# Patient Record
Sex: MALE | Race: WHITE | Employment: OTHER | URBAN - METROPOLITAN AREA
[De-identification: names, ages, dates, MRNs, and addresses within clinical notes are randomized per-mention and may not be internally consistent; named-entity substitution may affect disease eponyms.]

---

## 2017-11-09 PROBLEM — F09 COGNITIVE DYSFUNCTION: Status: ACTIVE | Noted: 2017-11-09

## 2018-03-15 ENCOUNTER — NURSE ONLY (OUTPATIENT)
Dept: CARDIOLOGY CLINIC | Age: 63
End: 2018-03-15
Payer: MEDICARE

## 2018-03-15 DIAGNOSIS — I48.20 CHRONIC ATRIAL FIBRILLATION (HCC): ICD-10-CM

## 2018-03-15 LAB
INTERNATIONAL NORMALIZATION RATIO, POC: 3
PROTHROMBIN TIME, POC: NORMAL

## 2018-03-15 PROCEDURE — 85610 PROTHROMBIN TIME: CPT | Performed by: INTERNAL MEDICINE

## 2018-03-15 PROCEDURE — 93793 ANTICOAG MGMT PT WARFARIN: CPT | Performed by: INTERNAL MEDICINE

## 2018-04-12 ENCOUNTER — NURSE ONLY (OUTPATIENT)
Dept: CARDIOLOGY CLINIC | Age: 63
End: 2018-04-12
Payer: MEDICARE

## 2018-04-12 DIAGNOSIS — I48.20 CHRONIC ATRIAL FIBRILLATION (HCC): ICD-10-CM

## 2018-04-12 LAB
INTERNATIONAL NORMALIZATION RATIO, POC: 2.7
PROTHROMBIN TIME, POC: NORMAL

## 2018-04-12 PROCEDURE — 85610 PROTHROMBIN TIME: CPT | Performed by: INTERNAL MEDICINE

## 2018-04-12 PROCEDURE — 93793 ANTICOAG MGMT PT WARFARIN: CPT | Performed by: INTERNAL MEDICINE

## 2018-05-10 ENCOUNTER — NURSE ONLY (OUTPATIENT)
Dept: CARDIOLOGY CLINIC | Age: 63
End: 2018-05-10
Payer: MEDICARE

## 2018-05-10 DIAGNOSIS — I48.20 CHRONIC ATRIAL FIBRILLATION (HCC): ICD-10-CM

## 2018-05-10 LAB
INTERNATIONAL NORMALIZATION RATIO, POC: 2
PROTHROMBIN TIME, POC: NORMAL

## 2018-05-10 PROCEDURE — 93793 ANTICOAG MGMT PT WARFARIN: CPT | Performed by: INTERNAL MEDICINE

## 2018-05-10 PROCEDURE — 85610 PROTHROMBIN TIME: CPT | Performed by: INTERNAL MEDICINE

## 2018-06-07 ENCOUNTER — NURSE ONLY (OUTPATIENT)
Dept: CARDIOLOGY CLINIC | Age: 63
End: 2018-06-07
Payer: MEDICARE

## 2018-06-07 DIAGNOSIS — I48.20 CHRONIC ATRIAL FIBRILLATION (HCC): ICD-10-CM

## 2018-06-07 LAB
INTERNATIONAL NORMALIZATION RATIO, POC: 2.5
PROTHROMBIN TIME, POC: NORMAL

## 2018-06-07 PROCEDURE — 93793 ANTICOAG MGMT PT WARFARIN: CPT | Performed by: INTERNAL MEDICINE

## 2018-06-07 PROCEDURE — 85610 PROTHROMBIN TIME: CPT | Performed by: INTERNAL MEDICINE

## 2018-06-27 RX ORDER — CARVEDILOL 12.5 MG/1
TABLET ORAL
Qty: 180 TABLET | Refills: 3 | Status: SHIPPED | OUTPATIENT
Start: 2018-06-27 | End: 2019-05-02 | Stop reason: SDUPTHER

## 2018-07-12 ENCOUNTER — NURSE ONLY (OUTPATIENT)
Dept: CARDIOLOGY CLINIC | Age: 63
End: 2018-07-12
Payer: MEDICARE

## 2018-07-12 DIAGNOSIS — I48.20 CHRONIC ATRIAL FIBRILLATION (HCC): ICD-10-CM

## 2018-07-12 LAB
INTERNATIONAL NORMALIZATION RATIO, POC: 3.3
PROTHROMBIN TIME, POC: ABNORMAL

## 2018-07-12 PROCEDURE — 85610 PROTHROMBIN TIME: CPT | Performed by: INTERNAL MEDICINE

## 2018-08-10 ENCOUNTER — NURSE ONLY (OUTPATIENT)
Dept: CARDIOLOGY CLINIC | Age: 63
End: 2018-08-10
Payer: MEDICARE

## 2018-08-10 DIAGNOSIS — I48.20 CHRONIC ATRIAL FIBRILLATION (HCC): ICD-10-CM

## 2018-08-10 LAB
INTERNATIONAL NORMALIZATION RATIO, POC: 2.3
PROTHROMBIN TIME, POC: NORMAL

## 2018-08-10 PROCEDURE — 85610 PROTHROMBIN TIME: CPT | Performed by: INTERNAL MEDICINE

## 2018-08-10 PROCEDURE — 93793 ANTICOAG MGMT PT WARFARIN: CPT | Performed by: INTERNAL MEDICINE

## 2018-09-14 ENCOUNTER — NURSE ONLY (OUTPATIENT)
Dept: CARDIOLOGY CLINIC | Age: 63
End: 2018-09-14
Payer: MEDICARE

## 2018-09-14 DIAGNOSIS — I48.91 ATRIAL FIBRILLATION, UNSPECIFIED TYPE (HCC): Primary | ICD-10-CM

## 2018-09-14 LAB
INTERNATIONAL NORMALIZATION RATIO, POC: 3.7
PROTHROMBIN TIME, POC: ABNORMAL

## 2018-09-14 PROCEDURE — 85610 PROTHROMBIN TIME: CPT | Performed by: INTERNAL MEDICINE

## 2018-10-12 ENCOUNTER — ANTI-COAG VISIT (OUTPATIENT)
Dept: CARDIOLOGY CLINIC | Age: 63
End: 2018-10-12
Payer: MEDICARE

## 2018-10-12 ENCOUNTER — TELEPHONE (OUTPATIENT)
Dept: CARDIOLOGY CLINIC | Age: 63
End: 2018-10-12

## 2018-10-12 ENCOUNTER — OFFICE VISIT (OUTPATIENT)
Dept: CARDIOLOGY CLINIC | Age: 63
End: 2018-10-12
Payer: MEDICARE

## 2018-10-12 ENCOUNTER — HOSPITAL ENCOUNTER (OUTPATIENT)
Age: 63
Discharge: HOME | End: 2018-10-12
Payer: MEDICARE

## 2018-10-12 VITALS
SYSTOLIC BLOOD PRESSURE: 134 MMHG | HEIGHT: 68 IN | DIASTOLIC BLOOD PRESSURE: 78 MMHG | HEART RATE: 77 BPM | BODY MASS INDEX: 32.13 KG/M2 | WEIGHT: 212 LBS

## 2018-10-12 DIAGNOSIS — I42.8 NONISCHEMIC CARDIOMYOPATHY (HCC): Primary | ICD-10-CM

## 2018-10-12 DIAGNOSIS — I50.42 CHRONIC COMBINED SYSTOLIC AND DIASTOLIC CONGESTIVE HEART FAILURE (HCC): ICD-10-CM

## 2018-10-12 DIAGNOSIS — I48.21 PERMANENT ATRIAL FIBRILLATION (HCC): ICD-10-CM

## 2018-10-12 DIAGNOSIS — N18.30 CHRONIC KIDNEY DISEASE, STAGE III (MODERATE) (HCC): ICD-10-CM

## 2018-10-12 DIAGNOSIS — I10 ESSENTIAL HYPERTENSION: ICD-10-CM

## 2018-10-12 DIAGNOSIS — I48.91 ATRIAL FIBRILLATION, UNSPECIFIED TYPE (HCC): ICD-10-CM

## 2018-10-12 DIAGNOSIS — F09 COGNITIVE DYSFUNCTION: ICD-10-CM

## 2018-10-12 DIAGNOSIS — I48.20 CHRONIC ATRIAL FIBRILLATION (HCC): ICD-10-CM

## 2018-10-12 LAB
ANION GAP SERPL CALCULATED.3IONS-SCNC: 11 MMOL/L (ref 7–16)
BUN BLDV-MCNC: 25 MG/DL (ref 8–23)
CALCIUM SERPL-MCNC: 9.3 MG/DL (ref 8.6–10.2)
CHLORIDE BLD-SCNC: 102 MMOL/L (ref 98–107)
CO2: 26 MMOL/L (ref 22–29)
CREAT SERPL-MCNC: 1.2 MG/DL (ref 0.7–1.2)
GFR AFRICAN AMERICAN: >60
GFR NON-AFRICAN AMERICAN: >60 ML/MIN/1.73
GLUCOSE BLD-MCNC: 109 MG/DL (ref 74–109)
INTERNATIONAL NORMALIZATION RATIO, POC: 2.6
POTASSIUM SERPL-SCNC: 4.3 MMOL/L (ref 3.5–5)
PROTHROMBIN TIME, POC: NORMAL
SODIUM BLD-SCNC: 139 MMOL/L (ref 132–146)

## 2018-10-12 PROCEDURE — 99214 OFFICE O/P EST MOD 30 MIN: CPT | Performed by: INTERNAL MEDICINE

## 2018-10-12 PROCEDURE — 80048 BASIC METABOLIC PNL TOTAL CA: CPT

## 2018-10-12 PROCEDURE — G8417 CALC BMI ABV UP PARAM F/U: HCPCS | Performed by: INTERNAL MEDICINE

## 2018-10-12 PROCEDURE — 1036F TOBACCO NON-USER: CPT | Performed by: INTERNAL MEDICINE

## 2018-10-12 PROCEDURE — 4040F PNEUMOC VAC/ADMIN/RCVD: CPT | Performed by: INTERNAL MEDICINE

## 2018-10-12 PROCEDURE — 85610 PROTHROMBIN TIME: CPT | Performed by: INTERNAL MEDICINE

## 2018-10-12 PROCEDURE — 1123F ACP DISCUSS/DSCN MKR DOCD: CPT | Performed by: INTERNAL MEDICINE

## 2018-10-12 PROCEDURE — 93000 ELECTROCARDIOGRAM COMPLETE: CPT | Performed by: INTERNAL MEDICINE

## 2018-10-12 PROCEDURE — G8484 FLU IMMUNIZE NO ADMIN: HCPCS | Performed by: INTERNAL MEDICINE

## 2018-10-12 PROCEDURE — 36415 COLL VENOUS BLD VENIPUNCTURE: CPT

## 2018-10-12 PROCEDURE — 3017F COLORECTAL CA SCREEN DOC REV: CPT | Performed by: INTERNAL MEDICINE

## 2018-10-12 PROCEDURE — G8427 DOCREV CUR MEDS BY ELIG CLIN: HCPCS | Performed by: INTERNAL MEDICINE

## 2018-10-12 PROCEDURE — 1101F PT FALLS ASSESS-DOCD LE1/YR: CPT | Performed by: INTERNAL MEDICINE

## 2018-10-12 NOTE — PROGRESS NOTES
OUTPATIENT CARDIOLOGY FOLLOW-UP    Name: Sherly Gordon    Age: 70 y.o. Primary Care Physician: Ilene Fischer MD    Date of Service: 10/12/2018    Chief Complaint: Ischemic cardiomyopathy, chronic combined systolic and diastolic cardiac, permanent atrial fibrillation, hypertension, chronic kidney disease    Interim History: Since his most recent assessment, the patient is remained stable from a cardiovascular standpoint with no interim symptoms of anginal-like chest discomfort or other ischemic equivalents. He denies changes of his functional capabilities or manifestations suggestive of decompensated left systolic dysfunction or volume overload. He has noted no arrhythmia related symptoms and denies symptoms of a focal neurologic origin nor bleeding in the face of chronic oral anticoagulation. The time of present evaluation he remains normotensive. Review of Systems: The remainder of a complete multisystem review including consitutional, central nervous, respiratory, circulatory, gastrointestinal, genitourinary, endocrinologic, hematologic, musculoskeletal and psychiatric are negative. Past Medical History:  Past Medical History:   Diagnosis Date    CHF (congestive heart failure) ([address] 5/03    severely impaired LV systolic function and CHF, EF 25-30%    H/O cardiovascular stress test 5/03    No evidence of stress-induced ischemia    H/O Doppler echocardiogram 5/04/10    [facility], mildly dilated LV, mod concentric LVH, normal LV systolic function, mod dilated left atrium, trace MR    Hypertension     Obesity     Sarcoidosis        Past Surgical History:  Past Surgical History:   Procedure Laterality Date    TONSILLECTOMY         Family History:  Family History   Problem Relation Age of Onset    Diabetes Mother     Diabetes Father     Hypertension Sister        Social History:  Social History     Social History    Marital status:       Spouse name: N/A    Number of children: N/A    Years chemistries have been advised. Continued appropriate monitoring of anticoagulation goals of maintaining prothrombin times and range of 2.0-3.0 times INR control will be essential to reducing risk of future atherosclerotic development. I presently plan to continue annual cardiovascular reassessment would happily reevaluate him in the interim should additional cardiovascular difficulties or concerns arise. The patient's current medication list, allergies, problem list and results of all previously ordered testing were reviewed at today's visit. Follow-up office visit in 1 year      Note: This report was completed using computerized voice recognition software. Every effort has been made to ensure accuracy, however; and invert and computerized transcription errors may be present. Spring Kendrick.  Pool Gupta, [facility] Cardiology

## 2018-11-15 ENCOUNTER — ANTI-COAG VISIT (OUTPATIENT)
Dept: CARDIOLOGY CLINIC | Age: 63
End: 2018-11-15
Payer: MEDICARE

## 2018-11-15 DIAGNOSIS — I48.91 ATRIAL FIBRILLATION, UNSPECIFIED TYPE (HCC): ICD-10-CM

## 2018-11-15 LAB
INTERNATIONAL NORMALIZATION RATIO, POC: 1.9
PROTHROMBIN TIME, POC: NORMAL

## 2018-11-15 PROCEDURE — 85610 PROTHROMBIN TIME: CPT | Performed by: INTERNAL MEDICINE

## 2018-11-15 PROCEDURE — 93793 ANTICOAG MGMT PT WARFARIN: CPT | Performed by: INTERNAL MEDICINE

## 2018-12-13 ENCOUNTER — ANTI-COAG VISIT (OUTPATIENT)
Dept: CARDIOLOGY CLINIC | Age: 63
End: 2018-12-13
Payer: MEDICARE

## 2018-12-13 DIAGNOSIS — I48.91 ATRIAL FIBRILLATION, UNSPECIFIED TYPE (HCC): ICD-10-CM

## 2018-12-13 LAB
INTERNATIONAL NORMALIZATION RATIO, POC: 2
PROTHROMBIN TIME, POC: NORMAL

## 2018-12-13 PROCEDURE — 85610 PROTHROMBIN TIME: CPT | Performed by: INTERNAL MEDICINE

## 2018-12-13 PROCEDURE — 93793 ANTICOAG MGMT PT WARFARIN: CPT | Performed by: INTERNAL MEDICINE

## 2019-01-17 ENCOUNTER — ANTI-COAG VISIT (OUTPATIENT)
Dept: CARDIOLOGY CLINIC | Age: 64
End: 2019-01-17
Payer: MEDICARE

## 2019-01-17 DIAGNOSIS — I48.91 ATRIAL FIBRILLATION, UNSPECIFIED TYPE (HCC): ICD-10-CM

## 2019-01-17 LAB
INTERNATIONAL NORMALIZATION RATIO, POC: 2.4
PROTHROMBIN TIME, POC: NORMAL

## 2019-01-17 PROCEDURE — 85610 PROTHROMBIN TIME: CPT | Performed by: INTERNAL MEDICINE

## 2019-01-17 PROCEDURE — 93793 ANTICOAG MGMT PT WARFARIN: CPT | Performed by: INTERNAL MEDICINE

## 2019-01-31 ENCOUNTER — OFFICE VISIT (OUTPATIENT)
Dept: FAMILY MEDICINE CLINIC | Age: 64
End: 2019-01-31
Payer: MEDICARE

## 2019-01-31 ENCOUNTER — HOSPITAL ENCOUNTER (OUTPATIENT)
Age: 64
Discharge: HOME | End: 2019-01-31
Payer: MEDICARE

## 2019-01-31 VITALS
RESPIRATION RATE: 20 BRPM | TEMPERATURE: 97.6 F | DIASTOLIC BLOOD PRESSURE: 82 MMHG | BODY MASS INDEX: 33.19 KG/M2 | HEART RATE: 86 BPM | HEIGHT: 68 IN | OXYGEN SATURATION: 98 % | SYSTOLIC BLOOD PRESSURE: 146 MMHG | WEIGHT: 219 LBS

## 2019-01-31 DIAGNOSIS — Z83.3 FAMILY HISTORY OF DIABETES MELLITUS: ICD-10-CM

## 2019-01-31 DIAGNOSIS — Z00.00 PREVENTATIVE HEALTH CARE: ICD-10-CM

## 2019-01-31 DIAGNOSIS — Z87.39 HISTORY OF GOUT: ICD-10-CM

## 2019-01-31 DIAGNOSIS — Z86.2 H/O SARCOIDOSIS: ICD-10-CM

## 2019-01-31 DIAGNOSIS — R79.9 ABNORMAL FINDING OF BLOOD CHEMISTRY: ICD-10-CM

## 2019-01-31 DIAGNOSIS — I42.8 NONISCHEMIC CARDIOMYOPATHY (HCC): ICD-10-CM

## 2019-01-31 DIAGNOSIS — E66.8 MODERATE OBESITY: ICD-10-CM

## 2019-01-31 DIAGNOSIS — I50.42 CHRONIC COMBINED SYSTOLIC AND DIASTOLIC CONGESTIVE HEART FAILURE (HCC): ICD-10-CM

## 2019-01-31 DIAGNOSIS — Z76.89 ENCOUNTER TO ESTABLISH CARE: Primary | ICD-10-CM

## 2019-01-31 DIAGNOSIS — S63.501A SPRAIN OF RIGHT WRIST, INITIAL ENCOUNTER: ICD-10-CM

## 2019-01-31 DIAGNOSIS — Z23 ENCOUNTER FOR IMMUNIZATION: ICD-10-CM

## 2019-01-31 DIAGNOSIS — I48.21 PERMANENT ATRIAL FIBRILLATION (HCC): ICD-10-CM

## 2019-01-31 DIAGNOSIS — I10 ESSENTIAL HYPERTENSION: ICD-10-CM

## 2019-01-31 PROCEDURE — G8484 FLU IMMUNIZE NO ADMIN: HCPCS | Performed by: FAMILY MEDICINE

## 2019-01-31 PROCEDURE — G0009 ADMIN PNEUMOCOCCAL VACCINE: HCPCS | Performed by: FAMILY MEDICINE

## 2019-01-31 PROCEDURE — 1123F ACP DISCUSS/DSCN MKR DOCD: CPT | Performed by: FAMILY MEDICINE

## 2019-01-31 PROCEDURE — G8417 CALC BMI ABV UP PARAM F/U: HCPCS | Performed by: FAMILY MEDICINE

## 2019-01-31 PROCEDURE — 1101F PT FALLS ASSESS-DOCD LE1/YR: CPT | Performed by: FAMILY MEDICINE

## 2019-01-31 PROCEDURE — 90670 PCV13 VACCINE IM: CPT | Performed by: FAMILY MEDICINE

## 2019-01-31 PROCEDURE — 99203 OFFICE O/P NEW LOW 30 MIN: CPT | Performed by: FAMILY MEDICINE

## 2019-01-31 PROCEDURE — 1036F TOBACCO NON-USER: CPT | Performed by: FAMILY MEDICINE

## 2019-01-31 PROCEDURE — 4040F PNEUMOC VAC/ADMIN/RCVD: CPT | Performed by: FAMILY MEDICINE

## 2019-01-31 PROCEDURE — 3017F COLORECTAL CA SCREEN DOC REV: CPT | Performed by: FAMILY MEDICINE

## 2019-01-31 PROCEDURE — G8427 DOCREV CUR MEDS BY ELIG CLIN: HCPCS | Performed by: FAMILY MEDICINE

## 2019-01-31 ASSESSMENT — PATIENT HEALTH QUESTIONNAIRE - PHQ9
1. LITTLE INTEREST OR PLEASURE IN DOING THINGS: 0
SUM OF ALL RESPONSES TO PHQ QUESTIONS 1-9: 0
SUM OF ALL RESPONSES TO PHQ9 QUESTIONS 1 & 2: 0
SUM OF ALL RESPONSES TO PHQ QUESTIONS 1-9: 0
2. FEELING DOWN, DEPRESSED OR HOPELESS: 0

## 2019-01-31 ASSESSMENT — ENCOUNTER SYMPTOMS
NAUSEA: 0
ABDOMINAL PAIN: 0
COUGH: 0
RHINORRHEA: 0
VOMITING: 0
SORE THROAT: 0
DIARRHEA: 0
BACK PAIN: 0
SHORTNESS OF BREATH: 0
SINUS PAIN: 0
CONSTIPATION: 0

## 2019-02-14 ENCOUNTER — ANTI-COAG VISIT (OUTPATIENT)
Dept: CARDIOLOGY CLINIC | Age: 64
End: 2019-02-14
Payer: MEDICARE

## 2019-02-14 DIAGNOSIS — I48.91 ATRIAL FIBRILLATION, UNSPECIFIED TYPE (HCC): ICD-10-CM

## 2019-02-14 LAB
INTERNATIONAL NORMALIZATION RATIO, POC: 3.7
PROTHROMBIN TIME, POC: ABNORMAL

## 2019-02-14 PROCEDURE — 93793 ANTICOAG MGMT PT WARFARIN: CPT | Performed by: INTERNAL MEDICINE

## 2019-02-14 PROCEDURE — 85610 PROTHROMBIN TIME: CPT | Performed by: INTERNAL MEDICINE

## 2019-03-14 ENCOUNTER — ANTI-COAG VISIT (OUTPATIENT)
Dept: CARDIOLOGY CLINIC | Age: 64
End: 2019-03-14
Payer: MEDICARE

## 2019-03-14 DIAGNOSIS — I48.91 ATRIAL FIBRILLATION, UNSPECIFIED TYPE (HCC): ICD-10-CM

## 2019-03-14 LAB
INTERNATIONAL NORMALIZATION RATIO, POC: 1.8
PROTHROMBIN TIME, POC: ABNORMAL

## 2019-03-14 PROCEDURE — 93793 ANTICOAG MGMT PT WARFARIN: CPT | Performed by: INTERNAL MEDICINE

## 2019-03-14 PROCEDURE — 85610 PROTHROMBIN TIME: CPT | Performed by: INTERNAL MEDICINE

## 2019-04-04 ENCOUNTER — ANTI-COAG VISIT (OUTPATIENT)
Dept: CARDIOLOGY CLINIC | Age: 64
End: 2019-04-04
Payer: MEDICARE

## 2019-04-04 ENCOUNTER — HOSPITAL ENCOUNTER (OUTPATIENT)
Age: 64
Discharge: HOME | End: 2019-04-04
Payer: MEDICARE

## 2019-04-04 DIAGNOSIS — I10 ESSENTIAL HYPERTENSION: ICD-10-CM

## 2019-04-04 DIAGNOSIS — Z00.00 PREVENTATIVE HEALTH CARE: ICD-10-CM

## 2019-04-04 DIAGNOSIS — R79.9 ABNORMAL FINDING OF BLOOD CHEMISTRY: ICD-10-CM

## 2019-04-04 DIAGNOSIS — Z87.39 HISTORY OF GOUT: ICD-10-CM

## 2019-04-04 DIAGNOSIS — I48.91 ATRIAL FIBRILLATION, UNSPECIFIED TYPE (HCC): ICD-10-CM

## 2019-04-04 DIAGNOSIS — Z83.3 FAMILY HISTORY OF DIABETES MELLITUS: ICD-10-CM

## 2019-04-04 DIAGNOSIS — E66.8 MODERATE OBESITY: ICD-10-CM

## 2019-04-04 LAB
ALBUMIN SERPL-MCNC: 4 G/DL (ref 3.5–5.2)
ALP BLD-CCNC: 79 U/L (ref 40–129)
ALT SERPL-CCNC: 40 U/L (ref 0–40)
ANION GAP SERPL CALCULATED.3IONS-SCNC: 9 MMOL/L (ref 7–16)
AST SERPL-CCNC: 29 U/L (ref 0–39)
BILIRUB SERPL-MCNC: 0.6 MG/DL (ref 0–1.2)
BUN BLDV-MCNC: 23 MG/DL (ref 8–23)
CALCIUM SERPL-MCNC: 9.4 MG/DL (ref 8.6–10.2)
CHLORIDE BLD-SCNC: 103 MMOL/L (ref 98–107)
CHOLESTEROL, TOTAL: 183 MG/DL (ref 0–199)
CO2: 29 MMOL/L (ref 22–29)
CREAT SERPL-MCNC: 1.3 MG/DL (ref 0.7–1.2)
GFR AFRICAN AMERICAN: >60
GFR NON-AFRICAN AMERICAN: 55 ML/MIN/1.73
GLUCOSE BLD-MCNC: 100 MG/DL (ref 74–99)
HBA1C MFR BLD: 5.5 % (ref 4–5.6)
HDLC SERPL-MCNC: 36 MG/DL
INTERNATIONAL NORMALIZATION RATIO, POC: 3
LDL CHOLESTEROL CALCULATED: 108 MG/DL (ref 0–99)
POTASSIUM SERPL-SCNC: 4.1 MMOL/L (ref 3.5–5)
PROTHROMBIN TIME, POC: NORMAL
SODIUM BLD-SCNC: 141 MMOL/L (ref 132–146)
TOTAL PROTEIN: 7 G/DL (ref 6.4–8.3)
TRIGL SERPL-MCNC: 193 MG/DL (ref 0–149)
URIC ACID, SERUM: 8.4 MG/DL (ref 3.4–7)
VLDLC SERPL CALC-MCNC: 39 MG/DL

## 2019-04-04 PROCEDURE — 80053 COMPREHEN METABOLIC PANEL: CPT

## 2019-04-04 PROCEDURE — 86803 HEPATITIS C AB TEST: CPT

## 2019-04-04 PROCEDURE — 83036 HEMOGLOBIN GLYCOSYLATED A1C: CPT

## 2019-04-04 PROCEDURE — 93793 ANTICOAG MGMT PT WARFARIN: CPT | Performed by: INTERNAL MEDICINE

## 2019-04-04 PROCEDURE — 80061 LIPID PANEL: CPT

## 2019-04-04 PROCEDURE — 84550 ASSAY OF BLOOD/URIC ACID: CPT

## 2019-04-04 PROCEDURE — 36415 COLL VENOUS BLD VENIPUNCTURE: CPT

## 2019-04-04 PROCEDURE — 85610 PROTHROMBIN TIME: CPT | Performed by: INTERNAL MEDICINE

## 2019-04-05 LAB — HEPATITIS C ANTIBODY INTERPRETATION: NORMAL

## 2019-05-02 ENCOUNTER — ANTI-COAG VISIT (OUTPATIENT)
Dept: CARDIOLOGY CLINIC | Age: 64
End: 2019-05-02
Payer: MEDICARE

## 2019-05-02 DIAGNOSIS — I48.91 ATRIAL FIBRILLATION, UNSPECIFIED TYPE (HCC): ICD-10-CM

## 2019-05-02 LAB
INTERNATIONAL NORMALIZATION RATIO, POC: 2
PROTHROMBIN TIME, POC: NORMAL

## 2019-05-02 PROCEDURE — 93793 ANTICOAG MGMT PT WARFARIN: CPT | Performed by: INTERNAL MEDICINE

## 2019-05-02 PROCEDURE — 85610 PROTHROMBIN TIME: CPT | Performed by: INTERNAL MEDICINE

## 2019-05-02 RX ORDER — DIGOXIN 125 MCG
TABLET ORAL
Qty: 90 TABLET | Refills: 3 | Status: SHIPPED | OUTPATIENT
Start: 2019-05-02 | End: 2019-12-12 | Stop reason: DRUGHIGH

## 2019-05-02 RX ORDER — WARFARIN SODIUM 5 MG/1
TABLET ORAL
Qty: 90 TABLET | Refills: 3 | Status: ON HOLD | OUTPATIENT
Start: 2019-05-02 | End: 2019-11-15 | Stop reason: HOSPADM

## 2019-05-02 RX ORDER — AMLODIPINE BESYLATE 5 MG/1
TABLET ORAL
Qty: 90 TABLET | Refills: 3 | Status: SHIPPED | OUTPATIENT
Start: 2019-05-02 | End: 2019-10-18 | Stop reason: ALTCHOICE

## 2019-05-02 RX ORDER — FUROSEMIDE 40 MG/1
TABLET ORAL
Qty: 90 TABLET | Refills: 3 | Status: SHIPPED | OUTPATIENT
Start: 2019-05-02 | End: 2019-12-12

## 2019-05-02 RX ORDER — CARVEDILOL 12.5 MG/1
TABLET ORAL
Qty: 180 TABLET | Refills: 3 | Status: SHIPPED | OUTPATIENT
Start: 2019-05-02 | End: 2019-10-18 | Stop reason: DRUGHIGH

## 2019-05-02 NOTE — PATIENT INSTRUCTIONS
INR 2.0 Continue alternating every 3 days 7.5 mg, 7.5 mg and 5 mg recheck in 1 month per Dr. Pinky Oneil

## 2019-05-08 ENCOUNTER — OFFICE VISIT (OUTPATIENT)
Dept: FAMILY MEDICINE CLINIC | Age: 64
End: 2019-05-08
Payer: MEDICARE

## 2019-05-08 VITALS
DIASTOLIC BLOOD PRESSURE: 72 MMHG | OXYGEN SATURATION: 98 % | SYSTOLIC BLOOD PRESSURE: 130 MMHG | TEMPERATURE: 97.7 F | WEIGHT: 223.8 LBS | HEART RATE: 80 BPM | RESPIRATION RATE: 16 BRPM | BODY MASS INDEX: 33.92 KG/M2 | HEIGHT: 68 IN

## 2019-05-08 DIAGNOSIS — E79.0 HYPERURICEMIA: ICD-10-CM

## 2019-05-08 DIAGNOSIS — Z23 NEED FOR TDAP VACCINATION: ICD-10-CM

## 2019-05-08 DIAGNOSIS — E78.2 MIXED HYPERLIPIDEMIA: ICD-10-CM

## 2019-05-08 DIAGNOSIS — Z12.11 SCREEN FOR COLON CANCER: ICD-10-CM

## 2019-05-08 DIAGNOSIS — Z00.00 ROUTINE GENERAL MEDICAL EXAMINATION AT A HEALTH CARE FACILITY: ICD-10-CM

## 2019-05-08 DIAGNOSIS — Z00.00 MEDICARE ANNUAL WELLNESS VISIT, INITIAL: Primary | ICD-10-CM

## 2019-05-08 PROCEDURE — 90471 IMMUNIZATION ADMIN: CPT | Performed by: FAMILY MEDICINE

## 2019-05-08 PROCEDURE — 4040F PNEUMOC VAC/ADMIN/RCVD: CPT | Performed by: FAMILY MEDICINE

## 2019-05-08 PROCEDURE — G0438 PPPS, INITIAL VISIT: HCPCS | Performed by: FAMILY MEDICINE

## 2019-05-08 PROCEDURE — 1123F ACP DISCUSS/DSCN MKR DOCD: CPT | Performed by: FAMILY MEDICINE

## 2019-05-08 PROCEDURE — 3017F COLORECTAL CA SCREEN DOC REV: CPT | Performed by: FAMILY MEDICINE

## 2019-05-08 PROCEDURE — 90715 TDAP VACCINE 7 YRS/> IM: CPT | Performed by: FAMILY MEDICINE

## 2019-05-08 ASSESSMENT — LIFESTYLE VARIABLES: HOW OFTEN DO YOU HAVE A DRINK CONTAINING ALCOHOL: 0

## 2019-05-08 ASSESSMENT — PATIENT HEALTH QUESTIONNAIRE - PHQ9
SUM OF ALL RESPONSES TO PHQ QUESTIONS 1-9: 0
SUM OF ALL RESPONSES TO PHQ QUESTIONS 1-9: 0

## 2019-05-08 ASSESSMENT — ANXIETY QUESTIONNAIRES: GAD7 TOTAL SCORE: 0

## 2019-05-08 NOTE — PROGRESS NOTES
Medicare Annual Wellness Visit  Name: Garrick Sites Date: 2019   MRN: <[id number]> Sex: Male   Age: 75 y.o. Ethnicity: Non-/Non    : 1955 Race: Michelle Lott is here for Medicare AWV (Fit test given )    Screenings for behavioral, psychosocial and functional/safety risks, and cognitive dysfunction are all negative except as indicated below. These results, as well as other patient data from the [address] form, are documented in Flowsheets linked to this Encounter. No Known Allergiesic  Prior to Visit Medications    Medication Sig Taking? Authorizing Provider   warfarin (COUMADIN) 5 MG tablet TAKE 1 TAB EVERY OTHER DAY ALTERNATING WITH 1&1/2 TAB ON IN BETWEEN DAYS OR AS DIRECTED BY DR. Prasad Loo Yes Delfina Luevano MD   furosemide (LASIX) 40 MG tablet Take 1 tablet by mouth daily. Yes Delfina Luevano MD   digoxin (LANOXIN) 125 MCG tablet TAKE 1 TABLET BY MOUTH EVERY DAY Yes Delfina Luevano MD   carvedilol (COREG) 12.5 MG tablet Take 1 tablet by mouth 2 times daily. Yes Delfina Luevano MD   amLODIPine (NORVASC) 5 MG tablet TAKE 1 TABLET BY MOUTH DAILY Yes Delfina Luevano MD   potassium chloride (KLOR-CON M) 10 MEQ extended release tablet TAKE 1 TABLET BY MOUTH DAILY Yes Lissy Lopez MD   candesartan (ATACAND) 32 MG tablet TAKE 1 TABLET BY MOUTH DAILY. Yes Lissy Lopez MD   enalapril (VASOTEC) 20 MG tablet TAKE 1 TABLET BY MOUTH 2 TIMES DAILY. Yes Lissy Lopez MD   aspirin 81 MG tablet Take 81 mg by mouth daily. Yes Historical Provider, MD   Cyanocobalamin (VITAMIN B-12 CR) 1000 MCG TBCR Take 2,000 mcg by mouth daily. Yes Historical Provider, MD   potassium chloride (KLOR-CON) 10 MEQ CR tablet Take 2 tablets by mouth daily. Lawyer Alis MD   ations    Medication Sig Taking?  Authorizing Provider   warfarin (COUMADIN) 5 MG tablet TAKE 1 TAB EVERY OTHER DAY ALTERNATING WITH 1&1/2 TAB ON IN BETWEEN DAYS OR AS DIRECTED BY DR. Prasad Loo Yes Dell López MD   furosemide (LASIX) 40 MG tablet Take 1 tablet by mouth daily. Yes Dell López MD   digoxin (LANOXIN) 125 MCG tablet TAKE 1 TABLET BY MOUTH EVERY DAY Yes Dell López MD   carvedilol (COREG) 12.5 MG tablet Take 1 tablet by mouth 2 times daily. Yes Dell López MD   amLODIPine (NORVASC) 5 MG tablet TAKE 1 TABLET BY MOUTH DAILY Yes Dell López MD   potassium chloride (KLOR-CON M) 10 MEQ extended release tablet TAKE 1 TABLET BY MOUTH DAILY Yes Willie Arnold MD   candesartan (ATACAND) 32 MG tablet TAKE 1 TABLET BY MOUTH DAILY. Yes Willie Arnold MD   enalapril (VASOTEC) 20 MG tablet TAKE 1 TABLET BY MOUTH 2 TIMES DAILY. Yes Willie Arnold MD   aspirin 81 MG tablet Take 81 mg by mouth daily. Yes Historical Provider, MD   Cyanocobalamin (VITAMIN B-12 CR) 1000 MCG TBCR Take 2,000 mcg by mouth daily. Yes Historical Provider, MD   potassium chloride (KLOR-CON) 10 MEQ CR tablet Take 2 tablets by mouth daily.   David Chawla MD      Diagnosis Date    CHF (congestive heart failure) ([facility] [address] 5/03    severely impaired LV systolic function and CHF, EF 25-30%    H/O cardiovascular stress test 5/03    No evidence of stress-induced ischemia    H/O Doppler echocardiogram 5/04/10    [facility], mildly dilated LV, mod concentric LVH, normal LV systolic function, mod dilated left atrium, trace MR    Hypertension     Obesity     Sarcoidosis      Past Surgical History:   Procedure Laterality Date    TONSILLECTOMY         Family History   Problem Relation Age of Onset    Diabetes Mother     Diabetes Father     Hypertension Sister        CareTeam (Including outside providers/suppliers regularly involved in providing care):   Patient Care Team:  Isatu Yi DO as PCP - General (Family Medicine)  Isatu Yi DO as PCP - [facility] Attributed Provider  David Chawla MD as Consulting Physician (Cardiology)  Willie Arnold MD as Consulting Physician (Cardiology)    Wt Readings from Interventions:  · No Living Will: patient declined    Health Habits/Nutrition:  Health Habits/Nutrition  Do you exercise for at least 20 minutes 2-3 times per week?: Yes  Have you lost any weight without trying in the past 3 months?: No  Do you eat fewer than 2 meals per day?: No  Have you seen a dentist within the past year?: (!) No  Body mass index is 34.03 kg/m².   Health Habits/Nutrition Interventions:  · Dental exam overdue:  patient encouraged to make appointment with his/her dentist    Hearing/Vision:  Hearing/Vision  Do you or your family notice any trouble with your hearing?: (!) Yes  Do you have difficulty driving, watching TV, or doing any of your daily activities because of your eyesight?: No  Have you had an eye exam within the past year?: (!) No  Hearing/Vision Interventions:  · Hearing concerns:  patient declines any further evaluation/treatment for hearing issues    Safety:  Safety  Do you have working smoke detectors?: (!) No  Have all throw rugs been removed or fastened?: Yes  Do you have non-slip mats in all bathtubs?: Yes  Do all of your stairways have a railing or banister?: Yes  Are your doorways, halls and stairs free of clutter?: Yes  Do you always fasten your seatbelt when you are in a car?: Yes  Safety Interventions:  · Home safety tips provided    Personalized Preventive Plan   Current Health Maintenance Status  Immunization History   Administered Date(s) Administered    Pneumococcal 13-valent Conjugate ([id number]) 01/31/2019    Tdap (Boostrix, Adacel) 05/08/2019        Health Maintenance   Topic Date Due    AAA screen  1955    Shingles Vaccine (1 of 2) 02/22/2003    Colon cancer screen colonoscopy  02/22/2003    Flu vaccine (Season Ended) 09/01/2019    Pneumococcal 65+ years Vaccine (2 of 2 - PPSV23) 01/31/2020    Potassium monitoring  04/04/2020    Creatinine monitoring  04/04/2020    Lipid screen  04/04/2024    DTaP/Tdap/Td vaccine (2 - Td) 05/08/2029    Hepatitis C screen  Completed     Recommendations for Preventive Services Due: see orders and patient instructions/AVS.  .   Recommended screening schedule for the next 5-10 years is provided to the patient in written form: see Patient Instructions/AVS.    Tdap given  ASCVD discussed, declining statin therapy at this time  Hyperuricemia discussed, will continue to monitor for gout symptoms    RTO in 1 year for AWV or sooner if needed    The 10-year ASCVD risk score (Carrington Amaya, et al., 2013) is: 18.3%    Values used to calculate the score:      Age: 75 years      Sex: Male      Is Non- : No      Diabetic: No      Tobacco smoker: No      Systolic Blood Pressure: 330 mmHg      Is BP treated: Yes      HDL Cholesterol: 36 mg/dL      Total Cholesterol: 183 mg/dL

## 2019-05-08 NOTE — PATIENT INSTRUCTIONS
Personalized Preventive Plan for Stuart Morales - 5/8/2019  Medicare offers a range of preventive health benefits. Some of the tests and screenings are paid in full while other may be subject to a deductible, co-insurance, and/or copay. Some of these benefits include a comprehensive review of your medical history including lifestyle, illnesses that may run in your family, and various assessments and screenings as appropriate. After reviewing your medical record and screening and assessments performed today your provider may have ordered immunizations, labs, imaging, and/or referrals for you. A list of these orders (if applicable) as well as your Preventive Care list are included within your After Visit Summary for your review. Other Preventive Recommendations:    · A preventive eye exam performed by an eye specialist is recommended every 1-2 years to screen for glaucoma; cataracts, macular degeneration, and other eye disorders. · A preventive dental visit is recommended every 6 months. · Try to get at least 150 minutes of exercise per week or 10,000 steps per day on a pedometer . · Order or download the FREE \"Exercise & Physical Activity: Your Everyday Guide\" from [employer]. Call 6-658.236.2163 or search [employer] online. · You need 3948-6579 mg of calcium and 6198-4657 IU of vitamin D per day. It is possible to meet your calcium requirement with diet alone, but a vitamin D supplement is usually necessary to meet this goal.  · When exposed to the sun, use a sunscreen that protects against both UVA and UVB radiation with an SPF of 30 or greater. Reapply every 2 to 3 hours or after sweating, drying off with a towel, or swimming. · Always wear a seat belt when traveling in a car. Always wear a helmet when riding a bicycle or motorcycle.       Patient Education        Tdap (Tetanus, Diphtheria, Pertussis) Vaccine: What You Need to Know  Why get vaccinated? Tetanus, diphtheria, and pertussis are very serious diseases. Tdap vaccine can protect us from these diseases. And Tdap vaccine given to pregnant women can protect  babies against pertussis. Tetanus (lockjaw) is rare in the [facility] today. It causes painful muscle tightening and stiffness, usually all over the body. · It can lead to tightening of muscles in the head and neck so you can't open your mouth, swallow, or sometimes even breathe. Tetanus kills about 1 out of 10 people who are infected even after receiving the best medical care. Diphtheria is also rare in the [place] today. It can cause a thick coating to form in the back of the throat. · It can lead to breathing problems, heart failure, paralysis, and death. Pertussis (whooping cough) causes severe coughing spells, which can cause difficulty breathing, vomiting, and disturbed sleep. · It can also lead to weight loss, incontinence, and rib fractures. Up to 2 in 100 adolescents and 5 in 100 adults with pertussis are hospitalized or have complications, which could include pneumonia or death. These diseases are caused by bacteria. Diphtheria and pertussis are spread from person to person through secretions from coughing or sneezing. Tetanus enters the body through cuts, scratches, or wounds. Before vaccines, as many as 200,000 cases of diphtheria, 200,000 cases of pertussis, and hundreds of cases of tetanus were reported in the [place] each year. Since vaccination began, reports of cases for tetanus and diphtheria have dropped by about 99% and for pertussis by about 80%. Tdap vaccine  The Tdap vaccine can protect adolescents and adults from tetanus, diphtheria, and pertussis. One dose of Tdap is routinely given at age 6 or 15. People who did not get Tdap at that age should get it as soon as possible.   Tdap is especially important for health care professionals and anyone having close contact with a baby younger than 12 months. Pregnant women should get a dose of Tdap during every pregnancy, to protect the  from pertussis. Infants are most at risk for severe, life-threatening complications from pertussis. Another vaccine, called Td, protects against tetanus and diphtheria, but not pertussis. A Td booster should be given every 10 years. Tdap may be given as one of these boosters if you have never gotten Tdap before. Tdap may also be given after a severe cut or burn to prevent tetanus infection. Your doctor or the person giving you the vaccine can give you more information. Tdap may safely be given at the same time as other vaccines. Some people should not get this vaccine  · A person who has ever had a life-threatening allergic reaction after a previous dose of any diphtheria-, tetanus-, or pertussis-containing vaccine, OR has a severe allergy to any part of this vaccine, should not get Tdap vaccine. Tell the person giving the vaccine about any severe allergies. · Anyone who had coma or long repeated seizures within 7 days after a childhood dose of DTP or DTaP, or a previous dose of Tdap, should not get Tdap, unless a cause other than the vaccine was found. They can still get Td. · Talk to your doctor if you:  ? Have seizures or another nervous system problem. ? Had severe pain or swelling after any vaccine containing diphtheria, tetanus, or pertussis. ? Ever had a condition called Guillain-Barré Syndrome (GBS). ? Aren't feeling well on the day the shot is scheduled. Risks  With any medicine, including vaccines, there is a chance of side effects. These are usually mild and go away on their own. Serious reactions are also possible but are rare. Most people who get Tdap vaccine do not have any problems with it.   Mild problems following Tdap  (Did not interfere with activities)  · Pain where the shot was given (about 3 in 4 adolescents or 2 in 3 adults)  · Redness or swelling where the shot was given (about 1 person in 5)  · Mild fever of at least 100.4°F (up to about 1 in 25 adolescents or 1 in 100 adults)  · Headache (about 3 or 4 people in 10)  · Tiredness (about 1 person in 3 or 4)  · Nausea, vomiting, diarrhea, stomachache (up to 1 in 4 adolescents or 1 in 10 adults)  · Chills, sore joints (about 1 person in 10)  · Body aches (about 1 person in 3 or 4)  · Rash, swollen glands (uncommon)  Moderate problems following Tdap  (Interfered with activities, but did not require medical attention)  · Pain where the shot was given (up to 1 in 5 or 6)  · Redness or swelling where the shot was given (up to about 1 in 16 adolescents or 1 in 12 adults)  · Fever over 102°F (about 1 in 100 adolescents or 1 in 250 adults)  · Headache (about 1 in 7 adolescents or 1 in 10 adults)  · Nausea, vomiting, diarrhea, stomachache (up to 1 to 3 people in 100)  · Swelling of the entire arm where the shot was given (up to about 1 in 500)  Severe problems following Tdap  (Unable to perform usual activities; required medical attention)  · Swelling, severe pain, bleeding and redness in the arm where the shot was given (rare)  Problems that could happen after any vaccine:  · People sometimes faint after a medical procedure, including vaccination. Sitting or lying down for about 15 minutes can help prevent fainting, and injuries caused by a fall. Tell your doctor if you feel dizzy or have vision changes or ringing in the ears. · Some people get severe pain in the shoulder and have difficulty moving the arm where a shot was given. This happens very rarely. · Any medication can cause a severe allergic reaction. Such reactions from a vaccine are very rare, estimated at fewer than 1 in a million doses, and would happen within a few minutes to a few hours after the vaccination. As with any medicine, there is a very remote chance of a vaccine causing a serious injury or death. The safety of vaccines is always being monitored.  For more information, visit: www.cdc.gov/vaccinesafety. What if there is a serious problem? What should I look for? · Look for anything that concerns you, such as signs of a severe allergic reaction, very high fever, or unusual behavior. Signs of a severe allergic reaction can include hives, swelling of the face and throat, difficulty breathing, a fast heartbeat, dizziness, and weakness. These would usually start a few minutes to a few hours after the vaccination. What should I do? · If you think it is a severe allergic reaction or other emergency that can't wait, call 9-1-1 or get the person to the nearest hospital. Otherwise, call your doctor. · Afterward, the reaction should be reported to the Vaccine Adverse Event Reporting System (VAERS). Your doctor might file this report, or you can do it yourself through the VAERS web site at www.vaers. hhs.gov, or by calling 0-240.460.5140. VAERS does not give medical advice. The National Vaccine Injury Compensation Program  The National Vaccine Injury Compensation Program (VICP) is a federal program that was created to compensate people who may have been injured by certain vaccines. Persons who believe they may have been injured by a vaccine can learn about the program and about filing a claim by calling 0-313.857.4038 or visiting the [facility] [employer] website at www.[facility].gov/vaccinecompensation. There is a time limit to file a claim for compensation. How can I learn more? · Ask your doctor. He or she can give you the vaccine package insert or suggest other sources of information. · Call your local or state health department. · Contact the Centers for Disease Control and Prevention (CDC):  ? Call 2-358.859.6454 (1-800-CDC-INFO) or  ? Visit CDC's website at www.cdc.gov/vaccines  Vaccine Information Statement (Interim)  Tdap Vaccine  (2/24/15)  42 MAR Fischer [id number]-[id number]  Department of Health and Human Services  Centers for Disease Control and Prevention  Many Vaccine Information Statements are available in Occitan and other languages. See www.immunize.org/vis. Muchas hojas de información sobre vacunas están disponibles en español y en otros idiomas. Visite www.immunize.org/vis. Care instructions adapted under license by [facility] ([facility]). If you have questions about a medical condition or this instruction, always ask your healthcare professional. [place] [address] any warranty or liability for your use of this information. Personalized Preventive Plan for Stuart Morales - 5/8/2019  Medicare offers a range of preventive health benefits. Some of the tests and screenings are paid in full while other may be subject to a deductible, co-insurance, and/or copay. Some of these benefits include a comprehensive review of your medical history including lifestyle, illnesses that may run in your family, and various assessments and screenings as appropriate. After reviewing your medical record and screening and assessments performed today your provider may have ordered immunizations, labs, imaging, and/or referrals for you. A list of these orders (if applicable) as well as your Preventive Care list are included within your After Visit Summary for your review. Other Preventive Recommendations:    · A preventive eye exam performed by an eye specialist is recommended every 1-2 years to screen for glaucoma; cataracts, macular degeneration, and other eye disorders. · A preventive dental visit is recommended every 6 months. · Try to get at least 150 minutes of exercise per week or 10,000 steps per day on a pedometer . · Order or download the FREE \"Exercise & Physical Activity: Your Everyday Guide\" from [employer]. Call 2-903.403.8272 or search [employer] online. · You need 3914-3115 mg of calcium and 5185-5632 IU of vitamin D per day.  It is possible to meet your calcium requirement with diet alone, but a vitamin D supplement is usually necessary to meet this goal.  · When exposed to the sun, use a sunscreen that protects against both UVA and UVB radiation with an SPF of 30 or greater. Reapply every 2 to 3 hours or after sweating, drying off with a towel, or swimming. · Always wear a seat belt when traveling in a car. Always wear a helmet when riding a bicycle or motorcycle.

## 2019-06-06 ENCOUNTER — ANTI-COAG VISIT (OUTPATIENT)
Dept: CARDIOLOGY CLINIC | Age: 64
End: 2019-06-06
Payer: MEDICARE

## 2019-06-06 DIAGNOSIS — I48.91 ATRIAL FIBRILLATION, UNSPECIFIED TYPE (HCC): ICD-10-CM

## 2019-06-06 LAB
INTERNATIONAL NORMALIZATION RATIO, POC: 1.6
PROTHROMBIN TIME, POC: ABNORMAL

## 2019-06-06 PROCEDURE — 93793 ANTICOAG MGMT PT WARFARIN: CPT | Performed by: INTERNAL MEDICINE

## 2019-06-06 PROCEDURE — 85610 PROTHROMBIN TIME: CPT | Performed by: INTERNAL MEDICINE

## 2019-06-06 NOTE — PATIENT INSTRUCTIONS
INR 1.6 TAKE 7.5MG THIS Saturday  AS WELL THEN Continue alternating every 3 days 7.5 mg, 7.5 mg and 5 mg recheck in 1 month per Dr. Shirleyann Ormond

## 2019-06-06 NOTE — PROGRESS NOTES
INR 1.6 TAKE 7.5MG THIS Saturday  AS WELL THEN Continue alternating every 3 days 7.5 mg, 7.5 mg and 5 mg recheck in 1 month per Dr. Lakia Isaac

## 2019-07-08 ENCOUNTER — ANTI-COAG VISIT (OUTPATIENT)
Dept: CARDIOLOGY CLINIC | Age: 64
End: 2019-07-08
Payer: MEDICARE

## 2019-07-08 DIAGNOSIS — I48.91 ATRIAL FIBRILLATION, UNSPECIFIED TYPE (HCC): ICD-10-CM

## 2019-07-08 LAB
INTERNATIONAL NORMALIZATION RATIO, POC: 1.9
PROTHROMBIN TIME, POC: NORMAL

## 2019-07-08 PROCEDURE — 85610 PROTHROMBIN TIME: CPT | Performed by: INTERNAL MEDICINE

## 2019-07-08 PROCEDURE — 93793 ANTICOAG MGMT PT WARFARIN: CPT | Performed by: INTERNAL MEDICINE

## 2019-07-08 NOTE — PROGRESS NOTES
INR 1.9.   Take 7.5 mg and than continue taking  every 3 days 7.5 mg, 7.5 mg and 5 mg recheck in one month  per Dr. Michelle Barton

## 2019-07-24 RX ORDER — CEPHALEXIN 500 MG/1
500 CAPSULE ORAL 3 TIMES DAILY
Qty: 21 CAPSULE | Refills: 0 | Status: SHIPPED | OUTPATIENT
Start: 2019-07-24 | End: 2019-07-31

## 2019-07-24 RX ORDER — KETOCONAZOLE 20 MG/G
CREAM TOPICAL
Qty: 30 G | Refills: 1 | Status: SHIPPED | OUTPATIENT
Start: 2019-07-24 | End: 2019-11-12

## 2019-07-24 RX ORDER — TRIAMCINOLONE ACETONIDE 5 MG/G
CREAM TOPICAL
Qty: 15 G | Refills: 2 | Status: SHIPPED | OUTPATIENT
Start: 2019-07-24 | End: 2019-07-26 | Stop reason: SDUPTHER

## 2019-07-26 RX ORDER — METHYLPREDNISOLONE 4 MG/1
TABLET ORAL
Qty: 21 TABLET | Refills: 0 | Status: SHIPPED | OUTPATIENT
Start: 2019-07-26 | End: 2019-08-01

## 2019-07-26 RX ORDER — TRIAMCINOLONE ACETONIDE 5 MG/G
CREAM TOPICAL
Qty: 454 G | Refills: 1 | Status: SHIPPED | OUTPATIENT
Start: 2019-07-26 | End: 2019-08-02

## 2019-08-16 ENCOUNTER — TELEPHONE (OUTPATIENT)
Dept: CARDIOLOGY CLINIC | Age: 64
End: 2019-08-16

## 2019-09-09 ENCOUNTER — ANTI-COAG VISIT (OUTPATIENT)
Dept: CARDIOLOGY CLINIC | Age: 64
End: 2019-09-09
Payer: MEDICARE

## 2019-09-09 DIAGNOSIS — I48.91 ATRIAL FIBRILLATION, UNSPECIFIED TYPE (HCC): ICD-10-CM

## 2019-09-09 LAB
INTERNATIONAL NORMALIZATION RATIO, POC: 1.7
PROTHROMBIN TIME, POC: ABNORMAL

## 2019-09-09 PROCEDURE — 93793 ANTICOAG MGMT PT WARFARIN: CPT | Performed by: INTERNAL MEDICINE

## 2019-09-09 PROCEDURE — 85610 PROTHROMBIN TIME: CPT | Performed by: INTERNAL MEDICINE

## 2019-09-09 NOTE — PROGRESS NOTES
INR 1.7. Take 7.5 mg today and than continue taking 5 mg on Monday and Thursday and 7.5 mg all other days and recheck in one week per Dr Lorie Myrick.

## 2019-09-16 ENCOUNTER — ANTI-COAG VISIT (OUTPATIENT)
Dept: CARDIOLOGY CLINIC | Age: 64
End: 2019-09-16
Payer: MEDICARE

## 2019-09-16 DIAGNOSIS — I48.91 ATRIAL FIBRILLATION, UNSPECIFIED TYPE (HCC): Primary | ICD-10-CM

## 2019-09-16 LAB
INTERNATIONAL NORMALIZATION RATIO, POC: 1.6
PROTHROMBIN TIME, POC: ABNORMAL

## 2019-09-16 PROCEDURE — 85610 PROTHROMBIN TIME: CPT | Performed by: INTERNAL MEDICINE

## 2019-09-16 PROCEDURE — 93793 ANTICOAG MGMT PT WARFARIN: CPT | Performed by: INTERNAL MEDICINE

## 2019-09-23 ENCOUNTER — ANTI-COAG VISIT (OUTPATIENT)
Dept: CARDIOLOGY CLINIC | Age: 64
End: 2019-09-23
Payer: MEDICARE

## 2019-09-23 DIAGNOSIS — I48.91 ATRIAL FIBRILLATION, UNSPECIFIED TYPE (HCC): ICD-10-CM

## 2019-09-23 LAB
INTERNATIONAL NORMALIZATION RATIO, POC: 2.4
PROTHROMBIN TIME, POC: NORMAL

## 2019-09-23 PROCEDURE — 85610 PROTHROMBIN TIME: CPT | Performed by: INTERNAL MEDICINE

## 2019-09-23 PROCEDURE — 93793 ANTICOAG MGMT PT WARFARIN: CPT | Performed by: INTERNAL MEDICINE

## 2019-09-26 ENCOUNTER — OFFICE VISIT (OUTPATIENT)
Dept: FAMILY MEDICINE CLINIC | Age: 64
End: 2019-09-26
Payer: MEDICARE

## 2019-09-26 VITALS
OXYGEN SATURATION: 98 % | TEMPERATURE: 97.3 F | HEIGHT: 68 IN | BODY MASS INDEX: 35.01 KG/M2 | WEIGHT: 231 LBS | SYSTOLIC BLOOD PRESSURE: 138 MMHG | HEART RATE: 76 BPM | DIASTOLIC BLOOD PRESSURE: 78 MMHG

## 2019-09-26 DIAGNOSIS — S16.1XXA ACUTE STRAIN OF NECK MUSCLE, INITIAL ENCOUNTER: Primary | ICD-10-CM

## 2019-09-26 PROCEDURE — G8417 CALC BMI ABV UP PARAM F/U: HCPCS | Performed by: FAMILY MEDICINE

## 2019-09-26 PROCEDURE — 3017F COLORECTAL CA SCREEN DOC REV: CPT | Performed by: FAMILY MEDICINE

## 2019-09-26 PROCEDURE — G8427 DOCREV CUR MEDS BY ELIG CLIN: HCPCS | Performed by: FAMILY MEDICINE

## 2019-09-26 PROCEDURE — 1036F TOBACCO NON-USER: CPT | Performed by: FAMILY MEDICINE

## 2019-09-26 PROCEDURE — 96372 THER/PROPH/DIAG INJ SC/IM: CPT | Performed by: FAMILY MEDICINE

## 2019-09-26 PROCEDURE — 4040F PNEUMOC VAC/ADMIN/RCVD: CPT | Performed by: FAMILY MEDICINE

## 2019-09-26 PROCEDURE — 1123F ACP DISCUSS/DSCN MKR DOCD: CPT | Performed by: FAMILY MEDICINE

## 2019-09-26 PROCEDURE — 99213 OFFICE O/P EST LOW 20 MIN: CPT | Performed by: FAMILY MEDICINE

## 2019-09-26 RX ORDER — CYCLOBENZAPRINE HCL 10 MG
10 TABLET ORAL 3 TIMES DAILY PRN
Qty: 30 TABLET | Refills: 0 | Status: SHIPPED | OUTPATIENT
Start: 2019-09-26 | End: 2019-10-06

## 2019-09-26 RX ORDER — KETOROLAC TROMETHAMINE 30 MG/ML
60 INJECTION, SOLUTION INTRAMUSCULAR; INTRAVENOUS ONCE
Status: COMPLETED | OUTPATIENT
Start: 2019-09-26 | End: 2019-09-26

## 2019-09-26 RX ADMIN — KETOROLAC TROMETHAMINE 60 MG: 30 INJECTION, SOLUTION INTRAMUSCULAR; INTRAVENOUS at 12:05

## 2019-09-26 ASSESSMENT — ENCOUNTER SYMPTOMS
CONSTIPATION: 0
DIARRHEA: 0
BACK PAIN: 1
VOMITING: 0
NAUSEA: 0
SHORTNESS OF BREATH: 0
COUGH: 0

## 2019-09-26 NOTE — PROGRESS NOTES
2019     Diana Osman (:  1955) is a 75 y.o. male, with a:  Past Medical History:   Diagnosis Date    CHF (congestive heart failure) ([facility]     severely impaired LV systolic function and CHF, EF 25-30%    H/O cardiovascular stress test     No evidence of stress-induced ischemia    H/O Doppler echocardiogram 5/04/10    [facility], mildly dilated LV, mod concentric LVH, normal LV systolic function, mod dilated left atrium, trace MR    Hypertension     Obesity     Sarcoidosis        Here for evaluation of the following medical concerns:  Chief Complaint   Patient presents with    Neck Pain     x8 days started after trimming trees     Shoulder Pain     Patient reports onset of symptoms about 8 days prior following significant yardwork. He denies any trauma. He denies any previous similar episodes. He has taken aspirin with minimal relief. Review of Systems   Constitutional: Negative for chills and fever. Respiratory: Negative for cough and shortness of breath. Cardiovascular: Negative for chest pain, palpitations and leg swelling. Gastrointestinal: Negative for constipation, diarrhea, nausea and vomiting. Musculoskeletal: Positive for arthralgias, back pain, myalgias, neck pain and neck stiffness. Prior to Visit Medications    Medication Sig Taking? Authorizing Provider   ketoconazole (NIZORAL) 2 % cream Apply topically daily to pubic area Yes Kamron Chase DO   warfarin (COUMADIN) 5 MG tablet TAKE 1 TAB EVERY OTHER DAY ALTERNATING WITH 1&1/2 TAB ON IN BETWEEN DAYS OR AS DIRECTED BY DR. Terrence Nunes Yes Denise Mckeon MD   furosemide (LASIX) 40 MG tablet Take 1 tablet by mouth daily. Yes Denise Mckeon MD   digoxin (LANOXIN) 125 MCG tablet TAKE 1 TABLET BY MOUTH EVERY DAY Yes Denise Mckeon MD   carvedilol (COREG) 12.5 MG tablet Take 1 tablet by mouth 2 times daily.  Yes Denise Mckeon MD   amLODIPine (NORVASC) 5 MG tablet TAKE 1 TABLET BY MOUTH DAILY Yes Bree Ruth Desirae Esparza MD   potassium chloride (KLOR-CON M) 10 MEQ extended release tablet TAKE 1 TABLET BY MOUTH DAILY Yes Stephen Porras MD   candesartan (ATACAND) 32 MG tablet TAKE 1 TABLET BY MOUTH DAILY. Yes Stephen Porras MD   enalapril (VASOTEC) 20 MG tablet TAKE 1 TABLET BY MOUTH 2 TIMES DAILY. Yes Stephen Porras MD   aspirin 81 MG tablet Take 81 mg by mouth daily. Yes Historical Provider, MD   Cyanocobalamin (VITAMIN B-12 CR) 1000 MCG TBCR Take 2,000 mcg by mouth daily. Yes Historical Provider, MD   potassium chloride (KLOR-CON) 10 MEQ CR tablet Take 2 tablets by mouth daily. Ant Rubio MD        Social History     Tobacco Use    Smoking status: Former Smoker     Packs/day: 0.50     Years: 5.00     Pack years: 2.50     Last attempt to quit: 10/16/1993     Years since quittin.9    Smokeless tobacco: Never Used   Substance Use Topics    Alcohol use: No        Past Surgical History:   Procedure Laterality Date    TONSILLECTOMY         Vitals:    19 1143   BP: 138/78   Site: Left Upper Arm   Position: Sitting   Pulse: 76   Temp: 97.3 °F (36.3 °C)   TempSrc: Temporal   SpO2: 98%   Weight: 231 lb (104.8 kg)   Height: 5' 8\" (1.727 m)     Estimated body mass index is 35.12 kg/m² as calculated from the following:    Height as of this encounter: 5' 8\" (1.727 m). Weight as of this encounter: 231 lb (104.8 kg). Physical Exam   Constitutional: He appears well-developed and well-nourished. He appears distressed. HENT:   Head: Normocephalic and atraumatic. Eyes: Conjunctivae and EOM are normal.   Neck: Muscular tenderness present. Decreased range of motion present. Cardiovascular: Normal rate. Pulmonary/Chest: Effort normal. No respiratory distress. Abdominal: Soft. He exhibits no distension. Musculoskeletal:        Right shoulder: He exhibits decreased range of motion and tenderness. Left shoulder: He exhibits decreased range of motion and tenderness. Neurological: He is alert. 10/18/2019 10:30 AM MD [facility]         --Yaniv Cook,  on 9/26/2019 at 11:51 AM

## 2019-09-28 ENCOUNTER — APPOINTMENT (OUTPATIENT)
Dept: CT IMAGING | Age: 64
End: 2019-09-28
Payer: MEDICARE

## 2019-09-28 ENCOUNTER — HOSPITAL ENCOUNTER (EMERGENCY)
Age: 64
Discharge: HOME | End: 2019-09-28
Attending: EMERGENCY MEDICINE
Payer: MEDICARE

## 2019-09-28 VITALS
BODY MASS INDEX: 33.88 KG/M2 | TEMPERATURE: 98.6 F | HEART RATE: 74 BPM | DIASTOLIC BLOOD PRESSURE: 100 MMHG | OXYGEN SATURATION: 95 % | HEIGHT: 68 IN | WEIGHT: 223.56 LBS | RESPIRATION RATE: 18 BRPM | SYSTOLIC BLOOD PRESSURE: 149 MMHG

## 2019-09-28 DIAGNOSIS — Z79.01 ANTICOAGULATED ON COUMADIN: ICD-10-CM

## 2019-09-28 DIAGNOSIS — M54.2 NECK PAIN: Primary | ICD-10-CM

## 2019-09-28 DIAGNOSIS — R13.10 DYSPHAGIA, UNSPECIFIED TYPE: ICD-10-CM

## 2019-09-28 LAB
ALBUMIN SERPL-MCNC: 3.9 G/DL (ref 3.5–5.2)
ALP BLD-CCNC: 48 U/L (ref 40–129)
ALT SERPL-CCNC: 14 U/L (ref 0–40)
ANION GAP SERPL CALCULATED.3IONS-SCNC: 12 MMOL/L (ref 7–16)
AST SERPL-CCNC: 16 U/L (ref 0–39)
BASOPHILS ABSOLUTE: 0.06 E9/L (ref 0–0.2)
BASOPHILS RELATIVE PERCENT: 0.9 % (ref 0–2)
BILIRUB SERPL-MCNC: 1.1 MG/DL (ref 0–1.2)
BUN BLDV-MCNC: 20 MG/DL (ref 8–23)
BURR CELLS: ABNORMAL
CALCIUM SERPL-MCNC: 9.5 MG/DL (ref 8.6–10.2)
CHLORIDE BLD-SCNC: 99 MMOL/L (ref 98–107)
CO2: 25 MMOL/L (ref 22–29)
CREAT SERPL-MCNC: 1.2 MG/DL (ref 0.7–1.2)
EOSINOPHILS ABSOLUTE: 0.22 E9/L (ref 0.05–0.5)
EOSINOPHILS RELATIVE PERCENT: 3.5 % (ref 0–6)
GFR AFRICAN AMERICAN: >60
GFR NON-AFRICAN AMERICAN: >60 ML/MIN/1.73
GLUCOSE BLD-MCNC: 172 MG/DL (ref 74–99)
HCT VFR BLD CALC: 44.8 % (ref 37–54)
HEMOGLOBIN: 14.9 G/DL (ref 12.5–16.5)
INR BLD: 1.9
LYMPHOCYTES ABSOLUTE: 0.44 E9/L (ref 1.5–4)
LYMPHOCYTES RELATIVE PERCENT: 7 % (ref 20–42)
MCH RBC QN AUTO: 28.9 PG (ref 26–35)
MCHC RBC AUTO-ENTMCNC: 33.3 % (ref 32–34.5)
MCV RBC AUTO: 87 FL (ref 80–99.9)
MONOCYTES ABSOLUTE: 1.07 E9/L (ref 0.1–0.95)
MONOCYTES RELATIVE PERCENT: 16.5 % (ref 2–12)
NEUTROPHILS ABSOLUTE: 4.54 E9/L (ref 1.8–7.3)
NEUTROPHILS RELATIVE PERCENT: 72.2 % (ref 43–80)
PDW BLD-RTO: 14.6 FL (ref 11.5–15)
PLATELET # BLD: 160 E9/L (ref 130–450)
PMV BLD AUTO: 9.7 FL (ref 7–12)
POIKILOCYTES: ABNORMAL
POLYCHROMASIA: ABNORMAL
POTASSIUM SERPL-SCNC: 3.8 MMOL/L (ref 3.5–5)
PROTHROMBIN TIME: 21.4 SEC (ref 9.3–12.4)
RBC # BLD: 5.15 E12/L (ref 3.8–5.8)
SODIUM BLD-SCNC: 136 MMOL/L (ref 132–146)
TEAR DROP CELLS: ABNORMAL
TOTAL PROTEIN: 7.6 G/DL (ref 6.4–8.3)
WBC # BLD: 6.3 E9/L (ref 4.5–11.5)

## 2019-09-28 PROCEDURE — 85610 PROTHROMBIN TIME: CPT

## 2019-09-28 PROCEDURE — 85025 COMPLETE CBC W/AUTO DIFF WBC: CPT

## 2019-09-28 PROCEDURE — 80053 COMPREHEN METABOLIC PANEL: CPT

## 2019-09-28 PROCEDURE — 96372 THER/PROPH/DIAG INJ SC/IM: CPT

## 2019-09-28 PROCEDURE — 36415 COLL VENOUS BLD VENIPUNCTURE: CPT

## 2019-09-28 PROCEDURE — 71260 CT THORAX DX C+: CPT

## 2019-09-28 PROCEDURE — 6360000004 HC RX CONTRAST MEDICATION: Performed by: RADIOLOGY

## 2019-09-28 PROCEDURE — 70491 CT SOFT TISSUE NECK W/DYE: CPT

## 2019-09-28 PROCEDURE — 99284 EMERGENCY DEPT VISIT MOD MDM: CPT

## 2019-09-28 PROCEDURE — 96374 THER/PROPH/DIAG INJ IV PUSH: CPT

## 2019-09-28 PROCEDURE — 6360000002 HC RX W HCPCS: Performed by: EMERGENCY MEDICINE

## 2019-09-28 RX ORDER — ORPHENADRINE CITRATE 30 MG/ML
60 INJECTION INTRAMUSCULAR; INTRAVENOUS ONCE
Status: COMPLETED | OUTPATIENT
Start: 2019-09-28 | End: 2019-09-28

## 2019-09-28 RX ORDER — LIDOCAINE 4 G/G
1 PATCH TOPICAL DAILY
Qty: 30 PATCH | Refills: 0 | Status: SHIPPED | OUTPATIENT
Start: 2019-09-28 | End: 2019-10-28

## 2019-09-28 RX ORDER — KETOROLAC TROMETHAMINE 15 MG/ML
15 INJECTION, SOLUTION INTRAMUSCULAR; INTRAVENOUS ONCE
Status: COMPLETED | OUTPATIENT
Start: 2019-09-28 | End: 2019-09-28

## 2019-09-28 RX ADMIN — IOPAMIDOL 80 ML: 755 INJECTION, SOLUTION INTRAVENOUS at 15:57

## 2019-09-28 RX ADMIN — ORPHENADRINE CITRATE 60 MG: 30 INJECTION INTRAMUSCULAR; INTRAVENOUS at 16:02

## 2019-09-28 RX ADMIN — KETOROLAC TROMETHAMINE 15 MG: 15 INJECTION, SOLUTION INTRAMUSCULAR; INTRAVENOUS at 16:02

## 2019-09-28 ASSESSMENT — ENCOUNTER SYMPTOMS
BACK PAIN: 0
COLOR CHANGE: 0
VISUAL CHANGE: 0
PHOTOPHOBIA: 0
SORE THROAT: 0
NAUSEA: 0
SHORTNESS OF BREATH: 0
ABDOMINAL PAIN: 0
TROUBLE SWALLOWING: 1
VOMITING: 0
COUGH: 0

## 2019-09-28 ASSESSMENT — PAIN DESCRIPTION - ORIENTATION: ORIENTATION: ANTERIOR;POSTERIOR

## 2019-09-28 ASSESSMENT — PAIN DESCRIPTION - PAIN TYPE: TYPE: ACUTE PAIN

## 2019-09-28 ASSESSMENT — PAIN DESCRIPTION - DESCRIPTORS: DESCRIPTORS: ACHING

## 2019-09-28 ASSESSMENT — PAIN SCALES - GENERAL
PAINLEVEL_OUTOF10: 7
PAINLEVEL_OUTOF10: 7

## 2019-09-28 ASSESSMENT — PAIN DESCRIPTION - LOCATION: LOCATION: NECK

## 2019-10-01 DIAGNOSIS — D72.821 MONOCYTOSIS: ICD-10-CM

## 2019-10-01 DIAGNOSIS — R53.83 FATIGUE, UNSPECIFIED TYPE: ICD-10-CM

## 2019-10-01 DIAGNOSIS — R13.10 DYSPHAGIA, UNSPECIFIED TYPE: ICD-10-CM

## 2019-10-01 DIAGNOSIS — M54.2 NECK PAIN: Primary | ICD-10-CM

## 2019-10-04 ENCOUNTER — HOSPITAL ENCOUNTER (OUTPATIENT)
Age: 64
Discharge: HOME | End: 2019-10-06
Payer: MEDICARE

## 2019-10-04 DIAGNOSIS — D72.821 MONOCYTOSIS: ICD-10-CM

## 2019-10-04 DIAGNOSIS — M54.2 NECK PAIN: ICD-10-CM

## 2019-10-04 DIAGNOSIS — R53.83 FATIGUE, UNSPECIFIED TYPE: ICD-10-CM

## 2019-10-04 DIAGNOSIS — R13.10 DYSPHAGIA, UNSPECIFIED TYPE: ICD-10-CM

## 2019-10-04 LAB
ALBUMIN SERPL-MCNC: 4.1 G/DL (ref 3.5–5.2)
ALP BLD-CCNC: 54 U/L (ref 40–129)
ALT SERPL-CCNC: 21 U/L (ref 0–40)
ANION GAP SERPL CALCULATED.3IONS-SCNC: 13 MMOL/L (ref 7–16)
AST SERPL-CCNC: 29 U/L (ref 0–39)
BASOPHILS ABSOLUTE: 0.04 E9/L (ref 0–0.2)
BASOPHILS RELATIVE PERCENT: 0.6 % (ref 0–2)
BILIRUB SERPL-MCNC: 0.4 MG/DL (ref 0–1.2)
BUN BLDV-MCNC: 32 MG/DL (ref 8–23)
C-REACTIVE PROTEIN: 2.3 MG/DL (ref 0–0.4)
CALCIUM SERPL-MCNC: 9.5 MG/DL (ref 8.6–10.2)
CHLORIDE BLD-SCNC: 99 MMOL/L (ref 98–107)
CO2: 26 MMOL/L (ref 22–29)
CREAT SERPL-MCNC: 1.3 MG/DL (ref 0.7–1.2)
EOSINOPHILS ABSOLUTE: 0.46 E9/L (ref 0.05–0.5)
EOSINOPHILS RELATIVE PERCENT: 6.9 % (ref 0–6)
GFR AFRICAN AMERICAN: >60
GFR NON-AFRICAN AMERICAN: 55 ML/MIN/1.73
GLUCOSE BLD-MCNC: 98 MG/DL (ref 74–99)
HCT VFR BLD CALC: 51.8 % (ref 37–54)
HEMOGLOBIN: 15.7 G/DL (ref 12.5–16.5)
IMMATURE GRANULOCYTES #: 0.03 E9/L
IMMATURE GRANULOCYTES %: 0.5 % (ref 0–5)
LYMPHOCYTES ABSOLUTE: 0.79 E9/L (ref 1.5–4)
LYMPHOCYTES RELATIVE PERCENT: 11.9 % (ref 20–42)
MCH RBC QN AUTO: 28.2 PG (ref 26–35)
MCHC RBC AUTO-ENTMCNC: 30.3 % (ref 32–34.5)
MCV RBC AUTO: 93 FL (ref 80–99.9)
MONOCYTES ABSOLUTE: 0.55 E9/L (ref 0.1–0.95)
MONOCYTES RELATIVE PERCENT: 8.3 % (ref 2–12)
NEUTROPHILS ABSOLUTE: 4.75 E9/L (ref 1.8–7.3)
NEUTROPHILS RELATIVE PERCENT: 71.8 % (ref 43–80)
PDW BLD-RTO: 14.9 FL (ref 11.5–15)
PLATELET # BLD: 216 E9/L (ref 130–450)
PMV BLD AUTO: 10.6 FL (ref 7–12)
POTASSIUM SERPL-SCNC: 5 MMOL/L (ref 3.5–5)
RBC # BLD: 5.57 E12/L (ref 3.8–5.8)
SEDIMENTATION RATE, ERYTHROCYTE: 16 MM/HR (ref 0–15)
SODIUM BLD-SCNC: 138 MMOL/L (ref 132–146)
TOTAL PROTEIN: 7.7 G/DL (ref 6.4–8.3)
WBC # BLD: 6.6 E9/L (ref 4.5–11.5)

## 2019-10-04 PROCEDURE — 36415 COLL VENOUS BLD VENIPUNCTURE: CPT

## 2019-10-04 PROCEDURE — 86663 EPSTEIN-BARR ANTIBODY: CPT

## 2019-10-04 PROCEDURE — 86664 EPSTEIN-BARR NUCLEAR ANTIGEN: CPT

## 2019-10-04 PROCEDURE — 86665 EPSTEIN-BARR CAPSID VCA: CPT

## 2019-10-04 PROCEDURE — 80053 COMPREHEN METABOLIC PANEL: CPT

## 2019-10-04 PROCEDURE — 85651 RBC SED RATE NONAUTOMATED: CPT

## 2019-10-04 PROCEDURE — 85025 COMPLETE CBC W/AUTO DIFF WBC: CPT

## 2019-10-04 PROCEDURE — 86140 C-REACTIVE PROTEIN: CPT

## 2019-10-04 PROCEDURE — 86735 MUMPS ANTIBODY: CPT

## 2019-10-06 LAB
EPSTEIN BARR VIRUS NUCLEAR AB IGG: 479 U/ML (ref 0–21.9)
EPSTEIN-BARR EARLY ANTIGEN ANTIBODY: 9.3 U/ML (ref 0–10.9)
EPSTEIN-BARR VCA IGG: 200 U/ML (ref 0–21.9)
EPSTEIN-BARR VCA IGM: <10 U/ML (ref 0–43.9)

## 2019-10-07 LAB — PATHOLOGIST REVIEW: NORMAL

## 2019-10-08 LAB — MUMPS IGM ANTIBODY: 4.86 IV

## 2019-10-18 ENCOUNTER — OFFICE VISIT (OUTPATIENT)
Dept: CARDIOLOGY CLINIC | Age: 64
End: 2019-10-18
Payer: MEDICARE

## 2019-10-18 ENCOUNTER — ANTI-COAG VISIT (OUTPATIENT)
Dept: CARDIOLOGY CLINIC | Age: 64
End: 2019-10-18
Payer: MEDICARE

## 2019-10-18 VITALS
WEIGHT: 220 LBS | BODY MASS INDEX: 33.34 KG/M2 | HEART RATE: 81 BPM | SYSTOLIC BLOOD PRESSURE: 120 MMHG | HEIGHT: 68 IN | DIASTOLIC BLOOD PRESSURE: 80 MMHG

## 2019-10-18 DIAGNOSIS — F09 COGNITIVE DYSFUNCTION: ICD-10-CM

## 2019-10-18 DIAGNOSIS — I10 ESSENTIAL HYPERTENSION: ICD-10-CM

## 2019-10-18 DIAGNOSIS — I48.21 PERMANENT ATRIAL FIBRILLATION (HCC): ICD-10-CM

## 2019-10-18 DIAGNOSIS — I48.91 ATRIAL FIBRILLATION, UNSPECIFIED TYPE (HCC): ICD-10-CM

## 2019-10-18 DIAGNOSIS — I50.42 CHRONIC COMBINED SYSTOLIC AND DIASTOLIC CONGESTIVE HEART FAILURE (HCC): ICD-10-CM

## 2019-10-18 DIAGNOSIS — E66.8 MODERATE OBESITY: ICD-10-CM

## 2019-10-18 DIAGNOSIS — N18.2 STAGE 2 CHRONIC KIDNEY DISEASE: ICD-10-CM

## 2019-10-18 DIAGNOSIS — I42.8 NONISCHEMIC CARDIOMYOPATHY (HCC): Primary | ICD-10-CM

## 2019-10-18 LAB
INTERNATIONAL NORMALIZATION RATIO, POC: 2
PROTHROMBIN TIME, POC: NORMAL

## 2019-10-18 PROCEDURE — 99214 OFFICE O/P EST MOD 30 MIN: CPT | Performed by: INTERNAL MEDICINE

## 2019-10-18 PROCEDURE — G8417 CALC BMI ABV UP PARAM F/U: HCPCS | Performed by: INTERNAL MEDICINE

## 2019-10-18 PROCEDURE — 1036F TOBACCO NON-USER: CPT | Performed by: INTERNAL MEDICINE

## 2019-10-18 PROCEDURE — G8427 DOCREV CUR MEDS BY ELIG CLIN: HCPCS | Performed by: INTERNAL MEDICINE

## 2019-10-18 PROCEDURE — 93000 ELECTROCARDIOGRAM COMPLETE: CPT | Performed by: INTERNAL MEDICINE

## 2019-10-18 PROCEDURE — G8484 FLU IMMUNIZE NO ADMIN: HCPCS | Performed by: INTERNAL MEDICINE

## 2019-10-18 PROCEDURE — 4040F PNEUMOC VAC/ADMIN/RCVD: CPT | Performed by: INTERNAL MEDICINE

## 2019-10-18 PROCEDURE — 1123F ACP DISCUSS/DSCN MKR DOCD: CPT | Performed by: INTERNAL MEDICINE

## 2019-10-18 PROCEDURE — 3017F COLORECTAL CA SCREEN DOC REV: CPT | Performed by: INTERNAL MEDICINE

## 2019-10-18 PROCEDURE — 85610 PROTHROMBIN TIME: CPT | Performed by: INTERNAL MEDICINE

## 2019-10-18 RX ORDER — CARVEDILOL 25 MG/1
25 TABLET ORAL 2 TIMES DAILY WITH MEALS
Qty: 60 TABLET | Refills: 3 | Status: SHIPPED | OUTPATIENT
Start: 2019-10-18 | End: 2019-10-18 | Stop reason: SDUPTHER

## 2019-10-18 RX ORDER — CARVEDILOL 25 MG/1
25 TABLET ORAL 2 TIMES DAILY WITH MEALS
Qty: 60 TABLET | Refills: 3 | Status: SHIPPED | OUTPATIENT
Start: 2019-10-18 | End: 2019-12-12

## 2019-10-18 RX ORDER — CARVEDILOL 6.25 MG/1
25 TABLET ORAL 2 TIMES DAILY WITH MEALS
Qty: 60 TABLET | Refills: 3 | Status: SHIPPED | OUTPATIENT
Start: 2019-10-18 | End: 2019-10-18 | Stop reason: DRUGHIGH

## 2019-11-07 ENCOUNTER — TELEPHONE (OUTPATIENT)
Dept: CARDIOLOGY CLINIC | Age: 64
End: 2019-11-07

## 2019-11-07 ENCOUNTER — NURSE ONLY (OUTPATIENT)
Dept: CARDIOLOGY CLINIC | Age: 64
End: 2019-11-07

## 2019-11-07 VITALS
HEART RATE: 68 BPM | WEIGHT: 236 LBS | SYSTOLIC BLOOD PRESSURE: 128 MMHG | BODY MASS INDEX: 35.88 KG/M2 | DIASTOLIC BLOOD PRESSURE: 80 MMHG

## 2019-11-12 ENCOUNTER — OFFICE VISIT (OUTPATIENT)
Dept: CARDIOLOGY CLINIC | Age: 64
End: 2019-11-12
Payer: MEDICARE

## 2019-11-12 VITALS
DIASTOLIC BLOOD PRESSURE: 90 MMHG | WEIGHT: 230 LBS | HEIGHT: 68 IN | HEART RATE: 72 BPM | OXYGEN SATURATION: 96 % | BODY MASS INDEX: 34.86 KG/M2 | RESPIRATION RATE: 18 BRPM | SYSTOLIC BLOOD PRESSURE: 150 MMHG

## 2019-11-12 DIAGNOSIS — I50.42 CHRONIC COMBINED SYSTOLIC AND DIASTOLIC CONGESTIVE HEART FAILURE (HCC): ICD-10-CM

## 2019-11-12 DIAGNOSIS — I42.8 NONISCHEMIC CARDIOMYOPATHY (HCC): Primary | ICD-10-CM

## 2019-11-12 DIAGNOSIS — I48.21 PERMANENT ATRIAL FIBRILLATION (HCC): ICD-10-CM

## 2019-11-12 PROCEDURE — 1123F ACP DISCUSS/DSCN MKR DOCD: CPT | Performed by: NURSE PRACTITIONER

## 2019-11-12 PROCEDURE — 1036F TOBACCO NON-USER: CPT | Performed by: NURSE PRACTITIONER

## 2019-11-12 PROCEDURE — 4040F PNEUMOC VAC/ADMIN/RCVD: CPT | Performed by: NURSE PRACTITIONER

## 2019-11-12 PROCEDURE — G8484 FLU IMMUNIZE NO ADMIN: HCPCS | Performed by: NURSE PRACTITIONER

## 2019-11-12 PROCEDURE — 3017F COLORECTAL CA SCREEN DOC REV: CPT | Performed by: NURSE PRACTITIONER

## 2019-11-12 PROCEDURE — 99214 OFFICE O/P EST MOD 30 MIN: CPT | Performed by: NURSE PRACTITIONER

## 2019-11-12 PROCEDURE — G8427 DOCREV CUR MEDS BY ELIG CLIN: HCPCS | Performed by: NURSE PRACTITIONER

## 2019-11-12 PROCEDURE — 93000 ELECTROCARDIOGRAM COMPLETE: CPT | Performed by: INTERNAL MEDICINE

## 2019-11-12 PROCEDURE — G8417 CALC BMI ABV UP PARAM F/U: HCPCS | Performed by: NURSE PRACTITIONER

## 2019-11-14 ENCOUNTER — HOSPITAL ENCOUNTER (OUTPATIENT)
Age: 64
Setting detail: OBSERVATION
Discharge: ANOTHER HOSPITAL | End: 2019-11-15
Attending: EMERGENCY MEDICINE | Admitting: INTERNAL MEDICINE
Payer: MEDICARE

## 2019-11-14 ENCOUNTER — APPOINTMENT (OUTPATIENT)
Dept: GENERAL RADIOLOGY | Age: 64
End: 2019-11-14
Payer: MEDICARE

## 2019-11-14 DIAGNOSIS — R07.9 CHEST PAIN, UNSPECIFIED TYPE: Primary | ICD-10-CM

## 2019-11-14 LAB
ALBUMIN SERPL-MCNC: 3.6 G/DL (ref 3.5–5.2)
ALP BLD-CCNC: 54 U/L (ref 40–129)
ALT SERPL-CCNC: 23 U/L (ref 0–40)
ANION GAP SERPL CALCULATED.3IONS-SCNC: 6 MMOL/L (ref 7–16)
ANISOCYTOSIS: ABNORMAL
AST SERPL-CCNC: 26 U/L (ref 0–39)
BASOPHILS ABSOLUTE: 0.05 E9/L (ref 0–0.2)
BASOPHILS RELATIVE PERCENT: 0.9 % (ref 0–2)
BILIRUB SERPL-MCNC: 0.6 MG/DL (ref 0–1.2)
BUN BLDV-MCNC: 29 MG/DL (ref 8–23)
CALCIUM SERPL-MCNC: 9.1 MG/DL (ref 8.6–10.2)
CHLORIDE BLD-SCNC: 105 MMOL/L (ref 98–107)
CK MB: 5.5 NG/ML (ref 0–7.7)
CO2: 29 MMOL/L (ref 22–29)
CREAT SERPL-MCNC: 1.4 MG/DL (ref 0.7–1.2)
DIGOXIN LEVEL: 0.7 NG/ML (ref 0.8–2)
EKG ATRIAL RATE: 357 BPM
EKG Q-T INTERVAL: 418 MS
EKG QRS DURATION: 106 MS
EKG QTC CALCULATION (BAZETT): 406 MS
EKG R AXIS: -32 DEGREES
EKG T AXIS: -16 DEGREES
EKG VENTRICULAR RATE: 57 BPM
EOSINOPHILS ABSOLUTE: 0.19 E9/L (ref 0.05–0.5)
EOSINOPHILS RELATIVE PERCENT: 3.5 % (ref 0–6)
GFR AFRICAN AMERICAN: >60
GFR NON-AFRICAN AMERICAN: 51 ML/MIN/1.73
GLUCOSE BLD-MCNC: 131 MG/DL (ref 74–99)
HCT VFR BLD CALC: 42.8 % (ref 37–54)
HEMOGLOBIN: 13.5 G/DL (ref 12.5–16.5)
INR BLD: 2.2
LYMPHOCYTES ABSOLUTE: 0.48 E9/L (ref 1.5–4)
LYMPHOCYTES RELATIVE PERCENT: 8.7 % (ref 20–42)
MAGNESIUM: 2.2 MG/DL (ref 1.6–2.6)
MCH RBC QN AUTO: 28.5 PG (ref 26–35)
MCHC RBC AUTO-ENTMCNC: 31.5 % (ref 32–34.5)
MCV RBC AUTO: 90.5 FL (ref 80–99.9)
METAMYELOCYTES RELATIVE PERCENT: 0.9 % (ref 0–1)
MONOCYTES ABSOLUTE: 0.21 E9/L (ref 0.1–0.95)
MONOCYTES RELATIVE PERCENT: 3.5 % (ref 2–12)
MYELOCYTE PERCENT: 0.9 % (ref 0–0)
NEUTROPHILS ABSOLUTE: 4.45 E9/L (ref 1.8–7.3)
NEUTROPHILS RELATIVE PERCENT: 81.7 % (ref 43–80)
PDW BLD-RTO: 16.6 FL (ref 11.5–15)
PLATELET # BLD: 142 E9/L (ref 130–450)
PMV BLD AUTO: 10.5 FL (ref 7–12)
POTASSIUM SERPL-SCNC: 4.1 MMOL/L (ref 3.5–5)
PRO-BNP: 2950 PG/ML (ref 0–125)
PROTHROMBIN TIME: 25.2 SEC (ref 9.3–12.4)
RBC # BLD: 4.73 E12/L (ref 3.8–5.8)
SODIUM BLD-SCNC: 140 MMOL/L (ref 132–146)
TOTAL CK: 141 U/L (ref 20–200)
TOTAL PROTEIN: 6.7 G/DL (ref 6.4–8.3)
TROPONIN: 0.02 NG/ML (ref 0–0.03)
WBC # BLD: 5.3 E9/L (ref 4.5–11.5)

## 2019-11-14 PROCEDURE — 82550 ASSAY OF CK (CPK): CPT

## 2019-11-14 PROCEDURE — 85610 PROTHROMBIN TIME: CPT

## 2019-11-14 PROCEDURE — 93005 ELECTROCARDIOGRAM TRACING: CPT | Performed by: EMERGENCY MEDICINE

## 2019-11-14 PROCEDURE — 83735 ASSAY OF MAGNESIUM: CPT

## 2019-11-14 PROCEDURE — G0378 HOSPITAL OBSERVATION PER HR: HCPCS

## 2019-11-14 PROCEDURE — 80162 ASSAY OF DIGOXIN TOTAL: CPT

## 2019-11-14 PROCEDURE — 6360000002 HC RX W HCPCS: Performed by: INTERNAL MEDICINE

## 2019-11-14 PROCEDURE — 2580000003 HC RX 258: Performed by: EMERGENCY MEDICINE

## 2019-11-14 PROCEDURE — 96376 TX/PRO/DX INJ SAME DRUG ADON: CPT

## 2019-11-14 PROCEDURE — 99215 OFFICE O/P EST HI 40 MIN: CPT | Performed by: INTERNAL MEDICINE

## 2019-11-14 PROCEDURE — 71045 X-RAY EXAM CHEST 1 VIEW: CPT

## 2019-11-14 PROCEDURE — 83880 ASSAY OF NATRIURETIC PEPTIDE: CPT

## 2019-11-14 PROCEDURE — APPSS45 APP SPLIT SHARED TIME 31-45 MINUTES: Performed by: PHYSICIAN ASSISTANT

## 2019-11-14 PROCEDURE — 2580000003 HC RX 258: Performed by: INTERNAL MEDICINE

## 2019-11-14 PROCEDURE — 85025 COMPLETE CBC W/AUTO DIFF WBC: CPT

## 2019-11-14 PROCEDURE — 99285 EMERGENCY DEPT VISIT HI MDM: CPT

## 2019-11-14 PROCEDURE — 99219 PR INITIAL OBSERVATION CARE/DAY 50 MINUTES: CPT | Performed by: INTERNAL MEDICINE

## 2019-11-14 PROCEDURE — 93016 CV STRESS TEST SUPVJ ONLY: CPT | Performed by: INTERNAL MEDICINE

## 2019-11-14 PROCEDURE — 80053 COMPREHEN METABOLIC PANEL: CPT

## 2019-11-14 PROCEDURE — 6370000000 HC RX 637 (ALT 250 FOR IP): Performed by: INTERNAL MEDICINE

## 2019-11-14 PROCEDURE — 82553 CREATINE MB FRACTION: CPT

## 2019-11-14 PROCEDURE — 36415 COLL VENOUS BLD VENIPUNCTURE: CPT

## 2019-11-14 PROCEDURE — 96375 TX/PRO/DX INJ NEW DRUG ADDON: CPT

## 2019-11-14 PROCEDURE — 84484 ASSAY OF TROPONIN QUANT: CPT

## 2019-11-14 PROCEDURE — 6370000000 HC RX 637 (ALT 250 FOR IP): Performed by: EMERGENCY MEDICINE

## 2019-11-14 RX ORDER — ENALAPRIL MALEATE 10 MG/1
20 TABLET ORAL 2 TIMES DAILY
Status: DISCONTINUED | OUTPATIENT
Start: 2019-11-14 | End: 2019-11-15

## 2019-11-14 RX ORDER — MORPHINE SULFATE 2 MG/ML
1 INJECTION, SOLUTION INTRAMUSCULAR; INTRAVENOUS EVERY 4 HOURS PRN
Status: DISCONTINUED | OUTPATIENT
Start: 2019-11-14 | End: 2019-11-15 | Stop reason: HOSPADM

## 2019-11-14 RX ORDER — WARFARIN SODIUM 7.5 MG/1
7.5 TABLET ORAL DAILY
Status: DISCONTINUED | OUTPATIENT
Start: 2019-11-14 | End: 2019-11-15

## 2019-11-14 RX ORDER — SODIUM CHLORIDE 0.9 % (FLUSH) 0.9 %
10 SYRINGE (ML) INJECTION EVERY 12 HOURS SCHEDULED
Status: DISCONTINUED | OUTPATIENT
Start: 2019-11-14 | End: 2019-11-15 | Stop reason: HOSPADM

## 2019-11-14 RX ORDER — FUROSEMIDE 10 MG/ML
40 INJECTION INTRAMUSCULAR; INTRAVENOUS ONCE
Status: DISCONTINUED | OUTPATIENT
Start: 2019-11-14 | End: 2019-11-14

## 2019-11-14 RX ORDER — NITROGLYCERIN 0.4 MG/1
0.4 TABLET SUBLINGUAL EVERY 5 MIN PRN
Status: DISCONTINUED | OUTPATIENT
Start: 2019-11-14 | End: 2019-11-15 | Stop reason: HOSPADM

## 2019-11-14 RX ORDER — ONDANSETRON 2 MG/ML
4 INJECTION INTRAMUSCULAR; INTRAVENOUS EVERY 6 HOURS PRN
Status: DISCONTINUED | OUTPATIENT
Start: 2019-11-14 | End: 2019-11-15 | Stop reason: HOSPADM

## 2019-11-14 RX ORDER — LANOLIN ALCOHOL/MO/W.PET/CERES
2000 CREAM (GRAM) TOPICAL DAILY
Status: DISCONTINUED | OUTPATIENT
Start: 2019-11-14 | End: 2019-11-15 | Stop reason: HOSPADM

## 2019-11-14 RX ORDER — FUROSEMIDE 10 MG/ML
20 INJECTION INTRAMUSCULAR; INTRAVENOUS ONCE
Status: COMPLETED | OUTPATIENT
Start: 2019-11-14 | End: 2019-11-14

## 2019-11-14 RX ORDER — POTASSIUM CHLORIDE 750 MG/1
10 TABLET, EXTENDED RELEASE ORAL DAILY
Status: DISCONTINUED | OUTPATIENT
Start: 2019-11-14 | End: 2019-11-15 | Stop reason: HOSPADM

## 2019-11-14 RX ORDER — 0.9 % SODIUM CHLORIDE 0.9 %
1000 INTRAVENOUS SOLUTION INTRAVENOUS ONCE
Status: COMPLETED | OUTPATIENT
Start: 2019-11-14 | End: 2019-11-14

## 2019-11-14 RX ORDER — FUROSEMIDE 10 MG/ML
40 INJECTION INTRAMUSCULAR; INTRAVENOUS 2 TIMES DAILY
Status: DISCONTINUED | OUTPATIENT
Start: 2019-11-14 | End: 2019-11-15

## 2019-11-14 RX ORDER — SODIUM CHLORIDE 0.9 % (FLUSH) 0.9 %
10 SYRINGE (ML) INJECTION PRN
Status: DISCONTINUED | OUTPATIENT
Start: 2019-11-14 | End: 2019-11-15 | Stop reason: SDUPTHER

## 2019-11-14 RX ORDER — FUROSEMIDE 40 MG/1
40 TABLET ORAL DAILY
Status: DISCONTINUED | OUTPATIENT
Start: 2019-11-15 | End: 2019-11-14

## 2019-11-14 RX ORDER — DIGOXIN 125 MCG
125 TABLET ORAL DAILY
Status: DISCONTINUED | OUTPATIENT
Start: 2019-11-14 | End: 2019-11-15 | Stop reason: HOSPADM

## 2019-11-14 RX ADMIN — MORPHINE SULFATE 1 MG: 2 INJECTION, SOLUTION INTRAMUSCULAR; INTRAVENOUS at 18:38

## 2019-11-14 RX ADMIN — NITROGLYCERIN 1 INCH: 20 OINTMENT TOPICAL at 12:20

## 2019-11-14 RX ADMIN — DIGOXIN 125 MCG: 125 TABLET ORAL at 19:25

## 2019-11-14 RX ADMIN — POTASSIUM CHLORIDE 10 MEQ: 750 TABLET, EXTENDED RELEASE ORAL at 19:25

## 2019-11-14 RX ADMIN — NITROGLYCERIN 0.4 MG: 0.4 TABLET, ORALLY DISINTEGRATING SUBLINGUAL at 11:45

## 2019-11-14 RX ADMIN — Medication 10 ML: at 22:35

## 2019-11-14 RX ADMIN — FUROSEMIDE 40 MG: 10 INJECTION, SOLUTION INTRAMUSCULAR; INTRAVENOUS at 22:35

## 2019-11-14 RX ADMIN — SODIUM CHLORIDE 1000 ML: 9 INJECTION, SOLUTION INTRAVENOUS at 10:50

## 2019-11-14 RX ADMIN — NITROGLYCERIN 0.4 MG: 0.4 TABLET, ORALLY DISINTEGRATING SUBLINGUAL at 10:50

## 2019-11-14 RX ADMIN — WARFARIN SODIUM 7.5 MG: 7.5 TABLET ORAL at 19:30

## 2019-11-14 RX ADMIN — MORPHINE SULFATE 1 MG: 2 INJECTION, SOLUTION INTRAMUSCULAR; INTRAVENOUS at 22:38

## 2019-11-14 RX ADMIN — FUROSEMIDE 20 MG: 10 INJECTION, SOLUTION INTRAMUSCULAR; INTRAVENOUS at 14:52

## 2019-11-14 RX ADMIN — Medication 2000 MCG: at 19:30

## 2019-11-14 ASSESSMENT — PAIN - FUNCTIONAL ASSESSMENT: PAIN_FUNCTIONAL_ASSESSMENT: ACTIVITIES ARE NOT PREVENTED

## 2019-11-14 ASSESSMENT — PAIN DESCRIPTION - PAIN TYPE
TYPE: ACUTE PAIN

## 2019-11-14 ASSESSMENT — PAIN DESCRIPTION - FREQUENCY
FREQUENCY: CONTINUOUS
FREQUENCY: INTERMITTENT
FREQUENCY: CONTINUOUS

## 2019-11-14 ASSESSMENT — PAIN SCALES - GENERAL
PAINLEVEL_OUTOF10: 5
PAINLEVEL_OUTOF10: 10
PAINLEVEL_OUTOF10: 10
PAINLEVEL_OUTOF10: 5
PAINLEVEL_OUTOF10: 5
PAINLEVEL_OUTOF10: 6

## 2019-11-14 ASSESSMENT — PAIN DESCRIPTION - LOCATION
LOCATION: CHEST

## 2019-11-14 ASSESSMENT — PAIN DESCRIPTION - DESCRIPTORS
DESCRIPTORS: CONSTANT;PRESSURE
DESCRIPTORS: PRESSURE;CONSTANT

## 2019-11-14 ASSESSMENT — PAIN DESCRIPTION - ORIENTATION
ORIENTATION: MID

## 2019-11-14 ASSESSMENT — PAIN DESCRIPTION - ONSET
ONSET: ON-GOING

## 2019-11-14 ASSESSMENT — PAIN DESCRIPTION - PROGRESSION: CLINICAL_PROGRESSION: NOT CHANGED

## 2019-11-15 ENCOUNTER — APPOINTMENT (OUTPATIENT)
Dept: NUCLEAR MEDICINE | Age: 64
End: 2019-11-15
Payer: MEDICARE

## 2019-11-15 ENCOUNTER — APPOINTMENT (OUTPATIENT)
Dept: NON INVASIVE DIAGNOSTICS | Age: 64
End: 2019-11-15
Payer: MEDICARE

## 2019-11-15 ENCOUNTER — APPOINTMENT (OUTPATIENT)
Dept: CT IMAGING | Age: 64
End: 2019-11-15
Payer: MEDICARE

## 2019-11-15 VITALS
SYSTOLIC BLOOD PRESSURE: 148 MMHG | OXYGEN SATURATION: 96 % | HEART RATE: 99 BPM | BODY MASS INDEX: 35.01 KG/M2 | TEMPERATURE: 98.9 F | RESPIRATION RATE: 24 BRPM | DIASTOLIC BLOOD PRESSURE: 82 MMHG | HEIGHT: 68 IN | WEIGHT: 231 LBS

## 2019-11-15 LAB
ADENOVIRUS BY PCR: NOT DETECTED
ALBUMIN SERPL-MCNC: 3.9 G/DL (ref 3.5–5.2)
ALP BLD-CCNC: 52 U/L (ref 40–129)
ALT SERPL-CCNC: 21 U/L (ref 0–40)
ANION GAP SERPL CALCULATED.3IONS-SCNC: 19 MMOL/L (ref 7–16)
ANISOCYTOSIS: ABNORMAL
AST SERPL-CCNC: 25 U/L (ref 0–39)
BASOPHILS ABSOLUTE: 0.02 E9/L (ref 0–0.2)
BASOPHILS RELATIVE PERCENT: 0.2 % (ref 0–2)
BILIRUB SERPL-MCNC: 1.2 MG/DL (ref 0–1.2)
BLOOD BANK DISPENSE STATUS: NORMAL
BLOOD BANK PRODUCT CODE: NORMAL
BORDETELLA PARAPERTUSSIS BY PCR: NOT DETECTED
BORDETELLA PERTUSSIS BY PCR: NOT DETECTED
BPU ID: NORMAL
BUN BLDV-MCNC: 23 MG/DL (ref 8–23)
CALCIUM SERPL-MCNC: 8.9 MG/DL (ref 8.6–10.2)
CHLAMYDOPHILIA PNEUMONIAE BY PCR: NOT DETECTED
CHLORIDE BLD-SCNC: 99 MMOL/L (ref 98–107)
CO2: 20 MMOL/L (ref 22–29)
CORONAVIRUS 229E BY PCR: NOT DETECTED
CORONAVIRUS HKU1 BY PCR: NOT DETECTED
CORONAVIRUS NL63 BY PCR: NOT DETECTED
CORONAVIRUS OC43 BY PCR: NOT DETECTED
CREAT SERPL-MCNC: 1.3 MG/DL (ref 0.7–1.2)
DESCRIPTION BLOOD BANK: NORMAL
EOSINOPHILS ABSOLUTE: 0.09 E9/L (ref 0.05–0.5)
EOSINOPHILS RELATIVE PERCENT: 1.1 % (ref 0–6)
GFR AFRICAN AMERICAN: >60
GFR NON-AFRICAN AMERICAN: 55 ML/MIN/1.73
GLUCOSE BLD-MCNC: 142 MG/DL (ref 74–99)
HCT VFR BLD CALC: 41.5 % (ref 37–54)
HEMOGLOBIN: 13.3 G/DL (ref 12.5–16.5)
HUMAN METAPNEUMOVIRUS BY PCR: NOT DETECTED
HUMAN RHINOVIRUS/ENTEROVIRUS BY PCR: NOT DETECTED
IMMATURE GRANULOCYTES #: 0.04 E9/L
IMMATURE GRANULOCYTES %: 0.5 % (ref 0–5)
INFLUENZA A BY PCR: NOT DETECTED
INFLUENZA B BY PCR: NOT DETECTED
INR BLD: 2.5
LV EF: 33 %
LV EF: 33 %
LVEF MODALITY: NORMAL
LVEF MODALITY: NORMAL
LYMPHOCYTES ABSOLUTE: 0.51 E9/L (ref 1.5–4)
LYMPHOCYTES RELATIVE PERCENT: 6.3 % (ref 20–42)
MAGNESIUM: 2.1 MG/DL (ref 1.6–2.6)
MCH RBC QN AUTO: 28.8 PG (ref 26–35)
MCHC RBC AUTO-ENTMCNC: 32 % (ref 32–34.5)
MCV RBC AUTO: 89.8 FL (ref 80–99.9)
METER GLUCOSE: 113 MG/DL (ref 74–99)
MONOCYTES ABSOLUTE: 0.91 E9/L (ref 0.1–0.95)
MONOCYTES RELATIVE PERCENT: 11.2 % (ref 2–12)
MYCOPLASMA PNEUMONIAE BY PCR: NOT DETECTED
NEUTROPHILS ABSOLUTE: 6.57 E9/L (ref 1.8–7.3)
NEUTROPHILS RELATIVE PERCENT: 80.7 % (ref 43–80)
OVALOCYTES: ABNORMAL
PARAINFLUENZA VIRUS 1 BY PCR: NOT DETECTED
PARAINFLUENZA VIRUS 2 BY PCR: NOT DETECTED
PARAINFLUENZA VIRUS 3 BY PCR: NOT DETECTED
PARAINFLUENZA VIRUS 4 BY PCR: NOT DETECTED
PDW BLD-RTO: 16.7 FL (ref 11.5–15)
PHOSPHORUS: 3.7 MG/DL (ref 2.5–4.5)
PLATELET # BLD: 119 E9/L (ref 130–450)
PMV BLD AUTO: 9.6 FL (ref 7–12)
POIKILOCYTES: ABNORMAL
POTASSIUM REFLEX MAGNESIUM: 4.1 MMOL/L (ref 3.5–5)
PROTHROMBIN TIME: 28.7 SEC (ref 9.3–12.4)
RBC # BLD: 4.62 E12/L (ref 3.8–5.8)
RESPIRATORY SYNCYTIAL VIRUS BY PCR: NOT DETECTED
SODIUM BLD-SCNC: 138 MMOL/L (ref 132–146)
TEAR DROP CELLS: ABNORMAL
TOTAL PROTEIN: 6.9 G/DL (ref 6.4–8.3)
WBC # BLD: 8.1 E9/L (ref 4.5–11.5)

## 2019-11-15 PROCEDURE — 6360000002 HC RX W HCPCS: Performed by: INTERNAL MEDICINE

## 2019-11-15 PROCEDURE — 84100 ASSAY OF PHOSPHORUS: CPT

## 2019-11-15 PROCEDURE — G0378 HOSPITAL OBSERVATION PER HR: HCPCS

## 2019-11-15 PROCEDURE — 86850 RBC ANTIBODY SCREEN: CPT

## 2019-11-15 PROCEDURE — 99225 PR SBSQ OBSERVATION CARE/DAY 25 MINUTES: CPT | Performed by: INTERNAL MEDICINE

## 2019-11-15 PROCEDURE — P9059 PLASMA, FRZ BETWEEN 8-24HOUR: HCPCS

## 2019-11-15 PROCEDURE — 86900 BLOOD TYPING SEROLOGIC ABO: CPT

## 2019-11-15 PROCEDURE — 71275 CT ANGIOGRAPHY CHEST: CPT

## 2019-11-15 PROCEDURE — A9500 TC99M SESTAMIBI: HCPCS | Performed by: RADIOLOGY

## 2019-11-15 PROCEDURE — 82962 GLUCOSE BLOOD TEST: CPT

## 2019-11-15 PROCEDURE — 2700000000 HC OXYGEN THERAPY PER DAY

## 2019-11-15 PROCEDURE — 78452 HT MUSCLE IMAGE SPECT MULT: CPT

## 2019-11-15 PROCEDURE — 87450 HC DIRECT STREP B ANTIGEN: CPT

## 2019-11-15 PROCEDURE — 0100U HC RESPIRPTHGN MULT REV TRANS & AMP PRB TECH 21 TRGT: CPT

## 2019-11-15 PROCEDURE — 99215 OFFICE O/P EST HI 40 MIN: CPT | Performed by: INTERNAL MEDICINE

## 2019-11-15 PROCEDURE — 93018 CV STRESS TEST I&R ONLY: CPT | Performed by: INTERNAL MEDICINE

## 2019-11-15 PROCEDURE — 83735 ASSAY OF MAGNESIUM: CPT

## 2019-11-15 PROCEDURE — 3430000000 HC RX DIAGNOSTIC RADIOPHARMACEUTICAL: Performed by: RADIOLOGY

## 2019-11-15 PROCEDURE — 85610 PROTHROMBIN TIME: CPT

## 2019-11-15 PROCEDURE — 36415 COLL VENOUS BLD VENIPUNCTURE: CPT

## 2019-11-15 PROCEDURE — 2580000003 HC RX 258: Performed by: INTERNAL MEDICINE

## 2019-11-15 PROCEDURE — 2500000003 HC RX 250 WO HCPCS: Performed by: INTERNAL MEDICINE

## 2019-11-15 PROCEDURE — 93306 TTE W/DOPPLER COMPLETE: CPT

## 2019-11-15 PROCEDURE — 93005 ELECTROCARDIOGRAM TRACING: CPT | Performed by: INTERNAL MEDICINE

## 2019-11-15 PROCEDURE — 93017 CV STRESS TEST TRACING ONLY: CPT

## 2019-11-15 PROCEDURE — 86901 BLOOD TYPING SEROLOGIC RH(D): CPT

## 2019-11-15 PROCEDURE — 85025 COMPLETE CBC W/AUTO DIFF WBC: CPT

## 2019-11-15 PROCEDURE — 80053 COMPREHEN METABOLIC PANEL: CPT

## 2019-11-15 PROCEDURE — 96365 THER/PROPH/DIAG IV INF INIT: CPT

## 2019-11-15 PROCEDURE — 6360000004 HC RX CONTRAST MEDICATION: Performed by: RADIOLOGY

## 2019-11-15 RX ORDER — DEXTROSE MONOHYDRATE 50 MG/ML
100 INJECTION, SOLUTION INTRAVENOUS PRN
Status: DISCONTINUED | OUTPATIENT
Start: 2019-11-15 | End: 2019-11-15 | Stop reason: HOSPADM

## 2019-11-15 RX ORDER — FUROSEMIDE 40 MG/1
40 TABLET ORAL 2 TIMES DAILY
Status: DISCONTINUED | OUTPATIENT
Start: 2019-11-15 | End: 2019-11-15 | Stop reason: HOSPADM

## 2019-11-15 RX ORDER — 0.9 % SODIUM CHLORIDE 0.9 %
250 INTRAVENOUS SOLUTION INTRAVENOUS ONCE
Status: DISCONTINUED | OUTPATIENT
Start: 2019-11-15 | End: 2019-11-15 | Stop reason: HOSPADM

## 2019-11-15 RX ORDER — ESMOLOL HYDROCHLORIDE 10 MG/ML
50 INJECTION, SOLUTION INTRAVENOUS CONTINUOUS
Status: DISCONTINUED | OUTPATIENT
Start: 2019-11-15 | End: 2019-11-15

## 2019-11-15 RX ORDER — METOPROLOL TARTRATE 5 MG/5ML
5 INJECTION INTRAVENOUS EVERY 5 MIN PRN
Status: DISCONTINUED | OUTPATIENT
Start: 2019-11-15 | End: 2019-11-15 | Stop reason: HOSPADM

## 2019-11-15 RX ORDER — ENALAPRIL MALEATE 10 MG/1
10 TABLET ORAL 2 TIMES DAILY
Status: DISCONTINUED | OUTPATIENT
Start: 2019-11-15 | End: 2019-11-15 | Stop reason: HOSPADM

## 2019-11-15 RX ORDER — NICOTINE POLACRILEX 4 MG
15 LOZENGE BUCCAL PRN
Status: DISCONTINUED | OUTPATIENT
Start: 2019-11-15 | End: 2019-11-15 | Stop reason: HOSPADM

## 2019-11-15 RX ORDER — SODIUM CHLORIDE 0.9 % (FLUSH) 0.9 %
10 SYRINGE (ML) INJECTION PRN
Status: DISCONTINUED | OUTPATIENT
Start: 2019-11-15 | End: 2019-11-15 | Stop reason: HOSPADM

## 2019-11-15 RX ORDER — PHYTONADIONE 5 MG/1
10 TABLET ORAL ONCE
Status: DISCONTINUED | OUTPATIENT
Start: 2019-11-15 | End: 2019-11-15

## 2019-11-15 RX ORDER — PHYTONADIONE 5 MG/1
5 TABLET ORAL ONCE
Status: DISCONTINUED | OUTPATIENT
Start: 2019-11-15 | End: 2019-11-15 | Stop reason: HOSPADM

## 2019-11-15 RX ORDER — DEXTROSE MONOHYDRATE 25 G/50ML
12.5 INJECTION, SOLUTION INTRAVENOUS PRN
Status: DISCONTINUED | OUTPATIENT
Start: 2019-11-15 | End: 2019-11-15 | Stop reason: HOSPADM

## 2019-11-15 RX ORDER — CARVEDILOL 6.25 MG/1
12.5 TABLET ORAL 2 TIMES DAILY WITH MEALS
Status: DISCONTINUED | OUTPATIENT
Start: 2019-11-15 | End: 2019-11-15 | Stop reason: HOSPADM

## 2019-11-15 RX ORDER — METOPROLOL TARTRATE 5 MG/5ML
5 INJECTION INTRAVENOUS EVERY 5 MIN PRN
Qty: 15 ML | Refills: 0 | Status: ON HOLD | OUTPATIENT
Start: 2019-11-15 | End: 2020-01-06

## 2019-11-15 RX ADMIN — SODIUM NITROPRUSSIDE 0.5 MCG/KG/MIN: 25 INJECTION, SOLUTION, CONCENTRATE INTRAVENOUS at 19:10

## 2019-11-15 RX ADMIN — Medication 35 MILLICURIE: at 13:24

## 2019-11-15 RX ADMIN — ESMOLOL HYDROCHLORIDE 50 MCG/KG/MIN: 10 INJECTION INTRAVENOUS at 18:25

## 2019-11-15 RX ADMIN — Medication 12 MILLICURIE: at 09:57

## 2019-11-15 RX ADMIN — IOPAMIDOL 60 ML: 755 INJECTION, SOLUTION INTRAVENOUS at 16:47

## 2019-11-15 RX ADMIN — REGADENOSON 0.4 MG: 0.08 INJECTION, SOLUTION INTRAVENOUS at 11:24

## 2019-11-15 ASSESSMENT — PAIN SCALES - GENERAL: PAINLEVEL_OUTOF10: 5

## 2019-11-15 ASSESSMENT — PAIN DESCRIPTION - PAIN TYPE: TYPE: ACUTE PAIN

## 2019-11-15 ASSESSMENT — PAIN DESCRIPTION - LOCATION: LOCATION: CHEST

## 2019-11-15 ASSESSMENT — PAIN DESCRIPTION - ORIENTATION: ORIENTATION: MID;LEFT

## 2019-11-16 ENCOUNTER — TELEPHONE (OUTPATIENT)
Dept: INTERNAL MEDICINE CLINIC | Age: 64
End: 2019-11-16

## 2019-11-16 LAB
L. PNEUMOPHILA SEROGP 1 UR AG: NORMAL
STREP PNEUMONIAE ANTIGEN, URINE: NORMAL

## 2019-11-19 LAB
EKG ATRIAL RATE: 74 BPM
EKG ATRIAL RATE: 82 BPM
EKG ATRIAL RATE: 85 BPM
EKG Q-T INTERVAL: 334 MS
EKG Q-T INTERVAL: 346 MS
EKG Q-T INTERVAL: 392 MS
EKG QRS DURATION: 100 MS
EKG QRS DURATION: 108 MS
EKG QRS DURATION: 112 MS
EKG QTC CALCULATION (BAZETT): 391 MS
EKG QTC CALCULATION (BAZETT): 404 MS
EKG QTC CALCULATION (BAZETT): 452 MS
EKG R AXIS: -11 DEGREES
EKG R AXIS: -25 DEGREES
EKG R AXIS: -26 DEGREES
EKG T AXIS: -10 DEGREES
EKG T AXIS: 12 DEGREES
EKG T AXIS: 5 DEGREES
EKG VENTRICULAR RATE: 110 BPM
EKG VENTRICULAR RATE: 64 BPM
EKG VENTRICULAR RATE: 77 BPM

## 2019-11-19 PROCEDURE — 93010 ELECTROCARDIOGRAM REPORT: CPT | Performed by: INTERNAL MEDICINE

## 2019-12-09 ENCOUNTER — TELEPHONE (OUTPATIENT)
Dept: CARDIOLOGY CLINIC | Age: 64
End: 2019-12-09

## 2019-12-11 ENCOUNTER — TELEPHONE (OUTPATIENT)
Dept: ADMINISTRATIVE | Age: 64
End: 2019-12-11

## 2019-12-11 ENCOUNTER — TELEPHONE (OUTPATIENT)
Dept: FAMILY MEDICINE CLINIC | Age: 64
End: 2019-12-11

## 2019-12-12 ENCOUNTER — OFFICE VISIT (OUTPATIENT)
Dept: FAMILY MEDICINE CLINIC | Age: 64
End: 2019-12-12
Payer: MEDICARE

## 2019-12-12 VITALS
WEIGHT: 209 LBS | DIASTOLIC BLOOD PRESSURE: 80 MMHG | OXYGEN SATURATION: 94 % | BODY MASS INDEX: 31.67 KG/M2 | TEMPERATURE: 98.8 F | SYSTOLIC BLOOD PRESSURE: 131 MMHG | HEART RATE: 93 BPM | HEIGHT: 68 IN

## 2019-12-12 DIAGNOSIS — J90 PLEURAL EFFUSION: ICD-10-CM

## 2019-12-12 DIAGNOSIS — I10 ESSENTIAL HYPERTENSION: ICD-10-CM

## 2019-12-12 DIAGNOSIS — I48.91 ATRIAL FIBRILLATION, UNSPECIFIED TYPE (HCC): ICD-10-CM

## 2019-12-12 DIAGNOSIS — I71.019 DISSECTION OF THORACIC AORTA: ICD-10-CM

## 2019-12-12 DIAGNOSIS — I31.39 PERICARDIAL EFFUSION (NONINFLAMMATORY): ICD-10-CM

## 2019-12-12 DIAGNOSIS — Z09 HOSPITAL DISCHARGE FOLLOW-UP: Primary | ICD-10-CM

## 2019-12-12 DIAGNOSIS — I50.43 ACUTE ON CHRONIC COMBINED SYSTOLIC AND DIASTOLIC CHF (CONGESTIVE HEART FAILURE) (HCC): ICD-10-CM

## 2019-12-12 PROBLEM — R07.9 CHEST PAIN: Status: RESOLVED | Noted: 2019-11-14 | Resolved: 2019-12-12

## 2019-12-12 PROBLEM — Z78.9 TRANSITION OF CARE PERFORMED WITH SHARING OF CLINICAL SUMMARY: Status: ACTIVE | Noted: 2019-11-25

## 2019-12-12 PROBLEM — G89.18 ACUTE POSTOPERATIVE PAIN: Status: RESOLVED | Noted: 2019-11-16 | Resolved: 2019-12-12

## 2019-12-12 PROBLEM — E87.70 FLUID OVERLOAD: Status: RESOLVED | Noted: 2019-11-17 | Resolved: 2019-12-12

## 2019-12-12 PROBLEM — E87.70 FLUID OVERLOAD: Status: ACTIVE | Noted: 2019-11-17

## 2019-12-12 PROBLEM — G89.18 ACUTE POSTOPERATIVE PAIN: Status: ACTIVE | Noted: 2019-11-16

## 2019-12-12 PROCEDURE — 4040F PNEUMOC VAC/ADMIN/RCVD: CPT | Performed by: FAMILY MEDICINE

## 2019-12-12 PROCEDURE — G8427 DOCREV CUR MEDS BY ELIG CLIN: HCPCS | Performed by: FAMILY MEDICINE

## 2019-12-12 PROCEDURE — 1036F TOBACCO NON-USER: CPT | Performed by: FAMILY MEDICINE

## 2019-12-12 PROCEDURE — 99214 OFFICE O/P EST MOD 30 MIN: CPT | Performed by: FAMILY MEDICINE

## 2019-12-12 PROCEDURE — G8417 CALC BMI ABV UP PARAM F/U: HCPCS | Performed by: FAMILY MEDICINE

## 2019-12-12 PROCEDURE — 1123F ACP DISCUSS/DSCN MKR DOCD: CPT | Performed by: FAMILY MEDICINE

## 2019-12-12 PROCEDURE — 3017F COLORECTAL CA SCREEN DOC REV: CPT | Performed by: FAMILY MEDICINE

## 2019-12-12 PROCEDURE — 1111F DSCHRG MED/CURRENT MED MERGE: CPT | Performed by: FAMILY MEDICINE

## 2019-12-12 PROCEDURE — G8484 FLU IMMUNIZE NO ADMIN: HCPCS | Performed by: FAMILY MEDICINE

## 2019-12-12 RX ORDER — COLCHICINE 0.6 MG/1
0.6 TABLET ORAL
COMMUNITY
Start: 2019-12-10 | End: 2019-12-21

## 2019-12-12 RX ORDER — POTASSIUM CHLORIDE 750 MG/1
TABLET, FILM COATED, EXTENDED RELEASE ORAL
Status: ON HOLD | COMMUNITY
Start: 2019-12-09 | End: 2020-01-06

## 2019-12-12 RX ORDER — CARVEDILOL 12.5 MG/1
12.5 TABLET ORAL
COMMUNITY
Start: 2019-12-09 | End: 2019-12-12 | Stop reason: DRUGHIGH

## 2019-12-12 RX ORDER — AMLODIPINE BESYLATE 5 MG/1
5 TABLET ORAL DAILY
Status: ON HOLD | COMMUNITY
Start: 2019-12-10 | End: 2020-01-15

## 2019-12-12 RX ORDER — FUROSEMIDE 20 MG/1
TABLET ORAL
COMMUNITY
Start: 2019-12-09 | End: 2020-01-06

## 2019-12-12 RX ORDER — CARVEDILOL 12.5 MG/1
12.5 TABLET ORAL 2 TIMES DAILY WITH MEALS
Qty: 60 TABLET | Refills: 0 | Status: ON HOLD
Start: 2019-12-12 | End: 2020-01-15 | Stop reason: HOSPADM

## 2019-12-12 RX ORDER — DIGOXIN 125 MCG
TABLET ORAL
Qty: 90 TABLET | Refills: 3 | Status: ON HOLD
Start: 2019-12-12 | End: 2020-01-15 | Stop reason: HOSPADM

## 2019-12-12 RX ORDER — ASPIRIN 81 MG/1
162 TABLET, CHEWABLE ORAL DAILY
Status: ON HOLD | COMMUNITY
Start: 2019-12-10 | End: 2020-01-15 | Stop reason: HOSPADM

## 2019-12-12 RX ORDER — HYDRALAZINE HYDROCHLORIDE 25 MG/1
12.5 TABLET, FILM COATED ORAL 3 TIMES DAILY
Qty: 45 TABLET | Refills: 0 | Status: ON HOLD
Start: 2019-12-12 | End: 2020-01-15 | Stop reason: HOSPADM

## 2019-12-12 RX ORDER — HYDRALAZINE HYDROCHLORIDE 25 MG/1
12.5 TABLET, FILM COATED ORAL
COMMUNITY
Start: 2019-12-09 | End: 2019-12-12 | Stop reason: DRUGHIGH

## 2019-12-12 ASSESSMENT — ENCOUNTER SYMPTOMS
VOMITING: 0
CONSTIPATION: 0
DIARRHEA: 0
ABDOMINAL PAIN: 0
SHORTNESS OF BREATH: 1
NAUSEA: 0
COUGH: 0

## 2020-01-06 ENCOUNTER — APPOINTMENT (OUTPATIENT)
Dept: GENERAL RADIOLOGY | Age: 65
DRG: 291 | End: 2020-01-06
Payer: MEDICARE

## 2020-01-06 ENCOUNTER — HOSPITAL ENCOUNTER (INPATIENT)
Age: 65
LOS: 9 days | Discharge: HOME | DRG: 291 | End: 2020-01-15
Attending: EMERGENCY MEDICINE | Admitting: INTERNAL MEDICINE
Payer: MEDICARE

## 2020-01-06 PROBLEM — I50.43 CHF (CONGESTIVE HEART FAILURE), NYHA CLASS I, ACUTE ON CHRONIC, COMBINED (HCC): Status: ACTIVE | Noted: 2020-01-06

## 2020-01-06 LAB
ABO/RH: NORMAL
ABO/RH: NORMAL
ADENOVIRUS BY PCR: NOT DETECTED
ALBUMIN SERPL-MCNC: 4.3 G/DL (ref 3.5–5.2)
ALP BLD-CCNC: 96 U/L (ref 40–129)
ALT SERPL-CCNC: 22 U/L (ref 0–40)
ANION GAP SERPL CALCULATED.3IONS-SCNC: 14 MMOL/L (ref 7–16)
ANISOCYTOSIS: ABNORMAL
ANTIBODY SCREEN: NORMAL
AST SERPL-CCNC: 25 U/L (ref 0–39)
B.E.: -2.1 MMOL/L (ref -3–3)
BASOPHILS ABSOLUTE: 0.02 E9/L (ref 0–0.2)
BASOPHILS RELATIVE PERCENT: 0.4 % (ref 0–2)
BILIRUB SERPL-MCNC: 0.8 MG/DL (ref 0–1.2)
BORDETELLA PARAPERTUSSIS BY PCR: NOT DETECTED
BORDETELLA PERTUSSIS BY PCR: NOT DETECTED
BUN BLDV-MCNC: 25 MG/DL (ref 8–23)
CALCIUM SERPL-MCNC: 10 MG/DL (ref 8.6–10.2)
CHLAMYDOPHILIA PNEUMONIAE BY PCR: NOT DETECTED
CHLORIDE BLD-SCNC: 105 MMOL/L (ref 98–107)
CO2: 24 MMOL/L (ref 22–29)
COHB: 1.1 % (ref 0–1.5)
CORONAVIRUS 229E BY PCR: NOT DETECTED
CORONAVIRUS HKU1 BY PCR: NOT DETECTED
CORONAVIRUS NL63 BY PCR: NOT DETECTED
CORONAVIRUS OC43 BY PCR: NOT DETECTED
CREAT SERPL-MCNC: 1.3 MG/DL (ref 0.7–1.2)
CRITICAL: ABNORMAL
DATE ANALYZED: ABNORMAL
DATE OF COLLECTION: ABNORMAL
DIGOXIN LEVEL: 0.6 NG/ML (ref 0.8–2)
EKG ATRIAL RATE: 78 BPM
EKG Q-T INTERVAL: 384 MS
EKG QRS DURATION: 112 MS
EKG QTC CALCULATION (BAZETT): 437 MS
EKG R AXIS: 0 DEGREES
EKG T AXIS: 18 DEGREES
EKG VENTRICULAR RATE: 78 BPM
EOSINOPHILS ABSOLUTE: 0.22 E9/L (ref 0.05–0.5)
EOSINOPHILS RELATIVE PERCENT: 4.2 % (ref 0–6)
GFR AFRICAN AMERICAN: >60
GFR NON-AFRICAN AMERICAN: 55 ML/MIN/1.73
GLUCOSE BLD-MCNC: 108 MG/DL (ref 74–99)
HCO3: 22.5 MMOL/L (ref 22–26)
HCT VFR BLD CALC: 43.2 % (ref 37–54)
HEMOGLOBIN: 12.9 G/DL (ref 12.5–16.5)
HHB: 2 % (ref 0–5)
HUMAN METAPNEUMOVIRUS BY PCR: NOT DETECTED
HUMAN RHINOVIRUS/ENTEROVIRUS BY PCR: NOT DETECTED
IMMATURE GRANULOCYTES #: 0.02 E9/L
IMMATURE GRANULOCYTES %: 0.4 % (ref 0–5)
INFLUENZA A BY PCR: NOT DETECTED
INFLUENZA A BY PCR: NOT DETECTED
INFLUENZA B BY PCR: NOT DETECTED
INFLUENZA B BY PCR: NOT DETECTED
INR BLD: 1.2
LAB: ABNORMAL
LACTIC ACID: 1 MMOL/L (ref 0.5–2.2)
LYMPHOCYTES ABSOLUTE: 0.51 E9/L (ref 1.5–4)
LYMPHOCYTES RELATIVE PERCENT: 9.7 % (ref 20–42)
Lab: ABNORMAL
MAGNESIUM: 2.2 MG/DL (ref 1.6–2.6)
MCH RBC QN AUTO: 27.8 PG (ref 26–35)
MCHC RBC AUTO-ENTMCNC: 29.9 % (ref 32–34.5)
MCV RBC AUTO: 93.1 FL (ref 80–99.9)
METHB: 0.4 % (ref 0–1.5)
MODE: ABNORMAL
MONOCYTES ABSOLUTE: 0.78 E9/L (ref 0.1–0.95)
MONOCYTES RELATIVE PERCENT: 14.9 % (ref 2–12)
MYCOPLASMA PNEUMONIAE BY PCR: NOT DETECTED
NEUTROPHILS ABSOLUTE: 3.7 E9/L (ref 1.8–7.3)
NEUTROPHILS RELATIVE PERCENT: 70.4 % (ref 43–80)
O2 CONTENT: 18.1 ML/DL
O2 SATURATION: 98 % (ref 92–98.5)
O2HB: 96.5 % (ref 94–97)
OPERATOR ID: 2325
OVALOCYTES: ABNORMAL
PARAINFLUENZA VIRUS 1 BY PCR: NOT DETECTED
PARAINFLUENZA VIRUS 2 BY PCR: NOT DETECTED
PARAINFLUENZA VIRUS 3 BY PCR: NOT DETECTED
PARAINFLUENZA VIRUS 4 BY PCR: NOT DETECTED
PATIENT TEMP: 37 C
PCO2: 38.1 MMHG (ref 35–45)
PDW BLD-RTO: 17.2 FL (ref 11.5–15)
PH BLOOD GAS: 7.39 (ref 7.35–7.45)
PLATELET # BLD: 159 E9/L (ref 130–450)
PMV BLD AUTO: 10.2 FL (ref 7–12)
PO2: 114.8 MMHG (ref 60–100)
POIKILOCYTES: ABNORMAL
POLYCHROMASIA: ABNORMAL
POTASSIUM SERPL-SCNC: 4 MMOL/L (ref 3.5–5)
PRO-BNP: 4214 PG/ML (ref 0–125)
PROTHROMBIN TIME: 14 SEC (ref 9.3–12.4)
RBC # BLD: 4.64 E12/L (ref 3.8–5.8)
RESPIRATORY SYNCYTIAL VIRUS BY PCR: NOT DETECTED
SCHISTOCYTES: ABNORMAL
SODIUM BLD-SCNC: 143 MMOL/L (ref 132–146)
SOURCE, BLOOD GAS: ABNORMAL
TEAR DROP CELLS: ABNORMAL
THB: 13.2 G/DL (ref 11.5–16.5)
TIME ANALYZED: 1417
TOTAL PROTEIN: 7.5 G/DL (ref 6.4–8.3)
TROPONIN: 0.02 NG/ML (ref 0–0.03)
TROPONIN: 0.02 NG/ML (ref 0–0.03)
WBC # BLD: 5.3 E9/L (ref 4.5–11.5)

## 2020-01-06 PROCEDURE — 2060000000 HC ICU INTERMEDIATE R&B

## 2020-01-06 PROCEDURE — 71045 X-RAY EXAM CHEST 1 VIEW: CPT

## 2020-01-06 PROCEDURE — 85025 COMPLETE CBC W/AUTO DIFF WBC: CPT

## 2020-01-06 PROCEDURE — 86901 BLOOD TYPING SEROLOGIC RH(D): CPT

## 2020-01-06 PROCEDURE — 6360000002 HC RX W HCPCS: Performed by: EMERGENCY MEDICINE

## 2020-01-06 PROCEDURE — 94761 N-INVAS EAR/PLS OXIMETRY MLT: CPT

## 2020-01-06 PROCEDURE — 6370000000 HC RX 637 (ALT 250 FOR IP): Performed by: INTERNAL MEDICINE

## 2020-01-06 PROCEDURE — 87040 BLOOD CULTURE FOR BACTERIA: CPT

## 2020-01-06 PROCEDURE — 94640 AIRWAY INHALATION TREATMENT: CPT

## 2020-01-06 PROCEDURE — 6370000000 HC RX 637 (ALT 250 FOR IP): Performed by: EMERGENCY MEDICINE

## 2020-01-06 PROCEDURE — 85610 PROTHROMBIN TIME: CPT

## 2020-01-06 PROCEDURE — 84484 ASSAY OF TROPONIN QUANT: CPT

## 2020-01-06 PROCEDURE — 94664 DEMO&/EVAL PT USE INHALER: CPT

## 2020-01-06 PROCEDURE — 80162 ASSAY OF DIGOXIN TOTAL: CPT

## 2020-01-06 PROCEDURE — 2700000000 HC OXYGEN THERAPY PER DAY

## 2020-01-06 PROCEDURE — 93010 ELECTROCARDIOGRAM REPORT: CPT | Performed by: INTERNAL MEDICINE

## 2020-01-06 PROCEDURE — 80053 COMPREHEN METABOLIC PANEL: CPT

## 2020-01-06 PROCEDURE — 83605 ASSAY OF LACTIC ACID: CPT

## 2020-01-06 PROCEDURE — 36415 COLL VENOUS BLD VENIPUNCTURE: CPT

## 2020-01-06 PROCEDURE — 94660 CPAP INITIATION&MGMT: CPT

## 2020-01-06 PROCEDURE — 83880 ASSAY OF NATRIURETIC PEPTIDE: CPT

## 2020-01-06 PROCEDURE — 99285 EMERGENCY DEPT VISIT HI MDM: CPT

## 2020-01-06 PROCEDURE — 96374 THER/PROPH/DIAG INJ IV PUSH: CPT

## 2020-01-06 PROCEDURE — 86900 BLOOD TYPING SEROLOGIC ABO: CPT

## 2020-01-06 PROCEDURE — 0100U HC RESPIRPTHGN MULT REV TRANS & AMP PRB TECH 21 TRGT: CPT

## 2020-01-06 PROCEDURE — 83735 ASSAY OF MAGNESIUM: CPT

## 2020-01-06 PROCEDURE — 82805 BLOOD GASES W/O2 SATURATION: CPT

## 2020-01-06 PROCEDURE — 87502 INFLUENZA DNA AMP PROBE: CPT

## 2020-01-06 PROCEDURE — 86850 RBC ANTIBODY SCREEN: CPT

## 2020-01-06 PROCEDURE — 93005 ELECTROCARDIOGRAM TRACING: CPT | Performed by: EMERGENCY MEDICINE

## 2020-01-06 RX ORDER — FUROSEMIDE 10 MG/ML
40 INJECTION INTRAMUSCULAR; INTRAVENOUS ONCE
Status: COMPLETED | OUTPATIENT
Start: 2020-01-06 | End: 2020-01-06

## 2020-01-06 RX ORDER — M-VIT,TX,IRON,MINS/CALC/FOLIC 27MG-0.4MG
1 TABLET ORAL DAILY
COMMUNITY

## 2020-01-06 RX ORDER — POTASSIUM CHLORIDE 750 MG/1
10 TABLET, EXTENDED RELEASE ORAL DAILY
Status: DISCONTINUED | OUTPATIENT
Start: 2020-01-06 | End: 2020-01-15 | Stop reason: HOSPADM

## 2020-01-06 RX ORDER — DIGOXIN 125 MCG
62.5 TABLET ORAL DAILY
Status: DISCONTINUED | OUTPATIENT
Start: 2020-01-06 | End: 2020-01-15 | Stop reason: HOSPADM

## 2020-01-06 RX ORDER — M-VIT,TX,IRON,MINS/CALC/FOLIC 27MG-0.4MG
1 TABLET ORAL DAILY
Status: DISCONTINUED | OUTPATIENT
Start: 2020-01-06 | End: 2020-01-15 | Stop reason: HOSPADM

## 2020-01-06 RX ORDER — ASPIRIN 81 MG/1
162 TABLET, CHEWABLE ORAL DAILY
Status: DISCONTINUED | OUTPATIENT
Start: 2020-01-06 | End: 2020-01-09

## 2020-01-06 RX ORDER — SENNA PLUS 8.6 MG/1
2 TABLET ORAL 2 TIMES DAILY
Status: DISCONTINUED | OUTPATIENT
Start: 2020-01-06 | End: 2020-01-15 | Stop reason: HOSPADM

## 2020-01-06 RX ORDER — CARVEDILOL 6.25 MG/1
12.5 TABLET ORAL 2 TIMES DAILY WITH MEALS
Status: DISCONTINUED | OUTPATIENT
Start: 2020-01-06 | End: 2020-01-07

## 2020-01-06 RX ORDER — SENNA PLUS 8.6 MG/1
2 TABLET ORAL 2 TIMES DAILY
Status: ON HOLD | COMMUNITY
End: 2020-01-15 | Stop reason: SDUPTHER

## 2020-01-06 RX ORDER — ENALAPRIL MALEATE 20 MG/1
1 TABLET ORAL 2 TIMES DAILY
Status: DISCONTINUED | OUTPATIENT
Start: 2020-01-06 | End: 2020-01-07 | Stop reason: ALTCHOICE

## 2020-01-06 RX ORDER — IPRATROPIUM BROMIDE AND ALBUTEROL SULFATE 2.5; .5 MG/3ML; MG/3ML
1 SOLUTION RESPIRATORY (INHALATION)
Status: DISCONTINUED | OUTPATIENT
Start: 2020-01-06 | End: 2020-01-15 | Stop reason: HOSPADM

## 2020-01-06 RX ORDER — FUROSEMIDE 20 MG/1
20 TABLET ORAL DAILY
Status: DISCONTINUED | OUTPATIENT
Start: 2020-01-06 | End: 2020-01-07

## 2020-01-06 RX ORDER — ACETAMINOPHEN 325 MG/1
650 TABLET ORAL EVERY 4 HOURS PRN
Status: DISCONTINUED | OUTPATIENT
Start: 2020-01-06 | End: 2020-01-15 | Stop reason: HOSPADM

## 2020-01-06 RX ORDER — HYDRALAZINE HYDROCHLORIDE 25 MG/1
12.5 TABLET, FILM COATED ORAL 3 TIMES DAILY
Status: DISCONTINUED | OUTPATIENT
Start: 2020-01-06 | End: 2020-01-07

## 2020-01-06 RX ORDER — AMLODIPINE BESYLATE 5 MG/1
5 TABLET ORAL DAILY
Status: DISCONTINUED | OUTPATIENT
Start: 2020-01-06 | End: 2020-01-15 | Stop reason: HOSPADM

## 2020-01-06 RX ORDER — VALSARTAN 160 MG/1
160 TABLET ORAL DAILY
Status: DISCONTINUED | OUTPATIENT
Start: 2020-01-06 | End: 2020-01-15 | Stop reason: HOSPADM

## 2020-01-06 RX ADMIN — FUROSEMIDE 40 MG: 10 INJECTION, SOLUTION INTRAMUSCULAR; INTRAVENOUS at 15:04

## 2020-01-06 RX ADMIN — SENNOSIDES 17.2 MG: 8.6 TABLET, FILM COATED ORAL at 22:10

## 2020-01-06 RX ADMIN — HYDRALAZINE HYDROCHLORIDE 12.5 MG: 25 TABLET, FILM COATED ORAL at 22:10

## 2020-01-06 RX ADMIN — FUROSEMIDE 20 MG: 20 TABLET ORAL at 22:09

## 2020-01-06 RX ADMIN — IPRATROPIUM BROMIDE AND ALBUTEROL SULFATE 1 AMPULE: 2.5; .5 SOLUTION RESPIRATORY (INHALATION) at 21:11

## 2020-01-06 RX ADMIN — VALSARTAN 160 MG: 160 TABLET, FILM COATED ORAL at 22:09

## 2020-01-06 RX ADMIN — NITROGLYCERIN 1 INCH: 20 OINTMENT TOPICAL at 15:04

## 2020-01-06 RX ADMIN — CARVEDILOL 12.5 MG: 6.25 TABLET, FILM COATED ORAL at 22:09

## 2020-01-06 RX ADMIN — ACETAMINOPHEN 650 MG: 325 TABLET, FILM COATED ORAL at 22:10

## 2020-01-06 ASSESSMENT — PAIN SCALES - GENERAL
PAINLEVEL_OUTOF10: 6
PAINLEVEL_OUTOF10: 3
PAINLEVEL_OUTOF10: 4

## 2020-01-06 NOTE — ED PROVIDER NOTES
and are negative. Past Surgical History:   Procedure Laterality Date    TONSILLECTOMY     Social History:  reports that he quit smoking about 26 years ago. He has a 2.50 pack-year smoking history. He has never used smokeless tobacco. He reports that he does not drink alcohol or use drugs. Family History: family history includes Diabetes in his father and mother; Hypertension in his sister. Allergies: Patient has no known allergies. Physical Exam           ED Triage Vitals   BP Temp Temp Source Pulse Resp SpO2 Height Weight   01/06/20 1400 01/06/20 1404 01/06/20 1404 01/06/20 1404 01/06/20 1404 01/06/20 1400 -- --   (!) 172/116 98.6 °F (37 °C) Tympanic 92 30 95 %        Oxygen Saturation Interpretation: Abnormal.    · General Appearance/Constitutional:  Alert. Moderate respiratory distress with accessory muscle use. Speaking in full sentences. · HEENT:  NC/NT. PERRLA. Airway patent. · Neck:  Normal ROM. Supple. · Respiratoty:  Breath sounds: reduced bilaterally. Lung sounds: rales- right base and left base. · CV:  Regular rate and rhythm, normal heart sounds, without pathological murmurs, ectopy, gallops, or rubs. .  · GI:  Soft, nontender, good bowel sounds. No firm or pulsatile mass. Midline scar well healing with no purulence or dehiscence   · Integument:  Normal turgor. Warm, dry, without visible rash. · Extremities/Lymphatics:  Edema:  2+ Bilateral lower extremity(s). Calf/Ankle edema is present. .  · Neurological:  Oriented x3. Motor functions intact.     Lab / Imaging Results   (All laboratory and radiology results have been personally reviewed by myself)  Labs:  Results for orders placed or performed during the hospital encounter of 01/06/20   Respiratory Panel, Molecular   Result Value Ref Range    Adenovirus by PCR Not Detected Not Detected    Bordetella parapertussis by PCR Not Detected Not Detected    Bordetella pertussis by PCR Not Detected Not Detected    Chlamydophilia pneumoniae by PCR Not Detected Not Detected    Coronavirus 229E by PCR Not Detected Not Detected    Coronavirus HKU1 by PCR Not Detected Not Detected    Coronavirus NL63 by PCR Not Detected Not Detected    Coronavirus OC43 by PCR Not Detected Not Detected    Human Metapneumovirus by PCR Not Detected Not Detected    Human Rhinovirus/Enterovirus by PCR Not Detected Not Detected    Influenza A by PCR Not Detected Not Detected    Influenza B by PCR Not Detected Not Detected    Mycoplasma pneumoniae by PCR Not Detected Not Detected    Parainfluenza Virus 1 by PCR Not Detected Not Detected    Parainfluenza Virus 2 by PCR Not Detected Not Detected    Parainfluenza Virus 3 by PCR Not Detected Not Detected    Parainfluenza Virus 4 by PCR Not Detected Not Detected    Respiratory Syncytial Virus by PCR Not Detected Not Detected   Rapid influenza A/B antigens   Result Value Ref Range    Influenza A by PCR Not Detected Not Detected    Influenza B by PCR Not Detected Not Detected   Blood Gas, Arterial   Result Value Ref Range    Date Analyzed 20200106     Time Analyzed 1417     Source: Blood Arterial     pH, Blood Gas 7.390 7.350 - 7.450    PCO2 38.1 35.0 - 45.0 mmHg    PO2 114.8 (H) 60.0 - 100.0 mmHg    HCO3 22.5 22.0 - 26.0 mmol/L    B.E. -2.1 -3.0 - 3.0 mmol/L    O2 Sat 98.0 92.0 - 98.5 %    O2Hb 96.5 94.0 - 97.0 %    COHb 1.1 0.0 - 1.5 %    MetHb 0.4 0.0 - 1.5 %    O2 Content 18.1 mL/dL    HHb 2.0 0.0 - 5.0 %    tHb (est) 13.2 11.5 - 16.5 g/dL    Mode NRB 15L     Date Of Collection      Time Collected      Pt Temp 37.0 C     ID [id number]     Lab 08224     Critical(s) Notified .  No Critical Values    CBC Auto Differential   Result Value Ref Range    WBC 5.3 4.5 - 11.5 E9/L    RBC 4.64 3.80 - 5.80 E12/L    Hemoglobin 12.9 12.5 - 16.5 g/dL    Hematocrit 43.2 37.0 - 54.0 %    MCV 93.1 80.0 - 99.9 fL    MCH 27.8 26.0 - 35.0 pg    MCHC 29.9 (L) 32.0 - 34.5 %    RDW 17.2 (H) 11.5 - 15.0 fL    Platelets 134 243 - 640 E9/L    MPV 10.2 7.0 - 12.0 fL    Neutrophils % 70.4 43.0 - 80.0 %    Immature Granulocytes % 0.4 0.0 - 5.0 %    Lymphocytes % 9.7 (L) 20.0 - 42.0 %    Monocytes % 14.9 (H) 2.0 - 12.0 %    Eosinophils % 4.2 0.0 - 6.0 %    Basophils % 0.4 0.0 - 2.0 %    Neutrophils Absolute 3.70 1.80 - 7.30 E9/L    Immature Granulocytes # 0.02 E9/L    Lymphocytes Absolute 0.51 (L) 1.50 - 4.00 E9/L    Monocytes Absolute 0.78 0.10 - 0.95 E9/L    Eosinophils Absolute 0.22 0.05 - 0.50 E9/L    Basophils Absolute 0.02 0.00 - 0.20 E9/L    Anisocytosis 1+     Polychromasia 1+     Poikilocytes 1+     Schistocytes 1+     Ovalocytes 1+     Tear Drop Cells 1+    Comprehensive Metabolic Panel   Result Value Ref Range    Sodium 143 132 - 146 mmol/L    Potassium 4.0 3.5 - 5.0 mmol/L    Chloride 105 98 - 107 mmol/L    CO2 24 22 - 29 mmol/L    Anion Gap 14 7 - 16 mmol/L    Glucose 108 (H) 74 - 99 mg/dL    BUN 25 (H) 8 - 23 mg/dL    CREATININE 1.3 (H) 0.7 - 1.2 mg/dL    GFR Non-African American 55 >=60 mL/min/1.73    GFR African American >60     Calcium 10.0 8.6 - 10.2 mg/dL    Total Protein 7.5 6.4 - 8.3 g/dL    Alb 4.3 3.5 - 5.2 g/dL    Total Bilirubin 0.8 0.0 - 1.2 mg/dL    Alkaline Phosphatase 96 40 - 129 U/L    ALT 22 0 - 40 U/L    AST 25 0 - 39 U/L   Troponin   Result Value Ref Range    Troponin 0.02 0.00 - 0.03 ng/mL   Brain Natriuretic Peptide   Result Value Ref Range    Pro-BNP 4,214 (H) 0 - 125 pg/mL   Magnesium   Result Value Ref Range    Magnesium 2.2 1.6 - 2.6 mg/dL   Protime-INR   Result Value Ref Range    Protime 14.0 (H) 9.3 - 12.4 sec    INR 1.2    Lactic Acid, Plasma   Result Value Ref Range    Lactic Acid 1.0 0.5 - 2.2 mmol/L   Digoxin level   Result Value Ref Range    Digoxin Lvl 0.6 (L) 0.8 - 2.0 ng/mL   TYPE AND SCREEN   Result Value Ref Range    Antibody Screen NEG      Imaging: All Radiology results interpreted by Radiologist unless otherwise noted.   XR CHEST PORTABLE   Final Result   Probable right-sided pleural effusion with some bibasilar edema or   early non consolidative pneumonia. Cardiomegaly with surgical changes, but without definite evidence of   CHF. EKG #1:  Interpreted by emergency department physician unless otherwise noted. Time:  14:05  Rate: 78  Rhythm: Atrial fibrillation. Artifact  Interpretation: unchanged from previous tracings. ED Course / Medical Decision Making     Medications   furosemide (LASIX) injection 40 mg (40 mg Intravenous Given 1/6/20 1504)   nitroglycerin (NITRO-BID) 2 % ointment 1 inch (1 inch Topical Given 1/6/20 1504)     Re-Evaluations:  1/6/20       Patients symptoms are improving. Consultations:             IP CONSULT TO INTERNAL MEDICINE  IP CONSULT TO CARDIOLOGY    Procedures:   none    MDM: Patient with recent aortic repair presents with signs and symptoms of fluid overload. He does have history of heart failure, most recent EF 20 to 30%. Since being discharged from [facility], he was taken off of his Lasix. He was found to be hypoxic by his home health nurse today. On arrival, he did have bibasilar rales and lower extremity edema. Chest x-ray showed pulmonary edema and BNP greater than 4,000. He continues to deny any chest pain, back pain or abdominal pain. His incisions are well-healing. He was placed on BiPAP and treated with nitro and Lasix. On reevaluation, his symptoms have significantly improved. He is very comfortable, laughing and joking. His respiratory rate is decreased and he no longer has accessory muscle use. Case discussed with Dr. Matt Milan on-call for [facility] physicians and he was admitted for further management. Counseling:   I have spoken with the patient and discussed todays results, in addition to providing specific details for the plan of care and counseling regarding the diagnosis and prognosis and are agreeable with the plan. Assessment      1.  Acute on chronic congestive heart failure, unspecified heart failure type ([address]      This patient's ED course included: a personal history and physicial examination  This patient has remained hemodynamically stable during their ED course. Plan   Admit to telemetry. Patient condition is stable. New Medications     New Prescriptions    No medications on file     Electronically signed by Stiven Oneil DO   DD: 1/6/20  **This report was transcribed using voice recognition software. Every effort was made to ensure accuracy; however, inadvertent computerized transcription errors may be present.   END OF PROVIDER NOTE        Stiven Oneil DO  01/06/20 8087

## 2020-01-06 NOTE — ED NOTES
Blood cultures obtained from right AC, per policy. Set one of two drawn at this time.                Denny Lockwood RN  01/06/20 8125

## 2020-01-06 NOTE — ED NOTES
Blood cultures obtained from left AC, per policy. Set two of two drawn at this time.                Rahul Tenorio RN  01/06/20 8213

## 2020-01-06 NOTE — H&P
Hospital Medicine History & Physical          PCP: Russell Trinidad DO    Date of Admission: 1/6/2020    Date of Service: Pt seen/examined on 1/6/20 and Admitted to Inpatient with expected LOS greater than two midnights due to medical therapy. Chief Complaint:  shortness of breath      History Of Present Illness:      75 y.o. male who presented to [address] [place] [address] with shortness of breath. History obtained from the patient. He states he was working with his home health nurse today when he was found to have oxygen saturation of 80% on room air. He was diagnosed with an ascending and descending aortic aneurysm with repair at [facility] diagnosed 11/15/2019. He also had a pericardial effusion drained and pleural effusion drained during his hospital stay. He has since followed up with both his surgeons and his family doctor. He has history of heart failure with last documented EF 20-30 %. He states since discharge, he has been taken off of his Lasix. He has noticed that his legs are filling up with fluid. He states last night he was feeling short of breath and took a lasix and warfarin pill. He denies chest pain or productive cough. He is scheduled for followup at [facility] on Thursday. He has PMH of CHF, HTN, sarcoidosis. Past Medical History:          Diagnosis Date    CHF (congestive heart failure) ([address] 5/03    severely impaired LV systolic function and CHF, EF 25-30%    H/O cardiovascular stress test 5/03    No evidence of stress-induced ischemia    H/O Doppler echocardiogram 5/04/10    [facility], mildly dilated LV, mod concentric LVH, normal LV systolic function, mod dilated left atrium, trace MR    Hypertension     Obesity     Sarcoidosis        Past Surgical History:          Procedure Laterality Date    TONSILLECTOMY         Medications Prior to Admission:      Prior to Admission medications    Medication Sig Start Date End Date Taking?  Authorizing Provider   amLODIPine ocular muscles intact. Conjunctivae/corneas clear. Neck: Supple, with full range of motion. No jugular venous distention. Trachea midline. Respiratory:  Normal respiratory effort. Clear to auscultation, bilaterally without Rales/Wheezes/Rhonchi. Cardiovascular:  Regular rate and rhythm with normal S1/S2 without murmurs, rubs or gallops. Abdomen: Soft, non-tender, non-distended with normal bowel sounds. Musculoskeletal:  No clubbing, cyanosis or edema bilaterally. Full range of motion without deformity. Skin: Skin color, texture, turgor normal.  Incisional scar mid chest  Neurologic:  Neurovascularly intact without any focal sensory/motor deficits.    Psychiatric:  Alert and oriented, thought content appropriate, normal insight  Capillary Refill: Brisk,< 3 seconds   Peripheral Pulses: +2 palpable, equal bilaterally       CXR:  I have reviewed the CXR with the following interpretation: right side pleural effusion and probable infiltrates      Labs:     Recent Labs     01/06/20  1425   WBC 5.3   HGB 12.9   HCT 43.2        Recent Labs     01/06/20  1425      K 4.0      CO2 24   BUN 25*   CREATININE 1.3*   CALCIUM 10.0     Recent Labs     01/06/20  1425   AST 25   ALT 22   BILITOT 0.8   ALKPHOS 96     Recent Labs     01/06/20  1425   INR 1.2     Recent Labs     01/06/20  1425   TROPONINI 0.02       Urinalysis:    No results found for: NITRU, [address], BACTERIA, RBCUA, BLOODU, SPECGRAV, GLUCOSEU      ASSESSMENT:    Active Hospital Problems    Diagnosis Date Noted    CHF (congestive heart failure), NYHA class I, acute on chronic, combined ([address] [I50.43] 01/06/2020    Dissection of thoracic aorta ([facility] [address] [I71.01] 11/15/2019    Acute on chronic systolic congestive heart failure ([address] [I50.23]     Cardiomyopathy ([address] [I42.9] 05/20/2016    Rate controlled atrial fibrillation (HCC) [I48.91] 05/20/2016    Chronic kidney disease, stage III (moderate) ([address] [N18.3] 02/04/2014    Moderate obesity [E66.8]

## 2020-01-07 LAB
ALBUMIN SERPL-MCNC: 3.9 G/DL (ref 3.5–5.2)
ALP BLD-CCNC: 85 U/L (ref 40–129)
ALT SERPL-CCNC: 21 U/L (ref 0–40)
ANION GAP SERPL CALCULATED.3IONS-SCNC: 17 MMOL/L (ref 7–16)
AST SERPL-CCNC: 24 U/L (ref 0–39)
BILIRUB SERPL-MCNC: 0.8 MG/DL (ref 0–1.2)
BUN BLDV-MCNC: 29 MG/DL (ref 8–23)
CALCIUM SERPL-MCNC: 9.4 MG/DL (ref 8.6–10.2)
CHLORIDE BLD-SCNC: 105 MMOL/L (ref 98–107)
CO2: 21 MMOL/L (ref 22–29)
CREAT SERPL-MCNC: 1.5 MG/DL (ref 0.7–1.2)
GFR AFRICAN AMERICAN: 57
GFR NON-AFRICAN AMERICAN: 47 ML/MIN/1.73
GLUCOSE BLD-MCNC: 99 MG/DL (ref 74–99)
HCT VFR BLD CALC: 38.1 % (ref 37–54)
HEMOGLOBIN: 11.6 G/DL (ref 12.5–16.5)
LV EF: 43 %
LVEF MODALITY: NORMAL
MCH RBC QN AUTO: 28 PG (ref 26–35)
MCHC RBC AUTO-ENTMCNC: 30.4 % (ref 32–34.5)
MCV RBC AUTO: 92 FL (ref 80–99.9)
PDW BLD-RTO: 16.9 FL (ref 11.5–15)
PLATELET # BLD: 149 E9/L (ref 130–450)
PMV BLD AUTO: 9.6 FL (ref 7–12)
POTASSIUM SERPL-SCNC: 4 MMOL/L (ref 3.5–5)
RBC # BLD: 4.14 E12/L (ref 3.8–5.8)
SODIUM BLD-SCNC: 143 MMOL/L (ref 132–146)
TOTAL PROTEIN: 7.2 G/DL (ref 6.4–8.3)
WBC # BLD: 5.1 E9/L (ref 4.5–11.5)

## 2020-01-07 PROCEDURE — 94761 N-INVAS EAR/PLS OXIMETRY MLT: CPT

## 2020-01-07 PROCEDURE — 94760 N-INVAS EAR/PLS OXIMETRY 1: CPT

## 2020-01-07 PROCEDURE — 6360000002 HC RX W HCPCS: Performed by: INTERNAL MEDICINE

## 2020-01-07 PROCEDURE — 6370000000 HC RX 637 (ALT 250 FOR IP): Performed by: INTERNAL MEDICINE

## 2020-01-07 PROCEDURE — 85027 COMPLETE CBC AUTOMATED: CPT

## 2020-01-07 PROCEDURE — 6360000002 HC RX W HCPCS: Performed by: NURSE PRACTITIONER

## 2020-01-07 PROCEDURE — 2580000003 HC RX 258

## 2020-01-07 PROCEDURE — 2060000000 HC ICU INTERMEDIATE R&B

## 2020-01-07 PROCEDURE — 36415 COLL VENOUS BLD VENIPUNCTURE: CPT

## 2020-01-07 PROCEDURE — 99222 1ST HOSP IP/OBS MODERATE 55: CPT | Performed by: INTERNAL MEDICINE

## 2020-01-07 PROCEDURE — APPSS60 APP SPLIT SHARED TIME 46-60 MINUTES: Performed by: NURSE PRACTITIONER

## 2020-01-07 PROCEDURE — 93306 TTE W/DOPPLER COMPLETE: CPT

## 2020-01-07 PROCEDURE — 94660 CPAP INITIATION&MGMT: CPT

## 2020-01-07 PROCEDURE — 80053 COMPREHEN METABOLIC PANEL: CPT

## 2020-01-07 PROCEDURE — 94640 AIRWAY INHALATION TREATMENT: CPT

## 2020-01-07 PROCEDURE — 2700000000 HC OXYGEN THERAPY PER DAY

## 2020-01-07 RX ORDER — FUROSEMIDE 10 MG/ML
40 INJECTION INTRAMUSCULAR; INTRAVENOUS DAILY
Status: DISCONTINUED | OUTPATIENT
Start: 2020-01-07 | End: 2020-01-08

## 2020-01-07 RX ORDER — FUROSEMIDE 20 MG/1
20 TABLET ORAL DAILY
Status: ON HOLD | COMMUNITY
End: 2020-01-15 | Stop reason: HOSPADM

## 2020-01-07 RX ORDER — HYDRALAZINE HYDROCHLORIDE 25 MG/1
25 TABLET, FILM COATED ORAL 3 TIMES DAILY
Status: DISCONTINUED | OUTPATIENT
Start: 2020-01-07 | End: 2020-01-08

## 2020-01-07 RX ORDER — SODIUM CHLORIDE 0.9 % (FLUSH) 0.9 %
SYRINGE (ML) INJECTION
Status: COMPLETED
Start: 2020-01-07 | End: 2020-01-07

## 2020-01-07 RX ORDER — HEPARIN SODIUM 10000 [USP'U]/ML
5000 INJECTION, SOLUTION INTRAVENOUS; SUBCUTANEOUS EVERY 8 HOURS
Status: DISCONTINUED | OUTPATIENT
Start: 2020-01-07 | End: 2020-01-09

## 2020-01-07 RX ORDER — CARVEDILOL 25 MG/1
25 TABLET ORAL 2 TIMES DAILY WITH MEALS
Status: DISCONTINUED | OUTPATIENT
Start: 2020-01-07 | End: 2020-01-15 | Stop reason: HOSPADM

## 2020-01-07 RX ORDER — POTASSIUM CHLORIDE 750 MG/1
10 CAPSULE, EXTENDED RELEASE ORAL DAILY
COMMUNITY
End: 2020-01-16 | Stop reason: SDUPTHER

## 2020-01-07 RX ADMIN — VALSARTAN 160 MG: 160 TABLET, FILM COATED ORAL at 09:28

## 2020-01-07 RX ADMIN — SENNOSIDES 17.2 MG: 8.6 TABLET, FILM COATED ORAL at 21:29

## 2020-01-07 RX ADMIN — SENNOSIDES 17.2 MG: 8.6 TABLET, FILM COATED ORAL at 09:30

## 2020-01-07 RX ADMIN — IPRATROPIUM BROMIDE AND ALBUTEROL SULFATE 1 AMPULE: 2.5; .5 SOLUTION RESPIRATORY (INHALATION) at 07:41

## 2020-01-07 RX ADMIN — DIGOXIN 62.5 MCG: 125 TABLET ORAL at 09:28

## 2020-01-07 RX ADMIN — IPRATROPIUM BROMIDE AND ALBUTEROL SULFATE 1 AMPULE: 2.5; .5 SOLUTION RESPIRATORY (INHALATION) at 21:49

## 2020-01-07 RX ADMIN — HYDRALAZINE HYDROCHLORIDE 12.5 MG: 25 TABLET, FILM COATED ORAL at 09:29

## 2020-01-07 RX ADMIN — CARVEDILOL 12.5 MG: 6.25 TABLET, FILM COATED ORAL at 08:08

## 2020-01-07 RX ADMIN — HYDRALAZINE HYDROCHLORIDE 25 MG: 25 TABLET, FILM COATED ORAL at 14:14

## 2020-01-07 RX ADMIN — HYDRALAZINE HYDROCHLORIDE 25 MG: 25 TABLET, FILM COATED ORAL at 21:29

## 2020-01-07 RX ADMIN — ACETAMINOPHEN 650 MG: 325 TABLET, FILM COATED ORAL at 08:08

## 2020-01-07 RX ADMIN — IPRATROPIUM BROMIDE AND ALBUTEROL SULFATE 1 AMPULE: 2.5; .5 SOLUTION RESPIRATORY (INHALATION) at 16:06

## 2020-01-07 RX ADMIN — ASPIRIN 81 MG 162 MG: 81 TABLET ORAL at 09:29

## 2020-01-07 RX ADMIN — Medication: at 15:33

## 2020-01-07 RX ADMIN — SODIUM CHLORIDE, PRESERVATIVE FREE 10 ML: 5 INJECTION INTRAVENOUS at 12:40

## 2020-01-07 RX ADMIN — ACETAMINOPHEN 650 MG: 325 TABLET, FILM COATED ORAL at 12:40

## 2020-01-07 RX ADMIN — FUROSEMIDE 40 MG: 10 INJECTION, SOLUTION INTRAMUSCULAR; INTRAVENOUS at 12:40

## 2020-01-07 RX ADMIN — HEPARIN SODIUM 5000 UNITS: 10000 INJECTION, SOLUTION INTRAVENOUS; SUBCUTANEOUS at 16:37

## 2020-01-07 RX ADMIN — POTASSIUM CHLORIDE 10 MEQ: 10 TABLET, EXTENDED RELEASE ORAL at 09:27

## 2020-01-07 RX ADMIN — MULTIPLE VITAMINS W/ MINERALS TAB 1 TABLET: TAB at 09:31

## 2020-01-07 RX ADMIN — AMLODIPINE BESYLATE 5 MG: 5 TABLET ORAL at 09:28

## 2020-01-07 RX ADMIN — CARVEDILOL 25 MG: 25 TABLET, FILM COATED ORAL at 17:53

## 2020-01-07 RX ADMIN — FUROSEMIDE 20 MG: 20 TABLET ORAL at 09:29

## 2020-01-07 RX ADMIN — IPRATROPIUM BROMIDE AND ALBUTEROL SULFATE 1 AMPULE: 2.5; .5 SOLUTION RESPIRATORY (INHALATION) at 13:14

## 2020-01-07 RX ADMIN — ACETAMINOPHEN 650 MG: 325 TABLET, FILM COATED ORAL at 02:22

## 2020-01-07 ASSESSMENT — PAIN SCALES - GENERAL
PAINLEVEL_OUTOF10: 1
PAINLEVEL_OUTOF10: 3
PAINLEVEL_OUTOF10: 4
PAINLEVEL_OUTOF10: 4
PAINLEVEL_OUTOF10: 2
PAINLEVEL_OUTOF10: 4
PAINLEVEL_OUTOF10: 1

## 2020-01-07 ASSESSMENT — PAIN DESCRIPTION - LOCATION
LOCATION: ABDOMEN;ARM
LOCATION: ARM

## 2020-01-07 ASSESSMENT — PAIN DESCRIPTION - DESCRIPTORS: DESCRIPTORS: ACHING;CONSTANT;NUMBNESS

## 2020-01-07 ASSESSMENT — PAIN DESCRIPTION - PAIN TYPE: TYPE: CHRONIC PAIN

## 2020-01-07 NOTE — CONSULTS
I independently interviewed and examined the patient. I have reviewed the above documentation completed by the LUIS. Please see my additional contributions to the HPI, physical exam, and assessment / medical decision making. Reason for consult: CHF    He was previously known to Dr. Sara Camacho service at [facility]) cardiology. Mr. Madiha Inman is a 68-year-old gentleman with history of permanent atrial fibrillation on warfarin currently off warfarin, with mild MR and TR, dilated cardiomyopathy with an EF of 40 to 45% based on echo done in May 2017, valvular heart disease team, hypertension, sarcoidosis, GERD who was recently admitted with chest pain on 11/14/2019 with tearing sensation in his chest.  Initially, he had a chest pain underwent Lexiscan Cardiolite stress test that came back negative. He had an echocardiogram due to persistent chest pain and was diagnosed with ascending aortic aneurysm with a dissection. He was also noted to have an EF of 30 to 35% on that echocardiogram.  He was life flighted to [facility] on 11/15/2019 and underwent emergency surgery for type I dissection. Postoperatively he developed atrial fibrillation and acute kidney injury. He also had a left pleural effusion for which he had a pigtail catheter placed. He was discharged home in early December 2019 on Lasix 20 mg p.o. daily. He was recently evaluated at [facility] outpatient on 12/20/2019. He had an echocardiogram done which showed small pericardial effusion measuring about 1 cm, LVEF was 42± percent, moderate mitral regurgitation. He was told to increase Lasix to 40 mg for a week. He ran out of Lasix and when he called [facility] he was directed to stop Lasix which he stopped 1 week back. Now for the past 1 week he has been having exertional dyspnea with bilateral leg edema which he noticed 3 days back.   Now for the past for 5 days he has been having orthopnea without any episodes of paroxysmal nocturnal

## 2020-01-07 NOTE — PLAN OF CARE
Problem: Falls - Risk of:  Goal: Will remain free from falls  Description  Will remain free from falls  1/7/2020 0525 by Cole Ferguson RN  Outcome: Met This Shift  1/6/2020 1901 by Robert Mccord RN  Outcome: Met This Shift  Goal: Absence of physical injury  Description  Absence of physical injury  Outcome: Met This Shift     Problem: Pain:  Goal: Pain level will decrease  Description  Pain level will decrease  1/6/2020 1901 by Ursula Ramirez RN  Outcome: Met This Shift  Goal: Control of acute pain  Description  Control of acute pain  Outcome: Met This Shift     Problem: OXYGENATION/RESPIRATORY FUNCTION  Goal: Patient will maintain patent airway  1/7/2020 0525 by Cole Ferguson RN  Outcome: Met This Shift  1/6/2020 1901 by Robert Mccord RN  Outcome: Met This Shift

## 2020-01-07 NOTE — PROGRESS NOTES
Pt asked for O2 to be increased. He was 88% on 4L. Increased O2 to 7L and is 91%. He stated that he was able to breathe much better on that amount. Will continue to monitor.      Electronically signed by Tigre Uribe RN on 1/7/2020 at 2:31 AM

## 2020-01-07 NOTE — PLAN OF CARE
Problem: Falls - Risk of:  Goal: Will remain free from falls  Description  Will remain free from falls  Outcome: Met This Shift     Problem: Pain:  Description  Pain management should include both nonpharmacologic and pharmacologic interventions.   Goal: Pain level will decrease  Description  Pain level will decrease  Outcome: Met This Shift     Problem: OXYGENATION/RESPIRATORY FUNCTION  Goal: Patient will maintain patent airway  Outcome: Met This Shift

## 2020-01-07 NOTE — PROGRESS NOTES
deformity. Skin: Skin color, texture, turgor normal.  No rashes or lesions. Neurologic:  Neurovascularly intact without any focal sensory/motor deficits. Cranial nerves: II-XII intact, grossly non-focal.  Psychiatric: Alert and oriented, thought content appropriate, normal insight  Capillary Refill: Brisk,< 3 seconds   Peripheral Pulses: +2 palpable, equal bilaterally       Labs:   Recent Labs     01/06/20  1425 01/07/20  0420   WBC 5.3 5.1   HGB 12.9 11.6*   HCT 43.2 38.1    149     Recent Labs     01/06/20  1425 01/07/20  0420    143   K 4.0 4.0    105   CO2 24 21*   BUN 25* 29*   CREATININE 1.3* 1.5*   CALCIUM 10.0 9.4     Recent Labs     01/06/20  1425 01/07/20  0420   AST 25 24   ALT 22 21   BILITOT 0.8 0.8   ALKPHOS 96 85     Recent Labs     01/06/20  1425   INR 1.2     Recent Labs     01/06/20  1425 01/06/20  1857   TROPONINI 0.02 0.02       Assessment/Plan:    Active Hospital Problems    Diagnosis Date Noted    CHF (congestive heart failure), NYHA class I, acute on chronic, combined ([facility] [I50.43] 01/06/2020    Dissection of thoracic aorta (HCC) [I71.01] 11/15/2019    Acute on chronic systolic congestive heart failure (HCC) [I50.23]     Cardiomy[facility] ([facility] [I42.9] 05/20/2016    Rate controlled atrial fibrillation (HCC) [I48.91] 05/20/2016    Chronic kidney disease, stage III (moderate) ([facility] [N18.3] 02/04/2014    Moderate obesity [E66.8] 11/05/2013    Essential hypertension [I10] 10/25/2012     Plan--  Continue IV diuretics  Echocardiogram pending  Follow-up in the a.m. DVT Prophylaxis: Heparin  Diet: DIET NO SALT ADDED (3-4 GM);   Code Status: Prior    PT/OT Eval Status: N/A    Dispo -goal home    Jarad Estevez DO

## 2020-01-07 NOTE — CONSULTS
30-35%. Hypokinesis of the basal inferior, basal inferoseptal walls. Indeterminate diastolic function. Mild left ventricular concentric hypertrophy noted. Mildly dilated right ventricle. Right ventricle global systolic function is normal. The left atrium is severely dilated. Moderately enlarged right atrium size. Mild mitral regurgitation is present. Trace aortic valve regurgitation. RVSP is 33 mmHg. Moderate-severely dilated aortic root. Ascending aorta 5.1 cm. There is a trivial circumferential pericardial effusion noted with no evidence of hemodynamic compromise. · TTE: Limited: 11/25/2019 [facility] -- - Technically difficult exam due to suboptimal positioning, bandages/chest tubes/wound and post op. Exam indication: Limited for pericardial effusion s/p type A dissection repair The left ventricle is normal in size. There is mild left ventricular hypertrophy. Left ventricular systolic function is mildly decreased. EF = 50 ± 5% (visual est.) The right ventricle is normal in size. Right ventricular systolic function is   moderately decreased. The left atrial cavity is severely dilated. There is a small to moderate size effusion lateral to the LV. The IVC is dilated and does not collapse with inspiration. Increased respiratory variation in inflow velocities across the tricuspid and mitral valves. No chamber collapse. No  obvious tamponade physiology. Recommend close monitoring. ·  TTE: 12/20/19 - [facility] There is small pericardial effusion adjacent to the left ventricle measuring 1.1  cm. There is an epicardial fat pad. Technically difficult exam due to body habitus. Exam indication: Type A dissection repair, The left ventricle is dilated. There is mild left ventricular hypertrophy. Left ventricular systolic function is mildly decreased. EF = 42 ± 5% (3D)  The right ventricle is normal in size. Right ventricular systolic function is low normal.The left atrial cavity is severely dilated. The right atrial cavity is dilated. · Gastrointestinal: Denies hematemesis or anorexia. No hematochezia or melena    · Genitourinary: Denies urgency, dysuria or hematuria. · Musculoskeletal: Denies gait disturbance,+ generalized weakness Denies joint complaints  · Integumentary: Denies rash, hives or pruritis   · Neurological: Denies dizziness, headaches or seizures. No numbness or tingling  · Psychiatric: +anxiety. Denies depression. · Endocrine: Denies temperature intolerance. No recent weight change. .  · Hematologic/Lymphatic: Denies abnormal bruising or bleeding. No swollen lymph nodes    PHYSICAL EXAM:   BP (!) 158/98   Pulse 78   Temp 97.6 °F (36.4 °C) (Temporal)   Resp 24   Ht 5' 8\" (1.727 m)   Wt 225 lb 4.8 oz (102.2 kg)   SpO2 94%   BMI 34.26 kg/m²   CONST:  Well developed, well nourished middle-aged obese male who appears of stated age. Awake, alert and cooperative. No apparent distress. HEENT:   Head- Normocephalic, atraumatic   Eyes- Conjunctivae pink, anicteric  Throat- Oral mucosa pink and moist  Neck-  No stridor, trachea midline, + jugular venous distention. No carotid bruit. CHEST: Chest symmetrical and non-tender to palpation. No accessory muscle use or intercostal retractions. Sternal incision well approximated, well-healed. RESPIRATORY: Lung sounds -bilateral rales. CARDIOVASCULAR:     Heart Inspection- shows no noted pulsations  Heart Palpation- no heaves or thrills; PMI is non-displaced   Heart Ausculation- IRR, no murmur. No s3,  or rub   PV: Trace lower extremity edema. No varicosities. Pedal pulses palpable, no clubbing or cyanosis   ABDOMEN: Soft, obese,  non-tender to light palpation. Bowel sounds present. No palpable masses no organomegaly; no abdominal bruit  MS: Good muscle strength and tone. No atrophy or abnormal movements. : Deferred  SKIN: Warm and dry no statis dermatitis or ulcers   NEURO / PSYCH: Oriented to person, place and time. Speech clear and appropriate. Follows all commands. small right pleural effusion and congestive heart failure his proBNP was 4000 214 and troponin was 0.02×3. He was given Lasix 40 mg IV and started on 20 mg daily and admitted to the floor. He is feeling much better and he put out close to 2 L of urine. He is feeling slightly better this morning.     Review of Systems:  Cardiac: As per HPI  General: No fever, chills  Pulmonary: As per HPI  GI: No nausea, vomiting  Musculoskeletal: MAYFIELD x 4, no focal motor deficits  Skin: Intact, no rashes  Neuro/Psych: No headache or seizures     Physical Exam:  BP (!) 161/84   Pulse 82   Temp 98.6 °F (37 °C) (Temporal)   Resp 18   Ht 5' 8\" (1.727 m)   Wt 225 lb 4.8 oz (102.2 kg)   SpO2 93%   BMI 34.26 kg/m²   Appearance: Awake, alert, no acute respiratory distress  Skin: Intact, no rash  Head: Normocephalic, atraumatic  ENMT: MMM, no rhinorrhea  Neck: Supple, no carotid bruits, JVD is difficult to evaluate due to thick neck  Lungs: He has bilateral rhonchi . Without any wheezing or rhonchi. Cardiac: Regular rate and rhythm, +S1S2, no murmurs apparent  Abdomen: Soft, +bowel sounds  Extremities: Moves all extremities x 4, no lower extremity edema  Neurologic: No focal motor deficits apparent, normal mood and affect     Telemetry findings reviewed: Atrial fibrillation at rate 60s, no new tachy/bradyarrhythmias overnight     EKG: Atrial fibrillation, anterior infarct age undetermined, abnormal EKG.    Vitals: Blood pressure 161/84 heart rate of 82. Labs were reviewed: Bun/creatinine 25/1.3>> 29/1.5, electrolytes normal, proBNP 4214, troponin 0.02>> 0.02, liver function normal, dig 0.6. H&H 11.6/38.1 WBC 5.1, 169.     Chest x-ray: Probable right-sided pleural effusion with some basilar edema pneumonia versus pulmonary edema.     TTE- 11/14/2019: LVEDD 6 cm EF 30 to 35%, hypo-kinesis of the basal inferior and basal inferior septal walls, diastolic function was indeterminate, mild concentric LVH, RV has global hypokinesis. RVSP 33 mmHg mild MR and moderate to severely dilated aortic root ascending aorta 5.1 cm there is trivial circumferential pericardial effusion     Stress test-11/15/2019: No reversible perfusion defect, large fixed inferior wall defect, EF 30%, hypokinetic of the inferior wall.     Assessment:  · Acute on chronic heart failure with reduced ejection fraction  · Ischemic cardiomyopathy, EF 30-35%  · ACC/AHA stage C.,  NYHA functional class III  · Persistent atrial fibrillation,  [id number] Vasc score -   · Type I aortic dissection status post surgery underwent at [place] on 11/15/2019. · Hypertension not well controlled. · CKD stage III  · Obesity  · History of sarcoidosis.       Plan:  · His blood pressure is not well controlled we will increase hydralazine to 25 mg p.o. 3 times a day and Coreg to 25 mg p.o. twice a day. · Increase Lasix to 40 mg IV daily. Monitor renal functions and electrolytes on a daily basis. · Cardiac diet and restrict daily sodium to 2 g and fluids to 64 ounces. · Continue rest of his current medications  · We will recheck his proBNP in a.m.   · No anticoagulation therapy at this time until he is cleared by the CT. surgery department.        Thank you for consulting us and will be following with you for any further recommendations.     Katt Menard MD, [facility]

## 2020-01-07 NOTE — CARE COORDINATION
Spoke with patient. He lives at home alone, tells me his spouse passed 5 years ago and then his mother 2 years after that and limited family support in the community. He plans to return home at discharge. Spoke with [place], they were active with homecare prior to admission, they will need resume orders prior to discharge. No assistive devices in the home. Patient plans to return home at discharge. No history of FABRICE. He is anxious to discharge as soon as possible to go to follow up appointment at [facility] Thursday. Patient on oxygen, no oxygen in the home.

## 2020-01-08 ENCOUNTER — APPOINTMENT (OUTPATIENT)
Dept: GENERAL RADIOLOGY | Age: 65
DRG: 291 | End: 2020-01-08
Payer: MEDICARE

## 2020-01-08 LAB
ALBUMIN SERPL-MCNC: 3.6 G/DL (ref 3.5–5.2)
ALP BLD-CCNC: 81 U/L (ref 40–129)
ALT SERPL-CCNC: 18 U/L (ref 0–40)
ANION GAP SERPL CALCULATED.3IONS-SCNC: 15 MMOL/L (ref 7–16)
AST SERPL-CCNC: 28 U/L (ref 0–39)
BILIRUB SERPL-MCNC: 0.7 MG/DL (ref 0–1.2)
BUN BLDV-MCNC: 35 MG/DL (ref 8–23)
CALCIUM SERPL-MCNC: 9.2 MG/DL (ref 8.6–10.2)
CHLORIDE BLD-SCNC: 99 MMOL/L (ref 98–107)
CO2: 23 MMOL/L (ref 22–29)
CREAT SERPL-MCNC: 1.5 MG/DL (ref 0.7–1.2)
GFR AFRICAN AMERICAN: 57
GFR NON-AFRICAN AMERICAN: 47 ML/MIN/1.73
GLUCOSE BLD-MCNC: 102 MG/DL (ref 74–99)
HCT VFR BLD CALC: 38.2 % (ref 37–54)
HEMOGLOBIN: 11.4 G/DL (ref 12.5–16.5)
MCH RBC QN AUTO: 27.8 PG (ref 26–35)
MCHC RBC AUTO-ENTMCNC: 29.8 % (ref 32–34.5)
MCV RBC AUTO: 93.2 FL (ref 80–99.9)
PDW BLD-RTO: 16.9 FL (ref 11.5–15)
PLATELET # BLD: 150 E9/L (ref 130–450)
PMV BLD AUTO: 10.1 FL (ref 7–12)
POTASSIUM SERPL-SCNC: 4.4 MMOL/L (ref 3.5–5)
RBC # BLD: 4.1 E12/L (ref 3.8–5.8)
SODIUM BLD-SCNC: 137 MMOL/L (ref 132–146)
TOTAL PROTEIN: 6.6 G/DL (ref 6.4–8.3)
WBC # BLD: 5 E9/L (ref 4.5–11.5)

## 2020-01-08 PROCEDURE — 36415 COLL VENOUS BLD VENIPUNCTURE: CPT

## 2020-01-08 PROCEDURE — 2580000003 HC RX 258

## 2020-01-08 PROCEDURE — 6370000000 HC RX 637 (ALT 250 FOR IP): Performed by: INTERNAL MEDICINE

## 2020-01-08 PROCEDURE — 6360000002 HC RX W HCPCS: Performed by: INTERNAL MEDICINE

## 2020-01-08 PROCEDURE — 2060000000 HC ICU INTERMEDIATE R&B

## 2020-01-08 PROCEDURE — 71046 X-RAY EXAM CHEST 2 VIEWS: CPT

## 2020-01-08 PROCEDURE — 6360000002 HC RX W HCPCS: Performed by: NURSE PRACTITIONER

## 2020-01-08 PROCEDURE — 94640 AIRWAY INHALATION TREATMENT: CPT

## 2020-01-08 PROCEDURE — 94660 CPAP INITIATION&MGMT: CPT

## 2020-01-08 PROCEDURE — 99232 SBSQ HOSP IP/OBS MODERATE 35: CPT | Performed by: INTERNAL MEDICINE

## 2020-01-08 PROCEDURE — 80053 COMPREHEN METABOLIC PANEL: CPT

## 2020-01-08 PROCEDURE — 2700000000 HC OXYGEN THERAPY PER DAY

## 2020-01-08 PROCEDURE — 85027 COMPLETE CBC AUTOMATED: CPT

## 2020-01-08 RX ORDER — SODIUM CHLORIDE 0.9 % (FLUSH) 0.9 %
SYRINGE (ML) INJECTION
Status: COMPLETED
Start: 2020-01-08 | End: 2020-01-08

## 2020-01-08 RX ORDER — ISOSORBIDE DINITRATE 10 MG/1
5 TABLET ORAL 3 TIMES DAILY
Status: DISCONTINUED | OUTPATIENT
Start: 2020-01-08 | End: 2020-01-15 | Stop reason: HOSPADM

## 2020-01-08 RX ORDER — FUROSEMIDE 10 MG/ML
40 INJECTION INTRAMUSCULAR; INTRAVENOUS 2 TIMES DAILY
Status: DISCONTINUED | OUTPATIENT
Start: 2020-01-08 | End: 2020-01-09

## 2020-01-08 RX ORDER — HYDRALAZINE HYDROCHLORIDE 50 MG/1
50 TABLET, FILM COATED ORAL 3 TIMES DAILY
Status: DISCONTINUED | OUTPATIENT
Start: 2020-01-08 | End: 2020-01-15 | Stop reason: HOSPADM

## 2020-01-08 RX ADMIN — POTASSIUM CHLORIDE 10 MEQ: 10 TABLET, EXTENDED RELEASE ORAL at 08:07

## 2020-01-08 RX ADMIN — IPRATROPIUM BROMIDE AND ALBUTEROL SULFATE 1 AMPULE: 2.5; .5 SOLUTION RESPIRATORY (INHALATION) at 19:18

## 2020-01-08 RX ADMIN — AMLODIPINE BESYLATE 5 MG: 5 TABLET ORAL at 08:08

## 2020-01-08 RX ADMIN — IPRATROPIUM BROMIDE AND ALBUTEROL SULFATE 1 AMPULE: 2.5; .5 SOLUTION RESPIRATORY (INHALATION) at 15:48

## 2020-01-08 RX ADMIN — IPRATROPIUM BROMIDE AND ALBUTEROL SULFATE 1 AMPULE: 2.5; .5 SOLUTION RESPIRATORY (INHALATION) at 08:39

## 2020-01-08 RX ADMIN — HYDRALAZINE HYDROCHLORIDE 25 MG: 25 TABLET, FILM COATED ORAL at 08:07

## 2020-01-08 RX ADMIN — CARVEDILOL 25 MG: 25 TABLET, FILM COATED ORAL at 08:08

## 2020-01-08 RX ADMIN — HEPARIN SODIUM 5000 UNITS: 10000 INJECTION, SOLUTION INTRAVENOUS; SUBCUTANEOUS at 16:13

## 2020-01-08 RX ADMIN — FUROSEMIDE 40 MG: 10 INJECTION, SOLUTION INTRAMUSCULAR; INTRAVENOUS at 08:06

## 2020-01-08 RX ADMIN — FUROSEMIDE 40 MG: 10 INJECTION, SOLUTION INTRAMUSCULAR; INTRAVENOUS at 17:56

## 2020-01-08 RX ADMIN — HYDRALAZINE HYDROCHLORIDE 25 MG: 25 TABLET, FILM COATED ORAL at 14:20

## 2020-01-08 RX ADMIN — IPRATROPIUM BROMIDE AND ALBUTEROL SULFATE 1 AMPULE: 2.5; .5 SOLUTION RESPIRATORY (INHALATION) at 13:19

## 2020-01-08 RX ADMIN — ACETAMINOPHEN 650 MG: 325 TABLET, FILM COATED ORAL at 16:12

## 2020-01-08 RX ADMIN — ACETAMINOPHEN 650 MG: 325 TABLET, FILM COATED ORAL at 03:00

## 2020-01-08 RX ADMIN — CARVEDILOL 25 MG: 25 TABLET, FILM COATED ORAL at 17:56

## 2020-01-08 RX ADMIN — ACETAMINOPHEN 650 MG: 325 TABLET, FILM COATED ORAL at 12:04

## 2020-01-08 RX ADMIN — ASPIRIN 81 MG 162 MG: 81 TABLET ORAL at 08:07

## 2020-01-08 RX ADMIN — ACETAMINOPHEN 650 MG: 325 TABLET, FILM COATED ORAL at 20:43

## 2020-01-08 RX ADMIN — SENNOSIDES 17.2 MG: 8.6 TABLET, FILM COATED ORAL at 08:06

## 2020-01-08 RX ADMIN — SENNOSIDES 17.2 MG: 8.6 TABLET, FILM COATED ORAL at 20:48

## 2020-01-08 RX ADMIN — SODIUM CHLORIDE, PRESERVATIVE FREE 10 ML: 5 INJECTION INTRAVENOUS at 08:10

## 2020-01-08 RX ADMIN — VALSARTAN 160 MG: 160 TABLET, FILM COATED ORAL at 08:08

## 2020-01-08 RX ADMIN — SODIUM CHLORIDE, PRESERVATIVE FREE 10 ML: 5 INJECTION INTRAVENOUS at 17:56

## 2020-01-08 RX ADMIN — MULTIPLE VITAMINS W/ MINERALS TAB 1 TABLET: TAB at 08:09

## 2020-01-08 RX ADMIN — HYDRALAZINE HYDROCHLORIDE 50 MG: 50 TABLET, FILM COATED ORAL at 20:48

## 2020-01-08 RX ADMIN — ISOSORBIDE DINITRATE 5 MG: 10 TABLET ORAL at 20:47

## 2020-01-08 RX ADMIN — DIGOXIN 62.5 MCG: 125 TABLET ORAL at 08:08

## 2020-01-08 RX ADMIN — ACETAMINOPHEN 650 MG: 325 TABLET, FILM COATED ORAL at 08:06

## 2020-01-08 ASSESSMENT — PAIN SCALES - GENERAL
PAINLEVEL_OUTOF10: 4
PAINLEVEL_OUTOF10: 3
PAINLEVEL_OUTOF10: 2
PAINLEVEL_OUTOF10: 3
PAINLEVEL_OUTOF10: 4

## 2020-01-08 NOTE — PROGRESS NOTES
ml   Net -230 ml       Exam:    BP (!) 166/86   Pulse 89   Temp 98.6 °F (37 °C) (Temporal)   Resp 20   Ht 5' 8\" (1.727 m)   Wt 221 lb 4.8 oz (100.4 kg)   SpO2 94%   BMI 33.65 kg/m²     General appearance: No apparent distress, appears stated age and cooperative. HEENT: Pupils equal, round, and reactive to light. Conjunctivae/corneas clear. Neck: Supple, with full range of motion. No jugular venous distention. Trachea midline. Respiratory:  Normal respiratory effort. Clear to auscultation, bilaterally without Rales/Wheezes/Rhonchi. Cardiovascular: Faint crackles at both bases abdomen: Soft, non-tender, non-distended with normal bowel sounds. Musculoskeletal: No clubbing, cyanosis or edema bilaterally. Full range of motion without deformity. Skin: Skin color, texture, turgor normal.  No rashes or lesions. Neurologic:  Neurovascularly intact without any focal sensory/motor deficits.  Cranial nerves: II-XII intact, grossly non-focal.  Psychiatric: Alert and oriented, thought content appropriate, normal insight  Capillary Refill: Brisk,< 3 seconds   Peripheral Pulses: +2 palpable, equal bilaterally       Labs:   Recent Labs     01/06/20  1425 01/07/20  0420 01/08/20  0430   WBC 5.3 5.1 5.0   HGB 12.9 11.6* 11.4*   HCT 43.2 38.1 38.2    149 150     Recent Labs     01/06/20  1425 01/07/20  0420 01/08/20  0430    143 137   K 4.0 4.0 4.4    105 99   CO2 24 21* 23   BUN 25* 29* 35*   CREATININE 1.3* 1.5* 1.5*   CALCIUM 10.0 9.4 9.2     Recent Labs     01/06/20  1425 01/07/20  0420 01/08/20  0430   AST 25 24 28   ALT 22 21 18   BILITOT 0.8 0.8 0.7   ALKPHOS 96 85 81     Recent Labs     01/06/20  1425   INR 1.2     Recent Labs     01/06/20  1425 01/06/20  1857   TROPONINI 0.02 0.02       Assessment/Plan:    Active Hospital Problems    Diagnosis Date Noted    CHF (congestive heart failure), NYHA class I, acute on chronic, combined ([facility] [I50.43] 01/06/2020    Dissection of thoracic aorta ([facility]

## 2020-01-08 NOTE — CONSULTS
soft, NT, ND, bowel sounds normal  Ext: warm, 1+ pitting edema, no clubbing  Skin: no rashes  Neuro: CN II-XII grossly intact, no focal deficits    Oximetry: 95% on 5L NC    Imaging personally reviewed:  CXR     Impression   Probable right-sided pleural effusion with some bibasilar edema or   early non consolidative pneumonia.       Cardiomegaly with surgical changes, but without definite evidence of   CHF. Echocardiogram:  1/6/19  Summary   Left ventricle is moderately enlarged . Ejection fraction is visually estimated at 40-45%. Overall ejection fraction mild-to-moderately decreased . Septal motion consistent with post bypass surgery. There is severe eccentric hypertrophy. Abnormal diastolic function. The left atrium is severely dilated. Normal right ventricular size and function. Mild to moderate mitral regurgitation is present. No hemodynamically significant aortic stenosis is present. RVSP is 29 mmHg. Normal PA systolic pressure. Mildly dilated aortic root (4.1 cm) and normal sized ascending aorta. A prosthetic graft noted in the location of the ascending aorta. There is a small localized near left ventricle pericardial effusion noted. Viral panel negative   proBNP 4214    Labs:  Lab Results   Component Value Date    WBC 5.0 01/08/2020    HGB 11.4 01/08/2020    HCT 38.2 01/08/2020    MCV 93.2 01/08/2020    MCH 27.8 01/08/2020    MCHC 29.8 01/08/2020    RDW 16.9 01/08/2020     01/08/2020    MPV 10.1 01/08/2020     Lab Results   Component Value Date     01/08/2020    K 4.4 01/08/2020    K 4.1 11/15/2019    CL 99 01/08/2020    CO2 23 01/08/2020    BUN 35 01/08/2020    CREATININE 1.5 01/08/2020    LABALBU 3.6 01/08/2020    CALCIUM 9.2 01/08/2020    GFRAA 57 01/08/2020    LABGLOM 47 01/08/2020     Lab Results   Component Value Date    PROTIME 14.0 01/06/2020    INR 1.2 01/06/2020       Assessment:  1. Acute hypoxic respiratory failure  2.  Decompensated systolic and diastolic heart failure  3. right pleural effusion  4. Pulmonary edema  5. Recent repair of type A aortic dissection with hemorrhagic pericardial effusion at [facility]  6. Previous left pleural effusion s/p pigtail drainage at [facility]  7. CKD  8. Probable KYARA  9. Sarcoidosis - remote and clinically inactive    Plan:  I believe Mr. Dorothy Balderas remains hypoxic secondary to pulmonary edema and a R pleural effusion due to decompensated heart failure. He is only net negative about 1L during his hospital stay. I would recommend increasing diuretic therapy which is being managed by the cardiology service. Repeat a PA/lateral CXR. We may need to proceed with a right thoracentesis. Initiate recruitment techniques consisting of incentive spirometry and EZPAP. He should undergo an outpatient sleep study.     Thank you for allowing me to participate in the care of Dalton Yao MD  1/8/2020 2:11 PM

## 2020-01-08 NOTE — PROGRESS NOTES
INPATIENT CARDIOLOGY FOLLOW-UP    Name: Makenna Diez    Age: 75 y.o. Date of Admission: 1/6/2020  1:58 PM    Date of Service: 1/8/2020    Chief Complaint: Follow-up for CHF    Interim History:  No new overnight cardiac complaints he had more dyspnea with exertion this morning. His abdomen still bloated. However feeling slightly better than yesterday. Currently with no complaints of CP, SOB, palpitations, dizziness, or lightheadedness. A. fib on telemetry.     Review of Systems:   Cardiac: As per HPI  General: No fever, chills  Pulmonary: As per HPI  HEENT: No visual disturbances, difficult swallowing  GI: No nausea, vomiting  Endocrine: No thyroid disease or DM  Musculoskeletal: MAYFIEDL x 4, no focal motor deficits  Skin: Intact, no rashes  Neuro/Psych: No headache or seizures    Problem List:  Patient Active Problem List   Diagnosis    H/O sarcoidosis    Essential hypertension    Moderate obesity    Chronic kidney disease, stage III (moderate) ([facility]    MR (mitral regurgitation)    Anticoagulated on Coumadin    Cardiomyopathy ([address]    Rate controlled atrial fibrillation ([address]    Cognitive dysfunction    Mixed hyperlipidemia    Hyperuricemia    Stage 2 chronic kidney disease    Acute on chronic systolic congestive heart failure (HCC)    Acute kidney injury ([address]    Dissection of thoracic aorta (HCC)    Pericardial effusion (noninflammatory)    Pleural effusion    Transition of care performed with sharing of clinical summary    Acute on chronic combined systolic and diastolic CHF (congestive heart failure) ([address]    Atrial fibrillation ([address]    CHF (congestive heart failure), NYHA class I, acute on chronic, combined (HCC)       Allergies:  No Known Allergies    Current Medications:  Current Facility-Administered Medications   Medication Dose Route Frequency Provider Last Rate Last Dose    furosemide (LASIX) injection 40 mg  40 mg Intravenous BID CRISTINA Hahn CNP   40 mg at

## 2020-01-09 ENCOUNTER — APPOINTMENT (OUTPATIENT)
Dept: ULTRASOUND IMAGING | Age: 65
DRG: 291 | End: 2020-01-09
Payer: MEDICARE

## 2020-01-09 ENCOUNTER — APPOINTMENT (OUTPATIENT)
Dept: GENERAL RADIOLOGY | Age: 65
DRG: 291 | End: 2020-01-09
Payer: MEDICARE

## 2020-01-09 LAB
ALBUMIN SERPL-MCNC: 3.5 G/DL (ref 3.5–5.2)
ALP BLD-CCNC: 77 U/L (ref 40–129)
ALT SERPL-CCNC: 16 U/L (ref 0–40)
AMYLASE FLUID: 39 U/L
ANION GAP SERPL CALCULATED.3IONS-SCNC: 15 MMOL/L (ref 7–16)
AST SERPL-CCNC: 19 U/L (ref 0–39)
BILIRUB SERPL-MCNC: 0.6 MG/DL (ref 0–1.2)
BUN BLDV-MCNC: 36 MG/DL (ref 8–23)
CALCIUM SERPL-MCNC: 9.3 MG/DL (ref 8.6–10.2)
CHLORIDE BLD-SCNC: 102 MMOL/L (ref 98–107)
CHOLESTEROL FLUID: 36 MG/DL
CO2: 22 MMOL/L (ref 22–29)
CREAT SERPL-MCNC: 1.4 MG/DL (ref 0.7–1.2)
CRITICAL: NORMAL
DATE ANALYZED: NORMAL
DATE OF COLLECTION: NORMAL
FLUID TYPE: NORMAL
GFR AFRICAN AMERICAN: >60
GFR NON-AFRICAN AMERICAN: 51 ML/MIN/1.73
GLUCOSE BLD-MCNC: 99 MG/DL (ref 74–99)
HCT VFR BLD CALC: 36.5 % (ref 37–54)
HEMOGLOBIN: 10.8 G/DL (ref 12.5–16.5)
INR BLD: 1.2
LAB: NORMAL
LD, FLUID: 117 U/L
Lab: NORMAL
MCH RBC QN AUTO: 27.9 PG (ref 26–35)
MCHC RBC AUTO-ENTMCNC: 29.6 % (ref 32–34.5)
MCV RBC AUTO: 94.3 FL (ref 80–99.9)
OPERATOR ID: 1057
PDW BLD-RTO: 16.6 FL (ref 11.5–15)
PH FLUID: 7.4
PH, BODY FLUID: 7.64
PLATELET # BLD: 148 E9/L (ref 130–450)
PMV BLD AUTO: 9.5 FL (ref 7–12)
POTASSIUM SERPL-SCNC: 4.2 MMOL/L (ref 3.5–5)
PROTEIN FLUID: 3 G/DL
PROTHROMBIN TIME: 13.5 SEC (ref 9.3–12.4)
RBC # BLD: 3.87 E12/L (ref 3.8–5.8)
SODIUM BLD-SCNC: 139 MMOL/L (ref 132–146)
SOURCE, BLOOD GAS: NORMAL
TIME ANALYZED: 1507
TOTAL PROTEIN: 6.4 G/DL (ref 6.4–8.3)
TRIGLYCERIDES FLUID: 10 MG/DL
WBC # BLD: 4.5 E9/L (ref 4.5–11.5)

## 2020-01-09 PROCEDURE — 83986 ASSAY PH BODY FLUID NOS: CPT

## 2020-01-09 PROCEDURE — 6370000000 HC RX 637 (ALT 250 FOR IP): Performed by: INTERNAL MEDICINE

## 2020-01-09 PROCEDURE — C1729 CATH, DRAINAGE: HCPCS

## 2020-01-09 PROCEDURE — 2700000000 HC OXYGEN THERAPY PER DAY

## 2020-01-09 PROCEDURE — 6370000000 HC RX 637 (ALT 250 FOR IP): Performed by: NURSE PRACTITIONER

## 2020-01-09 PROCEDURE — 94660 CPAP INITIATION&MGMT: CPT

## 2020-01-09 PROCEDURE — 94760 N-INVAS EAR/PLS OXIMETRY 1: CPT

## 2020-01-09 PROCEDURE — 87205 SMEAR GRAM STAIN: CPT

## 2020-01-09 PROCEDURE — 2580000003 HC RX 258

## 2020-01-09 PROCEDURE — 80053 COMPREHEN METABOLIC PANEL: CPT

## 2020-01-09 PROCEDURE — 89051 BODY FLUID CELL COUNT: CPT

## 2020-01-09 PROCEDURE — 82150 ASSAY OF AMYLASE: CPT

## 2020-01-09 PROCEDURE — 71045 X-RAY EXAM CHEST 1 VIEW: CPT

## 2020-01-09 PROCEDURE — 88305 TISSUE EXAM BY PATHOLOGIST: CPT

## 2020-01-09 PROCEDURE — 2500000003 HC RX 250 WO HCPCS: Performed by: INTERNAL MEDICINE

## 2020-01-09 PROCEDURE — 94640 AIRWAY INHALATION TREATMENT: CPT

## 2020-01-09 PROCEDURE — 83615 LACTATE (LD) (LDH) ENZYME: CPT

## 2020-01-09 PROCEDURE — 0W993ZZ DRAINAGE OF RIGHT PLEURAL CAVITY, PERCUTANEOUS APPROACH: ICD-10-PCS | Performed by: INTERNAL MEDICINE

## 2020-01-09 PROCEDURE — 84999 UNLISTED CHEMISTRY PROCEDURE: CPT

## 2020-01-09 PROCEDURE — 84478 ASSAY OF TRIGLYCERIDES: CPT

## 2020-01-09 PROCEDURE — 85610 PROTHROMBIN TIME: CPT

## 2020-01-09 PROCEDURE — 2060000000 HC ICU INTERMEDIATE R&B

## 2020-01-09 PROCEDURE — 36415 COLL VENOUS BLD VENIPUNCTURE: CPT

## 2020-01-09 PROCEDURE — 85027 COMPLETE CBC AUTOMATED: CPT

## 2020-01-09 PROCEDURE — 88112 CYTOPATH CELL ENHANCE TECH: CPT

## 2020-01-09 PROCEDURE — 84157 ASSAY OF PROTEIN OTHER: CPT

## 2020-01-09 PROCEDURE — 99233 SBSQ HOSP IP/OBS HIGH 50: CPT | Performed by: INTERNAL MEDICINE

## 2020-01-09 PROCEDURE — 87070 CULTURE OTHR SPECIMN AEROBIC: CPT

## 2020-01-09 RX ORDER — SODIUM CHLORIDE 0.9 % (FLUSH) 0.9 %
SYRINGE (ML) INJECTION
Status: COMPLETED
Start: 2020-01-09 | End: 2020-01-09

## 2020-01-09 RX ORDER — WARFARIN SODIUM 5 MG/1
7.5 TABLET ORAL
Status: COMPLETED | OUTPATIENT
Start: 2020-01-09 | End: 2020-01-09

## 2020-01-09 RX ORDER — WARFARIN SODIUM 5 MG/1
5 TABLET ORAL DAILY
Status: DISCONTINUED | OUTPATIENT
Start: 2020-01-09 | End: 2020-01-09 | Stop reason: DRUGHIGH

## 2020-01-09 RX ORDER — ASPIRIN 81 MG/1
81 TABLET, CHEWABLE ORAL DAILY
Status: DISCONTINUED | OUTPATIENT
Start: 2020-01-10 | End: 2020-01-15 | Stop reason: HOSPADM

## 2020-01-09 RX ORDER — METOLAZONE 2.5 MG/1
2.5 TABLET ORAL ONCE
Status: COMPLETED | OUTPATIENT
Start: 2020-01-09 | End: 2020-01-09

## 2020-01-09 RX ORDER — BUMETANIDE 0.25 MG/ML
1 INJECTION, SOLUTION INTRAMUSCULAR; INTRAVENOUS 2 TIMES DAILY
Status: DISCONTINUED | OUTPATIENT
Start: 2020-01-09 | End: 2020-01-11

## 2020-01-09 RX ADMIN — DIGOXIN 62.5 MCG: 125 TABLET ORAL at 09:11

## 2020-01-09 RX ADMIN — ISOSORBIDE DINITRATE 5 MG: 10 TABLET ORAL at 21:37

## 2020-01-09 RX ADMIN — SENNOSIDES 17.2 MG: 8.6 TABLET, FILM COATED ORAL at 09:12

## 2020-01-09 RX ADMIN — ACETAMINOPHEN 650 MG: 325 TABLET, FILM COATED ORAL at 00:46

## 2020-01-09 RX ADMIN — ACETAMINOPHEN 650 MG: 325 TABLET, FILM COATED ORAL at 22:40

## 2020-01-09 RX ADMIN — HYDRALAZINE HYDROCHLORIDE 50 MG: 50 TABLET, FILM COATED ORAL at 14:35

## 2020-01-09 RX ADMIN — ISOSORBIDE DINITRATE 5 MG: 10 TABLET ORAL at 09:11

## 2020-01-09 RX ADMIN — ACETAMINOPHEN 650 MG: 325 TABLET, FILM COATED ORAL at 09:18

## 2020-01-09 RX ADMIN — METOLAZONE 2.5 MG: 2.5 TABLET ORAL at 13:09

## 2020-01-09 RX ADMIN — WARFARIN SODIUM 7.5 MG: 5 TABLET ORAL at 18:07

## 2020-01-09 RX ADMIN — ISOSORBIDE DINITRATE 5 MG: 10 TABLET ORAL at 14:35

## 2020-01-09 RX ADMIN — SODIUM CHLORIDE, PRESERVATIVE FREE 10 ML: 5 INJECTION INTRAVENOUS at 09:13

## 2020-01-09 RX ADMIN — IPRATROPIUM BROMIDE AND ALBUTEROL SULFATE 1 AMPULE: 2.5; .5 SOLUTION RESPIRATORY (INHALATION) at 11:34

## 2020-01-09 RX ADMIN — CARVEDILOL 25 MG: 25 TABLET, FILM COATED ORAL at 18:07

## 2020-01-09 RX ADMIN — BUMETANIDE 1 MG: 0.25 INJECTION INTRAMUSCULAR; INTRAVENOUS at 09:11

## 2020-01-09 RX ADMIN — AMLODIPINE BESYLATE 5 MG: 5 TABLET ORAL at 09:11

## 2020-01-09 RX ADMIN — VALSARTAN 160 MG: 160 TABLET, FILM COATED ORAL at 09:11

## 2020-01-09 RX ADMIN — MULTIPLE VITAMINS W/ MINERALS TAB 1 TABLET: TAB at 09:11

## 2020-01-09 RX ADMIN — ACETAMINOPHEN 650 MG: 325 TABLET, FILM COATED ORAL at 18:08

## 2020-01-09 RX ADMIN — POTASSIUM CHLORIDE 10 MEQ: 10 TABLET, EXTENDED RELEASE ORAL at 09:12

## 2020-01-09 RX ADMIN — SENNOSIDES 17.2 MG: 8.6 TABLET, FILM COATED ORAL at 21:38

## 2020-01-09 RX ADMIN — CARVEDILOL 25 MG: 25 TABLET, FILM COATED ORAL at 09:11

## 2020-01-09 RX ADMIN — ACETAMINOPHEN 650 MG: 325 TABLET, FILM COATED ORAL at 13:25

## 2020-01-09 RX ADMIN — IPRATROPIUM BROMIDE AND ALBUTEROL SULFATE 1 AMPULE: 2.5; .5 SOLUTION RESPIRATORY (INHALATION) at 19:38

## 2020-01-09 RX ADMIN — HYDRALAZINE HYDROCHLORIDE 50 MG: 50 TABLET, FILM COATED ORAL at 21:37

## 2020-01-09 RX ADMIN — HYDRALAZINE HYDROCHLORIDE 50 MG: 50 TABLET, FILM COATED ORAL at 09:12

## 2020-01-09 RX ADMIN — BUMETANIDE 1 MG: 0.25 INJECTION INTRAMUSCULAR; INTRAVENOUS at 21:37

## 2020-01-09 ASSESSMENT — PAIN SCALES - GENERAL
PAINLEVEL_OUTOF10: 4
PAINLEVEL_OUTOF10: 4
PAINLEVEL_OUTOF10: 0
PAINLEVEL_OUTOF10: 3
PAINLEVEL_OUTOF10: 3
PAINLEVEL_OUTOF10: 0
PAINLEVEL_OUTOF10: 4
PAINLEVEL_OUTOF10: 0

## 2020-01-09 ASSESSMENT — PAIN DESCRIPTION - FREQUENCY
FREQUENCY: CONTINUOUS
FREQUENCY: CONTINUOUS

## 2020-01-09 ASSESSMENT — PAIN DESCRIPTION - PROGRESSION
CLINICAL_PROGRESSION: NOT CHANGED
CLINICAL_PROGRESSION: NOT CHANGED

## 2020-01-09 ASSESSMENT — PAIN DESCRIPTION - DESCRIPTORS: DESCRIPTORS: ACHING;DISCOMFORT;NUMBNESS

## 2020-01-09 ASSESSMENT — PAIN DESCRIPTION - ORIENTATION
ORIENTATION: LEFT
ORIENTATION: LEFT;RIGHT

## 2020-01-09 ASSESSMENT — PAIN DESCRIPTION - LOCATION
LOCATION: ARM;BACK
LOCATION: ARM;HEAD

## 2020-01-09 ASSESSMENT — PAIN DESCRIPTION - ONSET
ONSET: ON-GOING
ONSET: ON-GOING

## 2020-01-09 ASSESSMENT — PAIN DESCRIPTION - PAIN TYPE
TYPE: ACUTE PAIN;CHRONIC PAIN
TYPE: CHRONIC PAIN;ACUTE PAIN

## 2020-01-09 ASSESSMENT — PAIN - FUNCTIONAL ASSESSMENT
PAIN_FUNCTIONAL_ASSESSMENT: ACTIVITIES ARE NOT PREVENTED
PAIN_FUNCTIONAL_ASSESSMENT: ACTIVITIES ARE NOT PREVENTED

## 2020-01-09 NOTE — PROGRESS NOTES
mg Intravenous BID    metOLazone  2.5 mg Oral Once    warfarin  5 mg Oral Daily    [START ON 1/10/2020] aspirin  81 mg Oral Daily    hydrALAZINE  50 mg Oral TID    isosorbide dinitrate  5 mg Oral TID    carvedilol  25 mg Oral BID     heparin (porcine)  5,000 Units Subcutaneous Q8H    amLODIPine  5 mg Oral Daily    valsartan  160 mg Oral Daily    digoxin  62.5 mcg Oral Daily    potassium chloride  10 mEq Oral Daily    ipratropium-albuterol  1 ampule Inhalation Q4H WA    therapeutic multivitamin-minerals  1 tablet Oral Daily    senna  2 tablet Oral BID       Physical Exam:  General Appearance: appears comfortable in no acute distress. HEENT: Normocephalic atraumatic without obvious abnormality   Neck: Lips, mucosa, and tongue normal.  Supple, symmetrical, trachea midline, no adenopathy;thyroid:  no enlargement/tenderness/nodules or JVD. Lung: Breath sounds CTA markedly diminished right base, no wheezes no crackles no rhonchi. Respirations   unlabored. Symmetrical expansion. Heart: RRR, normal S1, S2. No MRG  Abdomen: Soft, NT, ND. BS present x 4 quadrants. No bruit or organomegaly. Extremities: Pedal pulses 2+ symmetric b/l. Extremities normal, no cyanosis, clubbing, or edema. Musculokeletal: No joint swelling, no muscle tenderness. ROM normal in all joints of extremities. Neurologic: Mental status: Alert and Oriented X3 . Pertinent/ New Labs and Imaging Studies     Imaging Personally Reviewed:               TECHNIQUE/NUMBER OF IMAGES/COMPARISON/CLINICAL HISTORY:       Chest pain. 2 views       2 images 2 views       Comparison November 14 and January 6.       History is pleural effusion.       FINDINGS: Patient previous midsternotomy. Cardiac air is enlarged. There is a mild to moderate right-sided pleural effusion. The pleural   effusion has not changed since since the study of January 6. A very   small left-sided pleural effusion is observed.  There is no perihilar   vascular congestion           Impression   Persistent mild to moderate right-sided pleural effusion. Minimal pleural reaction is seen in the left base       ECHO  Conclusions      Summary   Left ventricle is moderately enlarged . Ejection fraction is visually estimated at 40-45%. Overall ejection fraction mild-to-moderately decreased . Septal motion consistent with post bypass surgery. There is severe eccentric hypertrophy. Abnormal diastolic function. The left atrium is severely dilated. Normal right ventricular size and function. Mild to moderate mitral regurgitation is present. No hemodynamically significant aortic stenosis is present. RVSP is 29 mmHg. Normal PA systolic pressure. Mildly dilated aortic root (4.1 cm) and normal sized ascending aorta. A prosthetic graft noted in the location of the ascending aorta. There is a small localized near left ventricle pericardial effusion noted. Signature      ----------------------------------------------------------------   Electronically signed by Berry Coyne:  Lab Results   Component Value Date    WBC 4.5 01/09/2020    HGB 10.8 01/09/2020    HCT 36.5 01/09/2020    MCV 94.3 01/09/2020    MCH 27.9 01/09/2020    MCHC 29.6 01/09/2020    RDW 16.6 01/09/2020     01/09/2020    MPV 9.5 01/09/2020     Lab Results   Component Value Date     01/09/2020    K 4.2 01/09/2020    K 4.1 11/15/2019     01/09/2020    CO2 22 01/09/2020    BUN 36 01/09/2020    CREATININE 1.4 01/09/2020    LABALBU 3.5 01/09/2020    CALCIUM 9.3 01/09/2020    GFRAA >60 01/09/2020    LABGLOM 51 01/09/2020     Lab Results   Component Value Date    PROTIME 13.5 01/09/2020    INR 1.2 01/09/2020     Recent Labs     01/06/20  1425   PROBNP 4,214*     No results for input(s): PROCAL in the last 72 hours. This SmartLink has not been configured with any valid records. Micro:  No results for input(s): CULTRESP in the last 72 hours.   No results for input(s):

## 2020-01-09 NOTE — PROCEDURES
Thoracentesis Procedure Note    Pre-operative Diagnosis: right Pleural Effusion    Post-operative Diagnosis: same    Indications: Ashok Luna is a 75 y.o. male with a  has a past medical history of CHF (congestive heart failure) ([address] H/O cardiovascular stress test, H/O Doppler echocardiogram, Hypertension, Obesity, and Sarcoidosis. found to have a Pleural Effusion  ultrasound guided    Assistant-  US technician    Consent: After informed consent & appropriate time out protocol was noted & obtained. Risks/Benefits/Alternatives of the procedure were discussed including: infection, bleeding, pain, pneumothorax. Procedure Details:    Under sterile conditions  the patient was placed in the sitting position. The skin was prepped and draped with Chloraprep solution and sterile drapes were utilized. 1% plain lidocaine was used to anesthetize the skin, subcutaneous tissue and parietal pleura. Then the pleural catheter 6 Nepali was advanced into the pleural space. The fluid was obtained without any difficulties and minimal blood loss. A total of 900cc fluid was removed. The fluid was serosanguinous in color. A dressing was applied to the wound and wound care instructions were provided. The fluid was sent for analysis. Findings  900 ml of serosanguinous pleural fluid was obtained. A sample was sent for protein, cholesterol, and LDH,  Pathology for cytolology, cytogenetics, flow, and cell counts, as well as for infection analysis. Complications:  None; patient tolerated the procedure well. Condition: stable    Plan  A follow up chest x-ray was ordered.     Electronically signed by Madiha Gray MD on 1/9/2020 at 3:11 PM

## 2020-01-09 NOTE — PROGRESS NOTES
INPATIENT CARDIOLOGY FOLLOW-UP    Name: Som Bond    Age: 75 y.o. Date of Admission: 1/6/2020  1:58 PM    Date of Service: 1/9/2020    Chief Complaint: Follow-up for CHF    Interim History:  No new overnight cardiac complaints he is feeling better than yesterday. His abdomen is lessl bloated. Currently with no complaints of CP, palpitations, dizziness, or lightheadedness. Complaining of headache after he was started on Isordil and had similar symptoms when he took Nitropaste in the past.  A. fib on telemetry.     Review of Systems:   Cardiac: As per HPI  General: No fever, chills  Pulmonary: As per HPI  HEENT: No visual disturbances, difficult swallowing  GI: No nausea, vomiting  Endocrine: No thyroid disease or DM  Musculoskeletal: MAYFIELD x 4, no focal motor deficits  Skin: Intact, no rashes  Neuro/Psych: No headache or seizures    Problem List:  Patient Active Problem List   Diagnosis    H/O sarcoidosis    Essential hypertension    Moderate obesity    Chronic kidney disease, stage III (moderate) ([facility])    MR (mitral regurgitation)    Anticoagulated on Coumadin    Cardiomyopathy ([address]    Rate controlled atrial fibrillation (HCC)    Cognitive dysfunction    Mixed hyperlipidemia    Hyperuricemia    Stage 2 chronic kidney disease    Acute on chronic systolic congestive heart failure (HCC)    Acute kidney injury ([address]    Dissection of thoracic aorta (HCC)    Pericardial effusion (noninflammatory)    Pleural effusion    Transition of care performed with sharing of clinical summary    Acute on chronic combined systolic and diastolic CHF (congestive heart failure) ([address]    Atrial fibrillation ([address]    CHF (congestive heart failure), NYHA class I, acute on chronic, combined (HCC)       Allergies:  No Known Allergies    Current Medications:  Current Facility-Administered Medications   Medication Dose Route Frequency Provider Last Rate Last Dose    furosemide (LASIX) injection 40 mg  40 mg Intravenous BID CRISTINA Wetzel CNP   40 mg at 01/08/20 1756    hydrALAZINE (APRESOLINE) tablet 50 mg  50 mg Oral TID Prem Spain MD   50 mg at 01/08/20 2048    isosorbide dinitrate (ISORDIL) tablet 5 mg  5 mg Oral TID Prem Spain MD   5 mg at 01/08/20 2047    perflutren lipid microspheres (DEFINITY) injection 1.65 mg  1.5 mL Intravenous ONCE PRN CRISTINA Wetzel CNP        carvedilol (COREG) tablet 25 mg  25 mg Oral BID  Prem Spain MD   25 mg at 01/08/20 1756    heparin (porcine) injection 5,000 Units  5,000 Units Subcutaneous 14 \[facility]\"", DO   Stopped at 01/08/20 2342    amLODIPine (NORVASC) tablet 5 mg  5 mg Oral Daily Suzanne Sox, DO   5 mg at 01/08/20 3808    aspirin chewable tablet 162 mg  162 mg Oral Daily Suzanne Sox, DO   162 mg at 01/08/20 0795    valsartan (DIOVAN) tablet 160 mg  160 mg Oral Daily [place], DO   160 mg at 01/08/20 2453    digoxin (LANOXIN) tablet 62.5 mcg  62.5 mcg Oral Daily Suzanne Sox, DO   62.5 mcg at 01/08/20 6283    potassium chloride (KLOR-CON M) extended release tablet 10 mEq  10 mEq Oral Daily [place] Sox, DO   10 mEq at 01/08/20 7712    ipratropium-albuterol (DUONEB) nebulizer solution 1 ampule  1 ampule Inhalation Q4H WA [place] Sox, DO   1 ampule at 01/08/20 1918    acetaminophen (TYLENOL) tablet 650 mg  650 mg Oral Q4H PRN Suzanne Sox, DO   650 mg at 01/09/20 0046    therapeutic multivitamin-minerals 1 tablet  1 tablet Oral Daily Dong [place], DO   1 tablet at 01/08/20 0809    senna (SENOKOT) tablet 17.2 mg  2 tablet Oral BID Dong [place], DO   17.2 mg at 01/08/20 2048         Physical Exam:  /84   Pulse 113   Temp 97.9 °F (36.6 °C) (Temporal)   Resp 22   Ht 5' 8\" (1.727 m)   Wt 224 lb 4.8 oz (101.7 kg)   SpO2 96%   BMI 34.10 kg/m²   Wt Readings from Last 3 Encounters:   01/09/20 224 lb 4.8 oz (101.7 kg)   12/12/19 209 lb (94.8 kg)   11/15/19 231 lb (104.8 kg) Appearance: Awake, alert, no acute respiratory distress  Skin: Intact, no rash  Head: Normocephalic, atraumatic  Eyes: EOMI, no conjunctival erythema  ENMT: No pharyngeal erythema, MMM, no rhinorrhea  Neck: Supple, elevated JVP, no carotid bruits  Lungs: Clear to auscultation bilaterally. No wheezes, rales, or rhonchi. Cardiac: Irregularly irregular rate and rhythm, +S1S2, no murmurs apparent  Abdomen: Soft, nontender, +bowel sounds, abdomen is distended with a positive hepatojugular reflex. Extremities: Moves all extremities x 4, trace lower extremity edema  Neurologic: No focal motor deficits apparent, normal mood and affect  Peripheral Pulses: Intact posterior tibial pulses bilaterally    Intake/Output:    Intake/Output Summary (Last 24 hours) at 1/9/2020 0804  Last data filed at 1/9/2020 0400  Gross per 24 hour   Intake 370 ml   Output 1320 ml   Net -950 ml     No intake/output data recorded.     Laboratory Tests:  Recent Labs     01/07/20 0420 01/08/20  0430 01/09/20 0427    137 139   K 4.0 4.4 4.2    99 102   CO2 21* 23 22   BUN 29* 35* 36*   CREATININE 1.5* 1.5* 1.4*   GLUCOSE 99 102* 99   CALCIUM 9.4 9.2 9.3     Lab Results   Component Value Date    MG 2.2 01/06/2020     Recent Labs     01/07/20 0420 01/08/20  0430 01/09/20 0427   ALKPHOS 85 81 77   ALT 21 18 16   AST 24 28 19   PROT 7.2 6.6 6.4   BILITOT 0.8 0.7 0.6   LABALBU 3.9 3.6 3.5     Recent Labs     01/07/20 0420 01/08/20  0430 01/09/20 0427   WBC 5.1 5.0 4.5   RBC 4.14 4.10 3.87   HGB 11.6* 11.4* 10.8*   HCT 38.1 38.2 36.5*   MCV 92.0 93.2 94.3   MCH 28.0 27.8 27.9   MCHC 30.4* 29.8* 29.6*   RDW 16.9* 16.9* 16.6*    150 148   MPV 9.6 10.1 9.5     Lab Results   Component Value Date    CKTOTAL 141 11/14/2019    CKMB 5.5 11/14/2019    TROPONINI 0.02 01/06/2020    TROPONINI 0.02 01/06/2020    TROPONINI 0.02 11/14/2019     Lab Results   Component Value Date    INR 1.2 01/06/2020    INR 2.5 11/15/2019    INR 2.2 11/14/2019 PROTIME 14.0 (H) 01/06/2020    PROTIME 28.7 (H) 11/15/2019    PROTIME 25.2 (H) 11/14/2019     No results found for: TSHFT4, TSH  Lab Results   Component Value Date    LABA1C 5.5 04/04/2019     No results found for: EAG  Lab Results   Component Value Date    CHOL 183 04/04/2019     Lab Results   Component Value Date    TRIG 193 (H) 04/04/2019     Lab Results   Component Value Date    HDL 36 04/04/2019     Lab Results   Component Value Date    LDLCALC 108 (H) 04/04/2019     Lab Results   Component Value Date    LABVLDL 39 04/04/2019     No results found for: [facility]    Cardiac Tests:  Telemetry findings reviewed: Atrial fibrillation at rate 80s, no new tachy/bradyarrhythmias overnight     EKG: Atrial fibrillation, anterior infarct age undetermined, abnormal EKG.    Vitals: Blood pressure 161/84 heart rate of 82>> 144/86 with a heart rate of 87. Labs were reviewed: Bun/creatinine 25/1.3>> 29/1.5>> 35/1.5>> 6/1.4, electrolytes normal, proBNP 4214, troponin 0.02>> 0.02, liver function normal, dig 0.6. H&H 11.6/38.1>> 11.4/38.2>> 10.8/36.5, WBC 5.1, 169>>150.     Chest x-ray: Probable right-sided pleural effusion with some basilar edema pneumonia versus pulmonary edema.     TTE- 11/14/2019: LVEDD 6 cm EF 30 to 35%, hypo-kinesis of the basal inferior and basal inferior septal walls, diastolic function was indeterminate, mild concentric LVH, RV has global hypokinesis. RVSP 33 mmHg mild MR and moderate to severely dilated aortic root ascending aorta 5.1 cm there is trivial circumferential pericardial effusion     Stress test-11/15/2019: No reversible perfusion defect, large fixed inferior wall defect, EF 30%, hypokinetic of the inferior wall. Input output charting shows inadequate urine output not sure how accurate it is.   Assessment:  · Acute on chronic heart failure with reduced ejection fraction, although improving still volume overloaded but improving  · Ischemic cardiomyopathy, EF 30-35%  · ACC/AHA stage C.,  NYHA functional class III  · Persistent atrial fibrillation,  [id number] Vasc score -   · Type I aortic dissection status post surgery underwent at [facility] [place] on 11/15/2019. · Hypertension -improving. · CKD stage III, stable renal functions  · Obesity  · History of sarcoidosis. · Moderate right-sided pleural effusion.        Plan:  · His blood pressure is improving, continue hydralazine to 50 mg p.o. 3 times a day and Isordil 5 mg p.o. 3 times daily and continue Coreg to 25 mg p.o. twice a day. · His urine output is not adequate. We will change Lasix to Bumex 1 mg IV twice daily and also will give him 1 dose of metolazone 2.5 mg today. · Cardiac diet and restrict daily sodium to 2 g and fluids to 64 ounces. · Strict input output charting. · Continue rest of his current medications  · [facility] was contacted yesterday and per telephone encounter no no contraindications for [address], will initiate him on warfarin and will decrease aspirin to 81 mg po daily. Pharmacy to manage warfarin dosing. · Right thoracentesis as per Dr. Daniel Layton MD., [facility].   [facility]) Cardiology

## 2020-01-10 LAB
ALBUMIN SERPL-MCNC: 3.6 G/DL (ref 3.5–5.2)
ALP BLD-CCNC: 82 U/L (ref 40–129)
ALT SERPL-CCNC: 16 U/L (ref 0–40)
ANION GAP SERPL CALCULATED.3IONS-SCNC: 14 MMOL/L (ref 7–16)
AST SERPL-CCNC: 20 U/L (ref 0–39)
BILIRUB SERPL-MCNC: 0.6 MG/DL (ref 0–1.2)
BUN BLDV-MCNC: 37 MG/DL (ref 8–23)
CALCIUM SERPL-MCNC: 9.6 MG/DL (ref 8.6–10.2)
CHLORIDE BLD-SCNC: 99 MMOL/L (ref 98–107)
CO2: 25 MMOL/L (ref 22–29)
CREAT SERPL-MCNC: 1.6 MG/DL (ref 0.7–1.2)
GFR AFRICAN AMERICAN: 52
GFR NON-AFRICAN AMERICAN: 43 ML/MIN/1.73
GLUCOSE BLD-MCNC: 96 MG/DL (ref 74–99)
HCT VFR BLD CALC: 35.9 % (ref 37–54)
HEMOGLOBIN: 10.7 G/DL (ref 12.5–16.5)
INR BLD: 1.2
MCH RBC QN AUTO: 27.5 PG (ref 26–35)
MCHC RBC AUTO-ENTMCNC: 29.8 % (ref 32–34.5)
MCV RBC AUTO: 92.3 FL (ref 80–99.9)
PDW BLD-RTO: 16.8 FL (ref 11.5–15)
PLATELET # BLD: 153 E9/L (ref 130–450)
PMV BLD AUTO: 9.5 FL (ref 7–12)
POTASSIUM SERPL-SCNC: 4.1 MMOL/L (ref 3.5–5)
PROTHROMBIN TIME: 13.6 SEC (ref 9.3–12.4)
RBC # BLD: 3.89 E12/L (ref 3.8–5.8)
SODIUM BLD-SCNC: 138 MMOL/L (ref 132–146)
TOTAL PROTEIN: 6.5 G/DL (ref 6.4–8.3)
WBC # BLD: 4.4 E9/L (ref 4.5–11.5)

## 2020-01-10 PROCEDURE — 2500000003 HC RX 250 WO HCPCS: Performed by: INTERNAL MEDICINE

## 2020-01-10 PROCEDURE — 36415 COLL VENOUS BLD VENIPUNCTURE: CPT

## 2020-01-10 PROCEDURE — 2580000003 HC RX 258

## 2020-01-10 PROCEDURE — 6370000000 HC RX 637 (ALT 250 FOR IP): Performed by: INTERNAL MEDICINE

## 2020-01-10 PROCEDURE — 2060000000 HC ICU INTERMEDIATE R&B

## 2020-01-10 PROCEDURE — 94660 CPAP INITIATION&MGMT: CPT

## 2020-01-10 PROCEDURE — 85610 PROTHROMBIN TIME: CPT

## 2020-01-10 PROCEDURE — 97165 OT EVAL LOW COMPLEX 30 MIN: CPT

## 2020-01-10 PROCEDURE — 85027 COMPLETE CBC AUTOMATED: CPT

## 2020-01-10 PROCEDURE — 6370000000 HC RX 637 (ALT 250 FOR IP): Performed by: NURSE PRACTITIONER

## 2020-01-10 PROCEDURE — 97535 SELF CARE MNGMENT TRAINING: CPT

## 2020-01-10 PROCEDURE — 94640 AIRWAY INHALATION TREATMENT: CPT

## 2020-01-10 PROCEDURE — 80053 COMPREHEN METABOLIC PANEL: CPT

## 2020-01-10 PROCEDURE — 97161 PT EVAL LOW COMPLEX 20 MIN: CPT

## 2020-01-10 PROCEDURE — 2700000000 HC OXYGEN THERAPY PER DAY

## 2020-01-10 PROCEDURE — 97530 THERAPEUTIC ACTIVITIES: CPT

## 2020-01-10 PROCEDURE — 99232 SBSQ HOSP IP/OBS MODERATE 35: CPT | Performed by: INTERNAL MEDICINE

## 2020-01-10 RX ORDER — SODIUM CHLORIDE 0.9 % (FLUSH) 0.9 %
SYRINGE (ML) INJECTION
Status: COMPLETED
Start: 2020-01-10 | End: 2020-01-10

## 2020-01-10 RX ORDER — WARFARIN SODIUM 5 MG/1
7.5 TABLET ORAL
Status: COMPLETED | OUTPATIENT
Start: 2020-01-10 | End: 2020-01-10

## 2020-01-10 RX ADMIN — ACETAMINOPHEN 650 MG: 325 TABLET, FILM COATED ORAL at 04:46

## 2020-01-10 RX ADMIN — ISOSORBIDE DINITRATE 5 MG: 10 TABLET ORAL at 09:15

## 2020-01-10 RX ADMIN — BUMETANIDE 1 MG: 0.25 INJECTION INTRAMUSCULAR; INTRAVENOUS at 09:15

## 2020-01-10 RX ADMIN — VALSARTAN 160 MG: 160 TABLET, FILM COATED ORAL at 09:14

## 2020-01-10 RX ADMIN — MULTIPLE VITAMINS W/ MINERALS TAB 1 TABLET: TAB at 09:14

## 2020-01-10 RX ADMIN — ISOSORBIDE DINITRATE 5 MG: 10 TABLET ORAL at 14:31

## 2020-01-10 RX ADMIN — ACETAMINOPHEN 650 MG: 325 TABLET, FILM COATED ORAL at 14:31

## 2020-01-10 RX ADMIN — DIGOXIN 62.5 MCG: 125 TABLET ORAL at 09:15

## 2020-01-10 RX ADMIN — CARVEDILOL 25 MG: 25 TABLET, FILM COATED ORAL at 17:42

## 2020-01-10 RX ADMIN — AMLODIPINE BESYLATE 5 MG: 5 TABLET ORAL at 09:14

## 2020-01-10 RX ADMIN — ISOSORBIDE DINITRATE 5 MG: 10 TABLET ORAL at 20:31

## 2020-01-10 RX ADMIN — POTASSIUM CHLORIDE 10 MEQ: 10 TABLET, EXTENDED RELEASE ORAL at 09:14

## 2020-01-10 RX ADMIN — ACETAMINOPHEN 650 MG: 325 TABLET, FILM COATED ORAL at 09:14

## 2020-01-10 RX ADMIN — IPRATROPIUM BROMIDE AND ALBUTEROL SULFATE 1 AMPULE: 2.5; .5 SOLUTION RESPIRATORY (INHALATION) at 07:49

## 2020-01-10 RX ADMIN — IPRATROPIUM BROMIDE AND ALBUTEROL SULFATE 1 AMPULE: 2.5; .5 SOLUTION RESPIRATORY (INHALATION) at 17:17

## 2020-01-10 RX ADMIN — SODIUM CHLORIDE, PRESERVATIVE FREE 10 ML: 5 INJECTION INTRAVENOUS at 09:14

## 2020-01-10 RX ADMIN — SENNOSIDES 17.2 MG: 8.6 TABLET, FILM COATED ORAL at 09:14

## 2020-01-10 RX ADMIN — ACETAMINOPHEN 650 MG: 325 TABLET, FILM COATED ORAL at 18:37

## 2020-01-10 RX ADMIN — BUMETANIDE 1 MG: 0.25 INJECTION INTRAMUSCULAR; INTRAVENOUS at 20:31

## 2020-01-10 RX ADMIN — IPRATROPIUM BROMIDE AND ALBUTEROL SULFATE 1 AMPULE: 2.5; .5 SOLUTION RESPIRATORY (INHALATION) at 20:36

## 2020-01-10 RX ADMIN — ASPIRIN 81 MG 81 MG: 81 TABLET ORAL at 09:15

## 2020-01-10 RX ADMIN — HYDRALAZINE HYDROCHLORIDE 50 MG: 50 TABLET, FILM COATED ORAL at 09:14

## 2020-01-10 RX ADMIN — HYDRALAZINE HYDROCHLORIDE 50 MG: 50 TABLET, FILM COATED ORAL at 14:31

## 2020-01-10 RX ADMIN — SENNOSIDES 17.2 MG: 8.6 TABLET, FILM COATED ORAL at 20:30

## 2020-01-10 RX ADMIN — HYDRALAZINE HYDROCHLORIDE 50 MG: 50 TABLET, FILM COATED ORAL at 20:30

## 2020-01-10 RX ADMIN — ACETAMINOPHEN 650 MG: 325 TABLET, FILM COATED ORAL at 23:07

## 2020-01-10 RX ADMIN — IPRATROPIUM BROMIDE AND ALBUTEROL SULFATE 1 AMPULE: 2.5; .5 SOLUTION RESPIRATORY (INHALATION) at 12:19

## 2020-01-10 RX ADMIN — WARFARIN SODIUM 7.5 MG: 5 TABLET ORAL at 17:43

## 2020-01-10 RX ADMIN — CARVEDILOL 25 MG: 25 TABLET, FILM COATED ORAL at 09:14

## 2020-01-10 ASSESSMENT — PAIN DESCRIPTION - PROGRESSION
CLINICAL_PROGRESSION: NOT CHANGED
CLINICAL_PROGRESSION: NOT CHANGED

## 2020-01-10 ASSESSMENT — PAIN DESCRIPTION - PAIN TYPE
TYPE: CHRONIC PAIN

## 2020-01-10 ASSESSMENT — PAIN SCALES - GENERAL
PAINLEVEL_OUTOF10: 4
PAINLEVEL_OUTOF10: 4
PAINLEVEL_OUTOF10: 6
PAINLEVEL_OUTOF10: 4
PAINLEVEL_OUTOF10: 4
PAINLEVEL_OUTOF10: 0
PAINLEVEL_OUTOF10: 3

## 2020-01-10 ASSESSMENT — PAIN DESCRIPTION - FREQUENCY
FREQUENCY: CONTINUOUS

## 2020-01-10 ASSESSMENT — PAIN DESCRIPTION - DESCRIPTORS
DESCRIPTORS: NUMBNESS;TINGLING
DESCRIPTORS: PINS AND NEEDLES;NUMBNESS
DESCRIPTORS: NUMBNESS;PINS AND NEEDLES

## 2020-01-10 ASSESSMENT — PAIN - FUNCTIONAL ASSESSMENT
PAIN_FUNCTIONAL_ASSESSMENT: ACTIVITIES ARE NOT PREVENTED

## 2020-01-10 ASSESSMENT — PAIN DESCRIPTION - ONSET
ONSET: ON-GOING

## 2020-01-10 ASSESSMENT — PAIN DESCRIPTION - ORIENTATION
ORIENTATION: RIGHT

## 2020-01-10 ASSESSMENT — PAIN DESCRIPTION - LOCATION
LOCATION: ARM

## 2020-01-10 NOTE — PROGRESS NOTES
INPATIENT CARDIOLOGY FOLLOW-UP    Name: Ashok Luna    Age: 75 y.o. Date of Admission: 1/6/2020  1:58 PM    Date of Service: 1/10/2020    Chief Complaint: Follow-up for CHF    Interim History:  No new overnight cardiac complaints he is feeling better than yesterday. He is feeling better and improving   Currently with no complaints of CP, palpitations, dizziness, or lightheadedness. A. fib on telemetry.     Review of Systems:   Cardiac: As per HPI  General: No fever, chills  Pulmonary: As per HPI  HEENT: No visual disturbances, difficult swallowing  GI: No nausea, vomiting  Endocrine: No thyroid disease or DM  Musculoskeletal: MAYFIELD x 4, no focal motor deficits  Skin: Intact, no rashes  Neuro/Psych: No headache or seizures    Problem List:  Patient Active Problem List   Diagnosis    H/O sarcoidosis    Essential hypertension    Moderate obesity    [facility], [facility]) ([facility]    MR (mitral regurgitation)    Anticoagulated on Coumadin    Cardiomyopathy ([address]    Rate controlled atrial fibrillation (HCC)    Cognitive dysfunction    Mixed hyperlipidemia    Hyperuricemia    Stage 2 chronic kidney disease    Acute on chronic systolic congestive heart failure (HCC)    Acute kidney injury ([address]    Dissection of thoracic aorta (HCC)    Pericardial effusion (noninflammatory)    Pleural effusion    Transition of care performed with sharing of clinical summary    Acute on chronic combined systolic and diastolic CHF (congestive heart failure) ([address]    Atrial fibrillation ([address]    CHF (congestive heart failure), NYHA class I, acute on chronic, combined (HCC)       Allergies:  No Known Allergies    Current Medications:  Current Facility-Administered Medications   Medication Dose Route Frequency Provider Last Rate Last Dose    bumetanide (BUMEX) injection 1 mg  1 mg Intravenous BID Mara Singh MD   1 mg at 01/10/20 0915    aspirin chewable tablet 81 mg  81 mg Oral Daily Roena Rack, APRN - CNP   81 mg at 01/10/20 0915    warfarin (COUMADIN) daily dosing (placeholder)   Other RX Placeholder CRISTINA Hahn CNP        hydrALAZINE (APRESOLINE) tablet 50 mg  50 mg Oral TID Aakash Schreiber MD   50 mg at 01/10/20 0914    isosorbide dinitrate (ISORDIL) tablet 5 mg  5 mg Oral TID Aakash Schreiber MD   5 mg at 01/10/20 0915    perflutren lipid microspheres (DEFINITY) injection 1.65 mg  1.5 mL Intravenous ONCE PRN CRISTINA Hahn CNP        carvedilol (COREG) tablet 25 mg  25 mg Oral BID WC Aakash Schreiber MD   25 mg at 01/10/20 0914    amLODIPine (NORVASC) tablet 5 mg  5 mg Oral Daily [place] Helling, DO   5 mg at 01/10/20 1076    valsartan (DIOVAN) tablet 160 mg  160 mg Oral Daily [place] Helling, DO   160 mg at 01/10/20 0914    digoxin (LANOXIN) tablet 62.5 mcg  62.5 mcg Oral Daily Pegram Helling, DO   62.5 mcg at 01/10/20 0915    potassium chloride (KLOR-CON M) extended release tablet 10 mEq  10 mEq Oral Daily Abraham Helling, DO   10 mEq at 01/10/20 0914    ipratropium-albuterol (DUONEB) nebulizer solution 1 ampule  1 ampule Inhalation Q4H WA [place] Helling, DO   1 ampule at 01/10/20 1219    acetaminophen (TYLENOL) tablet 650 mg  650 mg Oral Q4H PRN [facility], [facility]   650 mg at 01/10/20 0897    therapeutic multivitamin-minerals 1 tablet  1 tablet Oral Daily Ca [place], DO   1 tablet at 01/10/20 0914    senna (SENOKOT) tablet 17.2 mg  2 tablet Oral BID Ca Flax, DO   17.2 mg at 01/10/20 9516         Physical Exam:  /80   Pulse 101   Temp 98.6 °F (37 °C) (Temporal)   Resp 14   Ht 5' 8\" (1.727 m)   Wt 220 lb 14.4 oz (100.2 kg)   SpO2 95%   BMI 33.59 kg/m²   Wt Readings from Last 3 Encounters:   01/10/20 220 lb 14.4 oz (100.2 kg)   12/12/19 209 lb (94.8 kg)   11/15/19 231 lb (104.8 kg)     Appearance: Awake, alert, no acute respiratory distress  Skin: Intact, no rash  Head: Normocephalic, atraumatic  Eyes: EOMI, no conjunctival erythema  ENMT: No pharyngeal erythema, MMM, no rhinorrhea  Neck: Supple, elevated JVP, no carotid bruits  Lungs: Clear to auscultation bilaterally. No wheezes. Has bibasilar rales  Cardiac: Irregularly irregular rate and rhythm, +S1S2, no murmurs apparent  Abdomen: Soft, nontender, +bowel sounds, abdomen is distended with a positive hepatojugular reflex.   Extremities: Moves all extremities x 4, trace lower extremity edema  Neurologic: No focal motor deficits apparent, normal mood and affect  Peripheral Pulses: Intact posterior tibial pulses bilaterally    Intake/Output:    Intake/Output Summary (Last 24 hours) at 1/10/2020 1238  Last data filed at 1/10/2020 1058  Gross per 24 hour   Intake 1140 ml   Output 2125 ml   Net -985 ml     I/O this shift:  In: 600 [P.O.:600]  Out: 500 [Urine:500]    Laboratory Tests:  Recent Labs     01/08/20  0430 01/09/20  0427 01/10/20  0426    139 138   K 4.4 4.2 4.1   CL 99 102 99   CO2 23 22 25   BUN 35* 36* 37*   CREATININE 1.5* 1.4* 1.6*   GLUCOSE 102* 99 96   CALCIUM 9.2 9.3 9.6     Lab Results   Component Value Date    MG 2.2 01/06/2020     Recent Labs     01/08/20  0430 01/09/20  0427 01/10/20  0426   ALKPHOS 81 77 82   ALT 18 16 16   AST 28 19 20   PROT 6.6 6.4 6.5   BILITOT 0.7 0.6 0.6   LABALBU 3.6 3.5 3.6     Recent Labs     01/08/20  0430 01/09/20  0427 01/10/20  0426   WBC 5.0 4.5 4.4*   RBC 4.10 3.87 3.89   HGB 11.4* 10.8* 10.7*   HCT 38.2 36.5* 35.9*   MCV 93.2 94.3 92.3   MCH 27.8 27.9 27.5   MCHC 29.8* 29.6* 29.8*   RDW 16.9* 16.6* 16.8*    148 153   MPV 10.1 9.5 9.5     Lab Results   Component Value Date    CKTOTAL 141 11/14/2019    CKMB 5.5 11/14/2019    TROPONINI 0.02 01/06/2020    TROPONINI 0.02 01/06/2020    TROPONINI 0.02 11/14/2019     Lab Results   Component Value Date    INR 1.2 01/09/2020    INR 1.2 01/06/2020    INR 2.5 11/15/2019    PROTIME 13.5 (H) 01/09/2020    PROTIME 14.0 (H) 01/06/2020    PROTIME 28.7 (H) 11/15/2019     No atrial fibrillation,  [id number] Vasc score -   · Type I aortic dissection status post surgery underwent at [facility] on 11/15/2019. · Hypertension -improving. · CKD stage III, stable renal functions  · Obesity  · History of sarcoidosis. · Moderate right-sided pleural effusion.        Plan:  · His blood pressure improved, continue hydralazine to 50 mg p.o. 3 times a day and Isordil 5 mg p.o. 3 times daily and continue Coreg to 25 mg p.o. twice a day. · Continue Bumex 1 mg IV twice daily 1 more day and changed to p.o. in a.m. · Cardiac diet and restrict daily sodium to 2 g and fluids to 64 ounces. · Strict input output charting. · Continue rest of his current medications  · Continue oral anticoagulation with warfarin and continue aspirin 81 mg. John Armas MD., [facility].   [facility]) Cardiology

## 2020-01-10 NOTE — PROGRESS NOTES
Physical Therapy    Initial Assessment     Name: Emerson Hanna  : 1955  MRN: [id number]    Date of Service: 1/10/2020    Evaluating PT:  Tadeo Motta, PT, DPT [id number]    Room #:  2092/7908-R  Diagnosis:  CHF  PMHx:  CHF, recent ascending/descending aortic aneurysm, sarcoidosis, HTN, obesity   Precautions:  Falls risk, Monitor SpO2   Equipment Needs:  Pulse Oximeter     Pt lives alone in a 1 story home with 1+1 stairs to enter and 0 rail(s). Bedroom and bathroom are on the first level. Pt ambulated with no AD PTA. Full flight with B rails to basement laundry. 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 Initial Evaluation  Date: 1/10/20 Treatment Short Term/ Long Term   Goals   AM-PAC 6 Clicks 36/44     Was pt agreeable to Eval/treatment? Yes     Does pt have pain? 4/10 hand pain on R     Bed Mobility  Rolling: Independent   Supine to sit: Independent   Sit to supine: NT  Scooting: Independent   Rolling: Independent   Supine to sit: Independent   Sit to supine: Independent   Scooting: Independent    Transfers Sit to stand: SBA  Stand to sit: SBA  Stand pivot: SBA  Sit to stand: Independent   Stand to sit: Independent   Stand pivot: Independent    Ambulation    200 feet with no AD SBA  >300 feet with no AD Independent    Stair negotiation: ascended and descended  NT  >4 steps with 1 rail Modified Independent     ROM BUE:  Per OT  BLE:  WFL     Strength BUE:  Per OT  BLE:  Grossly 5/5      Balance Sitting EOB:  Independent   Dynamic Standing:  SBA  Sitting EOB:  Independent   Dynamic Standing:  Independent      Pt is A & O x 4  Sensation:  Pt reports numbness and tingling to R first two digits   Edema:   WNL    Vitals:  PRE:  HR 74    SpO2 95%  POST:  HR 77   SpO2 95%  During ambulation:  SpO2 93-95% on 2 L  Post ambulation:  Dropped to 86-87% on 2 L and recovered to 94% in ~2 minutes   Supplemental O2: 2

## 2020-01-10 NOTE — PROGRESS NOTES
Occupational Therapy  OCCUPATIONAL THERAPY INITIAL EVALUATION      Date:1/10/2020  Patient Name: Susu Reno  MRN: [id number]  : 1955  Room: [address] [place], [place] [address]    Evaluating OT: Oskar Venegas OTR/L #[id number]    AM-PAC Daily Activity Raw Score:     Recommended Adaptive Equipment: shower chair for EC, pulsox     Comments: Based on patient's functional performance as stated above and level of assistance needed prior to admission, this therapist believes that the patient would benefit from continued skilled OT during/following hospital stay in an effort to increase safety, functional independence with ADLs/IADLs, and quality of life. Reason for admission: SOB  Diagnosis: acute on chronic CHF, unspecified heart failure type   Pertinent Medical History: CHF, recent aortic ascending/descending aortic aneurysm with repair \", sarcoidosis, HTN, obesity     Precautions:  Falls, 2L oxygen, monitor O2     Home Living: Pt lives alone in a ranch with 1+1 step(s) to enter and 0 rail(s); bed/bathroom with tub/shower on 1st floor; pt also has finished basement with kitchen, living area and bathroom with walk-in shower. ~8-10 steps and B rails to access. Equipment owned: none    Prior Level of Function: Indep  with ADLs , indep with IADLs; using no device for ambulation. No 02. Was recently getting home nursing/therapy.    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, going up/down ladders    Pain Level: 4/10 R hand pain since recently   Cognition: A&O: 4/4; Follows multi- step directions; G Attention to task   Memory:  G   Sequencing:  G   Problem solving:  G   Judgement/safety: F+ cues for pacing/EC     Functional Assessment:   Initial Eval Status  Date: 1/10/20 Treatment Status  Date: Short Term Goals  Treatment frequency: PRN    Feeding Indep     Grooming SBA  Standing sink no device  Mod I  while seated; / standing   UB Dressing Setup  Indep  Clothing of standardized testing, informal observation of tasks, consultation with other medical professions/disciplines, assessment of data & development of POC/goals.      Low Evaluation + 15 timed treatment minutes  Treatment Time in: 1440  Treatment Time out: 45 [facility], OTR/L #[id number]

## 2020-01-10 NOTE — PROGRESS NOTES
diuretic therapy being managed by cardiology  3. Follow chest x-ray  4. Continue incentive spirometry and EZ Pap  5.  will need outpatient PSG, ordered        This plan of care was reviewed in collaboration with Dr. Esly Shi   Electronically signed by CRISTINA Adam on 1/10/2020 at 2:39 PM    I personally saw, examined, and cared for the patient. Labs, medications, radiographs reviewed.  I agree with history exam and plans detailed in NP note with the following additions:    Oxygenation improved  Still with basilar crackles  Diuresis per primary  Pleural fluid is transudate    Electronically signed by Billy Guerrier MD on 1/10/2020 at 4:09 PM

## 2020-01-10 NOTE — PROGRESS NOTES
Pharmacy Consultation Note  (Anticoagulant Dosing and Monitoring)    Initial consult date: 1/9/20  Consulting physician: Emerson Vegas (APRN)    Allergies:  Patient has no known allergies. 75 y.o. male      Ht Readings from Last 1 Encounters:   01/06/20 5' 8\" (1.727 m)     Wt Readings from Last 1 Encounters:   01/10/20 220 lb 14.4 oz (100.2 kg)         Warfarin Indication Target   INR [place]   Dose  (if applicable) Diet/Feeding Tube   Permanent afib 2-3 7.5 mg daily  Diet no salt added (1/9)       Vitamin K or Blood product  Administration Date                 Warfarin drug-drug interactions  Start  Stop Home Med? Comments   Acetaminophen  1/6   Increase bleeding risk    Aspirin 1/10   Increase bleeding risk                   TSH:  No results found for: TSH     Hepatic Function Panel:                            Lab Results   Component Value Date    ALKPHOS 82 01/10/2020    ALT 16 01/10/2020    AST 20 01/10/2020    PROT 6.5 01/10/2020    BILITOT 0.6 01/10/2020    LABALBU 3.6 01/10/2020       Date Warfarin Dose INR Heparin or LMWH HBG/  HCT PLT Comment   1/9/20 7.5 mg  1.2 Heparin 5,000 units TID 10.8/36.5 148    1/10/20 7.5 mg 1.2 -- 10.7/35.9 153                                 Assessment and Plan:  · 75 yom who presents to the ED for hypoxia and SOB after miscommunication of restarting his diuretic. Patient has PMH of aortic aneursym (repaired by [facility] in 11/19), permanent afib on warfarin, HTN, CHF (EF 20-30%), sarcoidosis, KYARA, and mumps. Dr. Mahesh Rizo spoke with [facility] and mateo to resume [address] [place] [address]. · INR goal 2-3; home warfarin dose 7.5 mg po daily  · 1/9: INR 1.2; plan for patient to have thoracentesis today. Will start warfarin after.   · 1/10: INR 1.2; H/H stable     Plan:  · Warfarin 7.5 mg po x 1 dose   · Daily PT/INR until the INR is stable within the therapeutic range  · Pharmacist will follow and monitor/adjust dosing as necessary    Kelly Luna, SherryD, BCPS 1/10/2020 1:48 PM  Phone: [phone]

## 2020-01-10 NOTE — CARE COORDINATION
Patient continues to plan for home at discharge. [facility] following, they will need resume homecare orders. Patient continues to require oxygen. If needed at discharge choiced for [facility]. The Plan for Transition of Care is related to the following treatment goals: discharge plan when stable    The Patient and/or patient representative Jackie Merino was provided with a choice of provider and agrees   with the discharge plan. [x] Yes [] No    Freedom of choice list was provided with basic dialogue that supports the patient's individualized plan of care/goals, treatment preferences and shares the quality data associated with the providers.  [x] Yes [] No

## 2020-01-10 NOTE — PROGRESS NOTES
Hospitalist Progress Note      PCP: Gabriela Bowers DO    Date of Admission: 1/6/2020    Chief Complaint: Shortness of breath    Hospital Course: This is a patient who was taken off his Lasix, and presented to the hospital in decompensated congestive heart failure. Echocardiogram shows ejection fraction around 40%. He was initially started on Lasix IV and then switched to Bumex, he also had thoracentesis done. The Bumex is helping him diurese better. He said his breathing is better today when he walked his pulse ox did not fall as deeply. Subjective: No new complaints      Medications:  Reviewed    Infusion Medications   Scheduled Medications    bumetanide  1 mg Intravenous BID    aspirin  81 mg Oral Daily    warfarin (COUMADIN) daily dosing (placeholder)   Other RX Placeholder    hydrALAZINE  50 mg Oral TID    isosorbide dinitrate  5 mg Oral TID    carvedilol  25 mg Oral BID WC    amLODIPine  5 mg Oral Daily    valsartan  160 mg Oral Daily    digoxin  62.5 mcg Oral Daily    potassium chloride  10 mEq Oral Daily    ipratropium-albuterol  1 ampule Inhalation Q4H WA    therapeutic multivitamin-minerals  1 tablet Oral Daily    senna  2 tablet Oral BID     PRN Meds: perflutren lipid microspheres, acetaminophen      Intake/Output Summary (Last 24 hours) at 1/10/2020 1022  Last data filed at 1/10/2020 1014  Gross per 24 hour   Intake 1380 ml   Output 1850 ml   Net -470 ml       Exam:    /80   Pulse 101   Temp 98.6 °F (37 °C) (Temporal)   Resp 14   Ht 5' 8\" (1.727 m)   Wt 220 lb 14.4 oz (100.2 kg)   SpO2 95%   BMI 33.59 kg/m²     General appearance: No apparent distress, appears stated age and cooperative. HEENT: Pupils equal, round, and reactive to light. Conjunctivae/corneas clear. Neck: Supple, with full range of motion. No jugular venous distention. Trachea midline. Respiratory:  Normal respiratory effort.  Clear to auscultation, bilaterally without of Coumadin  DVT Prophylaxis: On Coumadin  Diet: DIET NO SALT ADDED (3-4 GM); 2000 ml  Code Status: Prior    PT/OT Eval Status: Consulted    Dispo -goal home 24 to 48 hours    Zach Bland DO

## 2020-01-11 LAB
ALBUMIN SERPL-MCNC: 3.5 G/DL (ref 3.5–5.2)
ALP BLD-CCNC: 81 U/L (ref 40–129)
ALT SERPL-CCNC: 17 U/L (ref 0–40)
ANION GAP SERPL CALCULATED.3IONS-SCNC: 15 MMOL/L (ref 7–16)
AST SERPL-CCNC: 20 U/L (ref 0–39)
BILIRUB SERPL-MCNC: 0.5 MG/DL (ref 0–1.2)
BLOOD CULTURE, ROUTINE: NORMAL
BUN BLDV-MCNC: 40 MG/DL (ref 8–23)
CALCIUM SERPL-MCNC: 9.7 MG/DL (ref 8.6–10.2)
CHLORIDE BLD-SCNC: 98 MMOL/L (ref 98–107)
CO2: 25 MMOL/L (ref 22–29)
CREAT SERPL-MCNC: 1.7 MG/DL (ref 0.7–1.2)
CULTURE, BLOOD 2: NORMAL
GFR AFRICAN AMERICAN: 49
GFR NON-AFRICAN AMERICAN: 40 ML/MIN/1.73
GLUCOSE BLD-MCNC: 100 MG/DL (ref 74–99)
HCT VFR BLD CALC: 35.6 % (ref 37–54)
HEMOGLOBIN: 10.8 G/DL (ref 12.5–16.5)
INR BLD: 1.3
MCH RBC QN AUTO: 27.9 PG (ref 26–35)
MCHC RBC AUTO-ENTMCNC: 30.3 % (ref 32–34.5)
MCV RBC AUTO: 92 FL (ref 80–99.9)
PDW BLD-RTO: 16.7 FL (ref 11.5–15)
PLATELET # BLD: 147 E9/L (ref 130–450)
PMV BLD AUTO: 10 FL (ref 7–12)
POTASSIUM SERPL-SCNC: 4 MMOL/L (ref 3.5–5)
PROTHROMBIN TIME: 14.3 SEC (ref 9.3–12.4)
RBC # BLD: 3.87 E12/L (ref 3.8–5.8)
SODIUM BLD-SCNC: 138 MMOL/L (ref 132–146)
TOTAL PROTEIN: 6.5 G/DL (ref 6.4–8.3)
WBC # BLD: 4.2 E9/L (ref 4.5–11.5)

## 2020-01-11 PROCEDURE — 85610 PROTHROMBIN TIME: CPT

## 2020-01-11 PROCEDURE — 94660 CPAP INITIATION&MGMT: CPT

## 2020-01-11 PROCEDURE — 94761 N-INVAS EAR/PLS OXIMETRY MLT: CPT

## 2020-01-11 PROCEDURE — 6370000000 HC RX 637 (ALT 250 FOR IP): Performed by: INTERNAL MEDICINE

## 2020-01-11 PROCEDURE — 36415 COLL VENOUS BLD VENIPUNCTURE: CPT

## 2020-01-11 PROCEDURE — 2500000003 HC RX 250 WO HCPCS: Performed by: INTERNAL MEDICINE

## 2020-01-11 PROCEDURE — 6370000000 HC RX 637 (ALT 250 FOR IP): Performed by: NURSE PRACTITIONER

## 2020-01-11 PROCEDURE — 80053 COMPREHEN METABOLIC PANEL: CPT

## 2020-01-11 PROCEDURE — 94640 AIRWAY INHALATION TREATMENT: CPT

## 2020-01-11 PROCEDURE — 85027 COMPLETE CBC AUTOMATED: CPT

## 2020-01-11 PROCEDURE — 2060000000 HC ICU INTERMEDIATE R&B

## 2020-01-11 PROCEDURE — 2700000000 HC OXYGEN THERAPY PER DAY

## 2020-01-11 PROCEDURE — 94760 N-INVAS EAR/PLS OXIMETRY 1: CPT

## 2020-01-11 RX ORDER — WARFARIN SODIUM 5 MG/1
7.5 TABLET ORAL
Status: COMPLETED | OUTPATIENT
Start: 2020-01-11 | End: 2020-01-11

## 2020-01-11 RX ORDER — BUMETANIDE 1 MG/1
1 TABLET ORAL 2 TIMES DAILY
Status: DISCONTINUED | OUTPATIENT
Start: 2020-01-11 | End: 2020-01-15 | Stop reason: HOSPADM

## 2020-01-11 RX ADMIN — CARVEDILOL 25 MG: 25 TABLET, FILM COATED ORAL at 08:01

## 2020-01-11 RX ADMIN — AMLODIPINE BESYLATE 5 MG: 5 TABLET ORAL at 09:07

## 2020-01-11 RX ADMIN — BUMETANIDE 1 MG: 1 TABLET ORAL at 20:27

## 2020-01-11 RX ADMIN — ACETAMINOPHEN 650 MG: 325 TABLET, FILM COATED ORAL at 20:27

## 2020-01-11 RX ADMIN — WARFARIN SODIUM 7.5 MG: 5 TABLET ORAL at 17:49

## 2020-01-11 RX ADMIN — HYDRALAZINE HYDROCHLORIDE 50 MG: 50 TABLET, FILM COATED ORAL at 09:07

## 2020-01-11 RX ADMIN — MULTIPLE VITAMINS W/ MINERALS TAB 1 TABLET: TAB at 09:07

## 2020-01-11 RX ADMIN — ISOSORBIDE DINITRATE 5 MG: 10 TABLET ORAL at 20:27

## 2020-01-11 RX ADMIN — IPRATROPIUM BROMIDE AND ALBUTEROL SULFATE 1 AMPULE: 2.5; .5 SOLUTION RESPIRATORY (INHALATION) at 19:15

## 2020-01-11 RX ADMIN — IPRATROPIUM BROMIDE AND ALBUTEROL SULFATE 1 AMPULE: 2.5; .5 SOLUTION RESPIRATORY (INHALATION) at 11:09

## 2020-01-11 RX ADMIN — HYDRALAZINE HYDROCHLORIDE 50 MG: 50 TABLET, FILM COATED ORAL at 14:46

## 2020-01-11 RX ADMIN — ACETAMINOPHEN 650 MG: 325 TABLET, FILM COATED ORAL at 14:50

## 2020-01-11 RX ADMIN — SENNOSIDES 17.2 MG: 8.6 TABLET, FILM COATED ORAL at 09:07

## 2020-01-11 RX ADMIN — CARVEDILOL 25 MG: 25 TABLET, FILM COATED ORAL at 17:49

## 2020-01-11 RX ADMIN — DIGOXIN 62.5 MCG: 125 TABLET ORAL at 09:07

## 2020-01-11 RX ADMIN — VALSARTAN 160 MG: 160 TABLET, FILM COATED ORAL at 09:06

## 2020-01-11 RX ADMIN — ISOSORBIDE DINITRATE 5 MG: 10 TABLET ORAL at 09:07

## 2020-01-11 RX ADMIN — ACETAMINOPHEN 650 MG: 325 TABLET, FILM COATED ORAL at 07:56

## 2020-01-11 RX ADMIN — POTASSIUM CHLORIDE 10 MEQ: 10 TABLET, EXTENDED RELEASE ORAL at 09:07

## 2020-01-11 RX ADMIN — ASPIRIN 81 MG 81 MG: 81 TABLET ORAL at 09:07

## 2020-01-11 RX ADMIN — HYDRALAZINE HYDROCHLORIDE 50 MG: 50 TABLET, FILM COATED ORAL at 20:27

## 2020-01-11 RX ADMIN — IPRATROPIUM BROMIDE AND ALBUTEROL SULFATE 1 AMPULE: 2.5; .5 SOLUTION RESPIRATORY (INHALATION) at 15:17

## 2020-01-11 RX ADMIN — ISOSORBIDE DINITRATE 5 MG: 10 TABLET ORAL at 14:47

## 2020-01-11 RX ADMIN — BUMETANIDE 1 MG: 0.25 INJECTION INTRAMUSCULAR; INTRAVENOUS at 11:02

## 2020-01-11 RX ADMIN — SENNOSIDES 17.2 MG: 8.6 TABLET, FILM COATED ORAL at 20:27

## 2020-01-11 RX ADMIN — IPRATROPIUM BROMIDE AND ALBUTEROL SULFATE 1 AMPULE: 2.5; .5 SOLUTION RESPIRATORY (INHALATION) at 07:24

## 2020-01-11 ASSESSMENT — PAIN DESCRIPTION - ORIENTATION
ORIENTATION: MID
ORIENTATION: RIGHT

## 2020-01-11 ASSESSMENT — PAIN DESCRIPTION - LOCATION
LOCATION: BACK
LOCATION: ARM
LOCATION: BACK

## 2020-01-11 ASSESSMENT — PAIN DESCRIPTION - PROGRESSION
CLINICAL_PROGRESSION: GRADUALLY IMPROVING
CLINICAL_PROGRESSION: GRADUALLY IMPROVING
CLINICAL_PROGRESSION: NOT CHANGED

## 2020-01-11 ASSESSMENT — PAIN - FUNCTIONAL ASSESSMENT
PAIN_FUNCTIONAL_ASSESSMENT: PREVENTS OR INTERFERES WITH MANY ACTIVE NOT PASSIVE ACTIVITIES
PAIN_FUNCTIONAL_ASSESSMENT: PREVENTS OR INTERFERES SOME ACTIVE ACTIVITIES AND ADLS
PAIN_FUNCTIONAL_ASSESSMENT: ACTIVITIES ARE NOT PREVENTED

## 2020-01-11 ASSESSMENT — PAIN SCALES - GENERAL
PAINLEVEL_OUTOF10: 3
PAINLEVEL_OUTOF10: 4
PAINLEVEL_OUTOF10: 0
PAINLEVEL_OUTOF10: 0
PAINLEVEL_OUTOF10: 4

## 2020-01-11 ASSESSMENT — PAIN DESCRIPTION - FREQUENCY
FREQUENCY: INTERMITTENT

## 2020-01-11 ASSESSMENT — PAIN DESCRIPTION - ONSET
ONSET: GRADUAL

## 2020-01-11 ASSESSMENT — PAIN DESCRIPTION - PAIN TYPE
TYPE: CHRONIC PAIN

## 2020-01-11 ASSESSMENT — PAIN DESCRIPTION - DESCRIPTORS
DESCRIPTORS: ACHING;DISCOMFORT;SORE
DESCRIPTORS: ACHING;DISCOMFORT;SORE

## 2020-01-11 NOTE — PROGRESS NOTES
with normal bowel sounds. Musculoskeletal: No clubbing, cyanosis or edema bilaterally. Full range of motion without deformity. Skin: Skin color, texture, turgor normal.  No rashes or lesions. Neurologic:  Neurovascularly intact without any focal sensory/motor deficits. Cranial nerves: II-XII intact, grossly non-focal.  Psychiatric: Alert and oriented, thought content appropriate, normal insight  Capillary Refill: Brisk,< 3 seconds   Peripheral Pulses: +2 palpable, equal bilaterally       Labs:   Recent Labs     01/09/20  0427 01/10/20  0426 01/11/20  0413   WBC 4.5 4.4* 4.2*   HGB 10.8* 10.7* 10.8*   HCT 36.5* 35.9* 35.6*    153 147     Recent Labs     01/09/20  0427 01/10/20  0426 01/11/20  0413    138 138   K 4.2 4.1 4.0    99 98   CO2 22 25 25   BUN 36* 37* 40*   CREATININE 1.4* 1.6* 1.7*   CALCIUM 9.3 9.6 9.7     Recent Labs     01/09/20  0427 01/10/20  0426 01/11/20  0413   AST 19 20 20   ALT 16 16 17   BILITOT 0.6 0.6 0.5   ALKPHOS 77 82 81     Recent Labs     01/09/20  0914 01/10/20  1205 01/11/20  1118   INR 1.2 1.2 1.3     No results for input(s): Bahman Lua in the last 72 hours.     Assessment/Plan:    Active Hospital Problems    Diagnosis Date Noted    CHF (congestive heart failure), NYHA class I, acute on chronic, combined ([facility] [I50.43] 01/06/2020    Dissection of thoracic aorta (HCC) [I71.01] 11/15/2019    Acute on chronic systolic congestive heart failure ([facility] [I50.23]     Cardiomyopathy ([facility] [I42.9] 05/20/2016    Rate controlled atrial fibrillation (HCC) [I48.91] 05/20/2016    Chronic kidney disease, stage III (moderate) (HCC) [N18.3] 02/04/2014    Moderate obesity [E66.8] 11/05/2013    Essential hypertension [I10] 10/25/2012     Plan  If okay with all transition Bumex to oral  Possible discharge tomorrow if creatinine is stable  Continue Coumadin    DVT Prophylaxis: Coumadin  Diet: DIET NO SALT ADDED (3-4 GM); 2000 ml  Code Status: Prior    PT/OT Eval Status:

## 2020-01-12 LAB
ALBUMIN SERPL-MCNC: 3.6 G/DL (ref 3.5–5.2)
ALP BLD-CCNC: 82 U/L (ref 40–129)
ALT SERPL-CCNC: 17 U/L (ref 0–40)
ANION GAP SERPL CALCULATED.3IONS-SCNC: 19 MMOL/L (ref 7–16)
APPEARANCE FLUID: NORMAL
AST SERPL-CCNC: 21 U/L (ref 0–39)
BILIRUB SERPL-MCNC: 0.5 MG/DL (ref 0–1.2)
BODY FLUID CULTURE, STERILE: NORMAL
BUN BLDV-MCNC: 42 MG/DL (ref 8–23)
CALCIUM SERPL-MCNC: 9.9 MG/DL (ref 8.6–10.2)
CELL COUNT FLUID TYPE: NORMAL
CHLORIDE BLD-SCNC: 97 MMOL/L (ref 98–107)
CO2: 26 MMOL/L (ref 22–29)
COLOR FLUID: YELLOW
CREAT SERPL-MCNC: 1.6 MG/DL (ref 0.7–1.2)
GFR AFRICAN AMERICAN: 52
GFR NON-AFRICAN AMERICAN: 43 ML/MIN/1.73
GLUCOSE BLD-MCNC: 100 MG/DL (ref 74–99)
GRAM STAIN RESULT: NORMAL
HCT VFR BLD CALC: 36 % (ref 37–54)
HEMOGLOBIN: 10.8 G/DL (ref 12.5–16.5)
INR BLD: 1.3
MCH RBC QN AUTO: 27.4 PG (ref 26–35)
MCHC RBC AUTO-ENTMCNC: 30 % (ref 32–34.5)
MCV RBC AUTO: 91.4 FL (ref 80–99.9)
MONOCYTE, FLUID: 99 %
NEUTROPHIL, FLUID: 1 %
NUCLEATED CELLS FLUID: 3030 /UL
PDW BLD-RTO: 16.5 FL (ref 11.5–15)
PLATELET # BLD: 145 E9/L (ref 130–450)
PMV BLD AUTO: 9.4 FL (ref 7–12)
POTASSIUM SERPL-SCNC: 4.2 MMOL/L (ref 3.5–5)
PROTHROMBIN TIME: 15.2 SEC (ref 9.3–12.4)
RBC # BLD: 3.94 E12/L (ref 3.8–5.8)
RBC FLUID: 9000 /UL
SODIUM BLD-SCNC: 142 MMOL/L (ref 132–146)
TOTAL PROTEIN: 6.5 G/DL (ref 6.4–8.3)
WBC # BLD: 4.2 E9/L (ref 4.5–11.5)

## 2020-01-12 PROCEDURE — 6370000000 HC RX 637 (ALT 250 FOR IP): Performed by: INTERNAL MEDICINE

## 2020-01-12 PROCEDURE — 36415 COLL VENOUS BLD VENIPUNCTURE: CPT

## 2020-01-12 PROCEDURE — 94660 CPAP INITIATION&MGMT: CPT

## 2020-01-12 PROCEDURE — 2700000000 HC OXYGEN THERAPY PER DAY

## 2020-01-12 PROCEDURE — 85610 PROTHROMBIN TIME: CPT

## 2020-01-12 PROCEDURE — 80053 COMPREHEN METABOLIC PANEL: CPT

## 2020-01-12 PROCEDURE — 94640 AIRWAY INHALATION TREATMENT: CPT

## 2020-01-12 PROCEDURE — 6370000000 HC RX 637 (ALT 250 FOR IP): Performed by: NURSE PRACTITIONER

## 2020-01-12 PROCEDURE — 2060000000 HC ICU INTERMEDIATE R&B

## 2020-01-12 PROCEDURE — 85027 COMPLETE CBC AUTOMATED: CPT

## 2020-01-12 RX ORDER — WARFARIN SODIUM 5 MG/1
10 TABLET ORAL
Status: COMPLETED | OUTPATIENT
Start: 2020-01-12 | End: 2020-01-12

## 2020-01-12 RX ADMIN — HYDRALAZINE HYDROCHLORIDE 50 MG: 50 TABLET, FILM COATED ORAL at 20:53

## 2020-01-12 RX ADMIN — BUMETANIDE 1 MG: 1 TABLET ORAL at 09:46

## 2020-01-12 RX ADMIN — SENNOSIDES 17.2 MG: 8.6 TABLET, FILM COATED ORAL at 20:53

## 2020-01-12 RX ADMIN — VALSARTAN 160 MG: 160 TABLET, FILM COATED ORAL at 09:46

## 2020-01-12 RX ADMIN — CARVEDILOL 25 MG: 25 TABLET, FILM COATED ORAL at 17:25

## 2020-01-12 RX ADMIN — ASPIRIN 81 MG 81 MG: 81 TABLET ORAL at 09:51

## 2020-01-12 RX ADMIN — HYDRALAZINE HYDROCHLORIDE 50 MG: 50 TABLET, FILM COATED ORAL at 09:46

## 2020-01-12 RX ADMIN — ACETAMINOPHEN 650 MG: 325 TABLET, FILM COATED ORAL at 09:51

## 2020-01-12 RX ADMIN — WARFARIN SODIUM 10 MG: 5 TABLET ORAL at 17:26

## 2020-01-12 RX ADMIN — IPRATROPIUM BROMIDE AND ALBUTEROL SULFATE 1 AMPULE: 2.5; .5 SOLUTION RESPIRATORY (INHALATION) at 19:42

## 2020-01-12 RX ADMIN — HYDRALAZINE HYDROCHLORIDE 50 MG: 50 TABLET, FILM COATED ORAL at 13:58

## 2020-01-12 RX ADMIN — AMLODIPINE BESYLATE 5 MG: 5 TABLET ORAL at 09:46

## 2020-01-12 RX ADMIN — ACETAMINOPHEN 650 MG: 325 TABLET, FILM COATED ORAL at 14:26

## 2020-01-12 RX ADMIN — IPRATROPIUM BROMIDE AND ALBUTEROL SULFATE 1 AMPULE: 2.5; .5 SOLUTION RESPIRATORY (INHALATION) at 11:23

## 2020-01-12 RX ADMIN — ACETAMINOPHEN 650 MG: 325 TABLET, FILM COATED ORAL at 04:30

## 2020-01-12 RX ADMIN — IPRATROPIUM BROMIDE AND ALBUTEROL SULFATE 1 AMPULE: 2.5; .5 SOLUTION RESPIRATORY (INHALATION) at 15:59

## 2020-01-12 RX ADMIN — IPRATROPIUM BROMIDE AND ALBUTEROL SULFATE 1 AMPULE: 2.5; .5 SOLUTION RESPIRATORY (INHALATION) at 07:53

## 2020-01-12 RX ADMIN — SENNOSIDES 17.2 MG: 8.6 TABLET, FILM COATED ORAL at 09:46

## 2020-01-12 RX ADMIN — ACETAMINOPHEN 650 MG: 325 TABLET, FILM COATED ORAL at 23:47

## 2020-01-12 RX ADMIN — ISOSORBIDE DINITRATE 5 MG: 10 TABLET ORAL at 20:53

## 2020-01-12 RX ADMIN — POTASSIUM CHLORIDE 10 MEQ: 10 TABLET, EXTENDED RELEASE ORAL at 09:47

## 2020-01-12 RX ADMIN — ISOSORBIDE DINITRATE 5 MG: 10 TABLET ORAL at 13:58

## 2020-01-12 RX ADMIN — BUMETANIDE 1 MG: 1 TABLET ORAL at 20:53

## 2020-01-12 RX ADMIN — MULTIPLE VITAMINS W/ MINERALS TAB 1 TABLET: TAB at 09:45

## 2020-01-12 RX ADMIN — CARVEDILOL 25 MG: 25 TABLET, FILM COATED ORAL at 07:45

## 2020-01-12 RX ADMIN — ACETAMINOPHEN 650 MG: 325 TABLET, FILM COATED ORAL at 19:44

## 2020-01-12 RX ADMIN — DIGOXIN 62.5 MCG: 125 TABLET ORAL at 09:47

## 2020-01-12 RX ADMIN — ISOSORBIDE DINITRATE 5 MG: 10 TABLET ORAL at 09:47

## 2020-01-12 ASSESSMENT — PAIN - FUNCTIONAL ASSESSMENT
PAIN_FUNCTIONAL_ASSESSMENT: ACTIVITIES ARE NOT PREVENTED
PAIN_FUNCTIONAL_ASSESSMENT: PREVENTS OR INTERFERES SOME ACTIVE ACTIVITIES AND ADLS
PAIN_FUNCTIONAL_ASSESSMENT: ACTIVITIES ARE NOT PREVENTED

## 2020-01-12 ASSESSMENT — PAIN SCALES - GENERAL
PAINLEVEL_OUTOF10: 3
PAINLEVEL_OUTOF10: 3
PAINLEVEL_OUTOF10: 0
PAINLEVEL_OUTOF10: 4
PAINLEVEL_OUTOF10: 4
PAINLEVEL_OUTOF10: 0
PAINLEVEL_OUTOF10: 4
PAINLEVEL_OUTOF10: 0
PAINLEVEL_OUTOF10: 0
PAINLEVEL_OUTOF10: 5

## 2020-01-12 ASSESSMENT — PAIN DESCRIPTION - PAIN TYPE
TYPE: CHRONIC PAIN
TYPE: ACUTE PAIN

## 2020-01-12 ASSESSMENT — PAIN DESCRIPTION - ONSET
ONSET: GRADUAL
ONSET: GRADUAL
ONSET: AWAKENED FROM SLEEP
ONSET: GRADUAL
ONSET: GRADUAL

## 2020-01-12 ASSESSMENT — PAIN DESCRIPTION - ORIENTATION
ORIENTATION: RIGHT

## 2020-01-12 ASSESSMENT — PAIN DESCRIPTION - PROGRESSION
CLINICAL_PROGRESSION: NOT CHANGED
CLINICAL_PROGRESSION: GRADUALLY IMPROVING
CLINICAL_PROGRESSION: GRADUALLY IMPROVING
CLINICAL_PROGRESSION: NOT CHANGED
CLINICAL_PROGRESSION: GRADUALLY IMPROVING

## 2020-01-12 ASSESSMENT — PAIN DESCRIPTION - DESCRIPTORS
DESCRIPTORS: ACHING;DISCOMFORT
DESCRIPTORS: DISCOMFORT;NAGGING

## 2020-01-12 ASSESSMENT — PAIN DESCRIPTION - LOCATION
LOCATION: ARM
LOCATION: ABDOMEN

## 2020-01-12 ASSESSMENT — PAIN DESCRIPTION - FREQUENCY
FREQUENCY: INTERMITTENT

## 2020-01-12 NOTE — PROGRESS NOTES
Patient removed oxygen. Pulse ox at rest 85%. Oxygen immediately applied at 3 l/min via nc. Pulse ox increased to 93% within 30 sec.

## 2020-01-12 NOTE — PROGRESS NOTES
Hospitalist Progress Note      PCP: Grant Santiago DO    Date of Admission: 1/6/2020    Chief Complaint: Volume overload    Hospital Course: This patient is a 64-year-old male admitted with volume overload and congestive heart failure. He was initially started IV Lasix without much success, transition to IV Bumex, and now is on oral Bumex. However he walked to the bathroom without oxygen and his saturation was 85%. He had thoracentesis, and overall breathing is much better, he will need oxygen arranged for home. He is negative approximately 4 L since admission    Subjective:Just feels worn out      Medications:  Reviewed    Infusion Medications   Scheduled Medications    warfarin  10 mg Oral Once    bumetanide  1 mg Oral BID    aspirin  81 mg Oral Daily    warfarin (COUMADIN) daily dosing (placeholder)   Other RX Placeholder    hydrALAZINE  50 mg Oral TID    isosorbide dinitrate  5 mg Oral TID    carvedilol  25 mg Oral BID WC    amLODIPine  5 mg Oral Daily    valsartan  160 mg Oral Daily    digoxin  62.5 mcg Oral Daily    potassium chloride  10 mEq Oral Daily    ipratropium-albuterol  1 ampule Inhalation Q4H WA    therapeutic multivitamin-minerals  1 tablet Oral Daily    senna  2 tablet Oral BID     PRN Meds: perflutren lipid microspheres, acetaminophen      Intake/Output Summary (Last 24 hours) at 1/12/2020 1447  Last data filed at 1/12/2020 1406  Gross per 24 hour   Intake 1420 ml   Output 3000 ml   Net -1580 ml       Exam:    /68   Pulse 68   Temp 97.3 °F (36.3 °C) (Temporal)   Resp 18   Ht 5' 8\" (1.727 m)   Wt 229 lb 6.4 oz (104.1 kg)   SpO2 97%   BMI 34.88 kg/m²     General appearance: No apparent distress, appears stated age and cooperative. HEENT: Pupils equal, round, and reactive to light. Conjunctivae/corneas clear. Neck: Supple, with full range of motion. No jugular venous distention. Trachea midline. Respiratory:  Normal respiratory effort.  Clear to auscultation, Bumex  Continue Coumadin  Hopefully discharge tomorrow    DVT Prophylaxis: Coumadin  Diet: DIET NO SALT ADDED (3-4 GM); 2000 ml  Code Status: Prior    PT/OT Eval Status: Consulted     Dispo -goal home    Alyssia Chino DO

## 2020-01-12 NOTE — PROGRESS NOTES
Pharmacy Consultation Note  (Anticoagulant Dosing and Monitoring)    Initial consult date: 1/9/20  Consulting physician: Laura Youssef (APRN)    Allergies:  Patient has no known allergies. 75 y.o. male      Ht Readings from Last 1 Encounters:   01/06/20 5' 8\" (1.727 m)     Wt Readings from Last 1 Encounters:   01/12/20 229 lb 6.4 oz (104.1 kg)         Warfarin Indication Target   INR Range Home   Dose  (if applicable) Diet/Feeding Tube   Permanent afib 2-3 7.5 mg daily  Diet no salt added (1/9)       Vitamin K or Blood product  Administration Date                 Warfarin drug-drug interactions  Start  Stop Home Med? Comments   Acetaminophen  1/6   Increase bleeding risk    Aspirin 1/10   Increase bleeding risk                   TSH:  No results found for: TSH     Hepatic Function Panel:                            Lab Results   Component Value Date    ALKPHOS 82 01/12/2020    ALT 17 01/12/2020    AST 21 01/12/2020    PROT 6.5 01/12/2020    BILITOT 0.5 01/12/2020    LABALBU 3.6 01/12/2020       Date Warfarin Dose INR Heparin or LMWH HBG/  HCT PLT Comment   1/9/20 7.5 mg  1.2 Heparin 5,000 units TID 10.8/36.5 148    1/10/20 7.5 mg 1.2 -- 10.7/35.9 153    1/11/20 7.5 mg 1.3 -- 10.8/35.6 147    1/12/20 10 mg 1.3 -- 10.8/36 145               Assessment and Plan:  · 75 yom who presents to the ED for hypoxia and SOB after miscommunication of restarting his diuretic. Patient has PMH of aortic aneursym (repaired by [facility] in 11/19), permanent afib on warfarin, HTN, CHF (EF 20-30%), sarcoidosis, KYARA, and mumps. Dr. Royal Corbin spoke with [facility] and mateo to resume [address] [place] [address]. · INR goal 2-3; home warfarin dose 7.5 mg po daily  · 1/9: INR 1.2; plan for patient to have thoracentesis today. Will start warfarin after. · 1/10: INR 1.2; H/H stable  · 1/11: INR 1.3    · 1/12: INR 1.3: H/H stable.  Since minimal change in INR will give booster dose of 10 mg     Plan:  · Warfarin 10 mg po x 1 dose   · Upon discharge recommend 7.5 mg daily as this was the patient's home dose prior with close INR follow-up  · Daily PT/INR until the INR is stable within the therapeutic range  · Pharmacist will follow and monitor/adjust dosing as necessary    Sherry HuD, BCPS 1/12/2020 7:11 AM  Phone: [phone]

## 2020-01-12 NOTE — PLAN OF CARE
Problem: Falls - Risk of:  Goal: Will remain free from falls  Description  Will remain free from falls  Outcome: Met This Shift  Goal: Absence of physical injury  Description  Absence of physical injury  Outcome: Met This Shift     Problem: OXYGENATION/RESPIRATORY FUNCTION  Goal: Patient will maintain patent airway  Outcome: Met This Shift Attending Only

## 2020-01-12 NOTE — PROGRESS NOTES
Date: 1/11/2020    Time: 7:18 PM    Patient Placed On BIPAP/CPAP/ Non-Invasive Ventilation? Yes    If no must comment. Facial area red/color change? No           If YES are Blister/Lesion present? No   If yes must notify nursing staff  BIPAP/CPAP skin barrier?   Yes    Skin barrier type:mepilex       Comments:        Chastity Maldonado

## 2020-01-13 ENCOUNTER — TELEPHONE (OUTPATIENT)
Dept: CARDIOLOGY CLINIC | Age: 65
End: 2020-01-13

## 2020-01-13 LAB
ALBUMIN SERPL-MCNC: 3.6 G/DL (ref 3.5–5.2)
ALP BLD-CCNC: 82 U/L (ref 40–129)
ALT SERPL-CCNC: 19 U/L (ref 0–40)
ANION GAP SERPL CALCULATED.3IONS-SCNC: 13 MMOL/L (ref 7–16)
APTT: 32.7 SEC (ref 24.5–35.1)
APTT: 82.9 SEC (ref 24.5–35.1)
APTT: 84.8 SEC (ref 24.5–35.1)
AST SERPL-CCNC: 22 U/L (ref 0–39)
BILIRUB SERPL-MCNC: 0.5 MG/DL (ref 0–1.2)
BUN BLDV-MCNC: 42 MG/DL (ref 8–23)
CALCIUM SERPL-MCNC: 9.6 MG/DL (ref 8.6–10.2)
CHLORIDE BLD-SCNC: 97 MMOL/L (ref 98–107)
CO2: 28 MMOL/L (ref 22–29)
CREAT SERPL-MCNC: 1.7 MG/DL (ref 0.7–1.2)
GFR AFRICAN AMERICAN: 49
GFR NON-AFRICAN AMERICAN: 40 ML/MIN/1.73
GLUCOSE BLD-MCNC: 96 MG/DL (ref 74–99)
HCT VFR BLD CALC: 35.4 % (ref 37–54)
HCT VFR BLD CALC: 36.4 % (ref 37–54)
HEMOGLOBIN: 10.8 G/DL (ref 12.5–16.5)
HEMOGLOBIN: 11 G/DL (ref 12.5–16.5)
INR BLD: 1.5
MCH RBC QN AUTO: 28 PG (ref 26–35)
MCH RBC QN AUTO: 28.1 PG (ref 26–35)
MCHC RBC AUTO-ENTMCNC: 30.2 % (ref 32–34.5)
MCHC RBC AUTO-ENTMCNC: 30.5 % (ref 32–34.5)
MCV RBC AUTO: 91.7 FL (ref 80–99.9)
MCV RBC AUTO: 93.1 FL (ref 80–99.9)
PDW BLD-RTO: 16.3 FL (ref 11.5–15)
PDW BLD-RTO: 16.5 FL (ref 11.5–15)
PLATELET # BLD: 132 E9/L (ref 130–450)
PLATELET # BLD: 135 E9/L (ref 130–450)
PMV BLD AUTO: 9.2 FL (ref 7–12)
PMV BLD AUTO: 9.7 FL (ref 7–12)
POTASSIUM SERPL-SCNC: 4.3 MMOL/L (ref 3.5–5)
PROTHROMBIN TIME: 17 SEC (ref 9.3–12.4)
RBC # BLD: 3.86 E12/L (ref 3.8–5.8)
RBC # BLD: 3.91 E12/L (ref 3.8–5.8)
SODIUM BLD-SCNC: 138 MMOL/L (ref 132–146)
TOTAL PROTEIN: 6.6 G/DL (ref 6.4–8.3)
WBC # BLD: 3.7 E9/L (ref 4.5–11.5)
WBC # BLD: 4.3 E9/L (ref 4.5–11.5)

## 2020-01-13 PROCEDURE — 94640 AIRWAY INHALATION TREATMENT: CPT

## 2020-01-13 PROCEDURE — 6360000002 HC RX W HCPCS: Performed by: HOSPITALIST

## 2020-01-13 PROCEDURE — 97530 THERAPEUTIC ACTIVITIES: CPT

## 2020-01-13 PROCEDURE — 6370000000 HC RX 637 (ALT 250 FOR IP): Performed by: NURSE PRACTITIONER

## 2020-01-13 PROCEDURE — 6370000000 HC RX 637 (ALT 250 FOR IP): Performed by: INTERNAL MEDICINE

## 2020-01-13 PROCEDURE — 85027 COMPLETE CBC AUTOMATED: CPT

## 2020-01-13 PROCEDURE — 80053 COMPREHEN METABOLIC PANEL: CPT

## 2020-01-13 PROCEDURE — 94660 CPAP INITIATION&MGMT: CPT

## 2020-01-13 PROCEDURE — 2700000000 HC OXYGEN THERAPY PER DAY

## 2020-01-13 PROCEDURE — 97110 THERAPEUTIC EXERCISES: CPT

## 2020-01-13 PROCEDURE — 85610 PROTHROMBIN TIME: CPT

## 2020-01-13 PROCEDURE — 36415 COLL VENOUS BLD VENIPUNCTURE: CPT

## 2020-01-13 PROCEDURE — 2060000000 HC ICU INTERMEDIATE R&B

## 2020-01-13 PROCEDURE — 85730 THROMBOPLASTIN TIME PARTIAL: CPT

## 2020-01-13 RX ORDER — HEPARIN SODIUM 1000 [USP'U]/ML
60 INJECTION, SOLUTION INTRAVENOUS; SUBCUTANEOUS PRN
Status: DISCONTINUED | OUTPATIENT
Start: 2020-01-13 | End: 2020-01-15 | Stop reason: HOSPADM

## 2020-01-13 RX ORDER — WARFARIN SODIUM 5 MG/1
10 TABLET ORAL
Status: COMPLETED | OUTPATIENT
Start: 2020-01-13 | End: 2020-01-13

## 2020-01-13 RX ORDER — HEPARIN SODIUM 1000 [USP'U]/ML
60 INJECTION, SOLUTION INTRAVENOUS; SUBCUTANEOUS ONCE
Status: COMPLETED | OUTPATIENT
Start: 2020-01-13 | End: 2020-01-13

## 2020-01-13 RX ORDER — HEPARIN SODIUM 10000 [USP'U]/100ML
12 INJECTION, SOLUTION INTRAVENOUS CONTINUOUS
Status: DISCONTINUED | OUTPATIENT
Start: 2020-01-13 | End: 2020-01-15

## 2020-01-13 RX ORDER — HEPARIN SODIUM 1000 [USP'U]/ML
30 INJECTION, SOLUTION INTRAVENOUS; SUBCUTANEOUS PRN
Status: DISCONTINUED | OUTPATIENT
Start: 2020-01-13 | End: 2020-01-15 | Stop reason: HOSPADM

## 2020-01-13 RX ADMIN — HYDRALAZINE HYDROCHLORIDE 50 MG: 50 TABLET, FILM COATED ORAL at 09:30

## 2020-01-13 RX ADMIN — ISOSORBIDE DINITRATE 5 MG: 10 TABLET ORAL at 21:04

## 2020-01-13 RX ADMIN — IPRATROPIUM BROMIDE AND ALBUTEROL SULFATE 1 AMPULE: 2.5; .5 SOLUTION RESPIRATORY (INHALATION) at 16:54

## 2020-01-13 RX ADMIN — DIGOXIN 62.5 MCG: 125 TABLET ORAL at 10:17

## 2020-01-13 RX ADMIN — WARFARIN SODIUM 10 MG: 5 TABLET ORAL at 17:49

## 2020-01-13 RX ADMIN — AMLODIPINE BESYLATE 5 MG: 5 TABLET ORAL at 10:17

## 2020-01-13 RX ADMIN — CARVEDILOL 25 MG: 25 TABLET, FILM COATED ORAL at 17:49

## 2020-01-13 RX ADMIN — IPRATROPIUM BROMIDE AND ALBUTEROL SULFATE 1 AMPULE: 2.5; .5 SOLUTION RESPIRATORY (INHALATION) at 13:26

## 2020-01-13 RX ADMIN — IPRATROPIUM BROMIDE AND ALBUTEROL SULFATE 1 AMPULE: 2.5; .5 SOLUTION RESPIRATORY (INHALATION) at 08:57

## 2020-01-13 RX ADMIN — ISOSORBIDE DINITRATE 5 MG: 10 TABLET ORAL at 14:03

## 2020-01-13 RX ADMIN — POTASSIUM CHLORIDE 10 MEQ: 10 TABLET, EXTENDED RELEASE ORAL at 10:16

## 2020-01-13 RX ADMIN — HYDRALAZINE HYDROCHLORIDE 50 MG: 50 TABLET, FILM COATED ORAL at 21:04

## 2020-01-13 RX ADMIN — ISOSORBIDE DINITRATE 5 MG: 10 TABLET ORAL at 09:30

## 2020-01-13 RX ADMIN — CARVEDILOL 25 MG: 25 TABLET, FILM COATED ORAL at 07:24

## 2020-01-13 RX ADMIN — ACETAMINOPHEN 650 MG: 325 TABLET, FILM COATED ORAL at 17:53

## 2020-01-13 RX ADMIN — IPRATROPIUM BROMIDE AND ALBUTEROL SULFATE 1 AMPULE: 2.5; .5 SOLUTION RESPIRATORY (INHALATION) at 20:30

## 2020-01-13 RX ADMIN — HEPARIN SODIUM 6310 UNITS: 1000 INJECTION INTRAVENOUS; SUBCUTANEOUS at 13:55

## 2020-01-13 RX ADMIN — HEPARIN SODIUM 12 UNITS/KG/HR: 10000 INJECTION, SOLUTION INTRAVENOUS at 13:55

## 2020-01-13 RX ADMIN — HYDRALAZINE HYDROCHLORIDE 50 MG: 50 TABLET, FILM COATED ORAL at 14:03

## 2020-01-13 RX ADMIN — ACETAMINOPHEN 650 MG: 325 TABLET, FILM COATED ORAL at 11:52

## 2020-01-13 RX ADMIN — SENNOSIDES 17.2 MG: 8.6 TABLET, FILM COATED ORAL at 21:04

## 2020-01-13 RX ADMIN — ACETAMINOPHEN 650 MG: 325 TABLET, FILM COATED ORAL at 22:10

## 2020-01-13 RX ADMIN — MULTIPLE VITAMINS W/ MINERALS TAB 1 TABLET: TAB at 10:17

## 2020-01-13 RX ADMIN — BUMETANIDE 1 MG: 1 TABLET ORAL at 21:04

## 2020-01-13 RX ADMIN — BUMETANIDE 1 MG: 1 TABLET ORAL at 09:30

## 2020-01-13 RX ADMIN — ASPIRIN 81 MG 81 MG: 81 TABLET ORAL at 10:16

## 2020-01-13 RX ADMIN — VALSARTAN 160 MG: 160 TABLET, FILM COATED ORAL at 10:17

## 2020-01-13 RX ADMIN — SENNOSIDES 17.2 MG: 8.6 TABLET, FILM COATED ORAL at 09:30

## 2020-01-13 RX ADMIN — ACETAMINOPHEN 650 MG: 325 TABLET, FILM COATED ORAL at 07:24

## 2020-01-13 ASSESSMENT — PAIN DESCRIPTION - FREQUENCY
FREQUENCY: INTERMITTENT

## 2020-01-13 ASSESSMENT — PAIN DESCRIPTION - ONSET
ONSET: GRADUAL

## 2020-01-13 ASSESSMENT — PAIN SCALES - GENERAL
PAINLEVEL_OUTOF10: 0
PAINLEVEL_OUTOF10: 3
PAINLEVEL_OUTOF10: 0
PAINLEVEL_OUTOF10: 5
PAINLEVEL_OUTOF10: 4
PAINLEVEL_OUTOF10: 0
PAINLEVEL_OUTOF10: 3

## 2020-01-13 ASSESSMENT — PAIN - FUNCTIONAL ASSESSMENT
PAIN_FUNCTIONAL_ASSESSMENT: PREVENTS OR INTERFERES SOME ACTIVE ACTIVITIES AND ADLS
PAIN_FUNCTIONAL_ASSESSMENT: PREVENTS OR INTERFERES SOME ACTIVE ACTIVITIES AND ADLS

## 2020-01-13 ASSESSMENT — PAIN DESCRIPTION - DESCRIPTORS
DESCRIPTORS: ACHING;DISCOMFORT;DULL
DESCRIPTORS: ACHING;DISCOMFORT;SORE
DESCRIPTORS: ACHING;DISCOMFORT;SORE

## 2020-01-13 ASSESSMENT — PAIN DESCRIPTION - LOCATION
LOCATION: HEAD

## 2020-01-13 ASSESSMENT — PAIN DESCRIPTION - PROGRESSION
CLINICAL_PROGRESSION: GRADUALLY IMPROVING
CLINICAL_PROGRESSION: NOT CHANGED
CLINICAL_PROGRESSION: GRADUALLY IMPROVING

## 2020-01-13 ASSESSMENT — PAIN DESCRIPTION - PAIN TYPE
TYPE: ACUTE PAIN
TYPE: CHRONIC PAIN
TYPE: CHRONIC PAIN

## 2020-01-13 ASSESSMENT — PAIN DESCRIPTION - ORIENTATION: ORIENTATION: MID

## 2020-01-13 NOTE — TELEPHONE ENCOUNTER
Inpatient Notes   Received: Yesterday   Message Contents   MD Mohsen Tan, GLENDA             Post acute hf tj meneses or Jah Albrecht

## 2020-01-13 NOTE — PROGRESS NOTES
Conjunctivae/corneas clear. Neck: Supple, with full range of motion. No jugular venous distention. Trachea midline. Respiratory:  Normal respiratory effort. Clear to auscultation, bilaterally without Rales/Wheezes/Rhonchi. Cardiovascular: Regular rate and rhythm with normal S1/S2 without murmurs, rubs or gallops. Abdomen: Soft, non-tender, non-distended with normal bowel sounds. Musculoskeletal: No clubbing, cyanosis or edema bilaterally. Full range of motion without deformity. Skin: Skin color, texture, turgor normal.  No rashes or lesions. Neurologic:  Neurovascularly intact without any focal sensory/motor deficits. Cranial nerves: II-XII intact, grossly non-focal.  Psychiatric: Alert and oriented, thought content appropriate, normal insight        Labs:   Recent Labs     01/11/20 0413 01/12/20 0432 01/13/20  0436   WBC 4.2* 4.2* 3.7*   HGB 10.8* 10.8* 10.8*   HCT 35.6* 36.0* 35.4*    145 132     Recent Labs     01/11/20 0413 01/12/20  0432 01/13/20  0436    142 138   K 4.0 4.2 4.3   CL 98 97* 97*   CO2 25 26 28   BUN 40* 42* 42*   CREATININE 1.7* 1.6* 1.7*   CALCIUM 9.7 9.9 9.6     Recent Labs     01/11/20  0413 01/12/20  0432 01/13/20  0436   AST 20 21 22   ALT 17 17 19   BILITOT 0.5 0.5 0.5   ALKPHOS 81 82 82     Recent Labs     01/11/20  1118 01/12/20  0432 01/13/20  0436   INR 1.3 1.3 1.5     No results for input(s): Skipper Brenner in the last 72 hours.     Assessment/Plan:    Active Hospital Problems    Diagnosis Date Noted    CHF (congestive heart failure), NYHA class I, acute on chronic, combined ([facility] [I50.43] 01/06/2020    Dissection of thoracic aorta (HCC) [I71.01] 11/15/2019    Acute on chronic systolic congestive heart failure ([facility] [I50.23]     Cardiomyopathy [facility] [I42.9] 05/20/2016    Rate controlled atrial fibrillation ([facility] [I48.91] 05/20/2016    Chronic kidney disease, stage III (moderate) ([facility] [N18.3] 02/04/2014    Moderate obesity [E66.8] 11/05/2013    Essential hypertension [I10] 10/25/2012     Plan  Arrange home O2 at dc  Iv heparin bridge-- ER 30-35% AND AFIB  Continue diuresing with oral Bumex  Continue Coumadin  Dc home when INR 2  Pulmo/cards on board, last seen on 1-10-20 by them    DVT Prophylaxis: Coumadin  Diet: DIET NO SALT ADDED (3-4 GM); 2000 ml  Code Status: Prior    PT/OT Eval Status: Consulted     Dispo -goal home    Dre Michael MD

## 2020-01-13 NOTE — TELEPHONE ENCOUNTER
Pt currently hospitalized  [facility] hospital FU appt made for January 23rd at 10:30 with sabina camacho np, at the [facility] office (pt of dr. Benigno House)  Manjit Reardon inpt CHF RN, notified of this appt and will add to his DC instructions.

## 2020-01-13 NOTE — PROGRESS NOTES
Once    bumetanide  1 mg Oral BID    aspirin  81 mg Oral Daily    warfarin (COUMADIN) daily dosing (placeholder)   Other RX Placeholder    hydrALAZINE  50 mg Oral TID    isosorbide dinitrate  5 mg Oral TID    carvedilol  25 mg Oral BID     amLODIPine  5 mg Oral Daily    valsartan  160 mg Oral Daily    digoxin  62.5 mcg Oral Daily    potassium chloride  10 mEq Oral Daily    ipratropium-albuterol  1 ampule Inhalation Q4H WA    therapeutic multivitamin-minerals  1 tablet Oral Daily    senna  2 tablet Oral BID       Physical Exam:  General Appearance: appears comfortable in no acute distress. HEENT: Normocephalic atraumatic without obvious abnormality   Neck: Lips, mucosa, and tongue normal.  Supple, symmetrical, trachea midline, no adenopathy;thyroid:  no enlargement/tenderness/nodules or JVD. Lung: Breath sounds slightly diminished bibasilar,  no wheezes no crackles no rhonchi. Respirations   unlabored. Symmetrical expansion. Heart: RRR, normal S1, S2. No MRG  Abdomen: Soft, NT, ND. BS present x 4 quadrants. No bruit or organomegaly. Extremities: Pedal pulses 2+ symmetric b/l. Extremities normal, no cyanosis, clubbing, trace BLE edema. Musculokeletal: No joint swelling, no muscle tenderness. ROM normal in all joints of extremities. Neurologic: Mental status: Alert and Oriented X3 . Pertinent/ New Labs and Imaging Studies     Imaging Personally Reviewed:  1/9/20  Following thoracentesis, there is diminished right pleural effusion. Tiny cyst left pleural effusion is present. There are mild diffuse   interstitial drapes. Prior median sternotomy wires. Aortic arch stent.       The heart is enlarged. There is calcification within thoracic aorta. Acromioclavicular arthropathy.       The heart, lungs, mediastinum and regional skeleton are otherwise   unremarkable.           Impression       Diminished right pleural effusion following thoracentesis.  No evidence   of pneumothorax.

## 2020-01-13 NOTE — PROGRESS NOTES
Physical Therapy  Treatment Note    Name: Som Bond  : 1955  MRN: [id number]    Date of Service: 2020    Evaluating PT:  Pascual Mckenna, PT, DPT [id number]    Room #:  7950/5178-K  Diagnosis:  CHF  PMHx:  CHF, recent ascending/descending aortic aneurysm, sarcoidosis, HTN, obesity   Precautions:  Falls risk, Monitor SpO2   Equipment Needs:  Pulse Oximeter     Pt lives alone in a 1 story home with 1+1 stairs to enter and 0 rail(s). Bedroom and bathroom are on the first level. Pt ambulated with no AD PTA. Full flight with B rails to basement laundry. 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 Initial Evaluation  Date: 1/10/20 Treatment  Date: 20 Short Term/ Long Term   Goals   AM-PAC 6 Clicks 19/13     Was pt agreeable to Eval/treatment? Yes yes    Does pt have pain? 4/10 hand pain on R no    Bed Mobility  Rolling: Independent   Supine to sit: Independent   Sit to supine: NT  Scooting: Independent  Rolling: NT  Supine to sit: Independent  Sit to supine: NT  Scooting: Independent Rolling: Independent   Supine to sit: Independent   Sit to supine: Independent   Scooting: Independent    Transfers Sit to stand: SBA  Stand to sit: SBA  Stand pivot: SBA Sit to stand: Independent  Stand to sit: Independent  Stand pivot: Independent   Sit to stand: Independent   Stand to sit:  Independent   Stand pivot: Independent    Ambulation    200 feet with no AD  x 2 with standing rest break between bouts feet with no AD SBA >300 feet with no AD Independent    Stair negotiation: ascended and descended  NT 12 steps with single hand rail SBA >4 steps with 1 rail Modified Independent     ROM BUE:  Per OT  BLE:  WFL     Strength BUE:  Per OT  BLE:  Grossly 5/5      Balance Sitting EOB:  Independent   Dynamic Standing:  SBA Sitting EOB: Independent  Dynamic standing: SBA Sitting EOB:  Independent   Dynamic Standing:  Independent      Pt is A & O x 4  Sensation:  Pt reports numbness and tingling to R first two digits   Edema: WNL    Vitals:  PRE:  NT    SpO2 95%  POST:  NT   SpO2 95%  During ambulation:  SpO2 90% on 3 L  Post ambulation:  Dropped to 88% on 3 L and recovered to 94% in ~1 minute   Supplemental O2: 3 L  Pt performed therapeutic exercises of the following:  STS 2 x 10    Patient education  Pt educated on role of physical therapy intervention. Pt educated on breathing and energy conservation strategies for safe independent ambulation with supplemental O2 use at home. Patient response to education:   Pt verbalized understanding Pt demonstrated skill Pt requires further education in this area   yes yes yes     Comments:  Pt received supine in bed and agreeable to PT intervention at this time. Pt demonstrating improved ambulation tolerance as well as stair negotiation on this date. SPO2 monitored throughout with dips in oxygen levels with activity but quick recover with seated rest breaks. Pt was thoroughly educated throughout session on strategies to prevent oxygen desaturation with activity. Pt was receptive to education but requires further reinforcement. Pt will continue to benefit from skilled PT intervention to maximize functional mobility and safety for safe discharge. Patient is making good progress toward established physical therapy goals. Continue with physical therapy current plan of care.     Time in: 1103  Time out: [address], PT, DPT  [id number]

## 2020-01-13 NOTE — PROGRESS NOTES
warfarin until INR therapeutic    Plan:  · Warfarin 10 mg po x 1 dose   · Daily PT/INR until the INR is stable within the therapeutic range  · Pharmacist will follow and monitor/adjust dosing as necessary    Consuelo Lara PharmD, BCPS 1/13/2020 11:33 AM  Phone: [phone]

## 2020-01-13 NOTE — CARE COORDINATION
Plan for discharge remains home with [facility]. They will need resume orders. Patient will need pulse ox testing with in 48 hours of discharge. Choiced for [facility] if oxygen needed. INR 1.5. Discharge on hold until INR 2.0 or greater.

## 2020-01-14 ENCOUNTER — APPOINTMENT (OUTPATIENT)
Dept: GENERAL RADIOLOGY | Age: 65
DRG: 291 | End: 2020-01-14
Payer: MEDICARE

## 2020-01-14 PROBLEM — I50.43 ACUTE ON CHRONIC SYSTOLIC AND DIASTOLIC HEART FAILURE, NYHA CLASS 3 (HCC): Status: RESOLVED | Noted: 2020-01-06 | Resolved: 2020-01-14

## 2020-01-14 LAB
ALBUMIN SERPL-MCNC: 3.4 G/DL (ref 3.5–5.2)
ALP BLD-CCNC: 80 U/L (ref 40–129)
ALT SERPL-CCNC: 19 U/L (ref 0–40)
ANION GAP SERPL CALCULATED.3IONS-SCNC: 13 MMOL/L (ref 7–16)
APTT: 68.5 SEC (ref 24.5–35.1)
AST SERPL-CCNC: 21 U/L (ref 0–39)
BILIRUB SERPL-MCNC: 0.4 MG/DL (ref 0–1.2)
BUN BLDV-MCNC: 48 MG/DL (ref 8–23)
CALCIUM SERPL-MCNC: 9.7 MG/DL (ref 8.6–10.2)
CHLORIDE BLD-SCNC: 97 MMOL/L (ref 98–107)
CO2: 28 MMOL/L (ref 22–29)
CREAT SERPL-MCNC: 1.9 MG/DL (ref 0.7–1.2)
GFR AFRICAN AMERICAN: 43
GFR NON-AFRICAN AMERICAN: 36 ML/MIN/1.73
GLUCOSE BLD-MCNC: 101 MG/DL (ref 74–99)
HCT VFR BLD CALC: 34.1 % (ref 37–54)
HEMOGLOBIN: 10.1 G/DL (ref 12.5–16.5)
INR BLD: 1.8
MAGNESIUM: 2.1 MG/DL (ref 1.6–2.6)
MCH RBC QN AUTO: 27.2 PG (ref 26–35)
MCHC RBC AUTO-ENTMCNC: 29.6 % (ref 32–34.5)
MCV RBC AUTO: 91.7 FL (ref 80–99.9)
PDW BLD-RTO: 16.2 FL (ref 11.5–15)
PLATELET # BLD: 132 E9/L (ref 130–450)
PMV BLD AUTO: 9.9 FL (ref 7–12)
POTASSIUM SERPL-SCNC: 4.2 MMOL/L (ref 3.5–5)
PROTHROMBIN TIME: 21 SEC (ref 9.3–12.4)
RBC # BLD: 3.72 E12/L (ref 3.8–5.8)
SODIUM BLD-SCNC: 138 MMOL/L (ref 132–146)
TOTAL PROTEIN: 6.5 G/DL (ref 6.4–8.3)
WBC # BLD: 3.9 E9/L (ref 4.5–11.5)

## 2020-01-14 PROCEDURE — 6370000000 HC RX 637 (ALT 250 FOR IP): Performed by: INTERNAL MEDICINE

## 2020-01-14 PROCEDURE — 85730 THROMBOPLASTIN TIME PARTIAL: CPT

## 2020-01-14 PROCEDURE — 6370000000 HC RX 637 (ALT 250 FOR IP): Performed by: NURSE PRACTITIONER

## 2020-01-14 PROCEDURE — 85610 PROTHROMBIN TIME: CPT

## 2020-01-14 PROCEDURE — 80053 COMPREHEN METABOLIC PANEL: CPT

## 2020-01-14 PROCEDURE — 71045 X-RAY EXAM CHEST 1 VIEW: CPT

## 2020-01-14 PROCEDURE — 83735 ASSAY OF MAGNESIUM: CPT

## 2020-01-14 PROCEDURE — 94640 AIRWAY INHALATION TREATMENT: CPT

## 2020-01-14 PROCEDURE — 36415 COLL VENOUS BLD VENIPUNCTURE: CPT

## 2020-01-14 PROCEDURE — 2700000000 HC OXYGEN THERAPY PER DAY

## 2020-01-14 PROCEDURE — 6360000002 HC RX W HCPCS: Performed by: HOSPITALIST

## 2020-01-14 PROCEDURE — 1200000000 HC SEMI PRIVATE

## 2020-01-14 PROCEDURE — 85027 COMPLETE CBC AUTOMATED: CPT

## 2020-01-14 PROCEDURE — 94660 CPAP INITIATION&MGMT: CPT

## 2020-01-14 RX ORDER — WARFARIN SODIUM 5 MG/1
10 TABLET ORAL
Status: COMPLETED | OUTPATIENT
Start: 2020-01-14 | End: 2020-01-14

## 2020-01-14 RX ADMIN — AMLODIPINE BESYLATE 5 MG: 5 TABLET ORAL at 09:28

## 2020-01-14 RX ADMIN — SENNOSIDES 17.2 MG: 8.6 TABLET, FILM COATED ORAL at 09:29

## 2020-01-14 RX ADMIN — IPRATROPIUM BROMIDE AND ALBUTEROL SULFATE 1 AMPULE: 2.5; .5 SOLUTION RESPIRATORY (INHALATION) at 19:37

## 2020-01-14 RX ADMIN — ASPIRIN 81 MG 81 MG: 81 TABLET ORAL at 09:27

## 2020-01-14 RX ADMIN — IPRATROPIUM BROMIDE AND ALBUTEROL SULFATE 1 AMPULE: 2.5; .5 SOLUTION RESPIRATORY (INHALATION) at 17:07

## 2020-01-14 RX ADMIN — HEPARIN SODIUM 10 UNITS/KG/HR: 10000 INJECTION, SOLUTION INTRAVENOUS at 12:13

## 2020-01-14 RX ADMIN — MULTIPLE VITAMINS W/ MINERALS TAB 1 TABLET: TAB at 09:28

## 2020-01-14 RX ADMIN — DIGOXIN 62.5 MCG: 125 TABLET ORAL at 09:28

## 2020-01-14 RX ADMIN — ISOSORBIDE DINITRATE 5 MG: 10 TABLET ORAL at 14:26

## 2020-01-14 RX ADMIN — IPRATROPIUM BROMIDE AND ALBUTEROL SULFATE 1 AMPULE: 2.5; .5 SOLUTION RESPIRATORY (INHALATION) at 08:32

## 2020-01-14 RX ADMIN — ACETAMINOPHEN 650 MG: 325 TABLET, FILM COATED ORAL at 02:55

## 2020-01-14 RX ADMIN — CARVEDILOL 25 MG: 25 TABLET, FILM COATED ORAL at 16:44

## 2020-01-14 RX ADMIN — ISOSORBIDE DINITRATE 5 MG: 10 TABLET ORAL at 09:28

## 2020-01-14 RX ADMIN — ACETAMINOPHEN 650 MG: 325 TABLET, FILM COATED ORAL at 09:29

## 2020-01-14 RX ADMIN — IPRATROPIUM BROMIDE AND ALBUTEROL SULFATE 1 AMPULE: 2.5; .5 SOLUTION RESPIRATORY (INHALATION) at 12:46

## 2020-01-14 RX ADMIN — BUMETANIDE 1 MG: 1 TABLET ORAL at 20:39

## 2020-01-14 RX ADMIN — WARFARIN SODIUM 10 MG: 5 TABLET ORAL at 17:42

## 2020-01-14 RX ADMIN — POTASSIUM CHLORIDE 10 MEQ: 10 TABLET, EXTENDED RELEASE ORAL at 09:27

## 2020-01-14 RX ADMIN — VALSARTAN 160 MG: 160 TABLET, FILM COATED ORAL at 09:28

## 2020-01-14 RX ADMIN — HYDRALAZINE HYDROCHLORIDE 50 MG: 50 TABLET, FILM COATED ORAL at 14:26

## 2020-01-14 RX ADMIN — ACETAMINOPHEN 650 MG: 325 TABLET, FILM COATED ORAL at 20:38

## 2020-01-14 RX ADMIN — HYDRALAZINE HYDROCHLORIDE 50 MG: 50 TABLET, FILM COATED ORAL at 09:27

## 2020-01-14 RX ADMIN — CARVEDILOL 25 MG: 25 TABLET, FILM COATED ORAL at 09:29

## 2020-01-14 RX ADMIN — ISOSORBIDE DINITRATE 5 MG: 10 TABLET ORAL at 20:39

## 2020-01-14 RX ADMIN — ACETAMINOPHEN 650 MG: 325 TABLET, FILM COATED ORAL at 14:30

## 2020-01-14 RX ADMIN — HYDRALAZINE HYDROCHLORIDE 50 MG: 50 TABLET, FILM COATED ORAL at 20:39

## 2020-01-14 RX ADMIN — SENNOSIDES 17.2 MG: 8.6 TABLET, FILM COATED ORAL at 20:39

## 2020-01-14 RX ADMIN — BUMETANIDE 1 MG: 1 TABLET ORAL at 09:29

## 2020-01-14 ASSESSMENT — PAIN DESCRIPTION - LOCATION
LOCATION: ARM

## 2020-01-14 ASSESSMENT — PAIN DESCRIPTION - ONSET
ONSET: ON-GOING

## 2020-01-14 ASSESSMENT — PAIN DESCRIPTION - FREQUENCY
FREQUENCY: CONTINUOUS

## 2020-01-14 ASSESSMENT — PAIN SCALES - GENERAL
PAINLEVEL_OUTOF10: 0
PAINLEVEL_OUTOF10: 0
PAINLEVEL_OUTOF10: 4
PAINLEVEL_OUTOF10: 0
PAINLEVEL_OUTOF10: 4
PAINLEVEL_OUTOF10: 4
PAINLEVEL_OUTOF10: 5
PAINLEVEL_OUTOF10: 4

## 2020-01-14 ASSESSMENT — PAIN DESCRIPTION - PAIN TYPE
TYPE: ACUTE PAIN

## 2020-01-14 ASSESSMENT — PAIN - FUNCTIONAL ASSESSMENT
PAIN_FUNCTIONAL_ASSESSMENT: PREVENTS OR INTERFERES SOME ACTIVE ACTIVITIES AND ADLS

## 2020-01-14 ASSESSMENT — PAIN DESCRIPTION - PROGRESSION
CLINICAL_PROGRESSION: NOT CHANGED

## 2020-01-14 ASSESSMENT — PAIN DESCRIPTION - ORIENTATION
ORIENTATION: RIGHT

## 2020-01-14 ASSESSMENT — PAIN DESCRIPTION - DESCRIPTORS
DESCRIPTORS: ACHING;DISCOMFORT;SORE
DESCRIPTORS: ACHING;SORE

## 2020-01-14 NOTE — PROGRESS NOTES
Sobeida Agustin, notified of patient's APTT drawn at [address] [place] [address], to be resulted. Eli stated \"We cannot find the specimen, we will send someone to redraw! \"    Addendum:  Eli notified this nurse  \"Phlebotomist has the tube and is going to send to lab for processing\"

## 2020-01-14 NOTE — PROGRESS NOTES
Hospitalist Progress Note    CC: Acute on chronic systolic and diastolic heart failure, NYHA class 3 [facility])    Hospital course:      Admit date: 2020  Days in hospital:  8    24 Hour Events:   -- No acute events reported / recorded    Subjective:   -- Patient seen and examined, chart reviewed  -- Remains admitted while awaiting Therapeutic INR in the setting of Atrial Fibrillation and Moderately Reduced EF  -- His A/C had apparently been held following his last admission to [facility] in the setting of Type A Dissection and complications of Pericardial Effusion development. -- Reports that fees better, awaiting INR Goals  -- Questions about long term prognosis and whether he \"will ever get better\"   -- Resting comfortably when seated in Bedside chair     ROS:   Pertinent items are noted in HPI. Objective:  Patient Vitals for the past 24 hrs:   BP Temp Temp src Pulse Resp SpO2 Weight   20 0728 -- -- -- -- -- -- 223 lb 1.6 oz (101.2 kg)   20 0020 122/72 98.2 °F (36.8 °C) Temporal 76 22 94 % --   20 2104 130/68 -- -- 68 20 -- --   20 2000 -- -- -- 76 20 96 % --   20 1749 126/66 -- -- 74 -- -- --   20 1600 (!) 120/58 97.2 °F (36.2 °C) Temporal 84 20 95 % --     Temp (24hrs), Av.7 °F (36.5 °C), Min:97.2 °F (36.2 °C), Max:98.2 °F (36.8 °C)      Intake/Output Summary (Last 24 hours) at 2020 0755  Last data filed at 2020 2200  Gross per 24 hour   Intake 1551. 85 ml   Output 1700 ml   Net -148.15 ml   Net  This Admission  -5L     Gen: Awake, Alert, NAD. No conversational Dyspnea   HEENT: NC/AT, moist mucous membranes, no oropharyngeal erythema or exudate  Neck: supple, trachea midline, no anterior cervical or SC LAD  Heart:  Normal s1/s2, RRR, no murmurs, gallops, or rubs.  no leg edema  Lungs:  Diminished but clear to auscultation  bilaterally, no wheeze, no rales, no rhonchi, no crackles, no use of accessory muscles  Abd: bowel sounds present, soft, nontender, nondistended, no masses  Extrem:  No clubbing, cyanosis,  no edema  Skin: no rashes or lesions  Psych: A & O x3  Neuro: grossly intact, moves all four extremities. Assessment:    Principal Problem (Resolved):    Acute on chronic systolic and diastolic heart failure, NYHA class 3 (HCC)  Active Problems:    Acute on chronic systolic congestive heart failure (HCC)    Atrial fibrillation (HCC)    Chronic kidney disease, stage III (moderate) (HCC)    Cardiomyopathy (HCC)    Rate controlled atrial fibrillation (HCC)    Dissection of thoracic aorta (HCC)    Essential hypertension    Moderate obesity      Plan:  Continue Heparin Gtt and Warfarin until Therapeutic   Remain on Oral Diuretics for fluid management. Goal is net Negative Daily   Needs Additional O/P Work up for Pulmonary Function   Ambulatory Pulse Oxyegn noted -- 3L Oxygen Requirement with Exertion.  No Rest Requirement  Home Health Re-ordered        Prognosis:  Fair    Code status:  Full Code    DVT prophylaxis: [] Lovenox  [] SQ Heparin  [] SCDs  [x] warfarin/oral direct thrombin inhibitor [] Encourage ambulation  GI prophylaxis: [] [id number]  [] not indicated  Diet:  DIET NO SALT ADDED (3-4 GM); 2000 ml    Disposition:  [] Home  [x] Home with home health   -- Disposition is Home with Home health   -- Anticipated Discharge is in 24-48 hours   -- Downgrade Status -- Med-Surg Now       Medications:  Scheduled Meds:   bumetanide  1 mg Oral BID    aspirin  81 mg Oral Daily    warfarin (COUMADIN) daily dosing (placeholder)   Other RX Placeholder    hydrALAZINE  50 mg Oral TID    isosorbide dinitrate  5 mg Oral TID    carvedilol  25 mg Oral BID WC    amLODIPine  5 mg Oral Daily    valsartan  160 mg Oral Daily    digoxin  62.5 mcg Oral Daily    potassium chloride  10 mEq Oral Daily    ipratropium-albuterol  1 ampule Inhalation Q4H WA    therapeutic multivitamin-minerals  1 tablet Oral Daily    senna  2

## 2020-01-14 NOTE — PROGRESS NOTES
Pulse ox room air sitting 93%. Pulse ox room air ambulation 86%  Pulse ox on oxygen at 3 l/min via nc ambulation recovery 90%.   Pulse ox on 3 l/min via nc 93% sitting recovery

## 2020-01-14 NOTE — PROGRESS NOTES
Sapna Zeng M.D.,[facility]  Leeroy Dela Cruz D.O., F.A.C.O.I., David Wagner M.D. Ariadne Painter M.D., Rosa M Baum M.D. Eir Ozuna D.O. Daily Pulmonary Progress Note    Patient:  Mona Hunter 75 y.o. male MRN: [id number]     Date of Service: 1/14/2020      Synopsis     We are following patient for bilateral pleural effusion, probable KYARA    \"CC\" SOB     Code status: FULL       Subjective      Patient was seen and examined. Not tolerating hospital BIPAP mask well. Having desat with exertion. Review of Systems:  Constitutional: Denies fever, weight loss, night sweats, and fatigue  Skin: Denies pigmentation, dark lesions, and rashes   HEENT: Denies hearing loss, tinnitus, ear drainage, epistaxis, sore throat, and hoarseness. Cardiovascular: Denies palpitations, chest pain, and chest pressure. Respiratory: + cough,+ dyspnea with exertion, hemoptysis, apnea, and choking.   Gastrointestinal: Denies nausea, vomiting, poor appetite, diarrhea, heartburn or reflux  Genitourinary: Denies dysuria, frequency, urgency or hematuria  Musculoskeletal: Denies myalgias, muscle weakness, and bone pain    24-hour events:  None     Objective   Vitals: /70   Pulse 70   Temp 97.9 °F (36.6 °C) (Temporal)   Resp 20   Ht 5' 8\" (1.727 m)   Wt 223 lb 1.6 oz (101.2 kg)   SpO2 94%   BMI 33.92 kg/m²     I/O:    Intake/Output Summary (Last 24 hours) at 1/14/2020 1506  Last data filed at 1/14/2020 0910  Gross per 24 hour   Intake 1115.34 ml   Output 1400 ml   Net -284.66 ml       Vent Information  Skin Assessment: Clean, dry, & intact  FiO2 : 40 %       IPAP: 12 cmH20  CPAP/EPAP: 6 cmH2O     CURRENT MEDS :  Scheduled Meds:   warfarin  10 mg Oral Once    bumetanide  1 mg Oral BID    aspirin  81 mg Oral Daily    warfarin (COUMADIN) daily dosing (placeholder)   Other RX Placeholder    hydrALAZINE  50 mg Oral TID    isosorbide dinitrate  5 mg Oral TID    carvedilol  25 mg Oral BID WC    amLODIPine  5 mg Oral Daily    valsartan  160 mg Oral Daily    digoxin  62.5 mcg Oral Daily    potassium chloride  10 mEq Oral Daily    ipratropium-albuterol  1 ampule Inhalation Q4H WA    therapeutic multivitamin-minerals  1 tablet Oral Daily    senna  2 tablet Oral BID       Physical Exam:  General Appearance: appears comfortable in no acute distress. HEENT: Normocephalic atraumatic without obvious abnormality   Neck: Lips, mucosa, and tongue normal.  Supple, symmetrical, trachea midline, no adenopathy;thyroid:  no enlargement/tenderness/nodules or JVD. Lung: Breath sounds slightly diminished bibasilar,  no wheezes no crackles no rhonchi. Respirations   unlabored. Symmetrical expansion. Heart: RRR, normal S1, S2. No MRG  Abdomen: Soft, NT, ND. BS present x 4 quadrants. No bruit or organomegaly. Extremities: Pedal pulses 2+ symmetric b/l. Extremities normal, no cyanosis, clubbing, trace BLE edema. Musculokeletal: No joint swelling, no muscle tenderness. ROM normal in all joints of extremities. Neurologic: Mental status: Alert and Oriented X3 . Pertinent/ New Labs and Imaging Studies     Imaging Personally Reviewed:  1/9/20  Following thoracentesis, there is diminished right pleural effusion. Tiny cyst left pleural effusion is present. There are mild diffuse   interstitial drapes. Prior median sternotomy wires. Aortic arch stent.       The heart is enlarged. There is calcification within thoracic aorta. Acromioclavicular arthropathy.       The heart, lungs, mediastinum and regional skeleton are otherwise   unremarkable.           Impression       Diminished right pleural effusion following thoracentesis. No evidence   of pneumothorax.       Findings are worrisome for fluid overload or congestive failure   mechanism. Clinical correlation is needed.               ECHO  Conclusions      Summary   Left ventricle is moderately enlarged . Ejection fraction is visually estimated at 40-45%. Overall ejection fraction mild-to-moderately decreased . Septal motion consistent with post bypass surgery. There is severe eccentric hypertrophy. Abnormal diastolic function. The left atrium is severely dilated. Normal right ventricular size and function. Mild to moderate mitral regurgitation is present. No hemodynamically significant aortic stenosis is present. RVSP is 29 mmHg. Normal PA systolic pressure. Mildly dilated aortic root (4.1 cm) and normal sized ascending aorta. A prosthetic graft noted in the location of the ascending aorta. There is a small localized near left ventricle pericardial effusion noted. Signature      ----------------------------------------------------------------   Electronically signed by Endy Castellano:  Lab Results   Component Value Date    WBC 3.9 01/14/2020    HGB 10.1 01/14/2020    HCT 34.1 01/14/2020    MCV 91.7 01/14/2020    MCH 27.2 01/14/2020    MCHC 29.6 01/14/2020    RDW 16.2 01/14/2020     01/14/2020    MPV 9.9 01/14/2020     Lab Results   Component Value Date     01/14/2020    K 4.2 01/14/2020    K 4.1 11/15/2019    CL 97 01/14/2020    CO2 28 01/14/2020    BUN 48 01/14/2020    CREATININE 1.9 01/14/2020    LABALBU 3.4 01/14/2020    CALCIUM 9.7 01/14/2020    GFRAA 43 01/14/2020    LABGLOM 36 01/14/2020     Lab Results   Component Value Date    PROTIME 21.0 01/14/2020    INR 1.8 01/14/2020     No results for input(s): PROBNP in the last 72 hours. No results for input(s): PROCAL in the last 72 hours. This SmartLink has not been configured with any valid records. Micro:  No results for input(s): CULTRESP in the last 72 hours. No results for input(s): LABGRAM in the last 72 hours. No results for input(s): LEGUR in the last 72 hours. No results for input(s): STREPNEUMAGU in the last 72 hours. No results for input(s): LP1UAG in the last 72 hours. Assessment:    1. Acute hypoxic respiratory failure  2.  Decompensated

## 2020-01-14 NOTE — PROGRESS NOTES
Pharmacy Consultation Note  (Anticoagulant Dosing and Monitoring)    Initial consult date: 1/9/20  Consulting physician: Ophelia Mancilla (APRN)    Allergies:  Patient has no known allergies. 75 y.o. male      Ht Readings from Last 1 Encounters:   01/06/20 5' 8\" (1.727 m)     Wt Readings from Last 1 Encounters:   01/14/20 223 lb 1.6 oz (101.2 kg)         Warfarin Indication Target   INR Range Home   Dose  (if applicable) Diet/Feeding Tube   Permanent afib 2-3 7.5 mg daily  Diet no salt added (1/9)       Vitamin K or Blood product  Administration Date                 Warfarin drug-drug interactions  Start  Stop Home Med? Comments   Acetaminophen  1/6   Increase bleeding risk    Aspirin 1/10   Increase bleeding risk                   TSH:  No results found for: TSH     Hepatic Function Panel:                            Lab Results   Component Value Date    ALKPHOS 80 01/14/2020    ALT 19 01/14/2020    AST 21 01/14/2020    PROT 6.5 01/14/2020    BILITOT 0.4 01/14/2020    LABALBU 3.4 01/14/2020       Date Warfarin Dose INR Heparin or LMWH HBG/  HCT PLT Comment   1/9/20 7.5 mg  1.2 Heparin 5,000 units TID 10.8/36.5 148    1/10/20 7.5 mg 1.2 -- 10.7/35.9 153    1/11/20 7.5 mg 1.3 -- 10.8/35.6 147    1/12/20 10 mg 1.3 -- 10.8/36 145    1/13/20 10 mg 1.5 Heparin gtt 10.8/35.4 135    1/14/20 10 mg 1.8 Heparin gtt 10.1/34.1 132      Assessment and Plan:  · 75 yom who presents to the ED for hypoxia and SOB after miscommunication of restarting his diuretic. Patient has PMH of aortic aneursym (repaired by [facility] in 11/19), permanent afib on warfarin, HTN, CHF (EF 20-30%), sarcoidosis, KYARA, and mumps. Dr. David Parr spoke with [facility] and okay to resume [address] [place] [address]. · INR goal 2-3; home warfarin dose 7.5 mg po daily  · 1/9: INR 1.2; plan for patient to have thoracentesis today. Will start warfarin after. · 1/10: INR 1.2; H/H stable  · 1/11: INR 1.3    · 1/12: INR 1.3: H/H stable.  Since minimal change in INR will give booster dose of 10 mg   · 1/13: INR1.5; H/H stable. Heparin gtt started to bridge to warfarin until INR therapeutic  · 1/14: INR 1.8; H/H and platelets stable.      Plan:  · Warfarin 10 mg po x 1 dose   · Daily PT/INR until the INR is stable within the therapeutic range  · Pharmacist will follow and monitor/adjust dosing as necessary    Jackeyln Jean PharmD, BCPS 1/14/2020 11:23 AM  Phone: [phone]

## 2020-01-15 VITALS
RESPIRATION RATE: 18 BRPM | DIASTOLIC BLOOD PRESSURE: 63 MMHG | SYSTOLIC BLOOD PRESSURE: 123 MMHG | HEART RATE: 70 BPM | TEMPERATURE: 98 F | WEIGHT: 223.1 LBS | BODY MASS INDEX: 33.81 KG/M2 | OXYGEN SATURATION: 93 % | HEIGHT: 68 IN

## 2020-01-15 LAB
ALBUMIN SERPL-MCNC: 3.5 G/DL (ref 3.5–5.2)
ALP BLD-CCNC: 84 U/L (ref 40–129)
ALT SERPL-CCNC: 20 U/L (ref 0–40)
ANION GAP SERPL CALCULATED.3IONS-SCNC: 13 MMOL/L (ref 7–16)
APTT: 117.1 SEC (ref 24.5–35.1)
APTT: 40.5 SEC (ref 24.5–35.1)
AST SERPL-CCNC: 22 U/L (ref 0–39)
BILIRUB SERPL-MCNC: 0.3 MG/DL (ref 0–1.2)
BUN BLDV-MCNC: 47 MG/DL (ref 8–23)
CALCIUM SERPL-MCNC: 10.1 MG/DL (ref 8.6–10.2)
CHLORIDE BLD-SCNC: 96 MMOL/L (ref 98–107)
CO2: 28 MMOL/L (ref 22–29)
CREAT SERPL-MCNC: 1.7 MG/DL (ref 0.7–1.2)
GFR AFRICAN AMERICAN: 49
GFR NON-AFRICAN AMERICAN: 40 ML/MIN/1.73
GLUCOSE BLD-MCNC: 95 MG/DL (ref 74–99)
HCT VFR BLD CALC: 35.1 % (ref 37–54)
HEMOGLOBIN: 10.5 G/DL (ref 12.5–16.5)
INR BLD: 2
MAGNESIUM: 2.3 MG/DL (ref 1.6–2.6)
MCH RBC QN AUTO: 27.2 PG (ref 26–35)
MCHC RBC AUTO-ENTMCNC: 29.9 % (ref 32–34.5)
MCV RBC AUTO: 90.9 FL (ref 80–99.9)
PDW BLD-RTO: 16.3 FL (ref 11.5–15)
PLATELET # BLD: 133 E9/L (ref 130–450)
PMV BLD AUTO: 9.5 FL (ref 7–12)
POTASSIUM SERPL-SCNC: 4.3 MMOL/L (ref 3.5–5)
PROTHROMBIN TIME: 22.5 SEC (ref 9.3–12.4)
RBC # BLD: 3.86 E12/L (ref 3.8–5.8)
SODIUM BLD-SCNC: 137 MMOL/L (ref 132–146)
TOTAL PROTEIN: 6.8 G/DL (ref 6.4–8.3)
WBC # BLD: 3.8 E9/L (ref 4.5–11.5)

## 2020-01-15 PROCEDURE — 6360000002 HC RX W HCPCS: Performed by: HOSPITALIST

## 2020-01-15 PROCEDURE — 85730 THROMBOPLASTIN TIME PARTIAL: CPT

## 2020-01-15 PROCEDURE — 2700000000 HC OXYGEN THERAPY PER DAY

## 2020-01-15 PROCEDURE — 6370000000 HC RX 637 (ALT 250 FOR IP): Performed by: INTERNAL MEDICINE

## 2020-01-15 PROCEDURE — 85027 COMPLETE CBC AUTOMATED: CPT

## 2020-01-15 PROCEDURE — 97112 NEUROMUSCULAR REEDUCATION: CPT

## 2020-01-15 PROCEDURE — 85610 PROTHROMBIN TIME: CPT

## 2020-01-15 PROCEDURE — 97535 SELF CARE MNGMENT TRAINING: CPT

## 2020-01-15 PROCEDURE — 94640 AIRWAY INHALATION TREATMENT: CPT

## 2020-01-15 PROCEDURE — 94660 CPAP INITIATION&MGMT: CPT

## 2020-01-15 PROCEDURE — 36415 COLL VENOUS BLD VENIPUNCTURE: CPT

## 2020-01-15 PROCEDURE — 80053 COMPREHEN METABOLIC PANEL: CPT

## 2020-01-15 PROCEDURE — 83735 ASSAY OF MAGNESIUM: CPT

## 2020-01-15 PROCEDURE — 6370000000 HC RX 637 (ALT 250 FOR IP): Performed by: NURSE PRACTITIONER

## 2020-01-15 RX ORDER — BUMETANIDE 1 MG/1
1 TABLET ORAL 2 TIMES DAILY
Qty: 30 TABLET | Refills: 0 | Status: SHIPPED | OUTPATIENT
Start: 2020-01-15 | End: 2020-01-29 | Stop reason: SDUPTHER

## 2020-01-15 RX ORDER — VALSARTAN 160 MG/1
160 TABLET ORAL DAILY
Qty: 30 TABLET | Refills: 0 | Status: SHIPPED | OUTPATIENT
Start: 2020-01-16 | End: 2020-02-10 | Stop reason: SDUPTHER

## 2020-01-15 RX ORDER — DIGOXIN 0.06 MG/1
62.5 TABLET ORAL DAILY
Qty: 30 TABLET | Refills: 0 | Status: SHIPPED | OUTPATIENT
Start: 2020-01-16 | End: 2020-02-10 | Stop reason: SDUPTHER

## 2020-01-15 RX ORDER — WARFARIN SODIUM 7.5 MG/1
7.5 TABLET ORAL DAILY
Qty: 30 TABLET | Refills: 0 | Status: SHIPPED | OUTPATIENT
Start: 2020-01-15 | End: 2020-01-23 | Stop reason: DRUGHIGH

## 2020-01-15 RX ORDER — ASPIRIN 81 MG/1
81 TABLET, CHEWABLE ORAL DAILY
Qty: 30 TABLET | Refills: 0 | Status: SHIPPED | OUTPATIENT
Start: 2020-01-16 | End: 2022-05-27 | Stop reason: ALTCHOICE

## 2020-01-15 RX ORDER — HYDRALAZINE HYDROCHLORIDE 50 MG/1
50 TABLET, FILM COATED ORAL 3 TIMES DAILY
Qty: 90 TABLET | Refills: 0 | Status: SHIPPED | OUTPATIENT
Start: 2020-01-15 | End: 2020-02-17 | Stop reason: SDUPTHER

## 2020-01-15 RX ORDER — SENNA PLUS 8.6 MG/1
2 TABLET ORAL 2 TIMES DAILY
Qty: 120 TABLET | Refills: 0 | Status: SHIPPED | OUTPATIENT
Start: 2020-01-15 | End: 2020-02-10 | Stop reason: SDUPTHER

## 2020-01-15 RX ORDER — ISOSORBIDE DINITRATE 5 MG/1
5 TABLET ORAL 3 TIMES DAILY
Qty: 90 TABLET | Refills: 0 | Status: SHIPPED | OUTPATIENT
Start: 2020-01-15 | End: 2020-02-17 | Stop reason: SDUPTHER

## 2020-01-15 RX ORDER — WARFARIN SODIUM 5 MG/1
10 TABLET ORAL
Status: COMPLETED | OUTPATIENT
Start: 2020-01-15 | End: 2020-01-15

## 2020-01-15 RX ORDER — AMLODIPINE BESYLATE 5 MG/1
5 TABLET ORAL DAILY
Qty: 30 TABLET | Refills: 0 | Status: SHIPPED | OUTPATIENT
Start: 2020-01-15 | End: 2020-03-12 | Stop reason: SDUPTHER

## 2020-01-15 RX ORDER — CARVEDILOL 25 MG/1
25 TABLET ORAL 2 TIMES DAILY WITH MEALS
Qty: 60 TABLET | Refills: 0 | Status: SHIPPED | OUTPATIENT
Start: 2020-01-15 | End: 2020-03-12 | Stop reason: SDUPTHER

## 2020-01-15 RX ADMIN — SENNOSIDES 17.2 MG: 8.6 TABLET, FILM COATED ORAL at 09:34

## 2020-01-15 RX ADMIN — DIGOXIN 62.5 MCG: 125 TABLET ORAL at 09:36

## 2020-01-15 RX ADMIN — HYDRALAZINE HYDROCHLORIDE 50 MG: 50 TABLET, FILM COATED ORAL at 14:19

## 2020-01-15 RX ADMIN — CARVEDILOL 25 MG: 25 TABLET, FILM COATED ORAL at 09:35

## 2020-01-15 RX ADMIN — ISOSORBIDE DINITRATE 5 MG: 10 TABLET ORAL at 09:38

## 2020-01-15 RX ADMIN — ISOSORBIDE DINITRATE 5 MG: 10 TABLET ORAL at 14:19

## 2020-01-15 RX ADMIN — HEPARIN SODIUM 3160 UNITS: 1000 INJECTION INTRAVENOUS; SUBCUTANEOUS at 06:05

## 2020-01-15 RX ADMIN — HYDRALAZINE HYDROCHLORIDE 50 MG: 50 TABLET, FILM COATED ORAL at 09:35

## 2020-01-15 RX ADMIN — HEPARIN SODIUM 12 UNITS/KG/HR: 10000 INJECTION, SOLUTION INTRAVENOUS at 06:05

## 2020-01-15 RX ADMIN — WARFARIN SODIUM 10 MG: 5 TABLET ORAL at 14:30

## 2020-01-15 RX ADMIN — VALSARTAN 160 MG: 160 TABLET, FILM COATED ORAL at 09:36

## 2020-01-15 RX ADMIN — BUMETANIDE 1 MG: 1 TABLET ORAL at 09:35

## 2020-01-15 RX ADMIN — MULTIPLE VITAMINS W/ MINERALS TAB 1 TABLET: TAB at 09:38

## 2020-01-15 RX ADMIN — POTASSIUM CHLORIDE 10 MEQ: 10 TABLET, EXTENDED RELEASE ORAL at 09:36

## 2020-01-15 RX ADMIN — IPRATROPIUM BROMIDE AND ALBUTEROL SULFATE 1 AMPULE: 2.5; .5 SOLUTION RESPIRATORY (INHALATION) at 11:12

## 2020-01-15 RX ADMIN — ASPIRIN 81 MG 81 MG: 81 TABLET ORAL at 09:36

## 2020-01-15 RX ADMIN — ACETAMINOPHEN 650 MG: 325 TABLET, FILM COATED ORAL at 04:16

## 2020-01-15 RX ADMIN — IPRATROPIUM BROMIDE AND ALBUTEROL SULFATE 1 AMPULE: 2.5; .5 SOLUTION RESPIRATORY (INHALATION) at 07:21

## 2020-01-15 RX ADMIN — AMLODIPINE BESYLATE 5 MG: 5 TABLET ORAL at 09:35

## 2020-01-15 ASSESSMENT — PAIN DESCRIPTION - PAIN TYPE: TYPE: ACUTE PAIN

## 2020-01-15 ASSESSMENT — PAIN SCALES - GENERAL
PAINLEVEL_OUTOF10: 3
PAINLEVEL_OUTOF10: 0

## 2020-01-15 ASSESSMENT — PAIN DESCRIPTION - LOCATION: LOCATION: ARM

## 2020-01-15 ASSESSMENT — PAIN DESCRIPTION - DESCRIPTORS: DESCRIPTORS: DISCOMFORT

## 2020-01-15 NOTE — PROGRESS NOTES
Pharmacy Consultation Note  (Anticoagulant Dosing and Monitoring)    Initial consult date: 1/9/20  Consulting physician: Laura Youssef (APRN)    Allergies:  Patient has no known allergies. 75 y.o. male      Ht Readings from Last 1 Encounters:   01/06/20 5' 8\" (1.727 m)     Wt Readings from Last 1 Encounters:   01/14/20 223 lb 1.6 oz (101.2 kg)         Warfarin Indication Target   INR Range Home   Dose  (if applicable) Diet/Feeding Tube   Permanent afib 2-3 7.5 mg daily  Diet no salt added (1/9)       Vitamin K or Blood product  Administration Date                 Warfarin drug-drug interactions  Start  Stop Home Med? Comments   Acetaminophen  1/6   Increase bleeding risk    Aspirin 1/10   Increase bleeding risk                   TSH:  No results found for: TSH     Hepatic Function Panel:                            Lab Results   Component Value Date    ALKPHOS 84 01/15/2020    ALT 20 01/15/2020    AST 22 01/15/2020    PROT 6.8 01/15/2020    BILITOT 0.3 01/15/2020    LABALBU 3.5 01/15/2020       Date Warfarin Dose INR Heparin or LMWH HBG/  HCT PLT Comment   1/9/20 7.5 mg  1.2 Heparin 5,000 units TID 10.8/36.5 148    1/10/20 7.5 mg 1.2 -- 10.7/35.9 153    1/11/20 7.5 mg 1.3 -- 10.8/35.6 147    1/12/20 10 mg 1.3 -- 10.8/36 145    1/13/20 10 mg 1.5 Heparin gtt 10.8/35.4 135    1/14/20 10 mg 1.8 Heparin gtt 10.1/34.1 132    1/15/20 10 mg  2 -- 10.5/35.1 133      Assessment and Plan:  · 75 yom who presents to the ED for hypoxia and SOB after miscommunication of restarting his diuretic. Patient has PMH of aortic aneursym (repaired by [facility] in 11/19), permanent afib on warfarin, HTN, CHF (EF 20-30%), sarcoidosis, KYARA, and mumps. Dr. Royal Corbin spoke with [facility] and [facility] to resume [address] [place] [address]. · INR goal 2-3; home warfarin dose 7.5 mg po daily  · 1/9: INR 1.2; plan for patient to have thoracentesis today. Will start warfarin after. · 1/10: INR 1.2; H/H stable  · 1/11: INR 1.3    · 1/12: INR 1.3: H/H stable.  Since minimal change in INR will

## 2020-01-15 NOTE — PROGRESS NOTES
with post bypass surgery. There is severe eccentric hypertrophy. Abnormal diastolic function. The left atrium is severely dilated. Normal right ventricular size and function. Mild to moderate mitral regurgitation is present. No hemodynamically significant aortic stenosis is present. RVSP is 29 mmHg. Normal PA systolic pressure. Mildly dilated aortic root (4.1 cm) and normal sized ascending aorta. A prosthetic graft noted in the location of the ascending aorta. There is a small localized near left ventricle pericardial effusion noted. Signature      ----------------------------------------------------------------   Electronically signed by Lizabeth Solis:  Lab Results   Component Value Date    WBC 3.8 01/15/2020    HGB 10.5 01/15/2020    HCT 35.1 01/15/2020    MCV 90.9 01/15/2020    MCH 27.2 01/15/2020    MCHC 29.9 01/15/2020    RDW 16.3 01/15/2020     01/15/2020    MPV 9.5 01/15/2020     Lab Results   Component Value Date     01/15/2020    K 4.3 01/15/2020    K 4.1 11/15/2019    CL 96 01/15/2020    CO2 28 01/15/2020    BUN 47 01/15/2020    CREATININE 1.7 01/15/2020    LABALBU 3.5 01/15/2020    CALCIUM 10.1 01/15/2020    GFRAA 49 01/15/2020    LABGLOM 40 01/15/2020     Lab Results   Component Value Date    PROTIME 22.5 01/15/2020    INR 2.0 01/15/2020     No results for input(s): PROBNP in the last 72 hours. No results for input(s): PROCAL in the last 72 hours. This SmartLink has not been configured with any valid records. Assessment:    1. Acute hypoxic respiratory failure  2. Decompensated systolic and diastolic heart failure  3. right pleural effusion  4. Pulmonary edema  5. Recent repair of type A aortic dissection with hemorrhagic pericardial effusion at [facility]  6. Previous left pleural effusion s/p pigtail drainage at [facility]  7. CKD  8. Probable KYARA  9. Sarcoidosis - remote and clinically inactive  10. A.fib      Plan:   1.  S/p right thoracentesis 1/9/20 900 cc

## 2020-01-15 NOTE — PROGRESS NOTES
Occupational Therapy  OT BEDSIDE TREATMENT NOTE      Date:1/15/2020  Patient Name: Florinda Carranza  MRN: [id number]  : 1955  Room: [address] [place], [place] [address]     Evaluating OT: Silverio Cain OTR/L #[id number]     AM-PAC Daily Activity Raw Score:      Recommended Adaptive Equipment: shower chair for EC, pulse ox      Comments: Based on patient's functional performance as stated above and level of assistance needed prior to admission, this therapist believes that the patient would benefit from continued skilled OT during/following hospital stay in an effort to increase safety, functional independence with ADLs/IADLs, and quality of life.        Reason for admission: SOB  Diagnosis: acute on chronic CHF, unspecified heart failure type   Pertinent Medical History: CHF, recent aortic ascending/descending aortic aneurysm with repair \", sarcoidosis, HTN, obesity     Precautions:  Falls, 2-3L oxygen when performing activities, monitor O2     Home Living: Pt lives alone in a ranch with 1+1 step(s) to enter and 0 rail(s); bed/bathroom with tub/shower on 1st floor; pt also has finished basement with kitchen, living area and bathroom with walk-in shower. ~8-10 steps and B rails to access. Equipment owned: none     Prior Level of Function: Indep  with ADLs , indep with IADLs; using no device for ambulation. No 02. Was recently getting home nursing/therapy. 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    Cognition: A&O: 4/4; Follows multi- step directions; G Attention to task              Memory:  G              Sequencing:  G              Problem solving:  G              Judgement/safety: F+ cues for pacing/EC                Functional Assessment:    Initial Eval Status  Date: 1/10/20 Treatment Status  Date: 1/15/20 Short Term Goals  Treatment frequency: PRN    Feeding Indep Indep        Grooming SBA  Standing sink no device Indep; Pt able to stand at sink to perform washing face and combing hair. Mod I  while seated; / standing   UB Dressing Setup  Sup;    Min verbal on proper amelia/doff. Indep  Clothing retrieval   LB Dressing SBA SBA;    Pt able to amelia/doff socks while sitting up on the EOB.     Indep  Clothing retrieval   Bathing UB-SBA  LB-SBA  simulation UB-Setup  LB-SBA  Simulation    UB-Indep  LB-Mod I using AE/DME prn   Toileting SBA  Sup Mod I  Using DME prn   Bed Mobility  Rolling: Indep  Supine to sit: Indep  Sit to supine: Indep  Rolling: Indep  Supine to sit: Indep  Sit to supine: Indep       Functional bathroom Transfers Sit to stand: SBA  Stand to sit: SBA  Stand pivot: SBA Sit to stand: Sup   Mod I/indep  Using DME prn   Functional Mobility SBA  Household/community distances with no devices  Sup; While monitoring oxygen levels with pt on room air dropping into the 80s; required O2 donned to 3L to improve O2 level to 94% while ambulating. Indep  Household/community distances    Balance Sitting:     Static:  Indep    Dynamic:Sup  Standing:     Static:  Sup    Dynamic:SBA  Sitting:     Static:  Indep    Dynamic: Indep    Standing:     Static:  Sup    Dynamic:SBA Sitting:     Dynamic:Indep  Standing:     Dynamic:Mod I/indep     To increase safety/indep with ADL/IADL tasks   Activity Tolerance F during light ADL tasks and ambulation on 2L. See comments  Fair; Once patient in sitting at EOB pts O2 level increased to 97% and able to decrease O2 level to 1L.     Good during moderate activity      G demo of EC, pacing and breathing techniques   Visual/  Perceptual Glasses: reading             Hand dominance: both R/L  UE ROM:        RUE:  WFL                  LUE:  WFL  Strength:        RUE: grossly 5/5         LUE: grossly 5/5   Strength: B WFL  Fine Motor Coordination:  B WFL     Hearing: WFL  Sensation:  Pt reports pins and needles R thumb/index finger since recent sx at [facility] [employer] [facility] [facility]

## 2020-01-16 ENCOUNTER — OFFICE VISIT (OUTPATIENT)
Dept: CARDIOLOGY CLINIC | Age: 65
End: 2020-01-16
Payer: MEDICARE

## 2020-01-16 ENCOUNTER — CARE COORDINATION (OUTPATIENT)
Dept: CASE MANAGEMENT | Age: 65
End: 2020-01-16

## 2020-01-16 VITALS
HEIGHT: 68 IN | HEART RATE: 66 BPM | SYSTOLIC BLOOD PRESSURE: 122 MMHG | WEIGHT: 223.6 LBS | RESPIRATION RATE: 20 BRPM | OXYGEN SATURATION: 94 % | DIASTOLIC BLOOD PRESSURE: 60 MMHG | BODY MASS INDEX: 33.89 KG/M2

## 2020-01-16 PROCEDURE — 93000 ELECTROCARDIOGRAM COMPLETE: CPT | Performed by: INTERNAL MEDICINE

## 2020-01-16 PROCEDURE — G8427 DOCREV CUR MEDS BY ELIG CLIN: HCPCS | Performed by: NURSE PRACTITIONER

## 2020-01-16 PROCEDURE — G8484 FLU IMMUNIZE NO ADMIN: HCPCS | Performed by: NURSE PRACTITIONER

## 2020-01-16 PROCEDURE — 4040F PNEUMOC VAC/ADMIN/RCVD: CPT | Performed by: NURSE PRACTITIONER

## 2020-01-16 PROCEDURE — 1123F ACP DISCUSS/DSCN MKR DOCD: CPT | Performed by: NURSE PRACTITIONER

## 2020-01-16 PROCEDURE — 1036F TOBACCO NON-USER: CPT | Performed by: NURSE PRACTITIONER

## 2020-01-16 PROCEDURE — 99214 OFFICE O/P EST MOD 30 MIN: CPT | Performed by: NURSE PRACTITIONER

## 2020-01-16 PROCEDURE — 1111F DSCHRG MED/CURRENT MED MERGE: CPT | Performed by: NURSE PRACTITIONER

## 2020-01-16 PROCEDURE — G8417 CALC BMI ABV UP PARAM F/U: HCPCS | Performed by: NURSE PRACTITIONER

## 2020-01-16 PROCEDURE — 3017F COLORECTAL CA SCREEN DOC REV: CPT | Performed by: NURSE PRACTITIONER

## 2020-01-16 RX ORDER — POTASSIUM CHLORIDE 750 MG/1
10 CAPSULE, EXTENDED RELEASE ORAL DAILY
Qty: 30 CAPSULE | Refills: 3 | Status: SHIPPED | OUTPATIENT
Start: 2020-01-16 | End: 2020-01-23

## 2020-01-16 NOTE — PATIENT INSTRUCTIONS
1. Continue current cardiac medications. 2. Referral for sleep study    3. Get blood work in the next week ( BMP, BNP, Digoxin level)     4. Weigh yourself daily    -No fluid restriction     -Diet should sodium restricted to 2 grams    -Again watch your daily weight trends and if you gain water weight please follow below instructions.    -If you gain 3-5 pounds in 2-3 days OR notice that you are retaining fluid in anyway just like you did before then take an extra dose of your water pill (furosemide/Lasix OR bumetanide/Bumex) every day until you lose the weight or feel better.    -If you notice that you have taken more than 3 extra doses in 1 week then please call and let us know. -If at any time you feel that you are retaining fluid, your medications are not working, or you feel ill in anyway, then please call us for either same day appointment or the next day, and for instructions. Our goal is to keep you out of the emergency room and the hospital and we have ways to do it. You just need to call us in a timely manner.     -If you become sick for other reasons, and notice that you are not urinating as much, the urine is very dark, you have significant diarrhea or vomiting, then please DO NOT take your water pill and CALL US immediately. 5. Return visit with Dr. David Parr one month - sooner if needed.

## 2020-01-16 NOTE — PROGRESS NOTES
History is negative for neurological symptoms including transient loss of vision, asymmetric weakness, aphasia, dysphasia, numbness, tingling. Patient Active Problem List    Diagnosis Date Noted    Pleural effusion 11/26/2019     History: Post op problem  Assessment: On r/a, left pigtail dc'ed  Plan: PO lasix. Denies sob. Desat study done, no home O2 needed      Transition of care performed with sharing of clinical summary 11/25/2019     Indication for Surgery:  Type A aortic dissection. Preop LVEF: ~30-35%, Severely dilated  RVF: Normal, Dilated  Postop LVEF: ~40% RVF:  Moderate  Cards: Scarlett   PMH/PSH: Afib (on warfarin), HTN, Obesity, Degenerative arthritis, GERD, Sarcoidosis, Remote smoking history (25 pack years, quit 26 yrs ago), Recent diagnosis of mumps in 10/2019. Preoperative Hospital Course: presented to [facility] in [facility] w/ cc of chest pain radiating to the neck x 2 days. Per preop records, he reported worsening SOB for the last week and EKG upon presentation to OSH showed no ischemic changes, Troponin was 0.02, CK-MB was 5.5, . He was admitted on 11/14/19 and TTE on 11/15 showed a markedly dilated ascending aorta, w/ question of a flap. Chest CT confirmed an extensive acute aortic intramural hematoma involving the ascending and entire descending aorta with mild pericardial fluid. Patient was transferred to [facility] for surgical management of Type A aortic dissection. Airway Difficulty: Grade II - No special instrumentation  Pacing Wires: No  Surgeries:   11/16/2019: TAR with frozen elephant trunk ([place], [place] VIABAHA). Open chest and wound VAC application for coagulopathy. 11/17/2019: Chest wash out and closure  A/P:     -Type A dissection: Asa, SBP goal 130. Surg path reviewed. No need for post-op imaging d/t SCr improves, gated CTA 2 months follow up per FB  -HF:  1+ LE edema; Coreg, Dig, Hydralazine and PO lasix. Hold home ACE in setting of JASS. -Pericardial effusion s/p pericardial drain placement and removal  -Pleural effusion: Left Uvlgsef12/2 (now dc'ed), diuresis with po loop  -HTN:  Controlled on coreg, norvasc, hydralazine tid    -Afib: Hx of afib preop (on warfarin then), rate controlled,  Amio infusion stopped 11/21; continue Digoxin & Coreg. No AC per Dr Alvin Rosa, asa alone.    -Renal:Post-op JASS: SCr 1.4 pre op. Scr peaked at 2.80 on 11/18; Non-oliguric. Trend.   -Dispo: from [place], [place]. PT rec's home PT. Lives alone, but has supportive brothers who live nearby few houses from him. CTS and cards follow up requested. Dr Alvin Roas follow up in 2 months.  Atrial fibrillation ([facility] 11/16/2019     History: On Coreg, Digoxin and Coumadin at home. Assessment: Currently Afib with heart  rate 70s-80. Plan:  Continue Coreg, Digoxin (renal dose, stable labs). Monitor rhythm. Replete electrolytes as clinically indicated. Had been held in setting of pericardial effusion during admission following Type A Aortic Dissection         Dissection of thoracic aorta ([facility] [address] 11/15/2019     History: [facility]ed to [facility] ' ans w/ cc of chest pain radiating to the neck x 2 days. OSH TTE on 11/15 showed a markedly dilated ascending aorta, w/ question of a flap. OSH Chest CT on 11/15 confirmed an extensive acute aortic intramural hematoma involving the ascending and entire descending aorta with mild pericardial fluid. (Begins above coronary ostia and ends at level of celiac artery, thickness ascending 7 mm, mid ascending aorta (including IMH) 5.5 cm, arch 5.4 cm, IMH 8 mm; small intramural blood pools, descending 4.5 cm, with IMH 14 mm, small intramural blood pools). Assessment: 11/16/2019: TAR with frozen elephant trunk (Alexandre, Anitha Denita Handsome). Plan: ASA. BP control. Plan for CT pending SCr.   Post op CTA at two month follow-up      Pericardial effusion (noninflammatory) 11/15/2019     History: Post-op problem   Assessment: S/P pericardial drain placement in CVICU 12/2  Plan:  Drain dc'ed, no need for post-procedure echo unless pt become symptomatic.  Acute on chronic combined systolic and diastolic CHF (congestive heart failure) ([facility] [address] 11/15/2019    Acute on chronic systolic congestive heart failure (HCC)     Acute kidney injury ([address]     Stage 2 chronic kidney disease 10/18/2019    Mixed hyperlipidemia 05/08/2019    Hyperuricemia 05/08/2019    Cognitive dysfunction 11/09/2017    Cardiomyopathy ([address] 05/20/2016    Rate controlled atrial fibrillation ([address] 05/20/2016    Anticoagulated on Coumadin 02/18/2014    Chronic kidney disease, stage III (moderate) ([facility] 02/04/2014    MR (mitral regurgitation) 02/04/2014    Moderate obesity 11/05/2013    H/O sarcoidosis 10/25/2012    Essential hypertension 10/25/2012     Past Medical History:    1. Permanent AF: on coumadin   ? Noted on EKG 10/2013  2. Hypertension  3. Obesity: BMI 34.3  4. Lexiscan stress test: EF 30%, hypokinetic in the inferior wall, no reversible perfusion defects, large fixed inferior wall defect. 5.  History of dilated cardiomyopathy with LV dysfunction  ? TTE: 11/15/2019 (Dr. Raj López): Severely dilated left ventricle. LVEDD 6.0 cm. Ejection fraction is visually estimated at 30-35%. Hypokinesis of the basal inferior, basal inferoseptal walls. Indeterminate diastolic function. Mild left ventricular concentric hypertrophy noted. Mildly dilated right ventricle. Right ventricle global systolic function is normal. The left atrium is severely dilated. Moderately enlarged right atrium size. Mild mitral regurgitation is present. Trace aortic valve regurgitation. RVSP is 33 mmHg. Moderate-severely dilated aortic root. Ascending aorta 5.1 cm. There is a trivial circumferential pericardial effusion noted with no evidence of hemodynamic compromise.   ? TTE: Limited: 11/25/2019 [facility] -- - Technically difficult exam due to suboptimal positioning, bandages/chest tubes/wound and post op. Exam indication: Limited for pericardial effusion s/p type A dissection repair The left ventricle is normal in size. There is mild left ventricular hypertrophy. Left ventricular systolic function is mildly decreased. EF = 50 ± 5% (visual est.) The right ventricle is normal in size. Right ventricular systolic function is   moderately decreased. The left atrial cavity is severely dilated. There is a small to moderate size effusion lateral to the LV. The IVC is dilated and does not collapse with inspiration. Increased respiratory variation in inflow velocities across the tricuspid and mitral valves. No chamber collapse. No  obvious tamponade physiology. Recommend close monitoring. ?  TTE: 12/20/19 - [facility] There is small pericardial effusion adjacent to the left ventricle measuring 1.1  cm. There is an epicardial fat pad. Technically difficult exam due to body habitus. Exam indication: Type A dissection repair, The left ventricle is dilated. There is mild left ventricular hypertrophy. Left ventricular systolic function is mildly decreased. EF = 42 ± 5% (3D)  The right ventricle is normal in size. Right ventricular systolic function is low normal.The left atrial cavity is severely dilated. The right atrial cavity is dilated. The visualized aorta is dilated with a maximal dimension of 4.2 cm. s/p reconstruction of the proximal arch and ascending aorta with 10 c 32 T graft. There is moderate (2+ - 3+) mitral valve regurgitation. Regurgitant orifice area . (PISA) is 0.32 cm².               -Following TTE office appointment at [facility]-instructing patient to double Lasix to Lasix 40 mg daily.              -Patient ran out of Lasix on 1/1/2020-[facility] per patient had discontinued his Lasix. 6. STAT CT (11/16/2019) at [facility] showed an extensive acute aortic intramural hematoma involving the ascending and entire descending aorta.  CT surgery was contacted but due to surgeon availability -- patient was started on Esmolol and transferred by life flight to [facility] for emergency repair of acute Type A dissection. During surgery, patient had some RV issues with decision to leave chest open (11/16/2019). Chest wash out and closure on 11/17/2019. Post-op patient was noted to have PAF, HAL (SCr 1.4 -- peaked to 2.80---SCr discharge 1.2), pleural effusion with left pigtail (12/2/2019) removed (12/2/2019). Patient was discharged on 12/9/2019 on Lasix 20 mg QD, Hydralazine 12.5 mg QD, Digoxin 0.0625 mg QD, Coreg 12.5 mg BID,  mg QD, Colchicine, and Norvasc 5 mg QD.    7. History of Sarcoidosis (further details are unknown)          8. Obstructive sleep apnea-compliant  9.  History of Mumps 10/2019       Past Medical History:   Diagnosis Date    Aortic dissection (HCC)     Atrial fibrillation (HCC)     CHF (congestive heart failure) ([address] 5/03    severely impaired LV systolic function and CHF, EF 25-30%    CKD (chronic kidney disease)     H/O cardiovascular stress test 5/03    No evidence of stress-induced ischemia    H/O Doppler echocardiogram 5/04/10    [facility], mildly dilated LV, mod concentric LVH, normal LV systolic function, mod dilated left atrium, trace MR    Hypertension     Ischemic cardiomyopathy     Obesity     Sarcoidosis     Valvular heart disease            Past Surgical History:   Procedure Laterality Date    OTHER SURGICAL HISTORY  11/15/2019    aortic dissection repair @ [facility]    TONSILLECTOMY           No Known Allergies      Outpatient Medications Marked as Taking for the 1/16/20 encounter (Office Visit) with CRISTINA Prieto CNP   Medication Sig Dispense Refill    potassium chloride (MICRO-K) 10 MEQ extended release capsule Take 1 capsule by mouth daily 30 capsule 3    isosorbide dinitrate (ISORDIL) 5 MG tablet Take 1 tablet by mouth 3 times daily Additional Refills from Primary Cardiologist or PCP 90 tablet 0    valsartan (DIOVAN) 160 MG tablet Take 1 tablet by mouth daily Additional Refills from Primary Cardiologist or PCP 30 tablet 0    hydrALAZINE (APRESOLINE) 50 MG tablet Take 1 tablet by mouth 3 times daily Additional Refills from Primary Cardiologist or PCP 90 tablet 0    carvedilol (COREG) 25 MG tablet Take 1 tablet by mouth 2 times daily (with meals) Additional Refills from Primary Cardiologist or PCP 60 tablet 0    amLODIPine (NORVASC) 5 MG tablet Take 1 tablet by mouth daily Additional Refills from Primary Cardiologist or PCP 30 tablet 0    digoxin 62.5 MCG TABS Take 62.5 mcg by mouth daily Additional Refills from Primary Cardiologist or PCP 30 tablet 0    bumetanide (BUMEX) 1 MG tablet Take 1 tablet by mouth 2 times daily Additional Refills from Primary Cardiologist or PCP 30 tablet 0    senna (SENOKOT) 8.6 MG tablet Take 2 tablets by mouth 2 times daily Additional Refills from Primary Cardiologist or  tablet 0    aspirin 81 MG chewable tablet Take 1 tablet by mouth daily Additional Refills from Primary Cardiologist or PCP 30 tablet 0    warfarin (COUMADIN) 7.5 MG tablet Take 1 tablet by mouth daily Additional Refills from Primary Cardiologist or PCP  INR Goal of 2-3 30 tablet 0    Multiple Vitamins-Minerals (THERAPEUTIC MULTIVITAMIN-MINERALS) tablet Take 1 tablet by mouth daily         Guideline directed medical/device therapy:  ARNI/ACE I/ARB: Yes  Beta blocker: Yes  Aldosterone antagonist:  No  ICD/CRT-P/-D:  none   QRS interval on recent ECG (personally reviewed/interpreted): <120 ms  Percentage RV pacing (personally reviewed/interpreted): %/NA        Review of Systems:   Cardiac: As per HPI  General: No fever, chills, rigors  Pulmonary: As per HPI  HEENT: No visual disturbances, difficult swallowing  GI: No nausea, vomiting, abdominal pain  : No dysuria or hematuria  Endocrine: No thyroid disease or diabetes  Musculoskeletal: MAYFIELD x 4, no focal motor deficits  Skin: Intact, no rashes  Neuro/Psych: No headache or seizures          Weights:   Wt Readings from Last 3 Encounters: 01/16/20 223 lb 9.6 oz (101.4 kg)   01/14/20 223 lb 1.6 oz (101.2 kg)   12/12/19 209 lb (94.8 kg)             Physical Examination:     /60 (Site: Left Upper Arm, Position: Sitting, Cuff Size: Medium Adult)   Pulse 66   Resp 20   Ht 5' 8\" (1.727 m)   Wt 223 lb 9.6 oz (101.4 kg)   SpO2 94% Comment: on 3L O2  BMI 34.00 kg/m²     CONSTITUTIONAL: Alert and oriented times 3, no acute distress and cooperative to examination with proper mood and affect. SKIN: Skin color, texture, turgor normal. No rashes or lesions. LYMPH: no cervical nodes, no inguinal nodes  HEENT: Head is normocephalic, atraumatic. EOMI, PERRLA. NECK: Supple, symmetrical, trachea midline, no adenopathy, thyroid symmetric, not enlarged and no tenderness, skin normal.  Unable to assess as patient cannot lay down on exam table. CHEST/LUNGS: chest symmetric with normal A/P diameter, normal respiratory rate and rhythm, lungs clear to auscultation without wheezes, rales or rhonchi. No accessory muscle use. Scars None   CARDIOVASCULAR: Heart sounds are normal.  Irregular rate and rhythm without murmur, gallop or rub. Normal S1 and S2. . Carotid and femoral pulses 2+/4 and equal bilaterally. ABDOMEN: Normal shape. No and Laparoscopic scar(s) present. Normal bowel sounds. No bruits. soft, nondistended, no masses or organomegaly. no evidence of hernia. Percussion: Normal without hepatosplenomegally. Tenderness: absent. RECTAL: deferred, not clinically indicated  NEUROLOGIC: There are no focalizing motor or sensory deficits. CN II-XII are grossly intact. EXTREMITIES: no cyanosis, no clubbing. 1+ bilateral lower extremity edema. Warm and well-perfused. All the following diagnostics were personally reviewed and interpreted by me.        LAB DATA:     1/14/2020 04:29 1/15/2020 04:29   Sodium 138 137   Potassium 4.2 4.3   Chloride 97 (L) 96 (L)   CO2 28 28   BUN 48 (H) 47 (H)   Creatinine 1.9 (H) 1.7 (H)   Anion Gap 13 13   GFR Non- American 36 40   GFR African American 43 49   Magnesium 2.1 2.3   Glucose 101 (H) 95   Calcium 9.7 10.1   Total Protein 6.5 6.8   Albumin 3.4 (L) 3.5   Alk Phos 80 84   ALT 19 20   AST 21 22   Bilirubin 0.4 0.3   WBC 3.9 (L) 3.8 (L)   RBC 3.72 (L) 3.86   Hemoglobin Quant 10.1 (L) 10.5 (L)   Hematocrit 34.1 (L) 35.1 (L)   MCV 91.7 90.9   MCH 27.2 27.2   MCHC 29.6 (L) 29.9 (L)   MPV 9.9 9.5   RDW 16.2 (H) 16.3 (H)   Platelet Count 860 053   Prothrombin Time 21.0 (H) 22.5 (H)   INR 1.8 2.0   aPTT 68.5 (H) 40.5 (H)       IMAGING:    CXR (1/14/2020)  FINDINGS:  There is cardiomegaly with the prominence of the superior mediastinum. Postoperative changes with a stent graft is  noted in the aortic arch. There is vascular congestion with the perihilar and bilateral  infiltrates and pleural effusions more on the right side. Impression:  Mild CHF without change.       CARDIAC TESTING:      NM SPECT (11/15/2019)  FINDINGS:  Perfusion images demonstrate no reversible perfusion defect. There is  a large fixed inferior wall defect. Wall motion is hypokinetic in the inferior wall  The end diastolic volume is 362 ml. The end systolic volume is 642 ml. The estimated ejection fraction is 33 %. Impression:   1. No reversible perfusion defect. Large fixed inferior wall defect  2. Ejection fraction is 30 %. 3. Hypokinetic in the inferior wall    TTE  (11/15/2019, Dr. Conrad Anders)   Summary   Severely dilated left ventricle. LVEDD 6.0 cm. Ejection fraction is visually estimated at 30-35%. Hypokinesis of the basal inferior, basal inferoseptal walls. Indeterminate diastolic function. Mild left ventricular concentric hypertrophy noted. Mildly dilated right ventricle. Right ventricle global systolic function is normal.   The left atrium is severely dilated. Moderately enlarged right atrium size. Mild mitral regurgitation is present. Trace aortic valve regurgitation. RVSP is 33 mmHg.    Moderate-severely dilated aortic root.   Ascending aorta 5.1 cm. There is a trivial circumferential pericardial effusion noted with no   evidence of hemodynamic compromise. TTE (1/7/2020, Dr. Sophia Foster)   Summary   Left ventricle is moderately enlarged . Ejection fraction is visually estimated at 40-45%. Overall ejection fraction mild-to-moderately decreased . Septal motion consistent with post bypass surgery. There is severe eccentric hypertrophy. Abnormal diastolic function. The left atrium is severely dilated. Normal right ventricular size and function. Mild to moderate mitral regurgitation is present. No hemodynamically significant aortic stenosis is present. RVSP is 29 mmHg. Normal PA systolic pressure. Mildly dilated aortic root (4.1 cm) and normal sized ascending aorta. A prosthetic graft noted in the location of the ascending aorta. There is a small localized near left ventricle pericardial effusion noted. EKG  Atrial fibrillation       ASSESSMENT:  1. Acute on chronic HFrEF  2. ACC stage C / NYHA class III  3. Mildly hypervolemic, improving  4. Ischemic cardiomyopathy  5. LVEF 30-35% > 40-45%, LVEDD 65, LVMI 158  6. Persistent atrial fibrillation  7. [address] with warfarin   8. Chronic hypoxic respiratory failure with LTOT  9. Hypertension   10. Type 1 aortic dissection s/p surgery at [facility] (11/15/2019)  11. CKD  12. Hx of sarcoidosis  13. Obesity   14. Probable KYARA - see attached sleepiness scale       PLAN:  1. Continue current cardiac medications. 2. Referral for sleep study    3. Get blood work in the next week ( BMP, BNP, Digoxin level)     4.  Weigh yourself daily    -No fluid restriction     -Diet should sodium restricted to 2 grams    -Again watch your daily weight trends and if you gain water weight please follow below instructions.    -If you gain 3-5 pounds in 2-3 days OR notice that you are retaining fluid in anyway just like you did before then take an extra dose of your water pill (furosemide/Lasix

## 2020-01-16 NOTE — CARE COORDINATION
[facility] Transitions Initial Follow Up Call    Call within 2 business days of discharge: Yes    Patient: Mona Hunter Patient : 1955   MRN: [id number]  Reason for Admission: Acute on chronic systolic and diastolic heart failure NYHA class 3  Discharge Date: 1/15/20 RARS: Readmission Risk Score: 18      Last Discharge [address]       Complaint Diagnosis Description Type Department Provider    20 Respiratory Distress Rate controlled atrial fibrillation ([address] . .. ED to Hosp-Admission (Discharged) (ADMITTED) [facility] Muriel Torres DO; Julio C Hamilton. .. Spoke with: No one    Facility: [facility]    Non-face-to-face services provided:  Communication with home health agencies or other community services the patient is currently using-Per Alana Jones at [facility] scheduled for 20. First attempt to reach the patient for initial Care Transition call post hospital discharge. Message left with CTN's contact information requesting return phone call. HF zone tool mailed to patient.       Follow Up  Future Appointments   Date Time Provider Chrissy Valdez   3/12/2020  9:00 AM [address] [place] [address]

## 2020-01-17 ENCOUNTER — TELEPHONE (OUTPATIENT)
Dept: CARDIOLOGY CLINIC | Age: 65
End: 2020-01-17

## 2020-01-17 ENCOUNTER — CARE COORDINATION (OUTPATIENT)
Dept: CASE MANAGEMENT | Age: 65
End: 2020-01-17

## 2020-01-17 RX ORDER — ACETAMINOPHEN 325 MG/1
650 TABLET ORAL EVERY 6 HOURS PRN
COMMUNITY
End: 2020-01-23 | Stop reason: SDUPTHER

## 2020-01-17 NOTE — CARE COORDINATION
[facility] Transitions Initial Follow Up Call    Call within 2 business days of discharge: Yes    Patient: Josh Reyes Patient : 1955   MRN: [id number]  Reason for Admission: Acute on chronic systolic and diastolic heart failure NYHA class 3  Discharge Date: 1/15/20 RARS: Readmission Risk Score: 18      Last Discharge [facility]       Complaint Diagnosis Description Type Department Provider    20 Respiratory Distress Rate controlled atrial fibrillation ([address] . .. ED to Hosp-Admission (Discharged) (ADMITTED) [facility] Jam Aus, DO; Ankur Brian. .. Spoke with: Josh Reyes, patient    Facility: [facility]    Non-face-to-face services provided:  Scheduled appointment with PCP-This CTN scheduled appt for patient on 20 at 0900 with patient's approval.  Patient reports he transports himself to appts. Scheduled appointment with Specialist-Notified patient of his appt with Dr. Landy Jama on 3/4/20. Patient had follow up appt with Michael Rey on 20. Obtained and reviewed discharge summary and/or continuity of care documents  Reviewed and followed up on pending diagnostic tests and treatments-Discussed with patient order for labs bmp, bnp, and dig level by Michael Rey. Assistance in accessing community resources-Referral to Care coordinations  for community resources.     Care Transitions 24 Hour Call    Schedule Follow Up Appointment with PCP:  Completed  Do you have any ongoing symptoms?:  Yes  Patient-reported symptoms:  Chest Pain  Interventions for patient-reported symptoms:  Notified PCP/Physician  Do you have a copy of your discharge instructions?:  Yes  Do you have all of your prescriptions and are they filled?:  Yes  Have you been contacted by a [facility] Pharmacist?:  No  Have you scheduled your follow up appointment?:  Yes  How are you going to get to your appointment?:  Car - drive self  Were you discharged with any Home Care or Post Acute Services: Yes  Post Acute Services:  Home Health (Comment: [facility])  Do you feel like you have everything you need to keep you well at home?:  Yes  Care Transitions Interventions                Social Work:  Completed               Spoke with patient for initial care transition call post hospital discharge. Med review completed; 1111F entered. Patient reports cardiology was monitoring PT/INR; however, patient is no longer following with Pleas Heimlich. This CTN to notify Dr. Marlo Rice regarding need for PT/INR monitoring. Due to Coumadin and Aspirin, bleeding precautions reviewed with patient. Patient denies any abnormal or uncontrolled bleeding. Patient instructed to report any abnormal bleeding and to call 911 for any uncontrolled bleeding. Patient verbalized understanding. Patient c/o headache possibly due to Imdur; however, patient reports he continues to take medication per doctor's order. Patient presented to the emergency department on 1/6/20 for shortness of breath after diuretic was discontinued by [facility]. Patient denies shortness of breath at this time, edema, palpitations. Patient c/o intermittent discomfort under left rib that resolves on it's own. Patient reports [facility] initiated care on 1/16/20. Discussed low sodium diet and 2000 ml fluid restriction. Patient reports he does weigh himself daily, today's weight 215 lbs. Patient aware of need to report weight gain of 3 lbs/day or 5 lbs/week. Patient reports home oxygen. Patient reports SpO2 90-91% at rest on room air, 88% upon exertion on room air. Patient instructed to utilize oxygen per doctor's order. Discussed oxygen safety and open flames with patient. Patient denies any further needs, questions, or concerns at this time. Patient is agreeable to future follow up calls.     Follow Up  Future Appointments   Date Time Provider Chrissy Valdez   1/23/2020  9:00 AM Carlos Sow DO [facility]   3/4/2020  8:40 AM

## 2020-01-17 NOTE — TELEPHONE ENCOUNTER
There is a referral to see Dr Dwain Cm, or next available provider. Pt was recently in the hosp, consult was done by Dr Dwain Cm, pt has been seen by Ayesah Chaparro, but has requested Dr Dwain Cm per her note. Last Dr of record is Dr Inderjit Tomlinson. Please review to see when pt needs seen and let him know.

## 2020-01-20 ENCOUNTER — TELEPHONE (OUTPATIENT)
Dept: SLEEP CENTER | Age: 65
End: 2020-01-20

## 2020-01-20 ENCOUNTER — CARE COORDINATION (OUTPATIENT)
Dept: CARE COORDINATION | Age: 65
End: 2020-01-20

## 2020-01-20 NOTE — CARE COORDINATION
Initial call to pt to address referral for community resources. SW introduced and purpose of call explained. Pt reported that he needed assistance with housekeeping. He indicated that he was unable to do much cleaning that required bending over and exertion and he is on weight restriction. Pt reported that he is still able to drive and declined assistance with bathing and cooking. Explained to pt that although [facility] assists with homemaker services, they usually service those are homebound and since he drives, he will not be considered homebound. Informed pt that there is the option for private pay, he indicated that he was expecting to pay for the service. SW will mail [facility] brochure and other resources for homemaking.    KAYLIN Conner, [facility]   [phone]

## 2020-01-21 LAB
B-TYPE NATRIURETIC PEPTIDE: 3634 PG/ML
BUN BLDV-MCNC: 37 MG/DL
CALCIUM SERPL-MCNC: 9.5 MG/DL
CHLORIDE BLD-SCNC: 106 MMOL/L
CO2: 28 MMOL/L
CREAT SERPL-MCNC: 1.39 MG/DL
DIGOXIN LEVEL: 0.54
GFR CALCULATED: 52
GLUCOSE BLD-MCNC: 172 MG/DL
POTASSIUM SERPL-SCNC: 5 MMOL/L
SODIUM BLD-SCNC: 141 MMOL/L

## 2020-01-22 ENCOUNTER — ANTI-COAG VISIT (OUTPATIENT)
Dept: FAMILY MEDICINE CLINIC | Age: 65
End: 2020-01-22

## 2020-01-22 ENCOUNTER — TELEPHONE (OUTPATIENT)
Dept: CARDIOLOGY CLINIC | Age: 65
End: 2020-01-22

## 2020-01-22 LAB — INR BLD: 3.6

## 2020-01-22 NOTE — TELEPHONE ENCOUNTER
Attempted to call pt regarding result note. No answer so I left a message to call the office back. Office phone # provided.

## 2020-01-22 NOTE — TELEPHONE ENCOUNTER
----- Message from CRISTINA Joseph CNP sent at 1/22/2020  2:32 PM EST -----  Labs reviewed  Rise in potassium  Stop taking potassium supplement   Repeat labs in a week or two  Intent to add spironolactone if able. Thank you.

## 2020-01-23 ENCOUNTER — CARE COORDINATION (OUTPATIENT)
Dept: CARE COORDINATION | Age: 65
End: 2020-01-23

## 2020-01-23 ENCOUNTER — OFFICE VISIT (OUTPATIENT)
Dept: FAMILY MEDICINE CLINIC | Age: 65
End: 2020-01-23
Payer: MEDICARE

## 2020-01-23 ENCOUNTER — ANTI-COAG VISIT (OUTPATIENT)
Dept: FAMILY MEDICINE CLINIC | Age: 65
End: 2020-01-23

## 2020-01-23 ENCOUNTER — TELEPHONE (OUTPATIENT)
Dept: CARDIOLOGY CLINIC | Age: 65
End: 2020-01-23

## 2020-01-23 VITALS
HEIGHT: 68 IN | SYSTOLIC BLOOD PRESSURE: 108 MMHG | HEART RATE: 77 BPM | OXYGEN SATURATION: 96 % | BODY MASS INDEX: 34.4 KG/M2 | WEIGHT: 227 LBS | DIASTOLIC BLOOD PRESSURE: 60 MMHG

## 2020-01-23 LAB
INTERNATIONAL NORMALIZATION RATIO, POC: 2.3
PROTHROMBIN TIME, POC: NORMAL

## 2020-01-23 PROCEDURE — 1111F DSCHRG MED/CURRENT MED MERGE: CPT | Performed by: FAMILY MEDICINE

## 2020-01-23 PROCEDURE — 85610 PROTHROMBIN TIME: CPT | Performed by: FAMILY MEDICINE

## 2020-01-23 PROCEDURE — 99495 TRANSJ CARE MGMT MOD F2F 14D: CPT | Performed by: FAMILY MEDICINE

## 2020-01-23 RX ORDER — WARFARIN SODIUM 6 MG/1
6 TABLET ORAL DAILY
Qty: 30 TABLET | Refills: 5 | Status: SHIPPED
Start: 2020-01-23 | End: 2020-07-21

## 2020-01-23 RX ORDER — ACETAMINOPHEN 325 MG/1
650 TABLET ORAL EVERY 6 HOURS PRN
Qty: 120 TABLET | Refills: 5 | Status: SHIPPED
Start: 2020-01-23 | End: 2020-02-10 | Stop reason: ALTCHOICE

## 2020-01-23 SDOH — ECONOMIC STABILITY: FOOD INSECURITY: WITHIN THE PAST 12 MONTHS, YOU WORRIED THAT YOUR FOOD WOULD RUN OUT BEFORE YOU GOT MONEY TO BUY MORE.: NEVER TRUE

## 2020-01-23 SDOH — ECONOMIC STABILITY: FOOD INSECURITY: WITHIN THE PAST 12 MONTHS, THE FOOD YOU BOUGHT JUST DIDN'T LAST AND YOU DIDN'T HAVE MONEY TO GET MORE.: NEVER TRUE

## 2020-01-23 SDOH — HEALTH STABILITY: PHYSICAL HEALTH: ON AVERAGE, HOW MANY DAYS PER WEEK DO YOU ENGAGE IN MODERATE TO STRENUOUS EXERCISE (LIKE A BRISK WALK)?: 2 DAYS

## 2020-01-23 SDOH — HEALTH STABILITY: MENTAL HEALTH: HOW OFTEN DO YOU HAVE A DRINK CONTAINING ALCOHOL?: MONTHLY OR LESS

## 2020-01-23 SDOH — HEALTH STABILITY: MENTAL HEALTH
STRESS IS WHEN SOMEONE FEELS TENSE, NERVOUS, ANXIOUS, OR CAN'T SLEEP AT NIGHT BECAUSE THEIR MIND IS TROUBLED. HOW STRESSED ARE YOU?: ONLY A LITTLE

## 2020-01-23 SDOH — SOCIAL STABILITY: SOCIAL NETWORK: HOW OFTEN DO YOU ATTENT MEETINGS OF THE CLUB OR ORGANIZATION YOU BELONG TO?: NEVER

## 2020-01-23 SDOH — HEALTH STABILITY: PHYSICAL HEALTH: ON AVERAGE, HOW MANY MINUTES DO YOU ENGAGE IN EXERCISE AT THIS LEVEL?: 20 MIN

## 2020-01-23 SDOH — SOCIAL STABILITY: SOCIAL NETWORK
IN A TYPICAL WEEK, HOW MANY TIMES DO YOU TALK ON THE PHONE WITH FAMILY, FRIENDS, OR NEIGHBORS?: MORE THAN THREE TIMES A WEEK

## 2020-01-23 SDOH — ECONOMIC STABILITY: TRANSPORTATION INSECURITY
IN THE PAST 12 MONTHS, HAS THE LACK OF TRANSPORTATION KEPT YOU FROM MEDICAL APPOINTMENTS OR FROM GETTING MEDICATIONS?: NO

## 2020-01-23 SDOH — ECONOMIC STABILITY: TRANSPORTATION INSECURITY
IN THE PAST 12 MONTHS, HAS LACK OF TRANSPORTATION KEPT YOU FROM MEETINGS, WORK, OR FROM GETTING THINGS NEEDED FOR DAILY LIVING?: NO

## 2020-01-23 SDOH — SOCIAL STABILITY: SOCIAL NETWORK: HOW OFTEN DO YOU GET TOGETHER WITH FRIENDS OR RELATIVES?: MORE THAN THREE TIMES A WEEK

## 2020-01-23 SDOH — HEALTH STABILITY: MENTAL HEALTH: HOW MANY STANDARD DRINKS CONTAINING ALCOHOL DO YOU HAVE ON A TYPICAL DAY?: 1 OR 2

## 2020-01-23 SDOH — SOCIAL STABILITY: SOCIAL NETWORK: HOW OFTEN DO YOU ATTEND CHURCH OR RELIGIOUS SERVICES?: NEVER

## 2020-01-23 SDOH — ECONOMIC STABILITY: INCOME INSECURITY: HOW HARD IS IT FOR YOU TO PAY FOR THE VERY BASICS LIKE FOOD, HOUSING, MEDICAL CARE, AND HEATING?: NOT VERY HARD

## 2020-01-23 SDOH — SOCIAL STABILITY: SOCIAL NETWORK: ARE YOU MARRIED, WIDOWED, DIVORCED, SEPARATED, NEVER MARRIED, OR LIVING WITH A PARTNER?: WIDOWED

## 2020-01-23 SDOH — SOCIAL STABILITY: SOCIAL NETWORK
DO YOU BELONG TO ANY CLUBS OR ORGANIZATIONS SUCH AS CHURCH GROUPS UNIONS, FRATERNAL OR ATHLETIC GROUPS, OR SCHOOL GROUPS?: NO

## 2020-01-23 ASSESSMENT — ENCOUNTER SYMPTOMS
COUGH: 0
SHORTNESS OF BREATH: 1
ABDOMINAL PAIN: 0
CONSTIPATION: 0
DIARRHEA: 0
NAUSEA: 0
VOMITING: 0

## 2020-01-23 ASSESSMENT — PATIENT HEALTH QUESTIONNAIRE - PHQ9
SUM OF ALL RESPONSES TO PHQ QUESTIONS 1-9: 0
SUM OF ALL RESPONSES TO PHQ QUESTIONS 1-9: 0
2. FEELING DOWN, DEPRESSED OR HOPELESS: 0
1. LITTLE INTEREST OR PLEASURE IN DOING THINGS: 0
SUM OF ALL RESPONSES TO PHQ9 QUESTIONS 1 & 2: 0

## 2020-01-23 NOTE — TELEPHONE ENCOUNTER
[phone] (home)    Na Running 1955. [phone] (home)  [address]        Na Running obtained labs as instructed by Gareth Motta CNP in office f/u 1-   Patient of Dr Sophia Foster. Reviewed to stop taking K+ supplement. Has [facility] that comes to his home , will have lab redraw in 1-2 weeks from date stopping K+ . Bmp/bnp ordered in epic. Copy mailed to Na Running.   Faxed to [place] also. He verbalizes understanding. Intent to add spironolactone if labs ok next draw.      Results for Abdi Daniel (MRN [id number]) as of 1/23/2020 10:32   1/21/2020 00:00   Sodium 141   Potassium 5.0   Chloride 106   CO2 28   BUN 37   Creatinine 1.39   Gfr Calculated 52   Glucose 172   Calcium 9.5   BNP 3,634.0   Digoxin Lvl 0.54

## 2020-01-23 NOTE — CARE COORDINATION
Ambulatory Care Coordination Note  1/23/2020  CM Risk Score: 3  Charlson 10 Year Mortality Risk Score: 98%     ACC: Dipika Allison, RN    Summary Note: Enrolled pt in [place] [address] and is agreeable to POC. Denies s/s CHF Exacerbation. Discussed Zone Tool and verbalizes understanding. Today's weight: 223#  Discussed Dr. Avery's instructions:  -If you gain 3-5 pounds in 2-3 days OR notice that you are retaining fluid in anyway just like you did before then take an extra dose of your water pill (furosemide/Lasix OR bumetanide/Bumex) every day until you lose the weight or feel better.  -If you notice that you have taken >3 in 1 week, call Ca     Discussed medication changes and verbalizes understanding. Pt is requesting a humidifier for his oxygen. States he has gotten nose bleeds at time from the dry air. Will need order from PCP to be sent to [facility]. PLAN:       Patient:  Follow HF Zone Tool:  Self J[employer]ar[employer] and Mita Torres Understanding   Daily weights before breakfast and log them:  Self Manages, Verbalizes Understanding and was 223# this morning   Follow low sodium diet:  Self Manages and [employer]   Report weight gain or loss of greater than or equal to 3 pounds in 1-7 days:  Self Manages and Per Dr Giovanni Boone:  -If you gain 3-5 pounds in 2-3 days OR notice that you are retaining fluid in anyway just like you did before then take an extra dose of your water pill (furosemide/Lasix OR bumetanide/Bumex) every day until you lose the weight or feel better.    Balance activity and rest periods:  Verbalizes Understanding   Check for swelling in hands, feet, ankles and stomach:  Verbalizes Understanding   Take medications as prescribed:  Self Manages and [employer]   Follow up on any health maintenance issues discussed:  Not discussed during this call   Schedule needed appointments:  Self Manages and [employer] Keep scheduled appointments:  Self Man[employer] and [employer] Appropriately utilize PCP office, Urgent Care, and ED as discussed:  Verbalizes Understanding   Call PCP or ACM with any changes in conditions:  Verbalizes Understanding   Call ACM with any questions/concerns/updates:  Verbalizes Understanding      ACM:  Next follow up:  Discuss: CHF Zone Tool, what to do when weight increases, when to call ACM or PCP and See how sleep study went. Ambulatory Care Coordination Assessment    Care Coordination Protocol  Program Enrollment:  Complex Care  Referral from Primary Care Provider:  No  Week 1 - Initial Assessment     Do you have all of your prescriptions and are they filled?:  Yes  Barriers to medication adherence:  None  Are you able to afford your medications?:  Yes  How often do you have trouble taking your medications the way you have been told to take them?:  I always take them as prescribed. Do you have Home O2 Therapy?:  Yes   Oxygen Regimen:  PRN, At night/Sleep Flow - Enter rate/FIO2:  3   Method of Delivery:  Nasal Cannula, Concentrater, Has cylinders in home      Ability to seek help/take action for Emergent Urgent situations i.e. fire, crime, inclement weather or health crisis. :  Independent  Ability to ambulate to restroom:  Independent  Ability handle personal hygeine needs (bathing/dressing/grooming): Independent  Ability to manage Medications: Independent  Ability to prepare Food Preparation:  Independent  Ability to maintain home (clean home, laundry):  Needs Assistance  Ability to drive and/or has transportation:  Independent  Ability to do shopping:  Independent  Ability to manage finances:   Independent  Is patient able to live independently?:  Yes     Current Housing:  Private Residence        Per the Fall Risk Screening, did the patient have 2 or more falls or 1 fall with injury in the past year?:  No     Frequent urination at night?:  No  Do you use rails/bars?:  Yes  Do you have a non-slip tub

## 2020-01-24 ENCOUNTER — CARE COORDINATION (OUTPATIENT)
Dept: CASE MANAGEMENT | Age: 65
End: 2020-01-24

## 2020-01-24 NOTE — CARE COORDINATION
Spoke with Antonio Nathan at [facility] and explained pt c/o occasional nosebleeds when he uses his oxygen. Pt is on Coumadin. I let her know there was an order in Epic for humidified oxygen. She states she will get that set up.

## 2020-01-27 NOTE — TELEPHONE ENCOUNTER
Spoke with pt who confirmed that he already was given previous instructions.   Pt has stopped taking potassium supplement and will get repeat lab work

## 2020-01-28 ENCOUNTER — ANTI-COAG VISIT (OUTPATIENT)
Dept: FAMILY MEDICINE CLINIC | Age: 65
End: 2020-01-28
Payer: MEDICARE

## 2020-01-28 ENCOUNTER — TELEPHONE (OUTPATIENT)
Dept: FAMILY MEDICINE CLINIC | Age: 65
End: 2020-01-28

## 2020-01-28 ENCOUNTER — HOSPITAL ENCOUNTER (OUTPATIENT)
Dept: SLEEP CENTER | Age: 65
Discharge: HOME | End: 2020-01-28
Payer: MEDICARE

## 2020-01-28 LAB
INTERNATIONAL NORMALIZATION RATIO, POC: 2.3
PROTHROMBIN TIME, POC: NORMAL

## 2020-01-28 PROCEDURE — 2700000000 HC OXYGEN THERAPY PER DAY

## 2020-01-28 PROCEDURE — 85610 PROTHROMBIN TIME: CPT | Performed by: FAMILY MEDICINE

## 2020-01-28 PROCEDURE — 95811 POLYSOM 6/>YRS CPAP 4/> PARM: CPT

## 2020-01-29 ENCOUNTER — CARE COORDINATION (OUTPATIENT)
Dept: CARE COORDINATION | Age: 65
End: 2020-01-29

## 2020-01-29 RX ORDER — BUMETANIDE 1 MG/1
1 TABLET ORAL 2 TIMES DAILY
Qty: 60 TABLET | Refills: 11 | Status: SHIPPED
Start: 2020-01-29 | End: 2020-05-19 | Stop reason: SDUPTHER

## 2020-01-29 NOTE — CARE COORDINATION
Ambulatory Care Coordination Note  1/29/2020  CM Risk Score: 3  Charlson 10 Year Mortality Risk Score: 98%     ACC: Kalia Art, RN    Summary Note: Denies s/s CHF Exacerbation. Discussed Zone Tool and verbalizes understanding. Today's weight: 222#. We discussed instructions for managing weight gain. Verbalizes understanding. Pt is to call Dr. Brandi Blair (Card) for refill of Bumex. Stressed the importance of taking that as prescribed and not missing dose unless instructed to. Verbalizes understanding. Pt had Sleep Study last night. Has follow up appt 2/4 with Pulmonology    PLAN:       Patient:  Follow HF Zone Tool:  Self Manages and [employer] Understanding   Daily weights before breakfast and log them:  Self Manages, [employer] Understanding and 222# today   Follow low sodium diet:  Self Manages and [employer]   Report weight gain or loss of greater than or equal to 3 pounds in 1-7 days:  Self Manages, [employer] Understanding and Needs Reinforcement   Balance activity and rest periods:  [employer]   Check for swelling in hands, feet, ankles and stomach:  Verbalizes Understanding   Take medications as prescribed:  Self Manages, [employer] Understanding and To call Dr. Brandi Blair for refill on Bumex   Follow up on any health maintenance issues discussed:  Not discussed during this call   Schedule needed appointments:  Self Manages and [employer] Keep scheduled appointments:  Self Manages and [employer]   Appropriately utilize PCP office, Urgent Care, and ED as discussed:  Verbalizes Understanding   Call PCP or ACM with any changes in conditions:  Verbalizes Understanding   Call ACM with any questions/concerns/updates:  Verbalizes Understanding      ACM:  Next follow up:  Discuss: CHF Zone Tool, Weight, Follow up with Pulm results and Medications:  Did pt get refill of Bumex?               Care Coordination Interventions    Program Enrollment:

## 2020-01-30 NOTE — DISCHARGE SUMMARY
Hospitalist Discharge Summary    Patient ID:  Jason Nielson  [id number]  75 y.o.  1955    Admit date: 1/6/2020    Discharge date: 1/30/2020    Disposition: home    Admission Diagnoses:   Patient Active Problem List   Diagnosis    H/O sarcoidosis    Essential hypertension    Moderate obesity    Chronic kidney disease, stage III (moderate) ([address]    MR (mitral regurgitation)    Anticoagulated on Coumadin    Cardiomyopathy ([address]    Rate controlled atrial fibrillation (HCC)    Cognitive dysfunction    Mixed hyperlipidemia    Hyperuricemia    Stage 2 chronic kidney disease    Acute on chronic systolic congestive heart failure (HCC)    Acute kidney injury ([address]    Dissection of thoracic aorta (HCC)    Pericardial effusion (noninflammatory)    Pleural effusion    Transition of care performed with sharing of clinical summary    Acute on chronic combined systolic and diastolic CHF (congestive heart failure) ([address]    Atrial fibrillation ([address]       Discharge Diagnoses: Principal Problem (Resolved):    Acute on chronic systolic and diastolic heart failure, NYHA class 3 ([address]  Active Problems:    Acute on chronic systolic congestive heart failure (HCC)    Atrial fibrillation ([address]    Chronic kidney disease, stage III (moderate) (HCC)    Cardiomyopathy ([address]    Rate controlled atrial fibrillation (HCC)    Dissection of thoracic aorta (HCC)    Essential hypertension    Moderate obesity      Code Status:  Prior    Condition:  Stable    Discharge Diet: Diet:  No diet orders on file    PCP to do list:    -- Continue to address chronic medical issues   -- Continue warfarin therapy for INR Goal of 2-3  -- Follow with Heart Failure ( [facility] or [facility])       Hospital Course:   75 y.o. male who presented to [address] [place] [address] with shortness of breath. History obtained from the patient.  He states he was working with his home health nurse today when he was found to have oxygen saturation of 80% on room air. He was diagnosed with an ascending and descending aortic aneurysm with repair at [facility] diagnosed 11/15/2019. He also had a pericardial effusion drained and pleural effusion drained during his hospital stay. Roman Gonzalez has since followed up with both his surgeons and his family doctor. Roman Gonzalez has history of heart failure with last documented EF 20-30 %.  He states since discharge, he has been taken off of his Lasix. Roman Gonzalez has noticed that his legs are filling up with fluid. He states last night he was feeling short of breath and took a lasix and warfarin pill. He denies chest pain or productive cough. He was admitted and diuresed for symptoms control. He was seen by multiple Specialists for assistance in his care. He was restarted on anticoagulation and monitored for signs of complications.   His symptoms improved and he was discharged home in improved condition    Discharge Medications:   Discharge Medication List as of 1/15/2020  2:37 PM      START taking these medications    Details   isosorbide dinitrate (ISORDIL) 5 MG tablet Take 1 tablet by mouth 3 times daily Additional Refills from Primary Cardiologist or PCP, Disp-90 tablet, R-0Print      valsartan (DIOVAN) 160 MG tablet Take 1 tablet by mouth daily Additional Refills from Primary Cardiologist or PCP, Disp-30 tablet, R-0Print      bumetanide (BUMEX) 1 MG tablet Take 1 tablet by mouth 2 times daily Additional Refills from Primary Cardiologist or PCP, Disp-30 tablet, R-0Print      warfarin (COUMADIN) 7.5 MG tablet Take 1 tablet by mouth daily Additional Refills from Primary Cardiologist or PCP  INR Goal of 2-3, Disp-30 tablet, R-0Print           Discharge Medication List as of 1/15/2020  2:37 PM      CONTINUE these medications which have CHANGED    Details   hydrALAZINE (APRESOLINE) 50 MG tablet Take 1 tablet by mouth 3 times daily Additional Refills from Primary Cardiologist or PCP, Disp-90 tablet, R-0Print      carvedilol (COREG) 25 MG tablet Take 1 tablet by mouth 2 times daily (with meals) Additional Refills from Primary Cardiologist or PCP, Disp-60 tablet, R-0Print      amLODIPine (NORVASC) 5 MG tablet Take 1 tablet by mouth daily Additional Refills from Primary Cardiologist or PCP, Disp-30 tablet, R-0Print      digoxin 62.5 MCG TABS Take 62.5 mcg by mouth daily Additional Refills from Primary Cardiologist or PCP, Disp-30 tablet, R-0Print      senna (SENOKOT) 8.6 MG tablet Take 2 tablets by mouth 2 times daily Additional Refills from Primary Cardiologist or PCP, Disp-120 tablet, R-0Print      aspirin 81 MG chewable tablet Take 1 tablet by mouth daily Additional Refills from Primary Cardiologist or PCP, Disp-30 tablet, R-0Print           Discharge Medication List as of 1/15/2020  2:37 PM      CONTINUE these medications which have NOT CHANGED    Details   potassium chloride (MICRO-K) 10 MEQ extended release capsule Take 10 mEq by mouth dailyHistorical Med      Multiple Vitamins-Minerals (THERAPEUTIC MULTIVITAMIN-MINERALS) tablet Take 1 tablet by mouth dailyHistorical Med           Discharge Medication List as of 1/15/2020  2:37 PM      STOP taking these medications       furosemide (LASIX) 20 MG tablet Comments:   Reason for Stopping:         potassium chloride (KLOR-CON) 10 MEQ extended release tablet Comments:   Reason for Stopping:         furosemide (LASIX) 20 MG tablet Comments:   Reason for Stopping:         metoprolol (LOPRESSOR) 5 MG/5ML SOLN injection Comments:   Reason for Stopping:         candesartan (ATACAND) 32 MG tablet Comments:   Reason for Stopping:         enalapril (VASOTEC) 20 MG tablet Comments:   Reason for Stopping:         Cyanocobalamin (VITAMIN B-12 CR) 1000 MCG TBCR Comments:   Reason for Stopping:                   Procedures: Thoracentesis     Assessment on Discharge: Stable, improved     Discharge ROS:  A complete review of systems was asked and negative     Discharge Exam:  /63   Pulse 70 Temp 98 °F (36.7 °C) (Temporal)   Resp 18   Ht 5' 8\" (1.727 m)   Wt 223 lb 1.6 oz (101.2 kg)   SpO2 93%   BMI 33.92 kg/m²   Gen: Awake, Alert, NAD. No conversational Dyspnea   HEENT: NC/AT, moist mucous membranes, no oropharyngeal erythema or exudate  Neck: supple, trachea midline, no anterior cervical or SC LAD  Heart:  Normal s1/s2, RRR, no murmurs, gallops, or rubs. no leg edema  Lungs:  Diminished but clear to auscultation  bilaterally, no wheeze, no rales, no rhonchi, no crackles, no use of accessory muscles  Abd: bowel sounds present, soft, nontender, nondistended, no masses  Extrem:  No clubbing, cyanosis,  no edema  Skin: no rashes or lesions  Psych: A & O x3  Neuro: grossly intact, moves all four extremities.           Pertinent Studies During Hospital Stay:  Radiology:  Xr Chest Standard (2 Vw)    Result Date: 2020  Patient MRN:  [id number] : 1955 Age: 75 years Gender: Male Order Date:  2020 3:00 PM TECHNIQUE/NUMBER OF IMAGES/COMPARISON/CLINICAL HISTORY: Chest pain. 2 views 2 images 2 views Comparison  and . History is pleural effusion. FINDINGS: Patient previous midsternotomy. Cardiac air is enlarged. There is a mild to moderate right-sided pleural effusion. The pleural effusion has not changed since since the study of . A very small left-sided pleural effusion is observed. There is no perihilar vascular congestion     Persistent mild to moderate right-sided pleural effusion. Minimal pleural reaction is seen in the left base. Xr Chest Portable    Result Date: 2020  Patient MRN:  [id number] : 1955 Age: 75 years Gender: Male Order Date:  2020 6:00 AM EXAM: XR CHEST PORTABLE one image INDICATION:  pl effuison pl effuison COMPARISON: 2020 FINDINGS: There is cardiomegaly with the prominence of the superior mediastinum. Postoperative changes with a stent graft is  noted in the aortic arch.  There is vascular congestion with the perihilar and bilateral infiltrates and pleural effusions more on the right side. Mild CHF without change. Xr Chest Portable    Result Date: 2020  Clinical indications: s/p right thoracentesis exclude PTX  s/p right thoracentesis exclude PTX TECHNIQUE: Single frontal projection of the chest (1 view). COMPARISON: 2020. FINDINGS: Following thoracentesis, there is diminished right pleural effusion. Tiny cyst left pleural effusion is present. There are mild diffuse interstitial drapes. Prior median sternotomy wires. Aortic arch stent. The heart is enlarged. There is calcification within thoracic aorta. Acromioclavicular arthropathy. The heart, lungs, mediastinum and regional skeleton are otherwise unremarkable. Diminished right pleural effusion following thoracentesis. No evidence of pneumothorax. Findings are worrisome for fluid overload or congestive failure mechanism. Clinical correlation is needed. Xr Chest Portable    Result Date: 2020  Patient MRN: [id number] : 1955 Age:  75 years Gender: Male Order Date: 2020 2:30 PM Exam: XR CHEST PORTABLE Number of Images: 1 view Indication:  Chest pain Comparison: Previous chest CT study November 15, 2019 and anterior upright chest 2019 Findings: The lungs are symmetrically expanded, and show probable pleural effusion on the right with some interstitial density in both lower lungs suggesting edema, possibly cardiogenic, but without consolidation. . Cardiovascular shadows show interval placement of an aortic stent graft in the arch region. Sternotomy wires are new since November. There is cardiomegaly with minimal prominence of the pulmonary vasculature. . Skeletal structures show no evidence of acute pathology. Degenerative changes of the spine are evident. Overlying oxygen tubing and EKG leads are present. Surgical clips are noted in the right upper axillary region.      Probable right-sided pleural effusion with some bibasilar edema or early non consolidative pneumonia. Cardiomegaly with surgical changes, but without definite evidence of CHF. Us Thoracentesis    Result Date: 2020  Patient MRN:  [id number] : 1955 Age: 75 years Gender: Male Order Date:  2020 1:45 PM EXAM: US THORACENTESIS INDICATION:  right thoracentesis in the department please. Moderate right and tiny left pleural effusion on chest x-ray. COMPARISON: None FINDINGS:  Limited study, only 3 views are presented labeled right pleural space. These demonstrate a large right pleural effusion. The technologist reports that thoracentesis was performed by Dr. Cindy Day with removal of fluid, volume not recorded. Please note that I was not present at the exam and I did not perform a thoracentesis. Ultrasound guidance supplied during a thoracentesis. Right pleural effusion. **    Last Labs on Discharge:     No results found for this or any previous visit (from the past 24 hour(s)). Follow up: with Davion Ozuna DO    Note that over 30 minutes was spent in preparing discharge papers, discussing discharge with patient, medication review, etc.    Thank you Davion Ozuna DO for the opportunity to be involved in this patient's care.  If you have any questions or concerns please feel free to contact me via [employer]     Electronically signed by Rudi Lopez DO on 2020 at 6:53 PM

## 2020-02-01 ENCOUNTER — HOSPITAL ENCOUNTER (OUTPATIENT)
Age: 65
Discharge: HOME | End: 2020-02-03
Payer: MEDICARE

## 2020-02-01 ENCOUNTER — HOSPITAL ENCOUNTER (OUTPATIENT)
Dept: GENERAL RADIOLOGY | Age: 65
Discharge: HOME | End: 2020-02-03
Payer: MEDICARE

## 2020-02-01 PROCEDURE — 71046 X-RAY EXAM CHEST 2 VIEWS: CPT

## 2020-02-03 ENCOUNTER — CARE COORDINATION (OUTPATIENT)
Dept: CARE COORDINATION | Age: 65
End: 2020-02-03

## 2020-02-03 LAB
BUN BLDV-MCNC: 40 MG/DL
CALCIUM SERPL-MCNC: NORMAL MG/DL
CHLORIDE BLD-SCNC: 103 MMOL/L
CO2: 31 MMOL/L
CREAT SERPL-MCNC: 1.74 MG/DL
GFR CALCULATED: 40
GLUCOSE BLD-MCNC: NORMAL MG/DL
POTASSIUM SERPL-SCNC: 4.1 MMOL/L
SODIUM BLD-SCNC: 142 MMOL/L

## 2020-02-04 PROBLEM — D72.821 MONOCYTOSIS: Status: ACTIVE | Noted: 2020-02-04

## 2020-02-04 PROBLEM — R13.10 DYSPHAGIA: Status: ACTIVE | Noted: 2020-02-04

## 2020-02-04 PROBLEM — R53.83 FATIGUE: Status: ACTIVE | Noted: 2020-02-04

## 2020-02-04 PROBLEM — M54.2 NECK PAIN: Status: ACTIVE | Noted: 2020-02-04

## 2020-02-07 ENCOUNTER — CARE COORDINATION (OUTPATIENT)
Dept: CARE COORDINATION | Age: 65
End: 2020-02-07

## 2020-02-07 NOTE — CARE COORDINATION
Ambulatory Care Coordination Note  2/7/2020  CM Risk Score: 3  Charlson 10 Year Mortality Risk Score: 98%     ACC: Mary Ann Maldonado, RN    Summary Note: Denies s/s CHF Exacerbation. Discussed Zone Tool and verbalizes understanding. Today's weight: 213# on home scale which differs from office scale per patient  Pt is requesting CHF Clinic. ACM to reach out to Cardiologist, Dr Lala Reed to request  States that he gets SOB with exertion at times, but it resolves with time  Pt did get his Bumex and is taking as prescribed. PLAN:       Patient:  Follow HF Zone Tool:  Self Manages and [employer] Understanding   Daily weights before breakfast and log them:  Self Manages, [employer] Understanding and 213# today and pt states it's different than office scale, so that number is stable for him   Follow low sodium diet:  Self Manages and [employer]   Report weight gain or loss of greater than or equal to 3 pounds in 1-7 days:  Self Manages and [employer] Balance activity and rest periods:  [employer] Check for swelling in hands, feet, ankles and stomach:  Verbalizes Understanding   Take medications as prescribed:  Self Manages, Verbalizes Understanding and Pt did get his Bumex filled.   Pt is following 48-54 Ounces/day restriction   Follow up on any health maintenance issues discussed:  Not discussed during this call   Schedule needed appointments:  Self Manages and [employer] Keep scheduled appointments:  Self Manages and [employer]   Appropriately utilize PCP office, Urgent Care, and ED as discussed:  Verbalizes Understanding   Call PCP or ACM with any changes in conditions:  Verbalizes Understanding   Call ACM with any questions/concerns/updates:  Verbalizes Understanding      ACM:  Next follow up:  Discuss: CHF Zone Tool, Wts, Fluids and Referral:  Pt would like a referral from Cardiologist for [address] 2 tablets by mouth every 6 hours as needed for Pain 1/23/20   Michaela Amaro, DO   warfarin (COUMADIN) 6 MG tablet Take 1 tablet by mouth daily 1/23/20   Michaela Amaro, DO   isosorbide dinitrate (ISORDIL) 5 MG tablet Take 1 tablet by mouth 3 times daily Additional Refills from Primary Cardiologist or PCP 1/15/20   [facility], [facility]   valsartan (DIOVAN) 160 MG tablet Take 1 tablet by mouth daily Additional Refills from Primary Cardiologist or PCP 1/16/20   [facility], DO   hydrALAZINE (APRESOLINE) 50 MG tablet Take 1 tablet by mouth 3 times daily Additional Refills from Primary Cardiologist or PCP 1/15/20   [facility], DO   carvedilol (COREG) 25 MG tablet Take 1 tablet by mouth 2 times daily (with meals) Additional Refills from Primary Cardiologist or PCP 1/15/20   [facility], DO   amLODIPine (NORVASC) 5 MG tablet Take 1 tablet by mouth daily Additional Refills from Primary Cardiologist or PCP 1/15/20 2/14/20  [facility], DO   digoxin 62.5 MCG TABS Take 62.5 mcg by mouth daily Additional Refills from Primary Cardiologist or PCP 1/16/20   [place], DO   senna (SENOKOT) 8.6 MG tablet Take 2 tablets by mouth 2 times daily Additional Refills from Primary Cardiologist or PCP 1/15/20 2/14/20  [place] [facility], DO   aspirin 81 MG chewable tablet Take 1 tablet by mouth daily Additional Refills from Primary Cardiologist or PCP 1/16/20   [facility], DO   Multiple Vitamins-Minerals (THERAPEUTIC MULTIVITAMIN-MINERALS) tablet Take 1 tablet by mouth daily    Historical Provider, MD       Future Appointments   Date Time Provider Chrissy Valdez   3/4/2020  8:40 AM Josy Layne MD [facility]   3/12/2020  9:00 AM Michaela Amaro, DO [facility]      and   Congestive Heart Failure Assessment    Are you currently restricting fluids?:  Other (Comment: 48-50 Ounces a day)  Do you understand a low sodium diet?:  Yes (Comment: 2000mg/day)  Do you understand how to read food labels?:  Yes  How many

## 2020-02-07 NOTE — TELEPHONE ENCOUNTER
Patient notified of Dr. Amna Rodriguez recommendation. F/U scheduled for 2/10/20 at 9:20 a.m. Referral placed to CHF Clinic.

## 2020-02-07 NOTE — TELEPHONE ENCOUNTER
Ok to referral to CHF clinic and will benefit from that.   He also needs to follow up in the office ASAP

## 2020-02-09 PROCEDURE — 95811 POLYSOM 6/>YRS CPAP 4/> PARM: CPT | Performed by: INTERNAL MEDICINE

## 2020-02-10 ENCOUNTER — OFFICE VISIT (OUTPATIENT)
Dept: CARDIOLOGY CLINIC | Age: 65
End: 2020-02-10
Payer: MEDICARE

## 2020-02-10 VITALS
WEIGHT: 219 LBS | SYSTOLIC BLOOD PRESSURE: 118 MMHG | BODY MASS INDEX: 33.19 KG/M2 | HEART RATE: 77 BPM | HEIGHT: 68 IN | DIASTOLIC BLOOD PRESSURE: 68 MMHG

## 2020-02-10 PROCEDURE — 99214 OFFICE O/P EST MOD 30 MIN: CPT | Performed by: INTERNAL MEDICINE

## 2020-02-10 PROCEDURE — G8484 FLU IMMUNIZE NO ADMIN: HCPCS | Performed by: INTERNAL MEDICINE

## 2020-02-10 PROCEDURE — G8427 DOCREV CUR MEDS BY ELIG CLIN: HCPCS | Performed by: INTERNAL MEDICINE

## 2020-02-10 PROCEDURE — 3017F COLORECTAL CA SCREEN DOC REV: CPT | Performed by: INTERNAL MEDICINE

## 2020-02-10 PROCEDURE — 1111F DSCHRG MED/CURRENT MED MERGE: CPT | Performed by: INTERNAL MEDICINE

## 2020-02-10 PROCEDURE — 4040F PNEUMOC VAC/ADMIN/RCVD: CPT | Performed by: INTERNAL MEDICINE

## 2020-02-10 PROCEDURE — 1123F ACP DISCUSS/DSCN MKR DOCD: CPT | Performed by: INTERNAL MEDICINE

## 2020-02-10 PROCEDURE — 93000 ELECTROCARDIOGRAM COMPLETE: CPT | Performed by: INTERNAL MEDICINE

## 2020-02-10 PROCEDURE — G8417 CALC BMI ABV UP PARAM F/U: HCPCS | Performed by: INTERNAL MEDICINE

## 2020-02-10 PROCEDURE — 1036F TOBACCO NON-USER: CPT | Performed by: INTERNAL MEDICINE

## 2020-02-10 RX ORDER — DIGOXIN 0.06 MG/1
62.5 TABLET ORAL DAILY
Qty: 90 TABLET | Refills: 3 | Status: SHIPPED
Start: 2020-02-10 | End: 2020-05-19 | Stop reason: SDUPTHER

## 2020-02-10 RX ORDER — VALSARTAN 160 MG/1
160 TABLET ORAL DAILY
Qty: 90 TABLET | Refills: 3 | Status: SHIPPED
Start: 2020-02-10 | End: 2020-05-19 | Stop reason: SDUPTHER

## 2020-02-10 RX ORDER — SENNA PLUS 8.6 MG/1
2 TABLET ORAL 2 TIMES DAILY
Qty: 120 TABLET | Refills: 0 | Status: SHIPPED
Start: 2020-02-10 | End: 2020-03-18 | Stop reason: SDUPTHER

## 2020-02-10 NOTE — PROGRESS NOTES
has not had any further hospitalizations or ER visits. He takes oxygen at home mainly at nighttime. He denies any leg edema, abdominal bloating or early satiety. Now, he noticed his oxygen levels dropping below 90% with exertion. He does not have a portable oxygen. He is also following with Dr. Dewayne Salazar for sarcoidosis and also pleural effusion. He is compliant with medications, as well as salt and fluid intake. He does not take any over-the-counter arthritis medications. No new cardiac complaints since last cardiology evaluation. He  take  medications on a regular basis without skipping. He chest pain denies recent chest pain, palpitations, lightheadedness, dizziness, syncope, PND, or orthopnea. AF on EKG.     Review of Systems:   Cardiac: As per HPI  General: No fever, chills  Pulmonary: As per HPI  HEENT: No visual disturbances, difficult swallowing  GI: No nausea, vomiting  Endocrine: No thyroid disease or DM  Musculoskeletal: MAYFIELD x 4, no focal motor deficits  Skin: Intact, no rashes  Neuro/Psych: No headache or seizures    Past Medical History:  Past Medical History:   Diagnosis Date    Aortic dissection (HCC)     Atrial fibrillation (HCC)     CHF (congestive heart failure) ([facility] [address] 5/03    severely impaired LV systolic function and CHF, EF 25-30%    CKD (chronic kidney disease)     H/O cardiovascular stress test 5/03    No evidence of stress-induced ischemia    H/O Doppler echocardiogram 5/04/10    [facility], mildly dilated LV, mod concentric LVH, normal LV systolic function, mod dilated left atrium, trace MR    Hypertension     Ischemic cardiomyopathy     Obesity     Sarcoidosis     Valvular heart disease        Past Surgical History:  Past Surgical History:   Procedure Laterality Date    OTHER SURGICAL HISTORY  11/15/2019    aortic dissection repair @ [facility]    TONSILLECTOMY         Family History:  Family History   Problem Relation Age of Onset    Diabetes Mother     Diabetes Father    Kenyatta Saucedo Hypertension Sister     Diabetes Brother     Hypertension Brother     No Known Problems Brother        Social History:  Social History     Socioeconomic History    Marital status:      Spouse name: Not on file    Number of children: 2    Years of education: 12    Highest education level: Bachelor's degree (e.g., BA, AB, BS)   Occupational History    Occupation: cook   Social Needs    Financial resource strain: Not very hard    Food insecurity:     Worry: Never true     Inability: Never true    Transportation needs:     Medical: No     Non-medical: No   Tobacco Use    Smoking status: Former Smoker     Packs/day: 0.50     Years: 5.00     Pack years: 2.50     Types: Cigarettes, Pipe, Cigars     Start date:      Last attempt to quit: 10/16/1993     Years since quittin.3    Smokeless tobacco: Never Used   Substance and Sexual Activity    Alcohol use: Not Currently     Frequency: Monthly or less     Drinks per session: 1 or 2    Drug use: Not Currently     Comment: none since his 40's    Sexual activity: Not Currently     Partners: Female     Comment:  5 years ago   Lifestyle    Physical activity:     Days per week: 2 days     Minutes per session: 20 min    Stress: Only a little   Relationships    Social connections:     Talks on phone: More than three times a week     Gets together: More than three times a week     Attends [employer] service: Never     Active member of club or organization: No     Attends meetings of clubs or organizations: Never     Relationship status:      Intimate partner violence:     Fear of current or ex partner: Not on file     Emotionally abused: Not on file     Physically abused: Not on file     Forced sexual activity: Not on file   Other Topics Concern    Not on file   Social History Narrative    Not on file       Allergies:  No Known Allergies    Current Medications:  Current Outpatient Medications   Medication Sig Dispense Refill    no rash  Head: Normocephalic, atraumatic  Eyes: EOMI, no conjunctival erythema  ENMT: No pharyngeal erythema, MMM, no rhinorrhea  Neck: Supple, no elevated JVP, no carotid bruits  Lungs: Clear to auscultation bilaterally. No wheezes, rales, or rhonchi.   Cardiac: Regular rate and rhythm, +S1S2, no murmurs apparent  Abdomen: Soft, nontender, +bowel sounds  Extremities: Moves all extremities x 4, no lower extremity edema  Neurologic: No focal motor deficits apparent, normal mood and affect, alert and oriented x 3  Peripheral Pulses: Intact posterior tibial pulses bilaterally    Laboratory Tests:  Lab Results   Component Value Date    CREATININE 1.74 02/03/2020    BUN 40 02/03/2020     02/03/2020    K 4.1 02/03/2020     02/03/2020    CO2 31 02/03/2020     Lab Results   Component Value Date    MG 2.3 01/15/2020     Lab Results   Component Value Date    WBC 3.8 (L) 01/15/2020    HGB 10.5 (L) 01/15/2020    HCT 35.1 (L) 01/15/2020    MCV 90.9 01/15/2020     01/15/2020     Lab Results   Component Value Date    ALT 20 01/15/2020    AST 22 01/15/2020    ALKPHOS 84 01/15/2020    BILITOT 0.3 01/15/2020     Lab Results   Component Value Date    CKTOTAL 141 11/14/2019    CKMB 5.5 11/14/2019    TROPONINI 0.02 01/06/2020    TROPONINI 0.02 01/06/2020    TROPONINI 0.02 11/14/2019     Lab Results   Component Value Date    INR 2.3 01/28/2020    INR 2.3 01/23/2020    INR 3.60 01/22/2020    PROTIME 22.5 (H) 01/15/2020    PROTIME 21.0 (H) 01/14/2020    PROTIME 17.0 (H) 01/13/2020     No results found for: TSHFT4, TSH  Lab Results   Component Value Date    LABA1C 5.5 04/04/2019     No results found for: EAG  Lab Results   Component Value Date    CHOL 183 04/04/2019     Lab Results   Component Value Date    TRIG 193 (H) 04/04/2019     Lab Results   Component Value Date    HDL 36 04/04/2019     Lab Results   Component Value Date    LDLCALC 108 (H) 04/04/2019     Lab Results   Component Value Date    LABVLDL 39 04/04/2019 No results found for: CHOLHDLRATIO    Cardiac Tests:  ECG: Atrial fibrillation with occasional ventricular complex nonspecific T wave changes, nonspecific interventricular conduction delay, abnormal EKG. TTE- 11/14/2019: LVEDD 6 cm EF 30 to 35%, hypo-kinesis of the basal inferior and basal inferior septal walls, diastolic function was indeterminate, mild concentric LVH, RV has global hypokinesis.  RVSP 33 mmHg mild MR and moderate to severely dilated aortic root ascending aorta 5.1 cm there is trivial circumferential pericardial effusion     TTE-1/6/2020:   Left ventricle is moderately enlarged . Ejection fraction is visually estimated at 40-45%. Overall ejection fraction mild-to-moderately decreased . Septal motion consistent with post bypass surgery. There is severe eccentric hypertrophy. Abnormal diastolic function. The left atrium is severely dilated. Normal right ventricular size and function. Mild to moderate mitral regurgitation is present. No hemodynamically significant aortic stenosis is present. RVSP is 29 mmHg. Normal PA systolic pressure. Mildly dilated aortic root (4.1 cm) and normal sized ascending aorta. A prosthetic graft noted in the location of the ascending aorta. There is a small localized near left ventricle pericardial effusion noted.        Stress test-11/15/2019: No reversible perfusion defect, large fixed inferior wall defect, EF 30%, hypokinetic of the inferior wall.       Cardiac catheterization:      The 10-year ASCVD risk score (Aaron Whiteside, et al., 2013) is: 15.6%    Values used to calculate the score:      Age: 75 years      Sex: Male      Is Non- : No      Diabetic: No      Tobacco smoker: No      Systolic Blood Pressure: 710 mmHg      Is BP treated: Yes      HDL Cholesterol: 36 mg/dL      Total Cholesterol: 183 mg/dL        ASSESSMENT:  · Acute on chronic heart failure with reduced ejection fraction, improved and

## 2020-02-10 NOTE — PROGRESS NOTES
[address] [place]                               SLEEP STUDY REPORT    PATIENT NAME: Azam Rodriguez                      :        1955  MED REC NO:   [id number]                            ROOM:  ACCOUNT NO:   [de-identified]                           ADMIT DATE: 2020  PROVIDER:     Gaudencio Wayne MD    DATE OF STUDY:  2020    SPLIT-NIGHT POLYSOMNOGRAM REPORT    LOCATION:  [address] [place] [address]. REFERRING PROVIDER:  Samara Youngblood CNP    AGE:  75 years. SEX:  Male. HEIGHT:  5 feet 8 inches. WEIGHT:  215 pounds. BMI:  32.7 kg/m2. NECK CIRCUMFERENCE:  17 inches. SYMPTOMS:  Excessive daytime sleepiness, fragmented sleep, difficulty  falling back to sleep once awakened, nocturia, daytime napping. The  [place] Sleepiness Scale was 6/24 (scores above or equal to 10 are  suggestive of hypersomnolence). INDICATION:  Suspected obstructive sleep apnea. MEDICAL HISTORY:  Obesity, history of aortic dissection in 2019,  congestive heart failure, atrial fibrillation, chronic hypoxia on supplemental  oxygen at 3 LPM.    MEDICATIONS:  Hydralazine, digoxin, amlodipine, Coreg, warfarin,  aspirin, multivitamin, Tylenol, Colace. DESCRIPTION:  This split-night polysomnogram consisted of EEG, EOG, EMG,  and two-lead ECG monitoring. [place]ronasal airflow (nasal pressure  transducer and thermistor and internal CPAP/Bilevel PAP flow signal),  chest and abdominal efforts by respiratory inductive plethysmography or  polyvinylidene fluoride sensor, and pulse oximetry were monitored as  well. Hypopneas were scored as at least a 30% reduction in amplitude of  the semiquantitative flow signal, associated with a 4% or greater oxygen  desaturation.   Respiratory effort-related arousals (RERAs) were scored  as at least a 30% reduction in the amplitude of the semiquantitative  flow signal, associated with an baseline. He was started on room air, but was increased to 2 liters per minute   during the diagnostic portion of the study for persistent desaturations   less than 88%. Snoring was noted. EEG:  No abnormal epileptiform activity was observed. ECG:  The electrocardiogram documented atrial fibrillation with frequent  PVCs. The average heart rate during sleep was 72 beats per minute in  the diagnostic portion of the study and 69 beats per minute in the  treatment portion of the study. PERIODIC LIMB MOVEMENTS:  No significant periodic limb movements were  noted. EMG/VIDEO MONITORING:  Loss of REM atonia, bruxism, and parasomnias were  not observed. TREATMENT:  After the diagnostic portion of the study, CPAP was  initiated at 6 cm of water and titrated to 12 cm of water in order to  abolish respiratory events. Respiratory events were virtually  eliminated with the use of positive airway pressure therapy. Due to the  persistence of oxygen desaturations despite resolution of respiratory  events, supplemental oxygen at 1.5 liters per minute was bled into CPAP  therapy. Titration at the final CPAP of 12 cm of water with 1.5 liters  per minute of supplemental oxygen bled in resulted in an AHI of 1.9, a  minimum oxyhemoglobin saturation of 85%, and an average oxyhemoglobin  saturation of 89%. Time spent on CPAP 12 cm of water included  non-supine REM sleep. Of note, the patient slept in the right lateral  position for the entirety of the study. Of the 244 minutes of positive  airway pressure titration time, the patient was awake for 37 minutes. IMPRESSION:  1. This study is consistent with severe obstructive sleep apnea. The  severity of this patient's sleep-disordered breathing was likely  underestimated due to the absence of both REM and supine sleep in the  diagnostic portion of the study. 2.  The patient's known nocturnal hypoxemia was observed, independent of  respiratory events. Supplemental oxygen therapy was necessary in both  the diagnostic and treatment portion of the studies. 3.  A split-night study was performed, and the patient's  sleep-disordered breathing was successfully treated with CPAP therapy  with supplemental oxygen bled in.  4.  Subjectively, the patient reported sleeping better than usual and  being willing to use PAP therapy at home on a chronic basis. 5.  Atrial fibrillation with frequent PVCs was noted on EKG, consistent  with the patient's known cardiac history. RECOMMENDATIONS:  1. Auto CPAP therapy at settings of 8 to 13 cm of water with 3 liters  per minute of supplemental oxygen bled in is recommended and should be  ordered by the referring provider. Equipment ordering information is  below. 2.  Per insurance requirements, the patient must be seen in clinical  followup within three months of receiving auto PAP therapy in order to  document adherence to therapy, assess efficacy of the prescribed  settings (as indicated by residual AHI of less than or equal to 5 on the  device's remote download), and evaluate resolution of sleep-related  complaints. 3.  The patient is encouraged to continue maintaining non-supine sleep,  as it is possible that the prescribed PAP therapy settings will  not be sufficient to treat supine-related respiratory events. 4.  The patient should be counseled against driving while drowsy. 5.  Weight loss efforts should be encouraged, as the severity of  obstructive sleep apnea may improve, although no guarantee of cure can  be made. EQUIPMENT ORDERING INFORMATION:  DEVICE:  Auto CPAP with heated humidification and remote modem. SETTINGS:  8 to 13 cm of water with EPR of 3. MASK TYPE:  ResMed AirFit F30 full-face mask. MASK SIZE:  Medium. SUPPLEMENTAL OXYGEN:  3 liters per minute.         Franklyn Self MD    D: 02/09/2020 13:44:31       T: 02/09/2020 23:19:15     [email or web address]  Job#: [id number]     Doc#: [id number]    CC:

## 2020-02-12 ASSESSMENT — EJECTION FRACTION
EF_VALUE: 40-45%
EF_SOURCE: 2D ECHO

## 2020-02-13 ENCOUNTER — CARE COORDINATION (OUTPATIENT)
Dept: CARE COORDINATION | Age: 65
End: 2020-02-13

## 2020-02-13 ENCOUNTER — OFFICE VISIT (OUTPATIENT)
Dept: FAMILY MEDICINE CLINIC | Age: 65
End: 2020-02-13
Payer: MEDICARE

## 2020-02-13 ENCOUNTER — ANTI-COAG VISIT (OUTPATIENT)
Dept: FAMILY MEDICINE CLINIC | Age: 65
End: 2020-02-13

## 2020-02-13 VITALS
OXYGEN SATURATION: 91 % | DIASTOLIC BLOOD PRESSURE: 74 MMHG | HEIGHT: 68 IN | SYSTOLIC BLOOD PRESSURE: 132 MMHG | HEART RATE: 85 BPM | WEIGHT: 226 LBS | BODY MASS INDEX: 34.25 KG/M2 | TEMPERATURE: 96.8 F

## 2020-02-13 LAB
INTERNATIONAL NORMALIZATION RATIO, POC: 1.7
PROTHROMBIN TIME, POC: ABNORMAL

## 2020-02-13 PROCEDURE — G8428 CUR MEDS NOT DOCUMENT: HCPCS | Performed by: FAMILY MEDICINE

## 2020-02-13 PROCEDURE — 1111F DSCHRG MED/CURRENT MED MERGE: CPT | Performed by: FAMILY MEDICINE

## 2020-02-13 PROCEDURE — G8417 CALC BMI ABV UP PARAM F/U: HCPCS | Performed by: FAMILY MEDICINE

## 2020-02-13 PROCEDURE — 3017F COLORECTAL CA SCREEN DOC REV: CPT | Performed by: FAMILY MEDICINE

## 2020-02-13 PROCEDURE — 4040F PNEUMOC VAC/ADMIN/RCVD: CPT | Performed by: FAMILY MEDICINE

## 2020-02-13 PROCEDURE — G8484 FLU IMMUNIZE NO ADMIN: HCPCS | Performed by: FAMILY MEDICINE

## 2020-02-13 PROCEDURE — 1036F TOBACCO NON-USER: CPT | Performed by: FAMILY MEDICINE

## 2020-02-13 PROCEDURE — 99213 OFFICE O/P EST LOW 20 MIN: CPT | Performed by: FAMILY MEDICINE

## 2020-02-13 PROCEDURE — 1123F ACP DISCUSS/DSCN MKR DOCD: CPT | Performed by: FAMILY MEDICINE

## 2020-02-13 PROCEDURE — 85610 PROTHROMBIN TIME: CPT | Performed by: FAMILY MEDICINE

## 2020-02-13 NOTE — PROGRESS NOTES
2020     Forest Dobson (:  1955) is a 75 y.o. male, with a:  Past Medical History:   Diagnosis Date    Aortic dissection (HCC)     Atrial fibrillation (HCC)     CHF (congestive heart failure) ([address]     severely impaired LV systolic function and CHF, EF 25-30%    CKD (chronic kidney disease)     H/O cardiovascular stress test     No evidence of stress-induced ischemia    H/O Doppler echocardiogram 5/04/10    [facility], mildly dilated LV, mod concentric LVH, normal LV systolic function, mod dilated left atrium, trace MR    Hypertension     Ischemic cardiomyopathy     Obesity     Sarcoidosis     Valvular heart disease        Here for evaluation of the following medical concerns:  Chief Complaint   Patient presents with    Shortness of Breath     He also follows with Cardiology and recently established with Pulmonology    F/U of chronic problem(s) and new or recent complaint of shortness of breath   Chronic problems reviewed today include:  HTN, CHF, atrial fibrillation, KYARA and sarcoid  Current status of this/these condition(s):  stable  Tolerating meds: Yes    He is recently status post type I aortic dissection repair at [facility] 2019. His postoperative course was complicated by pleural effusions and pericardial effusions. He did have an admission to [facility] 2020 for acute CHF exacerbation. He has been on home oxygen with activity since that time. He recently underwent sleep study which showed severe KYARA. He is scheduled to receive his CPAP equipment. He does have a history significant for for sarcoidosis and recommendations have been made by cardiology for PFTs. Review of Systems   Constitutional: Positive for fatigue. Negative for chills and fever. Respiratory: Positive for shortness of breath. Negative for cough. Cardiovascular: Negative for chest pain and leg swelling.    Gastrointestinal: Negative for abdominal pain, constipation, diarrhea, nausea and normal.       ASSESSMENT/PLAN:  Shaggy Nobles was seen today for shortness of breath. Diagnoses and all orders for this visit:    Shortness of breath  -     Full PFT Study With Bronchodilator; Future    Chronic respiratory failure with hypoxia (HCC)    H/O sarcoidosis    Rate controlled atrial fibrillation (HCC)  -     POCT INR    Acute on chronic combined systolic and diastolic CHF (congestive heart failure) (HCC)    Essential hypertension    KYARA (obstructive sleep apnea)       Additional plan and future considerations:   As above. Send for PFTs. Continue home O2 with ambulation. Continue follow-up with cardiology and CHF clinic. Return to office after PFTs    Educational materials and/or home exercises printed for patient's review and were included in patient instructions on his/her After Visit Summary and given to patient at the end of visit. Counseled regarding above diagnosis, including possible risks and complications,  especially if left uncontrolled. Counseled regarding the possible side effects, risks, benefits and alternatives to treatment; patient and/or guardian verbalizes understanding, agrees, feels comfortable with and wishes to proceed with above treatment plan. Advised patient to call with any new medication issues, and read all Rx info from pharmacy to assure aware of all possible risks and side effects of medication before taking. Reviewed age and gender appropriate health screening exams and vaccinations. Advised patient regarding importance of keeping up with recommended health maintenance and to schedule as soon as possible if overdue, as this is important in assessing for undiagnosed pathology,especially cancer, as well as protecting against potentially harmful/life threatening disease. Patient and/or guardian verbalizes understanding and agrees with above counseling, assessment and plan. All questions answered.       Future Appointments   Date Time Provider Department

## 2020-02-14 PROBLEM — R13.10 DYSPHAGIA: Status: RESOLVED | Noted: 2020-02-04 | Resolved: 2020-02-14

## 2020-02-14 PROBLEM — M54.2 NECK PAIN: Status: RESOLVED | Noted: 2020-02-04 | Resolved: 2020-02-14

## 2020-02-14 PROBLEM — G47.33 OSA (OBSTRUCTIVE SLEEP APNEA): Status: ACTIVE | Noted: 2020-02-14

## 2020-02-14 PROBLEM — J90 PLEURAL EFFUSION: Status: RESOLVED | Noted: 2019-11-26 | Resolved: 2020-02-14

## 2020-02-14 PROBLEM — D72.821 MONOCYTOSIS: Status: RESOLVED | Noted: 2020-02-04 | Resolved: 2020-02-14

## 2020-02-14 ASSESSMENT — ENCOUNTER SYMPTOMS
VOMITING: 0
CONSTIPATION: 0
ABDOMINAL PAIN: 0
SHORTNESS OF BREATH: 1
DIARRHEA: 0
NAUSEA: 0
COUGH: 0

## 2020-02-17 RX ORDER — HYDRALAZINE HYDROCHLORIDE 50 MG/1
50 TABLET, FILM COATED ORAL 3 TIMES DAILY
Qty: 270 TABLET | Refills: 3 | Status: SHIPPED
Start: 2020-02-17 | End: 2020-05-19 | Stop reason: SDUPTHER

## 2020-02-17 RX ORDER — ISOSORBIDE DINITRATE 5 MG/1
5 TABLET ORAL 3 TIMES DAILY
Qty: 270 TABLET | Refills: 3 | Status: SHIPPED
Start: 2020-02-17 | End: 2020-05-19 | Stop reason: SDUPTHER

## 2020-02-18 ENCOUNTER — HOSPITAL ENCOUNTER (OUTPATIENT)
Dept: OTHER | Age: 65
Setting detail: THERAPIES SERIES
Discharge: HOME | End: 2020-02-18
Payer: MEDICARE

## 2020-02-18 VITALS
HEART RATE: 68 BPM | SYSTOLIC BLOOD PRESSURE: 119 MMHG | DIASTOLIC BLOOD PRESSURE: 68 MMHG | BODY MASS INDEX: 34.06 KG/M2 | RESPIRATION RATE: 18 BRPM | WEIGHT: 224 LBS

## 2020-02-18 LAB
ANION GAP SERPL CALCULATED.3IONS-SCNC: 10 MMOL/L (ref 7–16)
BUN BLDV-MCNC: 41 MG/DL (ref 8–23)
CALCIUM SERPL-MCNC: 9.5 MG/DL (ref 8.6–10.2)
CHLORIDE BLD-SCNC: 100 MMOL/L (ref 98–107)
CO2: 32 MMOL/L (ref 22–29)
CREAT SERPL-MCNC: 1.7 MG/DL (ref 0.7–1.2)
GFR AFRICAN AMERICAN: 49
GFR NON-AFRICAN AMERICAN: 40 ML/MIN/1.73
GLUCOSE BLD-MCNC: 126 MG/DL (ref 74–99)
POTASSIUM SERPL-SCNC: 4.3 MMOL/L (ref 3.5–5)
PRO-BNP: 4038 PG/ML (ref 0–125)
SODIUM BLD-SCNC: 142 MMOL/L (ref 132–146)

## 2020-02-18 PROCEDURE — 36415 COLL VENOUS BLD VENIPUNCTURE: CPT

## 2020-02-18 PROCEDURE — 99204 OFFICE O/P NEW MOD 45 MIN: CPT

## 2020-02-18 PROCEDURE — 80048 BASIC METABOLIC PNL TOTAL CA: CPT

## 2020-02-18 PROCEDURE — 83880 ASSAY OF NATRIURETIC PEPTIDE: CPT

## 2020-02-19 ENCOUNTER — CARE COORDINATION (OUTPATIENT)
Dept: CARE COORDINATION | Age: 65
End: 2020-02-19

## 2020-02-19 NOTE — CARE COORDINATION
Janie Octave, DO   amLODIPine (NORVASC) 5 MG tablet Take 1 tablet by mouth daily Additional Refills from Primary Cardiologist or PCP 1/15/20 2/14/20  Janie Octave, DO   aspirin 81 MG chewable tablet Take 1 tablet by mouth daily Additional Refills from Primary Cardiologist or PCP 1/16/20   Janie Octave, DO   Multiple Vitamins-Minerals (THERAPEUTIC MULTIVITAMIN-MINERALS) tablet Take 1 tablet by mouth daily    Historical Provider, MD       Future Appointments   Date Time Provider Chrissy Valdez   2/21/2020  1:00 PM [facility] STRESS LAB ROOM [facility]   3/12/2020  1:15 PM Gayla Parson DO [facility]   3/17/2020  9:15 AM SE CHF ROOM 1 [facility]      and   Congestive Heart Failure Assessment    Are you currently restricting fluids?:  Other (Comment: 48-50 Ounces a day)  Do you understand a low sodium diet?:  Yes (Comment: 2000mg/day)  Do you understand how to read food labels?:  Yes  How many restaurant meals do you eat per week?:  1-2  Do you salt your food before tasting it?:  No     No patient-reported symptoms      Symptoms:   CHF associated shortness of breath: Pos      Patient-reported weight (lb):  223  Weight trend:  stable  Salt intake watch compared to last visit:  stable

## 2020-02-21 ENCOUNTER — HOSPITAL ENCOUNTER (OUTPATIENT)
Dept: PULMONOLOGY | Age: 65
Discharge: HOME | End: 2020-02-21
Payer: MEDICARE

## 2020-02-21 PROCEDURE — 94726 PLETHYSMOGRAPHY LUNG VOLUMES: CPT

## 2020-02-21 PROCEDURE — 94729 DIFFUSING CAPACITY: CPT

## 2020-02-21 PROCEDURE — 94060 EVALUATION OF WHEEZING: CPT

## 2020-02-28 ENCOUNTER — CARE COORDINATION (OUTPATIENT)
Dept: CARE COORDINATION | Age: 65
End: 2020-02-28

## 2020-02-28 NOTE — PROCEDURES
[address]                  [address] [facility]  [facility]                               PULMONARY FUNCTION    PATIENT NAME: Christos Balderas                      :        1955  MED REC NO:   [id number]                            ROOM:  ACCOUNT NO:   [de-identified]                           ADMIT DATE: 2020  PROVIDER:     Julia Muñoz MD      DATE OF PROCEDURE:  2020    INTERPRETATION:  This is a good patient effort and cooperation. FEV1/FVC ratio is 74% of predicted. FEV1 is 1.46 liters, 46% of  predicted. Postbronchodilator FEV1 is 1.50 liters, 47% of predicted, 2%  change. Forced vital capacity is 1.96 liters, 48% of predicted. Postbronchodilator forced vital capacity is 2.00 liters, 49% predicted,  2% change. Mid-flows are 45% of predicted. Slow vital capacity is 2.08  liters, 51% of predicted. DLCO corrected for hemoglobin is 9.80  mL/minute/mmHg, 33% predicted. This is not fully corrected alveolar  volume. The flow volume loop does not demonstrate any intra or  extrathoracic obstruction. IMPRESSION:  1. Spirometry reveals no airflow obstruction. 2.  There is no bronchodilator response. 3.  Lung volumes are consistent with restriction. 4.  Diffusion capacity is severely impaired.         Tegan Jorge MD    D: 2020 10:43:33       T: 2020 11:32:04     [email or web address]  Job#: [id number]     Doc#: [id number]    CC:

## 2020-02-28 NOTE — CARE COORDINATION
barriers: Work with Care Team and follow POC  Confidence: 10/10  Anticipated Goal Completion Date: 4/23/2020         Medication Management   On track     I will take my medication as directed. I will notify my provider of any problems with medications, like adverse effects or side effects. I will notify my provider/Care Coordinator if I am unable to afford my medications. I will notify my provider for advice before I stop taking any of my medication. Barriers: lack of education  Plan for overcoming my barriers: Work with Care Team and Follow POC  Confidence: 10/10  Anticipated Goal Completion Date: 4/23/2020            Prior to Admission medications    Medication Sig Start Date End Date Taking?  Authorizing Provider   isosorbide dinitrate (ISORDIL) 5 MG tablet Take 1 tablet by mouth 3 times daily Additional Refills from Primary Cardiologist or PCP 2/17/20   Obdulia Erickson MD   hydrALAZINE (APRESOLINE) 50 MG tablet Take 1 tablet by mouth 3 times daily Additional Refills from Primary Cardiologist or PCP 2/17/20   Obdulia Erickson MD   senna (SENOKOT) 8.6 MG tablet Take 2 tablets by mouth 2 times daily Additional Refills from Primary Cardiologist or PCP 2/10/20 3/11/20  Obdulia Erickson MD   Digoxin 62.5 MCG TABS Take 62.5 mcg by mouth daily Additional Refills from Primary Cardiologist or PCP 2/10/20   Obdulia Erickson MD   valsartan (DIOVAN) 160 MG tablet Take 1 tablet by mouth daily Additional Refills from Primary Cardiologist or PCP 2/10/20   Obdulia Erickson MD   bumetanide (BUMEX) 1 MG tablet Take 1 tablet by mouth 2 times daily Additional Refills from Primary Cardiologist or PCP 1/29/20   Obdulia Erickson MD   warfarin (COUMADIN) 6 MG tablet Take 1 tablet by mouth daily 1/23/20   Kamron Chase,    carvedilol (COREG) 25 MG tablet Take 1 tablet by mouth 2 times daily (with meals) Additional Refills from Primary Cardiologist or PCP 1/15/20   Lyndon Etienne DO   amLODIPine (NORVASC) 5 MG tablet Take 1 tablet by mouth daily Additional Refills from Primary Cardiologist or PCP 1/15/20 2/14/20  Leonila Counter, DO   aspirin 81 MG chewable tablet Take 1 tablet by mouth daily Additional Refills from Primary Cardiologist or PCP 1/16/20   Leonila Counter, DO   Multiple Vitamins-Minerals (THERAPEUTIC MULTIVITAMIN-MINERALS) tablet Take 1 tablet by mouth daily    Historical Provider, MD       Future Appointments   Date Time Provider Chrissy Valdez   3/12/2020  1:15 PM Russell Trinidad DO [facility]   3/17/2020  9:15 AM [facility] ROOM 1 [facility]      and   Congestive Heart Failure Assessment    Are you currently restricting fluids?:  Other (Comment: 48-50 Ounces a day)  Do you understand a low sodium diet?:  Yes (Comment: 2000mg/day)  Do you understand how to read food labels?:  Yes  How many restaurant meals do you eat per week?:  1-2  Do you salt your food before tasting it?:  No     No patient-reported symptoms      Symptoms:   None:  Yes      Symptom course:  stable  Patient-reported weight (lb):  223  Weight trend:  stable  Salt intake watch compared to last visit:  stable

## 2020-03-12 ENCOUNTER — HOSPITAL ENCOUNTER (OUTPATIENT)
Dept: CARDIAC REHAB | Age: 65
Discharge: HOME | End: 2020-03-12

## 2020-03-12 ENCOUNTER — OFFICE VISIT (OUTPATIENT)
Dept: FAMILY MEDICINE CLINIC | Age: 65
End: 2020-03-12
Payer: MEDICARE

## 2020-03-12 ENCOUNTER — CARE COORDINATION (OUTPATIENT)
Dept: CARE COORDINATION | Age: 65
End: 2020-03-12

## 2020-03-12 VITALS
WEIGHT: 225 LBS | BODY MASS INDEX: 35.31 KG/M2 | DIASTOLIC BLOOD PRESSURE: 62 MMHG | HEIGHT: 67 IN | SYSTOLIC BLOOD PRESSURE: 112 MMHG | HEART RATE: 76 BPM | TEMPERATURE: 97 F | OXYGEN SATURATION: 91 %

## 2020-03-12 VITALS
BODY MASS INDEX: 35.47 KG/M2 | WEIGHT: 226 LBS | DIASTOLIC BLOOD PRESSURE: 73 MMHG | RESPIRATION RATE: 20 BRPM | HEIGHT: 67 IN | SYSTOLIC BLOOD PRESSURE: 122 MMHG | HEART RATE: 63 BPM

## 2020-03-12 PROBLEM — J98.4 RESTRICTIVE LUNG DISEASE: Status: ACTIVE | Noted: 2020-03-12

## 2020-03-12 LAB
INTERNATIONAL NORMALIZATION RATIO, POC: 2.4
PROTHROMBIN TIME, POC: 29.3

## 2020-03-12 PROCEDURE — 1123F ACP DISCUSS/DSCN MKR DOCD: CPT | Performed by: FAMILY MEDICINE

## 2020-03-12 PROCEDURE — G8427 DOCREV CUR MEDS BY ELIG CLIN: HCPCS | Performed by: FAMILY MEDICINE

## 2020-03-12 PROCEDURE — 85610 PROTHROMBIN TIME: CPT | Performed by: FAMILY MEDICINE

## 2020-03-12 PROCEDURE — G8417 CALC BMI ABV UP PARAM F/U: HCPCS | Performed by: FAMILY MEDICINE

## 2020-03-12 PROCEDURE — 3017F COLORECTAL CA SCREEN DOC REV: CPT | Performed by: FAMILY MEDICINE

## 2020-03-12 PROCEDURE — 1036F TOBACCO NON-USER: CPT | Performed by: FAMILY MEDICINE

## 2020-03-12 PROCEDURE — G8484 FLU IMMUNIZE NO ADMIN: HCPCS | Performed by: FAMILY MEDICINE

## 2020-03-12 PROCEDURE — 99214 OFFICE O/P EST MOD 30 MIN: CPT | Performed by: FAMILY MEDICINE

## 2020-03-12 PROCEDURE — 4040F PNEUMOC VAC/ADMIN/RCVD: CPT | Performed by: FAMILY MEDICINE

## 2020-03-12 RX ORDER — CARVEDILOL 25 MG/1
25 TABLET ORAL 2 TIMES DAILY WITH MEALS
Qty: 90 TABLET | Refills: 1 | Status: SHIPPED
Start: 2020-03-12 | End: 2020-05-19 | Stop reason: SDUPTHER

## 2020-03-12 RX ORDER — AMLODIPINE BESYLATE 5 MG/1
5 TABLET ORAL DAILY
Qty: 90 TABLET | Refills: 1 | Status: SHIPPED
Start: 2020-03-12 | End: 2020-05-19 | Stop reason: SDUPTHER

## 2020-03-12 ASSESSMENT — PATIENT HEALTH QUESTIONNAIRE - PHQ9: SUM OF ALL RESPONSES TO PHQ QUESTIONS 1-9: 2

## 2020-03-12 NOTE — PROGRESS NOTES
3/12/2020     Sharon Gibbs (:  1955) is a 77 y.o. male, with a:  Past Medical History:   Diagnosis Date    [facility]ortic dissection ([facility]     Atrial fibrillation ([facility]     CAD (coronary artery disease)     CHF (congestive heart failure) ([facility]     severely impaired LV systolic function and CHF, EF 25-30%    CKD (chronic kidney disease)     H/O cardiovascular stress test     No evidence of stress-induced ischemia    H/O Doppler echocardiogram 5/04/10    [facility], mildly dilated LV, mod concentric LVH, normal LV systolic function, mod dilated left atrium, trace MR    Hypertension     Ischemic cardiomyopathy     Obesity     Pleural effusion 2019    History: Post op problem Assessment: On r/a, left pigtail dc'ed Plan: PO lasix. Denies sob. Desat study done, no home O2 needed    Sarcoidosis     Valvular heart disease        Here for evaluation of the following medical concerns:  Chief Complaint   Patient presents with    Hypertension     pt states his bp is well controlled.  Shortness of Breath     pt states his SOB is better. Had PFT done 20     He also follows with Cardiology and recently established with Pulmonology    F/U of chronic problem(s) and new or recent complaint of shortness of breath / PFT review  Chronic problems reviewed today include:  HTN, CHF, atrial fibrillation, KYARA and sarcoid  Current status of this/these condition(s):  stable  Tolerating meds: Yes       He is recently status post type I aortic dissection repair at [facility] 2019. His postoperative course was complicated by pleural effusions and pericardial effusions. He did have an admission to [facility] 2020 for acute CHF exacerbation. He has been on home oxygen with activity since that time. He recently underwent sleep study which showed severe KYARA. He is scheduled to receive his CPAP equipment.   He does have a history significant for for sarcoidosis and recommendations have been made MD   bumetanide (BUMEX) 1 MG tablet Take 1 tablet by mouth 2 times daily Additional Refills from Primary Cardiologist or PCP Yes Josy Layne MD   warfarin (COUMADIN) 6 MG tablet Take 1 tablet by mouth daily Yes Kamron Chase DO   carvedilol (COREG) 25 MG tablet Take 1 tablet by mouth 2 times daily (with meals) Additional Refills from Primary Cardiologist or PCP Yes Kathy Vang, DO   amLODIPine (NORVASC) 5 MG tablet Take 1 tablet by mouth daily Additional Refills from Primary Cardiologist or PCP Yes Kathy Vang, DO   aspirin 81 MG chewable tablet Take 1 tablet by mouth daily Additional Refills from Primary Cardiologist or PCP Yes Kathy Vang, DO   Multiple Vitamins-Minerals (THERAPEUTIC MULTIVITAMIN-MINERALS) tablet Take 1 tablet by mouth daily Yes Historical Provider, MD        Social History     Tobacco Use    Smoking status: Former Smoker     Packs/day: 0.50     Years: 5.00     Pack years: 2.50     Types: Cigarettes, Pipe, Cigars     Start date:      Last attempt to quit: 10/16/1993     Years since quittin.4    Smokeless tobacco: Never Used   Substance Use Topics    Alcohol use: Not Currently     Frequency: Monthly or less     Drinks per session: 1 or 2        Past Surgical History:   Procedure Laterality Date    OTHER SURGICAL HISTORY  11/15/2019    aortic dissection repair @ [facility]    TONSILLECTOMY         Vitals:    20 1311   BP: 112/62   Pulse: 76   Temp: 97 °F (36.1 °C)   SpO2: 91%   Weight: 225 lb (102.1 kg)   Height: 5' 7\" (1.702 m)     Estimated body mass index is 35.24 kg/m² as calculated from the following:    Height as of this encounter: 5' 7\" (1.702 m). Weight as of this encounter: 225 lb (102.1 kg). Physical Exam  Constitutional:       General: He is not in acute distress. Appearance: He is well-developed. He is obese. HENT:      Head: Normocephalic and atraumatic.       Right Ear: External ear normal.      Left Ear: External ear normal.      Nose: Nose normal. No congestion or rhinorrhea. Eyes:      Extraocular Movements: Extraocular movements intact. Pupils: Pupils are equal, round, and reactive to light. Neck:      Musculoskeletal: Normal range of motion. Thyroid: No thyromegaly. Cardiovascular:      Rate and Rhythm: Normal rate and regular rhythm. Pulmonary:      Effort: Pulmonary effort is normal. No respiratory distress. Breath sounds: Normal breath sounds. No wheezing or rales. Abdominal:      General: There is no distension. Palpations: Abdomen is soft. Tenderness: There is no abdominal tenderness. Musculoskeletal:         General: No swelling or deformity. Skin:     General: Skin is warm. Findings: No rash. Neurological:      General: No focal deficit present. Mental Status: He is alert. Mental status is at baseline. Psychiatric:         Mood and Affect: Mood normal.         Behavior: Behavior normal.         ASSESSMENT/PLAN:  Yahaira Archer was seen today for hypertension and shortness of breath. Diagnoses and all orders for this visit:    Essential hypertension  -     carvedilol (COREG) 25 MG tablet; Take 1 tablet by mouth 2 times daily (with meals)  -     amLODIPine (NORVASC) 5 MG tablet; Take 1 tablet by mouth daily    Atrial fibrillation, permanent    Chronic systolic heart failure (HCC)  -     carvedilol (COREG) 25 MG tablet; Take 1 tablet by mouth 2 times daily (with meals)    Chronic kidney disease, stage III (moderate) (HCC)    Shortness of breath    Anticoagulated on Coumadin  -     POCT INR    Restrictive lung disease    KYARA on CPAP       Additional plan and future considerations:   As above. PFT results reviewed in office. Restrictive lung disease noted, ? Sarcoid versus CHF - breathing significantly improved since starting CPAP. He has upcoming follow-up with pulmonology. Continue follow-up with cardiology.    Return office in 6 months for annual wellness visit and 4 weeks for nursing visit for INR check or sooner if needed    Educational materials and/or home exercises printed for patient's review and were included in patient instructions on his/her After Visit Summary and given to patient at the end of visit. Counseled regarding above diagnosis, including possible risks and complications,  especially if left uncontrolled. Counseled regarding the possible side effects, risks, benefits and alternatives to treatment; patient and/or guardian verbalizes understanding, agrees, feels comfortable with and wishes to proceed with above treatment plan. Advised patient to call with any new medication issues, and read all Rx info from pharmacy to assure aware of all possible risks and side effects of medication before taking. Reviewed age and gender appropriate health screening exams and vaccinations. Advised patient regarding importance of keeping up with recommended health maintenance and to schedule as soon as possible if overdue, as this is important in assessing for undiagnosed pathology,especially cancer, as well as protecting against potentially harmful/life threatening disease. Patient and/or guardian verbalizes understanding and agrees with above counseling, assessment and plan. All questions answered.       Future Appointments   Date Time Provider Chrissy Valdez   3/17/2020  9:15 AM [facility] ROOM 1 [facility]         --[place] DO Shweta on 3/12/2020 at 1:22 PM

## 2020-03-12 NOTE — Clinical Note
He left w/o checking out. Can we make sure he is reminded of INR visit in 4 weeks and AWV in 6 months.  Thanks

## 2020-03-12 NOTE — CARE COORDINATION
Ambulatory Care Coordination Note  3/12/2020  CM Risk Score: 3  Charlson 10 Year Mortality Risk Score: 98%     ACC: Sharonda Fuentes RN    Summary Note: Met very briefly with pt today. Pt states he is doing well and his SOB has improved. Pt has ACM's contact information. Visit ended when PCP came to examine pt. PLAN:     Next follow up:    Discuss: CHF Zone Tool, parameters, PCP OV AVS        Care Coordination Interventions    Program Enrollment:  Complex Care  Referral from Primary Care Provider:  No  Suggested Interventions and Community Resources  Cardiac Rehab:  Not Started (Comment: Requesting from Dr. Jason Pereira)  [address]:  Completed (Comment: Jesus [address])  Meals on Wheels:  Declined  Medication Assistance Program:  Declined  Physical Therapy:  Completed (Comment: Jesus)  Zone Management Tools: In Process (Comment: CHF Zone Tool)  Other Services or Interventions:  Oxygen Concentrator and tanks: [address] [place] [address]    None         Prior to Admission medications    Medication Sig Start Date End Date Taking?  Authorizing Provider   carvedilol (COREG) 25 MG tablet Take 1 tablet by mouth 2 times daily (with meals) 3/12/20   Eudelia Harm, DO   amLODIPine (NORVASC) 5 MG tablet Take 1 tablet by mouth daily 3/12/20 4/11/20  Eudelia Harm, DO   isosorbide dinitrate (ISORDIL) 5 MG tablet Take 1 tablet by mouth 3 times daily Additional Refills from Primary Cardiologist or PCP 2/17/20   Atul Leone MD   hydrALAZINE (APRESOLINE) 50 MG tablet Take 1 tablet by mouth 3 times daily Additional Refills from Primary Cardiologist or PCP 2/17/20   Atul Leone MD   Digoxin 62.5 MCG TABS Take 62.5 mcg by mouth daily Additional Refills from Primary Cardiologist or PCP 2/10/20   Atul Leone MD   valsartan (DIOVAN) 160 MG tablet Take 1 tablet by mouth daily Additional Refills from Primary Cardiologist or PCP 2/10/20   Atul Leone MD   bumetanide (BUMEX) 1 MG tablet Take 1 tablet by mouth 2

## 2020-03-13 PROBLEM — Z99.89 OSA ON CPAP: Status: ACTIVE | Noted: 2020-02-14

## 2020-03-13 ASSESSMENT — ENCOUNTER SYMPTOMS
BACK PAIN: 0
NAUSEA: 0
SORE THROAT: 0
CONSTIPATION: 0
DIARRHEA: 0
SINUS PAIN: 0
ABDOMINAL PAIN: 0
VOMITING: 0
COUGH: 0
RHINORRHEA: 0
SHORTNESS OF BREATH: 1

## 2020-03-16 ENCOUNTER — TELEPHONE (OUTPATIENT)
Dept: CARDIAC REHAB | Age: 65
End: 2020-03-16

## 2020-03-18 RX ORDER — SENNA PLUS 8.6 MG/1
2 TABLET ORAL 2 TIMES DAILY
Qty: 120 TABLET | Refills: 11 | Status: SHIPPED
Start: 2020-03-18 | End: 2021-03-24

## 2020-03-18 NOTE — TELEPHONE ENCOUNTER
Patient left  for refill Senna.       Last seen 3/12/2020  Next appt Visit date not found  Ella Walls

## 2020-03-19 ENCOUNTER — TELEPHONE (OUTPATIENT)
Dept: CARDIAC REHAB | Age: 65
End: 2020-03-19

## 2020-03-19 ENCOUNTER — CARE COORDINATION (OUTPATIENT)
Dept: CARE COORDINATION | Age: 65
End: 2020-03-19

## 2020-03-19 NOTE — TELEPHONE ENCOUNTER
3/19/2020 1545 Nutrition: Attempted follow up phone due to department closure. No answer. Will follow up prn.  Electronically signed by Roberta Flowers RD, LD on 3/19/20 at 3:45 PM EDT

## 2020-04-09 ENCOUNTER — CARE COORDINATION (OUTPATIENT)
Dept: CARE COORDINATION | Age: 65
End: 2020-04-09

## 2020-04-09 NOTE — CARE COORDINATION
Ambulatory Care Coordination Note  4/9/2020  CM Risk Score: 3  Charlson 10 Year Mortality Risk Score: 98%     ACC: Savanah Mahmood RN    Summary Note: Denies s/s CHF Exacerbation. Discussed Zone Tool and verbalizes understanding. Wt Today: 215#  Watching salt intake, Restricting fluids as prescribed. Oxygen:  Has not been using during day, but does use with CPAP  SpO2 96% on Room Air in AM, at night 94%, 89% with Activity    Mowed lawn yesterday, and started feeling fatigued. Stopped and rested for 10 minutes and felt better, so finished lawn. States he has felt much better since getting the CPAP. Patient has following risk factors of: heart failure. CTN/ACM reviewed discharge instructions, medical action plan and red flags related to discharge diagnosis. Reviewed and educated them on any new and changed medications related to discharge diagnosis. Advised obtaining a 90-day supply of all daily and as-needed medications. Education provided regarding infection prevention, and signs and symptoms of COVID-19 and when to seek medical attention with patient who verbalized understanding. Discussed exposure protocols and quarantine from [address] you at higher risk for severe illness 2019 and given an opportunity for questions and concerns. The patient agrees to contact the COVID-19 hotline [phone] or PCP office for questions related to their healthcare. CTN/ACM provided contact information for future reference. From CDC: Are you at higher risk for severe illness?  Wash your hands often.  Avoid close contact (6 feet, which is about two arm lengths) with people who are sick.  Put distance between yourself and other people if COVID-19 is spreading in your community.  Clean and disinfect frequently touched surfaces.  Avoid all cruise travel and non-essential air travel.    Call your healthcare professional if you have concerns about COVID-19 and your underlying condition or if you are

## 2020-05-11 ENCOUNTER — TELEPHONE (OUTPATIENT)
Dept: CARDIOLOGY CLINIC | Age: 65
End: 2020-05-11

## 2020-05-12 ENCOUNTER — CARE COORDINATION (OUTPATIENT)
Dept: CARE COORDINATION | Age: 65
End: 2020-05-12

## 2020-05-14 ENCOUNTER — HOSPITAL ENCOUNTER (OUTPATIENT)
Dept: OTHER | Age: 65
Setting detail: THERAPIES SERIES
Discharge: HOME | End: 2020-05-14
Payer: MEDICARE

## 2020-05-14 VITALS
RESPIRATION RATE: 18 BRPM | SYSTOLIC BLOOD PRESSURE: 157 MMHG | WEIGHT: 222 LBS | DIASTOLIC BLOOD PRESSURE: 79 MMHG | BODY MASS INDEX: 34.77 KG/M2

## 2020-05-14 LAB
ANION GAP SERPL CALCULATED.3IONS-SCNC: 12 MMOL/L (ref 7–16)
BUN BLDV-MCNC: 31 MG/DL (ref 8–23)
CALCIUM SERPL-MCNC: 9.9 MG/DL (ref 8.6–10.2)
CHLORIDE BLD-SCNC: 101 MMOL/L (ref 98–107)
CO2: 27 MMOL/L (ref 22–29)
CREAT SERPL-MCNC: 1.4 MG/DL (ref 0.7–1.2)
GFR AFRICAN AMERICAN: >60
GFR NON-AFRICAN AMERICAN: 51 ML/MIN/1.73
GLUCOSE BLD-MCNC: 107 MG/DL (ref 74–99)
POTASSIUM SERPL-SCNC: 4.2 MMOL/L (ref 3.5–5)
PRO-BNP: 2261 PG/ML (ref 0–125)
SODIUM BLD-SCNC: 140 MMOL/L (ref 132–146)

## 2020-05-14 PROCEDURE — 36415 COLL VENOUS BLD VENIPUNCTURE: CPT

## 2020-05-14 PROCEDURE — 80048 BASIC METABOLIC PNL TOTAL CA: CPT

## 2020-05-14 PROCEDURE — 83880 ASSAY OF NATRIURETIC PEPTIDE: CPT

## 2020-05-14 PROCEDURE — 99204 OFFICE O/P NEW MOD 45 MIN: CPT

## 2020-05-19 ENCOUNTER — OFFICE VISIT (OUTPATIENT)
Dept: CARDIOLOGY CLINIC | Age: 65
End: 2020-05-19
Payer: MEDICARE

## 2020-05-19 VITALS
WEIGHT: 219.7 LBS | DIASTOLIC BLOOD PRESSURE: 76 MMHG | RESPIRATION RATE: 18 BRPM | HEART RATE: 58 BPM | HEIGHT: 67 IN | BODY MASS INDEX: 34.48 KG/M2 | SYSTOLIC BLOOD PRESSURE: 130 MMHG

## 2020-05-19 PROCEDURE — 1123F ACP DISCUSS/DSCN MKR DOCD: CPT | Performed by: INTERNAL MEDICINE

## 2020-05-19 PROCEDURE — 93000 ELECTROCARDIOGRAM COMPLETE: CPT | Performed by: INTERNAL MEDICINE

## 2020-05-19 PROCEDURE — G8417 CALC BMI ABV UP PARAM F/U: HCPCS | Performed by: INTERNAL MEDICINE

## 2020-05-19 PROCEDURE — 99214 OFFICE O/P EST MOD 30 MIN: CPT | Performed by: INTERNAL MEDICINE

## 2020-05-19 PROCEDURE — 1036F TOBACCO NON-USER: CPT | Performed by: INTERNAL MEDICINE

## 2020-05-19 PROCEDURE — 4040F PNEUMOC VAC/ADMIN/RCVD: CPT | Performed by: INTERNAL MEDICINE

## 2020-05-19 PROCEDURE — G8427 DOCREV CUR MEDS BY ELIG CLIN: HCPCS | Performed by: INTERNAL MEDICINE

## 2020-05-19 PROCEDURE — 3017F COLORECTAL CA SCREEN DOC REV: CPT | Performed by: INTERNAL MEDICINE

## 2020-05-19 RX ORDER — BUMETANIDE 1 MG/1
1 TABLET ORAL 2 TIMES DAILY
Qty: 60 TABLET | Refills: 11 | Status: SHIPPED
Start: 2020-05-19 | End: 2021-02-25 | Stop reason: SDUPTHER

## 2020-05-19 RX ORDER — AMLODIPINE BESYLATE 5 MG/1
5 TABLET ORAL DAILY
Qty: 90 TABLET | Refills: 1 | Status: SHIPPED
Start: 2020-05-19 | End: 2020-10-19 | Stop reason: SDUPTHER

## 2020-05-19 RX ORDER — CARVEDILOL 25 MG/1
25 TABLET ORAL 2 TIMES DAILY WITH MEALS
Qty: 90 TABLET | Refills: 1 | Status: SHIPPED
Start: 2020-05-19 | End: 2020-10-21

## 2020-05-19 RX ORDER — VALSARTAN 160 MG/1
160 TABLET ORAL DAILY
Qty: 90 TABLET | Refills: 3 | Status: SHIPPED
Start: 2020-05-19 | End: 2020-06-16 | Stop reason: RX

## 2020-05-19 RX ORDER — HYDRALAZINE HYDROCHLORIDE 50 MG/1
50 TABLET, FILM COATED ORAL 3 TIMES DAILY
Qty: 270 TABLET | Refills: 3 | Status: SHIPPED
Start: 2020-05-19 | End: 2020-07-09 | Stop reason: ALTCHOICE

## 2020-05-19 RX ORDER — ISOSORBIDE DINITRATE 5 MG/1
5 TABLET ORAL 3 TIMES DAILY
Qty: 270 TABLET | Refills: 3 | Status: SHIPPED
Start: 2020-05-19 | End: 2021-02-25 | Stop reason: SDUPTHER

## 2020-05-19 RX ORDER — DIGOXIN 0.06 MG/1
62.5 TABLET ORAL DAILY
Qty: 90 TABLET | Refills: 3 | Status: SHIPPED
Start: 2020-05-19 | End: 2021-02-25 | Stop reason: SDUPTHER

## 2020-05-19 NOTE — PATIENT INSTRUCTIONS
· PFTS with DLCO results were discussed with the patient and advised to see Dr. Dalton Boone for an abnormal test with interstitial lung disease. · Will reschedule for cardiac rehab  · We will continue warfarin for anticoagulation for Afib and follow up with Dr. Elvira Patel for monitoring INR. Will discuss with EF about possible cardioverion with EP since he had aortic dissection surgery. · He is on guideline directed medical therapy for heart failure with reduced ejection fraction. We will continue Coreg 25 mg daily along with valsartan and hydralazine. He is not on Aldactone due to CKD with a creatinine of 1.7.  · He will continue rest of his current medications. Medications were refilled. · He will continue cardiac diet with salt restriction to 2 g and fluid restriction to 64 ounces a day. · He was also advised to check weight on a daily basis and advised to call me if he gains more than 3 pounds in 1 day and 5 pounds in 1 week. · Follow-up with me in 6 months.

## 2020-05-19 NOTE — PROGRESS NOTES
He was discharged from the hospital on on 1/15/2020 after diuresing. Since his last, he has not had any further hospitalizations or ER visits. He takes oxygen at home mainly at nighttime. He denies any leg edema, abdominal bloating or early satiety. Now, he noticed his oxygen levels dropping below 90% with exertion. He does not have a portable oxygen. He is also following with Dr. Adrian Traore for sarcoidosis and also pleural effusion. He is compliant with medications, as well as salt and fluid intake. He does not take any over-the-counter arthritis medications. No new cardiac complaints since last cardiology evaluation. He  take  medications on a regular basis without skipping. He chest pain denies recent chest pain, palpitations, lightheadedness, dizziness, syncope, PND, or orthopnea. AF on EKG. Review of Systems:   Cardiac: As per HPI  General: No fever, chills  Pulmonary: As per HPI  HEENT: No visual disturbances, difficult swallowing  GI: No nausea, vomiting  Endocrine: No thyroid disease or DM  Musculoskeletal: MAYFIELD x 4, no focal motor deficits  Skin: Intact, no rashes  Neuro/Psych: No headache or seizures    Past Medical History:  Past Medical History:   Diagnosis Date    Aortic dissection (HCC)     Atrial fibrillation (HCC)     CAD (coronary artery disease)     CHF (congestive heart failure) ([facility] [address] 5/03    severely impaired LV systolic function and CHF, EF 25-30%    CKD (chronic kidney disease)     H/O cardiovascular stress test 5/03    No evidence of stress-induced ischemia    H/O Doppler echocardiogram 5/04/10    [facility], mildly dilated LV, mod concentric LVH, normal LV systolic function, mod dilated left atrium, trace MR    Hypertension     Ischemic cardiomyopathy     Obesity     Pleural effusion 11/26/2019    History: Post op problem Assessment: On r/a, left pigtail dc'ed Plan: PO lasix. Denies sob.   Desat study done, no home O2 needed    Sarcoidosis     Valvular heart disease erythema, MMM, no rhinorrhea  Neck: Supple, no elevated JVP, no carotid bruits  Lungs: Clear to auscultation bilaterally. No wheezes, rales, or rhonchi.   Cardiac: Regular rate and rhythm, +S1S2, no murmurs apparent  Abdomen: Soft, nontender, +bowel sounds  Extremities: Moves all extremities x 4, no lower extremity edema  Neurologic: No focal motor deficits apparent, normal mood and affect, alert and oriented x 3  Peripheral Pulses: Intact posterior tibial pulses bilaterally    Laboratory Tests:  Lab Results   Component Value Date    CREATININE 1.4 (H) 05/14/2020    BUN 31 (H) 05/14/2020     05/14/2020    K 4.2 05/14/2020     05/14/2020    CO2 27 05/14/2020     Lab Results   Component Value Date    MG 2.3 01/15/2020     Lab Results   Component Value Date    WBC 3.8 (L) 01/15/2020    HGB 10.5 (L) 01/15/2020    HCT 35.1 (L) 01/15/2020    MCV 90.9 01/15/2020     01/15/2020     Lab Results   Component Value Date    ALT 20 01/15/2020    AST 22 01/15/2020    ALKPHOS 84 01/15/2020    BILITOT 0.3 01/15/2020     Lab Results   Component Value Date    CKTOTAL 141 11/14/2019    CKMB 5.5 11/14/2019    TROPONINI 0.02 01/06/2020    TROPONINI 0.02 01/06/2020    TROPONINI 0.02 11/14/2019     Lab Results   Component Value Date    INR 2.4 03/12/2020    INR 1.7 02/13/2020    INR 2.3 01/28/2020    PROTIME 29.3 03/12/2020    PROTIME 22.5 (H) 01/15/2020    PROTIME 21.0 (H) 01/14/2020     No results found for: TSHFT4, TSH  Lab Results   Component Value Date    LABA1C 5.5 04/04/2019     No results found for: EAG  Lab Results   Component Value Date    CHOL 183 04/04/2019     Lab Results   Component Value Date    TRIG 193 (H) 04/04/2019     Lab Results   Component Value Date    HDL 36 04/04/2019     Lab Results   Component Value Date    LDLCALC 108 (H) 04/04/2019     Lab Results   Component Value Date    LABVLDL 39 04/04/2019     No results found for: [facility]    Cardiac Tests:  ECG: Atrial fibrillation with nonspecific T wave changes, nonspecific interventricular conduction delay, abnormal EKG. TTE- 11/14/2019: LVEDD 6 cm EF 30 to 35%, hypo-kinesis of the basal inferior and basal inferior septal walls, diastolic function was indeterminate, mild concentric LVH, RV has global hypokinesis.  RVSP 33 mmHg mild MR and moderate to severely dilated aortic root ascending aorta 5.1 cm there is trivial circumferential pericardial effusion     TTE-1/6/2020:   Left ventricle is moderately enlarged . Ejection fraction is visually estimated at 40-45%. Overall ejection fraction mild-to-moderately decreased . Septal motion consistent with post bypass surgery. There is severe eccentric hypertrophy. Abnormal diastolic function. The left atrium is severely dilated. Normal right ventricular size and function. Mild to moderate mitral regurgitation is present. No hemodynamically significant aortic stenosis is present. RVSP is 29 mmHg. Normal PA systolic pressure. Mildly dilated aortic root (4.1 cm) and normal sized ascending aorta. A prosthetic graft noted in the location of the ascending aorta. There is a small localized near left ventricle pericardial effusion noted.        Stress test-11/15/2019: No reversible perfusion defect, large fixed inferior wall defect, EF 30%, hypokinetic of the inferior wall.       Cardiac catheterization:      The 10-year ASCVD risk score (Volodymyr Barron, et al., 2013) is: 19.5%    Values used to calculate the score:      Age: 77 years      Sex: Male      Is Non- : No      Diabetic: No      Tobacco smoker: No      Systolic Blood Pressure: 507 mmHg      Is BP treated: Yes      HDL Cholesterol: 36 mg/dL      Total Cholesterol: 183 mg/dL        ASSESSMENT:  · Acute on chronic heart failure with reduced ejection fraction, improved and euvolemic  · Ischemic cardiomyopathy, EF 30-35%>>>40-45%  · HFmrEF  · ACC/AHA stage C.,  NYHA functional class III  · Persistent atrial fibrillation, still in AF [id number] Vasc score - 3  · Type I aortic dissection status post surgery underwent at [place] on 11/15/2019.    · Hypertension -improving. · CKD stage III, stable renal functions  · Obesity with KYARA on C-pap  · History of sarcoidosis. · Moderate right-sided pleural effusion  · Exertional hypoxemia rule out pulmonary pathology including interstitial lung disease    Plan:   · PFTS with DLCO results were discussed with the patient and advised to see Dr. Max Borges for an abnormal test with interstitial lung disease. · Will reschedule for cardiac rehab  · We will continue warfarin for anticoagulation for Afib and follow up with Dr. Sanya Jones for monitoring INR. Will discuss with EF about possible cardioverion with EP since he had aortic dissection surgery. · He is on guideline directed medical therapy for heart failure with reduced ejection fraction. We will continue Coreg 25 mg daily along with valsartan and hydralazine. He is not on Aldactone due to CKD with a creatinine of 1.7.  · He will continue rest of his current medications. Medications were refilled. · He will continue cardiac diet with salt restriction to 2 g and fluid restriction to 64 ounces a day. · He was also advised to check weight on a daily basis and advised to call me if he gains more than 3 pounds in 1 day and 5 pounds in 1 week. · Follow-up with me in 6 months. The patient's current medication list, allergies, problem list and results of all previously ordered testing were reviewed at today's visit.   Zeferino Hartman MD  [facility]) Cardiology

## 2020-06-03 ENCOUNTER — TELEPHONE (OUTPATIENT)
Dept: CARDIOLOGY CLINIC | Age: 65
End: 2020-06-03

## 2020-06-05 ENCOUNTER — TELEPHONE (OUTPATIENT)
Dept: FAMILY MEDICINE CLINIC | Age: 65
End: 2020-06-05

## 2020-06-05 ENCOUNTER — CARE COORDINATION (OUTPATIENT)
Dept: CARE COORDINATION | Age: 65
End: 2020-06-05

## 2020-06-05 NOTE — TELEPHONE ENCOUNTER
Per Sailaja Aldrich, RN pt needs seen for follow up and INR. Called pt to schedule and left  msg requesting return call.

## 2020-06-05 NOTE — CARE COORDINATION
provider for advice before I stop taking any of my medication. Barriers: lack of education  Plan for overcoming my barriers: Work with Care Team and Follow POC  Confidence: 10/10  Anticipated Goal Completion Date: 4/23/2020            Prior to Admission medications    Medication Sig Start Date End Date Taking?  Authorizing Provider   isosorbide dinitrate (ISORDIL) 5 MG tablet Take 1 tablet by mouth 3 times daily Additional Refills from Primary Cardiologist or PCP 5/19/20   Annalise Mcclellan MD   hydrALAZINE (APRESOLINE) 50 MG tablet Take 1 tablet by mouth 3 times daily Additional Refills from Primary Cardiologist or PCP 5/19/20   Annalise Mcclellan MD   valsartan (DIOVAN) 160 MG tablet Take 1 tablet by mouth daily Additional Refills from Primary Cardiologist or PCP 5/19/20   Annalise Mcclellan MD   Digoxin 62.5 MCG TABS Take 62.5 mcg by mouth daily Additional Refills from Primary Cardiologist or PCP 5/19/20   Annalise Mcclellan MD   carvedilol (COREG) 25 MG tablet Take 1 tablet by mouth 2 times daily (with meals) 5/19/20   Annalise Mcclellan MD   bumetanide (BUMEX) 1 MG tablet Take 1 tablet by mouth 2 times daily Additional Refills from Primary Cardiologist or PCP 5/19/20   Annalise Mcclellan MD   amLODIPine (NORVASC) 5 MG tablet Take 1 tablet by mouth daily 5/19/20 6/18/20  Annalise Mcclellan MD   warfarin (COUMADIN) 6 MG tablet Take 1 tablet by mouth daily 1/23/20   Jeremy Leyva, DO   aspirin 81 MG chewable tablet Take 1 tablet by mouth daily Additional Refills from Primary Cardiologist or PCP 1/16/20   Jori Freitas, DO   Multiple Vitamins-Minerals (THERAPEUTIC MULTIVITAMIN-MINERALS) tablet Take 1 tablet by mouth daily    Historical Provider, MD       Future Appointments   Date Time Provider Chrissy Valdez   8/18/2020 10:00 AM [facility] ROOM 1 [facility]     ,   Congestive Heart Failure Assessment    Are you currently restricting fluids?:  Other (Comment: 48-50 Ounces a day)  Do you understand a low sodium diet?:  Yes (Comment: 2000mg/day)  Do you understand how to read food labels?:  Yes  How many restaurant meals do you eat per week?:  1-2  Do you salt your food before tasting it?:  No     No patient-reported symptoms      Symptoms:   None:  Yes      Symptom course:  stable  Weight trend:  stable  Salt intake watch compared to last visit:  stable      and   General Assessment    Do you have any symptoms that are causing concern?:  No

## 2020-06-08 ENCOUNTER — NURSE ONLY (OUTPATIENT)
Dept: FAMILY MEDICINE CLINIC | Age: 65
End: 2020-06-08
Payer: MEDICARE

## 2020-06-08 ENCOUNTER — ANTI-COAG VISIT (OUTPATIENT)
Dept: FAMILY MEDICINE CLINIC | Age: 65
End: 2020-06-08

## 2020-06-08 LAB
INTERNATIONAL NORMALIZATION RATIO, POC: 1.9
PROTHROMBIN TIME, POC: 23.3

## 2020-06-08 PROCEDURE — 93793 ANTICOAG MGMT PT WARFARIN: CPT | Performed by: FAMILY MEDICINE

## 2020-06-08 PROCEDURE — 85610 PROTHROMBIN TIME: CPT | Performed by: FAMILY MEDICINE

## 2020-06-08 NOTE — TELEPHONE ENCOUNTER
Patients carvedilol ordered as a 90 day supply instead of 30 days.      Electronically signed by Dona Marrufo MD on 6/8/2020 at 10:56 AM

## 2020-06-16 ENCOUNTER — TELEPHONE (OUTPATIENT)
Dept: CARDIOLOGY CLINIC | Age: 65
End: 2020-06-16

## 2020-06-16 RX ORDER — LOSARTAN POTASSIUM 50 MG/1
50 TABLET ORAL DAILY
COMMUNITY
End: 2020-06-16 | Stop reason: SDUPTHER

## 2020-06-17 RX ORDER — LOSARTAN POTASSIUM 50 MG/1
50 TABLET ORAL DAILY
Qty: 90 TABLET | Refills: 3 | Status: SHIPPED
Start: 2020-06-17 | End: 2021-02-25 | Stop reason: SDUPTHER

## 2020-06-18 ENCOUNTER — CARE COORDINATION (OUTPATIENT)
Dept: CARE COORDINATION | Age: 65
End: 2020-06-18

## 2020-06-18 NOTE — CARE COORDINATION
Ambulatory Care Coordination Note  6/18/2020  CM Risk Score: 3  Charlson 10 Year Mortality Risk Score: 98%     ACC: Suzzette Jeans, RN    Summary Note: Denies s/s CHF Exacerbation. Discussed Zone Tool and verbalizes understanding. Today's weight: 207#. Pt is losing weight by eating healthier and going to Cardiac Rehab. He has INR scheduled for 7/6. Noted there is no PCP appt scheduled, and pt states he will make that appt at his INR visit. Pt is aware of Dr Avery's instructions:   -If you gain 3-5 pounds in 2-3 days OR notice that you are retaining fluid in anyway just like you did before then take an extra dose of your water pill (furosemide/Lasix OR bumetanide/Bumex) every day until you lose the weight or feel better.  -If you notice that you have taken >3 in 1 week, call Cardiology  Pt verbalizes understanding. Patient graduating from [address] [place] [address].  Has met goals. Education completed. Has the self-management tools needed. Contact information given should patient have any questions/concerns/barriers that she needs assistance with. Letter sent. PCP notified. Care Coordination Interventions    Program Enrollment:  Complex Care  Referral from Primary Care Provider:  No  Suggested Interventions and Community Resources  Cardiac Rehab:  Not Started (Comment: Requesting from Dr. Yolanda Schaffer)  [address]:  Completed (Comment: Jesus Messer [facility])  Meals on Wheels:  Declined  Medication Assistance Program:  Declined  Physical Therapy:  Completed (Comment: Jesus)  Zone Management Tools: In Process (Comment: CHF Zone Tool)  Other Services or Interventions:  Oxygen Concentrator and tanks: [facility]    None         Prior to Admission medications    Medication Sig Start Date End Date Taking?  Authorizing Provider   losartan (COZAAR) 50 MG tablet Take 1 tablet by mouth daily 6/17/20   Kevin Lara MD   isosorbide dinitrate (ISORDIL) 5 MG tablet Take 1 tablet by mouth 3 times daily Additional Refills from Primary Cardiologist or PCP 5/19/20   Gautam Nelson MD   hydrALAZINE (APRESOLINE) 50 MG tablet Take 1 tablet by mouth 3 times daily Additional Refills from Primary Cardiologist or PCP 5/19/20   Gautam Nelson MD   Digoxin 62.5 MCG TABS Take 62.5 mcg by mouth daily Additional Refills from Primary Cardiologist or PCP 5/19/20   Gautam Nelson MD   carvedilol (COREG) 25 MG tablet Take 1 tablet by mouth 2 times daily (with meals) 5/19/20   Gautam Nelson MD   bumetanide (BUMEX) 1 MG tablet Take 1 tablet by mouth 2 times daily Additional Refills from Primary Cardiologist or PCP 5/19/20   Gautam Nelson MD   amLODIPine (NORVASC) 5 MG tablet Take 1 tablet by mouth daily 5/19/20 6/18/20  Gautam Nelson MD   warfarin (COUMADIN) 6 MG tablet Take 1 tablet by mouth daily 1/23/20   Truptib[facility], DO   aspirin 81 MG chewable tablet Take 1 tablet by mouth daily Additional Refills from Primary Cardiologist or PCP 1/16/20   Anumnav Spearangela, DO   Multiple Vitamins-Minerals (THERAPEUTIC MULTIVITAMIN-MINERALS) tablet Take 1 tablet by mouth daily    Historical Provider, MD       Future Appointments   Date Time Provider Chrissy Valdez   7/6/2020  9:30 AM SCHEDULE, [facility]   8/18/2020 10:00 AM [facility] CHF ROOM 1 [facility]   Congestive Heart Failure Assessment    Are you currently restricting fluids?:  Other (Comment: 48-50 Ounces a day)  Do you understand a low sodium diet?:  Yes (Comment: 2000mg/day)  Do you understand how to read food labels?:  Yes  How many restaurant meals do you eat per week?:  1-2  Do you salt your food before tasting it?:  No     No patient-reported symptoms      Symptoms:      Symptom course:  stable  Patient-reported weight (lb):  207  Weight trend:  stable  Salt intake watch compared to last visit:  stable      and   General Assessment    Do you have any symptoms that are causing concern?:  No

## 2020-06-27 PROCEDURE — 9900000038 HC CARDIAC REHAB PHASE 3 - MONTHLY

## 2020-06-29 ENCOUNTER — HOSPITAL ENCOUNTER (OUTPATIENT)
Dept: CARDIAC REHAB | Age: 65
Discharge: HOME | End: 2020-06-29

## 2020-07-21 ENCOUNTER — NURSE ONLY (OUTPATIENT)
Dept: FAMILY MEDICINE CLINIC | Age: 65
End: 2020-07-21
Payer: MEDICARE

## 2020-07-21 LAB
INTERNATIONAL NORMALIZATION RATIO, POC: 2.3
PROTHROMBIN TIME, POC: 27.1

## 2020-07-21 PROCEDURE — 85610 PROTHROMBIN TIME: CPT | Performed by: FAMILY MEDICINE

## 2020-07-21 RX ORDER — WARFARIN SODIUM 6 MG/1
6 TABLET ORAL DAILY
Qty: 30 TABLET | Refills: 5 | Status: SHIPPED
Start: 2020-07-21 | End: 2021-02-25 | Stop reason: SDUPTHER

## 2020-07-27 ENCOUNTER — HOSPITAL ENCOUNTER (OUTPATIENT)
Dept: CARDIAC REHAB | Age: 65
Discharge: HOME | End: 2020-07-27

## 2020-07-27 PROCEDURE — 9900000038 HC CARDIAC REHAB PHASE 3 - MONTHLY

## 2020-08-08 PROCEDURE — 9900000038 HC CARDIAC REHAB PHASE 3 - MONTHLY

## 2020-08-19 ENCOUNTER — NURSE ONLY (OUTPATIENT)
Dept: FAMILY MEDICINE CLINIC | Age: 65
End: 2020-08-19
Payer: MEDICARE

## 2020-08-19 LAB
INTERNATIONAL NORMALIZATION RATIO, POC: 1.8
PROTHROMBIN TIME, POC: 21.4

## 2020-08-19 PROCEDURE — 93793 ANTICOAG MGMT PT WARFARIN: CPT | Performed by: FAMILY MEDICINE

## 2020-08-19 PROCEDURE — 85610 PROTHROMBIN TIME: CPT | Performed by: FAMILY MEDICINE

## 2020-08-25 ENCOUNTER — HOSPITAL ENCOUNTER (OUTPATIENT)
Dept: OTHER | Age: 65
Setting detail: THERAPIES SERIES
Discharge: HOME | End: 2020-08-25
Payer: MEDICARE

## 2020-08-31 ENCOUNTER — HOSPITAL ENCOUNTER (OUTPATIENT)
Dept: CARDIAC REHAB | Age: 65
Discharge: HOME | End: 2020-08-31

## 2020-09-28 ENCOUNTER — HOSPITAL ENCOUNTER (OUTPATIENT)
Dept: CARDIAC REHAB | Age: 65
Discharge: HOME | End: 2020-09-28

## 2020-09-28 PROCEDURE — 9900000038 HC CARDIAC REHAB PHASE 3 - MONTHLY

## 2020-10-06 ENCOUNTER — OFFICE VISIT (OUTPATIENT)
Dept: FAMILY MEDICINE CLINIC | Age: 65
End: 2020-10-06
Payer: MEDICARE

## 2020-10-06 VITALS
SYSTOLIC BLOOD PRESSURE: 110 MMHG | OXYGEN SATURATION: 98 % | BODY MASS INDEX: 32.02 KG/M2 | DIASTOLIC BLOOD PRESSURE: 70 MMHG | HEIGHT: 67 IN | RESPIRATION RATE: 16 BRPM | WEIGHT: 204 LBS | TEMPERATURE: 97.4 F | HEART RATE: 64 BPM

## 2020-10-06 PROBLEM — N18.2 STAGE 2 CHRONIC KIDNEY DISEASE: Status: RESOLVED | Noted: 2019-10-18 | Resolved: 2020-10-06

## 2020-10-06 PROBLEM — I31.39 PERICARDIAL EFFUSION (NONINFLAMMATORY): Status: RESOLVED | Noted: 2019-11-15 | Resolved: 2020-10-06

## 2020-10-06 LAB
INTERNATIONAL NORMALIZATION RATIO, POC: 1.9
PROTHROMBIN TIME, POC: 22.8

## 2020-10-06 PROCEDURE — G0439 PPPS, SUBSEQ VISIT: HCPCS | Performed by: FAMILY MEDICINE

## 2020-10-06 PROCEDURE — 90732 PPSV23 VACC 2 YRS+ SUBQ/IM: CPT | Performed by: FAMILY MEDICINE

## 2020-10-06 PROCEDURE — 85610 PROTHROMBIN TIME: CPT | Performed by: FAMILY MEDICINE

## 2020-10-06 PROCEDURE — 3017F COLORECTAL CA SCREEN DOC REV: CPT | Performed by: FAMILY MEDICINE

## 2020-10-06 PROCEDURE — G8484 FLU IMMUNIZE NO ADMIN: HCPCS | Performed by: FAMILY MEDICINE

## 2020-10-06 PROCEDURE — G0009 ADMIN PNEUMOCOCCAL VACCINE: HCPCS | Performed by: FAMILY MEDICINE

## 2020-10-06 PROCEDURE — 4040F PNEUMOC VAC/ADMIN/RCVD: CPT | Performed by: FAMILY MEDICINE

## 2020-10-06 PROCEDURE — 1123F ACP DISCUSS/DSCN MKR DOCD: CPT | Performed by: FAMILY MEDICINE

## 2020-10-06 ASSESSMENT — LIFESTYLE VARIABLES
AUDIT TOTAL SCORE: 1
HOW OFTEN DURING THE LAST YEAR HAVE YOU BEEN UNABLE TO REMEMBER WHAT HAPPENED THE NIGHT BEFORE BECAUSE YOU HAD BEEN DRINKING: NEVER
HOW OFTEN DURING THE LAST YEAR HAVE YOU FAILED TO DO WHAT WAS NORMALLY EXPECTED FROM YOU BECAUSE OF DRINKING: 0
AUDIT TOTAL SCORE: 0
HOW OFTEN DO YOU HAVE A DRINK CONTAINING ALCOHOL: 1
HOW OFTEN DURING THE LAST YEAR HAVE YOU BEEN UNABLE TO REMEMBER WHAT HAPPENED THE NIGHT BEFORE BECAUSE YOU HAD BEEN DRINKING: 0
HOW MANY STANDARD DRINKS CONTAINING ALCOHOL DO YOU HAVE ON A TYPICAL DAY: 0
HAVE YOU OR SOMEONE ELSE BEEN INJURED AS A RESULT OF YOUR DRINKING: NO
HAVE YOU OR SOMEONE ELSE BEEN INJURED AS A RESULT OF YOUR DRINKING: 0
HOW OFTEN DURING THE LAST YEAR HAVE YOU FOUND THAT YOU WERE NOT ABLE TO STOP DRINKING ONCE YOU HAD STARTED: NEVER
HOW OFTEN DO YOU HAVE SIX OR MORE DRINKS ON ONE OCCASION: NEVER
HAS A RELATIVE, FRIEND, DOCTOR, OR ANOTHER HEALTH PROFESSIONAL EXPRESSED CONCERN ABOUT YOUR DRINKING OR SUGGESTED YOU CUT DOWN: 0
HOW OFTEN DURING THE LAST YEAR HAVE YOU NEEDED AN ALCOHOLIC DRINK FIRST THING IN THE MORNING TO GET YOURSELF GOING AFTER A NIGHT OF HEAVY DRINKING: 0
HOW OFTEN DURING THE LAST YEAR HAVE YOU FOUND THAT YOU WERE NOT ABLE TO STOP DRINKING ONCE YOU HAD STARTED: 0
AUDIT-C TOTAL SCORE: 1
AUDIT-C TOTAL SCORE: 0
HOW OFTEN DURING THE LAST YEAR HAVE YOU HAD A FEELING OF GUILT OR REMORSE AFTER DRINKING: NEVER
HAS A RELATIVE, FRIEND, DOCTOR, OR ANOTHER HEALTH PROFESSIONAL EXPRESSED CONCERN ABOUT YOUR DRINKING OR SUGGESTED YOU CUT DOWN: NO
HOW MANY STANDARD DRINKS CONTAINING ALCOHOL DO YOU HAVE ON A TYPICAL DAY: ONE OR TWO
HOW OFTEN DURING THE LAST YEAR HAVE YOU NEEDED AN ALCOHOLIC DRINK FIRST THING IN THE MORNING TO GET YOURSELF GOING AFTER A NIGHT OF HEAVY DRINKING: NEVER
HOW OFTEN DURING THE LAST YEAR HAVE YOU HAD A FEELING OF GUILT OR REMORSE AFTER DRINKING: 0
HOW OFTEN DURING THE LAST YEAR HAVE YOU FAILED TO DO WHAT WAS NORMALLY EXPECTED FROM YOU BECAUSE OF DRINKING: NEVER
HOW OFTEN DO YOU HAVE A DRINK CONTAINING ALCOHOL: MONTHLY OR LESS
HOW OFTEN DO YOU HAVE SIX OR MORE DRINKS ON ONE OCCASION: 0

## 2020-10-06 ASSESSMENT — PATIENT HEALTH QUESTIONNAIRE - PHQ9
SUM OF ALL RESPONSES TO PHQ9 QUESTIONS 1 & 2: 0
1. LITTLE INTEREST OR PLEASURE IN DOING THINGS: 0
2. FEELING DOWN, DEPRESSED OR HOPELESS: 0
SUM OF ALL RESPONSES TO PHQ QUESTIONS 1-9: 0
SUM OF ALL RESPONSES TO PHQ QUESTIONS 1-9: 0

## 2020-10-06 NOTE — PATIENT INSTRUCTIONS
Personalized Preventive Plan for Melany Loyola - 10/6/2020  Medicare offers a range of preventive health benefits. Some of the tests and screenings are paid in full while other may be subject to a deductible, co-insurance, and/or copay. Some of these benefits include a comprehensive review of your medical history including lifestyle, illnesses that may run in your family, and various assessments and screenings as appropriate. After reviewing your medical record and screening and assessments performed today your provider may have ordered immunizations, labs, imaging, and/or referrals for you. A list of these orders (if applicable) as well as your Preventive Care list are included within your After Visit Summary for your review. Other Preventive Recommendations:    · A preventive eye exam performed by an eye specialist is recommended every 1-2 years to screen for glaucoma; cataracts, macular degeneration, and other eye disorders. · A preventive dental visit is recommended every 6 months. · Try to get at least 150 minutes of exercise per week or 10,000 steps per day on a pedometer . · Order or download the FREE \"Exercise & Physical Activity: Your Everyday Guide\" from [employer]. Call 9-527.966.1324 or search [employer] online. · You need 8117-6134 mg of calcium and 2750-9301 IU of vitamin D per day. It is possible to meet your calcium requirement with diet alone, but a vitamin D supplement is usually necessary to meet this goal.  · When exposed to the sun, use a sunscreen that protects against both UVA and UVB radiation with an SPF of 30 or greater. Reapply every 2 to 3 hours or after sweating, drying off with a towel, or swimming. · Always wear a seat belt when traveling in a car. Always wear a helmet when riding a bicycle or motorcycle. Patient Education        Pneumococcal Polysaccharide Vaccine: What You Need to Know  Why get vaccinated?   Pneumococcal polysaccharide vaccine (PPSV23) can prevent pneumococcal disease. Pneumococcal disease refers to any illness caused by pneumococcal bacteria. These bacteria can cause many types of illnesses, including pneumonia, which is an infection of the lungs. Pneumococcal bacteria are one of the most common causes of pneumonia. Besides pneumonia, pneumococcal bacteria can also cause:  · Ear infections,  · Sinus infections  · Meningitis (infection of the tissue covering the brain and spinal cord)  · Bacteremia (bloodstream infection)  Anyone can get pneumococcal disease, but children under 3years of age, people with certain medical conditions, adults 72 years or older, and cigarette smokers are at the highest risk. Most pneumococcal infections are mild. However, some can result in long-term problems, such as brain damage or hearing loss. Meningitis, bacteremia, and pneumonia caused by pneumococcal disease can be fatal.  PPSV23  PPSV23 protects against 23 types of bacteria that cause pneumococcal disease. PPSV23 is recommended for:  · All adults 72 years or older,  · Anyone 2 years or older with certain medical conditions that can lead to an increased risk for pneumococcal disease. Most people need only one dose of PPSV23. A second dose of PPSV23, and another type of pneumococcal vaccine called PCV13, are recommended for certain high-risk groups. Your health care provider can give you more information. People 65 years or older should get a dose of PPSV23 even if they have already gotten one or more doses of the vaccine before they turned 65. Talk with your health care provider  Tell your vaccine provider if the person getting the vaccine:  · Has had an allergic reaction after a previous dose of PPSV23, or has any severe, life-threatening allergies. In some cases, your health care provider may decide to postpone PPSV23 vaccination to a future visit. People with minor illnesses, such as a cold, may be vaccinated.  People who are moderately or severely ill should usually wait until they recover before getting PPSV23. Your health care provider can give you more information. Risks of a vaccine reaction  · Redness or pain where the shot is given, feeling tired, fever, or muscle aches can happen after PPSV23. People sometimes faint after medical procedures, including vaccination. Tell your provider if you feel dizzy or have vision changes or ringing in the ears. As with any medicine, there is a very remote chance of a vaccine causing a severe allergic reaction, other serious injury, or death. What if there is a serious problem? An allergic reaction could occur after the vaccinated person leaves the clinic. If you see signs of a severe allergic reaction (hives, swelling of the face and throat, difficulty breathing, a fast heartbeat, dizziness, or weakness), call 9-1-1 and get the person to the nearest hospital.  For other signs that concern you, call your health care provider. Adverse reactions should be reported to the Vaccine Adverse Event Reporting System (VAERS). Your health care provider will usually file this report, or you can do it yourself. Visit the VAERS website at www.vaers. hhs.gov at www.vaers. hhs.gov or call 3-659.759.2069. VAERS is only for reporting reactions, and VAERS staff do not give medical advice. How can I learn more? · Ask your health care provider. · Call your local or state health department. · Contact the Centers for Disease Control and Prevention (CDC):  ? Call 4-593.941.8388 (1-800-CDC-INFO) or  ? Visit CDC's website at www.cdc.gov/vaccines  Vaccine Information Statement  PPSV23 Vaccine  10/30/2019  [facility] and [facility] for Disease Control and Prevention  Many Vaccine Information Statements are available in [place] and other languages. See www.immunize.org/vis. Hojas de información Sobre Vacunas están disponibles en español y en muchos otros idiomas.  Visite part of your colon. Your doctor uses a lighted tube called a sigmoidoscope. This test can't find cancers or polyps in the upper part of your colon. In some cases, polyps that are found can be removed. But if your doctor finds polyps, you will need to have a colonoscopy to check the upper part of your colon. Colonoscopy. This test lets your doctor look at the lining of your rectum and your entire colon. The doctor uses a thin, flexible tool called a colonoscope. It can also be used to remove polyps or get a tissue sample (biopsy). A less common test is CT colonography (CTC). It's also called virtual colonoscopy. Who should be screened for colorectal cancer? Your risk for colorectal cancer gets higher as you get older. Some experts say that adults should start regular screening at age 48 and stop at age 76. Others say to start before age 48 or continue after age 76. Talk with your doctor about your risk and when to start and stop screening. How often you need screening depends on the type of test you get:  Stool tests. Every 1 or 2 years for FIT or gFOBT. Every 3 years for sDNA, also called FIT-DNA. Tests that look inside the colon. Every 5 or 10 years for sigmoidoscopy. Every 5 years for CT colonography (virtual colonoscopy). Every 10 years for colonoscopy. Experts agree that people at higher risk may need to be tested sooner. This includes people who have a strong family history of colon cancer. Talk to your doctor about which test is best for you and when to be tested. When should you call for help? Watch closely for changes in your health, and be sure to contact your doctor if:  · You have any changes in your bowel habits. · You have any problems. Where can you learn more? Go to [email or web address]. org and sign in to your [employer] account. Enter [phone] in the [facility] box to learn more about \"Colon Cancer Screening: Care Instructions. \"     If you do not have an account, please click on the \"Sign Up Now\" link. Current as of: August 22, 2019               Content Version: 12.5  © 9800-0413 Healthwise, Incorporated. Care instructions adapted under license by [facility] ([facility]). If you have questions about a medical condition or this instruction, always ask your healthcare professional. [address] any warranty or liability for your use of this information.

## 2020-10-19 RX ORDER — AMLODIPINE BESYLATE 5 MG/1
5 TABLET ORAL DAILY
Qty: 90 TABLET | Refills: 3 | Status: SHIPPED
Start: 2020-10-19 | End: 2021-02-25 | Stop reason: SDUPTHER

## 2020-10-21 RX ORDER — CARVEDILOL 25 MG/1
25 TABLET ORAL 2 TIMES DAILY WITH MEALS
Qty: 180 TABLET | Refills: 3 | Status: SHIPPED
Start: 2020-10-21 | End: 2021-02-25 | Stop reason: SDUPTHER

## 2021-02-25 ENCOUNTER — OFFICE VISIT (OUTPATIENT)
Dept: FAMILY MEDICINE CLINIC | Age: 66
End: 2021-02-25
Payer: MEDICARE

## 2021-02-25 VITALS
HEART RATE: 57 BPM | HEIGHT: 67 IN | TEMPERATURE: 97.2 F | BODY MASS INDEX: 32.18 KG/M2 | OXYGEN SATURATION: 98 % | SYSTOLIC BLOOD PRESSURE: 130 MMHG | WEIGHT: 205 LBS | DIASTOLIC BLOOD PRESSURE: 80 MMHG | RESPIRATION RATE: 18 BRPM

## 2021-02-25 DIAGNOSIS — I48.91 RATE CONTROLLED ATRIAL FIBRILLATION (HCC): Primary | ICD-10-CM

## 2021-02-25 DIAGNOSIS — Z79.01 ANTICOAGULATED ON COUMADIN: ICD-10-CM

## 2021-02-25 DIAGNOSIS — N18.31 STAGE 3A CHRONIC KIDNEY DISEASE (HCC): ICD-10-CM

## 2021-02-25 DIAGNOSIS — R79.9 ABNORMAL FINDING OF BLOOD CHEMISTRY, UNSPECIFIED: ICD-10-CM

## 2021-02-25 DIAGNOSIS — I71.019 DISSECTION OF THORACIC AORTA: ICD-10-CM

## 2021-02-25 DIAGNOSIS — G47.33 OSA (OBSTRUCTIVE SLEEP APNEA): ICD-10-CM

## 2021-02-25 DIAGNOSIS — I50.22 CHRONIC SYSTOLIC HEART FAILURE (HCC): ICD-10-CM

## 2021-02-25 DIAGNOSIS — I10 ESSENTIAL HYPERTENSION: ICD-10-CM

## 2021-02-25 LAB
INTERNATIONAL NORMALIZATION RATIO, POC: 1.6
PROTHROMBIN TIME, POC: 19.2

## 2021-02-25 PROCEDURE — 99213 OFFICE O/P EST LOW 20 MIN: CPT | Performed by: FAMILY MEDICINE

## 2021-02-25 PROCEDURE — 1036F TOBACCO NON-USER: CPT | Performed by: FAMILY MEDICINE

## 2021-02-25 PROCEDURE — G8427 DOCREV CUR MEDS BY ELIG CLIN: HCPCS | Performed by: FAMILY MEDICINE

## 2021-02-25 PROCEDURE — 4040F PNEUMOC VAC/ADMIN/RCVD: CPT | Performed by: FAMILY MEDICINE

## 2021-02-25 PROCEDURE — 85610 PROTHROMBIN TIME: CPT | Performed by: FAMILY MEDICINE

## 2021-02-25 PROCEDURE — 1123F ACP DISCUSS/DSCN MKR DOCD: CPT | Performed by: FAMILY MEDICINE

## 2021-02-25 PROCEDURE — G8484 FLU IMMUNIZE NO ADMIN: HCPCS | Performed by: FAMILY MEDICINE

## 2021-02-25 PROCEDURE — G8417 CALC BMI ABV UP PARAM F/U: HCPCS | Performed by: FAMILY MEDICINE

## 2021-02-25 PROCEDURE — 3017F COLORECTAL CA SCREEN DOC REV: CPT | Performed by: FAMILY MEDICINE

## 2021-02-25 RX ORDER — SENNOSIDES 8.6 MG
TABLET ORAL
COMMUNITY
Start: 2021-02-05 | End: 2021-02-25

## 2021-02-25 RX ORDER — DIGOXIN 0.06 MG/1
TABLET ORAL
Qty: 90 TABLET | Refills: 1 | Status: ON HOLD
Start: 2021-02-25 | End: 2021-03-05

## 2021-02-25 RX ORDER — ISOSORBIDE DINITRATE 5 MG/1
5 TABLET ORAL 3 TIMES DAILY
Qty: 270 TABLET | Refills: 1 | Status: SHIPPED
Start: 2021-02-25 | End: 2021-07-01 | Stop reason: SDUPTHER

## 2021-02-25 RX ORDER — WARFARIN SODIUM 6 MG/1
TABLET ORAL
Qty: 30 TABLET | Refills: 5 | OUTPATIENT
Start: 2021-02-25

## 2021-02-25 RX ORDER — WARFARIN SODIUM 6 MG/1
6 TABLET ORAL DAILY
Qty: 90 TABLET | Refills: 1 | Status: SHIPPED
Start: 2021-02-25 | End: 2021-08-23

## 2021-02-25 RX ORDER — HYDRALAZINE HYDROCHLORIDE 50 MG/1
50 TABLET, FILM COATED ORAL 3 TIMES DAILY
Qty: 270 TABLET | Refills: 1 | Status: SHIPPED
Start: 2021-02-25 | End: 2021-07-01 | Stop reason: SDUPTHER

## 2021-02-25 RX ORDER — HYDRALAZINE HYDROCHLORIDE 50 MG/1
TABLET, FILM COATED ORAL
COMMUNITY
Start: 2020-11-25 | End: 2021-02-25 | Stop reason: SDUPTHER

## 2021-02-25 RX ORDER — AMLODIPINE BESYLATE 5 MG/1
5 TABLET ORAL DAILY
Qty: 90 TABLET | Refills: 1 | Status: SHIPPED
Start: 2021-02-25 | End: 2021-07-01 | Stop reason: SDUPTHER

## 2021-02-25 RX ORDER — LOSARTAN POTASSIUM 50 MG/1
50 TABLET ORAL DAILY
Qty: 90 TABLET | Refills: 1 | Status: SHIPPED
Start: 2021-02-25 | End: 2021-07-01 | Stop reason: SDUPTHER

## 2021-02-25 RX ORDER — BUMETANIDE 1 MG/1
1 TABLET ORAL 2 TIMES DAILY
Qty: 180 TABLET | Refills: 1 | Status: SHIPPED
Start: 2021-02-25 | End: 2021-07-01 | Stop reason: SDUPTHER

## 2021-02-25 RX ORDER — CARVEDILOL 25 MG/1
25 TABLET ORAL 2 TIMES DAILY WITH MEALS
Qty: 180 TABLET | Refills: 1 | Status: SHIPPED
Start: 2021-02-25 | End: 2021-07-01 | Stop reason: SDUPTHER

## 2021-02-25 ASSESSMENT — PATIENT HEALTH QUESTIONNAIRE - PHQ9
2. FEELING DOWN, DEPRESSED OR HOPELESS: 0
SUM OF ALL RESPONSES TO PHQ QUESTIONS 1-9: 0
1. LITTLE INTEREST OR PLEASURE IN DOING THINGS: 0
SUM OF ALL RESPONSES TO PHQ QUESTIONS 1-9: 0
SUM OF ALL RESPONSES TO PHQ QUESTIONS 1-9: 0

## 2021-02-25 NOTE — PROGRESS NOTES
2021     Maya Drilling (:  1955) is a 74 y.o. male, with a:  Past Medical History:   Diagnosis Date    [facility]     Aortic dissection (HCC)     Atrial fibrillation ([facility]     CAD (coronary artery disease)     CHF (congestive heart failure) ([facility]     severely impaired LV systolic function and CHF, EF 25-30%    CKD (chronic kidney disease)     H/O cardiovascular stress test     No evidence of stress-induced ischemia    H/O Doppler echocardiogram 5/04/10    [facility], mildly dilated LV, mod concentric LVH, normal LV systolic function, mod dilated left atrium, trace MR    Hypertension     Ischemic cardiomyopathy     Obesity     Pericardial effusion (noninflammatory) 11/15/2019    History: Post-op problem  Assessment: S/P pericardial drain placement in CVICU  Plan:  Drain dc'ed, no need for post-procedure echo unless pt become symptomatic.  Pleural effusion 2019    History: Post op problem Assessment: On r/a, left pigtail dc'ed Plan: PO lasix. Denies sob. Desat study done, no home O2 needed    Sarcoidosis     Valvular heart disease        Here for evaluation of the following medical concerns:  Chief Complaint   Patient presents with    Follow-up     He also follows with Cardiology and Pulm    F/U of chronic problem(s)   Chronic problems reviewed today include:  A fib, CHF, CM, aortic dissection s/p repair, CKD, and KYARA  Current status of this/these condition(s):  stable  Tolerating meds: Yes    Atrial fibrillation / dilated cardiomyopathy / CHF / type I aortic dissection s/p emergent repair at [facility] 2019  Current treatment: Carvedilol 25 mg twice daily, amlodipine 5 mg daily, losartan 50 mg daily, isosorbide 5 mg 3 times daily, hydralazine 50 mg 3 times daily, digoxin 62.5 mcg half tablet daily, and Bumex 1 mg twice daily  Recent medication changes: None  Patient denies chest pain, shortness of breath, headache and peripheral edema.     Antihypertensive medication side effects: no medication side effects noted. BP Readings from Last 3 Encounters:   02/25/21 130/80   10/06/20 110/70   07/09/20 132/76                                          Sodium (mmol/L)   Date Value   05/14/2020 140    BUN (mg/dL)   Date Value   05/14/2020 31 (H)    Glucose (mg/dL)   Date Value   05/14/2020 107 (H)      Potassium (mmol/L)   Date Value   05/14/2020 4.2     Potassium reflex Magnesium (mmol/L)   Date Value   11/15/2019 4.1    CREATININE (mg/dL)   Date Value   05/14/2020 1.4 (H)         KYARA  Current treatment: CPAP  Tolerating well, reports compliance    CKD  Last Cr 1.4    Anticoagulated with Coumadin  Current warfarin regimen: coumadin 6 mg daily  Recent changes in treatment: none  Indication for treatment: Atrial fibrillation  Admits to recent missed doses    Lab Results   Component Value Date    INR 1.6 02/25/2021    INR 1.9 10/06/2020    INR 1.8 08/19/2020           Review of Systems   Constitutional: Negative for chills and fever. Respiratory: Negative for cough and shortness of breath. Cardiovascular: Negative for chest pain and leg swelling. Gastrointestinal: Negative for abdominal pain, constipation, diarrhea, nausea and vomiting. Genitourinary: Negative for dysuria. Neurological: Negative for headaches. Prior to Visit Medications    Medication Sig Taking?  Authorizing Provider   hydrALAZINE (APRESOLINE) 50 MG tablet  Yes Historical Provider, MD   carvedilol (COREG) 25 MG tablet TAKE 1 TABLET BY MOUTH 2 TIMES DAILY (WITH MEALS) Yes Mikey Ratliff MD   amLODIPine (NORVASC) 5 MG tablet Take 1 tablet by mouth daily Yes Kamron Chase DO   warfarin (COUMADIN) 6 MG tablet TAKE 1 TABLET BY MOUTH DAILY Yes Kamron Chase DO   losartan (COZAAR) 50 MG tablet Take 1 tablet by mouth daily Yes Keith Santos MD   isosorbide dinitrate (ISORDIL) 5 MG tablet Take 1 tablet by mouth 3 times daily Additional Refills from Primary Cardiologist or PCP Yes [place] TEAGUE Right lower leg: No edema. Left lower leg: No edema. Neurological:      Mental Status: He is alert. Mental status is at baseline. Psychiatric:         Mood and Affect: Mood normal.         Behavior: Behavior normal.     ASSESSMENT/PLAN:  Kay Caro was seen today for follow-up. Diagnoses and all orders for this visit:    Rate controlled atrial fibrillation (HCC)  -     amLODIPine (NORVASC) 5 MG tablet; Take 1 tablet by mouth daily  -     losartan (COZAAR) 50 MG tablet; Take 1 tablet by mouth daily  -     isosorbide dinitrate (ISORDIL) 5 MG tablet; Take 1 tablet by mouth 3 times daily  -     bumetanide (BUMEX) 1 MG tablet; Take 1 tablet by mouth 2 times daily  -     Digoxin 62.5 MCG TABS; Take 1/2 tablet daily  -     CBC Auto Differential; Future  -     Comprehensive Metabolic Panel; Future  -     HEMOGLOBIN A1C; Future  -     URIC ACID; Future  -     LIPID PANEL; Future    Chronic systolic heart failure (HCC)  -     hydrALAZINE (APRESOLINE) 50 MG tablet; Take 1 tablet by mouth 3 times daily  -     carvedilol (COREG) 25 MG tablet; Take 1 tablet by mouth 2 times daily (with meals)  -     amLODIPine (NORVASC) 5 MG tablet; Take 1 tablet by mouth daily  -     losartan (COZAAR) 50 MG tablet; Take 1 tablet by mouth daily  -     isosorbide dinitrate (ISORDIL) 5 MG tablet; Take 1 tablet by mouth 3 times daily  -     bumetanide (BUMEX) 1 MG tablet; Take 1 tablet by mouth 2 times daily  -     Digoxin 62.5 MCG TABS; Take 1/2 tablet daily  -     CBC Auto Differential; Future  -     Comprehensive Metabolic Panel; Future  -     HEMOGLOBIN A1C; Future  -     URIC ACID; Future  -     LIPID PANEL; Future    Stage 3a chronic kidney disease  -     CBC Auto Differential; Future  -     Comprehensive Metabolic Panel; Future  -     HEMOGLOBIN A1C; Future  -     URIC ACID; Future  -     LIPID PANEL; Future    Dissection of thoracic aorta (HCC)  -     CBC Auto Differential; Future  -     Comprehensive Metabolic Panel;  Future  - HEMOGLOBIN A1C; Future  -     URIC ACID; Future  -     LIPID PANEL; Future    KYARA (obstructive sleep apnea)    Anticoagulated on Coumadin  -     POCT INR  -     warfarin (COUMADIN) 6 MG tablet; Take 1 tablet by mouth daily    Essential hypertension  -     hydrALAZINE (APRESOLINE) 50 MG tablet; Take 1 tablet by mouth 3 times daily  -     carvedilol (COREG) 25 MG tablet; Take 1 tablet by mouth 2 times daily (with meals)  -     amLODIPine (NORVASC) 5 MG tablet; Take 1 tablet by mouth daily  -     CBC Auto Differential; Future  -     Comprehensive Metabolic Panel; Future  -     HEMOGLOBIN A1C; Future  -     URIC ACID; Future  -     LIPID PANEL; Future    Abnormal finding of blood chemistry, unspecified   -     HEMOGLOBIN A1C; Future    Additional plan and future considerations:   As above. Continue follow-up with pulmonology and cardiology as instructed. Continue current medications.   Return to office in 3 to 4 weeks for nursing visit for INR check and office visit in 6 months or sooner if needed    Future Appointments   Date Time Provider Chrissy Valdez   3/18/2021 10:00 AM [facility] Mcfarlane [facility]   8/23/2021 10:00 AM DO Prabha Rivera DO on 2/25/2021 at 3:51 PM

## 2021-02-26 ASSESSMENT — ENCOUNTER SYMPTOMS
COUGH: 0
DIARRHEA: 0
CONSTIPATION: 0
SHORTNESS OF BREATH: 0
NAUSEA: 0
VOMITING: 0
ABDOMINAL PAIN: 0

## 2021-03-04 ENCOUNTER — APPOINTMENT (OUTPATIENT)
Dept: CT IMAGING | Age: 66
DRG: 377 | End: 2021-03-04
Payer: MEDICARE

## 2021-03-04 ENCOUNTER — HOSPITAL ENCOUNTER (INPATIENT)
Age: 66
LOS: 6 days | Discharge: HOME | DRG: 377 | End: 2021-03-10
Attending: EMERGENCY MEDICINE | Admitting: FAMILY MEDICINE
Payer: MEDICARE

## 2021-03-04 ENCOUNTER — APPOINTMENT (OUTPATIENT)
Dept: GENERAL RADIOLOGY | Age: 66
DRG: 377 | End: 2021-03-04
Payer: MEDICARE

## 2021-03-04 DIAGNOSIS — K92.2 GASTROINTESTINAL HEMORRHAGE, UNSPECIFIED GASTROINTESTINAL HEMORRHAGE TYPE: Primary | ICD-10-CM

## 2021-03-04 DIAGNOSIS — D62 ACUTE BLOOD LOSS ANEMIA: ICD-10-CM

## 2021-03-04 DIAGNOSIS — I50.42 CHRONIC COMBINED SYSTOLIC AND DIASTOLIC CHF (CONGESTIVE HEART FAILURE) (HCC): ICD-10-CM

## 2021-03-04 LAB
ABO/RH: NORMAL
ALBUMIN SERPL-MCNC: 3.7 G/DL (ref 3.5–5.2)
ALP BLD-CCNC: 53 U/L (ref 40–129)
ALT SERPL-CCNC: 15 U/L (ref 0–40)
ANION GAP SERPL CALCULATED.3IONS-SCNC: 13 MMOL/L (ref 7–16)
ANTIBODY SCREEN: NORMAL
AST SERPL-CCNC: 21 U/L (ref 0–39)
BASOPHILS ABSOLUTE: 0.03 E9/L (ref 0–0.2)
BASOPHILS RELATIVE PERCENT: 0.3 % (ref 0–2)
BILIRUB SERPL-MCNC: 0.6 MG/DL (ref 0–1.2)
BUN BLDV-MCNC: 83 MG/DL (ref 8–23)
CALCIUM SERPL-MCNC: 9.4 MG/DL (ref 8.6–10.2)
CHLORIDE BLD-SCNC: 104 MMOL/L (ref 98–107)
CO2: 22 MMOL/L (ref 22–29)
CREAT SERPL-MCNC: 1.3 MG/DL (ref 0.7–1.2)
EOSINOPHILS ABSOLUTE: 0.18 E9/L (ref 0.05–0.5)
EOSINOPHILS RELATIVE PERCENT: 1.9 % (ref 0–6)
GFR AFRICAN AMERICAN: >60
GFR NON-AFRICAN AMERICAN: 55 ML/MIN/1.73
GLUCOSE BLD-MCNC: 146 MG/DL (ref 74–99)
HCT VFR BLD CALC: 24.6 % (ref 37–54)
HEMOGLOBIN: 8.1 G/DL (ref 12.5–16.5)
IMMATURE GRANULOCYTES #: 0.07 E9/L
IMMATURE GRANULOCYTES %: 0.7 % (ref 0–5)
INR BLD: 2.4
LACTIC ACID: 2.4 MMOL/L (ref 0.5–2.2)
LIPASE: 34 U/L (ref 13–60)
LYMPHOCYTES ABSOLUTE: 0.97 E9/L (ref 1.5–4)
LYMPHOCYTES RELATIVE PERCENT: 10.3 % (ref 20–42)
MCH RBC QN AUTO: 29.9 PG (ref 26–35)
MCHC RBC AUTO-ENTMCNC: 32.9 % (ref 32–34.5)
MCV RBC AUTO: 90.8 FL (ref 80–99.9)
MONOCYTES ABSOLUTE: 0.84 E9/L (ref 0.1–0.95)
MONOCYTES RELATIVE PERCENT: 8.9 % (ref 2–12)
NEUTROPHILS ABSOLUTE: 7.33 E9/L (ref 1.8–7.3)
NEUTROPHILS RELATIVE PERCENT: 77.9 % (ref 43–80)
PDW BLD-RTO: 16.5 FL (ref 11.5–15)
PLATELET # BLD: 164 E9/L (ref 130–450)
PMV BLD AUTO: 10.5 FL (ref 7–12)
POTASSIUM SERPL-SCNC: 4.6 MMOL/L (ref 3.5–5)
PROTHROMBIN TIME: 26.3 SEC (ref 9.3–12.4)
RBC # BLD: 2.71 E12/L (ref 3.8–5.8)
SODIUM BLD-SCNC: 139 MMOL/L (ref 132–146)
TOTAL PROTEIN: 6.2 G/DL (ref 6.4–8.3)
TROPONIN: 0.01 NG/ML (ref 0–0.03)
WBC # BLD: 9.4 E9/L (ref 4.5–11.5)

## 2021-03-04 PROCEDURE — 85025 COMPLETE CBC W/AUTO DIFF WBC: CPT

## 2021-03-04 PROCEDURE — 74177 CT ABD & PELVIS W/CONTRAST: CPT

## 2021-03-04 PROCEDURE — 2580000003 HC RX 258: Performed by: RADIOLOGY

## 2021-03-04 PROCEDURE — 93005 ELECTROCARDIOGRAM TRACING: CPT | Performed by: EMERGENCY MEDICINE

## 2021-03-04 PROCEDURE — 83605 ASSAY OF LACTIC ACID: CPT

## 2021-03-04 PROCEDURE — 6360000004 HC RX CONTRAST MEDICATION: Performed by: RADIOLOGY

## 2021-03-04 PROCEDURE — 96375 TX/PRO/DX INJ NEW DRUG ADDON: CPT

## 2021-03-04 PROCEDURE — 96374 THER/PROPH/DIAG INJ IV PUSH: CPT

## 2021-03-04 PROCEDURE — 2580000003 HC RX 258: Performed by: EMERGENCY MEDICINE

## 2021-03-04 PROCEDURE — 86850 RBC ANTIBODY SCREEN: CPT

## 2021-03-04 PROCEDURE — 6360000002 HC RX W HCPCS: Performed by: EMERGENCY MEDICINE

## 2021-03-04 PROCEDURE — 80053 COMPREHEN METABOLIC PANEL: CPT

## 2021-03-04 PROCEDURE — P9059 PLASMA, FRZ BETWEEN 8-24HOUR: HCPCS

## 2021-03-04 PROCEDURE — 71045 X-RAY EXAM CHEST 1 VIEW: CPT

## 2021-03-04 PROCEDURE — 84484 ASSAY OF TROPONIN QUANT: CPT

## 2021-03-04 PROCEDURE — P9016 RBC LEUKOCYTES REDUCED: HCPCS

## 2021-03-04 PROCEDURE — 85610 PROTHROMBIN TIME: CPT

## 2021-03-04 PROCEDURE — 83690 ASSAY OF LIPASE: CPT

## 2021-03-04 PROCEDURE — C9113 INJ PANTOPRAZOLE SODIUM, VIA: HCPCS | Performed by: EMERGENCY MEDICINE

## 2021-03-04 PROCEDURE — 99284 EMERGENCY DEPT VISIT MOD MDM: CPT

## 2021-03-04 PROCEDURE — 86923 COMPATIBILITY TEST ELECTRIC: CPT

## 2021-03-04 PROCEDURE — 86900 BLOOD TYPING SEROLOGIC ABO: CPT

## 2021-03-04 PROCEDURE — 2060000000 HC ICU INTERMEDIATE R&B

## 2021-03-04 PROCEDURE — 86901 BLOOD TYPING SEROLOGIC RH(D): CPT

## 2021-03-04 PROCEDURE — 71260 CT THORAX DX C+: CPT

## 2021-03-04 RX ORDER — SODIUM CHLORIDE 9 MG/ML
INJECTION, SOLUTION INTRAVENOUS CONTINUOUS
Status: DISCONTINUED | OUTPATIENT
Start: 2021-03-04 | End: 2021-03-05

## 2021-03-04 RX ORDER — SODIUM CHLORIDE 0.9 % (FLUSH) 0.9 %
10 SYRINGE (ML) INJECTION PRN
Status: DISCONTINUED | OUTPATIENT
Start: 2021-03-04 | End: 2021-03-10 | Stop reason: HOSPADM

## 2021-03-04 RX ORDER — SODIUM CHLORIDE 0.9 % (FLUSH) 0.9 %
10 SYRINGE (ML) INJECTION
Status: COMPLETED | OUTPATIENT
Start: 2021-03-04 | End: 2021-03-04

## 2021-03-04 RX ORDER — ACETAMINOPHEN 325 MG/1
650 TABLET ORAL EVERY 6 HOURS PRN
Status: DISCONTINUED | OUTPATIENT
Start: 2021-03-04 | End: 2021-03-10 | Stop reason: HOSPADM

## 2021-03-04 RX ORDER — DIGOXIN 0.06 MG/1
31.25 TABLET ORAL DAILY
Status: DISCONTINUED | OUTPATIENT
Start: 2021-03-05 | End: 2021-03-05

## 2021-03-04 RX ORDER — SODIUM CHLORIDE 9 MG/ML
10 INJECTION INTRAVENOUS EVERY 12 HOURS
Status: DISCONTINUED | OUTPATIENT
Start: 2021-03-05 | End: 2021-03-05

## 2021-03-04 RX ORDER — ACETAMINOPHEN 650 MG/1
650 SUPPOSITORY RECTAL EVERY 6 HOURS PRN
Status: DISCONTINUED | OUTPATIENT
Start: 2021-03-04 | End: 2021-03-10 | Stop reason: HOSPADM

## 2021-03-04 RX ORDER — PANTOPRAZOLE SODIUM 40 MG/10ML
40 INJECTION, POWDER, LYOPHILIZED, FOR SOLUTION INTRAVENOUS EVERY 12 HOURS
Status: DISCONTINUED | OUTPATIENT
Start: 2021-03-05 | End: 2021-03-05

## 2021-03-04 RX ORDER — ONDANSETRON 2 MG/ML
4 INJECTION INTRAMUSCULAR; INTRAVENOUS EVERY 6 HOURS PRN
Status: DISCONTINUED | OUTPATIENT
Start: 2021-03-04 | End: 2021-03-10 | Stop reason: HOSPADM

## 2021-03-04 RX ORDER — 0.9 % SODIUM CHLORIDE 0.9 %
500 INTRAVENOUS SOLUTION INTRAVENOUS ONCE
Status: COMPLETED | OUTPATIENT
Start: 2021-03-04 | End: 2021-03-05

## 2021-03-04 RX ORDER — PANTOPRAZOLE SODIUM 40 MG/10ML
40 INJECTION, POWDER, LYOPHILIZED, FOR SOLUTION INTRAVENOUS ONCE
Status: COMPLETED | OUTPATIENT
Start: 2021-03-04 | End: 2021-03-04

## 2021-03-04 RX ORDER — SODIUM CHLORIDE 0.9 % (FLUSH) 0.9 %
10 SYRINGE (ML) INJECTION EVERY 12 HOURS SCHEDULED
Status: DISCONTINUED | OUTPATIENT
Start: 2021-03-04 | End: 2021-03-10 | Stop reason: HOSPADM

## 2021-03-04 RX ADMIN — Medication 10 ML: at 23:28

## 2021-03-04 RX ADMIN — PANTOPRAZOLE SODIUM 40 MG: 40 INJECTION, POWDER, LYOPHILIZED, FOR SOLUTION INTRAVENOUS at 21:51

## 2021-03-04 RX ADMIN — IOPAMIDOL 90 ML: 755 INJECTION, SOLUTION INTRAVENOUS at 23:27

## 2021-03-04 RX ADMIN — SODIUM CHLORIDE: 9 INJECTION, SOLUTION INTRAVENOUS at 21:51

## 2021-03-05 ENCOUNTER — ANESTHESIA EVENT (OUTPATIENT)
Dept: ENDOSCOPY | Age: 66
DRG: 377 | End: 2021-03-05
Payer: MEDICARE

## 2021-03-05 ENCOUNTER — ANESTHESIA (OUTPATIENT)
Dept: ENDOSCOPY | Age: 66
DRG: 377 | End: 2021-03-05
Payer: MEDICARE

## 2021-03-05 ENCOUNTER — APPOINTMENT (OUTPATIENT)
Dept: GENERAL RADIOLOGY | Age: 66
DRG: 377 | End: 2021-03-05
Payer: MEDICARE

## 2021-03-05 VITALS — OXYGEN SATURATION: 100 % | DIASTOLIC BLOOD PRESSURE: 80 MMHG | SYSTOLIC BLOOD PRESSURE: 95 MMHG

## 2021-03-05 PROBLEM — I95.89 HYPOTENSION DUE TO HYPOVOLEMIA: Status: ACTIVE | Noted: 2021-03-05

## 2021-03-05 PROBLEM — Z86.79 S/P AAA REPAIR: Status: ACTIVE | Noted: 2021-03-05

## 2021-03-05 PROBLEM — E86.1 HYPOTENSION DUE TO HYPOVOLEMIA: Status: ACTIVE | Noted: 2021-03-05

## 2021-03-05 PROBLEM — Z98.890 S/P AAA REPAIR: Status: ACTIVE | Noted: 2021-03-05

## 2021-03-05 PROBLEM — I50.42 CHRONIC COMBINED SYSTOLIC AND DIASTOLIC CHF (CONGESTIVE HEART FAILURE) (HCC): Status: ACTIVE | Noted: 2019-11-15

## 2021-03-05 PROBLEM — E87.20 LACTIC ACIDOSIS: Status: ACTIVE | Noted: 2021-03-05

## 2021-03-05 PROBLEM — J44.9 COPD (CHRONIC OBSTRUCTIVE PULMONARY DISEASE) (HCC): Status: ACTIVE | Noted: 2021-03-05

## 2021-03-05 PROBLEM — D62 ACUTE BLOOD LOSS ANEMIA: Status: ACTIVE | Noted: 2021-03-05

## 2021-03-05 PROBLEM — Z95.1 HX OF CABG: Status: ACTIVE | Noted: 2021-03-05

## 2021-03-05 PROBLEM — I25.10 CAD (CORONARY ARTERY DISEASE): Status: ACTIVE | Noted: 2021-03-05

## 2021-03-05 LAB
ALBUMIN SERPL-MCNC: 2.9 G/DL (ref 3.5–5.2)
ALBUMIN SERPL-MCNC: 3.1 G/DL (ref 3.5–5.2)
ALP BLD-CCNC: 33 U/L (ref 40–129)
ALP BLD-CCNC: 40 U/L (ref 40–129)
ALT SERPL-CCNC: 13 U/L (ref 0–40)
ALT SERPL-CCNC: 13 U/L (ref 0–40)
AMMONIA: 18 UMOL/L (ref 16–60)
ANGLE (CLOT STRENGTH): 67.6 DEGREE (ref 59–74)
ANION GAP SERPL CALCULATED.3IONS-SCNC: 10 MMOL/L (ref 7–16)
ANION GAP SERPL CALCULATED.3IONS-SCNC: 5 MMOL/L (ref 7–16)
ANISOCYTOSIS: ABNORMAL
AST SERPL-CCNC: 14 U/L (ref 0–39)
AST SERPL-CCNC: 22 U/L (ref 0–39)
BASOPHILS ABSOLUTE: 0 E9/L (ref 0–0.2)
BASOPHILS ABSOLUTE: 0.02 E9/L (ref 0–0.2)
BASOPHILS RELATIVE PERCENT: 0 % (ref 0–2)
BASOPHILS RELATIVE PERCENT: 0.2 % (ref 0–2)
BILIRUB SERPL-MCNC: 0.4 MG/DL (ref 0–1.2)
BILIRUB SERPL-MCNC: 0.8 MG/DL (ref 0–1.2)
BLOOD BANK DISPENSE STATUS: NORMAL
BLOOD BANK PRODUCT CODE: NORMAL
BPU ID: NORMAL
BUN BLDV-MCNC: 61 MG/DL (ref 8–23)
BUN BLDV-MCNC: 78 MG/DL (ref 8–23)
BURR CELLS: ABNORMAL
CALCIUM IONIZED: 1.12 MMOL/L (ref 1.15–1.33)
CALCIUM SERPL-MCNC: 7.1 MG/DL (ref 8.6–10.2)
CALCIUM SERPL-MCNC: 8.3 MG/DL (ref 8.6–10.2)
CHLORIDE BLD-SCNC: 110 MMOL/L (ref 98–107)
CHLORIDE BLD-SCNC: 118 MMOL/L (ref 98–107)
CO2: 18 MMOL/L (ref 22–29)
CO2: 27 MMOL/L (ref 22–29)
CREAT SERPL-MCNC: 1 MG/DL (ref 0.7–1.2)
CREAT SERPL-MCNC: 1.3 MG/DL (ref 0.7–1.2)
DESCRIPTION BLOOD BANK: NORMAL
DIGOXIN LEVEL: 0.4 NG/ML (ref 0.8–2)
EKG ATRIAL RATE: 79 BPM
EKG Q-T INTERVAL: 350 MS
EKG QRS DURATION: 106 MS
EKG QTC CALCULATION (BAZETT): 423 MS
EKG R AXIS: 5 DEGREES
EKG T AXIS: 13 DEGREES
EKG VENTRICULAR RATE: 88 BPM
EOSINOPHILS ABSOLUTE: 0 E9/L (ref 0.05–0.5)
EOSINOPHILS ABSOLUTE: 0.03 E9/L (ref 0.05–0.5)
EOSINOPHILS RELATIVE PERCENT: 0 % (ref 0–6)
EOSINOPHILS RELATIVE PERCENT: 0.4 % (ref 0–6)
EPL-TEG: 0 % (ref 0–15)
G-TEG: 6.5 K D/SC (ref 4.5–11)
GFR AFRICAN AMERICAN: >60
GFR AFRICAN AMERICAN: >60
GFR NON-AFRICAN AMERICAN: 55 ML/MIN/1.73
GFR NON-AFRICAN AMERICAN: >60 ML/MIN/1.73
GLUCOSE BLD-MCNC: 114 MG/DL (ref 74–99)
GLUCOSE BLD-MCNC: 135 MG/DL (ref 74–99)
HCT VFR BLD CALC: 18.2 % (ref 37–54)
HCT VFR BLD CALC: 21.4 % (ref 37–54)
HCT VFR BLD CALC: 23.9 % (ref 37–54)
HEMOGLOBIN: 5.8 G/DL (ref 12.5–16.5)
HEMOGLOBIN: 6.9 G/DL (ref 12.5–16.5)
HEMOGLOBIN: 7.7 G/DL (ref 12.5–16.5)
IMMATURE GRANULOCYTES #: 0.05 E9/L
IMMATURE GRANULOCYTES %: 0.6 % (ref 0–5)
INR BLD: 1.4
INR BLD: 2.4
IRON SATURATION: 44 % (ref 20–55)
IRON: 92 MCG/DL (ref 59–158)
K (CLOTTING TIME): 1.7 MIN (ref 1–3)
LACTIC ACID: 1.2 MMOL/L (ref 0.5–2.2)
LACTIC ACID: 1.8 MMOL/L (ref 0.5–2.2)
LACTIC ACID: 2 MMOL/L (ref 0.5–2.2)
LY30 (FIBRINOLYSIS): 0 % (ref 0–8)
LYMPHOCYTES ABSOLUTE: 0.32 E9/L (ref 1.5–4)
LYMPHOCYTES ABSOLUTE: 0.75 E9/L (ref 1.5–4)
LYMPHOCYTES RELATIVE PERCENT: 2.6 % (ref 20–42)
LYMPHOCYTES RELATIVE PERCENT: 8.9 % (ref 20–42)
MA (MAX AMPLITUDE): 56.4 MM (ref 50–70)
MAGNESIUM: 1.8 MG/DL (ref 1.6–2.6)
MCH RBC QN AUTO: 29.9 PG (ref 26–35)
MCH RBC QN AUTO: 30.6 PG (ref 26–35)
MCHC RBC AUTO-ENTMCNC: 31.9 % (ref 32–34.5)
MCHC RBC AUTO-ENTMCNC: 32.2 % (ref 32–34.5)
MCV RBC AUTO: 93.8 FL (ref 80–99.9)
MCV RBC AUTO: 94.8 FL (ref 80–99.9)
MONOCYTES ABSOLUTE: 0 E9/L (ref 0.1–0.95)
MONOCYTES ABSOLUTE: 0.75 E9/L (ref 0.1–0.95)
MONOCYTES RELATIVE PERCENT: 0.9 % (ref 2–12)
MONOCYTES RELATIVE PERCENT: 8.9 % (ref 2–12)
NEUTROPHILS ABSOLUTE: 10.28 E9/L (ref 1.8–7.3)
NEUTROPHILS ABSOLUTE: 6.81 E9/L (ref 1.8–7.3)
NEUTROPHILS RELATIVE PERCENT: 81 % (ref 43–80)
NEUTROPHILS RELATIVE PERCENT: 97.4 % (ref 43–80)
OVALOCYTES: ABNORMAL
PDW BLD-RTO: 15.9 FL (ref 11.5–15)
PDW BLD-RTO: 16.9 FL (ref 11.5–15)
PHOSPHORUS: 2.8 MG/DL (ref 2.5–4.5)
PLATELET # BLD: 103 E9/L (ref 130–450)
PLATELET # BLD: 115 E9/L (ref 130–450)
PMV BLD AUTO: 10.2 FL (ref 7–12)
PMV BLD AUTO: 10.7 FL (ref 7–12)
POIKILOCYTES: ABNORMAL
POLYCHROMASIA: ABNORMAL
POTASSIUM REFLEX MAGNESIUM: 4.3 MMOL/L (ref 3.5–5)
POTASSIUM SERPL-SCNC: 4.4 MMOL/L (ref 3.5–5)
PROTHROMBIN TIME: 15.5 SEC (ref 9.3–12.4)
PROTHROMBIN TIME: 26 SEC (ref 9.3–12.4)
R (REACTION TIME): 4 MIN (ref 5–10)
RBC # BLD: 1.94 E12/L (ref 3.8–5.8)
RBC # BLD: 2.52 E12/L (ref 3.8–5.8)
SARS-COV-2, NAAT: NOT DETECTED
SODIUM BLD-SCNC: 142 MMOL/L (ref 132–146)
SODIUM BLD-SCNC: 146 MMOL/L (ref 132–146)
TOTAL IRON BINDING CAPACITY: 208 MCG/DL (ref 250–450)
TOTAL PROTEIN: 4.8 G/DL (ref 6.4–8.3)
TOTAL PROTEIN: 4.9 G/DL (ref 6.4–8.3)
WBC # BLD: 10.6 E9/L (ref 4.5–11.5)
WBC # BLD: 8.4 E9/L (ref 4.5–11.5)

## 2021-03-05 PROCEDURE — 83735 ASSAY OF MAGNESIUM: CPT

## 2021-03-05 PROCEDURE — 6360000002 HC RX W HCPCS: Performed by: EMERGENCY MEDICINE

## 2021-03-05 PROCEDURE — 6360000002 HC RX W HCPCS: Performed by: FAMILY MEDICINE

## 2021-03-05 PROCEDURE — 43255 EGD CONTROL BLEEDING ANY: CPT | Performed by: SURGERY

## 2021-03-05 PROCEDURE — 85347 COAGULATION TIME ACTIVATED: CPT

## 2021-03-05 PROCEDURE — 82140 ASSAY OF AMMONIA: CPT

## 2021-03-05 PROCEDURE — 2000000000 HC ICU R&B

## 2021-03-05 PROCEDURE — 6360000002 HC RX W HCPCS: Performed by: SURGERY

## 2021-03-05 PROCEDURE — 87635 SARS-COV-2 COVID-19 AMP PRB: CPT

## 2021-03-05 PROCEDURE — 02HV33Z INSERTION OF INFUSION DEVICE INTO SUPERIOR VENA CAVA, PERCUTANEOUS APPROACH: ICD-10-PCS | Performed by: RADIOLOGY

## 2021-03-05 PROCEDURE — C9113 INJ PANTOPRAZOLE SODIUM, VIA: HCPCS | Performed by: STUDENT IN AN ORGANIZED HEALTH CARE EDUCATION/TRAINING PROGRAM

## 2021-03-05 PROCEDURE — 80162 ASSAY OF DIGOXIN TOTAL: CPT

## 2021-03-05 PROCEDURE — 85576 BLOOD PLATELET AGGREGATION: CPT

## 2021-03-05 PROCEDURE — 83605 ASSAY OF LACTIC ACID: CPT

## 2021-03-05 PROCEDURE — 71045 X-RAY EXAM CHEST 1 VIEW: CPT

## 2021-03-05 PROCEDURE — 6360000002 HC RX W HCPCS: Performed by: STUDENT IN AN ORGANIZED HEALTH CARE EDUCATION/TRAINING PROGRAM

## 2021-03-05 PROCEDURE — 6360000002 HC RX W HCPCS: Performed by: ANESTHESIOLOGY

## 2021-03-05 PROCEDURE — 85014 HEMATOCRIT: CPT

## 2021-03-05 PROCEDURE — 99291 CRITICAL CARE FIRST HOUR: CPT | Performed by: SURGERY

## 2021-03-05 PROCEDURE — 2580000003 HC RX 258: Performed by: FAMILY MEDICINE

## 2021-03-05 PROCEDURE — 7100000000 HC PACU RECOVERY - FIRST 15 MIN: Performed by: SURGERY

## 2021-03-05 PROCEDURE — 7100000001 HC PACU RECOVERY - ADDTL 15 MIN: Performed by: SURGERY

## 2021-03-05 PROCEDURE — 85384 FIBRINOGEN ACTIVITY: CPT

## 2021-03-05 PROCEDURE — 84100 ASSAY OF PHOSPHORUS: CPT

## 2021-03-05 PROCEDURE — 2580000003 HC RX 258: Performed by: STUDENT IN AN ORGANIZED HEALTH CARE EDUCATION/TRAINING PROGRAM

## 2021-03-05 PROCEDURE — 82330 ASSAY OF CALCIUM: CPT

## 2021-03-05 PROCEDURE — 36415 COLL VENOUS BLD VENIPUNCTURE: CPT

## 2021-03-05 PROCEDURE — 93010 ELECTROCARDIOGRAM REPORT: CPT | Performed by: INTERNAL MEDICINE

## 2021-03-05 PROCEDURE — 85610 PROTHROMBIN TIME: CPT

## 2021-03-05 PROCEDURE — 94660 CPAP INITIATION&MGMT: CPT

## 2021-03-05 PROCEDURE — 36430 TRANSFUSION BLD/BLD COMPNT: CPT

## 2021-03-05 PROCEDURE — 80053 COMPREHEN METABOLIC PANEL: CPT

## 2021-03-05 PROCEDURE — 36556 INSERT NON-TUNNEL CV CATH: CPT

## 2021-03-05 PROCEDURE — 6360000002 HC RX W HCPCS

## 2021-03-05 PROCEDURE — 2500000003 HC RX 250 WO HCPCS

## 2021-03-05 PROCEDURE — 2500000003 HC RX 250 WO HCPCS: Performed by: STUDENT IN AN ORGANIZED HEALTH CARE EDUCATION/TRAINING PROGRAM

## 2021-03-05 PROCEDURE — P9059 PLASMA, FRZ BETWEEN 8-24HOUR: HCPCS

## 2021-03-05 PROCEDURE — P9016 RBC LEUKOCYTES REDUCED: HCPCS

## 2021-03-05 PROCEDURE — 99222 1ST HOSP IP/OBS MODERATE 55: CPT | Performed by: SURGERY

## 2021-03-05 PROCEDURE — 3700000000 HC ANESTHESIA ATTENDED CARE: Performed by: SURGERY

## 2021-03-05 PROCEDURE — 85025 COMPLETE CBC W/AUTO DIFF WBC: CPT

## 2021-03-05 PROCEDURE — 2709999900 HC NON-CHARGEABLE SUPPLY: Performed by: SURGERY

## 2021-03-05 PROCEDURE — 3609013000 HC EGD TRANSORAL CONTROL BLEEDING ANY METHOD: Performed by: SURGERY

## 2021-03-05 PROCEDURE — 2580000003 HC RX 258: Performed by: EMERGENCY MEDICINE

## 2021-03-05 PROCEDURE — 36592 COLLECT BLOOD FROM PICC: CPT

## 2021-03-05 PROCEDURE — 85018 HEMOGLOBIN: CPT

## 2021-03-05 PROCEDURE — 83540 ASSAY OF IRON: CPT

## 2021-03-05 PROCEDURE — 0W3P8ZZ CONTROL BLEEDING IN GASTROINTESTINAL TRACT, VIA NATURAL OR ARTIFICIAL OPENING ENDOSCOPIC: ICD-10-PCS | Performed by: SURGERY

## 2021-03-05 PROCEDURE — 36556 INSERT NON-TUNNEL CV CATH: CPT | Performed by: SURGERY

## 2021-03-05 PROCEDURE — 83550 IRON BINDING TEST: CPT

## 2021-03-05 PROCEDURE — 3700000001 HC ADD 15 MINUTES (ANESTHESIA): Performed by: SURGERY

## 2021-03-05 RX ORDER — METOCLOPRAMIDE HYDROCHLORIDE 5 MG/ML
INJECTION INTRAMUSCULAR; INTRAVENOUS
Status: DISCONTINUED
Start: 2021-03-05 | End: 2021-03-05

## 2021-03-05 RX ORDER — ETOMIDATE 2 MG/ML
INJECTION INTRAVENOUS PRN
Status: DISCONTINUED | OUTPATIENT
Start: 2021-03-05 | End: 2021-03-05 | Stop reason: SDUPTHER

## 2021-03-05 RX ORDER — SODIUM CHLORIDE 9 MG/ML
INJECTION, SOLUTION INTRAVENOUS PRN
Status: DISCONTINUED | OUTPATIENT
Start: 2021-03-05 | End: 2021-03-08

## 2021-03-05 RX ORDER — LIDOCAINE HYDROCHLORIDE 20 MG/ML
INJECTION, SOLUTION INTRAVENOUS PRN
Status: DISCONTINUED | OUTPATIENT
Start: 2021-03-05 | End: 2021-03-05 | Stop reason: SDUPTHER

## 2021-03-05 RX ORDER — MAGNESIUM SULFATE IN WATER 40 MG/ML
2000 INJECTION, SOLUTION INTRAVENOUS ONCE
Status: COMPLETED | OUTPATIENT
Start: 2021-03-05 | End: 2021-03-05

## 2021-03-05 RX ORDER — FENTANYL CITRATE 50 UG/ML
INJECTION, SOLUTION INTRAMUSCULAR; INTRAVENOUS PRN
Status: DISCONTINUED | OUTPATIENT
Start: 2021-03-05 | End: 2021-03-05 | Stop reason: SDUPTHER

## 2021-03-05 RX ORDER — SUCCINYLCHOLINE/SOD CL,ISO/PF 100 MG/5ML
SYRINGE (ML) INTRAVENOUS PRN
Status: DISCONTINUED | OUTPATIENT
Start: 2021-03-05 | End: 2021-03-05 | Stop reason: SDUPTHER

## 2021-03-05 RX ORDER — ONDANSETRON 2 MG/ML
INJECTION INTRAMUSCULAR; INTRAVENOUS PRN
Status: DISCONTINUED | OUTPATIENT
Start: 2021-03-05 | End: 2021-03-05 | Stop reason: SDUPTHER

## 2021-03-05 RX ORDER — DIGOXIN 125 MCG
62.5 TABLET ORAL DAILY
Status: DISCONTINUED | OUTPATIENT
Start: 2021-03-05 | End: 2021-03-10 | Stop reason: HOSPADM

## 2021-03-05 RX ORDER — MEPERIDINE HYDROCHLORIDE 25 MG/ML
12.5 INJECTION INTRAMUSCULAR; INTRAVENOUS; SUBCUTANEOUS EVERY 5 MIN PRN
Status: DISCONTINUED | OUTPATIENT
Start: 2021-03-05 | End: 2021-03-05 | Stop reason: HOSPADM

## 2021-03-05 RX ORDER — SUCRALFATE 1 G/1
1 TABLET ORAL EVERY 6 HOURS SCHEDULED
Status: DISCONTINUED | OUTPATIENT
Start: 2021-03-05 | End: 2021-03-10 | Stop reason: HOSPADM

## 2021-03-05 RX ORDER — DIGOXIN 125 MCG
62.5 TABLET ORAL DAILY
COMMUNITY
End: 2021-05-27

## 2021-03-05 RX ORDER — EPINEPHRINE 0.1 MG/ML
SYRINGE (ML) INJECTION PRN
Status: DISCONTINUED | OUTPATIENT
Start: 2021-03-05 | End: 2021-03-05 | Stop reason: HOSPADM

## 2021-03-05 RX ORDER — SODIUM CHLORIDE, SODIUM LACTATE, POTASSIUM CHLORIDE, CALCIUM CHLORIDE 600; 310; 30; 20 MG/100ML; MG/100ML; MG/100ML; MG/100ML
INJECTION, SOLUTION INTRAVENOUS CONTINUOUS
Status: DISCONTINUED | OUTPATIENT
Start: 2021-03-05 | End: 2021-03-07

## 2021-03-05 RX ORDER — DIPHENHYDRAMINE HYDROCHLORIDE 50 MG/ML
12.5 INJECTION INTRAMUSCULAR; INTRAVENOUS
Status: DISCONTINUED | OUTPATIENT
Start: 2021-03-05 | End: 2021-03-05 | Stop reason: HOSPADM

## 2021-03-05 RX ORDER — DEXAMETHASONE SODIUM PHOSPHATE 10 MG/ML
INJECTION, SOLUTION INTRAMUSCULAR; INTRAVENOUS PRN
Status: DISCONTINUED | OUTPATIENT
Start: 2021-03-05 | End: 2021-03-05 | Stop reason: SDUPTHER

## 2021-03-05 RX ORDER — PANTOPRAZOLE SODIUM 40 MG/10ML
40 INJECTION, POWDER, LYOPHILIZED, FOR SOLUTION INTRAVENOUS 2 TIMES DAILY
Status: DISCONTINUED | OUTPATIENT
Start: 2021-03-08 | End: 2021-03-05

## 2021-03-05 RX ORDER — SODIUM CHLORIDE 9 MG/ML
10 INJECTION INTRAVENOUS 2 TIMES DAILY
Status: DISCONTINUED | OUTPATIENT
Start: 2021-03-08 | End: 2021-03-05

## 2021-03-05 RX ORDER — PANTOPRAZOLE SODIUM 40 MG/10ML
40 INJECTION, POWDER, LYOPHILIZED, FOR SOLUTION INTRAVENOUS 2 TIMES DAILY
Status: DISCONTINUED | OUTPATIENT
Start: 2021-03-08 | End: 2021-03-07 | Stop reason: SDUPTHER

## 2021-03-05 RX ORDER — SODIUM CHLORIDE 9 MG/ML
INJECTION, SOLUTION INTRAVENOUS PRN
Status: DISCONTINUED | OUTPATIENT
Start: 2021-03-05 | End: 2021-03-05

## 2021-03-05 RX ORDER — METOCLOPRAMIDE HYDROCHLORIDE 5 MG/ML
INJECTION INTRAMUSCULAR; INTRAVENOUS PRN
Status: DISCONTINUED | OUTPATIENT
Start: 2021-03-05 | End: 2021-03-05 | Stop reason: SDUPTHER

## 2021-03-05 RX ADMIN — METOCLOPRAMIDE 5 MG: 5 INJECTION, SOLUTION INTRAMUSCULAR; INTRAVENOUS at 08:51

## 2021-03-05 RX ADMIN — ONDANSETRON 4 MG: 2 INJECTION INTRAMUSCULAR; INTRAVENOUS at 01:48

## 2021-03-05 RX ADMIN — SODIUM CHLORIDE: 9 INJECTION, SOLUTION INTRAVENOUS at 01:49

## 2021-03-05 RX ADMIN — SODIUM CHLORIDE, POTASSIUM CHLORIDE, SODIUM LACTATE AND CALCIUM CHLORIDE: 600; 310; 30; 20 INJECTION, SOLUTION INTRAVENOUS at 15:20

## 2021-03-05 RX ADMIN — Medication 60 MG: at 08:04

## 2021-03-05 RX ADMIN — CALCIUM GLUCONATE 1000 MG: 98 INJECTION, SOLUTION INTRAVENOUS at 18:12

## 2021-03-05 RX ADMIN — LIDOCAINE HYDROCHLORIDE 100 MG: 20 INJECTION, SOLUTION INTRAVENOUS at 08:04

## 2021-03-05 RX ADMIN — PHYTONADIONE 10 MG: 10 INJECTION, EMULSION INTRAMUSCULAR; INTRAVENOUS; SUBCUTANEOUS at 01:49

## 2021-03-05 RX ADMIN — ETOMIDATE 10 MG: 2 INJECTION, SOLUTION INTRAVENOUS at 08:04

## 2021-03-05 RX ADMIN — SODIUM CHLORIDE 500 ML: 9 INJECTION, SOLUTION INTRAVENOUS at 02:36

## 2021-03-05 RX ADMIN — FENTANYL CITRATE 50 MCG: 50 INJECTION, SOLUTION INTRAMUSCULAR; INTRAVENOUS at 08:04

## 2021-03-05 RX ADMIN — SODIUM PHOSPHATE, MONOBASIC, MONOHYDRATE AND SODIUM PHOSPHATE, DIBASIC, ANHYDROUS 15 MMOL: 276; 142 INJECTION, SOLUTION INTRAVENOUS at 18:15

## 2021-03-05 RX ADMIN — PANTOPRAZOLE SODIUM 8 MG/HR: 40 INJECTION, POWDER, FOR SOLUTION INTRAVENOUS at 17:51

## 2021-03-05 RX ADMIN — Medication 10 ML: at 12:16

## 2021-03-05 RX ADMIN — ONDANSETRON HYDROCHLORIDE 4 MG: 2 INJECTION, SOLUTION INTRAMUSCULAR; INTRAVENOUS at 08:16

## 2021-03-05 RX ADMIN — MAGNESIUM SULFATE HEPTAHYDRATE 2000 MG: 40 INJECTION, SOLUTION INTRAVENOUS at 17:20

## 2021-03-05 RX ADMIN — HYDROMORPHONE HYDROCHLORIDE 0.5 MG: 1 INJECTION, SOLUTION INTRAMUSCULAR; INTRAVENOUS; SUBCUTANEOUS at 09:05

## 2021-03-05 RX ADMIN — DEXAMETHASONE SODIUM PHOSPHATE 10 MG: 10 INJECTION INTRAMUSCULAR; INTRAVENOUS at 08:16

## 2021-03-05 RX ADMIN — Medication 10 ML: at 22:14

## 2021-03-05 ASSESSMENT — PULMONARY FUNCTION TESTS
PIF_VALUE: 24
PIF_VALUE: 21
PIF_VALUE: 1
PIF_VALUE: 21
PIF_VALUE: 21
PIF_VALUE: 2
PIF_VALUE: 2
PIF_VALUE: 24
PIF_VALUE: 23
PIF_VALUE: 21
PIF_VALUE: 23
PIF_VALUE: 25
PIF_VALUE: 21
PIF_VALUE: 25
PIF_VALUE: 24
PIF_VALUE: 25
PIF_VALUE: 1
PIF_VALUE: 21
PIF_VALUE: 21
PIF_VALUE: 25

## 2021-03-05 ASSESSMENT — LIFESTYLE VARIABLES: SMOKING_STATUS: 0

## 2021-03-05 ASSESSMENT — PAIN SCALES - GENERAL
PAINLEVEL_OUTOF10: 0
PAINLEVEL_OUTOF10: 9
PAINLEVEL_OUTOF10: 0

## 2021-03-05 NOTE — PROCEDURES
David Devine is a 74 y.o. male patient. 1. Gastrointestinal hemorrhage, unspecified gastrointestinal hemorrhage type      Past Medical History:   Diagnosis Date    [facility]     Aortic dissection (HCC)     Atrial fibrillation (HCC)     CAD (coronary artery disease)     CHF (congestive heart failure) ([facility] [address] 5/03    severely impaired LV systolic function and CHF, EF 25-30%    CKD (chronic kidney disease)     H/O cardiovascular stress test 5/03    No evidence of stress-induced ischemia    H/O Doppler echocardiogram 5/04/10    [facility], mildly dilated LV, mod concentric LVH, normal LV systolic function, mod dilated left atrium, trace MR    Hypertension     Ischemic cardiomyopathy     Obesity     Pericardial effusion (noninflammatory) 11/15/2019    History: Post-op problem  Assessment: S/P pericardial drain placement in CVICU 12/2 Plan:  Drain dc'ed, no need for post-procedure echo unless pt become symptomatic.  Pleural effusion 11/26/2019    History: Post op problem Assessment: On r/a, left pigtail dc'ed Plan: PO lasix. Denies sob. Desat study done, no home O2 needed    Sarcoidosis     Valvular heart disease      Blood pressure (!) 143/75, pulse 99, temperature 97.3 °F (36.3 °C), temperature source Axillary, resp. rate 20, height 5' 8\" (1.727 m), weight 204 lb (92.5 kg), SpO2 96 %. Central Line    Date/Time: 3/5/2021 4:38 PM  Performed by: Chandler Quintero MD  Authorized by: Chandler Quintero MD   Consent: Written consent obtained.   Risks and benefits: risks, benefits and alternatives were discussed  Consent given by: patient  Patient understanding: patient states understanding of the procedure being performed  Required items: required blood products, implants, devices, and special equipment available  Patient identity confirmed: verbally with patient  Time out: Immediately prior to procedure a \"time out\" was called to verify the correct patient, procedure, equipment, support staff and

## 2021-03-05 NOTE — ED PROVIDER NOTES
HPI:  3/4/21, Time: 9:17 PM JUDY Emmanuel is a 74 y.o. male presenting to the ED for passing dark stools and vomiting blood, beginning hours ago. The complaint has been persistent, moderate in severity, and worsened by nothing. Patient reports he has been passing blood in the stools since this morning as well as then started vomiting dark material.  Patient reports no history of ulcer. He is on Coumadin. He does have a history of repair of thoracic aneurysm. Patient reporting feeling weak. He reports no headache he reports no fall or injury. He reports no upper back pain or lower back pain. ROS:   Pertinent positives and negatives are stated within HPI, all other systems reviewed and are negative.  --------------------------------------------- PAST HISTORY ---------------------------------------------  Past Medical History:  has a past medical history of Acute kidney injury ([facility] [address] Aortic dissection ([facility] [address] Atrial fibrillation ([facility] [address] CAD (coronary artery disease), CHF (congestive heart failure) ([facility] [address] CKD (chronic kidney disease), H/O cardiovascular stress test, H/O Doppler echocardiogram, Hypertension, Ischemic cardiomyopathy, Obesity, Pericardial effusion (noninflammatory), Pleural effusion, Sarcoidosis, and Valvular heart disease. Past Surgical History:  has a past surgical history that includes Tonsillectomy and other surgical history (11/15/2019). Social History:  reports that he quit smoking about 27 years ago. His smoking use included cigarettes, pipe, and cigars. He started smoking about 33 years ago. He has a 2.50 pack-year smoking history. He has never used smokeless tobacco. He reports previous alcohol use. He reports previous drug use. Family History: family history includes Diabetes in his brother, father, and mother; Hypertension in his brother and sister; No Known Problems in his brother. The patients home medications have been reviewed.     Allergies: Patient has no Basophils % 0.3 0.0 - 2.0 %    Neutrophils Absolute 7.33 (H) 1.80 - 7.30 E9/L    Immature Granulocytes # 0.07 E9/L    Lymphocytes Absolute 0.97 (L) 1.50 - 4.00 E9/L    Monocytes Absolute 0.84 0.10 - 0.95 E9/L    Eosinophils Absolute 0.18 0.05 - 0.50 E9/L    Basophils Absolute 0.03 0.00 - 0.20 E9/L   Comprehensive Metabolic Panel   Result Value Ref Range    Sodium 139 132 - 146 mmol/L    Potassium 4.6 3.5 - 5.0 mmol/L    Chloride 104 98 - 107 mmol/L    CO2 22 22 - 29 mmol/L    Anion Gap 13 7 - 16 mmol/L    Glucose 146 (H) 74 - 99 mg/dL    BUN 83 (H) 8 - 23 mg/dL    CREATININE 1.3 (H) 0.7 - 1.2 mg/dL    GFR Non-African American 55 >=60 mL/min/1.73    GFR African American >60     Calcium 9.4 8.6 - 10.2 mg/dL    Total Protein 6.2 (L) 6.4 - 8.3 g/dL    Albumin 3.7 3.5 - 5.2 g/dL    Total Bilirubin 0.6 0.0 - 1.2 mg/dL    Alkaline Phosphatase 53 40 - 129 U/L    ALT 15 0 - 40 U/L    AST 21 0 - 39 U/L   Troponin   Result Value Ref Range    Troponin 0.01 0.00 - 0.03 ng/mL   Protime-INR   Result Value Ref Range    Protime 26.3 (H) 9.3 - 12.4 sec    INR 2.4    Lipase   Result Value Ref Range    Lipase 34 13 - 60 U/L   Lactic Acid, Plasma   Result Value Ref Range    Lactic Acid 2.4 (H) 0.5 - 2.2 mmol/L   TYPE AND SCREEN   Result Value Ref Range    ABO/Rh AB NEG     Antibody Screen NEG        RADIOLOGY:  Interpreted by Radiologist.  CT CHEST W CONTRAST   Final Result   No acute finding in the chest, abdomen or pelvis. There is an aortic arch stent graft extending into the proximal descending   thoracic aorta covering an intramural hematoma that was acute on 11/15/2019. There is no evidence of thoracic aortic aneurysm or dissection. Calcific coronary disease status post CABG. Lungs are mildly emphysematous. Unchanged biapical scarring with unchanged   right apical bronchiectasis. Few scattered colon diverticula with no evidence of diverticulitis.          CT ABDOMEN PELVIS W IV CONTRAST Additional Contrast? sodium chloride flush 0.9 % injection 10 mL (has no administration in time range)   acetaminophen (TYLENOL) tablet 650 mg (has no administration in time range)     Or   acetaminophen (TYLENOL) suppository 650 mg (has no administration in time range)   pantoprazole (PROTONIX) injection 40 mg (has no administration in time range)     And   sodium chloride (PF) 0.9 % injection 10 mL (has no administration in time range)   pantoprazole (PROTONIX) injection 40 mg (40 mg Intravenous Given 3/4/21 2151)   iopamidol (ISOVUE-370) 76 % injection 90 mL (90 mLs Intravenous Given 3/4/21 5217)   sodium chloride flush 0.9 % injection 10 mL (10 mLs Intravenous Given 3/4/21 2328)             Medical Decision Making:    Presenting here because of vomiting blood as well as passing blood in the stool. Patient reports it started this morning. Patient on Coumadin. Patient reporting no history of GI bleed. Patient does have a history of thoracic aneurysm repair. Patient reporting no back pain or abdominal pain. He does complain of some mild chest pain. Patient attributes this to vomiting. Patient was noted be hypotensive here given IV fluids. He was also given Protonix and Zofran. Patient vital signs stable here on recheck. Patient underwent CTs of his chest as well as abdomen due to his prior history of aneurysm repair. Patient will be admitted to intermediate bed. Re-Evaluations:             Re-evaluation. Patients symptoms show no change  Patient rechecked multiple times here in the emergency department. Patient having no active abdominal pain he has had no episodes of hematemesis here. Patient having no active chest pains either blood pressure did improve with IV fluids. Patient heart rate within normal limits. Patient made aware of findings and lab results. Patient will be admitted to intermediate bed.     Consultations:         Internal medicine was consulted and will admit and I also spoke to general surgery they will evaluate        Critical Care:     Please note that the withdrawal or failure to initiate urgent interventions for this patient would likely result in a life threatening deterioration or permanent disability. Accordingly this patient received 30 minutes of critical care time, excluding separately billable procedures. This patient's ED course included: a personal history and physicial eaxmination    This patient has been closely monitored during their ED course. Counseling: The emergency provider has spoken with the patient and discussed todays results, in addition to providing specific details for the plan of care and counseling regarding the diagnosis and prognosis. Questions are answered at this time and they are agreeable with the plan.       --------------------------------- IMPRESSION AND DISPOSITION ---------------------------------    IMPRESSION  1. Gastrointestinal hemorrhage, unspecified gastrointestinal hemorrhage type        DISPOSITION  Disposition: Admit to telemetry  Patient condition is stable        NOTE: This report was transcribed using voice recognition software.  Every effort was made to ensure accuracy; however, inadvertent computerized transcription errors may be present          Martinez Landis MD  03/05/21 0004       Martinez Landis MD  03/05/21 9346

## 2021-03-05 NOTE — H&P
Hospital Medicine History & Physical      PCP: Vinita Pond DO    Date of Admission: 3/4/2021    Date of Service: Pt seen/examined on 3/4/21 and Admitted to Inpatient with expected LOS greater than two midnights due to medical therapy. Chief Complaint:  GI bleed      History Of Present Illness:      74 y.o. male who presented to [facility] with PMH of coronary artery disease status post CABG, COPD, diverticulosis, AFib on coumadin, VHD-MR, Chronic combined CHF, Dementia, sarcoidosis, HTN, cardiomyopathy, type a thoracic aortic dissection status post stent 18 months ago, sleep apnea on CPAP, stage III chronic kidney disease, restrictive lung disease, pericardial effusion and obesity. Patient started having GI bleed around 11 Am today. He has never had this before according to him. Episode has been on and off, multiple times with dark red bowel movements up to 5 and hematemesis up to 3 times. This is associated with dizziness. No abdominal pain. No chest pain or shortness of breath. No cough or fever. No leg swelling. He has never had EGD or colonoscopy before. No prior history of GI bleed. Does not take nonsteroidal anti-inflammatories. Does not drink alcohol. He is anticoagulated with Coumadin for atrial fibrillation and therapeutic. He just saw PCP recently and everything was fine. Vital signs notable for blood pressure of 93/68, pulse 107, labs showed hemoglobin of 8.1, this was 10.5 a year ago, creatinine 1.3 this is around baseline, INR 2.4, lactic acid level 2.4, negative troponin, glucose 146, BUN 83 and lipase 34. Chest x-ray shows cardiomegaly with atelectasis. Chest CT is negative for any acute finding. Stent in the aortic arch extending into proximal descending aorta covering an intramural hematoma, emphysema and CABG. CT abdomen and pelvis showed diverticulosis without any diverticulitis. EKG showed atrial fibrillation rate of 68 with no acute change.     He is being admitted for further management. Past Medical History:          Diagnosis Date    [facility] ([facility] [address]     Aortic dissection (HCC)     Atrial fibrillation (HCC)     CAD (coronary artery disease)     CHF (congestive heart failure) ([facility] [address] 5/03    severely impaired LV systolic function and CHF, EF 25-30%    CKD (chronic kidney disease)     H/O cardiovascular stress test 5/03    No evidence of stress-induced ischemia    H/O Doppler echocardiogram 5/04/10    [facility], mildly dilated LV, mod concentric LVH, normal LV systolic function, mod dilated left atrium, trace MR    Hypertension     Ischemic cardiomyopathy     Obesity     Pericardial effusion (noninflammatory) 11/15/2019    History: Post-op problem  Assessment: S/P pericardial drain placement in CVICU 12/2 Plan:  Drain dc'ed, no need for post-procedure echo unless pt become symptomatic.  Pleural effusion 11/26/2019    History: Post op problem Assessment: On r/a, left pigtail dc'ed Plan: PO lasix. Denies sob. Desat study done, no home O2 needed    Sarcoidosis     Valvular heart disease        Past Surgical History:          Procedure Laterality Date    OTHER SURGICAL HISTORY  11/15/2019    aortic dissection repair @ [facility]    TONSILLECTOMY         Medications Prior to Admission:      Prior to Admission medications    Medication Sig Start Date End Date Taking?  Authorizing Provider   hydrALAZINE (APRESOLINE) 50 MG tablet Take 1 tablet by mouth 3 times daily 2/25/21 5/26/21  Maryjane Darling, DO   carvedilol (COREG) 25 MG tablet Take 1 tablet by mouth 2 times daily (with meals) 2/25/21 5/26/21  Maryjane Darling, DO   amLODIPine (NORVASC) 5 MG tablet Take 1 tablet by mouth daily 2/25/21 3/27/21  Maryjane Darling, DO   losartan (COZAAR) 50 MG tablet Take 1 tablet by mouth daily 2/25/21   Maryjane Darling, DO   isosorbide dinitrate (ISORDIL) 5 MG tablet Take 1 tablet by mouth 3 times daily 2/25/21 5/26/21  Kamron Chase, DO   bumetanide (BUMEX) 1 MG tablet Take 1 with normal S1/S2 without murmurs, rubs or gallops. Abdomen: Soft, non-tender, non-distended with normal bowel sounds. Musculoskeletal:  No clubbing, cyanosis or edema bilaterally. Full range of motion without deformity. Skin: Skin color, texture, turgor normal.  No rashes or lesions. Neurologic:  Neurovascularly intact without any focal sensory/motor deficits. Cranial nerves: II-XII intact, grossly non-focal.  Psychiatric:  Alert and oriented, thought content appropriate, normal insight  Capillary Refill: Brisk,< 3 seconds   Peripheral Pulses: +2 palpable, equal bilaterally       Labs:     Recent Labs     03/04/21 2141   WBC 9.4   HGB 8.1*   HCT 24.6*        Recent Labs     03/04/21 2141      K 4.6      CO2 22   BUN 83*   CREATININE 1.3*   CALCIUM 9.4     Recent Labs     03/04/21 2141   AST 21   ALT 15   BILITOT 0.6   ALKPHOS 53     Recent Labs     03/04/21 2141   INR 2.4     Recent Labs     03/04/21 2141   TROPONINI 0.01       Urinalysis:    No results found for: Iliana Needy, BACTERIA, RBCUA, BLOODU, [address]    Radiology:   Reviewed and documented    CT CHEST W CONTRAST   Final Result   No acute finding in the chest, abdomen or pelvis. There is an aortic arch stent graft extending into the proximal descending   thoracic aorta covering an intramural hematoma that was acute on 11/15/2019. There is no evidence of thoracic aortic aneurysm or dissection. Calcific coronary disease status post CABG. Lungs are mildly emphysematous. Unchanged biapical scarring with unchanged   right apical bronchiectasis. Few scattered colon diverticula with no evidence of diverticulitis. CT ABDOMEN PELVIS W IV CONTRAST Additional Contrast? None   Final Result   No acute finding in the chest, abdomen or pelvis. There is an aortic arch stent graft extending into the proximal descending   thoracic aorta covering an intramural hematoma that was acute on 11/15/2019. There is no evidence of thoracic aortic aneurysm or dissection. Calcific coronary disease status post CABG. Lungs are mildly emphysematous. Unchanged biapical scarring with unchanged   right apical bronchiectasis. Few scattered colon diverticula with no evidence of diverticulitis. XR CHEST PORTABLE   Final Result   Cardiomegaly with atelectasis in the lung bases more on the left side. Aortic aneurysm with stent in the aortic arch. Consider CT assessment. ASSESSMENT:    Active Hospital Problems    Diagnosis Date Noted    Acute blood loss anemia [D62] 03/05/2021    Hypotension due to hypovolemia [I95.89, E86.1] 03/05/2021    COPD (chronic obstructive pulmonary disease) (HCC) [J44.9] 03/05/2021    CAD (coronary artery disease) [I25.10] 03/05/2021    Hx of CABG [Z95.1] 03/05/2021    S/P AAA repair [R28.962, Z86.79] 03/05/2021    Lactic acidosis [E87.2] 03/05/2021    Upper GI bleed [K92.2] 03/04/2021    KYARA on CPAP [G47.33, Z99.89] 02/14/2020    Atrial fibrillation with RVR (HCC) [I48.91] 11/16/2019    Chronic combined systolic and diastolic CHF (congestive heart failure) ([facility] [address] [I50.42] 11/15/2019    Cardiomyopathy ([facility] [address] [I42.9] 05/20/2016    Anticoagulated on Coumadin [Z79.01] 02/18/2014    Stage 3a chronic kidney disease [N18.31] 02/04/2014    Essential hypertension [I10] 10/25/2012         PLAN:  1. Upper GI bleed, needs EGD and possibly colonoscopy. Surgery has been consulted. PPI. Monitor hemoglobin. Transfuse as needed. 2.  Acute blood loss anemia secondary to GI bleed. Monitor hemoglobin. As needed transfusion. 3.  Hypotension secondary to hypovolemia, IV fluids blood resuscitation as needed. 4.  Therapeutic anticoagulation on Coumadin, stopped Coumadin for now, give IV vitamin K, monitor INR daily. Coumadin can be restarted at a later date as appropriate.   5.  A. fib RVR, continue digoxin and other rate control agent as blood pressure tolerates, tachycardia is likely due to hypovolemia. Hold anticoagulation. 6.  Lactic acidosis, recheck after fluid. 7.  Chronic combined systolic and diastolic heart failure, currently compensated. Cautious use of fluid. Monitor volume status. 8.  Stage III chronic kidney disease at baseline  9. Hypertension, blood pressures running low, hold medications. 10.  Coronary artery disease status post CABG, no chest pain. 11.  Type A Aortic aneurysm status post endovascular stent graft repair. 12.  Sleep apnea on CPAP. 13.  Undiagnosed COPD, emphysema on CT scan. No acute exacerbation, breathing treatments as needed. DVT Prophylaxis: SCDs, avoid anticoagulation at this time due to risk of bleeding  Diet: Diet NPO Effective Now Exceptions are: Ice Chips  Code Status: Full Code    PT/OT Eval Status: As needed when appropriate    Dispo -inpatient/telemetry       Deangelo Miller MD    Thank you Avril Clements DO for the opportunity to be involved in this patient's care.

## 2021-03-05 NOTE — OP NOTE
EGD Op Note    DATE OF PROCEDURE: 3/5/2021     SURGEON: Adelina Olmdeo MD    PREOPERATIVE DIAGNOSIS: UGBI    POSTOPERATIVE DIAGNOSIS: Same gastric ulcer     OPERATION: Procedure(s):  EGD CONTROL HEMORRHAGE    ANESTHESIA: Local monitored anesthesia. ESTIMATED BLOOD LOSS: minimal    COMPLICATIONS: None. SPECIMENS:  * No specimens in log *    HISTORY: The patient is a 74y.o. year old male with history of above preop diagnosis. I recommended esophagogastroduodenoscopy with possible biopsy and I explained the risk, benefits, expected outcome, and alternatives to the procedure. Risks included but are not limited to bleeding, infection, respiratory distress, hypotension, and perforation of the esophagus, stomach, or duodenum. Patient understands and is in agreement. PROCEDURE: The patient was given IV conscious sedation per anesthesia. The patient was given supplemental oxygen by nasal cannula. The gastroscope was inserted orally and advanced under direct vision through the esophagus, through the stomach, through the pylorus, and into the duodenum. Findings:  Duodenum:     Descending: wnl     Bulb: wnl     Stomach:    Antrum: normal     Body: normal  - ulcer in lesser curve with active hemorrhage no visible vessel. Lesion was injected with 6cc of epi and cauterized with good hemostasis     Fundus: wnl     Esophagus: normal   GE junction: 40 cm     Larynx: not examined    The scope was removed and the patient tolerated the procedure well. IMPRESSION/PLAN:   1. Admit to ICU  2. Severe anemia, transfuse, correct coagulopathy   3. NPO NG, PPI,   4.  High risk of re bleeding      Adelina Olmedo MD  03/05/21  8:37 AM

## 2021-03-05 NOTE — ANESTHESIA POSTPROCEDURE EVALUATION
Department of Anesthesiology  Postprocedure Note    Patient: Oj Perrin  MRN: [id number]  YOB: 1955  Date of evaluation: 3/5/2021  Time:  12:47 PM     Procedure Summary     Date: 03/05/21 Room / Location: [facility] 03 / [facility]    Anesthesia Start: 0117 Anesthesia Stop: 5442    Procedure: EGD CONTROL HEMORRHAGE (N/A ) Diagnosis: (.)    Surgeons: Helena Hunter MD Responsible Provider: Lucy Monroy MD    Anesthesia Type: general ASA Status: 4          Anesthesia Type: general    Reji Phase I: Reji Score: 8    Reji Phase II:      Last vitals: Reviewed and per EMR flowsheets.        Anesthesia Post Evaluation    Patient location during evaluation: PACU  Patient participation: complete - patient participated  Level of consciousness: awake and alert  Airway patency: patent  Nausea & Vomiting: no nausea and no vomiting  Complications: no  Cardiovascular status: hemodynamically stable  Respiratory status: acceptable  Hydration status: euvolemic

## 2021-03-05 NOTE — PROGRESS NOTES
[facility]  SURGICAL INTENSIVE CARE UNIT (SICU)  ATTENDING PHYSICIAN CRITICAL CARE PROGRESS NOTE     I have examined the patient, reviewed the record,and discussed the case with the APN/  Resident. I have reviewed all relevant labs and imaging data. The following summarizes my clinical findings and independent assessment. Date of admission:  3/4/2021    CC: Bleeding duodenal ulcer     HOSPITAL COURSE:   3/5 Hematemesis, melena EGD - Epinephrine and cautery     New Imaging Reviewed:   No new imaging      Physical Exam:  Physical Exam  HENT:      Head: Normocephalic. Eyes:      Extraocular Movements: Extraocular movements intact. Pupils: Pupils are equal, round, and reactive to light. Neck:      Musculoskeletal: Neck supple. Cardiovascular:      Rate and Rhythm: Normal rate. Pulmonary:      Effort: Pulmonary effort is normal. No respiratory distress. Abdominal:      General: Abdomen is flat. There is no distension. Tenderness: There is no abdominal tenderness. There is no guarding or rebound. Musculoskeletal: Normal range of motion. Skin:     General: Skin is warm. Neurological:      Mental Status: He is alert. Psychiatric:         Mood and Affect: Mood normal.         Assessment   Active Problems:    Essential hypertension    Stage 3a chronic kidney disease    Anticoagulated on Coumadin    Cardiomyopathy (HCC)    Chronic combined systolic and diastolic CHF (congestive heart failure) ([facility])    Atrial fibrillation with RVR ([facility])    KYARA on CPAP    Gastrointestinal hemorrhage    Acute blood loss anemia    Hypotension due to hypovolemia    COPD (chronic obstructive pulmonary disease) ([facility])    CAD (coronary artery disease)    Hx of CABG    S/P AAA repair    Lactic acidosis  Resolved Problems:    * No resolved hospital problems.  *      Plan   GI:  NPO , No bowel regiment  PPI gtt and carafate , h pylori ag   Neuro: prn Tylenol     Renal: 125cc LR, JASS resolving Monitor Urine Output, Daily BMP, Mg,Phos, ionized Ca Q 6 hour H&H ,Post FFP transfusion TEG , Lactic acidosis resolved , replace Calcium   Musculoskeletal: WBAT all extremities ,AM-PAC Score when able   Pulmonary: Aggressive pulmonary hygiene , SMI , Monitor RR and Maintain SpO2 > 92%  ID: No Indication for Antibiotics      Heme: Transfusion secondary to Recieved 2units PRBC and 2FFP receiving 1 FFP    Cardiac: Monitor Hemodynamics digoxin Hold hydralazine, coreg, norvasc, coazaar, isordil, bumex, ASA, coumadin   Endocrine: No Glycemic Issues    DVT Prophylaxis: PCDs, Hold Chemoprophylaxis until  stable Hb    Ulcer Prophylaxis: PPI gtt for GIB bleed plan 72 hours then transition to BID   Tubes and Lines: Continue PIV and Place Central Line   Seizure proph:     No Indication  Ancillary consults:   Internal Medicine    Family Update:         As available   CODE Status:       Full Code    Dispo: RICHARD Mccormack MD    Critical Care: 32 minutes evaluating and managing patient with bleeding duodenal ulcer with risk rebleeding , acute blood loss anemia and JASS

## 2021-03-05 NOTE — ANESTHESIA PRE PROCEDURE
Department of Anesthesiology  Preprocedure Note       Name:  Yolande Reed   Age:  74 y.o.  :  1955                                          MRN:  [id number]         Date:  3/5/2021      Surgeon: Saurav Atwood):  Mark Villagomez MD    Procedure: Procedure(s):  EGD DIAGNOSTIC ONLY    Medications prior to admission:   Prior to Admission medications    Medication Sig Start Date End Date Taking?  Authorizing Provider   digoxin (LANOXIN) 125 MCG tablet Take 62.5 mcg by mouth daily   Yes Historical Provider, MD   hydrALAZINE (APRESOLINE) 50 MG tablet Take 1 tablet by mouth 3 times daily 21 Yes Kamron Chase DO   carvedilol (COREG) 25 MG tablet Take 1 tablet by mouth 2 times daily (with meals) 21 Yes Kamron Chase DO   amLODIPine (NORVASC) 5 MG tablet Take 1 tablet by mouth daily 2/25/21 3/27/21 Yes Kamron Chase DO   losartan (COZAAR) 50 MG tablet Take 1 tablet by mouth daily 21  Yes Kamron Chase DO   isosorbide dinitrate (ISORDIL) 5 MG tablet Take 1 tablet by mouth 3 times daily 21 Yes Kamron Chase DO   bumetanide (BUMEX) 1 MG tablet Take 1 tablet by mouth 2 times daily 21 Yes Kamron Chase DO   aspirin 81 MG chewable tablet Take 1 tablet by mouth daily Additional Refills from Primary Cardiologist or PCP 20  Yes Kyle Palomino,    Multiple Vitamins-Minerals (THERAPEUTIC MULTIVITAMIN-MINERALS) tablet Take 1 tablet by mouth daily   Yes Historical Provider, MD   warfarin (COUMADIN) 6 MG tablet Take 1 tablet by mouth daily 21   Kandice Watson DO       Current medications:    Current Facility-Administered Medications   Medication Dose Route Frequency Provider Last Rate Last Admin    digoxin (LANOXIN) tablet 62.5 mcg  62.5 mcg Oral Daily Tien Pratt MD        0.9 % sodium chloride infusion   Intravenous PRN Namrata Mckinney MD        0.9 % sodium chloride infusion   Intravenous Continuous Elizabeth Patel  mL/hr at 21 0149 New Bag at 03/05/21 0149    ondansetron (ZOFRAN) injection 4 mg  4 mg Intravenous Q6H PRN Reyes Fern, MD   4 mg at 03/05/21 0148    sodium chloride flush 0.9 % injection 10 mL  10 mL Intravenous 2 times per day Ishmael Valadez MD        sodium chloride flush 0.9 % injection 10 mL  10 mL Intravenous PRN Ishmael Valadez MD        acetaminophen (TYLENOL) tablet 650 mg  650 mg Oral Q6H PRN Ishmael Valadez MD        Or    acetaminophen (TYLENOL) suppository 650 mg  650 mg Rectal Q6H PRN Ishmael Valadez MD        pantoprazole (PROTONIX) injection 40 mg  40 mg Intravenous Q12H Ishmael Valadez MD        And    sodium chloride (PF) 0.9 % injection 10 mL  10 mL Intravenous Q12H Ishmael Valadez MD           Allergies:  No Known Allergies    Problem List:    Patient Active Problem List   Diagnosis Code    H/O sarcoidosis Z80.3    Essential hypertension I10    Moderate obesity E66.8    Stage 3a chronic kidney disease N18.31    MR (mitral regurgitation) I34.0    Anticoagulated on Coumadin Z79.01    Cardiomyopathy ([facility] I42.9    Rate controlled atrial fibrillation ([facility] I48.91    Cognitive dysfunction F09    Mixed hyperlipidemia E78.2    Hyperuricemia E79.0    Dissection of thoracic aorta ([facility] I71.01    Transition of care performed with sharing of clinical summary [id number]    Chronic combined systolic and diastolic CHF (congestive heart failure) ([facility]) I50.42    Atrial fibrillation with RVR ([facility]) I48.91    Fatigue R53.83    KYARA on CPAP G47.33, Z99.89    Restrictive lung disease J98.4    Upper GI bleed K92.2    Acute blood loss anemia D62    Hypotension due to hypovolemia I95.89, E86.1    COPD (chronic obstructive pulmonary disease) ([facility]) J44.9    CAD (coronary artery disease) I25.10    Hx of CABG Z95.1    S/P AAA repair Z98.890, Z86.79    Lactic acidosis E87.2       Past Medical History:        Diagnosis Date    Acute kidney injury ([facility] [facility]     Aortic dissection ([facility])     [facility]     CAD (coronary artery disease)     CHF (congestive heart failure) ([facility]     severely impaired LV systolic function and CHF, EF 25-30%    CKD (chronic kidney disease)     H/O cardiovascular stress test     No evidence of stress-induced ischemia    H/O Doppler echocardiogram 5/04/10    [facility], mildly dilated LV, mod concentric LVH, normal LV systolic function, mod dilated left atrium, trace MR    Hypertension     Ischemic cardiomyopathy     Obesity     Pericardial effusion (noninflammatory) 11/15/2019    History: Post-op problem  Assessment: S/P pericardial drain placement in CVICU  Plan:  Drain dc'ed, no need for post-procedure echo unless pt become symptomatic.  Pleural effusion 2019    History: Post op problem Assessment: On r/a, left pigtail dc'ed Plan: PO lasix. Denies sob.   Desat study done, no home O2 needed    Sarcoidosis     Valvular heart disease        Past Surgical History:        Procedure Laterality Date    OTHER SURGICAL HISTORY  11/15/2019    aortic dissection repair @ [facility]    TONSILLECTOMY         Social History:    Social History     Tobacco Use    Smoking status: Former Smoker     Packs/day: 0.50     Years: 5.00     Pack years: 2.50     Types: Cigarettes, Pipe, Cigars     Start date:      Quit date: 10/16/1993     Years since quittin.4    Smokeless tobacco: Never Used   Substance Use Topics    Alcohol use: Not Currently     Frequency: Monthly or less     Drinks per session: 1 or 2                                Counseling given: Not Answered      Vital Signs (Current):   Vitals:    21 0000 21 0100 21 0215 21 0435   BP: 110/65 (!) 110/55 122/70    Pulse: 99 98 94    Resp: 23 12 16    Temp:  36.7 °C (98.1 °F) 36.5 °C (97.7 °F)    TempSrc:   Temporal    SpO2: 95% 96% 97%    Weight:    205 lb 1.6 oz (93 kg)   Height:                                                  BP Readings from Last 3 Encounters:   21 122/70   02/25/21 130/80   10/06/20 110/70       NPO Status:                                                                                 BMI:   Wt Readings from Last 3 Encounters:   03/05/21 205 lb 1.6 oz (93 kg)   02/25/21 205 lb (93 kg)   10/06/20 204 lb (92.5 kg)     Body mass index is 31.19 kg/m². CBC:   Lab Results   Component Value Date    WBC 8.4 03/05/2021    RBC 1.94 03/05/2021    HGB 5.8 03/05/2021    HCT 18.2 03/05/2021    MCV 93.8 03/05/2021    RDW 16.9 03/05/2021     03/05/2021       CMP:   Lab Results   Component Value Date     03/04/2021    K 4.6 03/04/2021    K 4.1 11/15/2019     03/04/2021    CO2 22 03/04/2021    BUN 83 03/04/2021    CREATININE 1.3 03/04/2021    GFRAA >60 03/04/2021    LABGLOM 55 03/04/2021    GLUCOSE 146 03/04/2021    PROT 6.2 03/04/2021    CALCIUM 9.4 03/04/2021    BILITOT 0.6 03/04/2021    ALKPHOS 53 03/04/2021    AST 21 03/04/2021    ALT 15 03/04/2021       POC Tests: No results for input(s): POCGLU, POCNA, POCK, POCCL, POCBUN, POCHEMO, POCHCT in the last 72 hours.     Coags:   Lab Results   Component Value Date    PROTIME 26.0 03/05/2021    PROTIME 19.2 02/25/2021    INR 2.4 03/05/2021    APTT 117.1 01/15/2020       HCG (If Applicable): No results found for: PREGTESTUR, PREGSERUM, HCG, HCGQUANT     ABGs: No results found for: PHART, PO2ART, RKD2GXA, HOG3KEN, BEART, X5IHSSQR     Type & Screen (If Applicable):  No results found for: LABABO, LABRH    Drug/Infectious Status (If Applicable):  No results found for: HIV, HEPCAB    COVID-19 Screening (If Applicable): No results found for: COVID19     ECHO:    Narrative & Impression     Transthoracic Echocardiography Report (TTE)      Demographics      Patient Name    [facility]       Gender            Male                   [address] [facility]  [id number]     Room Number       0292   Number      Account #       [de-identified]    Procedure Date    01/07/2020      Corporate ID                 Chen Solis, MD Hernan                                Physician      Accession       [id number]    Referring   Number                       Physician      Date of Birth   1955   Sonographer       Zhane Mcintosh RDCS      Age             75 year(s)   Interpreting      [address]                                Physician         Physician Cardiology                                                  Josep Ramos MD                                   Any Other     Procedure     Type of Study      TTE procedure     Procedure Date  Date: 01/07/2020 Start: 10:25 AM     Study Location: Echo Lab  Technical Quality: Adequate visualization     Indications:R/O Tamponade.     Patient Status: STAT     Height: 68 inches Weight: 225 pounds BSA: 2.15 m^2 BMI: 34.21 kg/m^2     BP: 158/98 mmHg      Findings      Left Ventricle   Left ventricle is moderately enlarged . Ejection fraction is visually estimated at 40-45%. Overall ejection fraction mild-to-moderately decreased . Septal motion consistent with post bypass surgery. There is severe eccentric hypertrophy. Abnormal diastolic function. Right Ventricle   Normal right ventricular size and function. Left Atrium   The left atrium is severely dilated. Interatrial septum appears intact. No evidence of patent foramen ovale. Right Atrium   Markedly enlarged right atrium size. Mitral Valve   Normal mitral valve structure and function. No evidence of mitral valve stenosis. Mild to moderate mitral regurgitation is present. Tricuspid Valve   The tricuspid valve appears structurally normal.   Physiologic and/or trace tricuspid regurgitation. RVSP is 29 mmHg. Normal PA systolic pressure. Aortic Valve   The aortic valve appears mildly sclerotic. The aortic valve is trileaflet. No hemodynamically significant aortic stenosis is present. No evidence of aortic valve regurgitation.       Pulmonic Valve   Pulmonic valve is structurally normal.   Mild pulmonic regurgitation present. No evidence of pulmonic valve stenosis. Pericardial Effusion   There is a small localized near left ventricle pericardial effusion noted. Pleural Effusion   Right pleural effusion. Aorta   Mildly dilated aortic root (4.1 cm) and normal sized ascending aorta. A prosthetic graft noted in the location of the ascending aorta. Miscellaneous   Dilated Inferior Vena Cava. Inferior Vena Cava, normal respiratory variation. Conclusions      Summary   Left ventricle is moderately enlarged . Ejection fraction is visually estimated at 40-45%. Overall ejection fraction mild-to-moderately decreased . Septal motion consistent with post bypass surgery. There is severe eccentric hypertrophy. Abnormal diastolic function. The left atrium is severely dilated. Normal right ventricular size and function. Mild to moderate mitral regurgitation is present. No hemodynamically significant aortic stenosis is present. RVSP is 29 mmHg. Normal PA systolic pressure. Mildly dilated aortic root (4.1 cm) and normal sized ascending aorta. A prosthetic graft noted in the location of the ascending aorta. There is a small localized near left ventricle pericardial effusion noted. Signature      ----------------------------------------------------------------   Electronically signed by Jemima Valentino MD(Interpreting   physician) on 01/07/2020 07:02 PM   ----------------------------------------------------------------            Anesthesia Evaluation  Patient summary reviewed and Nursing notes reviewed  Airway: Mallampati: III  TM distance: >3 FB   Neck ROM: full  Mouth opening: > = 3 FB Dental:      Comment: Pt states no loose, chipped, or missing teeth. Pulmonary:   (+) COPD:  sleep apnea: on CPAP,  decreased breath sounds,      (-) not a current smoker (former smoker )          Patient did not smoke on day of surgery.                 ROS comment: sarcoidosis   Cardiovascular:  Exercise tolerance: poor (<4 METS),   (+) hypertension:, valvular problems/murmurs: MR, CAD: obstructive, CABG/stent:, dysrhythmias: atrial fibrillation, CHF (LVEF 65%): systolic and diastolic, hyperlipidemia      ECG reviewed  Rhythm: irregular  Rate: abnormal  Echocardiogram reviewed         Beta Blocker:  Not on Beta Blocker         Neuro/Psych:   Negative Neuro/Psych ROS              GI/Hepatic/Renal:   (+) GERD:,          ROS comment: N/V  hematemesis. Endo/Other:    (+) DiabetesType II DM, , blood dyscrasia: anemia, arthritis: OA., . Pt had no PAT visit        ROS comment: obese Abdominal:           Vascular:           ROS comment: Dissection of thoracic aorta . Anesthesia Plan      general     ASA 4       Induction: intravenous and rapid sequence. MIPS: Postoperative opioids intended and Prophylactic antiemetics administered. Anesthetic plan and risks discussed with patient. Use of blood products discussed with patient whom consented to blood products. Plan discussed with attending and CRNA. Louann Escalante RN   3/5/2021    DOS STAFF ADDENDUM:    Patient seen and examined, physical exam updated as needed, chart reviewed. NPO status confirmed. Anesthetic options and risks discussed with patient/legal guardian. Patient/legal guardian verbalized understanding and agrees to proceed.      Lara Guajardo MD  Staff Anesthesiologist  March 5, 2021  8:10 AM

## 2021-03-05 NOTE — PROGRESS NOTES
Physician Progress Note      PATIENTSheryle Killian  [facility] #:                  [id number]  :                       1955  ADMIT DATE:       3/4/2021 9:27 PM  [address] [place] DATE:  RESPONDING  PROVIDER #:        YAMILE FRANKLIN          QUERY TEXT:    Dear Cathy Luciano and associates,    Patient admitted with GI bleeding, noted to have elevated PTT 26.4 and chronic   gastric ulcer. If possible, please document in progress notes and discharge   summary the cause of the GI bleeding: The medical record reflects the following:  Risk Factors: age AF TAVR coumadin  Clinical Indicators: PTT 26.4  Hbg 5.8 drop GI bleed  Treatment: serial H&H's Vitamin K FFP transfusion PRBC    Abdifatah Blue RN BSN CCDS  Options provided:  -- GI bleeding due to chronic gastric ulcer  -- GI bleeding due to extrinsic circulating anticoagulation (coumadin)  -- Other - I will add my own diagnosis  -- Disagree - Not applicable / Not valid  -- Disagree - Clinically unable to determine / Unknown  -- Refer to Clinical Documentation Reviewer    PROVIDER RESPONSE TEXT:    This patient has GI bleeding due to chronic gastric ulcer s.     Query created by: Deny Lee on 3/5/2021 12:34 PM      Electronically signed by:  Pablo Elliott 3/5/2021 12:52 PM

## 2021-03-05 NOTE — PROGRESS NOTES
Hospitalist Progress Note      SYNOPSIS: Patient admitted on 3/4/2021 for a GIB. Patient states he had approximately 5 dark red bowel movements and hematemesis x3 beginning approximately 10 hours prior to arrival.  He also admits to associated dizziness. He is anticoagulated with Coumadin for atrial fibrillation. In ED he was found to be hypotensive, tachycardic, and anemic with a hemoglobin of 8.1. His INR is therapeutic at 2.4. Lactic acid 2.4 as well. Chest x-ray with cardiomegaly and atelectasis. Chest CT significant for a stent in the aortic arch extending into proximal descending aorta covering an intramural hematoma, emphysema and CABG. EKG with atrial fibrillation, rate controlled.    Coumadin was held. Surgery was consulted and patient underwent an EGD today which showed a gastric ulcer which was injected with epi and cauterized. Hospital day 1     SUBJECTIVE:  Stable overnight. No issues reported. Patient seen and examined. Records reviewed. Temp (24hrs), Av °F (36.7 °C), Min:97.3 °F (36.3 °C), Max:98.5 °F (36.9 °C)    DIET: Diet NPO Effective Now Exceptions are: Ice Chips  CODE: Full Code    Intake/Output Summary (Last 24 hours) at 3/5/2021 0958  Last data filed at 3/5/2021 6567  Gross per 24 hour   Intake --   Output 0 ml   Net 0 ml       OBJECTIVE:    /81   Pulse 109   Temp 98.5 °F (36.9 °C)   Resp 13   Ht 5' 8\" (1.727 m)   Wt 205 lb 1.6 oz (93 kg)   SpO2 100%   BMI 31.19 kg/m²     General appearance: Obese, elderly, ill appearing male in no apparent acute distress, appears stated age and cooperative. HEENT: Normal cephalic, atraumatic without obvious deformity. Pupils equal, round, and reactive to light. Extra ocular muscles intact. Conjunctivae/corneas clear. Neck: Supple, with full range of motion. No jugular venous distention. Trachea midline. Respiratory:  Clear to auscultation bilaterally. No apparent distress.   Cardiovascular:  Tachycardic rate and regular rhythm. S1, S2 without murmurs, rubs, or gallops. PV: Brisk capillary refill. +2 pedal and radial pulses bilaterally. No clubbing, cyanosis, edema of bilateral lower extremities. Abdomen: Soft, non-tender, non-distended. +BS  Musculoskeletal: No obvious deformities or erythematous or edematous joints. Skin: Pale. No rashes or lesions. Neurologic:  Neurovascularly intact without any focal sensory/motor deficits.  Cranial nerves: II-XII intact, grossly non-focal.  Psychiatric: Alert and oriented, thought content appropriate, normal insight    Medications:  REVIEWED DAILY    Infusion Medications    sodium chloride      sodium chloride 100 mL/hr at 03/05/21 0149     Scheduled Medications    digoxin  62.5 mcg Oral Daily    metoclopramide        sodium chloride flush  10 mL Intravenous 2 times per day    pantoprazole  40 mg Intravenous Q12H    And    sodium chloride (PF)  10 mL Intravenous Q12H     PRN Meds: sodium chloride, meperidine, diphenhydrAMINE, HYDROmorphone, HYDROmorphone, EPINEPHrine, ondansetron, sodium chloride flush, acetaminophen **OR** acetaminophen    Labs:     Recent Labs     03/04/21 2141 03/05/21 0622   WBC 9.4 8.4   HGB 8.1* 5.8*   HCT 24.6* 18.2*    115*       Recent Labs     03/04/21 2141 03/05/21 0622    142   K 4.6 4.3    110*   CO2 22 27   BUN 83* 78*   CREATININE 1.3* 1.3*   CALCIUM 9.4 8.3*       Recent Labs     03/04/21 2141 03/05/21 0622   PROT 6.2* 4.9*   ALKPHOS 53 40   ALT 15 13   AST 21 14   BILITOT 0.6 0.4   LIPASE 34  --        Recent Labs     03/04/21 2141 03/05/21 0622   INR 2.4 2.4       Recent Labs     03/04/21 2141   TROPONINI 0.01       Chronic labs:    Lab Results   Component Value Date    CHOL 183 04/04/2019    TRIG 193 (H) 04/04/2019    HDL 36 04/04/2019    LDLCALC 108 (H) 04/04/2019    INR 2.4 03/05/2021    LABA1C 5.5 04/04/2019       Radiology: REVIEWED DAILY    ASSESSMENT/PLAN:  Upper GI bleed secondary to gastric ulcer s/p

## 2021-03-05 NOTE — CONSULTS
GENERAL SURGERY  CONSULT NOTE  3/5/2021    Physician Consulted: Dr. Jovani Gandhi   Reason for Consult: Upper GI bleed   Referring Physician: Dr. Jose Lainez     HPI  Marivel Rubalcava is a 74 y.o. male who presents for evaluation of hematemesis and melena. Patient states that he had a very dark bowel movement with specks of bright red blood in it. He states that he became nauseous and began to vomit black content with specks of blood in it. He states that he has had 5 bowel movements and 3 episodes of hematemesis. Patient takes Coumadin with an INR of 2.4 for his rate control A. fib. Patient presented mildly hypotensive and tachycardic into the ED however, has become normotensive and remains mildly tachycardic. Hemoglobin is 8 1 down from his baseline of 11. Patient was Hemoccult positive in the ED. Patient has not had a bowel movement or any hematemesis since presentation to the hospital.  Patient complains of only mild epigastric as well as left lower quadrant pain. His abdomen is soft and nondistended. Patient does have a significant past medical history of A. fib which is rate controlled, CHF, thoracic aortic dissection s/p repair, CKD, and hypertension. Patient denies ever having an EGD or colonoscopy before.       Past Medical History:   Diagnosis Date    Acute kidney injury ([facility]     Aortic dissection (HCC)     Atrial fibrillation (HCC)     CAD (coronary artery disease)     CHF (congestive heart failure) ([facility] 5/03    severely impaired LV systolic function and CHF, EF 25-30%    CKD (chronic kidney disease)     H/O cardiovascular stress test 5/03    No evidence of stress-induced ischemia    H/O Doppler echocardiogram 5/04/10    [facility], mildly dilated LV, mod concentric LVH, normal LV systolic function, mod dilated left atrium, trace MR    Hypertension     Ischemic cardiomyopathy     Obesity     Pericardial effusion (noninflammatory) 11/15/2019    History: Post-op problem  Assessment: S/P pericardial drain placement in CVICU 12/2 Plan:  Drain dc'ed, no need for post-procedure echo unless pt become symptomatic.  Pleural effusion 11/26/2019    History: Post op problem Assessment: On r/a, left pigtail dc'ed Plan: PO lasix. Denies sob. Desat study done, no home O2 needed    Sarcoidosis     Valvular heart disease        Past Surgical History:   Procedure Laterality Date    OTHER SURGICAL HISTORY  11/15/2019    aortic dissection repair @ [facility]    TONSILLECTOMY         Medications Prior to Admission:    Prior to Admission medications    Medication Sig Start Date End Date Taking?  Authorizing Provider   hydrALAZINE (APRESOLINE) 50 MG tablet Take 1 tablet by mouth 3 times daily 2/25/21 5/26/21  Elihu Smith, DO   carvedilol (COREG) 25 MG tablet Take 1 tablet by mouth 2 times daily (with meals) 2/25/21 5/26/21  Elihu Smith, DO   amLODIPine (NORVASC) 5 MG tablet Take 1 tablet by mouth daily 2/25/21 3/27/21  Elihu Smith, DO   losartan (COZAAR) 50 MG tablet Take 1 tablet by mouth daily 2/25/21   Elihu Smith, DO   isosorbide dinitrate (ISORDIL) 5 MG tablet Take 1 tablet by mouth 3 times daily 2/25/21 5/26/21  Elihu Smith, DO   bumetanide (BUMEX) 1 MG tablet Take 1 tablet by mouth 2 times daily 2/25/21 5/26/21  Elihu Smith, DO   Digoxin 62.5 MCG TABS Take 1/2 tablet daily 2/25/21   Elihu Smith, DO   warfarin (COUMADIN) 6 MG tablet Take 1 tablet by mouth daily 2/25/21   Elihu Smith, DO   aspirin 81 MG chewable tablet Take 1 tablet by mouth daily Additional Refills from Primary Cardiologist or PCP 1/16/20   Ky Belcher, DO   Multiple Vitamins-Minerals (THERAPEUTIC MULTIVITAMIN-MINERALS) tablet Take 1 tablet by mouth daily    Historical Provider, MD       No Known Allergies    Family History   Problem Relation Age of Onset    Diabetes Mother     Diabetes Father     Hypertension Sister     Diabetes Brother     Hypertension Brother     No Known Problems Brother        Social History     Tobacco Use  Smoking status: Former Smoker     Packs/day: 0.50     Years: 5.00     Pack years: 2.50     Types: Cigarettes, Pipe, Cigars     Start date:      Quit date: 10/16/1993     Years since quittin.4    Smokeless tobacco: Never Used   Substance Use Topics    Alcohol use: Not Currently     Frequency: Monthly or less     Drinks per session: 1 or 2    Drug use: Not Currently     Comment: none since his 40's         Review of Systems   General ROS: positive for  - fatigue  Hematological and Lymphatic ROS: positive for -Coumadin, INR 2.4  Respiratory ROS: no cough, shortness of breath, or wheezing  Cardiovascular ROS: no chest pain or dyspnea on exertion  Gastrointestinal ROS: Melena/bright red blood per rectum, hematemesis  Genito-Urinary ROS: no dysuria, trouble voiding, or hematuria  Musculoskeletal ROS: negative      PHYSICAL EXAM:    Vitals:    21 0000   BP: 110/65   Pulse: 99   Resp: 23   Temp:    SpO2: 95%       General Appearance:  awake, alert, oriented, in mild acute distress  Skin:  Skin color, texture, turgor normal. No rashes or lesions. Head/face:  NCAT  Eyes:  PERRL  Lungs:  No chest wall tenderness. Heart: Currently normotensive, mildly tachycardic  Abdomen: Soft, nondistended, mildly tender in the epigastric and left lower quadrant  Extremities: pulses present in all extremities  Male Rectal: Hemoccult positive, normal rectal tone, no palpable masses      LABS:    CBC  Recent Labs     21   WBC 9.4   HGB 8.1*   HCT 24.6*        BMP  Recent Labs     21      K 4.6      CO2 22   BUN 83*   CREATININE 1.3*   CALCIUM 9.4     Liver Function  Recent Labs     21   LIPASE 34   BILITOT 0.6   AST 21   ALT 15   ALKPHOS 53   PROT 6.2*   LABALBU 3.7     No results for input(s): LACTATE in the last 72 hours.   Recent Labs     21   INR 2.4       RADIOLOGY    Ct Chest W Contrast    Result Date: 3/5/2021  EXAMINATION: CT OF THE CHEST WITH CONTRAST; CT OF THE ABDOMEN AND PELVIS WITH CONTRAST 3/4/2021 11:28 pm TECHNIQUE: CT of the chest was performed with the administration of intravenous contrast. Multiplanar reformatted images are provided for review. Dose modulation, iterative reconstruction, and/or weight based adjustment of the mA/kV was utilized to reduce the radiation dose to as low as reasonably achievable.; CT of the abdomen and pelvis was performed with the administration of intravenous contrast. Multiplanar reformatted images are provided for review. Dose modulation, iterative reconstruction, and/or weight based adjustment of the mA/kV was utilized to reduce the radiation dose to as low as reasonably achievable. COMPARISON: None Chest x-rays 03/04/2021 and 02/01/2020, CTA chest 11/15/2019 HISTORY: ORDERING SYSTEM PROVIDED HISTORY: chest pain hx of aneurysm TECHNOLOGIST PROVIDED HISTORY: Reason for exam:->chest pain hx of aneurysm Decision Support Exception->Emergency Medical Condition (MA) What reading provider will be dictating this exam?->CRC; ORDERING SYSTEM PROVIDED HISTORY: Abdominal pain vomiting gi bleed TECHNOLOGIST PROVIDED HISTORY: Reason for exam:->Abdominal pain vomiting gi bleed Additional Contrast?->None Decision Support Exception->Emergency Medical Condition (MA) What reading provider will be dictating this exam?->CRC FINDINGS: Chest: Mediastinum: There is an aortic stent graft extending from the left subclavian artery to the proximal descending thoracic aorta. This is in the area of a intramural hematoma noted on 11/15/2019. The aortic diameter is normal.  No evidence of aneurysm. No evidence of aortic dissection. Thoracic aorta is mildly tortuous. There is a stent in the proximal left subclavian artery. Proximal great vessels are patent. There is no mediastinal hematoma or adenopathy. Three-vessel calcific coronary artery disease status post CABG. The heart is borderline enlarged.   The left atrium is enlarged and there is left ventricular wall hypertrophy. Lungs/pleura: Lungs are mildly emphysematous. There is unchanged scarring at the right greater than left lung apices with unchanged right apical bronchiectasis. Lungs are mildly emphysematous. No acute infiltrate, pneumothorax or pleural fluid. Central pulmonary arteries are enlarged. Main pulmonary artery measures 4.1 cm. Soft Tissues/Bones: Prior sternal splitting procedure. No acute disease. Abdomen/Pelvis: Organs: There is minimal fatty infiltration of the liver. There are no calcified gallstones. No pericholecystic fluid. The spleen, pancreas, adrenal glands and kidneys are normal.  There are few small bilateral renal cysts measuring up to 1 cm. GI/Bowel: There are a few right colon diverticula and there are scattered diverticula in the left colon. No evidence of diverticulitis. The appendix is normal.  No bowel obstruction. Pelvis: There are prostate gland calcifications. Urinary bladder is unremarkable. Peritoneum/Retroperitoneum: There are extensive vascular calcifications throughout the abdomen and pelvis. Arterial structures are generally ectatic. Calcific aorto iliac atherosclerotic disease with no evidence of an abdominal aortic aneurysm. No adenopathy, free air or free fluid. Bones/Soft Tissues: No acute bone finding. No acute finding in the chest, abdomen or pelvis. There is an aortic arch stent graft extending into the proximal descending thoracic aorta covering an intramural hematoma that was acute on 11/15/2019. There is no evidence of thoracic aortic aneurysm or dissection. Calcific coronary disease status post CABG. Lungs are mildly emphysematous. Unchanged biapical scarring with unchanged right apical bronchiectasis. Few scattered colon diverticula with no evidence of diverticulitis.      Ct Abdomen Pelvis W Iv Contrast Additional Contrast? None    Result Date: 3/5/2021  EXAMINATION: CT OF THE CHEST WITH CONTRAST; CT OF THE ABDOMEN AND PELVIS WITH CONTRAST 3/4/2021 11:28 pm TECHNIQUE: CT of the chest was performed with the administration of intravenous contrast. Multiplanar reformatted images are provided for review. Dose modulation, iterative reconstruction, and/or weight based adjustment of the mA/kV was utilized to reduce the radiation dose to as low as reasonably achievable.; CT of the abdomen and pelvis was performed with the administration of intravenous contrast. Multiplanar reformatted images are provided for review. Dose modulation, iterative reconstruction, and/or weight based adjustment of the mA/kV was utilized to reduce the radiation dose to as low as reasonably achievable. COMPARISON: None Chest x-rays 03/04/2021 and 02/01/2020, CTA chest 11/15/2019 HISTORY: ORDERING SYSTEM PROVIDED HISTORY: chest pain hx of aneurysm TECHNOLOGIST PROVIDED HISTORY: Reason for exam:->chest pain hx of aneurysm Decision Support Exception->Emergency Medical Condition (MA) What reading provider will be dictating this exam?->CRC; ORDERING SYSTEM PROVIDED HISTORY: Abdominal pain vomiting gi bleed TECHNOLOGIST PROVIDED HISTORY: Reason for exam:->Abdominal pain vomiting gi bleed Additional Contrast?->None Decision Support Exception->Emergency Medical Condition (MA) What reading provider will be dictating this exam?->CRC FINDINGS: Chest: Mediastinum: There is an aortic stent graft extending from the left subclavian artery to the proximal descending thoracic aorta. This is in the area of a intramural hematoma noted on 11/15/2019. The aortic diameter is normal.  No evidence of aneurysm. No evidence of aortic dissection. Thoracic aorta is mildly tortuous. There is a stent in the proximal left subclavian artery. Proximal great vessels are patent. There is no mediastinal hematoma or adenopathy. Three-vessel calcific coronary artery disease status post CABG. The heart is borderline enlarged.   The left atrium is enlarged and there is left ventricular wall

## 2021-03-05 NOTE — PROGRESS NOTES
General Surgery Progress Note:  Patient seen and examined, agree with resident note, for remaining HP/Consult details please see resident HP/Consult note. CC: GIB     S: weak and light headed when walking better at rest.  Had hematemesis and melena. Hx coumadin, for afib,       Objective:  @BP (!) 156/84   Pulse 108   Temp 97.8 °F (36.6 °C) (Temporal)   Resp 21   Ht 5' 8\" (1.727 m)   Wt 205 lb 1.6 oz (93 kg)   SpO2 100%   BMI 31.19 kg/m² @    Physical -     Gen: NAD  HEENT PERRL conj pale  Resp: Breathing Comfortably, no resp distress clear b/l   CV: tachy irregular   Abd: soft obese non tender    EXT NVI no pedal edema    Assessment/Plan: UGIB on coumadin, hx afib, hx CKD, CHF, CAD,     - Anemia, acute blood loss, Reverse coagulopathy,   - EGD for possible hemorrhage control.        Leela Carlos MD FACS     9:30 AM

## 2021-03-05 NOTE — ED NOTES
Radiology Procedure Waiver   Name: Mikayla Baron  : 1955  MRN: [id number]    Date:  3/4/21    Time: 9:56 PM EST    Benefits of immediately proceeding with Radiology exam(s) without pre-testing outweigh the risks or are not indicated as specified below and therefore the following is/are being waived:    [] Pregnancy test   [] Patients LMP on-time and regular.   [] Patient had Tubal Ligation or has other Contraception Device. [] Patient  is Menopausal or Premenarcheal.    [] Patient had Full or Partial Hysterectomy. [] Protocol for Iodine allergy    [] MRI Questionnaire     [x] BUN/Creatinine   [] Patient age w/no hx of renal dysfunction. [] Patient on Dialysis. [] Recent Normal Labs.   Electronically signed by Leidy Hernandez MD on 3/4/21 at 9:56 PM EST               Leidy Hernandez MD  [phone]

## 2021-03-06 LAB
ALBUMIN SERPL-MCNC: 3.4 G/DL (ref 3.5–5.2)
ALP BLD-CCNC: 42 U/L (ref 40–129)
ALT SERPL-CCNC: 15 U/L (ref 0–40)
ANION GAP SERPL CALCULATED.3IONS-SCNC: 7 MMOL/L (ref 7–16)
ANISOCYTOSIS: ABNORMAL
ANISOCYTOSIS: ABNORMAL
AST SERPL-CCNC: 16 U/L (ref 0–39)
BASOPHILS ABSOLUTE: 0 E9/L (ref 0–0.2)
BASOPHILS ABSOLUTE: 0 E9/L (ref 0–0.2)
BASOPHILS ABSOLUTE: 0.01 E9/L (ref 0–0.2)
BASOPHILS ABSOLUTE: 0.01 E9/L (ref 0–0.2)
BASOPHILS RELATIVE PERCENT: 0 % (ref 0–2)
BASOPHILS RELATIVE PERCENT: 0.1 % (ref 0–2)
BILIRUB SERPL-MCNC: 0.7 MG/DL (ref 0–1.2)
BUN BLDV-MCNC: 43 MG/DL (ref 8–23)
CALCIUM IONIZED: 1.24 MMOL/L (ref 1.15–1.33)
CALCIUM SERPL-MCNC: 8.3 MG/DL (ref 8.6–10.2)
CHLORIDE BLD-SCNC: 113 MMOL/L (ref 98–107)
CK MB: 3.4 NG/ML (ref 0–7.7)
CO2: 25 MMOL/L (ref 22–29)
CREAT SERPL-MCNC: 1.1 MG/DL (ref 0.7–1.2)
EOSINOPHILS ABSOLUTE: 0 E9/L (ref 0.05–0.5)
EOSINOPHILS ABSOLUTE: 0.1 E9/L (ref 0.05–0.5)
EOSINOPHILS RELATIVE PERCENT: 0 % (ref 0–6)
EOSINOPHILS RELATIVE PERCENT: 0.9 % (ref 0–6)
GFR AFRICAN AMERICAN: >60
GFR NON-AFRICAN AMERICAN: >60 ML/MIN/1.73
GLUCOSE BLD-MCNC: 128 MG/DL (ref 74–99)
HCT VFR BLD CALC: 22.4 % (ref 37–54)
HCT VFR BLD CALC: 22.5 % (ref 37–54)
HCT VFR BLD CALC: 22.8 % (ref 37–54)
HCT VFR BLD CALC: 23 % (ref 37–54)
HEMOGLOBIN: 7.2 G/DL (ref 12.5–16.5)
HEMOGLOBIN: 7.3 G/DL (ref 12.5–16.5)
HEMOGLOBIN: 7.3 G/DL (ref 12.5–16.5)
HEMOGLOBIN: 7.5 G/DL (ref 12.5–16.5)
HYPOCHROMIA: ABNORMAL
IMMATURE GRANULOCYTES #: 0.13 E9/L
IMMATURE GRANULOCYTES #: 0.13 E9/L
IMMATURE GRANULOCYTES #: 0.16 E9/L
IMMATURE GRANULOCYTES %: 1.4 % (ref 0–5)
IMMATURE GRANULOCYTES %: 1.5 % (ref 0–5)
IMMATURE GRANULOCYTES %: 1.5 % (ref 0–5)
INR BLD: 1.4
LYMPHOCYTES ABSOLUTE: 0.36 E9/L (ref 1.5–4)
LYMPHOCYTES ABSOLUTE: 0.43 E9/L (ref 1.5–4)
LYMPHOCYTES ABSOLUTE: 0.44 E9/L (ref 1.5–4)
LYMPHOCYTES ABSOLUTE: 0.6 E9/L (ref 1.5–4)
LYMPHOCYTES RELATIVE PERCENT: 4.3 % (ref 20–42)
LYMPHOCYTES RELATIVE PERCENT: 4.3 % (ref 20–42)
LYMPHOCYTES RELATIVE PERCENT: 4.8 % (ref 20–42)
LYMPHOCYTES RELATIVE PERCENT: 5.6 % (ref 20–42)
MAGNESIUM: 2.6 MG/DL (ref 1.6–2.6)
MCH RBC QN AUTO: 29.8 PG (ref 26–35)
MCH RBC QN AUTO: 30 PG (ref 26–35)
MCH RBC QN AUTO: 30.9 PG (ref 26–35)
MCH RBC QN AUTO: 30.9 PG (ref 26–35)
MCHC RBC AUTO-ENTMCNC: 31.7 % (ref 32–34.5)
MCHC RBC AUTO-ENTMCNC: 32 % (ref 32–34.5)
MCHC RBC AUTO-ENTMCNC: 32.6 % (ref 32–34.5)
MCHC RBC AUTO-ENTMCNC: 32.9 % (ref 32–34.5)
MCV RBC AUTO: 93.8 FL (ref 80–99.9)
MCV RBC AUTO: 93.8 FL (ref 80–99.9)
MCV RBC AUTO: 93.9 FL (ref 80–99.9)
MCV RBC AUTO: 94.9 FL (ref 80–99.9)
MONOCYTES ABSOLUTE: 0.22 E9/L (ref 0.1–0.95)
MONOCYTES ABSOLUTE: 0.42 E9/L (ref 0.1–0.95)
MONOCYTES ABSOLUTE: 0.51 E9/L (ref 0.1–0.95)
MONOCYTES ABSOLUTE: 0.95 E9/L (ref 0.1–0.95)
MONOCYTES RELATIVE PERCENT: 1.7 % (ref 2–12)
MONOCYTES RELATIVE PERCENT: 4.7 % (ref 2–12)
MONOCYTES RELATIVE PERCENT: 6 % (ref 2–12)
MONOCYTES RELATIVE PERCENT: 8.9 % (ref 2–12)
MYELOCYTE PERCENT: 1.7 % (ref 0–0)
NEUTROPHILS ABSOLUTE: 10.32 E9/L (ref 1.8–7.3)
NEUTROPHILS ABSOLUTE: 7.43 E9/L (ref 1.8–7.3)
NEUTROPHILS ABSOLUTE: 8 E9/L (ref 1.8–7.3)
NEUTROPHILS ABSOLUTE: 8.93 E9/L (ref 1.8–7.3)
NEUTROPHILS RELATIVE PERCENT: 83.9 % (ref 43–80)
NEUTROPHILS RELATIVE PERCENT: 88.1 % (ref 43–80)
NEUTROPHILS RELATIVE PERCENT: 89.1 % (ref 43–80)
NEUTROPHILS RELATIVE PERCENT: 91.3 % (ref 43–80)
NUCLEATED RED BLOOD CELLS: 0.9 /100 WBC
OVALOCYTES: ABNORMAL
PDW BLD-RTO: 15.9 FL (ref 11.5–15)
PDW BLD-RTO: 16 FL (ref 11.5–15)
PDW BLD-RTO: 16.2 FL (ref 11.5–15)
PDW BLD-RTO: 16.3 FL (ref 11.5–15)
PHOSPHORUS: 3.5 MG/DL (ref 2.5–4.5)
PLATELET # BLD: 101 E9/L (ref 130–450)
PLATELET # BLD: 101 E9/L (ref 130–450)
PLATELET # BLD: 96 E9/L (ref 130–450)
PLATELET # BLD: 99 E9/L (ref 130–450)
PLATELET CONFIRMATION: NORMAL
PLATELET CONFIRMATION: NORMAL
PMV BLD AUTO: 10.2 FL (ref 7–12)
PMV BLD AUTO: 8.9 FL (ref 7–12)
PMV BLD AUTO: 9.5 FL (ref 7–12)
PMV BLD AUTO: 9.8 FL (ref 7–12)
POIKILOCYTES: ABNORMAL
POLYCHROMASIA: ABNORMAL
POTASSIUM SERPL-SCNC: 3.9 MMOL/L (ref 3.5–5)
PROTHROMBIN TIME: 14.9 SEC (ref 9.3–12.4)
RBC # BLD: 2.36 E12/L (ref 3.8–5.8)
RBC # BLD: 2.4 E12/L (ref 3.8–5.8)
RBC # BLD: 2.43 E12/L (ref 3.8–5.8)
RBC # BLD: 2.45 E12/L (ref 3.8–5.8)
SODIUM BLD-SCNC: 145 MMOL/L (ref 132–146)
TARGET CELLS: ABNORMAL
TEAR DROP CELLS: ABNORMAL
TOTAL CK: 65 U/L (ref 20–200)
TOTAL PROTEIN: 5.6 G/DL (ref 6.4–8.3)
TROPONIN: <0.01 NG/ML (ref 0–0.03)
WBC # BLD: 10.7 E9/L (ref 4.5–11.5)
WBC # BLD: 11.1 E9/L (ref 4.5–11.5)
WBC # BLD: 8.4 E9/L (ref 4.5–11.5)
WBC # BLD: 9 E9/L (ref 4.5–11.5)

## 2021-03-06 PROCEDURE — 36592 COLLECT BLOOD FROM PICC: CPT

## 2021-03-06 PROCEDURE — 2580000003 HC RX 258: Performed by: STUDENT IN AN ORGANIZED HEALTH CARE EDUCATION/TRAINING PROGRAM

## 2021-03-06 PROCEDURE — 93005 ELECTROCARDIOGRAM TRACING: CPT | Performed by: SURGERY

## 2021-03-06 PROCEDURE — 80053 COMPREHEN METABOLIC PANEL: CPT

## 2021-03-06 PROCEDURE — 94660 CPAP INITIATION&MGMT: CPT

## 2021-03-06 PROCEDURE — 83735 ASSAY OF MAGNESIUM: CPT

## 2021-03-06 PROCEDURE — 85610 PROTHROMBIN TIME: CPT

## 2021-03-06 PROCEDURE — 6360000002 HC RX W HCPCS: Performed by: STUDENT IN AN ORGANIZED HEALTH CARE EDUCATION/TRAINING PROGRAM

## 2021-03-06 PROCEDURE — C9113 INJ PANTOPRAZOLE SODIUM, VIA: HCPCS | Performed by: STUDENT IN AN ORGANIZED HEALTH CARE EDUCATION/TRAINING PROGRAM

## 2021-03-06 PROCEDURE — 86677 HELICOBACTER PYLORI ANTIBODY: CPT

## 2021-03-06 PROCEDURE — 6370000000 HC RX 637 (ALT 250 FOR IP): Performed by: SURGERY

## 2021-03-06 PROCEDURE — 99291 CRITICAL CARE FIRST HOUR: CPT | Performed by: SURGERY

## 2021-03-06 PROCEDURE — 84484 ASSAY OF TROPONIN QUANT: CPT

## 2021-03-06 PROCEDURE — 82330 ASSAY OF CALCIUM: CPT

## 2021-03-06 PROCEDURE — 6370000000 HC RX 637 (ALT 250 FOR IP): Performed by: STUDENT IN AN ORGANIZED HEALTH CARE EDUCATION/TRAINING PROGRAM

## 2021-03-06 PROCEDURE — 85025 COMPLETE CBC W/AUTO DIFF WBC: CPT

## 2021-03-06 PROCEDURE — 82550 ASSAY OF CK (CPK): CPT

## 2021-03-06 PROCEDURE — 36415 COLL VENOUS BLD VENIPUNCTURE: CPT

## 2021-03-06 PROCEDURE — 2000000000 HC ICU R&B

## 2021-03-06 PROCEDURE — 82553 CREATINE MB FRACTION: CPT

## 2021-03-06 PROCEDURE — 84100 ASSAY OF PHOSPHORUS: CPT

## 2021-03-06 RX ORDER — POTASSIUM CHLORIDE 20 MEQ/1
20 TABLET, EXTENDED RELEASE ORAL ONCE
Status: COMPLETED | OUTPATIENT
Start: 2021-03-06 | End: 2021-03-06

## 2021-03-06 RX ORDER — CARVEDILOL 6.25 MG/1
12.5 TABLET ORAL 2 TIMES DAILY WITH MEALS
Status: DISCONTINUED | OUTPATIENT
Start: 2021-03-06 | End: 2021-03-10 | Stop reason: HOSPADM

## 2021-03-06 RX ADMIN — PANTOPRAZOLE SODIUM 8 MG/HR: 40 INJECTION, POWDER, FOR SOLUTION INTRAVENOUS at 22:09

## 2021-03-06 RX ADMIN — SUCRALFATE 1 G: 1 TABLET ORAL at 05:38

## 2021-03-06 RX ADMIN — CARVEDILOL 12.5 MG: 6.25 TABLET, FILM COATED ORAL at 16:18

## 2021-03-06 RX ADMIN — SODIUM CHLORIDE, POTASSIUM CHLORIDE, SODIUM LACTATE AND CALCIUM CHLORIDE: 600; 310; 30; 20 INJECTION, SOLUTION INTRAVENOUS at 04:30

## 2021-03-06 RX ADMIN — Medication 10 ML: at 21:30

## 2021-03-06 RX ADMIN — SUCRALFATE 1 G: 1 TABLET ORAL at 00:59

## 2021-03-06 RX ADMIN — Medication 10 ML: at 08:24

## 2021-03-06 RX ADMIN — SUCRALFATE 1 G: 1 TABLET ORAL at 11:47

## 2021-03-06 RX ADMIN — DIGOXIN 62.5 MCG: 125 TABLET ORAL at 08:24

## 2021-03-06 RX ADMIN — POTASSIUM CHLORIDE 20 MEQ: 1500 TABLET, EXTENDED RELEASE ORAL at 13:43

## 2021-03-06 RX ADMIN — PANTOPRAZOLE SODIUM 8 MG/HR: 40 INJECTION, POWDER, FOR SOLUTION INTRAVENOUS at 11:47

## 2021-03-06 RX ADMIN — SUCRALFATE 1 G: 1 TABLET ORAL at 19:12

## 2021-03-06 ASSESSMENT — PAIN DESCRIPTION - LOCATION: LOCATION: CHEST

## 2021-03-06 ASSESSMENT — PAIN DESCRIPTION - PROGRESSION
CLINICAL_PROGRESSION: RAPIDLY IMPROVING
CLINICAL_PROGRESSION: NOT CHANGED

## 2021-03-06 ASSESSMENT — PAIN DESCRIPTION - PAIN TYPE: TYPE: OTHER (COMMENT)

## 2021-03-06 ASSESSMENT — PAIN SCALES - GENERAL
PAINLEVEL_OUTOF10: 0
PAINLEVEL_OUTOF10: 0
PAINLEVEL_OUTOF10: 3
PAINLEVEL_OUTOF10: 0

## 2021-03-06 ASSESSMENT — PAIN DESCRIPTION - ORIENTATION: ORIENTATION: MID

## 2021-03-06 ASSESSMENT — PAIN - FUNCTIONAL ASSESSMENT: PAIN_FUNCTIONAL_ASSESSMENT: PREVENTS OR INTERFERES SOME ACTIVE ACTIVITIES AND ADLS

## 2021-03-06 ASSESSMENT — PAIN DESCRIPTION - ONSET: ONSET: GRADUAL

## 2021-03-06 ASSESSMENT — PAIN DESCRIPTION - DESCRIPTORS: DESCRIPTORS: PRESSURE

## 2021-03-06 ASSESSMENT — PAIN DESCRIPTION - FREQUENCY: FREQUENCY: CONTINUOUS

## 2021-03-06 NOTE — PROGRESS NOTES
[facility]  SURGICAL INTENSIVE CARE UNIT (SICU)  ATTENDING PHYSICIAN CRITICAL CARE PROGRESS NOTE     I have examined the patient, reviewed the record,and discussed the case with the APN/  Resident. I have reviewed all relevant labs and imaging data. The following summarizes my clinical findings and independent assessment. Date of admission:  3/4/2021    CC: Bleeding duodenal ulcer     HOSPITAL COURSE:   3/5 Hematemesis, melena EGD - Epinephrine and cautery   3/6 No acute issues overnight Hb stable no further melena or hematemesis no BRB or coffee ground emesis per NGT     New Imaging Reviewed:   No new imaging      Physical Exam:  Physical Exam  HENT:      Head: Normocephalic. Eyes:      Extraocular Movements: Extraocular movements intact. Pupils: Pupils are equal, round, and reactive to light. Neck:      Musculoskeletal: Neck supple. Cardiovascular:      Rate and Rhythm: Normal rate. Pulmonary:      Effort: Pulmonary effort is normal. No respiratory distress. Abdominal:      General: Abdomen is flat. There is no distension. Tenderness: There is no abdominal tenderness. There is no guarding or rebound. Musculoskeletal: Normal range of motion. Skin:     General: Skin is warm. Neurological:      Mental Status: He is alert. Psychiatric:         Mood and Affect: Mood normal.         Assessment   Active Problems:    Essential hypertension    Stage 3a chronic kidney disease    Anticoagulated on Coumadin    Cardiomyopathy (HCC)    Chronic combined systolic and diastolic CHF (congestive heart failure) ([facility])    Atrial fibrillation with RVR (HCC)    KYARA on CPAP    Gastrointestinal hemorrhage    Acute blood loss anemia    Hypotension due to hypovolemia    COPD (chronic obstructive pulmonary disease) (HCC)    CAD (coronary artery disease)    Hx of CABG    S/P AAA repair    Lactic acidosis  Resolved Problems:    * No resolved hospital problems.  *      Plan   GI:  NPO OK for clears and NGT removal if afternoon Hb stable , No bowel regiment  PPI gtt 72 hours  and carafate , h pylori ag   Neuro: prn Tylenol     Renal: 125cc LR ( decrease to 75cc/hr , JASS resolving continue until on clears Monitor Urine Output, Daily BMP, Mg,Phos, ionized Ca Q 6 hour H&H , INR normal TEG normal  , Lactic acidosis resolved , replace  K   Musculoskeletal: WBAT all extremities ,AM-PAC Score when able   Pulmonary: Aggressive pulmonary hygiene , SMI , Monitor RR and Maintain SpO2 > 92%  ID: No Indication for Antibiotics      Heme: Transfusion secondary to Recieved 3units PRBC and 2FFP   Cardiac: Monitor Hemodynamics digoxin restart  1/2 coreg dose  Hold hydralazine, norvasc, coazaar, isordil, bumex, ASA, coumadin   Endocrine: No Glycemic Issues    DVT Prophylaxis: PCDs, Hold Chemoprophylaxis until  stable Hb    Ulcer Prophylaxis: PPI gtt for GIB bleed plan 72 hours then transition to BID   Tubes and Lines: Continue PIV and  Central Line   Seizure proph:     No Indication  Ancillary consults:   Internal Medicine    Family Update:         As available   CODE Status:       Full Code    Dispo: SICU    Manuel Saeed MD    Critical Care: 32 minutes evaluating and managing patient with bleeding duodenal ulcer with risk rebleeding , acute blood loss anemia and JASS ( resolving )

## 2021-03-06 NOTE — CONSULTS
Surgical Intensive Care Unit  Daily Progress Note  Date of admission:  3/4/2021  Reason for ICU transfer: GI bleed    HPI:    Patient is a 53-year-old gentleman with past medical history of A. fib on warfarin, CAD, CHF, CKD, hypertension, cardiomyopathy, sarcoidosis, and a past surgical history most notable for repair of aortic dissection through midline sternotomy at [facility]. Patient presented 3/4 with hematemesis and melena. Patient was hemodynamically stable in the ED after receiving blood. Hemoglobin was 8.1 down from baseline of around 11. Hemoccult positive in ED. Patient also had mild pain in the emergency department. Due to his Coumadin, INR was 2.4. He was given vitamin K for reversal.  Taken for EGD 3/5 in the morning. Gastric ulcer found. Injected with epinephrine and cauterized. Hemostasis was achieved. Transferred to SICU for critical monitoring. Physical Exam:  BP (!) 133/93   Pulse 106   Temp 97 °F (36.1 °C)   Resp 17   Ht 5' 8\" (1.727 m)   Wt 204 lb (92.5 kg)   SpO2 95%   BMI 31.02 kg/m²   CONSTITUTIONAL:  awake, alert, cooperative, no apparent distress, and appears stated age  EYES:  Lids and lashes normal, pupils equal, round and reactive to light, extra ocular muscles intact, sclera clear, conjunctiva normal  ENT:  normocepalic, without obvious abnormality  NECK:  supple, symmetrical, trachea midline  HEMATOLOGIC/LYMPHATICS:  no cervical lymphadenopathy  BACK:  symmetric  LUNGS:  no increased work of breathing  CARDIOVASCULAR:  regular rate and rhythm  ABDOMEN:  non-distended and mild tenderness noted in the epigastric region  CHEST/BREASTS: Midline sternotomy scar  GENITAL/URINARY: No abnormalities  MUSCULOSKELETAL:  motor strength is 5 out of 5 all extremities bilaterally  NEUROLOGIC:  Awake, alert, oriented to name, place and time. Cranial nerves II-XII are grossly intact. Motor is 5 out of 5 bilaterally. Cerebellar finger to nose, heel to shin intact.   Sensory is intact. Babinski down going, Romberg negative, and gait is normal.  SKIN:  no bruising or bleeding    ASSESSMENT / PLAN:       Plan   GI:  NPO , No bowel regiment  PPI gtt and carafate , h pylori ag   Neuro: prn Tylenol     Renal: 125cc LR, JASS resolving Monitor Urine Output, Daily BMP, Mg,Phos, ionized Ca Q 6 hour H&H ,Post FFP transfusion TEG , Lactic acidosis resolved , replace Calcium   Musculoskeletal: WBAT all extremities ,AM-PAC Score when able   Pulmonary: Aggressive pulmonary hygiene , SMI , Monitor RR and Maintain SpO2 > 92%  ID: No Indication for Antibiotics      Heme: Transfusion secondary to Recieved 2units PRBC and 2FFP receiving 1 FFP    Cardiac: Monitor Hemodynamics digoxin Hold hydralazine, coreg, norvasc, coazaar, isordil, bumex, ASA, coumadin   Endocrine: No Glycemic Issues     DVT Prophylaxis: PCDs, Hold Chemoprophylaxis until  stable Hb    Ulcer Prophylaxis: PPI gtt for GIB bleed plan 72 hours then transition to BID   Tubes and Lines: Continue PIV and Place Central Line   Seizure proph:     No Indication  Ancillary consults:   Internal Medicine    Family Update:         As available   CODE Status:       Full Code        Hospital course:  3/5/21  -transferred to SICU status post EGD with injection of epinephrine and coagulation achieving hemostasis of a gastric ulcer. Patient stable. NG tube in place. Continue clinical monitoring. Continue to correct coagulopathy.

## 2021-03-07 LAB
ALBUMIN SERPL-MCNC: 3.6 G/DL (ref 3.5–5.2)
ALP BLD-CCNC: 42 U/L (ref 40–129)
ALT SERPL-CCNC: 17 U/L (ref 0–40)
ANION GAP SERPL CALCULATED.3IONS-SCNC: 5 MMOL/L (ref 7–16)
AST SERPL-CCNC: 23 U/L (ref 0–39)
BASOPHILS ABSOLUTE: 0.01 E9/L (ref 0–0.2)
BASOPHILS ABSOLUTE: 0.01 E9/L (ref 0–0.2)
BASOPHILS ABSOLUTE: 0.02 E9/L (ref 0–0.2)
BASOPHILS RELATIVE PERCENT: 0.1 % (ref 0–2)
BASOPHILS RELATIVE PERCENT: 0.1 % (ref 0–2)
BASOPHILS RELATIVE PERCENT: 0.2 % (ref 0–2)
BILIRUB SERPL-MCNC: 0.7 MG/DL (ref 0–1.2)
BLOOD BANK DISPENSE STATUS: NORMAL
BLOOD BANK PRODUCT CODE: NORMAL
BPU ID: NORMAL
BUN BLDV-MCNC: 21 MG/DL (ref 8–23)
CALCIUM IONIZED: 1.25 MMOL/L (ref 1.15–1.33)
CALCIUM SERPL-MCNC: 8.4 MG/DL (ref 8.6–10.2)
CHLORIDE BLD-SCNC: 104 MMOL/L (ref 98–107)
CO2: 28 MMOL/L (ref 22–29)
CREAT SERPL-MCNC: 0.9 MG/DL (ref 0.7–1.2)
DESCRIPTION BLOOD BANK: NORMAL
EOSINOPHILS ABSOLUTE: 0.02 E9/L (ref 0.05–0.5)
EOSINOPHILS ABSOLUTE: 0.05 E9/L (ref 0.05–0.5)
EOSINOPHILS ABSOLUTE: 0.09 E9/L (ref 0.05–0.5)
EOSINOPHILS RELATIVE PERCENT: 0.2 % (ref 0–6)
EOSINOPHILS RELATIVE PERCENT: 0.6 % (ref 0–6)
EOSINOPHILS RELATIVE PERCENT: 1.1 % (ref 0–6)
GFR AFRICAN AMERICAN: >60
GFR NON-AFRICAN AMERICAN: >60 ML/MIN/1.73
GLUCOSE BLD-MCNC: 91 MG/DL (ref 74–99)
HCT VFR BLD CALC: 23 % (ref 37–54)
HCT VFR BLD CALC: 24.7 % (ref 37–54)
HCT VFR BLD CALC: 25 % (ref 37–54)
HEMOGLOBIN: 7.5 G/DL (ref 12.5–16.5)
HEMOGLOBIN: 8 G/DL (ref 12.5–16.5)
HEMOGLOBIN: 8.1 G/DL (ref 12.5–16.5)
IMMATURE GRANULOCYTES #: 0.11 E9/L
IMMATURE GRANULOCYTES #: 0.13 E9/L
IMMATURE GRANULOCYTES #: 0.23 E9/L
IMMATURE GRANULOCYTES %: 1.4 % (ref 0–5)
IMMATURE GRANULOCYTES %: 1.6 % (ref 0–5)
IMMATURE GRANULOCYTES %: 2.2 % (ref 0–5)
INR BLD: 1.2
LYMPHOCYTES ABSOLUTE: 0.66 E9/L (ref 1.5–4)
LYMPHOCYTES ABSOLUTE: 0.77 E9/L (ref 1.5–4)
LYMPHOCYTES ABSOLUTE: 0.81 E9/L (ref 1.5–4)
LYMPHOCYTES RELATIVE PERCENT: 10.3 % (ref 20–42)
LYMPHOCYTES RELATIVE PERCENT: 7.2 % (ref 20–42)
LYMPHOCYTES RELATIVE PERCENT: 8.1 % (ref 20–42)
MAGNESIUM: 2.2 MG/DL (ref 1.6–2.6)
MCH RBC QN AUTO: 30.5 PG (ref 26–35)
MCH RBC QN AUTO: 31.1 PG (ref 26–35)
MCH RBC QN AUTO: 31.2 PG (ref 26–35)
MCHC RBC AUTO-ENTMCNC: 32 % (ref 32–34.5)
MCHC RBC AUTO-ENTMCNC: 32.6 % (ref 32–34.5)
MCHC RBC AUTO-ENTMCNC: 32.8 % (ref 32–34.5)
MCV RBC AUTO: 95 FL (ref 80–99.9)
MCV RBC AUTO: 95.4 FL (ref 80–99.9)
MCV RBC AUTO: 95.4 FL (ref 80–99.9)
MONOCYTES ABSOLUTE: 0.74 E9/L (ref 0.1–0.95)
MONOCYTES ABSOLUTE: 0.79 E9/L (ref 0.1–0.95)
MONOCYTES ABSOLUTE: 0.99 E9/L (ref 0.1–0.95)
MONOCYTES RELATIVE PERCENT: 10.1 % (ref 2–12)
MONOCYTES RELATIVE PERCENT: 9.1 % (ref 2–12)
MONOCYTES RELATIVE PERCENT: 9.3 % (ref 2–12)
NEUTROPHILS ABSOLUTE: 6.03 E9/L (ref 1.8–7.3)
NEUTROPHILS ABSOLUTE: 6.51 E9/L (ref 1.8–7.3)
NEUTROPHILS ABSOLUTE: 8.65 E9/L (ref 1.8–7.3)
NEUTROPHILS RELATIVE PERCENT: 77 % (ref 43–80)
NEUTROPHILS RELATIVE PERCENT: 80.5 % (ref 43–80)
NEUTROPHILS RELATIVE PERCENT: 80.9 % (ref 43–80)
PDW BLD-RTO: 15.6 FL (ref 11.5–15)
PDW BLD-RTO: 15.6 FL (ref 11.5–15)
PDW BLD-RTO: 15.8 FL (ref 11.5–15)
PHOSPHORUS: 1.4 MG/DL (ref 2.5–4.5)
PLATELET # BLD: 103 E9/L (ref 130–450)
PLATELET # BLD: 115 E9/L (ref 130–450)
PLATELET # BLD: 117 E9/L (ref 130–450)
PMV BLD AUTO: 9.4 FL (ref 7–12)
PMV BLD AUTO: 9.6 FL (ref 7–12)
PMV BLD AUTO: 9.6 FL (ref 7–12)
POTASSIUM SERPL-SCNC: 3.8 MMOL/L (ref 3.5–5)
PROTHROMBIN TIME: 13.3 SEC (ref 9.3–12.4)
RBC # BLD: 2.41 E12/L (ref 3.8–5.8)
RBC # BLD: 2.6 E12/L (ref 3.8–5.8)
RBC # BLD: 2.62 E12/L (ref 3.8–5.8)
SODIUM BLD-SCNC: 137 MMOL/L (ref 132–146)
TOTAL PROTEIN: 5.7 G/DL (ref 6.4–8.3)
TROPONIN: <0.01 NG/ML (ref 0–0.03)
TROPONIN: <0.01 NG/ML (ref 0–0.03)
WBC # BLD: 10.7 E9/L (ref 4.5–11.5)
WBC # BLD: 7.8 E9/L (ref 4.5–11.5)
WBC # BLD: 8.1 E9/L (ref 4.5–11.5)

## 2021-03-07 PROCEDURE — 37799 UNLISTED PX VASCULAR SURGERY: CPT

## 2021-03-07 PROCEDURE — 2500000003 HC RX 250 WO HCPCS: Performed by: SURGERY

## 2021-03-07 PROCEDURE — 99291 CRITICAL CARE FIRST HOUR: CPT | Performed by: SURGERY

## 2021-03-07 PROCEDURE — 2000000000 HC ICU R&B

## 2021-03-07 PROCEDURE — 85610 PROTHROMBIN TIME: CPT

## 2021-03-07 PROCEDURE — 82330 ASSAY OF CALCIUM: CPT

## 2021-03-07 PROCEDURE — 6370000000 HC RX 637 (ALT 250 FOR IP): Performed by: STUDENT IN AN ORGANIZED HEALTH CARE EDUCATION/TRAINING PROGRAM

## 2021-03-07 PROCEDURE — 85025 COMPLETE CBC W/AUTO DIFF WBC: CPT

## 2021-03-07 PROCEDURE — 2580000003 HC RX 258: Performed by: STUDENT IN AN ORGANIZED HEALTH CARE EDUCATION/TRAINING PROGRAM

## 2021-03-07 PROCEDURE — 6370000000 HC RX 637 (ALT 250 FOR IP): Performed by: SURGERY

## 2021-03-07 PROCEDURE — 80053 COMPREHEN METABOLIC PANEL: CPT

## 2021-03-07 PROCEDURE — 2580000003 HC RX 258: Performed by: SURGERY

## 2021-03-07 PROCEDURE — P9016 RBC LEUKOCYTES REDUCED: HCPCS

## 2021-03-07 PROCEDURE — 36430 TRANSFUSION BLD/BLD COMPNT: CPT

## 2021-03-07 PROCEDURE — 36415 COLL VENOUS BLD VENIPUNCTURE: CPT

## 2021-03-07 PROCEDURE — 6360000002 HC RX W HCPCS: Performed by: SURGERY

## 2021-03-07 PROCEDURE — 83735 ASSAY OF MAGNESIUM: CPT

## 2021-03-07 PROCEDURE — C9113 INJ PANTOPRAZOLE SODIUM, VIA: HCPCS | Performed by: STUDENT IN AN ORGANIZED HEALTH CARE EDUCATION/TRAINING PROGRAM

## 2021-03-07 PROCEDURE — 84100 ASSAY OF PHOSPHORUS: CPT

## 2021-03-07 PROCEDURE — C9113 INJ PANTOPRAZOLE SODIUM, VIA: HCPCS | Performed by: SURGERY

## 2021-03-07 PROCEDURE — 6360000002 HC RX W HCPCS: Performed by: STUDENT IN AN ORGANIZED HEALTH CARE EDUCATION/TRAINING PROGRAM

## 2021-03-07 PROCEDURE — 84484 ASSAY OF TROPONIN QUANT: CPT

## 2021-03-07 RX ORDER — POTASSIUM CHLORIDE 20 MEQ/1
40 TABLET, EXTENDED RELEASE ORAL ONCE
Status: COMPLETED | OUTPATIENT
Start: 2021-03-07 | End: 2021-03-07

## 2021-03-07 RX ORDER — PANTOPRAZOLE SODIUM 40 MG/10ML
40 INJECTION, POWDER, LYOPHILIZED, FOR SOLUTION INTRAVENOUS 2 TIMES DAILY
Status: DISCONTINUED | OUTPATIENT
Start: 2021-03-07 | End: 2021-03-10 | Stop reason: HOSPADM

## 2021-03-07 RX ORDER — SODIUM CHLORIDE 9 MG/ML
INJECTION, SOLUTION INTRAVENOUS PRN
Status: DISCONTINUED | OUTPATIENT
Start: 2021-03-07 | End: 2021-03-08

## 2021-03-07 RX ADMIN — SUCRALFATE 1 G: 1 TABLET ORAL at 18:10

## 2021-03-07 RX ADMIN — SUCRALFATE 1 G: 1 TABLET ORAL at 00:02

## 2021-03-07 RX ADMIN — PANTOPRAZOLE SODIUM 8 MG/HR: 40 INJECTION, POWDER, FOR SOLUTION INTRAVENOUS at 10:02

## 2021-03-07 RX ADMIN — DIGOXIN 62.5 MCG: 125 TABLET ORAL at 10:03

## 2021-03-07 RX ADMIN — SUCRALFATE 1 G: 1 TABLET ORAL at 23:56

## 2021-03-07 RX ADMIN — PANTOPRAZOLE SODIUM 40 MG: 40 INJECTION, POWDER, FOR SOLUTION INTRAVENOUS at 21:00

## 2021-03-07 RX ADMIN — Medication 10 ML: at 21:01

## 2021-03-07 RX ADMIN — CARVEDILOL 12.5 MG: 6.25 TABLET, FILM COATED ORAL at 10:06

## 2021-03-07 RX ADMIN — POTASSIUM PHOSPHATE, MONOBASIC AND POTASSIUM PHOSPHATE, DIBASIC 20 MMOL: 224; 236 INJECTION, SOLUTION, CONCENTRATE INTRAVENOUS at 10:03

## 2021-03-07 RX ADMIN — SUCRALFATE 1 G: 1 TABLET ORAL at 10:03

## 2021-03-07 RX ADMIN — CARVEDILOL 12.5 MG: 6.25 TABLET, FILM COATED ORAL at 18:10

## 2021-03-07 RX ADMIN — SODIUM CHLORIDE, POTASSIUM CHLORIDE, SODIUM LACTATE AND CALCIUM CHLORIDE: 600; 310; 30; 20 INJECTION, SOLUTION INTRAVENOUS at 06:36

## 2021-03-07 RX ADMIN — Medication 10 ML: at 10:03

## 2021-03-07 RX ADMIN — POTASSIUM CHLORIDE 40 MEQ: 1500 TABLET, EXTENDED RELEASE ORAL at 10:03

## 2021-03-07 RX ADMIN — SUCRALFATE 1 G: 1 TABLET ORAL at 06:35

## 2021-03-07 ASSESSMENT — PAIN DESCRIPTION - PROGRESSION
CLINICAL_PROGRESSION: RAPIDLY IMPROVING

## 2021-03-07 ASSESSMENT — PAIN SCALES - GENERAL
PAINLEVEL_OUTOF10: 0

## 2021-03-07 NOTE — PROGRESS NOTES
Lactic acidosis  Resolved Problems:    * No resolved hospital problems.  *      Plan   GI: Clears  , No bowel regiment  PPI gtt 72 hours  and carafate , h pylori ag ( in process)   Neuro: prn Tylenol     Renal: Stop IV fluids ,  Monitor Urine Output, Daily BMP, Mg,Phos, ionized Ca Q 6 hour H&H , INR normal TEG normal  , Lactic acidosis resolved , replace  K   Musculoskeletal: WBAT all extremities ,AM-PAC Score when able   Pulmonary: Aggressive pulmonary hygiene , SMI , Monitor RR and Maintain SpO2 > 92%  ID: No Indication for Antibiotics      Heme: Transfusion secondary to Recieved 4units PRBC and 2FFP   Cardiac: Monitor Hemodynamics digoxin restarted  1/2 coreg dose yesterday   Hold hydralazine, norvasc, coazaar, isordil, bumex, ASA, coumadin   Endocrine: No Glycemic Issues    DVT Prophylaxis: PCDs, Hold Chemoprophylaxis until  stable Hb    Ulcer Prophylaxis: PPI gtt for GIB bleed plan 72 hours then transition to BID   Tubes and Lines: Continue PIV and  Central Line   Seizure proph:     No Indication  Ancillary consults:   Internal Medicine    Family Update:         As available   CODE Status:       Full Code    Dispo: RICHARD Baron MD    Critical Care: 32 minutes evaluating and managing patient with bleeding duodenal ulcer with risk rebleeding , acute blood loss anemia and JASS ( resolving )

## 2021-03-07 NOTE — PROGRESS NOTES
Hospitalist Progress Note      SYNOPSIS: Patient admitted on 3/4/2021 for a GIB. Patient states he had approximately 5 dark red bowel movements and hematemesis x3 beginning approximately 10 hours prior to arrival.  He also admits to associated dizziness. He is anticoagulated with Coumadin for atrial fibrillation. In ED he was found to be hypotensive, tachycardic, and anemic with a hemoglobin of 8.1. His INR is therapeutic at 2.4. Lactic acid 2.4 as well. Chest x-ray with cardiomegaly and atelectasis. Chest CT significant for a stent in the aortic arch extending into proximal descending aorta covering an intramural hematoma, emphysema and CABG. EKG with atrial fibrillation, rate controlled.    Coumadin was held. Surgery was consulted and patient underwent an EGD today which showed a gastric ulcer which was injected with epi and cauterized. Hospital day 2     SUBJECTIVE:  Stable overnight. No issues reported. Patient seen and examined. Records reviewed. Temp (24hrs), Av.2 °F (36.8 °C), Min:97.6 °F (36.4 °C), Max:98.5 °F (36.9 °C)    DIET: DIET CLEAR LIQUID;  CODE: Full Code    Intake/Output Summary (Last 24 hours) at 3/6/2021 2125  Last data filed at 3/6/2021 1800  Gross per 24 hour   Intake 3111.33 ml   Output 2045 ml   Net 1066.33 ml       OBJECTIVE:    BP (!) 147/89   Pulse 82   Temp 98 °F (36.7 °C) (Oral)   Resp 23   Ht 5' 8\" (1.727 m)   Wt 204 lb (92.5 kg)   SpO2 95%   BMI 31.02 kg/m²     General appearance: Obese, elderly, ill appearing male in no apparent acute distress, appears stated age and cooperative. HEENT: Normal cephalic, atraumatic without obvious deformity. Pupils equal, round, and reactive to light. Extra ocular muscles intact. Conjunctivae/corneas clear. Neck: Supple, with full range of motion. No jugular venous distention. Trachea midline. Respiratory:  Clear to auscultation bilaterally. No apparent distress.   Cardiovascular:  Tachycardic rate and regular rhythm. S1, S2 without murmurs, rubs, or gallops. PV: Brisk capillary refill. +2 pedal and radial pulses bilaterally. No clubbing, cyanosis, edema of bilateral lower extremities. Abdomen: Soft, non-tender, non-distended. +BS  Musculoskeletal: No obvious deformities or erythematous or edematous joints. Skin: Pale. No rashes or lesions. Neurologic:  Neurovascularly intact without any focal sensory/motor deficits.  Cranial nerves: II-XII intact, grossly non-focal.  Psychiatric: Alert and oriented, thought content appropriate, normal insight    Medications:  REVIEWED DAILY    Infusion Medications    sodium chloride      pantoprozole (PROTONIX) infusion 8 mg/hr (03/06/21 1147)    lactated ringers 75 mL/hr at 03/06/21 1343    sodium chloride       Scheduled Medications    carvedilol  12.5 mg Oral BID WC    digoxin  62.5 mcg Oral Daily    [START ON 3/8/2021] pantoprazole  40 mg Intravenous BID    sucralfate  1 g Oral 4 times per day    sodium chloride flush  10 mL Intravenous 2 times per day     PRN Meds: sodium chloride, sodium chloride, ondansetron, sodium chloride flush, acetaminophen **OR** acetaminophen    Labs:     Recent Labs     03/06/21  0545 03/06/21  1350 03/06/21  1900   WBC 8.4 11.1 10.7   HGB 7.5* 7.2* 7.3*   HCT 22.8* 22.5* 22.4*   PLT 99* 96* 101*       Recent Labs     03/05/21  0622 03/05/21  1500 03/06/21  0545    146 145   K 4.3 4.4 3.9   * 118* 113*   CO2 27 18* 25   BUN 78* 61* 43*   CREATININE 1.3* 1.0 1.1   CALCIUM 8.3* 7.1* 8.3*   PHOS  --  2.8 3.5       Recent Labs     03/04/21  2141 03/05/21  0622 03/05/21  1500 03/06/21  0545   PROT 6.2* 4.9* 4.8* 5.6*   ALKPHOS 53 40 33* 42   ALT 15 13 13 15   AST 21 14 22 16   BILITOT 0.6 0.4 0.8 0.7   LIPASE 34  --   --   --        Recent Labs     03/05/21  0622 03/05/21 2110 03/06/21  0545   INR 2.4 1.4 1.4       Recent Labs     03/04/21  2141   TROPONINI 0.01       Chronic labs:    Lab Results   Component Value Date    CHOL 183 04/04/2019    TRIG 193 (H) 04/04/2019    HDL 36 04/04/2019    LDLCALC 108 (H) 04/04/2019    INR 1.4 03/06/2021    LABA1C 5.5 04/04/2019       Radiology: REVIEWED DAILY    ASSESSMENT/PLAN:  Upper GI bleed secondary to gastric ulcer s/p EGD with epi injection and cauterization with high risk of rebleeding. General surgery following. PPI BID. NPO. NGT. Acute blood loss anemia 2/2 to above. Hb 5.8 this AM, to receive 2 U PRBC. Monitor hemoglobin Q6H. As needed transfusion. Hypovolemia 2/2 to above. IVF. Therapeutic anticoagulation on Coumadin Coumadin on hold. INR still 2.4 this AM S/p vitamin K. A. fib RVR continue digoxin, add other rate control agent as blood pressure tolerates, tachycardia is likely due to hypovolemia and anemia. Chronic combined systolic and diastolic heart failure, currently compensated. Cautious use of fluid. Monitor volume status. Stage III chronic kidney disease at baseline  Hypertension blood pressures running low, hold medications for now  Coronary artery disease s/p CABG  Type A Aortic aneurysm status post endovascular stent graft repair  KYARA on CPAP  COPD emphysema on CT scan. No acute exacerbation, breathing treatments as needed. Discussed patient with family physician     DISPOSITION: Patient needs continued ICU monitoring    +++++++++++++++++++++++++++++++++++++++++++++++++  Osman Flowers   [address]  +++++++++++++++++++++++++++++++++++++++++++++++++  NOTE: This report was transcribed using voice recognition software. Every effort was made to ensure accuracy; however, inadvertent computerized transcription errors may be present.

## 2021-03-07 NOTE — FLOWSHEET NOTE
Pt states he \"feels pressure across my chest.\" Indicates mid chest nipple-line area. Dr. Marquis Fierro notified. Stat 12 lead ekg and bloodwork obtained and sent.

## 2021-03-07 NOTE — PROGRESS NOTES
Hospitalist Progress Note      SYNOPSIS: Patient admitted on 3/4/2021 for a GIB. Patient states he had approximately 5 dark red bowel movements and hematemesis x3 beginning approximately 10 hours prior to arrival.  He also admits to associated dizziness. He is anticoagulated with Coumadin for atrial fibrillation. In ED he was found to be hypotensive, tachycardic, and anemic with a hemoglobin of 8.1. His INR is therapeutic at 2.4. Lactic acid 2.4 as well. Chest x-ray with cardiomegaly and atelectasis. Chest CT significant for a stent in the aortic arch extending into proximal descending aorta covering an intramural hematoma, emphysema and CABG. EKG with atrial fibrillation, rate controlled.    Coumadin was held. Surgery was consulted and patient underwent an EGD today which showed a gastric ulcer which was injected with epi and cauterized. Hospital day 3     SUBJECTIVE:  Stable overnight. No issues reported. Patient seen and examined. Records reviewed. Remains n.p.o.  Normal troponins overnight. Patient was given 1 PRBC unit for symptomatic anemia secondary to chest pain    Temp (24hrs), Av.3 °F (36.8 °C), Min:98 °F (36.7 °C), Max:98.6 °F (37 °C)    DIET: DIET CLEAR LIQUID;  CODE: Full Code    Intake/Output Summary (Last 24 hours) at 3/7/2021 1130  Last data filed at 3/7/2021 1100  Gross per 24 hour   Intake 4078 ml   Output 4701 ml   Net -623 ml       OBJECTIVE:    BP (!) 145/89   Pulse 84   Temp 98.3 °F (36.8 °C) (Oral)   Resp 17   Ht 5' 8\" (1.727 m)   Wt 204 lb (92.5 kg)   SpO2 98%   BMI 31.02 kg/m²     General appearance: Obese, elderly, ill appearing male in no apparent acute distress, appears stated age and cooperative. HEENT: Normal cephalic, atraumatic without obvious deformity. Pupils equal, round, and reactive to light. Extra ocular muscles intact. Conjunctivae/corneas clear. Neck: Supple, with full range of motion. No jugular venous distention.  Trachea --   --   --     < > = values in this interval not displayed. Recent Labs     03/05/21 2110 03/06/21  0545 03/07/21  0420   INR 1.4 1.4 1.2       Recent Labs     03/04/21 2141 03/06/21 2120 03/06/21 2215 03/07/21  0420   CKTOTAL  --   --  65  --    TROPONINI 0.01 <0.01  --  <0.01       Chronic labs:    Lab Results   Component Value Date    CHOL 183 04/04/2019    TRIG 193 (H) 04/04/2019    HDL 36 04/04/2019    LDLCALC 108 (H) 04/04/2019    INR 1.2 03/07/2021    LABA1C 5.5 04/04/2019       Radiology: REVIEWED DAILY    ASSESSMENT/PLAN:  Upper GI bleed secondary to gastric ulcer s/p EGD with epi injection and cauterization with high risk of rebleeding. General surgery following. PPI BID. NPO. NGT. Acute blood loss anemia 2/2 to above. Hb 5.8 this AM, to receive 2 U PRBC. Monitor hemoglobin Q6H. As needed transfusion. Hypovolemia 2/2 to above. IVF. Therapeutic anticoagulation on Coumadin Coumadin on hold. INR still 2.4 this AM S/p vitamin K. A. fib RVR continue digoxin, add other rate control agent as blood pressure tolerates, tachycardia is likely due to hypovolemia and anemia. Chronic combined systolic and diastolic heart failure, currently compensated. Cautious use of fluid. Monitor volume status. Stage III chronic kidney disease at baseline  Hypertension blood pressures running low, hold medications for now  Coronary artery disease s/p CABG  Type A Aortic aneurysm status post endovascular stent graft repair  KYARA on CPAP  COPD emphysema on CT scan. No acute exacerbation, breathing treatments as needed.   Chest pain recommend outpatient ischemic evaluation when able    Discussed patient with family physician     DISPOSITION: Patient needs continued ICU monitoring    +++++++++++++++++++++++++++++++++++++++++++++++++  Janie Colorado,   [place] [address], [place]  +++++++++++++++++++++++++++++++++++++++++++++++++  NOTE: This report was

## 2021-03-08 LAB
ALBUMIN SERPL-MCNC: 3.3 G/DL (ref 3.5–5.2)
ALP BLD-CCNC: 52 U/L (ref 40–129)
ALT SERPL-CCNC: 87 U/L (ref 0–40)
ANION GAP SERPL CALCULATED.3IONS-SCNC: 5 MMOL/L (ref 7–16)
AST SERPL-CCNC: 86 U/L (ref 0–39)
BASOPHILS ABSOLUTE: 0.01 E9/L (ref 0–0.2)
BASOPHILS RELATIVE PERCENT: 0.2 % (ref 0–2)
BILIRUB SERPL-MCNC: 0.6 MG/DL (ref 0–1.2)
BUN BLDV-MCNC: 21 MG/DL (ref 8–23)
CALCIUM IONIZED: 1.23 MMOL/L (ref 1.15–1.33)
CALCIUM SERPL-MCNC: 7.9 MG/DL (ref 8.6–10.2)
CHLORIDE BLD-SCNC: 105 MMOL/L (ref 98–107)
CO2: 29 MMOL/L (ref 22–29)
CREAT SERPL-MCNC: 1.3 MG/DL (ref 0.7–1.2)
EKG ATRIAL RATE: 277 BPM
EKG Q-T INTERVAL: 360 MS
EKG QRS DURATION: 114 MS
EKG QTC CALCULATION (BAZETT): 410 MS
EKG R AXIS: 37 DEGREES
EKG T AXIS: 12 DEGREES
EKG VENTRICULAR RATE: 78 BPM
EOSINOPHILS ABSOLUTE: 0.13 E9/L (ref 0.05–0.5)
EOSINOPHILS RELATIVE PERCENT: 2.4 % (ref 0–6)
GFR AFRICAN AMERICAN: >60
GFR NON-AFRICAN AMERICAN: 55 ML/MIN/1.73
GLUCOSE BLD-MCNC: 94 MG/DL (ref 74–99)
HCT VFR BLD CALC: 24.8 % (ref 37–54)
HEMOGLOBIN: 7.9 G/DL (ref 12.5–16.5)
IMMATURE GRANULOCYTES #: 0.1 E9/L
IMMATURE GRANULOCYTES %: 1.8 % (ref 0–5)
INR BLD: 1.2
LYMPHOCYTES ABSOLUTE: 0.79 E9/L (ref 1.5–4)
LYMPHOCYTES RELATIVE PERCENT: 14.3 % (ref 20–42)
MAGNESIUM: 2.2 MG/DL (ref 1.6–2.6)
MCH RBC QN AUTO: 30.9 PG (ref 26–35)
MCHC RBC AUTO-ENTMCNC: 31.9 % (ref 32–34.5)
MCV RBC AUTO: 96.9 FL (ref 80–99.9)
MONOCYTES ABSOLUTE: 0.63 E9/L (ref 0.1–0.95)
MONOCYTES RELATIVE PERCENT: 11.4 % (ref 2–12)
NEUTROPHILS ABSOLUTE: 3.85 E9/L (ref 1.8–7.3)
NEUTROPHILS RELATIVE PERCENT: 69.9 % (ref 43–80)
PDW BLD-RTO: 15.6 FL (ref 11.5–15)
PHOSPHORUS: 2.6 MG/DL (ref 2.5–4.5)
PLATELET # BLD: 120 E9/L (ref 130–450)
PMV BLD AUTO: 9.9 FL (ref 7–12)
POTASSIUM SERPL-SCNC: 4.1 MMOL/L (ref 3.5–5)
PROTHROMBIN TIME: 12.9 SEC (ref 9.3–12.4)
RBC # BLD: 2.56 E12/L (ref 3.8–5.8)
SODIUM BLD-SCNC: 139 MMOL/L (ref 132–146)
TOTAL PROTEIN: 5.4 G/DL (ref 6.4–8.3)
WBC # BLD: 5.5 E9/L (ref 4.5–11.5)

## 2021-03-08 PROCEDURE — 82330 ASSAY OF CALCIUM: CPT

## 2021-03-08 PROCEDURE — 85025 COMPLETE CBC W/AUTO DIFF WBC: CPT

## 2021-03-08 PROCEDURE — 97162 PT EVAL MOD COMPLEX 30 MIN: CPT

## 2021-03-08 PROCEDURE — 93010 ELECTROCARDIOGRAM REPORT: CPT | Performed by: INTERNAL MEDICINE

## 2021-03-08 PROCEDURE — 6370000000 HC RX 637 (ALT 250 FOR IP): Performed by: SURGERY

## 2021-03-08 PROCEDURE — 36592 COLLECT BLOOD FROM PICC: CPT

## 2021-03-08 PROCEDURE — 85610 PROTHROMBIN TIME: CPT

## 2021-03-08 PROCEDURE — 97530 THERAPEUTIC ACTIVITIES: CPT

## 2021-03-08 PROCEDURE — 99233 SBSQ HOSP IP/OBS HIGH 50: CPT | Performed by: SURGERY

## 2021-03-08 PROCEDURE — 6370000000 HC RX 637 (ALT 250 FOR IP): Performed by: STUDENT IN AN ORGANIZED HEALTH CARE EDUCATION/TRAINING PROGRAM

## 2021-03-08 PROCEDURE — 36415 COLL VENOUS BLD VENIPUNCTURE: CPT

## 2021-03-08 PROCEDURE — 2060000000 HC ICU INTERMEDIATE R&B

## 2021-03-08 PROCEDURE — C9113 INJ PANTOPRAZOLE SODIUM, VIA: HCPCS | Performed by: SURGERY

## 2021-03-08 PROCEDURE — 83735 ASSAY OF MAGNESIUM: CPT

## 2021-03-08 PROCEDURE — 6360000002 HC RX W HCPCS: Performed by: STUDENT IN AN ORGANIZED HEALTH CARE EDUCATION/TRAINING PROGRAM

## 2021-03-08 PROCEDURE — 84100 ASSAY OF PHOSPHORUS: CPT

## 2021-03-08 PROCEDURE — 2580000003 HC RX 258: Performed by: STUDENT IN AN ORGANIZED HEALTH CARE EDUCATION/TRAINING PROGRAM

## 2021-03-08 PROCEDURE — 6360000002 HC RX W HCPCS: Performed by: SURGERY

## 2021-03-08 PROCEDURE — 80053 COMPREHEN METABOLIC PANEL: CPT

## 2021-03-08 PROCEDURE — 97535 SELF CARE MNGMENT TRAINING: CPT

## 2021-03-08 PROCEDURE — 97166 OT EVAL MOD COMPLEX 45 MIN: CPT

## 2021-03-08 RX ORDER — HEPARIN SODIUM 10000 [USP'U]/ML
5000 INJECTION, SOLUTION INTRAVENOUS; SUBCUTANEOUS EVERY 8 HOURS SCHEDULED
Status: DISCONTINUED | OUTPATIENT
Start: 2021-03-08 | End: 2021-03-10 | Stop reason: HOSPADM

## 2021-03-08 RX ADMIN — Medication 10 ML: at 09:02

## 2021-03-08 RX ADMIN — HEPARIN SODIUM 5000 UNITS: 10000 INJECTION INTRAVENOUS; SUBCUTANEOUS at 09:03

## 2021-03-08 RX ADMIN — SUCRALFATE 1 G: 1 TABLET ORAL at 06:10

## 2021-03-08 RX ADMIN — CARVEDILOL 12.5 MG: 6.25 TABLET, FILM COATED ORAL at 09:02

## 2021-03-08 RX ADMIN — HEPARIN SODIUM 5000 UNITS: 10000 INJECTION INTRAVENOUS; SUBCUTANEOUS at 14:23

## 2021-03-08 RX ADMIN — SUCRALFATE 1 G: 1 TABLET ORAL at 16:58

## 2021-03-08 RX ADMIN — DIGOXIN 62.5 MCG: 125 TABLET ORAL at 09:02

## 2021-03-08 RX ADMIN — CARVEDILOL 12.5 MG: 6.25 TABLET, FILM COATED ORAL at 16:59

## 2021-03-08 RX ADMIN — SUCRALFATE 1 G: 1 TABLET ORAL at 11:45

## 2021-03-08 RX ADMIN — PANTOPRAZOLE SODIUM 40 MG: 40 INJECTION, POWDER, FOR SOLUTION INTRAVENOUS at 09:02

## 2021-03-08 RX ADMIN — Medication 10 ML: at 20:52

## 2021-03-08 RX ADMIN — PANTOPRAZOLE SODIUM 40 MG: 40 INJECTION, POWDER, FOR SOLUTION INTRAVENOUS at 20:52

## 2021-03-08 RX ADMIN — HEPARIN SODIUM 5000 UNITS: 10000 INJECTION INTRAVENOUS; SUBCUTANEOUS at 20:52

## 2021-03-08 ASSESSMENT — PAIN SCALES - GENERAL
PAINLEVEL_OUTOF10: 0
PAINLEVEL_OUTOF10: 1
PAINLEVEL_OUTOF10: 0

## 2021-03-08 NOTE — PROGRESS NOTES
Physical Therapy    Physical Therapy Initial Assessment     Name: Marivel Rubalcava  : 1955  MRN: [id number]    Referring Provider:  Luis Gallagher MD    Date of Service: 3/8/2021    Evaluating PT:  Sara Juan Alberto PT, DPT [id number]     Room #:  6268/0921-M -- evaluated prior to transfer while pt in [facility]  Diagnosis:  Upper GIB  PMHx/PSHx:  JASS, aortic dissection, Afib, CAD, CHF, HTN, ischemic cardiomyopathy, obesity, pericardial effusion, pleural effusion, sarcoidosis, valvular heart disease   Procedure/Surgery:  EGD with hemorrhage control 3/5  Precautions:  Falls  Equipment Needs:  NA    SUBJECTIVE:    Pt lives alone in a 1 story home with 1+1 stairs to enter and 0 rail. Bedroom and bathroom are on the 1st level. Pt ambulated with no AD and independent PTA. Full flight with B rails to basement laundry. OBJECTIVE:   Initial Evaluation  Date: 3/8/21 Treatment Short Term/ Long Term   Goals   AM-PAC 6 Clicks 92/74     Was pt agreeable to Eval/treatment? Yes     Does pt have pain? No c/o pain      Bed Mobility  NT, pt in chair on arrival   Rolling: Independent   Supine to sit: Independent   Sit to supine: Independent   Scooting: Independent    Transfers Sit to stand: SBA  Stand to sit: SBA  Stand pivot: SBA  Sit to stand: Independent   Stand to sit:  Independent   Stand pivot: Independent    Ambulation    200 feet with no AD CGA>SBA  >300 feet with no AD Independent    Stair negotiation: ascended and descended  NT  >4 steps with 1 rail Modified Independent     ROM BUE:  Per OT eval  BLE:  WFL     Strength BUE:  Per OT eval   BLE:  5/5     Balance Sitting EOB:  NT  Dynamic Standing:  SBA<>CGA  Sitting EOB:  Independent   Dynamic Standing:  Independent      Pt is A & O x 4  RASS:  0  CAM-ICU:  NT  Sensation:  Pt denies numbness and tingling to extremities  Edema:  Unremarkable     Vitals:  Blood Pressure at rest 146/86 Blood Pressure post session 135/86   Heart Rate at rest 83 bpm Heart Rate post session 82 Other     Frequency of treatments: 2-5x/week x 1-2 weeks. Time in  1015  Time out  1040    Total Treatment Time  10 minutes     Evaluation Time includes thorough review of current medical information, gathering information on past medical history/social history and prior level of function, completion of standardized testing/informal observation of tasks, assessment of data and education on plan of care and goals.     CPT codes:  [] Low Complexity PT evaluation 12379  [x] Moderate Complexity PT evaluation 34083  [] High Complexity PT evaluation 70223  [] PT Re-evaluation 22385  [] Gait training 76929 -- minutes  [] Manual therapy 85722 -- minutes  [x] Therapeutic activities 92428 10 minutes  [] Therapeutic exercises 82748 -- minutes  [] Neuromuscular reeducation 57948 -- minutes     Brittany Cardoso, PT, DPT  [id number]

## 2021-03-08 NOTE — PLAN OF CARE
Problem: Falls - Risk of:  Goal: Will remain free from falls  Description: Will remain free from falls  3/8/2021 0223 by Jonnie Manning RN  Outcome: Met This Shift  3/7/2021 2147 by Jonnie Manning RN  Outcome: Met This Shift  Goal: Absence of physical injury  Description: Absence of physical injury  3/8/2021 0223 by Jonnie Manning RN  Outcome: Met This Shift  3/7/2021 2147 by Jonnie Manning RN  Outcome: Met This Shift     Problem: Pain:  Description: Pain management should include both nonpharmacologic and pharmacologic interventions.   Goal: Pain level will decrease  Description: Pain level will decrease  3/8/2021 0223 by Jonnie Manning RN  Outcome: Met This Shift  3/7/2021 2147 by Jonnie Manning RN  Outcome: Met This Shift  Goal: Control of acute pain  Description: Control of acute pain  3/8/2021 0223 by Jonnie Manning RN  Outcome: Met This Shift  3/7/2021 2147 by Jonnie Manning RN  Outcome: Met This Shift  Goal: Control of chronic pain  Description: Control of chronic pain  3/8/2021 0223 by Jonnie Manning RN  Outcome: Met This Shift  3/7/2021 2147 by Jonnie Manning RN  Outcome: Met This Shift     Problem: Skin Integrity:  Goal: Will show no infection signs and symptoms  Description: Will show no infection signs and symptoms  3/8/2021 0223 by Jonnie Manning RN  Outcome: Met This Shift  3/7/2021 2147 by Jonnie Manning RN  Outcome: Met This Shift  Goal: Absence of new skin breakdown  Description: Absence of new skin breakdown  3/8/2021 0223 by Jonnie Manning RN  Outcome: Met This Shift  3/7/2021 2147 by Jonnie Manning RN  Outcome: Met This Shift

## 2021-03-08 NOTE — PROGRESS NOTES
Tachycardic rate and regular rhythm. S1, S2 without murmurs, rubs, or gallops. PV: Brisk capillary refill. +2 pedal and radial pulses bilaterally. No clubbing, cyanosis, edema of bilateral lower extremities. Abdomen: Soft, non-tender, non-distended. +BS  Musculoskeletal: No obvious deformities or erythematous or edematous joints. Skin: Pale. No rashes or lesions. Neurologic:  Neurovascularly intact without any focal sensory/motor deficits.  Cranial nerves: II-XII intact, grossly non-focal.  Psychiatric: Alert and oriented, thought content appropriate, normal insight    Medications:  REVIEWED DAILY    Infusion Medications     Scheduled Medications    heparin (porcine)  5,000 Units Subcutaneous 3 times per day    pantoprazole  40 mg Intravenous BID    carvedilol  12.5 mg Oral BID WC    digoxin  62.5 mcg Oral Daily    sucralfate  1 g Oral 4 times per day    sodium chloride flush  10 mL Intravenous 2 times per day     PRN Meds: ondansetron, sodium chloride flush, acetaminophen **OR** acetaminophen    Labs:     Recent Labs     03/07/21 0645 03/07/21  1540 03/08/21  0435   WBC 8.1 7.8 5.5   HGB 7.5* 8.1* 7.9*   HCT 23.0* 24.7* 24.8*   * 115* 120*       Recent Labs     03/06/21  0545 03/07/21  0420 03/08/21  0435    137 139   K 3.9 3.8 4.1   * 104 105   CO2 25 28 29   BUN 43* 21 21   CREATININE 1.1 0.9 1.3*   CALCIUM 8.3* 8.4* 7.9*   PHOS 3.5 1.4* 2.6       Recent Labs     03/06/21  0545 03/07/21  0420 03/08/21  0435   PROT 5.6* 5.7* 5.4*   ALKPHOS 42 42 52   ALT 15 17 87*   AST 16 23 86*   BILITOT 0.7 0.7 0.6       Recent Labs     03/06/21  0545 03/07/21  0420 03/08/21  0435   INR 1.4 1.2 1.2       Recent Labs     03/06/21  2120 03/06/21  2215 03/07/21  0420 03/07/21  1540   CKTOTAL  --  65  --   --    TROPONINI <0.01  --  <0.01 <0.01       Chronic labs:    Lab Results   Component Value Date    CHOL 183 04/04/2019    TRIG 193 (H) 04/04/2019    HDL 36 04/04/2019    LDLCALC 108 (H) 04/04/2019    INR 1.2 03/08/2021    LABA1C 5.5 04/04/2019       Radiology: REVIEWED DAILY    ASSESSMENT/PLAN:        Assessment  Acute anemia  Acute GI bleed secondary to gastric ulcer  Hypotensive shock secondary to the above  Therapeutic anticoagulation on Coumadin  Atrial fibrillation with RVR RVR resolved  Combined systolic and diastolic heart failure, chronic, compensated  CKD stage III  Hypertension  CAD status post surgical revascularization history  Type B aortic aneurysm status post endovascular stent graft repair  KYARA on sleep apnea  COPD emphysema stable  Chest pain in the setting of severe anemia      Plan  Patient status post EGD with control of hemorrhage. Hemoglobin hematocrit stable  PPI twice daily. Patient currently on peptic ulcer diet and tolerating  Kidney function is currently at or around baseline. Troponin VII negative x3. Needs ischemic evaluation due to chest pain in the setting of anemia  Hemoglobin is stable at around 8.0. Continue to monitor. INR 1.2. Resume warfarin when okay with general surgery  Continue Coreg, digoxin  Heparin for DVT prophylaxis has been resumed  PT OT evaluation  Home with home health recommended        DISPOSITION: Transfer to [facility]    +++++++++++++++++++++++++++++++++++++++++++++++++  German Mode,   [address], [place]  +++++++++++++++++++++++++++++++++++++++++++++++++  NOTE: This report was transcribed using voice recognition software. Every effort was made to ensure accuracy; however, inadvertent computerized transcription errors may be present.

## 2021-03-08 NOTE — CARE COORDINATION
Cm  Transition of care: Met with pt at bedside. He feels well this am, having first solid meal. He lives alone in a ranch style home. He is independent in adl's. His Pcp is Dr Celi Bolanos and he uses [facility] in [place]. Hx of [address] in 2019 after repair of a dissecting aneurysm. He does not anticipate any dc needs, but if hhc ordered, he will use [address]. Pt/Ot evals pending. Pt states his brother will transport him home at discharge.

## 2021-03-08 NOTE — PROGRESS NOTES
techniques to improve tolerance for selfcare routine   Functional transfer/mobility training/DME recommendations for increased independence, safety and fall prevention         Patient/family education to increase safety and functional independence             Environmental modifications for safe mobility and completion of ADLs                           Therapeutic activity to improve functional performance during ADLs. Therapeutic exercise to improve tolerance and functional strength for ADLs   Balance retraining/tolerance tasks for facilitation of postural control with dynamic challenges during ADLs . Neuromuscular re-education: facilitation of righting/equilibrium reactions, normalization muscle tone/facilitation active functional movement                         Treatment: OT intervention provided to achieve goals:   Balance retraining: Performed standing balance ex's sink level to improve righting reactions with postural changes during ADLS. Pt demo no LOB. ADL retraining: Instruction on adapted technique (seated) to increase independence and safe reach during dressing/bathing activities. Pt demonstrated G follow through. Energy conservation: Education on breathing techniques, pacing, work simplification strategies & recommended bathroom DME for safety and energy conservation during self care tasks and activities of daily living. Line management and environmental modifications made prior to and end of session to ensure patient safety and to increase efficiency of session. Skilled monitoring of HR, O2 saturation, blood pressure and patient's response to activity performed throughout session. Comments: OK from RN to see patient. Upon arrival, patient sitting up in chair. Pt demo F tolerance with G understanding of education/techniques. At end of session, patient left seated in chair. Call light within reach, all lines and tubes intact.  Pt instructed on use of call light for assistance and fall prevention. .    Patient presents with decreased ROM/strength,activity tolerance, dynamic balance, functional mobility limiting completion of ADLs. Pt can benefit from continued skilled OT to increase safety and functional independence. Evaluation Complexity: Moderate    · History: Expanded chart review of consults, imaging, and psychosocial history related to current functional performance. · Exam: 3+ performance deficits identified limiting functional independence and safe return home   · Assistance/Modification: Min/mod assistance or modifications required to perform tasks. May have comorbidities that affect occupational performance. Rehab Potential: Good for established goals    Patient / Family Goal: to return to [facility]    Patient and/or family were instructed/educated on diagnosis, prognosis/goals and plan of care. Pt demonstrated G understanding. [] Malnutrition indicators have been identified and nursing has been notified to ensure a dietitian consult is ordered. Time In:1015             Time Out: 1040         Total Treatment time: 15   Min Units   OT Eval Low 94565     OT Eval Medium 67405 X    OT Eval High 05235     OT Re-Eval [id number]     Therapeutic Ex (24) [id number]     Therapeutic Activities 63290     ADL/Self Care 97636 15 1   Orthotic Management 40623     Neuro Re-Ed 71482     Non-Billable Time        Evaluation time includes thorough review of current medical information, gathering information on past medical history/social history and prior level of function, completion of standardized testing/informal observation of tasks, assessment of data and development of POC/Goals.      Jesus Castillo OTR/L 0050

## 2021-03-08 NOTE — PROGRESS NOTES
[facility]  SURGICAL INTENSIVE CARE UNIT (SICU)  ATTENDING PHYSICIAN CRITICAL CARE PROGRESS NOTE     I have examined the patient, reviewed the record,and discussed the case with the APN/  Resident. I have reviewed all relevant labs and imaging data. The following summarizes my clinical findings and independent assessment. Date of admission:  3/4/2021    CC: Bleeding duodenal ulcer     Subjective: Patient has received a total of 5 units of packed red blood cells this admission. He last received 1 unit packed red blood cells yesterday on 3/7. HOSPITAL COURSE:   3/5 Hematemesis, melena EGD - Epinephrine and cautery   3/6 No acute issues overnight Hb stable no further melena or hematemesis no BRB or coffee ground emesis per NGT   3/7 Chest pain overnight EKG/ troponin done - afib trop normal Given 1 unit PRBC for symptomatic anemia           Physical Exam:  Physical Exam  HENT:      Head: Normocephalic. Eyes:      Extraocular Movements: Extraocular movements intact. Pupils: Pupils are equal, round, and reactive to light. Neck:      Musculoskeletal: Neck supple. Cardiovascular:      Rate and Rhythm: Normal rate. Pulmonary:      Effort: Pulmonary effort is normal. No respiratory distress. Abdominal:      General: Abdomen is flat. There is no distension. Tenderness: There is no abdominal tenderness. There is no guarding or rebound. Musculoskeletal: Normal range of motion. Skin:     General: Skin is warm. Neurological:      Mental Status: He is alert.    Psychiatric:         Mood and Affect: Mood normal.         Assessment   Active Problems:    Essential hypertension    Stage 3a chronic kidney disease    Anticoagulated on Coumadin    Cardiomyopathy (HCC)    Chronic combined systolic and diastolic CHF (congestive heart failure) ([facility])    Atrial fibrillation with RVR ([facility])    KYARA on CPAP    Gastrointestinal hemorrhage    Acute blood loss anemia    Hypotension due to hypovolemia    COPD (chronic obstructive pulmonary disease) (HCC)    CAD (coronary artery disease)    Hx of CABG    S/P AAA repair    Lactic acidosis  Resolved Problems:    * No resolved hospital problems. *      Plan   For pain-as needed Tylenol    History of CABG, history of AAA repair-on Coreg and digoxin  History of A. fib-rate controlled. Hold on anticoagulation for now    GI-bleeding ulcer status post EGD with cautery and epi injection on 3/5  Continue Protonix 40 mg twice daily and Carafate  No evidence of further bleeding    Heme: Received 1 unit packed red blood cells yesterday  Last 2 hemoglobins 8 .1-7. 9    Chronic renal insufficiency-creatinine 1 3 baseline    Endo: Blood sugars less than 180    DVT prophylaxis-SCDs, start heparin 5000 units every 8        CODE Status:       Full Code    Okay to transfer to monitored floor     Katiana Munson MD, FACS  3/8/2021  7:36 AM        NOTE: This report was transcribed using voice recognition software. Every effort was made to ensure accuracy; however, inadvertent computerized transcription errors may be present.

## 2021-03-08 NOTE — PLAN OF CARE
Problem: Falls - Risk of:  Goal: Will remain free from falls  Description: Will remain free from falls  3/8/2021 1412 by Marcy Ji RN  Outcome: Met This Shift  3/8/2021 0947 by Dennis Red RN  Outcome: Met This Shift  3/8/2021 0223 by Hong Pineda RN  Outcome: Met This Shift  Goal: Absence of physical injury  Description: Absence of physical injury  3/8/2021 1412 by Marcy Ji RN  Outcome: Met This Shift  3/8/2021 0947 by Dennis Red RN  Outcome: Met This Shift  3/8/2021 0223 by Hong Pineda RN  Outcome: Met This Shift     Problem: Pain:  Goal: Pain level will decrease  Description: Pain level will decrease  3/8/2021 1412 by Marcy Ji RN  Outcome: Met This Shift  3/8/2021 0223 by Hong Pineda RN  Outcome: Met This Shift  Goal: Control of acute pain  Description: Control of acute pain  3/8/2021 1412 by Marcy Ji RN  Outcome: Met This Shift  3/8/2021 0223 by Hong Pineda RN  Outcome: Met This Shift  Goal: Control of chronic pain  Description: Control of chronic pain  3/8/2021 1412 by Marcy Ji RN  Outcome: Met This Shift  3/8/2021 0223 by Hong Pineda RN  Outcome: Met This Shift     Problem: Skin Integrity:  Goal: Will show no infection signs and symptoms  Description: Will show no infection signs and symptoms  3/8/2021 1412 by Marcy Ji RN  Outcome: Met This Shift  3/8/2021 0223 by Hong Pineda RN  Outcome: Met This Shift  Goal: Absence of new skin breakdown  Description: Absence of new skin breakdown  3/8/2021 1412 by Marcy Ji RN  Outcome: Met This Shift  3/8/2021 0223 by Hong Pineda RN  Outcome: Met This Shift

## 2021-03-09 LAB
ALBUMIN SERPL-MCNC: 3.3 G/DL (ref 3.5–5.2)
ALP BLD-CCNC: 51 U/L (ref 40–129)
ALT SERPL-CCNC: 66 U/L (ref 0–40)
ANION GAP SERPL CALCULATED.3IONS-SCNC: 5 MMOL/L (ref 7–16)
AST SERPL-CCNC: 40 U/L (ref 0–39)
BASOPHILS ABSOLUTE: 0.01 E9/L (ref 0–0.2)
BASOPHILS RELATIVE PERCENT: 0.2 % (ref 0–2)
BILIRUB SERPL-MCNC: 0.6 MG/DL (ref 0–1.2)
BUN BLDV-MCNC: 21 MG/DL (ref 8–23)
CALCIUM IONIZED: 1.27 MMOL/L (ref 1.15–1.33)
CALCIUM SERPL-MCNC: 8.5 MG/DL (ref 8.6–10.2)
CHLORIDE BLD-SCNC: 105 MMOL/L (ref 98–107)
CO2: 29 MMOL/L (ref 22–29)
CREAT SERPL-MCNC: 1.4 MG/DL (ref 0.7–1.2)
EOSINOPHILS ABSOLUTE: 0.2 E9/L (ref 0.05–0.5)
EOSINOPHILS RELATIVE PERCENT: 3.9 % (ref 0–6)
GFR AFRICAN AMERICAN: >60
GFR NON-AFRICAN AMERICAN: 50 ML/MIN/1.73
GLUCOSE BLD-MCNC: 99 MG/DL (ref 74–99)
HCT VFR BLD CALC: 25.5 % (ref 37–54)
HEMOGLOBIN: 8.3 G/DL (ref 12.5–16.5)
IMMATURE GRANULOCYTES #: 0.07 E9/L
IMMATURE GRANULOCYTES %: 1.4 % (ref 0–5)
INR BLD: 1.2
LYMPHOCYTES ABSOLUTE: 0.8 E9/L (ref 1.5–4)
LYMPHOCYTES RELATIVE PERCENT: 15.5 % (ref 20–42)
MAGNESIUM: 2.3 MG/DL (ref 1.6–2.6)
MCH RBC QN AUTO: 31.4 PG (ref 26–35)
MCHC RBC AUTO-ENTMCNC: 32.5 % (ref 32–34.5)
MCV RBC AUTO: 96.6 FL (ref 80–99.9)
MONOCYTES ABSOLUTE: 0.5 E9/L (ref 0.1–0.95)
MONOCYTES RELATIVE PERCENT: 9.7 % (ref 2–12)
NEUTROPHILS ABSOLUTE: 3.58 E9/L (ref 1.8–7.3)
NEUTROPHILS RELATIVE PERCENT: 69.3 % (ref 43–80)
PDW BLD-RTO: 15.8 FL (ref 11.5–15)
PHOSPHORUS: 3.6 MG/DL (ref 2.5–4.5)
PLATELET # BLD: 127 E9/L (ref 130–450)
PMV BLD AUTO: 9.8 FL (ref 7–12)
POTASSIUM SERPL-SCNC: 4.7 MMOL/L (ref 3.5–5)
PROTHROMBIN TIME: 12.5 SEC (ref 9.3–12.4)
RBC # BLD: 2.64 E12/L (ref 3.8–5.8)
SODIUM BLD-SCNC: 139 MMOL/L (ref 132–146)
TOTAL PROTEIN: 5.5 G/DL (ref 6.4–8.3)
WBC # BLD: 5.2 E9/L (ref 4.5–11.5)

## 2021-03-09 PROCEDURE — 83735 ASSAY OF MAGNESIUM: CPT

## 2021-03-09 PROCEDURE — 85025 COMPLETE CBC W/AUTO DIFF WBC: CPT

## 2021-03-09 PROCEDURE — C9113 INJ PANTOPRAZOLE SODIUM, VIA: HCPCS | Performed by: STUDENT IN AN ORGANIZED HEALTH CARE EDUCATION/TRAINING PROGRAM

## 2021-03-09 PROCEDURE — 99232 SBSQ HOSP IP/OBS MODERATE 35: CPT | Performed by: SURGERY

## 2021-03-09 PROCEDURE — 80053 COMPREHEN METABOLIC PANEL: CPT

## 2021-03-09 PROCEDURE — 36415 COLL VENOUS BLD VENIPUNCTURE: CPT

## 2021-03-09 PROCEDURE — 6370000000 HC RX 637 (ALT 250 FOR IP): Performed by: STUDENT IN AN ORGANIZED HEALTH CARE EDUCATION/TRAINING PROGRAM

## 2021-03-09 PROCEDURE — 6370000000 HC RX 637 (ALT 250 FOR IP): Performed by: SURGERY

## 2021-03-09 PROCEDURE — 2700000000 HC OXYGEN THERAPY PER DAY

## 2021-03-09 PROCEDURE — 84100 ASSAY OF PHOSPHORUS: CPT

## 2021-03-09 PROCEDURE — 6360000002 HC RX W HCPCS: Performed by: STUDENT IN AN ORGANIZED HEALTH CARE EDUCATION/TRAINING PROGRAM

## 2021-03-09 PROCEDURE — 82330 ASSAY OF CALCIUM: CPT

## 2021-03-09 PROCEDURE — 2060000000 HC ICU INTERMEDIATE R&B

## 2021-03-09 PROCEDURE — 2580000003 HC RX 258: Performed by: STUDENT IN AN ORGANIZED HEALTH CARE EDUCATION/TRAINING PROGRAM

## 2021-03-09 PROCEDURE — 85610 PROTHROMBIN TIME: CPT

## 2021-03-09 PROCEDURE — 97530 THERAPEUTIC ACTIVITIES: CPT

## 2021-03-09 RX ADMIN — CARVEDILOL 12.5 MG: 6.25 TABLET, FILM COATED ORAL at 09:16

## 2021-03-09 RX ADMIN — SUCRALFATE 1 G: 1 TABLET ORAL at 23:47

## 2021-03-09 RX ADMIN — PANTOPRAZOLE SODIUM 40 MG: 40 INJECTION, POWDER, FOR SOLUTION INTRAVENOUS at 09:17

## 2021-03-09 RX ADMIN — HEPARIN SODIUM 5000 UNITS: 10000 INJECTION INTRAVENOUS; SUBCUTANEOUS at 15:20

## 2021-03-09 RX ADMIN — CARVEDILOL 12.5 MG: 6.25 TABLET, FILM COATED ORAL at 17:37

## 2021-03-09 RX ADMIN — SUCRALFATE 1 G: 1 TABLET ORAL at 00:00

## 2021-03-09 RX ADMIN — Medication 10 ML: at 21:33

## 2021-03-09 RX ADMIN — SUCRALFATE 1 G: 1 TABLET ORAL at 05:16

## 2021-03-09 RX ADMIN — HEPARIN SODIUM 5000 UNITS: 10000 INJECTION INTRAVENOUS; SUBCUTANEOUS at 05:16

## 2021-03-09 RX ADMIN — SUCRALFATE 1 G: 1 TABLET ORAL at 17:37

## 2021-03-09 RX ADMIN — Medication 10 ML: at 09:17

## 2021-03-09 RX ADMIN — SUCRALFATE 1 G: 1 TABLET ORAL at 12:08

## 2021-03-09 RX ADMIN — DIGOXIN 62.5 MCG: 125 TABLET ORAL at 09:17

## 2021-03-09 RX ADMIN — HEPARIN SODIUM 5000 UNITS: 10000 INJECTION INTRAVENOUS; SUBCUTANEOUS at 21:38

## 2021-03-09 RX ADMIN — PANTOPRAZOLE SODIUM 40 MG: 40 INJECTION, POWDER, FOR SOLUTION INTRAVENOUS at 21:32

## 2021-03-09 ASSESSMENT — PAIN SCALES - GENERAL
PAINLEVEL_OUTOF10: 0

## 2021-03-09 NOTE — CARE COORDINATION
Pt remains hemodynamically stable post EGD with epi for gastric bleed. Will need to check with surgery when anticoagulation can be resumed. Plan remains home with no needs.

## 2021-03-09 NOTE — PLAN OF CARE
Problem: Falls - Risk of:  Goal: Will remain free from falls  Description: Will remain free from falls  3/9/2021 0300 by Creditable  Outcome: Met This Shift  3/8/2021 1412 by Shanon Renae RN  Outcome: Met This Shift  Goal: Absence of physical injury  Description: Absence of physical injury  3/9/2021 0300 by Creditable  Outcome: Met This Shift  3/8/2021 1412 by Shanon Renae RN  Outcome: Met This Shift     Problem: Pain:  Goal: Pain level will decrease  Description: Pain level will decrease  3/9/2021 0300 by Creditable  Outcome: Met This Shift  3/8/2021 1412 by Shanon Renae RN  Outcome: Met This Shift  Goal: Control of acute pain  Description: Control of acute pain  3/9/2021 0300 by Creditable  Outcome: Met This Shift  3/8/2021 1412 by Shanon Renae RN  Outcome: Met This Shift  Goal: Control of chronic pain  Description: Control of chronic pain  3/8/2021 1412 by Shanon Renae RN  Outcome: Met This Shift     Problem: Skin Integrity:  Goal: Will show no infection signs and symptoms  Description: Will show no infection signs and symptoms  3/8/2021 1412 by Shanon Renae RN  Outcome: Met This Shift  Goal: Absence of new skin breakdown  Description: Absence of new skin breakdown  3/8/2021 1412 by Shanon Renae RN  Outcome: Met This Shift

## 2021-03-09 NOTE — PROGRESS NOTES
[facility] messaged via perfect serve to check when patient is able to resume [address] from their POV.   Brook Leach NP taking messages

## 2021-03-09 NOTE — PLAN OF CARE
Problem: Falls - Risk of:  Goal: Will remain free from falls  Description: Will remain free from falls  3/9/2021 1318 by Batool Méndez RN  Outcome: Met This Shift  3/9/2021 0300 by Cristofer Hinojosa  Outcome: Met This Shift  Goal: Absence of physical injury  Description: Absence of physical injury  3/9/2021 1318 by Batool Méndez RN  Outcome: Met This Shift  3/9/2021 0300 by Cristofer Hinojosa  Outcome: Met This Shift     Problem: Pain:  Goal: Pain level will decrease  Description: Pain level will decrease  3/9/2021 1318 by Batool Méndez RN  Outcome: Met This Shift  3/9/2021 0300 by Cristofer Hinojosa  Outcome: Met This Shift  Goal: Control of acute pain  Description: Control of acute pain  3/9/2021 0300 by Cristofer Hinojosa  Outcome: Met This Shift     Problem: Skin Integrity:  Goal: Will show no infection signs and symptoms  Description: Will show no infection signs and symptoms  Outcome: Met This Shift  Goal: Absence of new skin breakdown  Description: Absence of new skin breakdown  Outcome: Met This Shift

## 2021-03-09 NOTE — PROGRESS NOTES
Hospitalist Progress Note      PCP: Simba Blood DO    Date of Admission: 3/4/2021  Days in the hospital: [address] [facility] Hospital Course:   Patient admitted on 3/4/2021 for a GIB. Patient states he had approximately 5 dark red bowel movements and hematemesis x3 beginning approximately 10 hours prior to arrival.  He also admits to associated dizziness.  He is anticoagulated with Coumadin for atrial fibrillation. In ED he was found to be hypotensive, tachycardic, and anemic with a hemoglobin of 8.1. His INR is therapeutic at 2.4. Lactic acid 2.4 as well. Chest x-ray with cardiomegaly and atelectasis. Chest CT significant for a stent in the aortic arch extending into proximal descending aorta covering an intramural hematoma, emphysema and CABG. EKG with atrial fibrillation, rate controlled.    Coumadin was held. Surgery was consulted and patient underwent an EGD today which showed a gastric ulcer which was injected with epi and cauterized. He did receive 1 unit of PRBC. Patient was placed on Protonix twice daily. Subjective  Patient seen and examined at bedside. No active bleeding noted, H&H remained stable. Patient denies any fevers, chills, chest pain, shortness of breath, nausea, vomiting. Exam:    BP (!) 147/84   Pulse 85   Temp 98.7 °F (37.1 °C) (Temporal)   Resp 16   Ht 5' 8\" (1.727 m)   Wt 204 lb (92.5 kg)   SpO2 96%   BMI 31.02 kg/m²     HEENT: + Pallor, no icterus. Respiratory:  CTA, good air entry. Cardiovascular: RRR, no murmur. Abdomen: Soft, non-tender, BS noted. Musculoskeletal: No joint pains or joint swelling noted.    Neurologic: awake, alert and following commands       Assessment/Plan:  Active Hospital Problems    Diagnosis Date Noted    Acute blood loss anemia [D62] 03/05/2021    Hypotension due to hypovolemia [I95.89, E86.1] 03/05/2021    COPD (chronic obstructive pulmonary disease) (HCC) [J44.9] 03/05/2021    CAD (coronary artery disease) [I25.10] 03/05/2021    Hx of CABG [Z95.1] 03/05/2021    S/P AAA repair [X87.361, Z86.79] 03/05/2021    Lactic acidosis [E87.2] 03/05/2021    Gastrointestinal hemorrhage [K92.2] 03/04/2021    KYARA on CPAP [G47.33, Z99.89] 02/14/2020    Atrial fibrillation with RVR (HCC) [I48.91] 11/16/2019    Chronic combined systolic and diastolic CHF (congestive heart failure) ([facility] [I50.42] 11/15/2019    Cardiomy[facility] [I42.9] 05/20/2016    Anticoagulated on Coumadin [Z79.01] 02/18/2014    Stage 3a chronic kidney disease [N18.31] 02/04/2014    Essential hypertension [I10] 10/25/2012     · Gastric ulcer  · CKD stage III    Plan:  · H&H remained stable, continue to monitor, continue PPI  · Currently off Coumadin, resume when okay with surgery  · Slight worsening of creatinine noted, continue to monitor  · Continue rest of his medications      Labs:   Recent Labs     03/07/21  1540 03/08/21  0435 03/09/21  0544   WBC 7.8 5.5 5.2   HGB 8.1* 7.9* 8.3*   HCT 24.7* 24.8* 25.5*   * 120* 127*     Recent Labs     03/07/21 0420 03/08/21  0435 03/09/21  0544    139 139   K 3.8 4.1 4.7    105 105   CO2 28 29 29   BUN 21 21 21   CREATININE 0.9 1.3* 1.4*   CALCIUM 8.4* 7.9* 8.5*   PHOS 1.4* 2.6 3.6     Recent Labs     03/07/21  0420 03/08/21  0435 03/09/21  0544   AST 23 86* 40*   ALT 17 87* 66*   BILITOT 0.7 0.6 0.6   ALKPHOS 42 52 51     Recent Labs     03/07/21  0420 03/08/21  0435 03/09/21  0544   INR 1.2 1.2 1.2     Recent Labs     03/06/21 2120 03/06/21 2215 03/07/21  0420 03/07/21  1540   CKTOTAL  --  65  --   --    TROPONINI <0.01  --  <0.01 <0.01       Medications:  Reviewed    Infusion Medications   Scheduled Medications    heparin (porcine)  5,000 Units Subcutaneous 3 times per day    pantoprazole  40 mg Intravenous BID    carvedilol  12.5 mg Oral BID WC    digoxin  62.5 mcg Oral Daily    sucralfate  1 g Oral 4 times per day    sodium chloride flush  10 mL Intravenous 2 times per day     PRN Meds: ondansetron, sodium chloride flush, acetaminophen **OR** acetaminophen      Intake/Output Summary (Last 24 hours) at 3/9/2021 0957  Last data filed at 3/9/2021 0807  Gross per 24 hour   Intake 600 ml   Output 675 ml   Net -75 ml     · Body mass index is 31.02 kg/m². · Diet  · DIET PEPTIC ULCER;    · Code Status  · Full Code       Electronically signed by Christi Mojica MD on 3/9/2021 at 9:57 AM  [employer]   Please contact me through perfect serve    NOTE: This report was transcribed using voice recognition software. Every effort was made to ensure accuracy; however, inadvertent computerized transcription errors may be present.

## 2021-03-10 VITALS
TEMPERATURE: 99.2 F | HEART RATE: 86 BPM | HEIGHT: 68 IN | DIASTOLIC BLOOD PRESSURE: 79 MMHG | SYSTOLIC BLOOD PRESSURE: 144 MMHG | OXYGEN SATURATION: 97 % | WEIGHT: 204 LBS | BODY MASS INDEX: 30.92 KG/M2 | RESPIRATION RATE: 18 BRPM

## 2021-03-10 LAB
ALBUMIN SERPL-MCNC: 3.3 G/DL (ref 3.5–5.2)
ALP BLD-CCNC: 50 U/L (ref 40–129)
ALT SERPL-CCNC: 46 U/L (ref 0–40)
ANION GAP SERPL CALCULATED.3IONS-SCNC: 7 MMOL/L (ref 7–16)
AST SERPL-CCNC: 22 U/L (ref 0–39)
BASOPHILS ABSOLUTE: 0.01 E9/L (ref 0–0.2)
BASOPHILS RELATIVE PERCENT: 0.2 % (ref 0–2)
BILIRUB SERPL-MCNC: 0.5 MG/DL (ref 0–1.2)
BUN BLDV-MCNC: 18 MG/DL (ref 8–23)
CALCIUM SERPL-MCNC: 8.4 MG/DL (ref 8.6–10.2)
CHLORIDE BLD-SCNC: 105 MMOL/L (ref 98–107)
CO2: 29 MMOL/L (ref 22–29)
CREAT SERPL-MCNC: 1.4 MG/DL (ref 0.7–1.2)
EOSINOPHILS ABSOLUTE: 0.2 E9/L (ref 0.05–0.5)
EOSINOPHILS RELATIVE PERCENT: 4.2 % (ref 0–6)
GFR AFRICAN AMERICAN: >60
GFR NON-AFRICAN AMERICAN: 50 ML/MIN/1.73
GLUCOSE BLD-MCNC: 91 MG/DL (ref 74–99)
HCT VFR BLD CALC: 24.9 % (ref 37–54)
HELICOBACTER PYLORI IGG: NORMAL
HEMOGLOBIN: 7.9 G/DL (ref 12.5–16.5)
IMMATURE GRANULOCYTES #: 0.05 E9/L
IMMATURE GRANULOCYTES %: 1.1 % (ref 0–5)
INR BLD: 1.1
LYMPHOCYTES ABSOLUTE: 0.74 E9/L (ref 1.5–4)
LYMPHOCYTES RELATIVE PERCENT: 15.6 % (ref 20–42)
MCH RBC QN AUTO: 31 PG (ref 26–35)
MCHC RBC AUTO-ENTMCNC: 31.7 % (ref 32–34.5)
MCV RBC AUTO: 97.6 FL (ref 80–99.9)
MONOCYTES ABSOLUTE: 0.51 E9/L (ref 0.1–0.95)
MONOCYTES RELATIVE PERCENT: 10.7 % (ref 2–12)
NEUTROPHILS ABSOLUTE: 3.24 E9/L (ref 1.8–7.3)
NEUTROPHILS RELATIVE PERCENT: 68.2 % (ref 43–80)
PDW BLD-RTO: 16 FL (ref 11.5–15)
PLATELET # BLD: 137 E9/L (ref 130–450)
PMV BLD AUTO: 9.7 FL (ref 7–12)
POTASSIUM SERPL-SCNC: 5 MMOL/L (ref 3.5–5)
PROTHROMBIN TIME: 12.2 SEC (ref 9.3–12.4)
RBC # BLD: 2.55 E12/L (ref 3.8–5.8)
SARS-COV-2, NAAT: NORMAL
SODIUM BLD-SCNC: 141 MMOL/L (ref 132–146)
TOTAL PROTEIN: 5.4 G/DL (ref 6.4–8.3)
WBC # BLD: 4.8 E9/L (ref 4.5–11.5)

## 2021-03-10 PROCEDURE — 94660 CPAP INITIATION&MGMT: CPT

## 2021-03-10 PROCEDURE — C9113 INJ PANTOPRAZOLE SODIUM, VIA: HCPCS | Performed by: STUDENT IN AN ORGANIZED HEALTH CARE EDUCATION/TRAINING PROGRAM

## 2021-03-10 PROCEDURE — 85610 PROTHROMBIN TIME: CPT

## 2021-03-10 PROCEDURE — 85025 COMPLETE CBC W/AUTO DIFF WBC: CPT

## 2021-03-10 PROCEDURE — 87635 SARS-COV-2 COVID-19 AMP PRB: CPT

## 2021-03-10 PROCEDURE — 6360000002 HC RX W HCPCS: Performed by: STUDENT IN AN ORGANIZED HEALTH CARE EDUCATION/TRAINING PROGRAM

## 2021-03-10 PROCEDURE — 80053 COMPREHEN METABOLIC PANEL: CPT

## 2021-03-10 PROCEDURE — 6370000000 HC RX 637 (ALT 250 FOR IP): Performed by: STUDENT IN AN ORGANIZED HEALTH CARE EDUCATION/TRAINING PROGRAM

## 2021-03-10 PROCEDURE — 36415 COLL VENOUS BLD VENIPUNCTURE: CPT

## 2021-03-10 PROCEDURE — 2580000003 HC RX 258: Performed by: STUDENT IN AN ORGANIZED HEALTH CARE EDUCATION/TRAINING PROGRAM

## 2021-03-10 RX ORDER — PANTOPRAZOLE SODIUM 40 MG/1
40 TABLET, DELAYED RELEASE ORAL
Qty: 180 TABLET | Refills: 0 | Status: SHIPPED | OUTPATIENT
Start: 2021-03-10 | End: 2021-03-24 | Stop reason: SDUPTHER

## 2021-03-10 RX ORDER — SUCRALFATE 1 G/1
1 TABLET ORAL 4 TIMES DAILY
Qty: 120 TABLET | Refills: 0 | Status: SHIPPED | OUTPATIENT
Start: 2021-03-10 | End: 2021-03-24 | Stop reason: SDUPTHER

## 2021-03-10 RX ADMIN — SUCRALFATE 1 G: 1 TABLET ORAL at 12:15

## 2021-03-10 RX ADMIN — Medication 10 ML: at 10:43

## 2021-03-10 RX ADMIN — PANTOPRAZOLE SODIUM 40 MG: 40 INJECTION, POWDER, FOR SOLUTION INTRAVENOUS at 10:42

## 2021-03-10 RX ADMIN — SUCRALFATE 1 G: 1 TABLET ORAL at 05:55

## 2021-03-10 RX ADMIN — HEPARIN SODIUM 5000 UNITS: 10000 INJECTION INTRAVENOUS; SUBCUTANEOUS at 14:17

## 2021-03-10 RX ADMIN — CARVEDILOL 12.5 MG: 6.25 TABLET, FILM COATED ORAL at 10:43

## 2021-03-10 RX ADMIN — DIGOXIN 62.5 MCG: 125 TABLET ORAL at 10:42

## 2021-03-10 NOTE — PLAN OF CARE
Problem: Falls - Risk of:  Goal: Will remain free from falls  Description: Will remain free from falls  3/10/2021 0358 by Petty Rueda RN  Outcome: Met This Shift  3/10/2021 0219 by Elfredia Blind  Outcome: Met This Shift  Goal: Absence of physical injury  Description: Absence of physical injury  3/10/2021 0358 by Petty Rueda RN  Outcome: Met This Shift  3/10/2021 0219 by Elfredia Blind  Outcome: Met This Shift

## 2021-03-10 NOTE — PROGRESS NOTES
[facility]   ATTENDING PHYSICIAN PROGRESS NOTE     I have examined the patient, reviewed the record, and discussed the case with the APN/ Resident. I have reviewed all relevant labs and imaging data. The following summarizes my clinical findings and independent assessment. CC: melena; hematemesis    Patient denies abdominal pain. States he is tolerating a diet. Passing flatus. Having bowel movements.     Awake and alert  Follows commands  Heart: Regular  Lungs: Fairly clear bilaterally  Abdomen: Soft; bowel sounds active; nontender/nondistended  Skin: Warm/dry    Patient Active Problem List    Diagnosis Date Noted    Acute blood loss anemia 03/05/2021    Hypotension due to hypovolemia 03/05/2021    COPD (chronic obstructive pulmonary disease) ([facility] 03/05/2021    CAD (coronary artery disease) 03/05/2021    Hx of CABG 03/05/2021    S/P AAA repair 03/05/2021    Lactic acidosis 03/05/2021    Gastrointestinal hemorrhage 03/04/2021    Restrictive lung disease 03/12/2020    KYARA on CPAP 02/14/2020    Fatigue 02/04/2020    Transition of care performed with sharing of clinical summary 11/25/2019    Atrial fibrillation with RVR ([facility] 11/16/2019    Dissection of thoracic aorta (HCC) 11/15/2019    Chronic combined systolic and diastolic CHF (congestive heart failure) ([address] 11/15/2019    Mixed hyperlipidemia 05/08/2019    Hyperuricemia 05/08/2019    Cognitive dysfunction 11/09/2017    Cardiomyopathy ([address] 05/20/2016    Rate controlled atrial fibrillation ([address] 05/20/2016    Anticoagulated on Coumadin 02/18/2014    Stage 3a chronic kidney disease 02/04/2014    MR (mitral regurgitation) 02/04/2014    Moderate obesity 11/05/2013    H/O sarcoidosis 10/25/2012    Essential hypertension 10/25/2012       Gastric ulcer--status post EGD with injection and cautery  On PPI/Carafate  Monitor H/H  Peptic ulcer diet as tolerated  DVT risk--PCDs/subcu heparin  Discharge planning    Margo Ceja Phuong Howard MD, [facility]  3/9/2021  9:55 PM      NOTE: This report was transcribed using voice recognition software. Every effort was made to ensure accuracy; however, inadvertent computerized transcription errors may be present.

## 2021-03-10 NOTE — PROGRESS NOTES
CLINICAL PHARMACY NOTE: MEDS TO [address] Select Patient?: Yes  Total # of Prescriptions Filled: 2   The following medications were delivered to the patient:  · Sucralfate 1  · Protonix 40  Total # of Interventions Completed: 2  Time Spent (min): 15    Additional Documentation:

## 2021-03-10 NOTE — PLAN OF CARE
Problem: Falls - Risk of:  Goal: Will remain free from falls  Description: Will remain free from falls  3/10/2021 1505 by Harman aRza RN  Outcome: Completed  3/10/2021 0358 by Petty Rueda RN  Outcome: Met This Shift  3/10/2021 0219 by Elfredia Blind  Outcome: Met This Shift  Goal: Absence of physical injury  Description: Absence of physical injury  3/10/2021 1505 by Harman Raza RN  Outcome: Completed  3/10/2021 0358 by Petty Rueda RN  Outcome: Met This Shift  3/10/2021 0219 by Elfredia Blind  Outcome: Met This Shift     Problem: Pain:  Goal: Pain level will decrease  Description: Pain level will decrease  3/10/2021 1505 by Harman Raza RN  Outcome: Completed  3/10/2021 0358 by Petty Rueda RN  Outcome: Met This Shift  3/10/2021 0219 by Elfredia Blind  Outcome: Met This Shift  Goal: Control of acute pain  Description: Control of acute pain  3/10/2021 1505 by Harman Raza RN  Outcome: Completed  3/10/2021 0358 by Petty Rueda RN  Outcome: Met This Shift  3/10/2021 0219 by Elfredia Blind  Outcome: Met This Shift  Goal: Control of chronic pain  Description: Control of chronic pain  3/10/2021 1505 by Harman Raza RN  Outcome: Completed  3/10/2021 0358 by Petty Rueda RN  Outcome: Met This Shift     Problem: Skin Integrity:  Goal: Will show no infection signs and symptoms  Description: Will show no infection signs and symptoms  3/10/2021 1505 by Harman Raza RN  Outcome: Completed  3/10/2021 0358 by Petty Rueda RN  Outcome: Met This Shift  3/10/2021 0219 by Elfredia Blind  Outcome: Met This Shift  Goal: Absence of new skin breakdown  Description: Absence of new skin breakdown  3/10/2021 1505 by Harman Raza RN  Outcome: Completed  3/10/2021 0358 by Petty Rueda RN  Outcome: Met This Shift  3/10/2021 0219 by Elfredia Blind  Outcome: Met This Shift     Problem: Airway Clearance - Ineffective  Goal: Achieve or maintain patent airway  Outcome: Completed     Problem: Gas Exchange - Impaired  Goal: Absence of hypoxia  Outcome: Completed  Goal: Promote optimal lung function  Outcome: Completed     Problem: Breathing Pattern - Ineffective  Goal: Ability to achieve and maintain a regular respiratory rate  Outcome: Completed     Problem:  Body Temperature -  Risk of, Imbalanced  Goal: Ability to maintain a body temperature within defined limits  Outcome: Completed  Goal: Will regain or maintain usual level of consciousness  Outcome: Completed  Goal: Complications related to the disease process, condition or treatment will be avoided or minimized  Outcome: Completed     Problem: Isolation Precautions - Risk of Spread of Infection  Goal: Prevent transmission of infection  Outcome: Completed     Problem: Nutrition Deficits  Goal: Optimize nutritional status  Outcome: Completed     Problem: Risk for Fluid Volume Deficit  Goal: Maintain normal heart rhythm  Outcome: Completed  Goal: Maintain absence of muscle cramping  Outcome: Completed  Goal: Maintain normal serum potassium, sodium, calcium, phosphorus, and pH  Outcome: Completed     Problem: Loneliness or Risk for Loneliness  Goal: Demonstrate positive use of time alone when socialization is not possible  Outcome: Completed     Problem: Fatigue  Goal: Verbalize increase energy and improved vitality  Outcome: Completed     Problem: Patient Education: Go to Patient Education Activity  Goal: Patient/Family Education  Outcome: Completed

## 2021-03-10 NOTE — CARE COORDINATION
Discussed with attending, messaged general surgery to check for discharge from their POV, and check if pt can be re-started on coumadin, if not when? Via perfectserve. Discharge plan is home with no needs. Per general surgery may resume coumadin today, okay from their POV for discharge; attending notified.

## 2021-03-10 NOTE — DISCHARGE SUMMARY
Hospital Medicine Discharge Summary    Patient ID: Janice Wren      Patient's PCP: Ivan Vasquez DO    Admit Date: 3/4/2021     Discharge Date:    3/10/2021    Admitting Physician: Toña Solitario MD     Discharge Physician: Evy Apley, MD      Condition at discharge: Stable    Disposition: Home    Discharge Diagnoses: Active Hospital Problems    Diagnosis Date Noted    Acute blood loss anemia [D62] 03/05/2021    Hypotension due to hypovolemia [I95.89, E86.1] 03/05/2021    COPD (chronic obstructive pulmonary disease) (HCC) [J44.9] 03/05/2021    CAD (coronary artery disease) [I25.10] 03/05/2021    Hx of CABG [Z95.1] 03/05/2021    S/P AAA repair [A42.706, Z86.79] 03/05/2021    Lactic acidosis [E87.2] 03/05/2021    Gastrointestinal hemorrhage [K92.2] 03/04/2021    KYARA on CPAP [G47.33, Z99.89] 02/14/2020    Atrial fibrillation with RVR (HCC) [I48.91] 11/16/2019    Chronic combined systolic and diastolic CHF (congestive heart failure) ([address] [I50.42] 11/15/2019    Cardiomyopathy ([address] [I42.9] 05/20/2016    Anticoagulated on Coumadin [Z79.01] 02/18/2014    Stage 3a chronic kidney disease [N18.31] 02/04/2014    Essential hypertension [I10] 10/25/2012       The patient was seen and examined on day of discharge and this discharge summary is in conjunction with any daily progress note from day of discharge. Hospital Course:   Mr. Janice Wren, a 74y.o. year old male  who  has a past medical history of Acute kidney injury ([address] Aortic dissection ([address] Atrial fibrillation ([address] CAD (coronary artery disease), CHF (congestive heart failure) ([address] CKD (chronic kidney disease), H/O cardiovascular stress test, H/O Doppler echocardiogram, Hypertension, Ischemic cardiomyopathy, Obesity, Pericardial effusion (noninflammatory), Pleural effusion, Sarcoidosis, and Valvular heart disease. Patient admitted on 3/4/2021 for a GIB.  Patient states he had approximately 5 dark red bowel movements and hematemesis x3 beginning approximately 10 hours prior to arrival. Mady Jameson also admits to associated dizziness.  He is anticoagulated with Coumadin for atrial fibrillation.  In ED he was found to be hypotensive, tachycardic, and anemic with a hemoglobin of 8.1.  His INR is therapeutic at 2.4.  Lactic acid 2.4 as well.  Chest x-ray with cardiomegaly and atelectasis. Chest CT significant for a stent in the aortic arch extending into proximal descending aorta covering an intramural hematoma, emphysema and CABG. EKG with atrial fibrillation, rate controlled.    Coumadin was held. Surgery was consulted and patient underwent an EGD today which showed a gastric ulcer which was injected with epi and cauterized. He did receive 1 unit of PRBC. Patient was placed on Protonix twice daily. Patient overall is feeling better today, surgery is okay with patient going home today. They are okay with starting anticoagulation. He will need outpatient follow-up with PCP closely and CBC and BMP has been ordered for 3/15/2021. He will be placed back on his regular medications. He was asked to come back to the hospital if he notices any further bleeding or any change in medical condition. Consults:     IP CONSULT TO INTERNAL MEDICINE  IP CONSULT HOUSE SURGERY  IP CONSULT TO GENERAL SURGERY    Significant Diagnostic Studies:  As above      Discharge Instructions/Follow-up:  As above       Activity: activity as tolerated    Physical Exam:  Vitals:    03/10/21 1030   BP: (!) 144/79   Pulse: 86   Resp: 18   Temp: 99.2 °F (37.3 °C)   SpO2: 97%       General appearance: No apparent distress, appears stated age and cooperative. Respiratory:  Clear to auscultation, good air entry. Cardiovascular: RRR, no murmur. Abdomen: Soft, non-tender, non-distended with normal bowel sounds. Neurologic: awake, alert and following commands     Labs:  For convenience and continuity at follow-up the following most recent labs are provided:      CBC:    Lab Results   Component Value Date    WBC 4.8 03/10/2021    HGB 7.9 03/10/2021    HCT 24.9 03/10/2021     03/10/2021       Renal:    Lab Results   Component Value Date     03/10/2021    K 5.0 03/10/2021    K 4.3 03/05/2021     03/10/2021    CO2 29 03/10/2021    BUN 18 03/10/2021    CREATININE 1.4 03/10/2021    CALCIUM 8.4 03/10/2021    PHOS 3.6 03/09/2021         Discharge Medications:     Current Discharge Medication List           Details   pantoprazole (PROTONIX) 40 MG tablet Take 1 tablet by mouth 2 times daily (before meals)  Qty: 180 tablet, Refills: 0      sucralfate (CARAFATE) 1 GM tablet Take 1 tablet by mouth 4 times daily  Qty: 120 tablet, Refills: 0              Details   digoxin (LANOXIN) 125 MCG tablet Take 62.5 mcg by mouth daily      hydrALAZINE (APRESOLINE) 50 MG tablet Take 1 tablet by mouth 3 times daily  Qty: 270 tablet, Refills: 1    Associated Diagnoses: Chronic systolic heart failure ([facility] Essential hypertension      carvedilol (COREG) 25 MG tablet Take 1 tablet by mouth 2 times daily (with meals)  Qty: 180 tablet, Refills: 1    Associated Diagnoses: Chronic systolic heart failure ([facility] Essential hypertension      amLODIPine (NORVASC) 5 MG tablet Take 1 tablet by mouth daily  Qty: 90 tablet, Refills: 1    Associated Diagnoses: Essential hypertension; Rate controlled atrial fibrillation ([facility] Chronic systolic heart failure (HCC)      losartan (COZAAR) 50 MG tablet Take 1 tablet by mouth daily  Qty: 90 tablet, Refills: 1    Associated Diagnoses: Rate controlled atrial fibrillation ([facility] Chronic systolic heart failure (HCC)      isosorbide dinitrate (ISORDIL) 5 MG tablet Take 1 tablet by mouth 3 times daily  Qty: 270 tablet, Refills: 1    Associated Diagnoses: Rate controlled atrial fibrillation ([facility]  Chronic systolic heart failure (HCC)      bumetanide (BUMEX) 1 MG tablet Take 1 tablet by mouth 2 times daily  Qty: 180 tablet, Refills: 1 Associated Diagnoses: Rate controlled atrial fibrillation ([facility] Chronic systolic heart failure (HCC)      aspirin 81 MG chewable tablet Take 1 tablet by mouth daily Additional Refills from Primary Cardiologist or PCP  Qty: 30 tablet, Refills: 0      Multiple Vitamins-Minerals (THERAPEUTIC MULTIVITAMIN-MINERALS) tablet Take 1 tablet by mouth daily      warfarin (COUMADIN) 6 MG tablet Take 1 tablet by mouth daily  Qty: 90 tablet, Refills: 1    Associated Diagnoses: Anticoagulated on Coumadin             Time Spent on discharge is more than 31 min in the examination, evaluation, counseling and review of medications and discharge plan. Signed:    Evy Apley, MD   3/10/2021      Thank you Ivan Vasquez DO for the opportunity to be involved in this patient's care. If you have any questions or concerns please feel free to contact me. NOTE: This report was transcribed using voice recognition software. Every effort was made to ensure accuracy; however, inadvertent computerized transcription errors may be present.

## 2021-03-17 ENCOUNTER — TELEPHONE (OUTPATIENT)
Dept: SURGERY | Age: 66
End: 2021-03-17

## 2021-03-17 NOTE — TELEPHONE ENCOUNTER
Received a call from the patient who wanting to make a follow up appt with Dr. Lb Smith. Scheduled the appt on 3/24/21 at 12:45pm in [facility]. Gave the directions to the office. Bring ID, medication list, insurance card and co-pay. Gave the directions to the office. The patient verbalized understanding.   Electronically signed by Dimitry Sage on 3/17/2021 at 3:31 PM

## 2021-03-18 ENCOUNTER — NURSE ONLY (OUTPATIENT)
Dept: FAMILY MEDICINE CLINIC | Age: 66
End: 2021-03-18
Payer: MEDICARE

## 2021-03-18 DIAGNOSIS — I48.91 RATE CONTROLLED ATRIAL FIBRILLATION (HCC): ICD-10-CM

## 2021-03-18 DIAGNOSIS — Z79.01 ANTICOAGULATED ON COUMADIN: Primary | ICD-10-CM

## 2021-03-18 LAB
INTERNATIONAL NORMALIZATION RATIO, POC: 1.4
PROTHROMBIN TIME, POC: 16.4

## 2021-03-18 PROCEDURE — 93793 ANTICOAG MGMT PT WARFARIN: CPT | Performed by: FAMILY MEDICINE

## 2021-03-18 PROCEDURE — 85610 PROTHROMBIN TIME: CPT | Performed by: FAMILY MEDICINE

## 2021-03-18 NOTE — PROGRESS NOTES
Patient is scheduled for INR recheck 3/25/2021 and advised to get the CBC and BMP orders completed.  Patient advised of lab hours

## 2021-03-18 NOTE — PROGRESS NOTES
Patient presents in office for INR check. Patient just recently started back on warfarin 6mg daily approximately 3/9/2021. Patient did state one of the the days this past week he did take 2 pills.  Please advise

## 2021-03-18 NOTE — PROGRESS NOTES
Recommend patient continue current dose given recent interruption and GI bleed.  I would also recommend he have repeat CBC and BMP drawn to recheck blood counts and kidney function

## 2021-03-22 ENCOUNTER — TELEPHONE (OUTPATIENT)
Dept: FAMILY MEDICINE CLINIC | Age: 66
End: 2021-03-22

## 2021-03-22 LAB
ANION GAP SERPL CALCULATED.3IONS-SCNC: 12 MMOL/L (ref 7–16)
BUN BLDV-MCNC: 40 MG/DL (ref 8–23)
CALCIUM SERPL-MCNC: 9.4 MG/DL (ref 8.6–10.2)
CHLORIDE BLD-SCNC: 106 MMOL/L (ref 98–107)
CO2: 23 MMOL/L (ref 22–29)
CREAT SERPL-MCNC: 1.7 MG/DL (ref 0.7–1.2)
GFR AFRICAN AMERICAN: 49
GFR NON-AFRICAN AMERICAN: 40 ML/MIN/1.73
GLUCOSE BLD-MCNC: 99 MG/DL (ref 74–99)
HCT VFR BLD CALC: 26.6 % (ref 37–54)
HEMOGLOBIN: 7.9 G/DL (ref 12.5–16.5)
MCH RBC QN AUTO: 29.3 PG (ref 26–35)
MCHC RBC AUTO-ENTMCNC: 29.7 % (ref 32–34.5)
MCV RBC AUTO: 98.5 FL (ref 80–99.9)
PDW BLD-RTO: 15.5 FL (ref 11.5–15)
PLATELET # BLD: 206 E9/L (ref 130–450)
PMV BLD AUTO: 9.7 FL (ref 7–12)
POTASSIUM SERPL-SCNC: 4.3 MMOL/L (ref 3.5–5)
RBC # BLD: 2.7 E12/L (ref 3.8–5.8)
SODIUM BLD-SCNC: 141 MMOL/L (ref 132–146)
WBC # BLD: 4.8 E9/L (ref 4.5–11.5)

## 2021-03-24 ENCOUNTER — OFFICE VISIT (OUTPATIENT)
Dept: SURGERY | Age: 66
End: 2021-03-24

## 2021-03-24 VITALS
SYSTOLIC BLOOD PRESSURE: 136 MMHG | HEIGHT: 68 IN | DIASTOLIC BLOOD PRESSURE: 84 MMHG | BODY MASS INDEX: 30.77 KG/M2 | HEART RATE: 72 BPM | OXYGEN SATURATION: 98 % | RESPIRATION RATE: 18 BRPM | TEMPERATURE: 98.2 F | WEIGHT: 203 LBS

## 2021-03-24 DIAGNOSIS — K25.4 GASTROINTESTINAL HEMORRHAGE ASSOCIATED WITH GASTRIC ULCER: Primary | ICD-10-CM

## 2021-03-24 PROCEDURE — 99024 POSTOP FOLLOW-UP VISIT: CPT | Performed by: SURGERY

## 2021-03-24 RX ORDER — SUCRALFATE 1 G/1
1 TABLET ORAL 4 TIMES DAILY
Qty: 120 TABLET | Refills: 0 | Status: SHIPPED
Start: 2021-03-24 | End: 2021-07-01 | Stop reason: ALTCHOICE

## 2021-03-24 RX ORDER — PANTOPRAZOLE SODIUM 40 MG/1
40 TABLET, DELAYED RELEASE ORAL
Qty: 180 TABLET | Refills: 0 | Status: SHIPPED
Start: 2021-03-24 | End: 2021-07-01 | Stop reason: ALTCHOICE

## 2021-03-24 RX ORDER — SENNOSIDES 8.6 MG
TABLET ORAL
Qty: 120 TABLET | Refills: 11 | Status: SHIPPED | OUTPATIENT
Start: 2021-03-24

## 2021-03-24 NOTE — PROGRESS NOTES
General Surgery Progress Note:    Cc: post op     S: doing well just weak no bleeding       Objective:  @/84   Pulse 72   Temp 98.2 °F (36.8 °C) (Infrared)   Resp 18   Ht 5' 8\" (1.727 m)   Wt 203 lb (92.1 kg)   SpO2 98%   BMI 30.87 kg/m² @    Physical -     Gen: NAD  Resp: Breathing Comfortably, no resp distress  CV: Reg Rate  Abd: snt  EXT nvi    Assessment/Plan: s/p EGD control of hemorrhage,     Stool antigen for hpylori   Repeat egd 1 month     Electronically Signed by Marley Moss MD FACS   1:13 PM

## 2021-03-26 ENCOUNTER — TELEPHONE (OUTPATIENT)
Dept: SURGERY | Age: 66
End: 2021-03-26

## 2021-03-26 NOTE — TELEPHONE ENCOUNTER
Prior Authorization Form:      DEMOGRAPHICS:                     Patient Name:  Diamond Gleason  Patient :  1955            Insurance:  Payor: Manolo Ordoñez / Plan: [place] / Product Type: *No Product type* /   Insurance ID Number:    Payor/Plan Subscr  Sex Relation Sub. Ins. ID Effective Group Num   1.  [id number] [facility] 1955 Male Self [id number]                                   [address] [address]         DIAGNOSIS & PROCEDURE:                       Procedure/Operation: EGD          CPT Code: 87530    Diagnosis:  Gastric ulcers and s/p gastric hemorrhage     ICD10 Code: Z 87.19    Location:  [place]     Surgeon:  Dr. Deena Blackwell INFORMATION:                          Date: 21  Time: 1:30pm             Anesthesia: [facility]                                                       Status:  Outpatient        Special Comments:  N/A    Electronically signed by Diaz Garcia on 3/26/2021 at 1:20 PM

## 2021-03-26 NOTE — TELEPHONE ENCOUNTER
Scheduled patient for EGD on 4/19/21 at 1:30pm in [facility]. Patient needs to report at the front entrance. Per Dr. Lb Smith, the patient does not need to hold Asprin and Coumadin prior to the procedure. Patient verbalized understanding. Instruction letter mailed. Encouraged patient to call our office if any questions.     Electronically signed by Dimitry Sage on 3/26/2021 at 1:17 PM

## 2021-03-29 ENCOUNTER — NURSE ONLY (OUTPATIENT)
Dept: FAMILY MEDICINE CLINIC | Age: 66
End: 2021-03-29
Payer: MEDICARE

## 2021-03-29 DIAGNOSIS — Z79.01 ANTICOAGULATED ON COUMADIN: Primary | ICD-10-CM

## 2021-03-29 DIAGNOSIS — I48.91 RATE CONTROLLED ATRIAL FIBRILLATION (HCC): ICD-10-CM

## 2021-03-29 LAB
INTERNATIONAL NORMALIZATION RATIO, POC: 1.6
PROTHROMBIN TIME, POC: 19.2

## 2021-03-29 PROCEDURE — 93793 ANTICOAG MGMT PT WARFARIN: CPT | Performed by: FAMILY MEDICINE

## 2021-03-29 PROCEDURE — 85610 PROTHROMBIN TIME: CPT | Performed by: FAMILY MEDICINE

## 2021-03-29 NOTE — PROGRESS NOTES
I recommend patient increase to an extra half dose 1 day/week (9 mg Mon; 6 mg daily Tues-Sunday) and repeat INR in 2 weeks.

## 2021-04-08 ENCOUNTER — OFFICE VISIT (OUTPATIENT)
Dept: FAMILY MEDICINE CLINIC | Age: 66
End: 2021-04-08
Payer: MEDICARE

## 2021-04-08 VITALS
WEIGHT: 197 LBS | OXYGEN SATURATION: 98 % | RESPIRATION RATE: 18 BRPM | DIASTOLIC BLOOD PRESSURE: 70 MMHG | TEMPERATURE: 97.7 F | BODY MASS INDEX: 29.86 KG/M2 | SYSTOLIC BLOOD PRESSURE: 130 MMHG | HEART RATE: 66 BPM | HEIGHT: 68 IN

## 2021-04-08 DIAGNOSIS — I48.91 RATE CONTROLLED ATRIAL FIBRILLATION (HCC): ICD-10-CM

## 2021-04-08 DIAGNOSIS — K27.4 GASTROINTESTINAL HEMORRHAGE ASSOCIATED WITH PEPTIC ULCER: ICD-10-CM

## 2021-04-08 DIAGNOSIS — Z79.01 ANTICOAGULATED ON COUMADIN: Primary | ICD-10-CM

## 2021-04-08 DIAGNOSIS — D62 ACUTE BLOOD LOSS ANEMIA: ICD-10-CM

## 2021-04-08 DIAGNOSIS — R79.9 ABNORMAL FINDING OF BLOOD CHEMISTRY, UNSPECIFIED: ICD-10-CM

## 2021-04-08 LAB
INTERNATIONAL NORMALIZATION RATIO, POC: 1.8
PROTHROMBIN TIME, POC: 21.4

## 2021-04-08 PROCEDURE — G8417 CALC BMI ABV UP PARAM F/U: HCPCS | Performed by: FAMILY MEDICINE

## 2021-04-08 PROCEDURE — 3017F COLORECTAL CA SCREEN DOC REV: CPT | Performed by: FAMILY MEDICINE

## 2021-04-08 PROCEDURE — 4040F PNEUMOC VAC/ADMIN/RCVD: CPT | Performed by: FAMILY MEDICINE

## 2021-04-08 PROCEDURE — 1123F ACP DISCUSS/DSCN MKR DOCD: CPT | Performed by: FAMILY MEDICINE

## 2021-04-08 PROCEDURE — 1036F TOBACCO NON-USER: CPT | Performed by: FAMILY MEDICINE

## 2021-04-08 PROCEDURE — 85610 PROTHROMBIN TIME: CPT | Performed by: FAMILY MEDICINE

## 2021-04-08 PROCEDURE — 1111F DSCHRG MED/CURRENT MED MERGE: CPT | Performed by: FAMILY MEDICINE

## 2021-04-08 PROCEDURE — G8427 DOCREV CUR MEDS BY ELIG CLIN: HCPCS | Performed by: FAMILY MEDICINE

## 2021-04-08 PROCEDURE — 99213 OFFICE O/P EST LOW 20 MIN: CPT | Performed by: FAMILY MEDICINE

## 2021-04-08 RX ORDER — CYANOCOBALAMIN 1000 UG/ML
1000 INJECTION INTRAMUSCULAR; SUBCUTANEOUS ONCE
Qty: 1 ML | Refills: 3 | Status: SHIPPED | OUTPATIENT
Start: 2021-04-08 | End: 2021-08-23

## 2021-04-08 RX ORDER — FERROUS SULFATE 325(65) MG
325 TABLET ORAL
Qty: 30 TABLET | Refills: 5 | Status: SHIPPED
Start: 2021-04-08 | End: 2021-10-22 | Stop reason: ALTCHOICE

## 2021-04-08 ASSESSMENT — ENCOUNTER SYMPTOMS
WHEEZING: 0
DIARRHEA: 0
SHORTNESS OF BREATH: 1
BLOOD IN STOOL: 0
COUGH: 0
VOMITING: 0
NAUSEA: 0

## 2021-04-08 NOTE — PROGRESS NOTES
2021     Malvin Dhaliwal (:  1955) is a 74 y.o. male, with a:  Past Medical History:   Diagnosis Date    Acute [facility] ([facility]     Aortic dissection (HCC)     Atrial fibrillation (HCC)     CAD (coronary artery disease)     CHF (congestive heart failure) ([facility]     severely impaired LV systolic function and CHF, EF 25-30%    CKD (chronic kidney disease)     H/O cardiovascular stress test     No evidence of stress-induced ischemia    H/O Doppler echocardiogram 5/04/10    [facility], mildly dilated LV, mod concentric LVH, normal LV systolic function, mod dilated left atrium, trace MR    Hypertension     Ischemic cardiomyopathy     Obesity     Pericardial effusion (noninflammatory) 11/15/2019    History: Post-op problem  Assessment: S/P pericardial drain placement in CVICU  Plan:  Drain dc'ed, no need for post-procedure echo unless pt become symptomatic.  Pleural effusion 2019    History: Post op problem Assessment: On r/a, left pigtail dc'ed Plan: PO lasix. Denies sob. Desat study done, no home O2 needed    Sarcoidosis     Valvular heart disease        Here for evaluation of the following medical concerns:  Chief Complaint   Patient presents with    Follow-up     GI Bleed 3/4/2021     GI Bleed 2/2 gastric ulcer  Current treatment: Pantoprazole 40 mg twice daily and Carafate 1 g 4 times daily  Admitted 3/4 - 3/10 for acute blood loss anemia due to GI bleed, underwent EGD which showed a gastric ulcer which was injected with epi and cauterized  Upcoming plans for repeat EGD  Denies any further bleeding      Lab Results   Component Value Date    INR 1.8 2021    INR 1.6 2021    INR 1.4 2021     Taking 6 mg daily    Review of Systems   Constitutional: Positive for fatigue. Negative for chills and fever. Respiratory: Positive for shortness of breath. Negative for cough and wheezing. Cardiovascular: Negative for chest pain and leg swelling. Gastrointestinal: Negative for blood in stool, diarrhea, nausea and vomiting. Prior to Visit Medications    Medication Sig Taking?  Authorizing Provider   senna (SENOKOT) 8.6 MG TABS tablet TAKE 2 TABLETS BY MOUTH 2 TIMES DAILY Yes Kamron Chase DO   pantoprazole (PROTONIX) 40 MG tablet Take 1 tablet by mouth 2 times daily (before meals) Yes Gilmer Manzanares MD   sucralfate (CARAFATE) 1 GM tablet Take 1 tablet by mouth 4 times daily Yes Gilmer Manzanares MD   digoxin (LANOXIN) 125 MCG tablet Take 62.5 mcg by mouth daily Yes Historical Provider, MD   hydrALAZINE (APRESOLINE) 50 MG tablet Take 1 tablet by mouth 3 times daily Yes Kamron Chase DO   carvedilol (COREG) 25 MG tablet Take 1 tablet by mouth 2 times daily (with meals) Yes Kamron Chase DO   amLODIPine (NORVASC) 5 MG tablet Take 1 tablet by mouth daily Yes Kamron Chase DO   losartan (COZAAR) 50 MG tablet Take 1 tablet by mouth daily Yes Kamron Chase DO   isosorbide dinitrate (ISORDIL) 5 MG tablet Take 1 tablet by mouth 3 times daily Yes Kamron Chase DO   bumetanide (BUMEX) 1 MG tablet Take 1 tablet by mouth 2 times daily Yes Kamron Chase DO   warfarin (COUMADIN) 6 MG tablet Take 1 tablet by mouth daily Yes Kamron Chase DO   aspirin 81 MG chewable tablet Take 1 tablet by mouth daily Additional Refills from Primary Cardiologist or PCP Yes Donis Cuenca,    Multiple Vitamins-Minerals (THERAPEUTIC MULTIVITAMIN-MINERALS) tablet Take 1 tablet by mouth daily Yes Historical Provider, MD        Social History     Tobacco Use    Smoking status: Former Smoker     Packs/day: 0.50     Years: 5.00     Pack years: 2.50     Types: Cigarettes, Pipe, Cigars     Start date:      Quit date: 10/16/1993     Years since quittin.4    Smokeless tobacco: Never Used   Substance Use Topics    Alcohol use: Not Currently     Frequency: Monthly or less     Drinks per session: 1 or 2        Past Surgical History:   Procedure Laterality Date    OTHER SURGICAL HISTORY  11/15/2019    aortic dissection repair @ [facility]    TONSILLECTOMY      UPPER GASTROINTESTINAL ENDOSCOPY N/A 3/5/2021    EGD CONTROL HEMORRHAGE performed by Dax Lua MD at [address] [place] [address] Street:    04/08/21 1115   BP: 130/70   Pulse: 66   Resp: 18   Temp: 97.7 °F (36.5 °C)   TempSrc: Temporal   SpO2: 98%   Weight: 197 lb (89.4 kg)   Height: 5' 8\" (1.727 m)     Estimated body mass index is 29.95 kg/m² as calculated from the following:    Height as of this encounter: 5' 8\" (1.727 m). Weight as of this encounter: 197 lb (89.4 kg). Physical Exam  Constitutional:       Appearance: Normal appearance. HENT:      Head: Normocephalic and atraumatic. Eyes:      Extraocular Movements: Extraocular movements intact. Conjunctiva/sclera: Conjunctivae normal.   Cardiovascular:      Rate and Rhythm: Normal rate. Rhythm irregular. Pulmonary:      Effort: Pulmonary effort is normal. No respiratory distress. Breath sounds: No wheezing or rales. Abdominal:      General: There is no distension. Neurological:      General: No focal deficit present. Mental Status: He is alert. Mental status is at baseline. ASSESSMENT/PLAN:  Carlyn Unger was seen today for follow-up. Diagnoses and all orders for this visit:    Anticoagulated on Coumadin  -     POCT INR    Rate controlled atrial fibrillation ([facility]  -     LIPID PANEL; Future    Gastrointestinal hemorrhage associated with peptic ulcer  -     ferrous sulfate (IRON 325) 325 (65 Fe) MG tablet; Take 1 tablet by mouth daily (with breakfast)  -     LIPID PANEL; Future  -     CBC Auto Differential; Future  -     Comprehensive Metabolic Panel; Future    Acute blood loss anemia  -     cyanocobalamin 1000 MCG/ML injection; Inject 1 mL into the muscle once for 1 dose  -     IRON AND TIBC; Future  -     VITAMIN B12 & FOLATE; Future  -     URIC ACID; Future  -     LIPID PANEL;  Future  -     CBC Auto Differential; Future  -     Comprehensive

## 2021-04-08 NOTE — PROGRESS NOTES
PATIENT AGREES TO HAVE COVID TESTING DONE @ [facility] 4/14/21. AGREES TO SELF ISOLATE UNTIL SURGERY DATE.

## 2021-04-14 ENCOUNTER — HOSPITAL ENCOUNTER (OUTPATIENT)
Age: 66
Discharge: HOME | End: 2021-04-16
Payer: MEDICARE

## 2021-04-14 DIAGNOSIS — U07.1 COVID-19: ICD-10-CM

## 2021-04-14 PROCEDURE — U0005 INFEC AGEN DETEC AMPLI PROBE: HCPCS

## 2021-04-14 PROCEDURE — U0003 INFECTIOUS AGENT DETECTION BY NUCLEIC ACID (DNA OR RNA); SEVERE ACUTE RESPIRATORY SYNDROME CORONAVIRUS 2 (SARS-COV-2) (CORONAVIRUS DISEASE [COVID-19]), AMPLIFIED PROBE TECHNIQUE, MAKING USE OF HIGH THROUGHPUT TECHNOLOGIES AS DESCRIBED BY CMS-2020-01-R: HCPCS

## 2021-04-15 ENCOUNTER — TELEPHONE (OUTPATIENT)
Dept: SURGERY | Age: 66
End: 2021-04-15

## 2021-04-15 LAB
SARS-COV-2: NOT DETECTED
SOURCE: NORMAL

## 2021-04-15 NOTE — PROGRESS NOTES
[facility] PRE-ADMISSION TESTING ENDOSCOPY INSTRUCTIONS- formerly [facility]-phone number:[phone]    ENDOSCOPY INSTRUCTIONS:   [] Bowel prep instructions reviewed. [x] Nothing by mouth after midnight, including gum, candy, mints, or water. Please follow your surgeons instructions if you are required to complete a bowel prep. Colonoscopy- no solid food-only clear liquids the day prior). [x] You may brush your teeth, gargle, but do NOT swallow water. [] Do not wear makeup, lotions, powders, deodorant. Nail polish as directed by the nurse. [x] Arrange transportation with a responsible adult  to and from the hospital. If you do not have a responsible adult  to transport you, you will need to make arrangements with a medical transportation company (i.e. [employer]. A Uber/taxi/bus is not appropriate unless you are accompanied by a responsible adult ). Arrange for someone to be with you for the remainder of the day and for 24 hours after your procedure due to having had anesthesia. Who will be your  for transportation?_______friend___________   Who will be staying with you for 24 hrs after your procedure?____________friend______    PARKING INSTRUCTIONS:   [x] Arrival Time:______1230__________________  · [x] Parking lot  \"I\" OR 1 is located on [facility] (the corner of [facility]). To enter, press the button and the gate will lift. A free token will be provided to exit the lot. One car per patient is allowed to park in this lot. All other cars are to park on [address] [place] [address] either in the parking garage or the handicap lot. [] To reach the [place] lobby from [address] [place] [address], upon entering the hospital, take elevator B to the 3rd floor. EDUCATION INSTRUCTIONS:  [x] Bring a complete list of your medications, please write the last time you took the medicine, give this list to the nurse.   [x] Take the following medications the morning of

## 2021-04-15 NOTE — TELEPHONE ENCOUNTER
Spoke with Dru Cedillo [phone]. Told her that per Dr. Veronica Meier, the patient can continue to take Coumadin and Aspirin till the procedure day. Giovana Rosa verbalized understanding.   Electronically signed by Masoud Deleon on 4/15/2021 at 1:52 PM

## 2021-04-15 NOTE — PROGRESS NOTES
Message left with April at Dr Padilla's on  answering machine regarding pt states still taking baby ASA and coumadin and was not told to hold it. Requested call be made to patient if needs to stop.

## 2021-04-15 NOTE — TELEPHONE ENCOUNTER
MA received a call from PAT stating they called pt to go over upcoming procedure with Venita Matthews on 4/19. Pt stated Venita Matthews told him he could continue to take his 6mg Coumadin and 81mg baby Asprin up until procdure. PAT wanted to confirm this.     Electronically signed by Jose Ferguson MA on 4/15/21 at 11:13 AM EDT

## 2021-04-18 ENCOUNTER — ANESTHESIA EVENT (OUTPATIENT)
Dept: ENDOSCOPY | Age: 66
End: 2021-04-18
Payer: MEDICARE

## 2021-04-19 ENCOUNTER — HOSPITAL ENCOUNTER (OUTPATIENT)
Age: 66
Setting detail: OUTPATIENT SURGERY
Discharge: HOME | End: 2021-04-19
Attending: SURGERY | Admitting: SURGERY
Payer: MEDICARE

## 2021-04-19 ENCOUNTER — ANESTHESIA (OUTPATIENT)
Dept: ENDOSCOPY | Age: 66
End: 2021-04-19
Payer: MEDICARE

## 2021-04-19 ENCOUNTER — PREP FOR PROCEDURE (OUTPATIENT)
Dept: SURGERY | Age: 66
End: 2021-04-19

## 2021-04-19 VITALS
SYSTOLIC BLOOD PRESSURE: 148 MMHG | OXYGEN SATURATION: 97 % | WEIGHT: 197 LBS | DIASTOLIC BLOOD PRESSURE: 91 MMHG | TEMPERATURE: 98.4 F | RESPIRATION RATE: 18 BRPM | BODY MASS INDEX: 29.86 KG/M2 | HEIGHT: 68 IN | HEART RATE: 64 BPM

## 2021-04-19 VITALS
RESPIRATION RATE: 10 BRPM | OXYGEN SATURATION: 91 % | SYSTOLIC BLOOD PRESSURE: 201 MMHG | DIASTOLIC BLOOD PRESSURE: 95 MMHG

## 2021-04-19 DIAGNOSIS — U07.1 COVID-19: Primary | ICD-10-CM

## 2021-04-19 LAB
HCT VFR BLD CALC: 26.1 % (ref 37–54)
HEMOGLOBIN: 7.8 G/DL (ref 12.5–16.5)
MCH RBC QN AUTO: 26.9 PG (ref 26–35)
MCHC RBC AUTO-ENTMCNC: 29.9 % (ref 32–34.5)
MCV RBC AUTO: 90 FL (ref 80–99.9)
PDW BLD-RTO: 17.6 FL (ref 11.5–15)
PLATELET # BLD: 145 E9/L (ref 130–450)
PMV BLD AUTO: 8.8 FL (ref 7–12)
RBC # BLD: 2.9 E12/L (ref 3.8–5.8)
WBC # BLD: 4.3 E9/L (ref 4.5–11.5)

## 2021-04-19 PROCEDURE — 94640 AIRWAY INHALATION TREATMENT: CPT

## 2021-04-19 PROCEDURE — 3700000000 HC ANESTHESIA ATTENDED CARE: Performed by: SURGERY

## 2021-04-19 PROCEDURE — 6360000002 HC RX W HCPCS: Performed by: NURSE ANESTHETIST, CERTIFIED REGISTERED

## 2021-04-19 PROCEDURE — 7100000010 HC PHASE II RECOVERY - FIRST 15 MIN: Performed by: SURGERY

## 2021-04-19 PROCEDURE — 3609017100 HC EGD: Performed by: SURGERY

## 2021-04-19 PROCEDURE — 2500000003 HC RX 250 WO HCPCS: Performed by: NURSE ANESTHETIST, CERTIFIED REGISTERED

## 2021-04-19 PROCEDURE — 3700000001 HC ADD 15 MINUTES (ANESTHESIA): Performed by: SURGERY

## 2021-04-19 PROCEDURE — 43235 EGD DIAGNOSTIC BRUSH WASH: CPT | Performed by: SURGERY

## 2021-04-19 PROCEDURE — 2709999900 HC NON-CHARGEABLE SUPPLY: Performed by: SURGERY

## 2021-04-19 PROCEDURE — 36415 COLL VENOUS BLD VENIPUNCTURE: CPT

## 2021-04-19 PROCEDURE — 6370000000 HC RX 637 (ALT 250 FOR IP): Performed by: ANESTHESIOLOGY

## 2021-04-19 PROCEDURE — 85027 COMPLETE CBC AUTOMATED: CPT

## 2021-04-19 PROCEDURE — 7100000011 HC PHASE II RECOVERY - ADDTL 15 MIN: Performed by: SURGERY

## 2021-04-19 PROCEDURE — 7100000001 HC PACU RECOVERY - ADDTL 15 MIN: Performed by: SURGERY

## 2021-04-19 PROCEDURE — 2580000003 HC RX 258: Performed by: SURGERY

## 2021-04-19 PROCEDURE — 7100000000 HC PACU RECOVERY - FIRST 15 MIN: Performed by: SURGERY

## 2021-04-19 RX ORDER — SODIUM CHLORIDE 0.9 % (FLUSH) 0.9 %
5-40 SYRINGE (ML) INJECTION EVERY 12 HOURS SCHEDULED
Status: DISCONTINUED | OUTPATIENT
Start: 2021-04-19 | End: 2021-04-19 | Stop reason: HOSPADM

## 2021-04-19 RX ORDER — ETOMIDATE 2 MG/ML
INJECTION, SOLUTION INTRAVENOUS PRN
Status: DISCONTINUED | OUTPATIENT
Start: 2021-04-19 | End: 2021-04-19 | Stop reason: SDUPTHER

## 2021-04-19 RX ORDER — SODIUM CHLORIDE 9 MG/ML
25 INJECTION, SOLUTION INTRAVENOUS PRN
Status: CANCELLED | OUTPATIENT
Start: 2021-04-19

## 2021-04-19 RX ORDER — SODIUM CHLORIDE 0.9 % (FLUSH) 0.9 %
5-40 SYRINGE (ML) INJECTION PRN
Status: CANCELLED | OUTPATIENT
Start: 2021-04-19

## 2021-04-19 RX ORDER — SODIUM CHLORIDE 9 MG/ML
25 INJECTION, SOLUTION INTRAVENOUS PRN
Status: DISCONTINUED | OUTPATIENT
Start: 2021-04-19 | End: 2021-04-19 | Stop reason: HOSPADM

## 2021-04-19 RX ORDER — LIDOCAINE HYDROCHLORIDE 20 MG/ML
INJECTION, SOLUTION INTRAVENOUS PRN
Status: DISCONTINUED | OUTPATIENT
Start: 2021-04-19 | End: 2021-04-19 | Stop reason: SDUPTHER

## 2021-04-19 RX ORDER — SODIUM CHLORIDE 0.9 % (FLUSH) 0.9 %
5-40 SYRINGE (ML) INJECTION PRN
Status: DISCONTINUED | OUTPATIENT
Start: 2021-04-19 | End: 2021-04-19 | Stop reason: HOSPADM

## 2021-04-19 RX ORDER — PROPOFOL 10 MG/ML
INJECTION, EMULSION INTRAVENOUS PRN
Status: DISCONTINUED | OUTPATIENT
Start: 2021-04-19 | End: 2021-04-19 | Stop reason: SDUPTHER

## 2021-04-19 RX ORDER — SODIUM CHLORIDE 0.9 % (FLUSH) 0.9 %
5-40 SYRINGE (ML) INJECTION EVERY 12 HOURS SCHEDULED
Status: CANCELLED | OUTPATIENT
Start: 2021-04-19

## 2021-04-19 RX ORDER — IPRATROPIUM BROMIDE AND ALBUTEROL SULFATE 2.5; .5 MG/3ML; MG/3ML
1 SOLUTION RESPIRATORY (INHALATION)
Status: COMPLETED | OUTPATIENT
Start: 2021-04-19 | End: 2021-04-19

## 2021-04-19 RX ADMIN — IPRATROPIUM BROMIDE AND ALBUTEROL SULFATE 1 AMPULE: .5; 3 SOLUTION RESPIRATORY (INHALATION) at 14:30

## 2021-04-19 RX ADMIN — PROPOFOL 30 MG: 10 INJECTION, EMULSION INTRAVENOUS at 13:41

## 2021-04-19 RX ADMIN — SODIUM CHLORIDE 25 ML: 9 INJECTION, SOLUTION INTRAVENOUS at 12:43

## 2021-04-19 RX ADMIN — LIDOCAINE HYDROCHLORIDE 40 MG: 20 INJECTION, SOLUTION INTRAVENOUS at 13:41

## 2021-04-19 RX ADMIN — ETOMIDATE 10 MG: 2 INJECTION, SOLUTION INTRAVENOUS at 13:41

## 2021-04-19 ASSESSMENT — PAIN SCALES - GENERAL: PAINLEVEL_OUTOF10: 0

## 2021-04-19 ASSESSMENT — PAIN - FUNCTIONAL ASSESSMENT: PAIN_FUNCTIONAL_ASSESSMENT: 0-10

## 2021-04-19 NOTE — OP NOTE
EGD Op Note    DATE OF PROCEDURE: 4/19/2021     SURGEON: Charu Raines MD    PREOPERATIVE DIAGNOSIS: hx gastric ulcer    POSTOPERATIVE DIAGNOSIS: Same normal,     OPERATION: Procedure(s):  EGD ESOPHAGOGASTRODUODENOSCOPY    ANESTHESIA: Local monitored anesthesia. ESTIMATED BLOOD LOSS: minimal    COMPLICATIONS: None. SPECIMENS:  * No specimens in log *    HISTORY: The patient is a 74y.o. year old male with history of above preop diagnosis. I recommended esophagogastroduodenoscopy with possible biopsy and I explained the risk, benefits, expected outcome, and alternatives to the procedure. Risks included but are not limited to bleeding, infection, respiratory distress, hypotension, and perforation of the esophagus, stomach, or duodenum. Patient understands and is in agreement. PROCEDURE: The patient was given IV conscious sedation per anesthesia. The patient was given supplemental oxygen by nasal cannula. The gastroscope was inserted orally and advanced under direct vision through the esophagus, through the stomach, through the pylorus, and into the duodenum. Findings:  Duodenum:     Descending: wnl     Bulb: wnl     Stomach:    Antrum: normal     Body: normal     Fundus: normal    Esophagus: small hiatal hernia     Larynx: not examined    The scope was removed and the patient tolerated the procedure well. IMPRESSION/PLAN:   1. [place] diet,   2.  Fu as needed       Charu Raines MD  04/19/21  2:04 PM

## 2021-04-19 NOTE — ANESTHESIA PRE PROCEDURE
Department of Anesthesiology  Preprocedure Note       Name:  Marivel Rubalcava   Age:  74 y.o.  :  1955                                          MRN:  [id number]         Date:  2021      Surgeon: Lance Galvan):  Kamille Diego MD    Procedure: Procedure(s):  EGD ESOPHAGOGASTRODUODENOSCOPY    Medications prior to admission:   Prior to Admission medications    Medication Sig Start Date End Date Taking?  Authorizing Provider   ferrous sulfate (IRON 325) 325 (65 Fe) MG tablet Take 1 tablet by mouth daily (with breakfast) 21  Yes Kamron Chase DO   senna (SENOKOT) 8.6 MG TABS tablet TAKE 2 TABLETS BY MOUTH 2 TIMES DAILY 3/24/21  Yes Kamron Chase DO   pantoprazole (PROTONIX) 40 MG tablet Take 1 tablet by mouth 2 times daily (before meals) 3/24/21  Yes Kamille Diego MD   sucralfate (CARAFATE) 1 GM tablet Take 1 tablet by mouth 4 times daily 3/24/21  Yes Kamille Diego MD   digoxin (LANOXIN) 125 MCG tablet Take 62.5 mcg by mouth daily   Yes Historical Provider, MD   hydrALAZINE (APRESOLINE) 50 MG tablet Take 1 tablet by mouth 3 times daily 21 Yes Kamron Chase DO   carvedilol (COREG) 25 MG tablet Take 1 tablet by mouth 2 times daily (with meals) 21 Yes Kamron Chase DO   amLODIPine (NORVASC) 5 MG tablet Take 1 tablet by mouth daily 21 Yes Kamron Chase DO   losartan (COZAAR) 50 MG tablet Take 1 tablet by mouth daily 21  Yes Kamron Chase DO   isosorbide dinitrate (ISORDIL) 5 MG tablet Take 1 tablet by mouth 3 times daily 21 Yes Kamron Chase DO   bumetanide (BUMEX) 1 MG tablet Take 1 tablet by mouth 2 times daily 21 Yes Mark Holliday DO   warfarin (COUMADIN) 6 MG tablet Take 1 tablet by mouth daily 21  Yes Kamron Chase DO   aspirin 81 MG chewable tablet Take 1 tablet by mouth daily Additional Refills from Primary Cardiologist or PCP 20  Yes Yudi Malathi, DO   Multiple Vitamins-Minerals (THERAPEUTIC MULTIVITAMIN-MINERALS) tablet Take 1 tablet by mouth daily   Yes Historical Provider, MD   cyanocobalamin 1000 MCG/ML injection Inject 1 mL into the muscle once for 1 dose 4/8/21 4/8/21  Verta Handler,        Current medications:    Current Facility-Administered Medications   Medication Dose Route Frequency Provider Last Rate Last Admin    0.9 % sodium chloride infusion  25 mL Intravenous PRN Charu Raines  mL/hr at 04/19/21 1243 25 mL at 04/19/21 1243    sodium chloride flush 0.9 % injection 5-40 mL  5-40 mL Intravenous 2 times per day Charu Raines MD        sodium chloride flush 0.9 % injection 5-40 mL  5-40 mL Intravenous PRN Charu Raines MD           Allergies:  No Known Allergies    Problem List:    Patient Active Problem List   Diagnosis Code    H/O sarcoidosis Z80.3    Essential hypertension I10    Moderate obesity E66.8    Stage 3a chronic kidney disease N18.31    MR (mitral regurgitation) I34.0    Anticoagulated on Coumadin Z79.01    Cardiomyopathy ([facility] I42.9    Rate controlled atrial fibrillation ([facility]) I48.91    Cognitive dysfunction F09    Mixed hyperlipidemia E78.2    Hyperuricemia E79.0    Dissection of thoracic aorta ([facility] I71.01    Transition of care performed with sharing of clinical summary [id number]    Chronic combined systolic and diastolic CHF (congestive heart failure) ([facility]) I50.42    Atrial fibrillation with RVR ([facility]) I48.91    Fatigue R53.83    KYARA on CPAP G47.33, Z99.89    Restrictive lung disease J98.4    Gastrointestinal hemorrhage K92.2    Acute blood loss anemia D62    Hypotension due to hypovolemia I95.89, E86.1    COPD (chronic obstructive pulmonary disease) ([facility]) J44.9    CAD (coronary artery disease) I25.10    Hx of CABG Z95.1    S/P AAA repair Z98.890, Z86.79    Lactic acidosis E87.2       Past Medical History:        Diagnosis Date    Acute kidney injury ([facility]     Aortic dissection (HCC)     Atrial fibrillation ([facility]     CAD (coronary artery disease)     CHF (congestive heart failure) ([facility]     severely impaired LV systolic function and CHF, EF 25-30%    CKD (chronic kidney disease)     H/O cardiovascular stress test     No evidence of stress-induced ischemia    H/O Doppler echocardiogram 5/04/10    [facility], mildly dilated LV, mod concentric LVH, normal LV systolic function, mod dilated left atrium, trace MR    Hypertension     Ischemic cardiomyopathy     Obesity     Pericardial effusion (noninflammatory) 11/15/2019    History: Post-op problem  Assessment: S/P pericardial drain placement in CVICU  Plan:  Drain dc'ed, no need for post-procedure echo unless pt become symptomatic.  Pleural effusion 2019    History: Post op problem Assessment: On r/a, left pigtail dc'ed Plan: PO lasix. Denies sob.   Desat study done, no home O2 needed    Sarcoidosis     Valvular heart disease        Past Surgical History:        Procedure Laterality Date    OTHER SURGICAL HISTORY  11/15/2019    aortic dissection repair @ [facility]    TONSILLECTOMY      UPPER GASTROINTESTINAL ENDOSCOPY N/A 3/5/2021    EGD CONTROL HEMORRHAGE performed by Breanne Bah MD at [address] [place] [address] History:    Social History     Tobacco Use    Smoking status: Former Smoker     Packs/day: 0.50     Years: 5.00     Pack years: 2.50     Types: Cigarettes, Pipe, Cigars     Start date:      Quit date: 10/16/1993     Years since quittin.5    Smokeless tobacco: Never Used   Substance Use Topics    Alcohol use: Not Currently     Frequency: Monthly or less     Drinks per session: 1 or 2                                Counseling given: Not Answered      Vital Signs (Current):   Vitals:    21 1226   Weight: 197 lb (89.4 kg)   Height: 5' 8\" (1.727 m)                                              BP Readings from Last 3 Encounters:   21 130/70   21 136/84   03/10/21 (!) 144/79       NPO Status: per pt >8hours  BMI:   Wt Readings from Last 3 Encounters:   04/19/21 197 lb (89.4 kg)   04/08/21 197 lb (89.4 kg)   03/24/21 203 lb (92.1 kg)     Body mass index is 29.95 kg/m². CBC:   Lab Results   Component Value Date    WBC 4.8 03/22/2021    RBC 2.70 03/22/2021    HGB 7.9 03/22/2021    HCT 26.6 03/22/2021    MCV 98.5 03/22/2021    RDW 15.5 03/22/2021     03/22/2021       CMP:   Lab Results   Component Value Date     03/22/2021    K 4.3 03/22/2021    K 4.3 03/05/2021     03/22/2021    CO2 23 03/22/2021    BUN 40 03/22/2021    CREATININE 1.7 03/22/2021    GFRAA 49 03/22/2021    LABGLOM 40 03/22/2021    GLUCOSE 99 03/22/2021    PROT 5.4 03/10/2021    CALCIUM 9.4 03/22/2021    BILITOT 0.5 03/10/2021    ALKPHOS 50 03/10/2021    AST 22 03/10/2021    ALT 46 03/10/2021       POC Tests: No results for input(s): POCGLU, POCNA, POCK, POCCL, POCBUN, POCHEMO, POCHCT in the last 72 hours.     Coags:   Lab Results   Component Value Date    PROTIME 21.4 04/08/2021    INR 1.8 04/08/2021    INR 1.1 03/10/2021    APTT 117.1 01/15/2020       HCG (If Applicable): No results found for: PREGTESTUR, PREGSERUM, HCG, HCGQUANT     ABGs: No results found for: PHART, PO2ART, GLE1GWR, UME7TKW, BEART, A1APSJKN     Type & Screen (If Applicable):  No results found for: LABABO, LABRH    Drug/Infectious Status (If Applicable):  No results found for: HIV, HEPCAB    COVID-19 Screening (If Applicable):   Lab Results   Component Value Date    COVID19 Not Detected 04/14/2021           Anesthesia Evaluation  Patient summary reviewed and Nursing notes reviewed no history of anesthetic complications:   Airway: Mallampati: II  TM distance: >3 FB   Neck ROM: full  Mouth opening: > = 3 FB Dental:          Pulmonary:   (+) COPD:  sleep apnea:  decreased breath sounds,                             Cardiovascular:    (+) hypertension:, valvular problems/murmurs:, CAD:, CABG/stent:, dysrhythmias: atrial fibrillation, CHF: systolic and diastolic,         Rhythm: regular  Rate: normal                 ROS comment: ECHO 1/7/2020      Summary   Left ventricle is moderately enlarged . Ejection fraction is visually estimated at 40-45%. Overall ejection fraction mild-to-moderately decreased . Septal motion consistent with post bypass surgery. There is severe eccentric hypertrophy. Abnormal diastolic function. The left atrium is severely dilated. Normal right ventricular size and function. Mild to moderate mitral regurgitation is present. No hemodynamically significant aortic stenosis is present. RVSP is 29 mmHg. Normal PA systolic pressure. Mildly dilated aortic root (4.1 cm) and normal sized ascending aorta. A prosthetic graft noted in the location of the ascending aorta. There is a small localized near left ventricle pericardial effusion noted. Neuro/Psych:               GI/Hepatic/Renal:   (+) GERD:, renal disease ( CKD 3): CRI,           Endo/Other:    (+) blood dyscrasia ( on coumadin last dose on 4/18-ok per dr Koenig Number): anemia and anticoagulation therapy:., .                 Abdominal:           Vascular:   + PVD, aortic or cerebral, . ROS comment: Type A aortic dissection with hemorrhagic pericardial effusion s/p repair ascending and descending aortic aneurysm at [facility] 11/2019. Anesthesia Plan      MAC     ASA 3       Induction: intravenous. Anesthetic plan and risks discussed with patient. Plan discussed with attending.                   Isaac Olson, APRN - CRNA   4/19/2021

## 2021-04-19 NOTE — PROGRESS NOTES
Dr. Stevenson Rosas notified of pt blood count, pt hemodynamically stable, no further orders at this time, ok for discharge

## 2021-04-19 NOTE — H&P
[facility] Surgery   History and Physical    Patient's Name/Date of Birth: Derek Cat / 1955 (74 y.o.)      PCP: Yue Logan DO      CC:  gib     HPI:  74 y.o. male  Doing well FU for UGIB    Past Medical History:   Diagnosis Date    Acute kidney injury ([address]     Aortic dissection ([address]     Atrial fibrillation ([address]     CAD (coronary artery disease)     CHF (congestive heart failure) ([address] 5/03    severely impaired LV systolic function and CHF, EF 25-30%    CKD (chronic kidney disease)     H/O cardiovascular stress test 5/03    No evidence of stress-induced ischemia    H/O Doppler echocardiogram 5/04/10    [facility], mildly dilated LV, mod concentric LVH, normal LV systolic function, mod dilated left atrium, trace MR    Hypertension     Ischemic cardiomyopathy     Obesity     Pericardial effusion (noninflammatory) 11/15/2019    History: Post-op problem  Assessment: S/P pericardial drain placement in CVICU 12/2 Plan:  Drain dc'ed, no need for post-procedure echo unless pt become symptomatic.  Pleural effusion 11/26/2019    History: Post op problem Assessment: On r/a, left pigtail dc'ed Plan: PO lasix. Denies sob. Desat study done, no home O2 needed    Sarcoidosis     Valvular heart disease        Past Surgical History:   Procedure Laterality Date    OTHER SURGICAL HISTORY  11/15/2019    aortic dissection repair [facility]    TONSILLECTOMY      UPPER GASTROINTESTINAL ENDOSCOPY N/A 3/5/2021    EGD CONTROL HEMORRHAGE performed by Joey Weber MD at [place] [address] History   Problem Relation Age of Onset    Diabetes Mother     Diabetes Father     Hypertension Sister     Diabetes Brother     Hypertension Brother     No Known Problems Brother        Social History     Socioeconomic History    Marital status:      Spouse name: Not on file    Number of children: 2    Years of education: 12    Highest education level:  Bachelor's degree (e.g., BA, AB, BS)   Occupational History    Occupation: [employer]   Social Needs    Financial resource strain: Not very hard    Food insecurity     Worry: Never true     Inability: Never true    Transportation needs     Medical: No     Non-medical: No   Tobacco Use    Smoking status: Former Smoker     Packs/day: 0.50     Years: 5.00     Pack years: 2.50     Types: Cigarettes, Pipe, Cigars     Start date:      Quit date: 10/16/1993     Years since quittin.5    Smokeless tobacco: Never Used   Substance and Sexual Activity    Alcohol use: Not Currently     Frequency: Monthly or less     Drinks per session: 1 or 2    Drug use: Not Currently     Frequency: 3.0 times per week     Types: Marijuana     Comment: since  - few times a week    Sexual activity: Not Currently     Partners: Female     Comment:  5 years ago   Lifestyle    Physical activity     Days per week: 2 days     Minutes per session: 20 min    Stress: Only a little   Relationships    Social connections     Talks on phone: More than three times a week     Gets together: More than three times a week     Attends [employer] service: Never     Active member of club or organization: No     Attends meetings of clubs or organizations: Never     Relationship status:      Intimate partner violence     Fear of current or ex partner: Not on file     Emotionally abused: Not on file     Physically abused: Not on file     Forced sexual activity: Not on file   Other Topics Concern    Not on file   Social History Narrative    Not on file       Current Facility-Administered Medications   Medication Dose Route Frequency Provider Last Rate Last Admin    0.9 % sodium chloride infusion  25 mL Intravenous PRPANCHO Manzanares  mL/hr at 21 1243 25 mL at 21 1243    sodium chloride flush 0.9 % injection 5-40 mL  5-40 mL Intravenous 2 times per day Gilmer Manzanares MD        sodium chloride flush 0.9 % injection 5-40 mL  5-40 mL Intravenous MAGDALENA Manzanares MD

## 2021-04-19 NOTE — ANESTHESIA PRE PROCEDURE
Department of Anesthesiology  Preprocedure Note       Name:  David Devine   Age:  74 y.o.  :  1955                                          MRN:  [id number]         Date:  2021      Surgeon: Lane Caldera):  Gilmer Manzanares MD    Procedure: Procedure(s):  EGD ESOPHAGOGASTRODUODENOSCOPY    Medications prior to admission:   Prior to Admission medications    Medication Sig Start Date End Date Taking?  Authorizing Provider   ferrous sulfate (IRON 325) 325 (65 Fe) MG tablet Take 1 tablet by mouth daily (with breakfast) 21  Yes Kamron Chase DO   senna (SENOKOT) 8.6 MG TABS tablet TAKE 2 TABLETS BY MOUTH 2 TIMES DAILY 3/24/21  Yes Kamron Chase DO   pantoprazole (PROTONIX) 40 MG tablet Take 1 tablet by mouth 2 times daily (before meals) 3/24/21  Yes Gilmer Manzanares MD   sucralfate (CARAFATE) 1 GM tablet Take 1 tablet by mouth 4 times daily 3/24/21  Yes Gilmer Manzanares MD   digoxin (LANOXIN) 125 MCG tablet Take 62.5 mcg by mouth daily   Yes Historical Provider, MD   hydrALAZINE (APRESOLINE) 50 MG tablet Take 1 tablet by mouth 3 times daily 21 Yes Kamron Chase DO   carvedilol (COREG) 25 MG tablet Take 1 tablet by mouth 2 times daily (with meals) 21 Yes Kamron Chase DO   amLODIPine (NORVASC) 5 MG tablet Take 1 tablet by mouth daily 21 Yes Kamron Chase DO   losartan (COZAAR) 50 MG tablet Take 1 tablet by mouth daily 21  Yes Kamron Chase DO   isosorbide dinitrate (ISORDIL) 5 MG tablet Take 1 tablet by mouth 3 times daily 21 Yes Kamron Chase DO   bumetanide (BUMEX) 1 MG tablet Take 1 tablet by mouth 2 times daily 21 Yes [employer],    warfarin (COUMADIN) 6 MG tablet Take 1 tablet by mouth daily 21  Yes Kamron Chase DO   aspirin 81 MG chewable tablet Take 1 tablet by mouth daily Additional Refills from Primary Cardiologist or PCP 20  Yes Arnetha [place], DO   Multiple Vitamins-Minerals (THERAPEUTIC MULTIVITAMIN-MINERALS) tablet Take 1 tablet by mouth daily   Yes Historical Provider, MD   cyanocobalamin 1000 MCG/ML injection Inject 1 mL into the muscle once for 1 dose 4/8/21 4/8/21  Maribel Claros DO       Current medications:    Current Facility-Administered Medications   Medication Dose Route Frequency Provider Last Rate Last Admin    0.9 % sodium chloride infusion  25 mL Intravenous PRN Urszula Moncada  mL/hr at 04/19/21 1243 25 mL at 04/19/21 1243    sodium chloride flush 0.9 % injection 5-40 mL  5-40 mL Intravenous 2 times per day Urszula Moncada MD        sodium chloride flush 0.9 % injection 5-40 mL  5-40 mL Intravenous PRN Urszula Moncada MD           Allergies:  No Known Allergies    Problem List:    Patient Active Problem List   Diagnosis Code    H/O sarcoidosis Z80.3    Essential hypertension I10    Moderate obesity E66.8    Stage 3a chronic kidney disease N18.31    MR (mitral regurgitation) I34.0    Anticoagulated on Coumadin Z79.01    Cardiomyopathy ([facility] I42.9    Rate controlled atrial fibrillation ([facility]) I48.91    Cognitive dysfunction F09    Mixed hyperlipidemia E78.2    Hyperuricemia E79.0    Dissection of thoracic aorta ([facility] I71.01    Transition of care performed with sharing of clinical summary [id number]    Chronic combined systolic and diastolic CHF (congestive heart failure) ([facility]) I50.42    Atrial fibrillation with RVR ([facility]) I48.91    Fatigue R53.83    KYARA on CPAP G47.33, Z99.89    Restrictive lung disease J98.4    Gastrointestinal hemorrhage K92.2    Acute blood loss anemia D62    Hypotension due to hypovolemia I95.89, E86.1    COPD (chronic obstructive pulmonary disease) ([facility]) J44.9    CAD (coronary artery disease) I25.10    Hx of CABG Z95.1    S/P AAA repair Z98.890, Z86.79    Lactic acidosis E87.2       Past Medical History:        Diagnosis Date    Acute kidney injury ([facility]     Aortic dissection (HCC)     Atrial fibrillation ([facility]     CAD (coronary Time of last solid consumption: 1730                        Date of last liquid consumption: 04/18/21                        Date of last solid food consumption: 04/18/21    BMI:   Wt Readings from Last 3 Encounters:   04/19/21 197 lb (89.4 kg)   04/08/21 197 lb (89.4 kg)   03/24/21 203 lb (92.1 kg)     Body mass index is 29.95 kg/m². CBC:   Lab Results   Component Value Date    WBC 4.8 03/22/2021    RBC 2.70 03/22/2021    HGB 7.9 03/22/2021    HCT 26.6 03/22/2021    MCV 98.5 03/22/2021    RDW 15.5 03/22/2021     03/22/2021       CMP:   Lab Results   Component Value Date     03/22/2021    K 4.3 03/22/2021    K 4.3 03/05/2021     03/22/2021    CO2 23 03/22/2021    BUN 40 03/22/2021    CREATININE 1.7 03/22/2021    GFRAA 49 03/22/2021    LABGLOM 40 03/22/2021    GLUCOSE 99 03/22/2021    PROT 5.4 03/10/2021    CALCIUM 9.4 03/22/2021    BILITOT 0.5 03/10/2021    ALKPHOS 50 03/10/2021    AST 22 03/10/2021    ALT 46 03/10/2021       POC Tests: No results for input(s): POCGLU, POCNA, POCK, POCCL, POCBUN, POCHEMO, POCHCT in the last 72 hours.     Coags:   Lab Results   Component Value Date    PROTIME 21.4 04/08/2021    INR 1.8 04/08/2021    INR 1.1 03/10/2021    APTT 117.1 01/15/2020       HCG (If Applicable): No results found for: PREGTESTUR, PREGSERUM, HCG, HCGQUANT     ABGs: No results found for: PHART, PO2ART, KOR0YFA, ORM1EYR, BEART, D0UGYQIQ     Type & Screen (If Applicable):  No results found for: LABABO, LABRH    Drug/Infectious Status (If Applicable):  No results found for: HIV, HEPCAB    COVID-19 Screening (If Applicable):   Lab Results   Component Value Date    COVID19 Not Detected 04/14/2021           Anesthesia Evaluation  Patient summary reviewed and Nursing notes reviewed no history of anesthetic complications:   Airway: Mallampati: I  TM distance: >3 FB   Neck ROM: full  Mouth opening: > = 3 FB Dental:          Pulmonary:   (+) sleep apnea: on CPAP,  decreased breath sounds, COPD: Pt denies COPD but 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 Smoker: Former smoker - 2.5 pack years. Worked in [employer] for 8 years. Cardiovascular:    (+) hypertension: mild, valvular problems/murmurs:, CAD:, CABG/stent:, dysrhythmias (AFIB RVR): atrial fibrillation, CHF (EF 25-30%):, murmur, hyperlipidemia        Rhythm: regular  Rate: normal                    Neuro/Psych:   Negative Neuro/Psych ROS              GI/Hepatic/Renal:   (+) renal disease: CRI,           Endo/Other:    (+) : arthritis (\"Wide spread arthritis\"):., .                 Abdominal:   (+) obese,     Abdomen: soft. Vascular: negative vascular ROS. Anesthesia Plan      MAC     ASA 4     (L Wrist 20g PIV. Pt understands risks and benefits to MAC. Pt accepts GA as backup. )  Induction: intravenous. Anesthetic plan and risks discussed with patient. Use of blood products discussed with patient whom consented to blood products. Plan discussed with CRNA and attending. EKG 3/6/21  Atrial fibrillation  Abnormal ECG  When compared with ECG of 04-MAR-2021 21:40,  No significant change was found  Confirmed by Bindu Johnson ([id number]) on 3/8/2021 12:39:24 PM    1/7/21 ECHO  Summary   Left ventricle is moderately enlarged . Ejection fraction is visually estimated at 40-45%. Overall ejection fraction mild-to-moderately decreased . Septal motion consistent with post bypass surgery. There is severe eccentric hypertrophy. Abnormal diastolic function. The left atrium is severely dilated. Normal right ventricular size and function. Mild to moderate mitral regurgitation is present. No hemodynamically significant aortic stenosis is present. RVSP is 29 mmHg. Normal PA systolic pressure. Mildly dilated aortic root (4.1 cm) and normal sized ascending aorta. A prosthetic graft noted in the location of the ascending aorta.    There is a small localized near left ventricle pericardial effusion noted. 11/15/19 Cardiac Stress Test  Impression:   1.  No evidence of regadenoson induced ischemia on stress EKG. 2.  Nuclear images to be reported separately. 3/4/21 CHEST CT W CONTRAST  Impression   No acute finding in the chest, abdomen or pelvis.       There is an aortic arch stent graft extending into the proximal descending   thoracic aorta covering an intramural hematoma that was acute on 11/15/2019. There is no evidence of thoracic aortic aneurysm or dissection.       Calcific coronary disease status post CABG.       Lungs are mildly emphysematous.  Unchanged biapical scarring with unchanged   right apical bronchiectasis.       Few scattered colon diverticula with no evidence of diverticulitis. 3/5/21 CXR  Impression   1. The left internal jugular central venous catheter appears to be in   appropriate position. 2. No pneumothorax. 3. Interval development of hazy attenuation in the mid-lower left lung may be   secondary to aspiration or asymmetric edema. 4. The enteric tube appears to terminate just beyond the gastroesophageal   junction.  Recommend consideration of advancement.            Chely Contreras RN   4/19/2021

## 2021-04-20 NOTE — ANESTHESIA POSTPROCEDURE EVALUATION
Department of Anesthesiology  Postprocedure Note    Patient: Mert Shelley  MRN: [id number]  YOB: 1955  Date of evaluation: 4/20/2021  Time:  3:17 PM     Procedure Summary     Date: 04/19/21 Room / Location: [address] [place] [address] [place] [facility]    Anesthesia Start: 0309 Anesthesia Stop: 7391    Procedure: EGD ESOPHAGOGASTRODUODENOSCOPY (N/A ) Diagnosis: (GASTRIC ULCER , S/P GASTRIC HEMORRHAGE)    Surgeons: Brody Armijo MD Responsible Provider: Landy Temple MD    Anesthesia Type: MAC ASA Status: 3          Anesthesia Type: MAC    Reji Phase I: Reji Score: 10    Reji Phase II: Reji Score: 10    Last vitals: Reviewed and per EMR flowsheets.        Anesthesia Post Evaluation    Patient location during evaluation: PACU  Patient participation: complete - patient participated  Level of consciousness: awake and alert  Airway patency: patent  Nausea & Vomiting: no nausea and no vomiting  Complications: no  Cardiovascular status: hemodynamically stable and blood pressure returned to baseline  Respiratory status: acceptable  Hydration status: euvolemic

## 2021-04-22 ENCOUNTER — NURSE ONLY (OUTPATIENT)
Dept: FAMILY MEDICINE CLINIC | Age: 66
End: 2021-04-22
Payer: MEDICARE

## 2021-04-22 DIAGNOSIS — I48.91 RATE CONTROLLED ATRIAL FIBRILLATION (HCC): ICD-10-CM

## 2021-04-22 DIAGNOSIS — Z79.01 ANTICOAGULATED ON COUMADIN: Primary | ICD-10-CM

## 2021-04-22 LAB
INTERNATIONAL NORMALIZATION RATIO, POC: 2.1
PROTHROMBIN TIME, POC: 24.9

## 2021-04-22 PROCEDURE — 93793 ANTICOAG MGMT PT WARFARIN: CPT | Performed by: FAMILY MEDICINE

## 2021-04-22 PROCEDURE — 85610 PROTHROMBIN TIME: CPT | Performed by: FAMILY MEDICINE

## 2021-05-06 ENCOUNTER — NURSE ONLY (OUTPATIENT)
Dept: FAMILY MEDICINE CLINIC | Age: 66
End: 2021-05-06
Payer: MEDICARE

## 2021-05-06 DIAGNOSIS — I48.91 RATE CONTROLLED ATRIAL FIBRILLATION (HCC): ICD-10-CM

## 2021-05-06 DIAGNOSIS — Z79.01 ANTICOAGULATED ON COUMADIN: Primary | ICD-10-CM

## 2021-05-06 LAB
INTERNATIONAL NORMALIZATION RATIO, POC: 1.8
PROTHROMBIN TIME, POC: 21.8

## 2021-05-06 PROCEDURE — 93793 ANTICOAG MGMT PT WARFARIN: CPT | Performed by: FAMILY MEDICINE

## 2021-05-06 PROCEDURE — 85610 PROTHROMBIN TIME: CPT | Performed by: FAMILY MEDICINE

## 2021-05-27 ENCOUNTER — NURSE ONLY (OUTPATIENT)
Dept: FAMILY MEDICINE CLINIC | Age: 66
End: 2021-05-27
Payer: MEDICARE

## 2021-05-27 DIAGNOSIS — I48.91 RATE CONTROLLED ATRIAL FIBRILLATION (HCC): ICD-10-CM

## 2021-05-27 DIAGNOSIS — Z79.01 ANTICOAGULATED ON COUMADIN: Primary | ICD-10-CM

## 2021-05-27 LAB
INTERNATIONAL NORMALIZATION RATIO, POC: 1.6
PROTHROMBIN TIME, POC: 18.7

## 2021-05-27 PROCEDURE — 85610 PROTHROMBIN TIME: CPT | Performed by: FAMILY MEDICINE

## 2021-05-27 PROCEDURE — 93793 ANTICOAG MGMT PT WARFARIN: CPT | Performed by: FAMILY MEDICINE

## 2021-05-27 RX ORDER — DIGOXIN 125 MCG
TABLET ORAL
Qty: 45 TABLET | Refills: 0 | Status: SHIPPED
Start: 2021-05-27 | End: 2021-07-01 | Stop reason: SDUPTHER

## 2021-05-27 NOTE — PROGRESS NOTES
Recommend increase to 9 mg every Monday and Wednesday.  Repeat INR in 2 weeks Star Wedge Flap Text: The defect edges were debeveled with a #15 scalpel blade.  Given the location of the defect, shape of the defect and the proximity to free margins a star wedge flap was deemed most appropriate.  Using a sterile surgical marker, an appropriate rotation flap was drawn incorporating the defect and placing the expected incisions within the relaxed skin tension lines where possible. The area thus outlined was incised deep to adipose tissue with a #15 scalpel blade.  The skin margins were undermined to an appropriate distance in all directions utilizing iris scissors.

## 2021-06-10 ENCOUNTER — NURSE ONLY (OUTPATIENT)
Dept: FAMILY MEDICINE CLINIC | Age: 66
End: 2021-06-10
Payer: MEDICARE

## 2021-06-10 DIAGNOSIS — I48.91 RATE CONTROLLED ATRIAL FIBRILLATION (HCC): ICD-10-CM

## 2021-06-10 DIAGNOSIS — Z79.01 ANTICOAGULATED ON COUMADIN: Primary | ICD-10-CM

## 2021-06-10 LAB
INTERNATIONAL NORMALIZATION RATIO, POC: 3.1
PROTHROMBIN TIME, POC: 37.5

## 2021-06-10 PROCEDURE — 85610 PROTHROMBIN TIME: CPT | Performed by: FAMILY MEDICINE

## 2021-06-10 PROCEDURE — 93793 ANTICOAG MGMT PT WARFARIN: CPT | Performed by: FAMILY MEDICINE

## 2021-06-28 ENCOUNTER — CARE COORDINATION (OUTPATIENT)
Dept: CARE COORDINATION | Age: 66
End: 2021-06-28

## 2021-07-01 ENCOUNTER — OFFICE VISIT (OUTPATIENT)
Dept: CARDIOLOGY CLINIC | Age: 66
End: 2021-07-01
Payer: MEDICARE

## 2021-07-01 VITALS
WEIGHT: 200.9 LBS | DIASTOLIC BLOOD PRESSURE: 60 MMHG | HEIGHT: 68 IN | HEART RATE: 53 BPM | RESPIRATION RATE: 16 BRPM | SYSTOLIC BLOOD PRESSURE: 128 MMHG | BODY MASS INDEX: 30.45 KG/M2

## 2021-07-01 DIAGNOSIS — I48.91 RATE CONTROLLED ATRIAL FIBRILLATION (HCC): ICD-10-CM

## 2021-07-01 DIAGNOSIS — I50.22 CHRONIC SYSTOLIC HEART FAILURE (HCC): ICD-10-CM

## 2021-07-01 DIAGNOSIS — I10 ESSENTIAL HYPERTENSION: Primary | ICD-10-CM

## 2021-07-01 PROCEDURE — 3017F COLORECTAL CA SCREEN DOC REV: CPT | Performed by: INTERNAL MEDICINE

## 2021-07-01 PROCEDURE — G8417 CALC BMI ABV UP PARAM F/U: HCPCS | Performed by: INTERNAL MEDICINE

## 2021-07-01 PROCEDURE — 4040F PNEUMOC VAC/ADMIN/RCVD: CPT | Performed by: INTERNAL MEDICINE

## 2021-07-01 PROCEDURE — G8427 DOCREV CUR MEDS BY ELIG CLIN: HCPCS | Performed by: INTERNAL MEDICINE

## 2021-07-01 PROCEDURE — 99214 OFFICE O/P EST MOD 30 MIN: CPT | Performed by: INTERNAL MEDICINE

## 2021-07-01 PROCEDURE — 1123F ACP DISCUSS/DSCN MKR DOCD: CPT | Performed by: INTERNAL MEDICINE

## 2021-07-01 PROCEDURE — 93000 ELECTROCARDIOGRAM COMPLETE: CPT | Performed by: INTERNAL MEDICINE

## 2021-07-01 PROCEDURE — 1036F TOBACCO NON-USER: CPT | Performed by: INTERNAL MEDICINE

## 2021-07-01 RX ORDER — DIGOXIN 125 MCG
TABLET ORAL
Qty: 45 TABLET | Refills: 3 | Status: SHIPPED
Start: 2021-07-01 | End: 2022-03-07 | Stop reason: SDUPTHER

## 2021-07-01 RX ORDER — ISOSORBIDE DINITRATE 5 MG/1
5 TABLET ORAL 3 TIMES DAILY
Qty: 270 TABLET | Refills: 3 | Status: SHIPPED
Start: 2021-07-01 | End: 2022-03-07 | Stop reason: SDUPTHER

## 2021-07-01 RX ORDER — AMLODIPINE BESYLATE 5 MG/1
5 TABLET ORAL DAILY
Qty: 90 TABLET | Refills: 3 | Status: SHIPPED
Start: 2021-07-01 | End: 2022-03-07 | Stop reason: SDUPTHER

## 2021-07-01 RX ORDER — HYDRALAZINE HYDROCHLORIDE 50 MG/1
50 TABLET, FILM COATED ORAL 3 TIMES DAILY
Qty: 270 TABLET | Refills: 3 | Status: SHIPPED
Start: 2021-07-01 | End: 2022-03-07 | Stop reason: SDUPTHER

## 2021-07-01 RX ORDER — LOSARTAN POTASSIUM 50 MG/1
50 TABLET ORAL DAILY
Qty: 90 TABLET | Refills: 3 | Status: SHIPPED
Start: 2021-07-01 | End: 2022-03-07 | Stop reason: SDUPTHER

## 2021-07-01 RX ORDER — BUMETANIDE 1 MG/1
1 TABLET ORAL 2 TIMES DAILY
Qty: 180 TABLET | Refills: 3 | Status: SHIPPED
Start: 2021-07-01 | End: 2022-03-07 | Stop reason: SDUPTHER

## 2021-07-01 RX ORDER — CARVEDILOL 25 MG/1
25 TABLET ORAL 2 TIMES DAILY WITH MEALS
Qty: 180 TABLET | Refills: 3 | Status: SHIPPED
Start: 2021-07-01 | End: 2022-03-07 | Stop reason: SDUPTHER

## 2021-07-01 NOTE — PROGRESS NOTES
OUTPATIENT CARDIOLOGY FOLLOW-UP    Name: Khoi Chase    Age: 74 y.o. Primary Care Physician: Agata Monet DO    Date of Service: 7/1/2021    Chief Complaint:   Chief Complaint   Patient presents with    6 Month Follow-Up    Hypertension    Cardiomyopathy    Congestive Heart Failure    Fatigue       Interim History:   Mr. Jose Luis Cherry is a 55-year-old gentleman with history of permanent atrial fibrillation on warfarin, with mild MR and TR, dilated cardiomyopathy with an EF of 40 to 45% based on echo done in May 2017, valvular heart disease team, hypertension, sarcoidosis, GERD who was recently admitted with chest pain on 11/14/2019 with tearing sensation in his chest.  Initially, he had a chest pain underwent Lexiscan Cardiolite stress test that came back negative. He had an echocardiogram due to persistent chest pain and was diagnosed with ascending aortic aneurysm with a dissection. He was also noted to have an EF of 30 to 35% on that echocardiogram.  He was life flighted to [facility] on 11/15/2019 and underwent emergency surgery for type I dissection. Postoperatively, he developed atrial fibrillation and acute kidney injury. He also had a left pleural effusion for which he had a pigtail catheter placed. He was discharged home in early December 2019 on Lasix 20 mg p.o. daily. He was recently evaluated at [facility] outpatient on 12/20/2019. He had an echocardiogram done which showed small pericardial effusion measuring about 1 cm, LVEF was 42± percent, moderate mitral regurgitation. He was told to increase Lasix to 40 mg for a week. He ran out of Lasix and when he called [facility] he was directed to stop Lasix which he stopped 1 week back. He was admitted to the hospital on 1/6/2020 with acute decompensated heart failure. He was seen in the hospital as a new consult on 1/7/2020 after he was admitted with a decompensated heart failure.     He was discharged from the hospital on on 1/15/2020 after diuresing. He was last seen in office on 5/19/2020, since his last, he was hospitalized from 3/4/2021 to 3/10/2021 with a GI bleed. He had a hematemesis and also dark blood in his bowels. In the emergency room, he was noted to be hypotensive and tachycardic with a hemoglobin of 8.1 and his INR was 2.4 lactic acid 2.4. He had a CT chest which showed a stent in the aortic arch extending into the proximal descending aorta. His warfarin was held, surgical service was consulted. He underwent an EGD which showed gastric ulcer which was injected with epi and was also cauterized. He did receive 1 unit of packed red blood cells and was initiated on Protonix. He denies any further episodes of bloody or black stools. He is not taking over-the-counter medications such as arthritis medications. He is back on warfarin for anticoagulation and he is following with his PCP for INR monitoring. No further hospitalizations or ER visits since then. He takes oxygen at home mainly at nighttime. He denies any leg edema, abdominal bloating or early satiety. Now, he noticed his oxygen levels staying in the 90s. Now, he is using his CPAP on a regular basis. He does not have a portable oxygen. He is also following with Dr. Sedrick Felty for sarcoidosis and also pleural effusion. He is compliant with medications, as well as salt and fluid intake. He does not take any over-the-counter arthritis medications. No new cardiac complaints since last cardiology evaluation. He  take  medications on a regular basis without skipping. He chest pain denies recent chest pain, palpitations, lightheadedness, dizziness, syncope, PND, or orthopnea. AF on EKG.     Review of Systems:   Cardiac: As per HPI  General: No fever, chills  Pulmonary: As per HPI  HEENT: No visual disturbances, difficult swallowing  GI: No nausea, vomiting  Endocrine: No thyroid disease or DM  Musculoskeletal: MAYFIELD x 4, no focal motor deficits  Skin: Intact, no rashes  Neuro/Psych: No headache or seizures    Past Medical History:  Past Medical History:   Diagnosis Date    [facility] [address]     Aortic dissection (HCC)     Atrial fibrillation (HCC)     CAD (coronary artery disease)     CHF (congestive heart failure) ([facility] [address] 5/03    severely impaired LV systolic function and CHF, EF 25-30%    CKD (chronic kidney disease)     H/O cardiovascular stress test 5/03    No evidence of stress-induced ischemia    H/O Doppler echocardiogram 5/04/10    [facility], mildly dilated LV, mod concentric LVH, normal LV systolic function, mod dilated left atrium, trace MR    Hypertension     Ischemic cardiomyopathy     Obesity     Pericardial effusion (noninflammatory) 11/15/2019    History: Post-op problem  Assessment: S/P pericardial drain placement in CVICU 12/2 Plan:  Drain dc'ed, no need for post-procedure echo unless pt become symptomatic.  Pleural effusion 11/26/2019    History: Post op problem Assessment: On r/a, left pigtail dc'ed Plan: PO lasix. Denies sob. Desat study done, no home O2 needed    Sarcoidosis     Valvular heart disease        Past Surgical History:  Past Surgical History:   Procedure Laterality Date    OTHER SURGICAL HISTORY  11/15/2019    aortic dissection repair @ [facility]    TONSILLECTOMY      UPPER GASTROINTESTINAL ENDOSCOPY N/A 3/5/2021    EGD CONTROL HEMORRHAGE performed by Catracho العلي MD at [address] [place] [address],[address] Floor N/A 4/19/2021    EGD ESOPHAGOGASTRODUODENOSCOPY performed by Catracho العلي MD at [facility] ENDOSCOPY       Family History:  Family History   Problem Relation Age of Onset    Diabetes Mother     Diabetes Father     Hypertension Sister     Diabetes Brother     Hypertension Brother     No Known Problems Brother        Social History:  Social History     Socioeconomic History    Marital status:       Spouse name: Not on file    Number of children: 2    Years of education: 16    Highest education level: Bachelor's degree (e.g., BA, AB, BS)   Occupational History    Occupation: [email or web address]   Tobacco Use    Smoking status: Former Smoker     Packs/day: 0.50     Years: 5.00     Pack years: 2.50     Types: Cigarettes, Pipe, Cigars     Start date:      Quit date: 10/16/1993     Years since quittin.7    Smokeless tobacco: Never Used   Vaping Use    Vaping Use: Never used   Substance and Sexual Activity    Alcohol use: Not Currently    Drug use: Not Currently     Frequency: 3.0 times per week     Types: Marijuana     Comment: since  - few times a week    Sexual activity: Not Currently     Partners: Female     Comment:  5 years ago   Other Topics Concern    Not on file   Social History Narrative    Not on file     Social Determinants of Health     Financial Resource Strain:     Difficulty of Paying Living Expenses:    Food Insecurity:     Worried About Running Out of Food in the Last Year:     [address] in the Last Year:    Transportation Needs:     Lack of Transportation (Medical):  Lack of Transportation (Non-Medical):    Physical Activity:     Days of Exercise per Week:     Minutes of Exercise per Session:    Stress:     Feeling of Stress :    Social Connections:     Frequency of Communication with Friends and Family:     Frequency of Social Gatherings with Friends and Family:     Attends Religion Services:     Active Member of Clubs or Organizations:     Attends Club or Organization Meetings:     Marital Status:    Intimate Partner Violence:     Fear of Current or Ex-Partner:     Emotionally Abused:     Physically Abused:     Sexually Abused:         Allergies:  No Known Allergies    Current Medications:  Current Outpatient Medications   Medication Sig Dispense Refill    amLODIPine (NORVASC) 5 MG tablet Take 1 tablet by mouth daily 90 tablet 3    bumetanide (BUMEX) 1 MG tablet Take 1 tablet by mouth 2 times daily 180 tablet 3    carvedilol (COREG) 25 MG tablet Take 1 tablet by mouth 2 times daily (with meals) 180 tablet 3    digoxin (LANOXIN) 125 MCG tablet TAKE 1/2 TABLET (62.5MG) DAILY ADDITIONAL REFILLS FROM PRIMARY CARDIOLOGIST OR PCP 45 tablet 3    hydrALAZINE (APRESOLINE) 50 MG tablet Take 1 tablet by mouth 3 times daily 270 tablet 3    isosorbide dinitrate (ISORDIL) 5 MG tablet Take 1 tablet by mouth 3 times daily 270 tablet 3    losartan (COZAAR) 50 MG tablet Take 1 tablet by mouth daily 90 tablet 3    cyanocobalamin 1000 MCG/ML injection Inject 1 mL into the muscle once for 1 dose 1 mL 3    ferrous sulfate (IRON 325) 325 (65 Fe) MG tablet Take 1 tablet by mouth daily (with breakfast) 30 tablet 5    senna (SENOKOT) 8.6 MG TABS tablet TAKE 2 TABLETS BY MOUTH 2 TIMES DAILY 120 tablet 11    warfarin (COUMADIN) 6 MG tablet Take 1 tablet by mouth daily 90 tablet 1    aspirin 81 MG chewable tablet Take 1 tablet by mouth daily Additional Refills from Primary Cardiologist or PCP 30 tablet 0    Multiple Vitamins-Minerals (THERAPEUTIC MULTIVITAMIN-MINERALS) tablet Take 1 tablet by mouth daily       Current Facility-Administered Medications   Medication Dose Route Frequency Provider Last Rate Last Admin    perflutren lipid microspheres (DEFINITY) injection 1.65 mg  1.5 mL Intravenous ONCE PRN Lan Avilez MD           Physical Exam:  /60   Pulse 53   Resp 16   Ht 5' 8\" (1.727 m)   Wt 200 lb 14.4 oz (91.1 kg)   BMI 30.55 kg/m²   Wt Readings from Last 3 Encounters:   07/01/21 200 lb 14.4 oz (91.1 kg)   04/19/21 197 lb (89.4 kg)   04/08/21 197 lb (89.4 kg)     Appearance: Awake, alert and oriented x 3, no acute respiratory distress  Skin: Intact, no rash  Head: Normocephalic, atraumatic  Eyes: EOMI, no conjunctival erythema  ENMT: No pharyngeal erythema, MMM, no rhinorrhea  Neck: Supple, no elevated JVP, no carotid bruits  Lungs: Clear to auscultation bilaterally. No wheezes, rales, or rhonchi.   Cardiac: Regular rate and rhythm, +S1S2, no murmurs apparent  Abdomen: Soft, nontender, +bowel sounds  Extremities: Moves all extremities x 4, no lower extremity edema  Neurologic: No focal motor deficits apparent, normal mood and affect, alert and oriented x 3  Peripheral Pulses: Intact posterior tibial pulses bilaterally    Laboratory Tests:  Lab Results   Component Value Date    CREATININE 1.7 (H) 03/22/2021    BUN 40 (H) 03/22/2021     03/22/2021    K 4.3 03/22/2021     03/22/2021    CO2 23 03/22/2021     Lab Results   Component Value Date    MG 2.3 03/09/2021     Lab Results   Component Value Date    WBC 4.3 (L) 04/19/2021    HGB 7.8 (L) 04/19/2021    HCT 26.1 (L) 04/19/2021    MCV 90.0 04/19/2021     04/19/2021     Lab Results   Component Value Date    ALT 46 (H) 03/10/2021    AST 22 03/10/2021    ALKPHOS 50 03/10/2021    BILITOT 0.5 03/10/2021     Lab Results   Component Value Date    CKTOTAL 65 03/06/2021    CKMB 3.4 03/06/2021    TROPONINI <0.01 03/07/2021    TROPONINI <0.01 03/07/2021    TROPONINI <0.01 03/06/2021     Lab Results   Component Value Date    INR 3.1 06/10/2021    INR 1.6 05/27/2021    INR 1.8 05/06/2021    PROTIME 37.5 06/10/2021    PROTIME 18.7 05/27/2021    PROTIME 21.8 05/06/2021     No results found for: TSHFT4, TSH  Lab Results   Component Value Date    LABA1C 5.5 04/04/2019     No results found for: EAG  Lab Results   Component Value Date    CHOL 183 04/04/2019     Lab Results   Component Value Date    TRIG 193 (H) 04/04/2019     Lab Results   Component Value Date    HDL 36 04/04/2019     Lab Results   Component Value Date    LDLCALC 108 (H) 04/04/2019     Lab Results   Component Value Date    LABVLDL 39 04/04/2019     No results found for: CHOLHDLRATIO    Cardiac Tests:  ECG: Atrial fibrillation with slow ventricular rate,  with nonspecific T wave changes, nonspecific interventricular conduction delay, abnormal EKG.     TTE- 11/14/2019: LVEDD 6 cm EF 30 to 35%, hypo-kinesis of the basal -improving. · CKD stage III, stable renal functions  · Obesity with KYARA on C-pap  · History of sarcoidosis. · Moderate right-sided pleural effusion  · Exertional hypoxemia rule out pulmonary pathology including interstitial lung disease  · H/o GI bleed secondary to gastric ulcer. Plan:   · We will continue warfarin for anticoagulation  and follow up with Dr. Dalia Francis for monitoring INR. · Will defer cardioversion at this time due to underlying history of aortic dissection s/p repair. · He is on guideline directed medical therapy for heart failure with reduced ejection fraction. We will continue Coreg 25 mg daily along with valsartan and hydralazine. He is not on Aldactone due to CKD with a creatinine of 1.7.  · He will continue rest of his current medications. Medications were refilled. · He will continue cardiac diet with salt restriction to 2 g and fluid restriction to 64 ounces a day. · He was also advised to check weight on a daily basis and advised to call me if he gains more than 3 pounds in 1 day and 5 pounds in 1 week. · Will obtain an echo to assess LV function. · Follow-up with me in 6 months. The patient's current medication list, allergies, problem list and results of all previously ordered testing were reviewed at today's visit.   Giovanni uMnoz MD  [facility]) Cardiology

## 2021-07-01 NOTE — PATIENT INSTRUCTIONS
· We will continue warfarin for anticoagulation for Afib and follow up with Dr. Trina Ralph for monitoring INR. · Will defer cardioversion at this time due to underlying history of aortic dissection s/p repair. · He is on guideline directed medical therapy for heart failure with reduced ejection fraction. We will continue Coreg 25 mg daily along with valsartan and hydralazine. He is not on Aldactone due to CKD with a creatinine of 1.7.  · He will continue rest of his current medications. Medications were refilled. · He will continue cardiac diet with salt restriction to 2 g and fluid restriction to 64 ounces a day. · He was also advised to check weight on a daily basis and advised to call me if he gains more than 3 pounds in 1 day and 5 pounds in 1 week. · Follow-up with me in 6 months.

## 2021-07-12 ENCOUNTER — TELEPHONE (OUTPATIENT)
Dept: FAMILY MEDICINE CLINIC | Age: 66
End: 2021-07-12

## 2021-07-12 ENCOUNTER — NURSE ONLY (OUTPATIENT)
Dept: FAMILY MEDICINE CLINIC | Age: 66
End: 2021-07-12
Payer: MEDICARE

## 2021-07-12 DIAGNOSIS — I48.91 RATE CONTROLLED ATRIAL FIBRILLATION (HCC): Primary | ICD-10-CM

## 2021-07-12 DIAGNOSIS — Z79.01 ANTICOAGULATED ON COUMADIN: ICD-10-CM

## 2021-07-12 DIAGNOSIS — I48.91 RATE CONTROLLED ATRIAL FIBRILLATION (HCC): ICD-10-CM

## 2021-07-12 DIAGNOSIS — Z79.01 ANTICOAGULATED ON COUMADIN: Primary | ICD-10-CM

## 2021-07-12 LAB
INTERNATIONAL NORMALIZATION RATIO, POC: 2
PROTHROMBIN TIME, POC: 24.4

## 2021-07-12 PROCEDURE — 85610 PROTHROMBIN TIME: CPT | Performed by: FAMILY MEDICINE

## 2021-07-12 PROCEDURE — 93793 ANTICOAG MGMT PT WARFARIN: CPT | Performed by: FAMILY MEDICINE

## 2021-07-13 NOTE — TELEPHONE ENCOUNTER
Contacted patient regarding the order for digoxin level. Patient states that will go to lab and get it done.

## 2021-07-15 DIAGNOSIS — Z79.01 ANTICOAGULATED ON COUMADIN: ICD-10-CM

## 2021-07-15 DIAGNOSIS — K27.4 GASTROINTESTINAL HEMORRHAGE ASSOCIATED WITH PEPTIC ULCER: ICD-10-CM

## 2021-07-15 DIAGNOSIS — R79.9 ABNORMAL FINDING OF BLOOD CHEMISTRY, UNSPECIFIED: ICD-10-CM

## 2021-07-15 DIAGNOSIS — D62 ACUTE BLOOD LOSS ANEMIA: ICD-10-CM

## 2021-07-15 DIAGNOSIS — I48.91 RATE CONTROLLED ATRIAL FIBRILLATION (HCC): ICD-10-CM

## 2021-07-15 LAB
ALBUMIN SERPL-MCNC: 4.4 G/DL (ref 3.5–5.2)
ALP BLD-CCNC: 73 U/L (ref 40–129)
ALT SERPL-CCNC: 19 U/L (ref 0–40)
ANION GAP SERPL CALCULATED.3IONS-SCNC: 12 MMOL/L (ref 7–16)
ANISOCYTOSIS: ABNORMAL
AST SERPL-CCNC: 26 U/L (ref 0–39)
BASOPHILS ABSOLUTE: 0.02 E9/L (ref 0–0.2)
BASOPHILS RELATIVE PERCENT: 0.4 % (ref 0–2)
BILIRUB SERPL-MCNC: 0.5 MG/DL (ref 0–1.2)
BUN BLDV-MCNC: 30 MG/DL (ref 6–23)
BURR CELLS: ABNORMAL
CALCIUM SERPL-MCNC: 9.1 MG/DL (ref 8.6–10.2)
CHLORIDE BLD-SCNC: 103 MMOL/L (ref 98–107)
CHOLESTEROL, TOTAL: 169 MG/DL (ref 0–199)
CO2: 25 MMOL/L (ref 22–29)
CREAT SERPL-MCNC: 1.4 MG/DL (ref 0.7–1.2)
DIGOXIN LEVEL: 0.7 NG/ML (ref 0.8–2)
EOSINOPHILS ABSOLUTE: 0.22 E9/L (ref 0.05–0.5)
EOSINOPHILS RELATIVE PERCENT: 4.4 % (ref 0–6)
FOLATE: >20 NG/ML (ref 4.8–24.2)
GFR AFRICAN AMERICAN: >60
GFR NON-AFRICAN AMERICAN: 50 ML/MIN/1.73
GLUCOSE BLD-MCNC: 101 MG/DL (ref 74–99)
HCT VFR BLD CALC: 38.7 % (ref 37–54)
HDLC SERPL-MCNC: 37 MG/DL
HEMOGLOBIN: 11.8 G/DL (ref 12.5–16.5)
IMMATURE GRANULOCYTES #: 0.01 E9/L
IMMATURE GRANULOCYTES %: 0.2 % (ref 0–5)
LDL CHOLESTEROL CALCULATED: 117 MG/DL (ref 0–99)
LYMPHOCYTES ABSOLUTE: 0.51 E9/L (ref 1.5–4)
LYMPHOCYTES RELATIVE PERCENT: 10.1 % (ref 20–42)
MCH RBC QN AUTO: 27.5 PG (ref 26–35)
MCHC RBC AUTO-ENTMCNC: 30.5 % (ref 32–34.5)
MCV RBC AUTO: 90.2 FL (ref 80–99.9)
MONOCYTES ABSOLUTE: 0.53 E9/L (ref 0.1–0.95)
MONOCYTES RELATIVE PERCENT: 10.5 % (ref 2–12)
NEUTROPHILS ABSOLUTE: 3.75 E9/L (ref 1.8–7.3)
NEUTROPHILS RELATIVE PERCENT: 74.4 % (ref 43–80)
OVALOCYTES: ABNORMAL
PDW BLD-RTO: 21.2 FL (ref 11.5–15)
PLATELET # BLD: 145 E9/L (ref 130–450)
PMV BLD AUTO: 10.2 FL (ref 7–12)
POIKILOCYTES: ABNORMAL
POLYCHROMASIA: ABNORMAL
POTASSIUM SERPL-SCNC: 4.5 MMOL/L (ref 3.5–5)
RBC # BLD: 4.29 E12/L (ref 3.8–5.8)
SODIUM BLD-SCNC: 140 MMOL/L (ref 132–146)
TEAR DROP CELLS: ABNORMAL
TOTAL PROTEIN: 7.4 G/DL (ref 6.4–8.3)
TRIGL SERPL-MCNC: 76 MG/DL (ref 0–149)
URIC ACID, SERUM: 7.2 MG/DL (ref 3.4–7)
VITAMIN B-12: 944 PG/ML (ref 211–946)
VLDLC SERPL CALC-MCNC: 15 MG/DL
WBC # BLD: 5 E9/L (ref 4.5–11.5)

## 2021-08-23 ENCOUNTER — OFFICE VISIT (OUTPATIENT)
Dept: FAMILY MEDICINE CLINIC | Age: 66
End: 2021-08-23
Payer: MEDICARE

## 2021-08-23 VITALS
BODY MASS INDEX: 30.41 KG/M2 | HEART RATE: 74 BPM | RESPIRATION RATE: 16 BRPM | SYSTOLIC BLOOD PRESSURE: 130 MMHG | DIASTOLIC BLOOD PRESSURE: 80 MMHG | TEMPERATURE: 98.1 F | OXYGEN SATURATION: 96 % | WEIGHT: 200 LBS

## 2021-08-23 DIAGNOSIS — I48.91 RATE CONTROLLED ATRIAL FIBRILLATION (HCC): ICD-10-CM

## 2021-08-23 DIAGNOSIS — Z79.01 ANTICOAGULATED ON COUMADIN: Primary | ICD-10-CM

## 2021-08-23 DIAGNOSIS — G47.33 OSA ON CPAP: ICD-10-CM

## 2021-08-23 DIAGNOSIS — Z99.89 OSA ON CPAP: ICD-10-CM

## 2021-08-23 DIAGNOSIS — I50.22 CHRONIC SYSTOLIC HEART FAILURE (HCC): ICD-10-CM

## 2021-08-23 DIAGNOSIS — I71.019 DISSECTION OF THORACIC AORTA: ICD-10-CM

## 2021-08-23 DIAGNOSIS — N18.31 STAGE 3A CHRONIC KIDNEY DISEASE (HCC): ICD-10-CM

## 2021-08-23 DIAGNOSIS — E78.00 ELEVATED LDL CHOLESTEROL LEVEL: ICD-10-CM

## 2021-08-23 LAB
INTERNATIONAL NORMALIZATION RATIO, POC: 1.6
PROTHROMBIN TIME, POC: 19.5

## 2021-08-23 PROCEDURE — 3017F COLORECTAL CA SCREEN DOC REV: CPT | Performed by: FAMILY MEDICINE

## 2021-08-23 PROCEDURE — 99213 OFFICE O/P EST LOW 20 MIN: CPT | Performed by: FAMILY MEDICINE

## 2021-08-23 PROCEDURE — 4040F PNEUMOC VAC/ADMIN/RCVD: CPT | Performed by: FAMILY MEDICINE

## 2021-08-23 PROCEDURE — G8427 DOCREV CUR MEDS BY ELIG CLIN: HCPCS | Performed by: FAMILY MEDICINE

## 2021-08-23 PROCEDURE — 1036F TOBACCO NON-USER: CPT | Performed by: FAMILY MEDICINE

## 2021-08-23 PROCEDURE — 1123F ACP DISCUSS/DSCN MKR DOCD: CPT | Performed by: FAMILY MEDICINE

## 2021-08-23 PROCEDURE — 85610 PROTHROMBIN TIME: CPT | Performed by: FAMILY MEDICINE

## 2021-08-23 PROCEDURE — G8417 CALC BMI ABV UP PARAM F/U: HCPCS | Performed by: FAMILY MEDICINE

## 2021-08-23 RX ORDER — WARFARIN SODIUM 6 MG/1
TABLET ORAL
Qty: 90 TABLET | Refills: 1 | Status: SHIPPED
Start: 2021-08-23 | End: 2022-03-07 | Stop reason: ALTCHOICE

## 2021-08-23 ASSESSMENT — ENCOUNTER SYMPTOMS
VOMITING: 0
ABDOMINAL PAIN: 0
CONSTIPATION: 0
DIARRHEA: 0
COUGH: 0
SHORTNESS OF BREATH: 0
NAUSEA: 0

## 2021-08-23 NOTE — PROGRESS NOTES
2021     Francisca Siddiqui (:  1955) is a 74 y.o. male, with a:  Past Medical History:   Diagnosis Date    [facility] ([facility] [address]     Aortic dissection (HCC)     Atrial fibrillation ([facility] [address]     CAD (coronary artery disease)     CHF (congestive heart failure) ([facility] [address]     severely impaired LV systolic function and CHF, EF 25-30%    CKD (chronic kidney disease)     H/O cardiovascular stress test     No evidence of stress-induced ischemia    H/O Doppler echocardiogram 5/04/10    [facility], mildly dilated LV, mod concentric LVH, normal LV systolic function, mod dilated left atrium, trace MR    Hypertension     Ischemic cardiomyopathy     Obesity     Pericardial effusion (noninflammatory) 11/15/2019    History: Post-op problem  Assessment: S/P pericardial drain placement in CVICU  Plan:  Drain dc'ed, no need for post-procedure echo unless pt become symptomatic.  Pleural effusion 2019    History: Post op problem Assessment: On r/a, left pigtail dc'ed Plan: PO lasix. Denies sob.   Desat study done, no home O2 needed    Sarcoidosis     Valvular heart disease        Here for evaluation of the following medical concerns:  Chief Complaint   Patient presents with    6 Month Follow-Up    Discuss Labs     completed 7/15/2021     He also follows with Cardiology and Pulm    F/U of chronic problem(s)   Chronic problems reviewed today include:  A fib, CHF, CM, aortic dissection s/p repair, CKD, and KYARA  Current status of this/these condition(s):  stable  Tolerating meds: Yes    Atrial fibrillation / dilated cardiomyopathy / CHF / type I aortic dissection s/p emergent repair at [facility] 2019  Current treatment: Carvedilol 25 mg twice daily, amlodipine 5 mg daily, losartan 50 mg daily, isosorbide 5 mg 3 times daily, hydralazine 50 mg 3 times daily, digoxin 62.5 mcg half tablet daily, and Bumex 1 mg twice daily  Recent medication changes: None  Patient denies chest pain, shortness of breath, headache and peripheral edema. BP Readings from Last 3 Encounters:   08/23/21 130/80   07/01/21 128/60   04/19/21 (!) 201/95                                          Sodium (mmol/L)   Date Value   07/15/2021 140    BUN (mg/dL)   Date Value   07/15/2021 30 (H)    Glucose (mg/dL)   Date Value   07/15/2021 101 (H)      Potassium (mmol/L)   Date Value   07/15/2021 4.5     Potassium reflex Magnesium (mmol/L)   Date Value   03/05/2021 4.3    CREATININE (mg/dL)   Date Value   07/15/2021 1.4 (H)         KYARA  Current treatment: CPAP  Tolerating well, reports compliance    CKD  Last Cr 1.4    Anticoagulated with Coumadin  Current warfarin regimen: coumadin 6 mg daily; 9 mg Mon and Wed. Recent changes in treatment: coumadin increased  Indication for treatment: Atrial fibrillation    Lab Results   Component Value Date    INR 1.6 08/23/2021    INR 2.0 07/12/2021    INR 3.1 06/10/2021     The 10-year ASCVD risk score (Aj Ovalle, et al., 2013) is: 19.7%    Values used to calculate the score:      Age: 74 years      Sex: Male      Is Non- : No      Diabetic: No      Tobacco smoker: No      Systolic Blood Pressure: 774 mmHg      Is BP treated: Yes      HDL Cholesterol: 37 mg/dL      Total Cholesterol: 169 mg/dL      Review of Systems   Constitutional: Negative for chills and fever. Respiratory: Negative for cough and shortness of breath. Cardiovascular: Negative for chest pain and leg swelling. Gastrointestinal: Negative for abdominal pain, constipation, diarrhea, nausea and vomiting. Genitourinary: Negative for dysuria. Neurological: Negative for headaches. Prior to Visit Medications    Medication Sig Taking?  Authorizing Provider   amLODIPine (NORVASC) 5 MG tablet Take 1 tablet by mouth daily Yes Josephine Harding MD   carvedilol (COREG) 25 MG tablet Take 1 tablet by mouth 2 times daily (with meals) Yes Josephine Harding MD   digoxin (LANOXIN) 125 MCG tablet TAKE 1/2 TABLET (62.5MG) Temp: 98.1 °F (36.7 °C)   TempSrc: Temporal   SpO2: 96%   Weight: 200 lb (90.7 kg)     Estimated body mass index is 30.41 kg/m² as calculated from the following:    Height as of 7/1/21: 5' 8\" (1.727 m). Weight as of this encounter: 200 lb (90.7 kg). Physical Exam  Constitutional:       Appearance: Normal appearance. HENT:      Head: Normocephalic and atraumatic. Eyes:      Extraocular Movements: Extraocular movements intact. Conjunctiva/sclera: Conjunctivae normal.   Cardiovascular:      Rate and Rhythm: Normal rate. Rhythm irregular. Pulmonary:      Effort: Pulmonary effort is normal. No respiratory distress. Breath sounds: No wheezing or rales. Abdominal:      General: There is no distension. Musculoskeletal:      Right lower leg: No edema. Left lower leg: No edema. Neurological:      Mental Status: He is alert. Mental status is at baseline. Psychiatric:         Mood and Affect: Mood normal.         Behavior: Behavior normal.       ASSESSMENT/PLAN:  Michelle Smith was seen today for 6 month follow-up and discuss labs. Diagnoses and all orders for this visit:    Anticoagulated on Coumadin  -     POCT INR  -     warfarin (COUMADIN) 6 MG tablet; Take 9 mg MWF; 6 mg all other days    Rate controlled atrial fibrillation (HCC)  -     warfarin (COUMADIN) 6 MG tablet; Take 9 mg MWF; 6 mg all other days    Dissection of thoracic aorta (HCC)    Chronic systolic heart failure (HCC)    Stage 3a chronic kidney disease (HCC)    Elevated LDL cholesterol level    KYARA on CPAP    Additional plan and future considerations:  As above. Recent labs reviewed in office, ASCVD discussed. Increase coumadin to 9 mg MWF, 6 mg all other days and repeat INR in 1 months. RTO in 6 months for AWV or sooner if needed.     Future Appointments   Date Time Provider Chrissy Valdez   1/17/2022  1:00 PM Raya Lopez MD [address], DO on 8/23/2021 at 10:27 AM

## 2021-09-22 ENCOUNTER — TELEPHONE (OUTPATIENT)
Dept: CARDIOLOGY | Age: 66
End: 2021-09-22

## 2021-10-22 ENCOUNTER — HOSPITAL ENCOUNTER (OUTPATIENT)
Age: 66
Discharge: HOME | End: 2021-10-22
Payer: MEDICARE

## 2021-10-22 DIAGNOSIS — Z79.01 ANTICOAGULATED ON COUMADIN: ICD-10-CM

## 2021-10-22 LAB
HCT VFR BLD CALC: 28.6 % (ref 37–54)
HEMOGLOBIN: 8.9 G/DL (ref 12.5–16.5)
INR BLD: 1.9
MCH RBC QN AUTO: 31 PG (ref 26–35)
MCHC RBC AUTO-ENTMCNC: 31.1 % (ref 32–34.5)
MCV RBC AUTO: 99.7 FL (ref 80–99.9)
PDW BLD-RTO: 17.8 FL (ref 11.5–15)
PLATELET # BLD: 242 E9/L (ref 130–450)
PMV BLD AUTO: 9.2 FL (ref 7–12)
PROTHROMBIN TIME: 20.8 SEC (ref 9.3–12.4)
RBC # BLD: 2.87 E12/L (ref 3.8–5.8)
WBC # BLD: 5.5 E9/L (ref 4.5–11.5)

## 2021-10-22 PROCEDURE — 85027 COMPLETE CBC AUTOMATED: CPT

## 2021-10-22 PROCEDURE — 36415 COLL VENOUS BLD VENIPUNCTURE: CPT

## 2021-10-22 PROCEDURE — 85610 PROTHROMBIN TIME: CPT

## 2021-10-25 ENCOUNTER — TELEPHONE (OUTPATIENT)
Dept: FAMILY MEDICINE CLINIC | Age: 66
End: 2021-10-25

## 2021-10-25 NOTE — TELEPHONE ENCOUNTER
----- Message from [place] sent at 10/25/2021 11:51 AM EDT -----  Subject: Appointment Request    Reason for Call: Routine (Patient Request) No Script    QUESTIONS  Type of Appointment? Established Patient  Reason for appointment request? Available appointments did not meet   patient need  Additional Information for Provider? Pt has a bruise on his left leg that   he wanted to be seen for. His leg from the knee down is black and blue. It   has been this way for 2 weeks. ---------------------------------------------------------------------------  --------------  Gabi DE  What is the best way for the office to contact you? OK to leave message on   voicemail  Preferred Call Back Phone Number? [phone]  ---------------------------------------------------------------------------  --------------  SCRIPT ANSWERS  Relationship to Patient? Self  (Is the patient requesting to see the provider for a procedure?)? No  (Is the patient requesting to see the provider urgently - today or   tomorrow. )? No  Have you been diagnosed with, awaiting test results for, or told that you   are suspected of having COVID-19 (Coronavirus)? (If patient has tested   negative or was tested as a requirement for work, school, or travel and   not based on symptoms, answer no)? No  Within the past two weeks have you developed any of the following symptoms   (answer no if symptoms have been present longer than 2 weeks or began   more than 2 weeks ago)? Fever or Chills, Cough, Shortness of breath or   difficulty breathing, Loss of taste or smell, Sore throat, Nasal   congestion, Sneezing or runny nose, Fatigue or generalized body aches   (answer no if pain is specific to a body part e.g. back pain), Diarrhea,   Headache? No  Have you had close contact with someone with COVID-19 in the last 14 days? No  (Service Expert - click yes below to proceed with [employer]ual   Scheduling)?  Yes

## 2021-10-26 ENCOUNTER — OFFICE VISIT (OUTPATIENT)
Dept: FAMILY MEDICINE CLINIC | Age: 66
End: 2021-10-26
Payer: MEDICARE

## 2021-10-26 VITALS
SYSTOLIC BLOOD PRESSURE: 130 MMHG | TEMPERATURE: 97.6 F | OXYGEN SATURATION: 98 % | DIASTOLIC BLOOD PRESSURE: 80 MMHG | HEART RATE: 78 BPM | HEIGHT: 66 IN | RESPIRATION RATE: 18 BRPM | WEIGHT: 206 LBS | BODY MASS INDEX: 33.11 KG/M2

## 2021-10-26 DIAGNOSIS — D64.9 ANEMIA, UNSPECIFIED TYPE: Primary | ICD-10-CM

## 2021-10-26 DIAGNOSIS — R58 ECCHYMOSIS: ICD-10-CM

## 2021-10-26 DIAGNOSIS — D64.9 ANEMIA, UNSPECIFIED TYPE: ICD-10-CM

## 2021-10-26 DIAGNOSIS — Z87.19 HISTORY OF GI BLEED: ICD-10-CM

## 2021-10-26 DIAGNOSIS — K27.4 GASTROINTESTINAL HEMORRHAGE ASSOCIATED WITH PEPTIC ULCER: ICD-10-CM

## 2021-10-26 DIAGNOSIS — Z79.01 ANTICOAGULATED ON COUMADIN: ICD-10-CM

## 2021-10-26 DIAGNOSIS — M79.605 PAIN OF LEFT LOWER EXTREMITY: ICD-10-CM

## 2021-10-26 DIAGNOSIS — I48.91 RATE CONTROLLED ATRIAL FIBRILLATION (HCC): ICD-10-CM

## 2021-10-26 LAB
ALBUMIN SERPL-MCNC: 4.6 G/DL (ref 3.5–5.2)
ALP BLD-CCNC: 75 U/L (ref 40–129)
ALT SERPL-CCNC: 19 U/L (ref 0–40)
ANION GAP SERPL CALCULATED.3IONS-SCNC: 18 MMOL/L (ref 7–16)
ANISOCYTOSIS: ABNORMAL
AST SERPL-CCNC: 29 U/L (ref 0–39)
BASOPHILS ABSOLUTE: 0.05 E9/L (ref 0–0.2)
BASOPHILS RELATIVE PERCENT: 0.9 % (ref 0–2)
BILIRUB SERPL-MCNC: 1.2 MG/DL (ref 0–1.2)
BUN BLDV-MCNC: 30 MG/DL (ref 6–23)
CALCIUM SERPL-MCNC: 10 MG/DL (ref 8.6–10.2)
CHLORIDE BLD-SCNC: 105 MMOL/L (ref 98–107)
CO2: 21 MMOL/L (ref 22–29)
CREAT SERPL-MCNC: 1.6 MG/DL (ref 0.7–1.2)
EOSINOPHILS ABSOLUTE: 0.2 E9/L (ref 0.05–0.5)
EOSINOPHILS RELATIVE PERCENT: 3.6 % (ref 0–6)
FERRITIN: 181 NG/ML
GFR AFRICAN AMERICAN: 52
GFR NON-AFRICAN AMERICAN: 43 ML/MIN/1.73
GLUCOSE BLD-MCNC: 90 MG/DL (ref 74–99)
HCT VFR BLD CALC: 30.9 % (ref 37–54)
HEMOGLOBIN: 9.5 G/DL (ref 12.5–16.5)
HYPOCHROMIA: ABNORMAL
IMMATURE RETIC FRACT: 25.8 % (ref 2.3–13.4)
INTERNATIONAL NORMALIZATION RATIO, POC: 1.7
IRON SATURATION: 25 % (ref 20–55)
IRON: 71 MCG/DL (ref 59–158)
LYMPHOCYTES ABSOLUTE: 0.28 E9/L (ref 1.5–4)
LYMPHOCYTES RELATIVE PERCENT: 5.4 % (ref 20–42)
MCH RBC QN AUTO: 31.5 PG (ref 26–35)
MCHC RBC AUTO-ENTMCNC: 30.7 % (ref 32–34.5)
MCV RBC AUTO: 102.3 FL (ref 80–99.9)
MONOCYTES ABSOLUTE: 0.22 E9/L (ref 0.1–0.95)
MONOCYTES RELATIVE PERCENT: 3.6 % (ref 2–12)
NEUTROPHILS ABSOLUTE: 4.87 E9/L (ref 1.8–7.3)
NEUTROPHILS RELATIVE PERCENT: 86.5 % (ref 43–80)
PDW BLD-RTO: 18.8 FL (ref 11.5–15)
PLATELET # BLD: 209 E9/L (ref 130–450)
PMV BLD AUTO: 9.8 FL (ref 7–12)
POIKILOCYTES: ABNORMAL
POLYCHROMASIA: ABNORMAL
POTASSIUM SERPL-SCNC: 4.5 MMOL/L (ref 3.5–5)
PROTHROMBIN TIME, POC: 21
RBC # BLD: 3.02 E12/L (ref 3.8–5.8)
RETIC HGB EQUIVALENT: 33.5 PG (ref 28.2–36.6)
RETICULOCYTE ABSOLUTE COUNT: 0.17 E12/L
RETICULOCYTE COUNT PCT: 5.7 % (ref 0.4–1.9)
SCHISTOCYTES: ABNORMAL
SODIUM BLD-SCNC: 144 MMOL/L (ref 132–146)
TEAR DROP CELLS: ABNORMAL
TOTAL IRON BINDING CAPACITY: 284 MCG/DL (ref 250–450)
TOTAL PROTEIN: 7.7 G/DL (ref 6.4–8.3)
WBC # BLD: 5.6 E9/L (ref 4.5–11.5)

## 2021-10-26 PROCEDURE — 3017F COLORECTAL CA SCREEN DOC REV: CPT | Performed by: FAMILY MEDICINE

## 2021-10-26 PROCEDURE — 1123F ACP DISCUSS/DSCN MKR DOCD: CPT | Performed by: FAMILY MEDICINE

## 2021-10-26 PROCEDURE — 4040F PNEUMOC VAC/ADMIN/RCVD: CPT | Performed by: FAMILY MEDICINE

## 2021-10-26 PROCEDURE — 85610 PROTHROMBIN TIME: CPT | Performed by: FAMILY MEDICINE

## 2021-10-26 PROCEDURE — G8484 FLU IMMUNIZE NO ADMIN: HCPCS | Performed by: FAMILY MEDICINE

## 2021-10-26 PROCEDURE — G8417 CALC BMI ABV UP PARAM F/U: HCPCS | Performed by: FAMILY MEDICINE

## 2021-10-26 PROCEDURE — 1036F TOBACCO NON-USER: CPT | Performed by: FAMILY MEDICINE

## 2021-10-26 PROCEDURE — G8427 DOCREV CUR MEDS BY ELIG CLIN: HCPCS | Performed by: FAMILY MEDICINE

## 2021-10-26 PROCEDURE — 99214 OFFICE O/P EST MOD 30 MIN: CPT | Performed by: FAMILY MEDICINE

## 2021-10-26 RX ORDER — PANTOPRAZOLE SODIUM 40 MG/1
40 TABLET, DELAYED RELEASE ORAL
Qty: 30 TABLET | Refills: 0 | Status: SHIPPED
Start: 2021-10-26 | End: 2022-03-07 | Stop reason: ALTCHOICE

## 2021-10-26 RX ORDER — FERROUS SULFATE 325(65) MG
325 TABLET ORAL
Qty: 30 TABLET | Refills: 0 | Status: SHIPPED
Start: 2021-10-26 | End: 2022-05-27 | Stop reason: ALTCHOICE

## 2021-10-26 RX ORDER — TRAMADOL HYDROCHLORIDE 50 MG/1
50 TABLET ORAL EVERY 4 HOURS PRN
Qty: 30 TABLET | Refills: 0 | Status: SHIPPED | OUTPATIENT
Start: 2021-10-26 | End: 2021-10-31

## 2021-10-26 NOTE — PROGRESS NOTES
10/26/2021     Oliva May (:  1955) is a 74 y.o. male, with a:  Past Medical History:   Diagnosis Date    [facility] kidney injury ([facility]     Aortic dissection (HCC)     Atrial fibrillation (HCC)     CAD (coronary artery disease)     CHF (congestive heart failure) ([facility]     severely impaired LV systolic function and CHF, EF 25-30%    CKD (chronic kidney disease)     H/O cardiovascular stress test     No evidence of stress-induced ischemia    H/O Doppler echocardiogram 5/04/10    [facility], mildly dilated LV, mod concentric LVH, normal LV systolic function, mod dilated left atrium, trace MR    Hypertension     Ischemic cardiomyopathy     Obesity     Pericardial effusion (noninflammatory) 11/15/2019    History: Post-op problem  Assessment: S/P pericardial drain placement in CVICU  Plan:  Drain dc'ed, no need for post-procedure echo unless pt become symptomatic.  Pleural effusion 2019    History: Post op problem Assessment: On r/a, left pigtail dc'ed Plan: PO lasix. Denies sob. Desat study done, no home O2 needed    Sarcoidosis     Valvular heart disease        Here for evaluation of the following medical concerns:  Chief Complaint   Patient presents with    Leg Pain     Patient hit leg about 2 weeks ago on bathroom cabinet and still C/O bruising.       He also follows with Cardiology and Pulm    Here today for left leg pain, reports that about 2 weeks ago he hit his upper left leg against a bathroom cabinet    He initially had severe bruising, significant pain, and swelling    Still with significant discomfort, the swelling and ecchymosis is improving    He is on chronic anticoagulation for atrial fibrillation    His history is notable for GI Bleed 2/2 gastric ulcer    He was admitted 3/4/21 - 3/10/21 for acute blood loss anemia due to GI bleed, underwent EGD which showed a gastric ulcer which was injected with epi and cauterized    Lab Results   Component Value Date    INR 1.7 10/26/2021    INR 1.9 10/22/2021    INR 1.6 2021       Review of Systems   Cardiovascular: Positive for leg swelling. Musculoskeletal: Positive for arthralgias and gait problem. Hematological: Bruises/bleeds easily. Prior to Visit Medications    Medication Sig Taking?  Authorizing Provider   warfarin (COUMADIN) 6 MG tablet Take 9 mg MWF; 6 mg all other days Yes Kamron Chase,    amLODIPine (NORVASC) 5 MG tablet Take 1 tablet by mouth daily Yes María Leahy MD   bumetanide (BUMEX) 1 MG tablet Take 1 tablet by mouth 2 times daily Yes María Leahy MD   carvedilol (COREG) 25 MG tablet Take 1 tablet by mouth 2 times daily (with meals) Yes María Leahy MD   digoxin (LANOXIN) 125 MCG tablet TAKE 1/2 TABLET (62.5MG) DAILY ADDITIONAL REFILLS FROM PRIMARY CARDIOLOGIST OR PCP Yes María Leahy MD   hydrALAZINE (APRESOLINE) 50 MG tablet Take 1 tablet by mouth 3 times daily Yes María Leahy MD   isosorbide dinitrate (ISORDIL) 5 MG tablet Take 1 tablet by mouth 3 times daily Yes María Leahy MD   losartan (COZAAR) 50 MG tablet Take 1 tablet by mouth daily Yes María Leahy MD   senna (SENOKOT) 8.6 MG TABS tablet TAKE 2 TABLETS BY MOUTH 2 TIMES DAILY Yes Kamron Chase DO   aspirin 81 MG chewable tablet Take 1 tablet by mouth daily Additional Refills from Primary Cardiologist or PCP Yes Philip Juarez, DO   Multiple Vitamins-Minerals (THERAPEUTIC MULTIVITAMIN-MINERALS) tablet Take 1 tablet by mouth daily Yes Historical Provider, MD        Social History     Tobacco Use    Smoking status: Former Smoker     Packs/day: 0.50     Years: 5.00     Pack years: 2.50     Types: Cigarettes, Pipe, Cigars     Start date:      Quit date: 10/16/1993     Years since quittin.0    Smokeless tobacco: Never Used   Substance Use Topics    Alcohol use: Not Currently        Past Surgical History:   Procedure Laterality Date    OTHER SURGICAL HISTORY  11/15/2019    aortic dissection repair @ [facility]  TONSILLECTOMY      UPPER GASTROINTESTINAL ENDOSCOPY N/A 3/5/2021    EGD CONTROL HEMORRHAGE performed by Mariela Suarez MD at [address] [place] [address] N/A 4/19/2021    EGD ESOPHAGOGASTRODUODENOSCOPY performed by Mariela Suarez MD at [address] [place] [address] Street:    10/26/21 1322   BP: 130/80   Pulse: 78   Resp: 18   Temp: 97.6 °F (36.4 °C)   TempSrc: Temporal   SpO2: 98%   Weight: 206 lb (93.4 kg)   Height: 5' 6\" (1.676 m)     Estimated body mass index is 33.25 kg/m² as calculated from the following:    Height as of this encounter: 5' 6\" (1.676 m). Weight as of this encounter: 206 lb (93.4 kg). Physical Exam  Constitutional:       General: He is not in acute distress. Appearance: He is well-developed. HENT:      Head: Normocephalic and atraumatic. Eyes:      Extraocular Movements: Extraocular movements intact. Pupils: Pupils are equal, round, and reactive to light. Neck:      Thyroid: No thyromegaly. Cardiovascular:      Rate and Rhythm: Normal rate. Rhythm irregular. Pulmonary:      Effort: Pulmonary effort is normal. No respiratory distress. Breath sounds: Normal breath sounds. No wheezing or rales. Abdominal:      General: There is no distension. Musculoskeletal:         General: Swelling, tenderness and signs of injury present. Skin:     Findings: Bruising present. Neurological:      Mental Status: He is alert. Mental status is at baseline. ASSESSMENT/PLAN:  Asberry Paget was seen today for leg pain. Diagnoses and all orders for this visit:    Anemia, unspecified type  -     Landy Au MD, General Surgery, [place]  -     CBC Auto Differential; Future  -     Comprehensive Metabolic Panel; Future  -     IRON AND TIBC; Future  -     FERRITIN; Future  -     RETICULOCYTES;  Future    Anticoagulated on Coumadin  -     POCT INR    Rate controlled atrial fibrillation (HCC)    Ecchymosis  -     CBC Auto Differential; Future  - Comprehensive Metabolic Panel; Future  -     IRON AND TIBC; Future  -     FERRITIN; Future  -     RETICULOCYTES; Future    Pain of left lower extremity  -     traMADol (ULTRAM) 50 MG tablet; Take 1 tablet by mouth every 4 hours as needed for Pain for up to 5 days. Intended supply: 5 days. Take lowest dose possible to manage pain    History of GI bleed  -     Maik Dao MD, General Surgery, [place]  -     pantoprazole (PROTONIX) 40 MG tablet; Take 1 tablet by mouth every morning (before breakfast)    Gastrointestinal hemorrhage associated with peptic ulcer  -     ferrous sulfate (IRON 325) 325 (65 Fe) MG tablet; Take 1 tablet by mouth daily (with breakfast)      Additional plan and future considerations: As above. Short course of pain medication provided. Repeat CBC and check iron levels. Restart pantoprazole and iron supplementation. Refer back to [address] [place] [address] for considerations of repeat EGD and/or colonoscopy.  Swelling/echymosis improving per patient report, consider vascular consult if symptoms persist. RTO in 2 weeks for nursing visit for INR check    Future Appointments   Date Time Provider Chrissy Courtney   11/9/2021  9:30 AM Lionel Lobo   11/10/2021  2:00 PM Ahsan Strong MD BD GEN SURG [facility]   1/17/2022  1:00 PM Tone Ayala MD [address], DO on 10/26/2021 at 1:36 PM

## 2021-10-28 ENCOUNTER — TELEPHONE (OUTPATIENT)
Dept: SURGERY | Age: 66
End: 2021-10-28

## 2021-10-28 NOTE — TELEPHONE ENCOUNTER
Spoke with the patient on the phone. Scheduled him on 11/10/21 at 2pm in [facility]. Bring ID, medication list and insurance card. The patient verbalized understanding.   Electronically signed by Patrice Hernández on 10/28/2021 at 4:12 PM

## 2021-11-09 ENCOUNTER — NURSE ONLY (OUTPATIENT)
Dept: FAMILY MEDICINE CLINIC | Age: 66
End: 2021-11-09
Payer: MEDICARE

## 2021-11-09 DIAGNOSIS — Z79.01 ANTICOAGULATED ON COUMADIN: Primary | ICD-10-CM

## 2021-11-09 DIAGNOSIS — I48.91 RATE CONTROLLED ATRIAL FIBRILLATION (HCC): ICD-10-CM

## 2021-11-09 LAB
INTERNATIONAL NORMALIZATION RATIO, POC: 1.9
PROTHROMBIN TIME, POC: 23.4

## 2021-11-09 PROCEDURE — 93793 ANTICOAG MGMT PT WARFARIN: CPT | Performed by: FAMILY MEDICINE

## 2021-11-09 PROCEDURE — 85610 PROTHROMBIN TIME: CPT | Performed by: FAMILY MEDICINE

## 2021-11-10 ENCOUNTER — OFFICE VISIT (OUTPATIENT)
Dept: SURGERY | Age: 66
End: 2021-11-10
Payer: MEDICARE

## 2021-11-10 VITALS
HEIGHT: 67 IN | DIASTOLIC BLOOD PRESSURE: 60 MMHG | BODY MASS INDEX: 29.82 KG/M2 | RESPIRATION RATE: 16 BRPM | SYSTOLIC BLOOD PRESSURE: 114 MMHG | HEART RATE: 72 BPM | TEMPERATURE: 98.7 F | WEIGHT: 190 LBS

## 2021-11-10 DIAGNOSIS — D64.9 ANEMIA, UNSPECIFIED TYPE: Primary | ICD-10-CM

## 2021-11-10 PROCEDURE — 99212 OFFICE O/P EST SF 10 MIN: CPT | Performed by: SURGERY

## 2021-11-10 PROCEDURE — 4040F PNEUMOC VAC/ADMIN/RCVD: CPT | Performed by: SURGERY

## 2021-11-10 PROCEDURE — G8427 DOCREV CUR MEDS BY ELIG CLIN: HCPCS | Performed by: SURGERY

## 2021-11-10 PROCEDURE — G8484 FLU IMMUNIZE NO ADMIN: HCPCS | Performed by: SURGERY

## 2021-11-10 PROCEDURE — G8417 CALC BMI ABV UP PARAM F/U: HCPCS | Performed by: SURGERY

## 2021-11-10 PROCEDURE — 3017F COLORECTAL CA SCREEN DOC REV: CPT | Performed by: SURGERY

## 2021-11-10 PROCEDURE — 1036F TOBACCO NON-USER: CPT | Performed by: SURGERY

## 2021-11-10 PROCEDURE — 1123F ACP DISCUSS/DSCN MKR DOCD: CPT | Performed by: SURGERY

## 2021-11-10 NOTE — PATIENT INSTRUCTIONS
Patient Information and Instructions for Colonoscopy         Definition of Colonoscopy   A colonoscopy is the visual exam of the rectum and colon (large intestine). The exam is done with a tool called a colonoscope. The colonoscope is a flexible tube with a tiny camera on the end. This instrument allows the doctor to view the inside of your rectum and colon. Sigmoidoscopy is a shorter scope that views only the last one third of the colon. Reasons for Colonoscopy   It is used to examine, diagnose, and treat problems in your large intestine. The procedure is most often done for the following reasons: To determine the cause of abdominal pain, rectal bleeding, or a change in bowel habits   To detect and treat colon cancer or colon polyps   To obtain tissue samples for testing   To stop intestinal bleeding   Monitor response to treatment if you have inflammatory bowel disease     Possible Complications   Complications are rare, but no procedure is completely free of risk. If you are planning to have a colonoscopy, your doctor will review a list of possible complications, which may include:   Bleeding   Reaction to the sedation causing drop in your blood pressure or problems breathing  Perforation or puncture of the bowel     Factors that may increase the risk of complications include:   Pre-existing heart or kidney condition   Treatment with certain medicines, including aspirin and other drugs with anticoagulant or blood-thinning properties   Prior abdominal surgery or radiation treatments   Active colitis , diverticulitis , or other acute bowel disease   Previous treatment with radiation therapy     Be sure to discuss these risks with your doctor before the procedure.      What to Expect   Prior to Procedure   Your doctor will likely do the following:   Physical exam   Health history   Review of medicines   Test your stool for hidden blood (called \"occult blood\")     Your colon must be completely clean before the procedure. Any stool left in the intestine will block the view. This preparation may start several days before the procedure. Follow your doctor's instructions. Leading up to your procedure:   Talk to your doctor about your medicines. You may be asked to stop taking some medicines up to one week before the procedure, like:   Anti-inflammatory drugs (e.g., aspirin )   Blood thinners like clopidogrel (Plavix) or warfarin (Coumadin)   Iron supplements or vitamins containing iron   The day or days before your procedure, go on a clear liquid diet (clear broth, clear juice, clear jello) with no red coloring  Do not eat or drink anything after midnight. Wear comfortable clothing. If you have diabetes, ask your doctor if you need to adjust your diabetes medicine on the day prior to your procedure and the day of your procedure. Arrange for a ride home after the procedure. Anesthesia   You will receive intravenous sedation medicine for the procedure so you will not feel anything during the procedure. Description of the Procedure   You will lie on your left side with knees bent and drawn up toward your chest. The colonoscope will be slowly inserted through the rectum and into the bowel. The colonoscope will inject air into the colon. A small attached video camera will allow the doctor to view the colon's lining on a screen. The doctor will continue guiding the tool through the bowel and assess the lining. A tissue sample or polyps may be removed during the procedure. How Long Will It Take? Usually it takes about 30 to 45 minutes     Will It Hurt? Most people do not feel anything during the procedure and will not remember the procedure. After the procedure, gas pains and cramping are common. These pains should go away with the passing of gas. Post-procedure Care   If any tissue was removed: It will be sent to a lab to be examined. It may take 1-2 weeks for results.  The doctor will usually give an initial report after the scope is removed. Other tests may be recommended. A small amount of bleeding may occur during the first few days after the procedure. When you return home after the procedure, be sure to follow your doctor's instructions, which may include:   Resume medicines as instructed by your doctor. Resume normal diet, unless directed otherwise by your doctor. The sedative will make you drowsy. Avoid driving, operating machinery, or making important decisions for the rest of the day. Rest for the remainder of the day. After arriving home, contact your doctor if any of the following occurs:   Bleeding from your rectum, notify your doctor if you pass a teaspoonful of blood or more. Black, tarry stools   Severe abdominal pain   Hard, swollen abdomen   Signs of infection, including fever or chills   Inability to pass gas or stool   Coughing, shortness of breath, chest pain, severe nausea or vomiting     In case of an emergency, CALL 911 . ISABELLA COLONOSCOPY PREPARATION    Instructions for Clear liquid diet  Definition  A clear liquid diet consists of clear liquids, such as water, broth and plain gelatin, that are easily digested and leave no undigested residue in your intestinal tract. Your doctor may prescribe a clear liquid diet before certain medical procedures or if you have certain digestive problems. Because a clear liquid diet can't provide you with adequate calories and nutrients, it shouldn't be continued for more than a few days. Purpose  A clear liquid diet is often used before tests, procedures or surgeries that require no food in your stomach or intestines, such as before colonoscopy. It may also be recommended as a short-term diet if you have certain digestive problems, such as nausea, vomiting or diarrhea, or after certain types of surgery.      Diet details  A clear liquid diet helps maintain adequate hydration, provides some important electrolytes, such as sodium and potassium, and gives some energy at a time when a full diet isn't possible or recommended. The following foods are allowed in a clear liquid diet:    Plain water    Fruit juices without pulp, such as apple juice, white grape juice.  Strained lemonade    Clear, fat-free broth (bouillon or consomme)    Clear sodas     Plain gelatin (Not RED or PURPLE)   Honey    Ice pops without bits of fruit or fruit pulp    Tea or coffee without milk or cream   ** NOTHING RED OR PURPLE    Any foods not on the above list should be avoided. Also, for certain tests, such as colon exams, your doctor may ask you to avoid liquids or gelatin with red coloring. A typical menu on the clear liquid diet may look like this:   Breakfast:  1 glass fruit juice  1 cup coffee or tea (without dairy products)  1 cup broth  1 bowl gelatin     Snack:  1 glass fruit juice  1 bowl gelatin     Lunch:  1 glass fruit juice  1 glass water  1 cup broth  1 bowl gelatin     Snack:  1 ice pop (without fruit pulp)  1 cup coffee or tea (without dairy products) or a soft drink     Dinner:  1 cup juice or water  1 cup broth  1 bowl gelatin  1 cup coffee or tea     Purchase this over the counter:  1. GATORADE/Clear liquid (64 ounces)   Pick these up at the pharmacy:  2. DULCOLAX 5 mg tablets (four tablets)  3. MIRALAX BOTTLE 238 grams (over the counter only)    The DAY BEFORE your colonoscopy:   Drink only clear liquids. (Absolutely no solid food)    COLONOSCOPY PREP:  3 PM: Take 2 DULCOLAX tablets    5 PM: Mix the entire bottle of MIRALAX into the 64 ounces of GATORADE. (Put half the bottle in each 32 ounce bottle). Shake the solution until fully dissolved. Drink an 8 ounce glass every 30 minutes until the solution is gone. 7 PM: Take the last 2 DULCOLAX tablets. **DO NOT EAT OR DRINK ANYTHING AFTER MIDNIGHT**          The DAY OF your colonoscopy: You may take any necessary medications with a sip of water.   Bring along someone to take you home. REMEMBER: The preparation is very important. An adequate clean out allows for the best evaluation of your entire colon. During the prep, using baby wipes may ease some of your discomfort. You should NOT plan on working or driving the rest of the day due to sedation given at the procedure    Any Questions or concerns contact April at [phone].

## 2021-11-10 NOTE — PROGRESS NOTES
General Surgery Progress Note:    Cc: anemia     S: hx UGIB early this year noted to be anemic per PCP  No hx c scope. Objective:  @/60 (Site: Left Upper Arm, Position: Sitting, Cuff Size: Medium Adult)   Pulse 72   Temp 98.7 °F (37.1 °C) (Skin)   Resp 16   Ht 5' 6.5\" (1.689 m)   Wt 190 lb (86.2 kg)   BMI 30.21 kg/m² @    Physical -     Gen: NAD  Resp: Breathing Comfortably, no resp distress  CV: Reg Rate  Abd: SNT   EXT NVI    Assessment/Plan: hx UGIB on coumadin for afib noted to be anemic no hx c scope.     Will do EGD and C scope     Will check with PCP if ok to stop coumadin for 5 days prior to endoscopy      Electronically Signed by Omar Fitzpatrick MD FACS   2:19 PM

## 2021-11-11 ENCOUNTER — TELEPHONE (OUTPATIENT)
Dept: SURGERY | Age: 66
End: 2021-11-11

## 2021-11-11 NOTE — TELEPHONE ENCOUNTER
Scheduled patient for EGD and colonoscopy on 11/23/21 at 11:45am in [facility] [address]. Patient needs to report at the front entrance 1 hour prior to the procedure, no ASA products for 7 days, remind patient to do the colon prep as well as a clear liquid diet a day before. Patient verbalized understanding. Instruction letter handed. Encouraged patient to call our office if any questions.     Electronically signed by Judy Ag on 11/11/2021 at 9:13 AM

## 2021-11-11 NOTE — TELEPHONE ENCOUNTER
Prior Authorization Form:      DEMOGRAPHICS:                     Patient Name:  Almas Rodriguez  Patient :  1955            Insurance:  Payor: [facility] / Plan: [place] / Product Type: *No Product type* /   Insurance ID Number:    Payor/Plan Subscr  Sex Relation Sub. Ins. ID Effective Group Num   1.  [id number] [facility] 1955 Male Self [id number]                                   [address] [address]         DIAGNOSIS & PROCEDURE:                       Procedure/Operation: EGD and colonoscopy          CPT Code: 40002 and 98229    Diagnosis:  Anemia     ICD10 Code: D64.9    Location:  [facility]     Surgeon:  Dr. Shy Morataya INFORMATION:                          Date: 21    Time: 11:45am              Anesthesia:  MAC/TIVA                                                       Status:  Outpatient        Special Comments:  N/A       Electronically signed by Carole Braswell on 2021 at 9:13 AM

## 2021-11-12 ENCOUNTER — PREP FOR PROCEDURE (OUTPATIENT)
Dept: SURGERY | Age: 66
End: 2021-11-12

## 2021-11-12 ENCOUNTER — TELEPHONE (OUTPATIENT)
Dept: FAMILY MEDICINE CLINIC | Age: 66
End: 2021-11-12

## 2021-11-12 RX ORDER — SODIUM CHLORIDE 9 MG/ML
25 INJECTION, SOLUTION INTRAVENOUS PRN
Status: CANCELLED | OUTPATIENT
Start: 2021-11-12

## 2021-11-12 RX ORDER — SODIUM CHLORIDE 0.9 % (FLUSH) 0.9 %
5-40 SYRINGE (ML) INJECTION EVERY 12 HOURS SCHEDULED
Status: CANCELLED | OUTPATIENT
Start: 2021-11-12

## 2021-11-12 RX ORDER — SODIUM CHLORIDE 0.9 % (FLUSH) 0.9 %
5-40 SYRINGE (ML) INJECTION PRN
Status: CANCELLED | OUTPATIENT
Start: 2021-11-12

## 2021-11-12 NOTE — TELEPHONE ENCOUNTER
April from Dr. Irma Carroll of[facility] called to inform patient is scheduled for Colonoscopy on 11/23/21 and is asking if patient can hold Aspirin for 7 days prior and hold Coumadin for 5 days prior. Please advise.     Last seen 10/26/2021  Next appt 12/13/2021

## 2021-11-15 ENCOUNTER — TELEPHONE (OUTPATIENT)
Dept: SURGERY | Age: 66
End: 2021-11-15

## 2021-11-15 ENCOUNTER — TELEPHONE (OUTPATIENT)
Dept: CARDIOLOGY CLINIC | Age: 66
End: 2021-11-15

## 2021-11-15 NOTE — TELEPHONE ENCOUNTER
Yes, hold warfarin for 5 days and restart 12 hours after the procedure if there is no concern for bleeding and ok to hold aspirin if needed otherwise continue.

## 2021-11-15 NOTE — TELEPHONE ENCOUNTER
Received a call from Dr. Tracey Carranza office Alfie Coy. She stated per Dr. Kenny Faye, it's ok to stop Coumadin 5 days prior and Aspirin 81 mg 7 days prior to the procedure. Dr. Kenny Faye wants us to reach his cardiologist Dr. Al Butterfield and see if that's ok. Attempted to call Dr. Edil Lentz office. No answer, left detail message on the phone.   Electronically signed by Porfirio Siemens on 11/15/2021 at 12:58 PM

## 2021-11-15 NOTE — TELEPHONE ENCOUNTER
April with Dr. Renny Prince office notified of Dr. Emma Brandt recommendations. She will obtain this note via Epic.

## 2021-11-15 NOTE — TELEPHONE ENCOUNTER
I called Dr. Rosales Jacobo [facility] and spoke w/April informing OK to hold per Dr. Elizabeth Cherry, but he suggested to check w/Dr. Avery/Cardiologist.  April stated she will call Dr. Anselmo Robison [facility].

## 2021-11-15 NOTE — TELEPHONE ENCOUNTER
Notified the patient. He verbalized understanding. All questions were answered.   Electronically signed by Kyaw Camilo on 11/15/2021 at 2:23 PM

## 2021-11-15 NOTE — TELEPHONE ENCOUNTER
Patient is scheduled for colonoscopy on 11/23/21 by Dr. Neli Henning. Recommended to hold Coumadin (5) days prior and Aspirin (7) days prior. Please advise.

## 2021-11-17 NOTE — PROGRESS NOTES
[address] PRE-ADMISSION TESTING ENDOSCOPY INSTRUCTIONS- [facility]-phone number:[phone]    ENDOSCOPY INSTRUCTIONS:   [x] Bowel prep instructions reviewed. [x] Nothing by mouth after midnight, including gum, candy, mints, or water. Please follow your surgeons instructions if you are required to complete a bowel prep. Colonoscopy- no solid food-only clear liquids the day prior). [x] You may brush your teeth, gargle, but do NOT swallow water. [x] Do not wear makeup, lotions, powders, deodorant. Nail polish as directed by the nurse. [x] Arrange transportation with a responsible adult  to and from the hospital. If you do not have a responsible adult  to transport you, you will need to make arrangements with a medical transportation company (i.e. [employer]. A Uber/taxi/bus is not appropriate unless you are accompanied by a responsible adult ). Arrange for someone to be with you for the remainder of the day and for 24 hours after your procedure due to having had anesthesia. Who will be your  for transportation?___friend_______________   Who will be staying with you for 24 hrs after your procedure?____friend______________    PARKING INSTRUCTIONS: 1045, WEAR MASK   · [x] Parking lot  \"I\" OR 1 is located on [facility] (the corner of [facility]). To enter, press the button and the gate will lift. A free token will be provided to exit the lot. One car per patient is allowed to park in this lot. All other cars are to park on [address] [place] [address] either in the parking garage or the handicap lot. [] To reach the [place] lobby from [address] [place] [address], upon entering the hospital, take elevator B to the 3rd floor. EDUCATION INSTRUCTIONS:  [x] Bring a complete list of your medications, please write the last time you took the medicine, give this list to the nurse.   [x] Take the following medications the morning of surgery with 1-2 ounces of water: SEE LIST  [x] Stop herbal supplements and vitamins 5 days before your surgery. [] DO NOT take any diabetic medicine the morning of surgery. Follow instructions for insulin the day before surgery. [] If you are diabetic and your blood sugar is low or you feel symptomatic, you may drink 1-2 ounces of apple juice or take a glucose tablet. The morning of your procedure, you may call the pre-op area if you have concerns about your blood sugar [phone]. [] Use your inhalers the morning of surgery. Bring your emergency inhaler with you day of surgery. [x] Follow physician instructions regarding any blood thinners you may be taking. WHAT TO EXPECT:  [x] The day of your procedure you will be greeted and checked in by the [employer].  In addition, you will be registered in the [place] by a Patient Access Representative. Please bring your photo ID and insurance card. A nurse will greet you in accordance to the time you are needed in the pre-op area to prepare you for surgery. Please do not be discouraged if you are not greeted in the order you arrive as there are many variables that are involved in patient preparation. Your patience is greatly appreciated as you wait for your nurse. Please bring in items such as: books, magazines, newspapers, electronics, or any other items  to occupy your time in the waiting area. [x]  Delays may occur. Staff will make a sincere effort to keep you informed of delays. If any delays occur with your procedure, we apologize ahead of time for your inconvenience as we recognize the value of your time.

## 2021-11-23 ENCOUNTER — HOSPITAL ENCOUNTER (OUTPATIENT)
Age: 66
Setting detail: OUTPATIENT SURGERY
Discharge: HOME | End: 2021-11-23
Attending: SURGERY | Admitting: SURGERY
Payer: MEDICARE

## 2021-11-23 ENCOUNTER — ANESTHESIA (OUTPATIENT)
Dept: ENDOSCOPY | Age: 66
End: 2021-11-23
Payer: MEDICARE

## 2021-11-23 ENCOUNTER — ANESTHESIA EVENT (OUTPATIENT)
Dept: ENDOSCOPY | Age: 66
End: 2021-11-23
Payer: MEDICARE

## 2021-11-23 VITALS — OXYGEN SATURATION: 94 % | DIASTOLIC BLOOD PRESSURE: 60 MMHG | SYSTOLIC BLOOD PRESSURE: 107 MMHG

## 2021-11-23 VITALS
BODY MASS INDEX: 29.82 KG/M2 | HEART RATE: 77 BPM | TEMPERATURE: 97 F | RESPIRATION RATE: 16 BRPM | SYSTOLIC BLOOD PRESSURE: 138 MMHG | WEIGHT: 190 LBS | DIASTOLIC BLOOD PRESSURE: 88 MMHG | HEIGHT: 67 IN | OXYGEN SATURATION: 94 %

## 2021-11-23 PROCEDURE — 3700000001 HC ADD 15 MINUTES (ANESTHESIA): Performed by: SURGERY

## 2021-11-23 PROCEDURE — 3609010600 HC COLONOSCOPY POLYPECTOMY SNARE/COLD BIOPSY: Performed by: SURGERY

## 2021-11-23 PROCEDURE — 7100000011 HC PHASE II RECOVERY - ADDTL 15 MIN: Performed by: SURGERY

## 2021-11-23 PROCEDURE — 7100000010 HC PHASE II RECOVERY - FIRST 15 MIN: Performed by: SURGERY

## 2021-11-23 PROCEDURE — 88305 TISSUE EXAM BY PATHOLOGIST: CPT

## 2021-11-23 PROCEDURE — 45385 COLONOSCOPY W/LESION REMOVAL: CPT | Performed by: SURGERY

## 2021-11-23 PROCEDURE — 3700000000 HC ANESTHESIA ATTENDED CARE: Performed by: SURGERY

## 2021-11-23 PROCEDURE — 2500000003 HC RX 250 WO HCPCS: Performed by: ANESTHESIOLOGIST ASSISTANT

## 2021-11-23 PROCEDURE — 2580000003 HC RX 258: Performed by: SURGERY

## 2021-11-23 PROCEDURE — 2709999900 HC NON-CHARGEABLE SUPPLY: Performed by: SURGERY

## 2021-11-23 PROCEDURE — 2580000003 HC RX 258: Performed by: ANESTHESIOLOGIST ASSISTANT

## 2021-11-23 PROCEDURE — 3609017100 HC EGD: Performed by: SURGERY

## 2021-11-23 PROCEDURE — 43235 EGD DIAGNOSTIC BRUSH WASH: CPT | Performed by: SURGERY

## 2021-11-23 PROCEDURE — 6360000002 HC RX W HCPCS: Performed by: NURSE ANESTHETIST, CERTIFIED REGISTERED

## 2021-11-23 RX ORDER — SODIUM CHLORIDE 9 MG/ML
INJECTION, SOLUTION INTRAVENOUS CONTINUOUS PRN
Status: DISCONTINUED | OUTPATIENT
Start: 2021-11-23 | End: 2021-11-23 | Stop reason: SDUPTHER

## 2021-11-23 RX ORDER — EPHEDRINE SULFATE/0.9% NACL/PF 50 MG/5 ML
SYRINGE (ML) INTRAVENOUS PRN
Status: DISCONTINUED | OUTPATIENT
Start: 2021-11-23 | End: 2021-11-23 | Stop reason: SDUPTHER

## 2021-11-23 RX ORDER — SODIUM CHLORIDE 0.9 % (FLUSH) 0.9 %
5-40 SYRINGE (ML) INJECTION EVERY 12 HOURS SCHEDULED
Status: DISCONTINUED | OUTPATIENT
Start: 2021-11-23 | End: 2021-11-23 | Stop reason: HOSPADM

## 2021-11-23 RX ORDER — SODIUM CHLORIDE 9 MG/ML
25 INJECTION, SOLUTION INTRAVENOUS PRN
Status: DISCONTINUED | OUTPATIENT
Start: 2021-11-23 | End: 2021-11-23 | Stop reason: HOSPADM

## 2021-11-23 RX ORDER — FENTANYL CITRATE 50 UG/ML
INJECTION, SOLUTION INTRAMUSCULAR; INTRAVENOUS PRN
Status: DISCONTINUED | OUTPATIENT
Start: 2021-11-23 | End: 2021-11-23 | Stop reason: SDUPTHER

## 2021-11-23 RX ORDER — SODIUM CHLORIDE 0.9 % (FLUSH) 0.9 %
5-40 SYRINGE (ML) INJECTION PRN
Status: DISCONTINUED | OUTPATIENT
Start: 2021-11-23 | End: 2021-11-23 | Stop reason: HOSPADM

## 2021-11-23 RX ORDER — PROPOFOL 10 MG/ML
INJECTION, EMULSION INTRAVENOUS CONTINUOUS PRN
Status: DISCONTINUED | OUTPATIENT
Start: 2021-11-23 | End: 2021-11-23 | Stop reason: SDUPTHER

## 2021-11-23 RX ADMIN — FENTANYL CITRATE 50 MCG: 50 INJECTION, SOLUTION INTRAMUSCULAR; INTRAVENOUS at 11:47

## 2021-11-23 RX ADMIN — PROPOFOL 75 MCG/KG/MIN: 10 INJECTION, EMULSION INTRAVENOUS at 11:47

## 2021-11-23 RX ADMIN — SODIUM CHLORIDE 25 ML: 9 INJECTION, SOLUTION INTRAVENOUS at 10:52

## 2021-11-23 RX ADMIN — Medication 25 MG: at 12:31

## 2021-11-23 RX ADMIN — FENTANYL CITRATE 50 MCG: 50 INJECTION, SOLUTION INTRAMUSCULAR; INTRAVENOUS at 11:51

## 2021-11-23 RX ADMIN — SODIUM CHLORIDE: 9 INJECTION, SOLUTION INTRAVENOUS at 11:45

## 2021-11-23 ASSESSMENT — PAIN SCALES - GENERAL
PAINLEVEL_OUTOF10: 0

## 2021-11-23 ASSESSMENT — PAIN - FUNCTIONAL ASSESSMENT: PAIN_FUNCTIONAL_ASSESSMENT: 0-10

## 2021-11-23 NOTE — ANESTHESIA PRE PROCEDURE
Department of Anesthesiology  Preprocedure Note       Name:  Tobin Alvarado   Age:  74 y.o.  :  1955                                          MRN:  [id number]         Date:  2021      Surgeon: Shi Pozo):  Mariela Suarez MD    Procedure colonoscopy    Medications prior to admission:   Prior to Admission medications    Medication Sig Start Date End Date Taking?  Authorizing Provider   pantoprazole (PROTONIX) 40 MG tablet Take 1 tablet by mouth every morning (before breakfast) 10/26/21  Yes Kamron Chase DO   ferrous sulfate (IRON 325) 325 (65 Fe) MG tablet Take 1 tablet by mouth daily (with breakfast) 10/26/21  Yes Kamron Chase DO   warfarin (COUMADIN) 6 MG tablet Take 9 mg MWF; 6 mg all other days 21  Yes Kamron Chase DO   amLODIPine (NORVASC) 5 MG tablet Take 1 tablet by mouth daily 21 Yes Surya Ramsay MD   bumetanide (BUMEX) 1 MG tablet Take 1 tablet by mouth 2 times daily 21 Yes Surya Ramsay MD   carvedilol (COREG) 25 MG tablet Take 1 tablet by mouth 2 times daily (with meals) 21 Yes Surya Ramsay MD   digoxin (LANOXIN) 125 MCG tablet TAKE 1/2 TABLET (62.5MG) DAILY ADDITIONAL REFILLS FROM PRIMARY CARDIOLOGIST OR PCP 21  Yes Surya Ramsay MD   hydrALAZINE (APRESOLINE) 50 MG tablet Take 1 tablet by mouth 3 times daily 21 Yes Surya Ramsay MD   isosorbide dinitrate (ISORDIL) 5 MG tablet Take 1 tablet by mouth 3 times daily 21 Yes Surya Ramsay MD   losartan (COZAAR) 50 MG tablet Take 1 tablet by mouth daily 21  Yes Surya Ramsay MD   senna (SENOKOT) 8.6 MG TABS tablet TAKE 2 TABLETS BY MOUTH 2 TIMES DAILY 3/24/21  Yes Kamron Chase DO   aspirin 81 MG chewable tablet Take 1 tablet by mouth daily Additional Refills from Primary Cardiologist or PCP 1/16/20  Yes Yumiko Honey, DO   Multiple Vitamins-Minerals (THERAPEUTIC MULTIVITAMIN-MINERALS) tablet Take 1 tablet by mouth daily   Yes Historical Provider, MD       Current medications:    Current Facility-Administered Medications   Medication Dose Route Frequency Provider Last Rate Last Admin    0.9 % sodium chloride infusion  25 mL IntraVENous PRN Chasidy Butler MD        sodium chloride flush 0.9 % injection 5-40 mL  5-40 mL IntraVENous 2 times per day Chasidy Butler MD        sodium chloride flush 0.9 % injection 5-40 mL  5-40 mL IntraVENous PRN Chasidy Butler MD           Allergies:  No Known Allergies    Problem List:    Patient Active Problem List   Diagnosis Code    H/O sarcoidosis Z80.3    Essential hypertension I10    Moderate obesity E66.8    Stage 3a chronic kidney disease ([facility] N18.31    MR (mitral regurgitation) I34.0    Anticoagulated on Coumadin Z79.01    Cardiomyopathy ([facility] I42.9    Rate controlled atrial fibrillation ([facility] [facility] I48.91    Cognitive dysfunction F09    Mixed hyperlipidemia E78.2    Hyperuricemia E79.0    Dissection of thoracic aorta ([facility] I71.01    Transition of care performed with sharing of clinical summary [id number]    Chronic combined systolic and diastolic CHF (congestive heart failure) ([facility]) I50.42    Atrial fibrillation with RVR ([facility]) I48.91    Fatigue R53.83    KYARA on CPAP G47.33, Z99.89    Restrictive lung disease J98.4    Gastrointestinal hemorrhage K92.2    Acute blood loss anemia D62    Hypotension due to hypovolemia I95.89, E86.1    COPD (chronic obstructive pulmonary disease) ([facility]) J44.9    CAD (coronary artery disease) I25.10    Hx of CABG Z95.1    S/P AAA repair Z98.890, Z86.79    Lactic acidosis E87.2    Elevated LDL cholesterol level E78.00       Past Medical History:        Diagnosis Date    Acute kidney injury ([facility]     Aortic dissection (HCC)     Atrial fibrillation ([facility]     CAD (coronary artery disease)     CHF (congestive heart failure) ([address] 5/03    severely impaired LV systolic function and CHF, EF 25-30%    CKD (chronic kidney disease)     H/O cardiovascular stress test     No evidence of stress-induced ischemia    H/O Doppler echocardiogram 5/04/10    [facility], mildly dilated LV, mod concentric LVH, normal LV systolic function, mod dilated left atrium, trace MR    Hypertension     Ischemic cardiomyopathy     Obesity     Pericardial effusion (noninflammatory) 11/15/2019    History: Post-op problem  Assessment: S/P pericardial drain placement in CVICU  Plan:  Drain dc'ed, no need for post-procedure echo unless pt become symptomatic.  Pleural effusion 2019    History: Post op problem Assessment: On r/a, left pigtail dc'ed Plan: PO lasix. Denies sob.   Desat study done, no home O2 needed    Sarcoidosis     Valvular heart disease        Past Surgical History:        Procedure Laterality Date    OTHER SURGICAL HISTORY  11/15/2019    aortic dissection repair @ [facility]    TONSILLECTOMY      UPPER GASTROINTESTINAL ENDOSCOPY N/A 3/5/2021    EGD CONTROL HEMORRHAGE performed by Jerome Ruiz MD at [address] [place] [address] N/A 2021    EGD ESOPHAGOGASTRODUODENOSCOPY performed by Jerome Ruiz MD at [facility] History:    Social History     Tobacco Use    Smoking status: Former Smoker     Packs/day: 0.50     Years: 5.00     Pack years: 2.50     Types: Cigarettes, Pipe, Cigars     Start date:      Quit date: 10/16/1993     Years since quittin.1    Smokeless tobacco: Never Used   Substance Use Topics    Alcohol use: Not Currently                                Counseling given: Not Answered      Vital Signs (Current):   Vitals:    21 1022 21 1039   BP:  (!) 140/72   Pulse:  70   Resp:  20   Temp:  98.4 °F (36.9 °C)   TempSrc:  Temporal   SpO2:  97%   Weight: 190 lb (86.2 kg)    Height: 5' 6.5\" (1.689 m)                                               BP Readings from Last 3 Encounters:   21 (!) 140/72   11/10/21 114/60   10/26/21 130/80       NPO Status: per pt >8hours  BMI:   Wt Readings from Last 3 Encounters:   11/23/21 190 lb (86.2 kg)   11/10/21 190 lb (86.2 kg)   10/26/21 206 lb (93.4 kg)     Body mass index is 30.21 kg/m². CBC:   Lab Results   Component Value Date    WBC 5.6 10/26/2021    RBC 3.02 10/26/2021    HGB 9.5 10/26/2021    HCT 30.9 10/26/2021    .3 10/26/2021    RDW 18.8 10/26/2021     10/26/2021       CMP:   Lab Results   Component Value Date     10/26/2021    K 4.5 10/26/2021    K 4.3 03/05/2021     10/26/2021    CO2 21 10/26/2021    BUN 30 10/26/2021    CREATININE 1.6 10/26/2021    GFRAA 52 10/26/2021    LABGLOM 43 10/26/2021    GLUCOSE 90 10/26/2021    PROT 7.7 10/26/2021    CALCIUM 10.0 10/26/2021    BILITOT 1.2 10/26/2021    ALKPHOS 75 10/26/2021    AST 29 10/26/2021    ALT 19 10/26/2021       POC Tests: No results for input(s): POCGLU, POCNA, POCK, POCCL, POCBUN, POCHEMO, POCHCT in the last 72 hours.     Coags:   Lab Results   Component Value Date    PROTIME 23.4 11/09/2021    INR 1.9 11/09/2021    INR 1.9 10/22/2021    APTT 117.1 01/15/2020       HCG (If Applicable): No results found for: PREGTESTUR, PREGSERUM, HCG, HCGQUANT     ABGs: No results found for: PHART, PO2ART, WWQ4DFK, DVM8YQQ, BEART, W4QYKPAI     Type & Screen (If Applicable):  No results found for: LABABO, LABRH    Drug/Infectious Status (If Applicable):  No results found for: HIV, HEPCAB    COVID-19 Screening (If Applicable):   Lab Results   Component Value Date    COVID19 Not Detected 04/14/2021           Anesthesia Evaluation  Patient summary reviewed and Nursing notes reviewed no history of anesthetic complications:   Airway: Mallampati: II  TM distance: >3 FB   Neck ROM: full  Mouth opening: > = 3 FB Dental:          Pulmonary:   (+) COPD:  sleep apnea:  decreased breath sounds,                             Cardiovascular:    (+) hypertension:, valvular problems/murmurs:, CAD:, CABG/stent:, dysrhythmias: atrial fibrillation, CHF: systolic and diastolic,         Rhythm: regular  Rate: normal                 ROS comment: ECHO 1/7/2020      Summary   Left ventricle is moderately enlarged . Ejection fraction is visually estimated at 40-45%. Overall ejection fraction mild-to-moderately decreased . Septal motion consistent with post bypass surgery. There is severe eccentric hypertrophy. Abnormal diastolic function. The left atrium is severely dilated. Normal right ventricular size and function. Mild to moderate mitral regurgitation is present. No hemodynamically significant aortic stenosis is present. RVSP is 29 mmHg. Normal PA systolic pressure. Mildly dilated aortic root (4.1 cm) and normal sized ascending aorta. A prosthetic graft noted in the location of the ascending aorta. There is a small localized near left ventricle pericardial effusion noted. Neuro/Psych:               GI/Hepatic/Renal:   (+) GERD:, renal disease ( CKD 3): CRI,           Endo/Other:    (+) blood dyscrasia ( on coumadin last dose on 4/18-ok per dr Lola Arenas): anemia and anticoagulation therapy:., .                 Abdominal:             Vascular:   + PVD, aortic or cerebral, . 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 Other Findings:               Anesthesia Plan      MAC     ASA 3       Induction: intravenous. Anesthetic plan and risks discussed with patient. Plan discussed with attending.                   Sage De Jesus MD   11/23/2021

## 2021-11-23 NOTE — ANESTHESIA POSTPROCEDURE EVALUATION
Department of Anesthesiology  Postprocedure Note    Patient: Bing Bro  MRN: [id number]  YOB: 1955  Date of evaluation: 11/24/2021  Time:  7:20 AM     Procedure Summary     Date: 11/23/21 Room / Location: [facility] 01 / [facility] 75    Anesthesia Start:  Anesthesia Stop:     Procedures:       COLONOSCOPY DIAGNOSTIC (N/A )      EGD DIAGNOSTIC ONLY (N/A ) Diagnosis: (ANEMIA)    Surgeons: Aidee Ma MD Responsible Provider:     Anesthesia Type: MAC ASA Status: 3          Anesthesia Type: MAC    Reji Phase I: Reji Score: 10    Reji Phase II: Reji Score: 10    Last vitals: Reviewed and per EMR flowsheets.        Anesthesia Post Evaluation    Patient location during evaluation: PACU  Patient participation: complete - patient participated  Level of consciousness: awake  Pain score: 3  Airway patency: patent  Nausea & Vomiting: no nausea and no vomiting  Complications: no  Cardiovascular status: blood pressure returned to baseline  Respiratory status: acceptable  Hydration status: euvolemic

## 2021-11-23 NOTE — OP NOTE
EGD/Colonoscopy Op Note    DATE OF PROCEDURE: 11/23/2021     SURGEON: Kvng Garcia MD    PREOPERATIVE DIAGNOSIS: anemia     POSTOPERATIVE DIAGNOSIS: Same normal EGD, multiple colonic polyps     OPERATION: Procedure(s):  COLONOSCOPY POLYPECTOMY SNARE/COLD BIOPSY  EGD DIAGNOSTIC ONLY    ANESTHESIA: Local monitored anesthesia. ESTIMATED BLOOD LOSS: minimal    COMPLICATIONS: None. SPECIMENS:    ID Type Source Tests Collected by Time Destination   A : left colon polyp Tissue Colon SURGICAL PATHOLOGY Kvng Garcia MD 11/23/2021 1201    B : RIGHT COLON POLYP #1 Tissue Colon SURGICAL PATHOLOGY Kvng Garcia MD 11/23/2021 1211    C : RIGHT COLON POLYP #2 Tissue Colon SURGICAL PATHOLOGY Kvng Garcia MD 11/23/2021 1212    D : RIGHT COLON POLYP #3 Tissue Colon SURGICAL PATHOLOGY Kvng Garcia MD 11/23/2021 1215        HISTORY: The patient is a 74y.o. year old male with history of above preop diagnosis. I recommended esophagogastroduodenoscopy and colonoscopy with possible biopsy/polypectomy and I explained the risk, benefits, expected outcome, and alternatives to the procedure. Risks included but are not limited to bleeding, infection, respiratory distress, hypotension, and perforation. Patient understands and is in agreement. PROCEDURE: The patient was given IV conscious sedation per anesthesia. The patient was given supplemental oxygen by nasal cannula. The gastroscope was inserted orally and advanced under direct vision through the esophagus, through the stomach, through the pylorus, and into the duodenum. Findings:  Duodenum:     Descending: normal    Bulb: normal    Stomach:    Antrum: normal    Body: normal    Fundus: normal    Esophagus: normal    Larynx: not examined    The scope was removed and the patient tolerated the procedure well. The colonoscope was inserted per rectum and advanced under direct vision to the cecum without difficulty, identified by ileocecal valve.   The prep was good so exam was adequate. FINDINGS:    LUIS: abnormal: anal skin tags, hemorrhoids,     Terminal Ileum: normal    Cecum/Ascending colon: polyps x 3 removed with hot snare     Transverse colon: normal    Descending/Sigmoid colon: abnormal: polyp 1 cm removed with hot snare     Rectum/Anus: examined in normal and retroflexed positions and was wnl     The colon was decompressed and the scope was removed. The withdraw time was approximately 10 minutes. The patient tolerated the procedure well. ASSESSMENT/PLAN:     1.  Colorectal Cancer Screening - recommend repeat colonoscopy in 3 years due to multiple polyps sooner if issues     Justine Johnson MD  11/23/21  12:31 PM

## 2021-11-23 NOTE — H&P
[facility] Surgery   History and Physical    Patient's Name/Date of Birth: Reny De La Torre / 1955 (74 y.o.)      PCP: Melissa Tariq DO      CC:  Anemia hx GIB    HPI:  74 y.o. male  Anemia hx GIB with ulcers on [address], no hx of C scope. Past Medical History:   Diagnosis Date    [facility] ([facility]     Aortic dissection (HCC)     Atrial fibrillation (HCC)     CAD (coronary artery disease)     CHF (congestive heart failure) ([facility] 5/03    severely impaired LV systolic function and CHF, EF 25-30%    CKD (chronic kidney disease)     H/O cardiovascular stress test 5/03    No evidence of stress-induced ischemia    H/O Doppler echocardiogram 5/04/10    [facility], mildly dilated LV, mod concentric LVH, normal LV systolic function, mod dilated left atrium, trace MR    Hypertension     Ischemic cardiomyopathy     Obesity     Pericardial effusion (noninflammatory) 11/15/2019    History: Post-op problem  Assessment: S/P pericardial drain placement in CVICU 12/2 Plan:  Drain dc'ed, no need for post-procedure echo unless pt become symptomatic.  Pleural effusion 11/26/2019    History: Post op problem Assessment: On r/a, left pigtail dc'ed Plan: PO lasix. Denies sob.   Desat study done, no home O2 needed    Sarcoidosis     Valvular heart disease        Past Surgical History:   Procedure Laterality Date    OTHER SURGICAL HISTORY  11/15/2019    aortic dissection repair @ [facility]    TONSILLECTOMY      UPPER GASTROINTESTINAL ENDOSCOPY N/A 3/5/2021    EGD CONTROL HEMORRHAGE performed by Andreas Trinidad MD at [address] [place] [address] N/A 4/19/2021    EGD ESOPHAGOGASTRODUODENOSCOPY performed by Andreas Trinidad MD at [place] [address] History   Problem Relation Age of Onset    Diabetes Mother     Diabetes Father     Hypertension Sister     Diabetes Brother     Hypertension Brother     No Known Problems Brother        Social History     Socioeconomic History    Marital status:      Spouse name: Not on file    Number of children: 2    Years of education: 12    Highest education level: Bachelor's degree (e.g., BA, AB, BS)   Occupational History    Occupation: [employer]   Tobacco Use    Smoking status: Former Smoker     Packs/day: 0.50     Years: 5.00     Pack years: 2.50     Types: Cigarettes, Pipe, Cigars     Start date:      Quit date: 10/16/1993     Years since quittin.1    Smokeless tobacco: Never Used   Vaping Use    Vaping Use: Never used    Passive vaping exposure: Yes   Substance and Sexual Activity    Alcohol use: Not Currently    Drug use: Not Currently     Frequency: 3.0 times per week     Types: Marijuana (Weed)     Comment: since 65s - few times a week    Sexual activity: Not Currently     Partners: Female     Comment:  5 years ago   Other Topics Concern    Not on file   Social History Narrative    Not on file     Social Determinants of Health     Financial Resource Strain:     Difficulty of Paying Living Expenses: Not on file   Food Insecurity:     Worried About Running Out of Food in the Last Year: Not on file    Bridger of Food in the Last Year: Not on file   Transportation Needs:     Lack of Transportation (Medical): Not on file    Lack of Transportation (Non-Medical):  Not on file   Physical Activity:     Days of Exercise per Week: Not on file    Minutes of Exercise per Session: Not on file   Stress:     Feeling of Stress : Not on file   Social Connections:     Frequency of Communication with Friends and Family: Not on file    Frequency of Social Gatherings with Friends and Family: Not on file    Attends Druze Services: Not on file    Active Member of Clubs or Organizations: Not on file    Attends Club or Organization Meetings: Not on file    Marital Status: Not on file   Intimate Partner Violence:     Fear of Current or Ex-Partner: Not on file    Emotionally Abused: Not on file    Physically Abused: Not on file  Sexually Abused: Not on file   Housing Stability:     Unable to Pay for Housing in the Last Year: Not on file    Number of Places Lived in the Last Year: Not on file    Unstable Housing in the Last Year: Not on file       Current Facility-Administered Medications   Medication Dose Route Frequency Provider Last Rate Last Admin    perflutren lipid microspheres (DEFINITY) injection 1.65 mg  1.5 mL IntraVENous ONCE PRN Shai Blue MD         Current Outpatient Medications   Medication Sig Dispense Refill    pantoprazole (PROTONIX) 40 MG tablet Take 1 tablet by mouth every morning (before breakfast) 30 tablet 0    ferrous sulfate (IRON 325) 325 (65 Fe) MG tablet Take 1 tablet by mouth daily (with breakfast) 30 tablet 0    warfarin (COUMADIN) 6 MG tablet Take 9 mg MWF; 6 mg all other days 90 tablet 1    amLODIPine (NORVASC) 5 MG tablet Take 1 tablet by mouth daily 90 tablet 3    bumetanide (BUMEX) 1 MG tablet Take 1 tablet by mouth 2 times daily 180 tablet 3    carvedilol (COREG) 25 MG tablet Take 1 tablet by mouth 2 times daily (with meals) 180 tablet 3    digoxin (LANOXIN) 125 MCG tablet TAKE 1/2 TABLET (62.5MG) DAILY ADDITIONAL REFILLS FROM PRIMARY CARDIOLOGIST OR PCP 45 tablet 3    hydrALAZINE (APRESOLINE) 50 MG tablet Take 1 tablet by mouth 3 times daily 270 tablet 3    isosorbide dinitrate (ISORDIL) 5 MG tablet Take 1 tablet by mouth 3 times daily 270 tablet 3    losartan (COZAAR) 50 MG tablet Take 1 tablet by mouth daily 90 tablet 3    senna (SENOKOT) 8.6 MG TABS tablet TAKE 2 TABLETS BY MOUTH 2 TIMES DAILY 120 tablet 11    aspirin 81 MG chewable tablet Take 1 tablet by mouth daily Additional Refills from Primary Cardiologist or PCP 30 tablet 0    Multiple Vitamins-Minerals (THERAPEUTIC MULTIVITAMIN-MINERALS) tablet Take 1 tablet by mouth daily         No Known Allergies    REVIEW OF SYSTEMS:      Physical Exam:    Gen: NAD  Resp: Breathing Comfortably, no resp distress  CV: Reg Rate  Abd: SNT   EXT NVI     Assessment/Plan: hx UGIB on coumadin for afib noted to be anemic no hx c scope.     Will do EGD and C scope      Will check with PCP if ok to stop coumadin for 5 days prior to endoscopy        Electronically Signed by Kimani Hermosillo MD FACS   9:10 AM

## 2021-12-13 DIAGNOSIS — D62 ACUTE BLOOD LOSS ANEMIA: ICD-10-CM

## 2021-12-13 DIAGNOSIS — I48.91 RATE CONTROLLED ATRIAL FIBRILLATION (HCC): ICD-10-CM

## 2021-12-13 DIAGNOSIS — R79.9 ABNORMAL FINDING OF BLOOD CHEMISTRY, UNSPECIFIED: ICD-10-CM

## 2021-12-13 DIAGNOSIS — N18.31 STAGE 3A CHRONIC KIDNEY DISEASE (HCC): ICD-10-CM

## 2021-12-13 DIAGNOSIS — I71.019 DISSECTION OF THORACIC AORTA (HCC): ICD-10-CM

## 2021-12-13 DIAGNOSIS — I10 ESSENTIAL HYPERTENSION: ICD-10-CM

## 2021-12-13 DIAGNOSIS — I50.22 CHRONIC SYSTOLIC HEART FAILURE (HCC): ICD-10-CM

## 2021-12-13 LAB
HBA1C MFR BLD: 4.9 % (ref 4–5.6)
IRON SATURATION: 17 % (ref 20–55)
IRON: 52 MCG/DL (ref 59–158)
TOTAL IRON BINDING CAPACITY: 303 MCG/DL (ref 250–450)

## 2022-03-07 ENCOUNTER — OFFICE VISIT (OUTPATIENT)
Dept: CARDIOLOGY CLINIC | Age: 67
End: 2022-03-07
Payer: MEDICARE

## 2022-03-07 VITALS
WEIGHT: 206 LBS | DIASTOLIC BLOOD PRESSURE: 60 MMHG | RESPIRATION RATE: 16 BRPM | BODY MASS INDEX: 32.33 KG/M2 | HEIGHT: 67 IN | SYSTOLIC BLOOD PRESSURE: 122 MMHG | HEART RATE: 65 BPM

## 2022-03-07 DIAGNOSIS — I10 ESSENTIAL HYPERTENSION: ICD-10-CM

## 2022-03-07 DIAGNOSIS — I50.22 CHRONIC SYSTOLIC HEART FAILURE (HCC): ICD-10-CM

## 2022-03-07 DIAGNOSIS — I48.91 RATE CONTROLLED ATRIAL FIBRILLATION (HCC): ICD-10-CM

## 2022-03-07 DIAGNOSIS — I34.0 NONRHEUMATIC MITRAL VALVE REGURGITATION: ICD-10-CM

## 2022-03-07 DIAGNOSIS — I25.5 ISCHEMIC CARDIOMYOPATHY: Primary | ICD-10-CM

## 2022-03-07 PROCEDURE — G8417 CALC BMI ABV UP PARAM F/U: HCPCS | Performed by: INTERNAL MEDICINE

## 2022-03-07 PROCEDURE — 99214 OFFICE O/P EST MOD 30 MIN: CPT | Performed by: INTERNAL MEDICINE

## 2022-03-07 PROCEDURE — 4040F PNEUMOC VAC/ADMIN/RCVD: CPT | Performed by: INTERNAL MEDICINE

## 2022-03-07 PROCEDURE — G8427 DOCREV CUR MEDS BY ELIG CLIN: HCPCS | Performed by: INTERNAL MEDICINE

## 2022-03-07 PROCEDURE — 1123F ACP DISCUSS/DSCN MKR DOCD: CPT | Performed by: INTERNAL MEDICINE

## 2022-03-07 PROCEDURE — 1036F TOBACCO NON-USER: CPT | Performed by: INTERNAL MEDICINE

## 2022-03-07 PROCEDURE — G8484 FLU IMMUNIZE NO ADMIN: HCPCS | Performed by: INTERNAL MEDICINE

## 2022-03-07 PROCEDURE — 93000 ELECTROCARDIOGRAM COMPLETE: CPT | Performed by: INTERNAL MEDICINE

## 2022-03-07 PROCEDURE — 3017F COLORECTAL CA SCREEN DOC REV: CPT | Performed by: INTERNAL MEDICINE

## 2022-03-07 RX ORDER — BUMETANIDE 1 MG/1
1 TABLET ORAL 2 TIMES DAILY
Qty: 180 TABLET | Refills: 3 | Status: SHIPPED
Start: 2022-03-07 | End: 2022-10-10 | Stop reason: SDUPTHER

## 2022-03-07 RX ORDER — LOSARTAN POTASSIUM 50 MG/1
50 TABLET ORAL DAILY
Qty: 90 TABLET | Refills: 3 | Status: SHIPPED
Start: 2022-03-07 | End: 2022-10-04 | Stop reason: SDUPTHER

## 2022-03-07 RX ORDER — AMLODIPINE BESYLATE 5 MG/1
5 TABLET ORAL DAILY
Qty: 90 TABLET | Refills: 3 | Status: SHIPPED | OUTPATIENT
Start: 2022-03-07 | End: 2022-06-01

## 2022-03-07 RX ORDER — DIGOXIN 125 MCG
TABLET ORAL
Qty: 45 TABLET | Refills: 3 | Status: SHIPPED | OUTPATIENT
Start: 2022-03-07

## 2022-03-07 RX ORDER — HYDRALAZINE HYDROCHLORIDE 50 MG/1
50 TABLET, FILM COATED ORAL 3 TIMES DAILY
Qty: 270 TABLET | Refills: 3 | Status: SHIPPED | OUTPATIENT
Start: 2022-03-07 | End: 2022-06-05

## 2022-03-07 RX ORDER — ISOSORBIDE DINITRATE 5 MG/1
5 TABLET ORAL 3 TIMES DAILY
Qty: 270 TABLET | Refills: 3 | Status: SHIPPED
Start: 2022-03-07 | End: 2022-09-15 | Stop reason: SDUPTHER

## 2022-03-07 RX ORDER — CARVEDILOL 25 MG/1
25 TABLET ORAL 2 TIMES DAILY WITH MEALS
Qty: 180 TABLET | Refills: 3 | Status: SHIPPED | OUTPATIENT
Start: 2022-03-07 | End: 2022-06-05

## 2022-03-07 NOTE — PROGRESS NOTES
since his last, he has not had any further hospitalizations or ER visits. He was hospitalized from 3/4/2021 to 3/10/2021 with a GI bleed. He had a hematemesis and also dark blood in his bowels. In the emergency room, he was noted to be hypotensive and tachycardic with a hemoglobin of 8.1 and his INR was 2.4 lactic acid 2.4. He had a CT chest which showed a stent in the aortic arch extending into the proximal descending aorta. His warfarin was held, surgical service was consulted. He underwent an EGD which showed gastric ulcer which was injected with epi and was also cauterized. He did receive 1 unit of packed red blood cells and was initiated on Protonix. He denies any further episodes of bloody or black stools. He is not taking over-the-counter medications such as arthritis medications. He is back on warfarin for anticoagulation and he is following with his PCP for INR monitoring. No further hospitalizations or ER visits since then. He is interested in Watchman device and would like to avoid anticoagulants due to prior GI bleeding. He takes oxygen at home mainly at nighttime. He denies any leg edema, abdominal bloating or early satiety. Now, he noticed his oxygen levels staying in the 90s. Now, he is using his CPAP on a regular basis. He does not have a portable oxygen. He is also following with Dr. Joshua Nicholson for sarcoidosis and also pleural effusion. He is compliant with medications, as well as salt and fluid intake. He does not take any over-the-counter arthritis medications. No new cardiac complaints since last cardiology evaluation. He told me that his strength is not coming back and not able to lift heavy bench pressing. He  take  medications on a regular basis without skipping. He chest pain denies recent chest pain, palpitations, lightheadedness, dizziness, syncope, PND, or orthopnea. AF on EKG.     Review of Systems:   Cardiac: As per HPI  General: No fever, chills  Pulmonary: As per HPI  HEENT: No visual disturbances, difficult swallowing  GI: No nausea, vomiting  Endocrine: No thyroid disease or DM  Musculoskeletal: MAYFIELD x 4, no focal motor deficits  Skin: Intact, no rashes  Neuro/Psych: No headache or seizures    Past Medical History:  Past Medical History:   Diagnosis Date    [facility]     Aortic dissection (HCC)     Atrial fibrillation ([facility]     CAD (coronary artery disease)     CHF (congestive heart failure) ([facility] 5/03    severely impaired LV systolic function and CHF, EF 25-30%    CKD (chronic kidney disease)     H/O cardiovascular stress test 5/03    No evidence of stress-induced ischemia    H/O Doppler echocardiogram 5/04/10    [facility], mildly dilated LV, mod concentric LVH, normal LV systolic function, mod dilated left atrium, trace MR    Hypertension     Ischemic cardiomyopathy     Obesity     Pericardial effusion (noninflammatory) 11/15/2019    History: Post-op problem  Assessment: S/P pericardial drain placement in CVICU 12/2 Plan:  Drain dc'ed, no need for post-procedure echo unless pt become symptomatic.  Pleural effusion 11/26/2019    History: Post op problem Assessment: On r/a, left pigtail dc'ed Plan: PO lasix. Denies sob.   Desat study done, no home O2 needed    Sarcoidosis     Valvular heart disease        Past Surgical History:  Past Surgical History:   Procedure Laterality Date    COLONOSCOPY N/A 11/23/2021    COLONOSCOPY POLYPECTOMY SNARE/COLD BIOPSY performed by Nuris Brantley MD at [facility]  11/15/2019    aortic dissection repair @ [facility]    TONSILLECTOMY      UPPER GASTROINTESTINAL ENDOSCOPY N/A 3/5/2021    EGD CONTROL HEMORRHAGE performed by Nuris Brantley MD at [address] [facility] N/A 4/19/2021    EGD ESOPHAGOGASTRODUODENOSCOPY performed by Nuris Brantley MD at [address] [facility] 11/23/2021    EGD DIAGNOSTIC ONLY performed by Sindy Kendrick Rylan Hand MD at [facility]       Family History:  Family History   Problem Relation Age of Onset    Diabetes Mother     Diabetes Father     Hypertension Sister     Diabetes Brother     Hypertension Brother     No Known Problems Brother        Social History:  Social History     Socioeconomic History    Marital status:      Spouse name: Not on file    Number of children: 2    Years of education: 12    Highest education level: Bachelor's degree (e.g., BA, AB, BS)   Occupational History    Occupation: [employer]   Tobacco Use    Smoking status: Former Smoker     Packs/day: 0.50     Years: 5.00     Pack years: 2.50     Types: Cigarettes, Pipe, Cigars     Start date:      Quit date: 10/16/1993     Years since quittin.4    Smokeless tobacco: Never Used   Vaping Use    Vaping Use: Never used    Passive vaping exposure: Yes   Substance and Sexual Activity    Alcohol use: Not Currently    Drug use: Not Currently     Frequency: 3.0 times per week     Types: Marijuana (Weed)     Comment: since 65s - few times a week    Sexual activity: Not Currently     Partners: Female     Comment:  5 years ago   Other Topics Concern    Not on file   Social History Narrative    Not on file     Social Determinants of Health     Financial Resource Strain:     Difficulty of Paying Living Expenses: Not on file   Food Insecurity:     Worried About Running Out of Food in the Last Year: Not on file    Bridger of Food in the Last Year: Not on file   Transportation Needs:     Lack of Transportation (Medical): Not on file    Lack of Transportation (Non-Medical):  Not on file   Physical Activity:     Days of Exercise per Week: Not on file    Minutes of Exercise per Session: Not on file   Stress:     Feeling of Stress : Not on file   Social Connections:     Frequency of Communication with Friends and Family: Not on file    Frequency of Social Gatherings with Friends and Family: Not on file    Attends Taoism Services: Not on file    Active Member of Clubs or Organizations: Not on file    Attends Club or Organization Meetings: Not on file    Marital Status: Not on file   Intimate Partner Violence:     Fear of Current or Ex-Partner: Not on file    Emotionally Abused: Not on file    Physically Abused: Not on file    Sexually Abused: Not on file   Housing Stability:     Unable to Pay for Housing in the Last Year: Not on file    Number of [place] in the Last Year: Not on file    Unstable Housing in the Last Year: Not on file       Allergies:  No Known Allergies    Current Medications:  Current Outpatient Medications   Medication Sig Dispense Refill    WARFARIN SODIUM PO Take 7 mg by mouth daily      ferrous sulfate (IRON 325) 325 (65 Fe) MG tablet Take 1 tablet by mouth daily (with breakfast) 30 tablet 0    amLODIPine (NORVASC) 5 MG tablet Take 1 tablet by mouth daily 90 tablet 3    bumetanide (BUMEX) 1 MG tablet Take 1 tablet by mouth 2 times daily 180 tablet 3    carvedilol (COREG) 25 MG tablet Take 1 tablet by mouth 2 times daily (with meals) 180 tablet 3    digoxin (LANOXIN) 125 MCG tablet TAKE 1/2 TABLET (62.5MG) DAILY ADDITIONAL REFILLS FROM PRIMARY CARDIOLOGIST OR PCP 45 tablet 3    hydrALAZINE (APRESOLINE) 50 MG tablet Take 1 tablet by mouth 3 times daily 270 tablet 3    isosorbide dinitrate (ISORDIL) 5 MG tablet Take 1 tablet by mouth 3 times daily 270 tablet 3    losartan (COZAAR) 50 MG tablet Take 1 tablet by mouth daily 90 tablet 3    senna (SENOKOT) 8.6 MG TABS tablet TAKE 2 TABLETS BY MOUTH 2 TIMES DAILY 120 tablet 11    aspirin 81 MG chewable tablet Take 1 tablet by mouth daily Additional Refills from Primary Cardiologist or PCP 30 tablet 0    Multiple Vitamins-Minerals (THERAPEUTIC MULTIVITAMIN-MINERALS) tablet Take 1 tablet by mouth daily      pantoprazole (PROTONIX) 40 MG tablet Take 1 tablet by mouth every morning (before breakfast) (Patient not taking: Reported on <0.01 03/07/2021    TROPONINI <0.01 03/07/2021    TROPONINI <0.01 03/06/2021     Lab Results   Component Value Date    INR 1.9 11/09/2021    INR 1.7 10/26/2021    INR 1.9 10/22/2021    PROTIME 23.4 11/09/2021    PROTIME 21.0 10/26/2021    PROTIME 20.8 (H) 10/22/2021     No results found for: TSHFT4, TSH  Lab Results   Component Value Date    LABA1C 4.9 12/13/2021     No results found for: EAG  Lab Results   Component Value Date    CHOL 169 07/15/2021    CHOL 183 04/04/2019     Lab Results   Component Value Date    TRIG 76 07/15/2021    TRIG 193 (H) 04/04/2019     Lab Results   Component Value Date    HDL 37 07/15/2021    HDL 36 04/04/2019     Lab Results   Component Value Date    LDLCALC 117 (H) 07/15/2021    LDLCALC 108 (H) 04/04/2019     Lab Results   Component Value Date    LABVLDL 15 07/15/2021    LABVLDL 39 04/04/2019     No results found for: CHOLHDLRATIO    Cardiac Tests:  ECG: Atrial fibrillation with slow ventricular rate,  with nonspecific T wave changes, nonspecific interventricular conduction delay, abnormal EKG. TTE- 11/14/2019: LVEDD 6 cm EF 30 to 35%, hypo-kinesis of the basal inferior and basal inferior septal walls, diastolic function was indeterminate, mild concentric LVH, RV has global hypokinesis.  RVSP 33 mmHg mild MR and moderate to severely dilated aortic root ascending aorta 5.1 cm there is trivial circumferential pericardial effusion     TTE-1/6/2020:   Left ventricle is moderately enlarged . Ejection fraction is visually estimated at 40-45%. Overall ejection fraction mild-to-moderately decreased . Septal motion consistent with post bypass surgery. There is severe eccentric hypertrophy. Abnormal diastolic function. The left atrium is severely dilated. Normal right ventricular size and function. Mild to moderate mitral regurgitation is present. No hemodynamically significant aortic stenosis is present. RVSP is 29 mmHg. Normal PA systolic pressure.    Mildly dilated aortic root (4.1 cm) and normal sized ascending aorta. A prosthetic graft noted in the location of the ascending aorta. There is a small localized near left ventricle pericardial effusion noted. Stress test-11/15/2019: No reversible perfusion defect, large fixed inferior wall defect, EF 30%, hypokinetic of the inferior wall.       Cardiac catheterization:      The 10-year ASCVD risk score (Leo Ruth, et al., 2013) is: 19%    Values used to calculate the score:      Age: 69 years      Sex: Male      Is Non- : No      Diabetic: No      Tobacco smoker: No      Systolic Blood Pressure: 093 mmHg      Is BP treated: Yes      HDL Cholesterol: 37 mg/dL      Total Cholesterol: 169 mg/dL        ASSESSMENT:  · Acute on chronic heart failure with reduced ejection fraction, improved and euvolemic  · Ischemic cardiomyopathy, EF 30-35%>>>40-45% (based on stress test) and no cath done  · HFmrEF  · ACC/AHA stage C.,  NYHA functional class II  · Persistent atrial fibrillation, still in AF [id number] Vasc score - 3, rate is controlled. · Type I aortic dissection status post surgery underwent at [facility] on 11/15/2019.    · Hypertension -well controlled. .  · CKD stage III, stable renal functions  · Obesity with KYARA on C-pap  · History of sarcoidosis. · Moderate right-sided pleural effusion  · Exertional hypoxemia rule out pulmonary pathology including interstitial lung disease  · H/o GI bleed secondary to gastric ulcer. · Moderate anemia with history of GI bleed. Plan:   · Continue warfarin for anticoagulation  and follow up with Dr. Malena Pino for monitoring INR. Patient would like to change warfarin to Eliquis due to fluctautions in INR. He was advised to get PT and INR level and call me with results. · Will defer cardioversion at this time due to underlying history of aortic dissection s/p repair.   · He is on guideline directed medical therapy for heart failure with reduced ejection fraction. We will continue Coreg 25 mg daily along with valsartan and hydralazine. He is not on Aldactone due to CKD with a creatinine of 1.7>>1.6  · He will continue rest of his current medications. Medications were refilled. · He will continue cardiac diet with salt restriction to 2 g and fluid restriction to 64 ounces a day. · He was also advised to check weight on a daily basis and advised to call me if he gains more than 3 pounds in 1 day and 5 pounds in 1 week. · Will obtain an echo to assess LV function which ordered and was not done. .  · Follow-up with me in 3 months. The patient's current medication list, allergies, problem list and results of all previously ordered testing were reviewed at today's visit.   Renetta Montemayor MD  [address]

## 2022-03-07 NOTE — PATIENT INSTRUCTIONS
· Continue warfarin for anticoagulation  and follow up with Dr. Erin Valenzuela for monitoring INR. Patient would like to change to Eliquis due to fluctautions in INR. He was advised to get PT and INR level and call me results. · Will defer cardioversion at this time due to underlying history of aortic dissection s/p repair. · He is on guideline directed medical therapy for heart failure with reduced ejection fraction. We will continue Coreg 25 mg daily along with valsartan and hydralazine. He is not on Aldactone due to CKD with a creatinine of 1.7>>1.6  · He will continue rest of his current medications. Medications were refilled. · He will continue cardiac diet with salt restriction to 2 g and fluid restriction to 64 ounces a day. · He was also advised to check weight on a daily basis and advised to call me if he gains more than 3 pounds in 1 day and 5 pounds in 1 week. · Will obtain an echo to assess LV function which ordered and was not done.  .  · Follow-up with me in 3 months

## 2022-03-14 ENCOUNTER — OFFICE VISIT (OUTPATIENT)
Dept: FAMILY MEDICINE CLINIC | Age: 67
End: 2022-03-14
Payer: MEDICARE

## 2022-03-14 VITALS
SYSTOLIC BLOOD PRESSURE: 117 MMHG | WEIGHT: 208.6 LBS | BODY MASS INDEX: 32.74 KG/M2 | HEART RATE: 55 BPM | TEMPERATURE: 98.4 F | HEIGHT: 67 IN | OXYGEN SATURATION: 97 % | DIASTOLIC BLOOD PRESSURE: 62 MMHG | RESPIRATION RATE: 16 BRPM

## 2022-03-14 DIAGNOSIS — I48.91 RATE CONTROLLED ATRIAL FIBRILLATION (HCC): Primary | ICD-10-CM

## 2022-03-14 DIAGNOSIS — N18.31 STAGE 3A CHRONIC KIDNEY DISEASE (HCC): ICD-10-CM

## 2022-03-14 DIAGNOSIS — I71.019 DISSECTION OF THORACIC AORTA: ICD-10-CM

## 2022-03-14 DIAGNOSIS — I50.42 CHRONIC COMBINED SYSTOLIC AND DIASTOLIC CHF (CONGESTIVE HEART FAILURE) (HCC): ICD-10-CM

## 2022-03-14 DIAGNOSIS — I42.0 DILATED CARDIOMYOPATHY (HCC): ICD-10-CM

## 2022-03-14 PROBLEM — J44.9 COPD (CHRONIC OBSTRUCTIVE PULMONARY DISEASE) (HCC): Status: RESOLVED | Noted: 2021-03-05 | Resolved: 2022-03-14

## 2022-03-14 LAB
INTERNATIONAL NORMALIZATION RATIO, POC: 2.1
PROTHROMBIN TIME, POC: 25.2

## 2022-03-14 PROCEDURE — 4040F PNEUMOC VAC/ADMIN/RCVD: CPT | Performed by: FAMILY MEDICINE

## 2022-03-14 PROCEDURE — 99214 OFFICE O/P EST MOD 30 MIN: CPT | Performed by: FAMILY MEDICINE

## 2022-03-14 PROCEDURE — G8427 DOCREV CUR MEDS BY ELIG CLIN: HCPCS | Performed by: FAMILY MEDICINE

## 2022-03-14 PROCEDURE — 1123F ACP DISCUSS/DSCN MKR DOCD: CPT | Performed by: FAMILY MEDICINE

## 2022-03-14 PROCEDURE — G8484 FLU IMMUNIZE NO ADMIN: HCPCS | Performed by: FAMILY MEDICINE

## 2022-03-14 PROCEDURE — 85610 PROTHROMBIN TIME: CPT | Performed by: FAMILY MEDICINE

## 2022-03-14 PROCEDURE — G8417 CALC BMI ABV UP PARAM F/U: HCPCS | Performed by: FAMILY MEDICINE

## 2022-03-14 PROCEDURE — 3017F COLORECTAL CA SCREEN DOC REV: CPT | Performed by: FAMILY MEDICINE

## 2022-03-14 PROCEDURE — 1036F TOBACCO NON-USER: CPT | Performed by: FAMILY MEDICINE

## 2022-03-14 SDOH — ECONOMIC STABILITY: FOOD INSECURITY: WITHIN THE PAST 12 MONTHS, YOU WORRIED THAT YOUR FOOD WOULD RUN OUT BEFORE YOU GOT MONEY TO BUY MORE.: NEVER TRUE

## 2022-03-14 SDOH — ECONOMIC STABILITY: FOOD INSECURITY: WITHIN THE PAST 12 MONTHS, THE FOOD YOU BOUGHT JUST DIDN'T LAST AND YOU DIDN'T HAVE MONEY TO GET MORE.: NEVER TRUE

## 2022-03-14 ASSESSMENT — ENCOUNTER SYMPTOMS
SHORTNESS OF BREATH: 0
COUGH: 0
VOMITING: 0
CONSTIPATION: 0
NAUSEA: 0
ABDOMINAL PAIN: 0
DIARRHEA: 0

## 2022-03-14 ASSESSMENT — PATIENT HEALTH QUESTIONNAIRE - PHQ9
SUM OF ALL RESPONSES TO PHQ QUESTIONS 1-9: 0
SUM OF ALL RESPONSES TO PHQ QUESTIONS 1-9: 0
SUM OF ALL RESPONSES TO PHQ9 QUESTIONS 1 & 2: 0
2. FEELING DOWN, DEPRESSED OR HOPELESS: 0
SUM OF ALL RESPONSES TO PHQ QUESTIONS 1-9: 0
1. LITTLE INTEREST OR PLEASURE IN DOING THINGS: 0
SUM OF ALL RESPONSES TO PHQ QUESTIONS 1-9: 0

## 2022-03-14 ASSESSMENT — SOCIAL DETERMINANTS OF HEALTH (SDOH): HOW HARD IS IT FOR YOU TO PAY FOR THE VERY BASICS LIKE FOOD, HOUSING, MEDICAL CARE, AND HEATING?: NOT HARD AT ALL

## 2022-03-14 NOTE — PROGRESS NOTES
Adore Jett (:  1955) is a 69 y.o. male,Established patient, here for evaluation of the following chief complaint(s):  Office Visit for Anticoagulation Management ( INR 2.1 changing from coumadin to eloquis), Hypertension, Congestive Heart Failure, Chronic Kidney Disease, and Shoulder Pain      ASSESSMENT/PLAN:  1. Rate controlled atrial fibrillation (HCC)  -     POCT INR  -     CBC; Future  -     Basic Metabolic Panel; Future  -     Protime-INR; Future  -     apixaban (ELIQUIS) 5 MG TABS tablet; Take 1 tablet by mouth 2 times daily, Disp-60 tablet, R-5Normal  2. Dilated cardiomyopathy ([facility]  3. Dissection of thoracic aorta ([facility]  4. Chronic combined systolic and diastolic CHF (congestive heart failure) (HCC)  5. Stage 3a chronic kidney disease (HCC)  -     CBC; Future  -     Basic Metabolic Panel; Future    As above. EMR reviewed.   Continue follow-up with specialists as directed    We will transition to apixaban with plans to hold warfarin and repeat INR in 3 days and start when INR <2    RTO in 3-6 months or sooner if needed    SUBJECTIVE/OBJECTIVE:  HPI  He also follows with Cardiology and Pulm    Interval Hx  - underwent EGD/C-scope 21    F/U of chronic problem(s)   Chronic problems reviewed today include:  A fib, CHF, CM, aortic dissection s/p repair, CKD, and KYARA  Current status of this/these condition(s):  stable  Tolerating meds:         Yes    Atrial fibrillation / dilated cardiomyopathy / CHF / type I aortic dissection s/p emergent repair at [facility] 2019  Current treatment: Carvedilol 25 mg twice daily, amlodipine 5 mg daily, losartan 50 mg daily, isosorbide 5 mg 3 times daily, hydralazine 50 mg 3 times daily, digoxin 62.5 mcg half tablet daily, and Bumex 1 mg twice daily  Recent medication changes: None  Patient denies chest pain, shortness of breath, headache and peripheral edema  Following with Cardiology  Interested in changing coagulation to [address]    BP Readings from Last 3 Encounters:   03/14/22 117/62   03/07/22 122/60   11/23/21 138/88                                          Sodium (mmol/L)   Date Value   10/26/2021 144    BUN (mg/dL)   Date Value   10/26/2021 30 (H)    Glucose (mg/dL)   Date Value   10/26/2021 90      Potassium (mmol/L)   Date Value   10/26/2021 4.5     Potassium reflex Magnesium (mmol/L)   Date Value   03/05/2021 4.3    CREATININE (mg/dL)   Date Value   10/26/2021 1.6 (H)         KYARA  Current treatment: CPAP  Tolerating well, reports compliance     CKD  Last Cr 1.6     Anticoagulation  Current warfarin regimen: coumadin 6 mg daily; 9 mg Mon and Wed. Recent changes in treatment: none  Indication for treatment: Atrial fibrillation    Lab Results   Component Value Date    INR 2.1 03/14/2022    INR 1.9 11/09/2021    INR 1.7 10/26/2021     Anemia / history of GI bleed due to gastric ulcer   He was admitted 3/4/21 - 3/10/21 for acute blood loss anemia due to GI bleed, underwent EGD which showed a gastric ulcer which was injected with epi and cauterized  He underwent repeat EGD /C-scope 11/23/21; EGD normal, multiple colon polyps w/ recs for repeat in 3 years    Review of Systems   Constitutional: Negative for chills and fever. Respiratory: Negative for cough and shortness of breath. Cardiovascular: Negative for chest pain and leg swelling. Gastrointestinal: Negative for abdominal pain, constipation, diarrhea, nausea and vomiting. Genitourinary: Negative for dysuria. Musculoskeletal: Positive for arthralgias. Neurological: Negative for headaches. Vitals:    03/14/22 1158   BP: 117/62   Site: Left Upper Arm   Position: Sitting   Cuff Size: Large Adult   Pulse: 55   Resp: 16   Temp: 98.4 °F (36.9 °C)   TempSrc: Temporal   SpO2: 97%   Weight: 208 lb 9.6 oz (94.6 kg)   Height: 5' 7\" (1.702 m)     Estimated body mass index is 32.67 kg/m² as calculated from the following:    Height as of this encounter: 5' 7\" (1.702 m).     Weight as of this encounter: 208 lb 9.6 oz (94.6 kg). Physical Exam  Constitutional:       General: He is not in acute distress. Appearance: He is well-developed. HENT:      Head: Normocephalic and atraumatic. Eyes:      Extraocular Movements: Extraocular movements intact. Conjunctiva/sclera: Conjunctivae normal.   Cardiovascular:      Rate and Rhythm: Normal rate and regular rhythm. Pulmonary:      Effort: Pulmonary effort is normal. No respiratory distress. Breath sounds: Normal breath sounds. No wheezing, rhonchi or rales. Abdominal:      General: There is no distension. Musculoskeletal:      Right lower leg: No edema. Left lower leg: No edema. Neurological:      General: No focal deficit present. Mental Status: He is alert. Mental status is at baseline. Prior to Visit Medications    Medication Sig Taking?  Authorizing Provider   apixaban (ELIQUIS) 5 MG TABS tablet Take 1 tablet by mouth 2 times daily Yes Kamron Chase DO   losartan (COZAAR) 50 MG tablet Take 1 tablet by mouth daily Yes Calvin Meyers MD   isosorbide dinitrate (ISORDIL) 5 MG tablet Take 1 tablet by mouth 3 times daily Yes Calvin Meyers MD   hydrALAZINE (APRESOLINE) 50 MG tablet Take 1 tablet by mouth 3 times daily Yes Calvin Meyers MD   digoxin (LANOXIN) 125 MCG tablet TAKE 1/2 TABLET (62.5MG) DAILY ADDITIONAL REFILLS FROM PRIMARY CARDIOLOGIST OR PCP Yes Calvin Meyers MD   carvedilol (COREG) 25 MG tablet Take 1 tablet by mouth 2 times daily (with meals) Yes Calvin Meyers MD   amLODIPine (NORVASC) 5 MG tablet Take 1 tablet by mouth daily Yes Calvin Meyers MD   bumetanide (BUMEX) 1 MG tablet Take 1 tablet by mouth 2 times daily Yes Calvin Meyers MD   senna (SENOKOT) 8.6 MG TABS tablet TAKE 2 TABLETS BY MOUTH 2 TIMES DAILY Yes Kamron Chase DO   aspirin 81 MG chewable tablet Take 1 tablet by mouth daily Additional Refills from Primary Cardiologist or PCP Yes Jalen Escalera, DO   Multiple Vitamins-Minerals (THERAPEUTIC MULTIVITAMIN-MINERALS) tablet Take 1 tablet by mouth daily Yes Historical Provider, MD   ferrous sulfate (IRON 325) 325 (65 Fe) MG tablet Take 1 tablet by mouth daily (with breakfast)  Patient not taking: Reported on 3/14/2022  Harpreet Slaughter DO      An electronic signature was used to authenticate this note.     --Harpreet Slaughter, DO

## 2022-03-14 NOTE — PATIENT INSTRUCTIONS

## 2022-03-17 DIAGNOSIS — I48.91 RATE CONTROLLED ATRIAL FIBRILLATION (HCC): ICD-10-CM

## 2022-03-17 DIAGNOSIS — N18.31 STAGE 3A CHRONIC KIDNEY DISEASE (HCC): ICD-10-CM

## 2022-03-17 LAB
ANION GAP SERPL CALCULATED.3IONS-SCNC: 15 MMOL/L (ref 7–16)
BUN BLDV-MCNC: 37 MG/DL (ref 6–23)
CALCIUM SERPL-MCNC: 9.6 MG/DL (ref 8.6–10.2)
CHLORIDE BLD-SCNC: 106 MMOL/L (ref 98–107)
CO2: 21 MMOL/L (ref 22–29)
CREAT SERPL-MCNC: 1.3 MG/DL (ref 0.7–1.2)
GFR AFRICAN AMERICAN: >60
GFR NON-AFRICAN AMERICAN: 55 ML/MIN/1.73
GLUCOSE BLD-MCNC: 108 MG/DL (ref 74–99)
HCT VFR BLD CALC: 40.8 % (ref 37–54)
HEMOGLOBIN: 12.6 G/DL (ref 12.5–16.5)
INR BLD: 1.4
MCH RBC QN AUTO: 30.1 PG (ref 26–35)
MCHC RBC AUTO-ENTMCNC: 30.9 % (ref 32–34.5)
MCV RBC AUTO: 97.4 FL (ref 80–99.9)
PDW BLD-RTO: 17 FL (ref 11.5–15)
PLATELET # BLD: 149 E9/L (ref 130–450)
PMV BLD AUTO: 10 FL (ref 7–12)
POTASSIUM SERPL-SCNC: 4.5 MMOL/L (ref 3.5–5)
PROTHROMBIN TIME: 15.3 SEC (ref 9.3–12.4)
RBC # BLD: 4.19 E12/L (ref 3.8–5.8)
SODIUM BLD-SCNC: 142 MMOL/L (ref 132–146)
WBC # BLD: 4.3 E9/L (ref 4.5–11.5)

## 2022-06-14 ENCOUNTER — OFFICE VISIT (OUTPATIENT)
Dept: PHYSICAL MEDICINE AND REHAB | Age: 67
End: 2022-06-14
Payer: MEDICARE

## 2022-06-14 VITALS — HEIGHT: 67 IN | BODY MASS INDEX: 31.23 KG/M2 | WEIGHT: 199 LBS

## 2022-06-14 DIAGNOSIS — R29.898 WEAKNESS OF RIGHT UPPER EXTREMITY: ICD-10-CM

## 2022-06-14 DIAGNOSIS — G56.00 CARPAL TUNNEL SYNDROME, UNSPECIFIED LATERALITY: Primary | ICD-10-CM

## 2022-06-14 PROCEDURE — 95909 NRV CNDJ TST 5-6 STUDIES: CPT | Performed by: PHYSICAL MEDICINE & REHABILITATION

## 2022-06-14 PROCEDURE — 99203 OFFICE O/P NEW LOW 30 MIN: CPT | Performed by: PHYSICAL MEDICINE & REHABILITATION

## 2022-06-14 PROCEDURE — G8417 CALC BMI ABV UP PARAM F/U: HCPCS | Performed by: PHYSICAL MEDICINE & REHABILITATION

## 2022-06-14 PROCEDURE — 95886 MUSC TEST DONE W/N TEST COMP: CPT | Performed by: PHYSICAL MEDICINE & REHABILITATION

## 2022-06-14 PROCEDURE — G8428 CUR MEDS NOT DOCUMENT: HCPCS | Performed by: PHYSICAL MEDICINE & REHABILITATION

## 2022-06-14 NOTE — PROGRESS NOTES
[facility]  Electrodiagnostic Laboratory  *Accredited by the [address] [place] [address] with exemplary status  [address] [facility] [address] [facility] [address]  Phone: [phone]  Fax: [phone]      Date of Examination: 06/14/22  Patient Name: Desmond Ferris  is a 69y.o. year old male who was seen today regarding   Chief Complaint   Patient presents with    Extremity Pain     pain in the right wrist to elbow. 2 years of symp after a heart surgery. 5/10 pain at its worst.    Numbness     numbness in thumb, pointer, and middle.  Extremity Weakness     decrease strength. The symptoms are intermittent. Previous workup has included: none. Past Medical History:   Diagnosis Date    [facility]     Aortic dissection (HCC)     Atrial fibrillation (HCC)     CAD (coronary artery disease)     CHF (congestive heart failure) ([facility] 5/03    severely impaired LV systolic function and CHF, EF 25-30%    CKD (chronic kidney disease)     H/O cardiovascular stress test 5/03    No evidence of stress-induced ischemia    H/O Doppler echocardiogram 5/04/10    [facility], mildly dilated LV, mod concentric LVH, normal LV systolic function, mod dilated left atrium, trace MR    Hypertension     Ischemic cardiomyopathy     Obesity     Pericardial effusion (noninflammatory) 11/15/2019    History: Post-op problem  Assessment: S/P pericardial drain placement in CVICU 12/2 Plan:  Drain dc'ed, no need for post-procedure echo unless pt become symptomatic.  Pleural effusion 11/26/2019    History: Post op problem Assessment: On r/a, left pigtail dc'ed Plan: PO lasix. Denies sob.   Desat study done, no home O2 needed    Sarcoidosis     Valvular heart disease        Past Surgical History:   Procedure Laterality Date    COLONOSCOPY N/A 11/23/2021    COLONOSCOPY POLYPECTOMY SNARE/COLD BIOPSY performed by Homar Funez MD at [facility]  11/15/2019    aortic dissection repair @ [facility]    TONSILLECTOMY      UPPER GASTROINTESTINAL ENDOSCOPY N/A 3/5/2021    EGD CONTROL HEMORRHAGE performed by Flaca Antonio MD at [address] [place] [address] N/A 4/19/2021    EGD ESOPHAGOGASTRODUODENOSCOPY performed by Flaca Antonio MD at [address] [place] [address] 11/23/2021    EGD DIAGNOSTIC ONLY performed by Flaca Antonio MD at [address] [place] [address]       There is not family history of neuromuscular conditions. ROS: There has been no associated vision change, hearing change, speech abnormality, swallowing abnormality, or bowel or bladder dysfunction. Physical Exam: General: The patient is in no apparent distress. Height 5' 7\" (1.702 m), weight 199 lb (90.3 kg). MSK: There is no joint effusion, deformity, instability, swelling, erythema or warmth. AROM is full in the spine and extremities. Negative TInel. Negative SPurling. Neurologic:  No focal sensorimotor deficit. Reflexes 2+ and symmetric. Gait is normal.    Impression:     1. Weakness of right upper extremity        Plan:   · EMG is indicated to evaluate the above diagnosis. Orders Placed This Encounter   Procedures    CT NEEDLE EMG EA EXTREMTY W/PARASPINL AREA COMPLETE    CT MOTOR &/SENS 11-12 NRV CNDJ PRECONF ELTRODE LIMB     · EMG was done today and showed a normal study. The patient was educated about the diagnosis and the prognosis. · Consider imaging cervical spine for possible stenosis. · Advised patient to follow up with referring provider. Thank you for allowing me to participate in the care of your patient.       Sincerely,     Zaki Carlson DO

## 2022-06-14 NOTE — PATIENT INSTRUCTIONS
Electrodiagnotic Laboratory  Accredited by the [facility] with Exemplary status  NADEEM Alston D.O. [facility]  [address] [facility] [address]  Phone: [phone]  Fax: [phone]        Today you had an electrodiagnostic exam which included nerve conduction studies (NCS) and electromyography (EMG). This test evaluated the electrical activity of your nerves and muscles to help determine if you have a nerve or muscle disease. This test can help determine the location and type of a nerve or muscle problem. This will help your referring doctor diagnose your condition and determine the appropriate next step in your treatment plan. After your test:    1. There are no long lasting side effects of the test.     2. You may resume your normal activities without restrictions. 3.  Resume any medications that were stopped for the test.     4  If you have sore areas or bruising in your muscles where the needle was placed, apply a cold pack to the sore area for 15-20 minutes three to four times a day as needed for pain. The soreness should go away in about 1-2 days. 5. Your results were provided  Briefly at the end of your test and the final detailed report will be provided to your referring physician, and/or primary care physician and any other parties you requested within 1-2 days of the examination. You may wish to contact your referring provider after a few days to determine what they would like you to do next. 6.  Please call [phone] with any questions or concerns and if you develop increased body temperature/fever, swelling, tenderness, increased pain and/or drainage from the sites where the needle was placed. Thank you for choosing us for your health care needs.

## 2022-06-14 NOTE — PROGRESS NOTES
[address] [facility]  Electrodiagnostic Laboratory  *Accredited by the [address] [place] [address] with exemplary status  [address] [facility] [address]  Phone: [phone]  Fax: [phone]    Referring Provider: Francis Piper DO  Primary Care Physician: Hany Kaufman DO  Patient Name: Alpa Hwang  Patient YOB: 1955  Gender: male  BMI: Body mass index is 31.17 kg/m². Height 5' 7\" (1.702 m), weight 199 lb (90.3 kg). 6/14/2022    Reason for Referral: Weakness of extremity      Description of clinical problem:   Chief Complaint   Patient presents with    Extremity Pain     pain in the right wrist to elbow. 2 years of symp after a heart surgery. 5/10 pain at its worst.    Numbness     numbness in thumb, pointer, and middle.  Extremity Weakness     decrease strength. Sensory NCS      Nerve / Sites Rec. Site Peak Lat PP Amp Segments Distance Velocity Temp. ms µV  cm m/s °C   R Median - Digit II (Antidromic)      Palm Dig II 2.45 8.1 Palm - Dig II 7 38 34.3      Wrist Dig II 4.32* 7.8 Wrist - Dig II 14 41 34.3   R Ulnar - Digit V (Antidromic)      Wrist Dig V 3.80 14.4 Wrist - Dig V 14 43 34.3   R Radial - Anatomical snuff box (Forearm)      Forearm Wrist 2.50 16.2 Forearm - Wrist 10 49 34.3       Motor NCS      Nerve / Sites Muscle Onset Amplitude Segments Distance Velocity Temp.     ms mV  cm m/s °C   R Median - APB      Palm APB 1.98 4.5 Palm - APB   34      Wrist APB 4.84* 2.9 Wrist - Palm 8 28* 34      Elbow APB 9.22 2.4 Elbow - Wrist 19 43* 34   R Ulnar - ADM      Wrist ADM 2.97 9.4 Wrist - ADM 8  34.3      B. Elbow ADM 6.61 8.9 B. Elbow - Wrist 21 58 34.3      A. Elbow ADM 8.39 8.8 A. Elbow - B. Elbow 10 56 34.3       F  Wave      Nerve Fmin % F    ms %   R Median - APB 31.88 30   R Ulnar - ADM 28.91 30       EMG      EMG Summary Table     Spontaneous MUAP Recruitment   Muscle Nerve Roots IA Fib PSW Fasc Amp Dur. PPP Pattern   R.  Biceps brachii Musculocutaneous C5-C6 N None None None N N N N   R. Triceps brachii Radial C6-C8 N None None None N N N N   R. Pronator teres Median C6-C7 N None None None N N N N   R. Extensor indicis proprius Radial C7-C8 N None None None N N N N   R. First dorsal interosseous Ulnar C8-T1 N None None None N N N N   R. Abductor pollicis brevis Median G0-W5 N None None None N N N N   R. Deltoid Axillary C5-C6 N None None None N N N N   R. Brachioradialis Radial C5-C6 N None None None N N N N   R. Cervical paraspinals (low)  - N None None None N N N N   R. Cervical paraspinals (mid)  - N None None None N N N N          Study Limitations:  obesity    Summary of Findings:   Nerve conduction studies:   · The following nerve conduction studies were abnormal:   · The right median sensory latency was prolonged at the wrist.   · The right median motor latency at the wrist was prolonged and there was focal slowing of conduction velocity across the wrist.   · All other nerve conduction studies, as listed in the table were normal in latency, amplitude and conduction velocity. Needle EMG:   · Needle EMG was performed using a concentric needle. · Observed motor units were normal in amplitude, duration, phases and recruitment and no active denervation signs were seen. Diagnostic Interpretation: This study was abnormal.     Electrodiagnosis: There is electrodiagnostic evidence of a median mononeuropathy. · Location: right at the wrist.   · Nature: [  ] Axonal   Samir.Armida  ] Demyelinating  [  ] Mixed axonal and demyelinating     [  ] Sensory [  ] Motor               Samir.Armida  ] Mixed sensorimotor     [  ] with active denervation       [ X ] without active denervation  · Duration: Acute  · Severity: moderate  · Prognosis: Good. The prognosis for recovery of demyelinating lesions is good if the cause is alleviated. Previous Study: There is not a prior study for comparison. Follow up EMG is recommended if no surgical intervention and symptoms persist in one year. Technologist: Brandy Rosario  Physician:    Ulis Burkitt, D.O., P.T. Board Certified Physical Medicine and Rehabilitation  Board Certified Electrodiagnostic Medicine      Nerve conduction studies and electromyography were performed according to our laboratory policies and procedures which can be provided upon request. All abnormal values are identified in the table.  Laboratory normal values can also be provided upon request.       Cc: DO Jelena Welsh DO

## 2022-06-20 NOTE — PROGRESS NOTES
Patient notified  Will send in referral to [address] [facility].

## 2022-07-18 ENCOUNTER — OFFICE VISIT (OUTPATIENT)
Dept: ORTHOPEDIC SURGERY | Age: 67
End: 2022-07-18
Payer: MEDICARE

## 2022-07-18 VITALS — TEMPERATURE: 98 F | HEIGHT: 68 IN | WEIGHT: 195 LBS | BODY MASS INDEX: 29.55 KG/M2

## 2022-07-18 DIAGNOSIS — G56.01 RIGHT CARPAL TUNNEL SYNDROME: Primary | ICD-10-CM

## 2022-07-18 PROCEDURE — 1123F ACP DISCUSS/DSCN MKR DOCD: CPT | Performed by: ORTHOPAEDIC SURGERY

## 2022-07-18 PROCEDURE — 99203 OFFICE O/P NEW LOW 30 MIN: CPT | Performed by: ORTHOPAEDIC SURGERY

## 2022-07-18 NOTE — PROGRESS NOTES
Chief Complaint   Patient presents with    Wrist Pain     Right Wrist/hand, x 3 years, states of numbness/tingling in the fingers       Oliva May is a 69y.o. year old  male who presents for evaluation of right wrist pain. he reports this started 3 years. he does not remember a specific injury that started the pain. The injury was none. The pain is located mainly hand. The pain is worse with activity and better with rest.  The patient has tried nothing specific. The treatment has not been effective. The patient is left dominant. The patient is employed at retired. Past Medical History:   Diagnosis Date    Acute kidney injury [facility])     Aortic dissection (HCC)     Atrial fibrillation (HCC)     CAD (coronary artery disease)     CHF (congestive heart failure) ([facility] [address] 5/03    severely impaired LV systolic function and CHF, EF 25-30%    CKD (chronic kidney disease)     H/O cardiovascular stress test 5/03    No evidence of stress-induced ischemia    H/O Doppler echocardiogram 5/04/10    [facility], mildly dilated LV, mod concentric LVH, normal LV systolic function, mod dilated left atrium, trace MR    Hypertension     Ischemic cardiomyopathy     Obesity     Pericardial effusion (noninflammatory) 11/15/2019    History: Post-op problem  Assessment: S/P pericardial drain placement in CVICU 12/2 Plan:  Drain dc'ed, no need for post-procedure echo unless pt become symptomatic. Pleural effusion 11/26/2019    History: Post op problem Assessment: On r/a, left pigtail dc'ed Plan: PO lasix. Denies sob.   Desat study done, no home O2 needed    Sarcoidosis     Valvular heart disease      Past Surgical History:   Procedure Laterality Date    COLONOSCOPY N/A 11/23/2021    COLONOSCOPY POLYPECTOMY SNARE/COLD BIOPSY performed by Natasha Mejia MD at [address] [place] [address]  11/15/2019    aortic dissection repair @ [facility]    TONSILLECTOMY      UPPER GASTROINTESTINAL ENDOSCOPY N/A 3/5/2021    EGD CONTROL HEMORRHAGE performed by Grady Nunez MD at [address] [facility] N/A 2021    EGD ESOPHAGOGASTRODUODENOSCOPY performed by Grady Nunez MD at [address]  2021    EGD DIAGNOSTIC ONLY performed by Grady Nunez MD at [address] [place] [address]       Current Outpatient Medications:     apixaban (ELIQUIS) 5 MG TABS tablet, Take 1 tablet by mouth 2 times daily, Disp: 60 tablet, Rfl: 5    losartan (COZAAR) 50 MG tablet, Take 1 tablet by mouth daily, Disp: 90 tablet, Rfl: 3    digoxin (LANOXIN) 125 MCG tablet, TAKE 1/2 TABLET (62.5MG) DAILY ADDITIONAL REFILLS FROM PRIMARY CARDIOLOGIST OR PCP, Disp: 45 tablet, Rfl: 3    senna (SENOKOT) 8.6 MG TABS tablet, TAKE 2 TABLETS BY MOUTH 2 TIMES DAILY, Disp: 120 tablet, Rfl: 11    Multiple Vitamins-Minerals (THERAPEUTIC MULTIVITAMIN-MINERALS) tablet, Take 1 tablet by mouth daily, Disp: , Rfl:     isosorbide dinitrate (ISORDIL) 5 MG tablet, Take 1 tablet by mouth 3 times daily, Disp: 270 tablet, Rfl: 3    hydrALAZINE (APRESOLINE) 50 MG tablet, Take 1 tablet by mouth 3 times daily, Disp: 270 tablet, Rfl: 3    carvedilol (COREG) 25 MG tablet, Take 1 tablet by mouth 2 times daily (with meals), Disp: 180 tablet, Rfl: 3    amLODIPine (NORVASC) 5 MG tablet, Take 1 tablet by mouth daily, Disp: 90 tablet, Rfl: 3    bumetanide (BUMEX) 1 MG tablet, Take 1 tablet by mouth 2 times daily, Disp: 180 tablet, Rfl: 3  No Known Allergies  Social History     Socioeconomic History    Marital status:      Spouse name: Not on file    Number of children: 2    Years of education: 16    Highest education level:  Bachelor's degree (e.g., BA, AB, BS)   Occupational History    Occupation: [employer]   Tobacco Use    Smoking status: Former     Packs/day: 0.50     Years: 5.00     Pack years: 2.50     Types: Cigarettes, Pipe, Cigars     Start date:      Quit date: 10/16/1993     Years since quittin.7    Smokeless tobacco: Never Vaping Use    Vaping Use: Never used    Passive vaping exposure: Yes   Substance and Sexual Activity    Alcohol use: Not Currently    Drug use: Not Currently     Frequency: 3.0 times per week     Types: Marijuana Charmayne Stai)     Comment: since 65s - few times a week    Sexual activity: Not Currently     Partners: Female     Comment:  5 years ago   Other Topics Concern    Not on file   Social History Narrative    Not on file     Social Determinants of Health     Financial Resource Strain: Low Risk     Difficulty of Paying Living Expenses: Not hard at all   Food Insecurity: No Food Insecurity    Worried About Running Out of Food in the Last Year: Never true    Ran Out of Food in the Last Year: Never true   Transportation Needs: Not on file   Physical Activity: Not on file   Stress: Not on file   Social Connections: Not on file   Intimate Partner Violence: Not on file   Housing Stability: Not on file     Family History   Problem Relation Age of Onset    Diabetes Mother     Diabetes Father     Hypertension Sister     Diabetes Brother     Hypertension Brother     No Known Problems Brother        REVIEW OF SYSTEMS:     General/Constitution:  (-)weight loss, (-)fever, (-)chills, (-)weakness. Skin: (-) rash,(-) psoriasis,(-) eczema, (-)skin cancer. Musculoskeletal: (-) fractures,  (-) dislocations,(-) collagen vascular disease, (-) fibromyalgia, (-) multiple sclerosis, (-) muscular dystrophy, (-) RSD,(-) joint pain (-)swelling, (-) joint pain,swelling. Neurologic: (-) epilepsy, (-)seizures,(-) brain tumor,(-) TIA, (-)stroke, (-)headaches, (-)Parkinson disease,(-) memory loss, (-) LOC. Cardiovascular: (-) Chest pain, (-) swelling in legs/feet, (-) SOB, (-) cramping in legs/feet with walking. Respiratory: (-) SOB, (-) Coughing, (-) night sweats. GI: (-) nausea, (-) vomiting, (-) diarrhea, (-) blood in stool, (-) gastric ulcer.   Psychiatric: (-) Depression, (-) Anxiety, (-) bipolar disease, (-) Alzheimer's Disease  Allergic/Immunologic: (-) allergies latex, (-) allergies metal, (-) skin sensitivity. Hematlogic: (-) anemia, (-) blood transfusion, (-) DVT/PE, (-) Clotting disorders      Subjective:    Constitution:  Temp 98 °F (36.7 °C)   Ht 5' 8\" (1.727 m)   Wt 195 lb (88.5 kg)   BMI 29.65 kg/m²     Psycihatric:  The patient is alert and oriented x 3, appears to be stated age and in no distress. Respiratory:  Respiratory effort is not labored. Patient is not gasping. Palpation of the chest reveals no tactile fremitus. Skin:  Upon inspection: the skin appears warm, dry and intact. There is not a previous scar over the affected area. There is not any cellulitis, lymphedema or cutaneous lesions noted in the lower extremities. Upon palpation there is no induration noted. Neurologic:  Motor exam of the upper extremities show: The reflexes in biceps/triceps/brachioradialis are equal and symmetric. Sensory exam C5-T1 are normal bilaterally. Patient c/o numbness and tingling in the right hand. Cardiovascular: The vascular exam is normal and is well perfused to distal extremities. There are 2+ radial pulses bilaterally, and motor and sensation is intact to median, ulnar, and radial, musclocutaneus, and axillary nerve distribution and grossly symmetric bilaterally. There is cap refill noted less than two seconds in all digits. There is not edema of the bilateral upper extremities. There is not varicosities noted in the distal extremities. Lymph:  Upon palpation,  there is no lymphadenopathy noted in bilateral upper extremities. Musculoskeletal:  Gait: normal; examination of the nails and digits reveal no cyanosis or clubbing. Cervical Exam:  On physical exam, Reny De La Torre is well-developed, well-nourished, oriented to person, place and time. his gait is normal.  On evaluation of his cervical spine, he has full range of motion of the cervical spine without pain.   There is no cervical tenderness to palpation. Shoulder Exam:  On evaluation of his bilaterally upper extremities, his bilateral shoulder has no deformity. There is not evidence of scapular dyskinesis. There is not muscle atrophy in shoulder girdle. The range of motion for the Right Shoulder is 150/45/t10 and for the Left shoulder is 150/45/t10. Right shoulder Motor strength is 5/5 in the supraspinatus, 5/5 internal rotation and 5/5 in external rotation, and Left shoulder motor strength 5/5 in supraspinatus, 5/5 in internal rotation, 5/5 in external rotation. Elbow exam:  Evaluation of the elbow, reveals no signs of swelling or deformity. ROM is 0-150. There is not instability with varus/valgus stresses. Motor strength is 5/5 with flexion/extension. Wrist exam:  Inspection of the bilateral upper extremities, there is no evidence of deformity of the wrist.  ROM Wrist ROM R wrist DF 60, VF 70, L wrist DF 70, VF 80, R pronation 70/ supination 70, L pronation 90/supination 90. Motor strength is 4/5 with Dorsiflexion/Volarflexion/Supination/Pronation. Motor and sensation is intact and symmetric throughout the bilateral upper extremities in the ulnar and radial , musclcutaneous, and axillary nerve distributions. There are paresthesia in the median nerve distribution in the right hand. Hand exam:  The skin overlying the hand is  intact. There is not evidence of scar, lesion, laceration, or abrasion. The motion in the small joints of the hand are intact with no stiffness or deformity. The ROM in the MCP flexion WNL/ extension WNl , PIP flexion WNL/ extension WNL, DIP flexion WNl/ extension WNL. There is not rotational deformity. There is no masses or adenopathy in bilateral upper extremities. Radial pulses are 2+ and symmetric bilaterally. Capillary refill is intact and < 2 seconds. Motor strength is 5/5 with flexion and extension of the small finger joints.      Right:  Phallens sign(-), Tinnells sign (-), Median nerve compression test (-),  Finklesteins (-), [facility] Grind test (-), [employer](-). Left:    Phallens sign(-), Tinnells sign (-), Median nerve compression test (-),  Finklesteins (-), [employer] Grind test (-), [employer](-). Xrays:   Not performed. EMG:  Diagnostic Interpretation: This study was abnormal.       Electrodiagnosis: There is electrodiagnostic evidence of a median mononeuropathy. \" Location: right at the wrist.   \" Nature: [  ] Axonal   Sierra.Corolla  ] Demyelinating  [  ] Mixed axonal and demyelinating                      [  ] Sensory [  ] Motor               Sierra.[place]olla  ] Mixed sensorimotor                      [  ] with active denervation       [ X ] without active denervation   \" Duration: Acute   \" Severity: moderate   \" Prognosis: Good. The prognosis for recovery of demyelinating lesions is good if the cause is alleviated. Previous Study: There is not a prior study for comparison. Follow up EMG is recommended if no surgical intervention and symptoms persist in one year. Radiographic findings reviewed with patient    Impression:   Encounter Diagnosis   Name Primary? Right carpal tunnel syndrome Yes       Plan: Natural history and expected course discussed. Questions answered. Educational materials distributed. Rest, ice, compression, and elevation (RICE) therapy. Reduction in offending activity discussed. I had a lengthy discussion with the patient regarding their diagnosis. I explained treatment options including surgical vs non surgical treatment. I reviewed in detail the risks and benefits and outlined the procedure in detail with expected outcomes and possible complications. I also discussed non surgical treatment such as injections (CSI), physical therapy, topical creams and NSAID's. They have elected for surgical management at this time. We will perform a Right carpal tunnel release.    The risks and benefits were reviewed with the patient such as:  DVT, infection,  injuries to blood vessels and nerves, non relief of symptoms, continued pain, worsening of symptoms. The patient will call when he is ready to schedule surgery.

## 2022-08-19 NOTE — PROGRESS NOTES
LMOM advising patient of updated coumadin dose and advised patient to call office back for nurse visit x 2 weeks no

## 2022-09-02 RX ORDER — DIGOXIN 125 MCG
TABLET ORAL
Qty: 45 TABLET | Refills: 3 | OUTPATIENT
Start: 2022-09-02

## 2022-09-13 DIAGNOSIS — I50.22 CHRONIC SYSTOLIC HEART FAILURE (HCC): ICD-10-CM

## 2022-09-13 DIAGNOSIS — I48.91 RATE CONTROLLED ATRIAL FIBRILLATION (HCC): ICD-10-CM

## 2022-09-14 RX ORDER — ISOSORBIDE DINITRATE 5 MG/1
5 TABLET ORAL 3 TIMES DAILY
Qty: 270 TABLET | Refills: 3 | OUTPATIENT
Start: 2022-09-14 | End: 2022-12-13

## 2022-09-15 DIAGNOSIS — I48.91 RATE CONTROLLED ATRIAL FIBRILLATION (HCC): ICD-10-CM

## 2022-09-15 DIAGNOSIS — I50.22 CHRONIC SYSTOLIC HEART FAILURE (HCC): ICD-10-CM

## 2022-09-15 RX ORDER — ISOSORBIDE DINITRATE 5 MG/1
5 TABLET ORAL 3 TIMES DAILY
Qty: 270 TABLET | Refills: 3 | Status: SHIPPED | OUTPATIENT
Start: 2022-09-15

## 2022-10-04 DIAGNOSIS — I50.22 CHRONIC SYSTOLIC HEART FAILURE (HCC): ICD-10-CM

## 2022-10-04 DIAGNOSIS — I48.91 RATE CONTROLLED ATRIAL FIBRILLATION (HCC): ICD-10-CM

## 2022-10-04 RX ORDER — APIXABAN 5 MG/1
TABLET, FILM COATED ORAL
Qty: 60 TABLET | Refills: 5 | OUTPATIENT
Start: 2022-10-04

## 2022-10-04 RX ORDER — LOSARTAN POTASSIUM 50 MG/1
50 TABLET ORAL DAILY
Qty: 90 TABLET | Refills: 3 | Status: SHIPPED | OUTPATIENT
Start: 2022-10-04

## 2022-10-04 RX ORDER — LOSARTAN POTASSIUM 50 MG/1
50 TABLET ORAL DAILY
Qty: 90 TABLET | Refills: 3 | OUTPATIENT
Start: 2022-10-04

## 2022-10-08 DIAGNOSIS — I48.91 RATE CONTROLLED ATRIAL FIBRILLATION (HCC): ICD-10-CM

## 2022-10-08 DIAGNOSIS — I50.22 CHRONIC SYSTOLIC HEART FAILURE (HCC): ICD-10-CM

## 2022-10-10 RX ORDER — BUMETANIDE 1 MG/1
1 TABLET ORAL 2 TIMES DAILY
Qty: 180 TABLET | Refills: 3 | OUTPATIENT
Start: 2022-10-10 | End: 2023-01-08

## 2022-11-09 ENCOUNTER — TELEPHONE (OUTPATIENT)
Dept: ADMINISTRATIVE | Age: 67
End: 2022-11-09

## 2022-11-09 NOTE — TELEPHONE ENCOUNTER
Pt calling he was due in June 2022 for a 3 mth OV with SR. Scheduling into mid Jan, not sure if you want him in sooner or not? Please call pt with an appropriate apt. Thank you.

## 2022-12-05 PROBLEM — G56.01 RIGHT CARPAL TUNNEL SYNDROME: Status: ACTIVE | Noted: 2022-12-05

## 2023-01-18 ENCOUNTER — OFFICE VISIT (OUTPATIENT)
Dept: CARDIOLOGY CLINIC | Age: 68
End: 2023-01-18
Payer: MEDICARE

## 2023-01-18 VITALS
BODY MASS INDEX: 32.33 KG/M2 | DIASTOLIC BLOOD PRESSURE: 80 MMHG | HEART RATE: 60 BPM | WEIGHT: 206 LBS | SYSTOLIC BLOOD PRESSURE: 110 MMHG | HEIGHT: 67 IN | RESPIRATION RATE: 16 BRPM

## 2023-01-18 DIAGNOSIS — I50.22 CHRONIC SYSTOLIC HEART FAILURE (HCC): ICD-10-CM

## 2023-01-18 DIAGNOSIS — I10 ESSENTIAL HYPERTENSION: ICD-10-CM

## 2023-01-18 DIAGNOSIS — I48.91 RATE CONTROLLED ATRIAL FIBRILLATION (HCC): Primary | ICD-10-CM

## 2023-01-18 PROCEDURE — 3079F DIAST BP 80-89 MM HG: CPT | Performed by: INTERNAL MEDICINE

## 2023-01-18 PROCEDURE — 93000 ELECTROCARDIOGRAM COMPLETE: CPT | Performed by: INTERNAL MEDICINE

## 2023-01-18 PROCEDURE — G8427 DOCREV CUR MEDS BY ELIG CLIN: HCPCS | Performed by: INTERNAL MEDICINE

## 2023-01-18 PROCEDURE — 99214 OFFICE O/P EST MOD 30 MIN: CPT | Performed by: INTERNAL MEDICINE

## 2023-01-18 PROCEDURE — 3074F SYST BP LT 130 MM HG: CPT | Performed by: INTERNAL MEDICINE

## 2023-01-18 PROCEDURE — G8484 FLU IMMUNIZE NO ADMIN: HCPCS | Performed by: INTERNAL MEDICINE

## 2023-01-18 PROCEDURE — 1036F TOBACCO NON-USER: CPT | Performed by: INTERNAL MEDICINE

## 2023-01-18 PROCEDURE — G8417 CALC BMI ABV UP PARAM F/U: HCPCS | Performed by: INTERNAL MEDICINE

## 2023-01-18 PROCEDURE — 3017F COLORECTAL CA SCREEN DOC REV: CPT | Performed by: INTERNAL MEDICINE

## 2023-01-18 PROCEDURE — 1123F ACP DISCUSS/DSCN MKR DOCD: CPT | Performed by: INTERNAL MEDICINE

## 2023-01-18 RX ORDER — BUMETANIDE 1 MG/1
1 TABLET ORAL 2 TIMES DAILY
Qty: 180 TABLET | Refills: 3 | Status: SHIPPED | OUTPATIENT
Start: 2023-01-18 | End: 2023-04-18

## 2023-01-18 RX ORDER — DIGOXIN 125 MCG
TABLET ORAL
Qty: 45 TABLET | Refills: 3 | Status: SHIPPED | OUTPATIENT
Start: 2023-01-18

## 2023-01-18 RX ORDER — ISOSORBIDE DINITRATE 5 MG/1
5 TABLET ORAL 3 TIMES DAILY
Qty: 270 TABLET | Refills: 3 | Status: SHIPPED | OUTPATIENT
Start: 2023-01-18

## 2023-01-18 RX ORDER — CARVEDILOL 25 MG/1
25 TABLET ORAL 2 TIMES DAILY WITH MEALS
Qty: 180 TABLET | Refills: 3 | Status: SHIPPED | OUTPATIENT
Start: 2023-01-18 | End: 2023-04-18

## 2023-01-18 RX ORDER — LOSARTAN POTASSIUM 50 MG/1
50 TABLET ORAL DAILY
Qty: 90 TABLET | Refills: 3 | Status: SHIPPED | OUTPATIENT
Start: 2023-01-18

## 2023-01-18 RX ORDER — HYDRALAZINE HYDROCHLORIDE 50 MG/1
50 TABLET, FILM COATED ORAL 3 TIMES DAILY
Qty: 270 TABLET | Refills: 3 | Status: SHIPPED | OUTPATIENT
Start: 2023-01-18 | End: 2023-04-18

## 2023-01-18 RX ORDER — AMLODIPINE BESYLATE 5 MG/1
5 TABLET ORAL DAILY
Qty: 90 TABLET | Refills: 3 | Status: SHIPPED | OUTPATIENT
Start: 2023-01-18 | End: 2023-02-17

## 2023-01-18 NOTE — PROGRESS NOTES
OUTPATIENT CARDIOLOGY FOLLOW-UP    Name: Richard Rahman    Age: 69 y.o. Primary Care Physician: Leo Torres DO    Date of Service: 1/18/2023    Chief Complaint:   Chief Complaint   Patient presents with    Cardiomyopathy       Interim History:   Mr. Dennie Gail is a 49-year-old gentleman with history of permanent atrial fibrillation on warfarin, with mild MR and TR, dilated cardiomyopathy with an EF of 40 to 45% based on echo done in May 2017, valvular heart disease team, hypertension, sarcoidosis, GERD who was recently admitted with chest pain on 11/14/2019 with tearing sensation in his chest.  Initially, he had a chest pain underwent Lexiscan Cardiolite stress test that came back negative. He had an echocardiogram due to persistent chest pain and was diagnosed with ascending aortic aneurysm with a dissection. He was also noted to have an EF of 30 to 35% on that echocardiogram.  He was life flighted to [facility] [employer] clinic on 11/15/2019 and underwent emergency surgery for type I dissection. Postoperatively, he developed atrial fibrillation and acute kidney injury. He also had a left pleural effusion for which he had a pigtail catheter placed. He was discharged home in early December 2019 on Lasix 20 mg p.o. daily. He was recently evaluated at [facility] clinic outpatient on 12/20/2019. He had an echocardiogram done which showed small pericardial effusion measuring about 1 cm, LVEF was 42± percent, moderate mitral regurgitation. He was told to increase Lasix to 40 mg for a week. He ran out of Lasix and when he called [facility] clinic he was directed to stop Lasix which he stopped 1 week back. He was admitted to the hospital on 1/6/2020 with acute decompensated heart failure. He was seen in the hospital as a new consult on 1/7/2020 after he was admitted with a decompensated heart failure. He was discharged from the hospital on on 1/15/2020 after diuresing.       He was last seen in office on 3/7/22, since his last, he has not had any further hospitalizations or ER visits. He was hospitalized from 3/4/2021 to 3/10/2021 with a GI bleed. He had a hematemesis and also dark blood in his bowels. In the emergency room, he was noted to be hypotensive and tachycardic with a hemoglobin of 8.1 and his INR was 2.4 lactic acid 2.4. He had a CT chest which showed a stent in the aortic arch extending into the proximal descending aorta. His warfarin was held, surgical service was consulted. He underwent an EGD which showed gastric ulcer which was injected with epi and was also cauterized. He did receive 1 unit of packed red blood cells and was initiated on Protonix. He denies any further episodes of bloody or black stools. He is not taking over-the-counter medications such as arthritis medications. He was on warfarin for anticoagulation and  his PCP was monitoring INR. Now, he is taking Eliquis for anticoagulation without any further episodes of bleeding. No further hospitalizations or ER visits since then. He was  interested in Gwendolyn device and would like to avoid anticoagulants due to prior GI bleeding. He takes oxygen at home mainly at nighttime. He denies any leg edema, abdominal bloating or early satiety. Now, he noticed his oxygen levels staying in the 90s. Now, he is using his CPAP on a regular basis. He does not have a portable oxygen. He is also following with Dr. Ana Maria Dukes for sarcoidosis and also pleural effusion. He is compliant with medications, as well as salt and fluid intake. He does not take any over-the-counter arthritis medications. He told me that he exercises on an exercise bike without any chest pain or dyspnea. Denies any further hospitalizations or ER visits. He is on Eliquis for anticoagulation without any bleeding complications. He denies any chest pain, dyspnea, palpitation dizziness lightheadedness syncope or presyncope.   Patient was seen in March 2022 and was advised to get an echocardiogram which was not performed. He does have underlying LV dysfunction and the last EF was 40-45%. Repeat echocardiogram was ordered to assess LV function which was not performed, and the reasons were not clear    No new cardiac complaints since last cardiology evaluation. He told me that his strength is not coming back and not able to lift heavy bench pressing. He  take  medications on a regular basis without skipping. He denies recent chest pain, palpitations, lightheadedness, dizziness, syncope, PND, or orthopnea. AF on EKG. Review of Systems:   Cardiac: As per HPI  General: No fever, chills  Pulmonary: As per HPI  HEENT: No visual disturbances, difficult swallowing  GI: No nausea, vomiting  Endocrine: No thyroid disease or DM  Musculoskeletal: MAYFIELD x 4, no focal motor deficits  Skin: Intact, no rashes  Neuro/Psych: No headache or seizures    Past Medical History:  Past Medical History:   Diagnosis Date    Acute kidney injury ([facility] [address]     Aortic dissection (HCC)     Atrial fibrillation (HCC)     CAD (coronary artery disease)     CHF (congestive heart failure) ([facility] [address] 5/03    severely impaired LV systolic function and CHF, EF 25-30%    CKD (chronic kidney disease)     H/O cardiovascular stress test 5/03    No evidence of stress-induced ischemia    H/O Doppler echocardiogram 5/04/10    [facility], mildly dilated LV, mod concentric LVH, normal LV systolic function, mod dilated left atrium, trace MR    Hypertension     Ischemic cardiomyopathy     Obesity     Pericardial effusion (noninflammatory) 11/15/2019    History: Post-op problem  Assessment: S/P pericardial drain placement in CVICU 12/2 Plan:  Drain dc'ed, no need for post-procedure echo unless pt become symptomatic. Pleural effusion 11/26/2019    History: Post op problem Assessment: On r/a, left pigtail dc'ed Plan: PO lasix. Denies sob.   Desat study done, no home O2 needed    Sarcoidosis     Valvular heart disease        Past Surgical History:  Past Surgical History:   Procedure Laterality Date    COLONOSCOPY N/A 2021    COLONOSCOPY POLYPECTOMY SNARE/COLD BIOPSY performed by Sarah De La Torre MD at [address] [place] [address]  11/15/2019    aortic dissection repair @ [facility]    TONSILLECTOMY      UPPER GASTROINTESTINAL ENDOSCOPY N/A 3/5/2021    EGD CONTROL HEMORRHAGE performed by Sarah De La Torre MD at [place] [address] N/A 2021    EGD ESOPHAGOGASTRODUODENOSCOPY performed by Sraah De La Torre MD at [place] [address] N/A 2021    EGD DIAGNOSTIC ONLY performed by Sarah De La Torre MD at [place] [address] History:  Family History   Problem Relation Age of Onset    Diabetes Mother     Diabetes Father     Hypertension Sister     Diabetes Brother     Hypertension Brother     No Known Problems Brother        Social History:  Social History     Socioeconomic History    Marital status:      Spouse name: Not on file    Number of children: 2    Years of education: 16    Highest education level: Bachelor's degree (e.g., BA, AB, BS)   Occupational History    Occupation: cook   Tobacco Use    Smoking status: Former     Packs/day: 0.50     Years: 5.00     Pack years: 2.50     Types: Cigarettes, Pipe, Cigars     Start date:      Quit date: 10/16/1993     Years since quittin.2    Smokeless tobacco: Never    Tobacco comments:     Quit smoking cigs 29 yrs. ago   Vaping Use    Vaping Use: Never used    Passive vaping exposure: Yes   Substance and Sexual Activity    Alcohol use: Not Currently    Drug use: Not Currently     Frequency: 3.0 times per week     Types: Marijuana [employer])     Comment: since 65s - few times a week    Sexual activity: Not Currently     Partners: Female     Comment:   5 years ago   Other Topics Concern    Not on file   Social History Narrative    Not on file     Social Determinants of Health     Financial Resource Strain: Low Risk Difficulty of Paying Living Expenses: Not hard at all   Food Insecurity: No Food Insecurity    Worried About Running Out of Food in the Last Year: Never true    Ran Out of Food in the Last Year: Never true   Transportation Needs: Not on file   Physical Activity: Not on file   Stress: Not on file   Social Connections: Not on file   Intimate Partner Violence: Not on file   Housing Stability: Not on file       Allergies:  No Known Allergies    Current Medications:  Current Outpatient Medications   Medication Sig Dispense Refill    digoxin (LANOXIN) 125 MCG tablet TAKE 1/2 TABLET (62.5MG) DAILY 45 tablet 3    apixaban (ELIQUIS) 5 MG TABS tablet Take 1 tablet by mouth 2 times daily 60 tablet 5    senna (SENOKOT) 8.6 MG TABS tablet TAKE 2 TABLETS BY MOUTH 2 TIMES DAILY 120 tablet 1    bumetanide (BUMEX) 1 MG tablet Take 1 tablet by mouth 2 times daily 180 tablet 3    losartan (COZAAR) 50 MG tablet Take 1 tablet by mouth daily 90 tablet 3    isosorbide dinitrate (ISORDIL) 5 MG tablet Take 1 tablet by mouth 3 times daily 270 tablet 3    hydrALAZINE (APRESOLINE) 50 MG tablet Take 1 tablet by mouth 3 times daily 270 tablet 3    carvedilol (COREG) 25 MG tablet Take 1 tablet by mouth 2 times daily (with meals) 180 tablet 3    amLODIPine (NORVASC) 5 MG tablet Take 1 tablet by mouth daily 90 tablet 3    Multiple Vitamins-Minerals (THERAPEUTIC MULTIVITAMIN-MINERALS) tablet Take 1 tablet by mouth daily       Current Facility-Administered Medications   Medication Dose Route Frequency Provider Last Rate Last Admin    perflutren lipid microspheres (DEFINITY) injection 1.65 mg  1.5 mL IntraVENous ONCE PRN Ana Alvarado MD           Physical Exam:  /80   Pulse 60   Resp 16   Ht 5' 7\" (1.702 m)   Wt 206 lb (93.4 kg)   BMI 32.26 kg/m²   Wt Readings from Last 3 Encounters:   01/18/23 206 lb (93.4 kg)   12/05/22 205 lb (93 kg)   11/16/22 204 lb (92.5 kg)     Appearance: Awake, alert and oriented x 3, no acute respiratory distress  Skin: Intact, no rash  Head: Normocephalic, atraumatic  Eyes: EOMI, no conjunctival erythema  ENMT: No pharyngeal erythema, MMM, no rhinorrhea  Neck: Supple, no elevated JVP, no carotid bruits  Lungs: Clear to auscultation bilaterally. No wheezes, rales, or rhonchi.  Cardiac: Regular rate and rhythm, +S1S2, no murmurs apparent  Abdomen: Soft, nontender, +bowel sounds  Extremities: Moves all extremities x 4, trace to 1+lower extremity edema  Neurologic: No focal motor deficits apparent, normal mood and affect, alert and oriented x 3  Peripheral Pulses: Intact posterior tibial pulses bilaterally    Laboratory Tests:  Lab Results   Component Value Date    CREATININE 1.23 12/05/2022    BUN 18 12/05/2022     12/05/2022    K 3.9 12/05/2022     12/05/2022    CO2 28 12/05/2022     Lab Results   Component Value Date/Time    MG 2.3 03/09/2021 05:44 AM     Lab Results   Component Value Date    WBC 4.3 12/05/2022    HGB 13.5 12/05/2022    HCT 40.7 12/05/2022    MCV 95.3 12/05/2022     12/05/2022     Lab Results   Component Value Date    ALT 30 12/05/2022    AST 26 12/05/2022    ALKPHOS 81 12/05/2022    BILITOT 0.7 12/05/2022     Lab Results   Component Value Date    CKTOTAL 65 03/06/2021    CKMB 3.4 03/06/2021    TROPONINI <0.01 03/07/2021    TROPONINI <0.01 03/07/2021    TROPONINI <0.01 03/06/2021     Lab Results   Component Value Date    INR 1.4 03/17/2022    INR 2.1 03/14/2022    INR 1.9 11/09/2021    PROTIME 15.3 (H) 03/17/2022    PROTIME 25.2 03/14/2022    PROTIME 23.4 11/09/2021     Lab Results   Component Value Date    TSH 6.12 (H) 12/05/2022     Lab Results   Component Value Date    LABA1C 5.4 12/05/2022     No results found for: EAG  Lab Results   Component Value Date    CHOL 192 12/05/2022    CHOL 169 07/15/2021    CHOL 183 04/04/2019     Lab Results   Component Value Date    TRIG 98 12/05/2022    TRIG 76 07/15/2021    TRIG 193 (H) 04/04/2019     Lab Results   Component Value Date    HDL  41 12/05/2022    HDL 37 07/15/2021    HDL 36 04/04/2019     Lab Results   Component Value Date    LDLCALC 131 (H) 12/05/2022    LDLCALC 117 (H) 07/15/2021    LDLCALC 108 (H) 04/04/2019     Lab Results   Component Value Date    LABVLDL 20 12/05/2022    LABVLDL 15 07/15/2021    LABVLDL 39 04/04/2019     Lab Results   Component Value Date    CHOLHDLRATIO 4.7 12/05/2022       Cardiac Tests:  ECG: Atrial fibrillation with controlled ventricular rate,  with nonspecific T wave changes, nonspecific interventricular conduction delay, abnormal EKG. TTE- 11/14/2019: LVEDD 6 cm EF 30 to 35%, hypo-kinesis of the basal inferior and basal inferior septal walls, diastolic function was indeterminate, mild concentric LVH, RV has global hypokinesis. RVSP 33 mmHg mild MR and moderate to severely dilated aortic root ascending aorta 5.1 cm there is trivial circumferential pericardial effusion     TTE-1/6/2020:   Left ventricle is moderately enlarged . Ejection fraction is visually estimated at 40-45%. Overall ejection fraction mild-to-moderately decreased . Septal motion consistent with post bypass surgery. There is severe eccentric hypertrophy. Abnormal diastolic function. The left atrium is severely dilated. Normal right ventricular size and function. Mild to moderate mitral regurgitation is present. No hemodynamically significant aortic stenosis is present. RVSP is 29 mmHg. Normal PA systolic pressure. Mildly dilated aortic root (4.1 cm) and normal sized ascending aorta. A prosthetic graft noted in the location of the ascending aorta. There is a small localized near left ventricle pericardial effusion noted. Stress test-11/15/2019: No reversible perfusion defect, large fixed inferior wall defect, EF 30%, hypokinetic of the inferior wall.        Cardiac catheterization:      The 10-year ASCVD risk score (Maureen DK, et al., 2019) is: 16.3%    Values used to calculate the score:      Age: 69 years Sex: Male      Is Non- : No      Diabetic: No      Tobacco smoker: No      Systolic Blood Pressure: 958 mmHg      Is BP treated: Yes      HDL Cholesterol: 41 mg/dL      Total Cholesterol: 192 mg/dL    Recent lipid panel 12/5/2022: Cholesterol 192, triglyceride 98, HDL 41, . Creatinine 1.23    ASSESSMENT:  Chronic heart failure with reduced ejection fraction, improved and still euvolemic mild bilateral lower extremity edema  Ischemic cardiomyopathy, EF 30-35%>>>40-45% (based on stress test) and no cath done  HFmrEF  ACC/AHA stage C.,  NYHA functional class II  Persistent atrial fibrillation, still in AF [id number] Vasc score - 3, rate is controlled. Type I aortic dissection status post surgery [facility]. Hypertension -well controlled. .  CKD stage III, stable renal functions, creatinine 1.6>> 1.3>> 1.2  Obesity with KYARA on C-pap  History of sarcoidosis. Moderate right-sided pleural effusion, improved  Exertional hypoxemia rule out pulmonary pathology including interstitial lung disease  H/o GI bleed secondary to gastric ulcer. Moderate anemia with history of GI bleed. Plan:   Continue Eliquis 5 mg p.o. twice daily for anticoagulation    Will defer cardioversion due to underlying history of aortic dissection s/p repair. He is on guideline directed medical therapy for heart failure with reduced ejection fraction. We will continue Coreg 25 mg daily along with losartan 50 mg p.o. daily and hydralazine 50 mg p.o. 3 times a day and isosorbide 5 mg p.o. 3 times a day. He is not on Aldactone due to CKD with a creatinine of 1.7>>1.6  Continue digoxin 62.5 micrograms along with Coreg for rate control. He will continue rest of his current medications. All his medications were refilled. Continue cardiac diet with salt restriction to 2 g and fluid restriction to 64 ounces a day.   He was also advised to check weight on a daily basis and advised to call me if he gains more than 3 pounds in 1 day and 5 pounds in 1 week.    Will obtain an echo to assess LV function which ordered and was not done, will reschedule him for an echocardiogram to assess LV function.  Follow-up with me in 6 months.          The patient's current medication list, allergies, problem list and results of all previously ordered testing were reviewed at today's visit.  Safia Avery MD  [facility]

## 2023-01-18 NOTE — PATIENT INSTRUCTIONS
Continue Eliquis 5 mg p.o. twice daily for anticoagulation    Will defer cardioversion due to underlying history of aortic dissection s/p repair. He is on guideline directed medical therapy for heart failure with reduced ejection fraction. We will continue Coreg 25 mg daily along with losartan 50 mg p.o. daily and hydralazine 50 mg p.o. 3 times a day and isosorbide 5 mg p.o. 3 times a day. He is not on Aldactone due to CKD with a creatinine of 1.7>>1.6  Continue digoxin 62.5 micrograms along with Coreg for rate control. He will continue rest of his current medications. Medications were refilled. Continue cardiac diet with salt restriction to 2 g and fluid restriction to 64 ounces a day. He was also advised to check weight on a daily basis and advised to call me if he gains more than 3 pounds in 1 day and 5 pounds in 1 week. Will obtain an echo to assess LV function which ordered and was not done, will reschedule him for an echocardiogram to assess LV function. Follow-up with me in 6 months.

## 2023-01-23 ENCOUNTER — HOSPITAL ENCOUNTER (OUTPATIENT)
Dept: CARDIOLOGY | Age: 68
Discharge: HOME | End: 2023-01-23
Payer: MEDICARE

## 2023-01-23 DIAGNOSIS — I50.22 CHRONIC SYSTOLIC HEART FAILURE (HCC): ICD-10-CM

## 2023-01-23 LAB
LV EF: 53 %
LVEF MODALITY: NORMAL

## 2023-01-23 PROCEDURE — 93306 TTE W/DOPPLER COMPLETE: CPT

## 2023-01-25 ENCOUNTER — TELEPHONE (OUTPATIENT)
Dept: CARDIOLOGY CLINIC | Age: 68
End: 2023-01-25

## 2023-01-25 NOTE — TELEPHONE ENCOUNTER
----- Message from Surya Ramsay MD sent at 1/25/2023  2:35 PM EST -----  Echocardiogram showed improved heart function, LV function improved 40-45% to 50-55% (normal 55 to 75%).   Continue current medications and follow-up with me as scheduled

## 2023-01-25 NOTE — TELEPHONE ENCOUNTER
Patient notified of echo results and Dr. Avery's recommendation. Patient states he is a lot more active now and notices LE edema at the end of the day. Swelling resolves after he rests at night.

## 2023-01-26 NOTE — TELEPHONE ENCOUNTER
I would not recommend changes to medications if he is not having any trouble breathing , he can apply compression stockings to legs, if still has swelling then we can consider changing amlodipine ( it can swelling).

## 2023-05-04 NOTE — PLAN OF CARE
Problem: Falls - Risk of:  Goal: Will remain free from falls  Description  Will remain free from falls  Outcome: Met This Shift  Goal: Absence of physical injury  Description  Absence of physical injury  Outcome: Met This Shift     Problem: OXYGENATION/RESPIRATORY FUNCTION  Goal: Patient will maintain patent airway  Outcome: Met This Shift Additional Area 1 Location: chin

## 2023-08-08 ENCOUNTER — OFFICE VISIT (OUTPATIENT)
Dept: CARDIOLOGY CLINIC | Age: 68
End: 2023-08-08
Payer: MEDICARE

## 2023-08-08 VITALS
HEART RATE: 58 BPM | BODY MASS INDEX: 30.87 KG/M2 | SYSTOLIC BLOOD PRESSURE: 118 MMHG | DIASTOLIC BLOOD PRESSURE: 76 MMHG | RESPIRATION RATE: 16 BRPM | WEIGHT: 196.7 LBS | HEIGHT: 67 IN

## 2023-08-08 DIAGNOSIS — I10 ESSENTIAL HYPERTENSION: ICD-10-CM

## 2023-08-08 DIAGNOSIS — I48.91 RATE CONTROLLED ATRIAL FIBRILLATION (HCC): Primary | ICD-10-CM

## 2023-08-08 DIAGNOSIS — I50.22 CHRONIC SYSTOLIC HEART FAILURE (HCC): ICD-10-CM

## 2023-08-08 PROCEDURE — 3078F DIAST BP <80 MM HG: CPT | Performed by: INTERNAL MEDICINE

## 2023-08-08 PROCEDURE — 93000 ELECTROCARDIOGRAM COMPLETE: CPT | Performed by: INTERNAL MEDICINE

## 2023-08-08 PROCEDURE — 3017F COLORECTAL CA SCREEN DOC REV: CPT | Performed by: INTERNAL MEDICINE

## 2023-08-08 PROCEDURE — 1036F TOBACCO NON-USER: CPT | Performed by: INTERNAL MEDICINE

## 2023-08-08 PROCEDURE — G8417 CALC BMI ABV UP PARAM F/U: HCPCS | Performed by: INTERNAL MEDICINE

## 2023-08-08 PROCEDURE — 99214 OFFICE O/P EST MOD 30 MIN: CPT | Performed by: INTERNAL MEDICINE

## 2023-08-08 PROCEDURE — G8427 DOCREV CUR MEDS BY ELIG CLIN: HCPCS | Performed by: INTERNAL MEDICINE

## 2023-08-08 PROCEDURE — 3074F SYST BP LT 130 MM HG: CPT | Performed by: INTERNAL MEDICINE

## 2023-08-08 PROCEDURE — 1123F ACP DISCUSS/DSCN MKR DOCD: CPT | Performed by: INTERNAL MEDICINE

## 2023-08-08 RX ORDER — HYDRALAZINE HYDROCHLORIDE 50 MG/1
50 TABLET, FILM COATED ORAL 3 TIMES DAILY
Qty: 270 TABLET | Refills: 3 | Status: SHIPPED | OUTPATIENT
Start: 2023-08-08 | End: 2024-08-02

## 2023-08-08 RX ORDER — LOSARTAN POTASSIUM 50 MG/1
50 TABLET ORAL DAILY
Qty: 90 TABLET | Refills: 3 | Status: SHIPPED | OUTPATIENT
Start: 2023-08-08

## 2023-08-08 RX ORDER — BUMETANIDE 1 MG/1
1 TABLET ORAL DAILY PRN
Qty: 90 TABLET | Refills: 3 | Status: SHIPPED | OUTPATIENT
Start: 2023-08-08 | End: 2024-08-02

## 2023-08-08 RX ORDER — ISOSORBIDE DINITRATE 5 MG/1
5 TABLET ORAL 3 TIMES DAILY
Qty: 270 TABLET | Refills: 3 | Status: SHIPPED | OUTPATIENT
Start: 2023-08-08

## 2023-08-08 RX ORDER — DIGOXIN 125 MCG
TABLET ORAL
Qty: 45 TABLET | Refills: 3 | Status: SHIPPED | OUTPATIENT
Start: 2023-08-08

## 2023-08-08 NOTE — PATIENT INSTRUCTIONS
Continue Eliquis 5 mg p.o. twice daily for anticoagulation    Will defer cardioversion due to underlying history of aortic dissection s/p repair. He is on guideline directed medical therapy for heart failure with reduced ejection fraction. We will continue Coreg 25 mg daily along with losartan 50 mg p.o. daily and hydralazine 50 mg p.o. 3 times a day and isosorbide 5 mg p.o. 3 times a day. He is not on Aldactone due to CKD with a creatinine of 1.7>>1.6  Continue digoxin 62.5 micrograms along with Coreg 25 mg po twice a day for rate control. He will continue rest of his current medications. All his medications were refilled. Continue cardiac diet with salt restriction to 2 g and fluid restriction to 64 ounces a day. He was also advised to check weight on a daily basis and advised to call me if he gains more than 3 pounds in 1 day and 5 pounds in 1 week. Advised stop amlodipine due to fatigue and soft BP. Recommended to monitor BP daily and call me in  one week. Echo results reviewed, as above and results discussed with the patient, EF improved to 50-55%. Follow-up with me in 6 months. Tissue Cultured Epidermal Autograft Text: The defect edges were debeveled with a #15 scalpel blade.  Given the location of the defect, shape of the defect and the proximity to free margins a tissue cultured epidermal autograft was deemed most appropriate.  The graft was then trimmed to fit the size of the defect.  The graft was then placed in the primary defect and oriented appropriately.

## 2023-08-15 ENCOUNTER — TELEPHONE (OUTPATIENT)
Dept: CARDIOLOGY CLINIC | Age: 68
End: 2023-08-15

## 2023-08-15 RX ORDER — ISOSORBIDE DINITRATE 10 MG/1
10 TABLET ORAL 3 TIMES DAILY
COMMUNITY

## 2023-08-15 NOTE — TELEPHONE ENCOUNTER
Patient called with BP readings after stopping Amlodipine due to fatigue and soft BP on 8/8/23.    129/72  133/66  132/71  156/85  137/76  143/79 most recent, today    He has noticed an increase in energy but still seems to go to the bathroom pretty regularly.

## 2023-08-22 ENCOUNTER — TELEPHONE (OUTPATIENT)
Dept: CARDIOLOGY CLINIC | Age: 68
End: 2023-08-22

## 2023-08-22 DIAGNOSIS — I10 ESSENTIAL HYPERTENSION: Primary | ICD-10-CM

## 2023-08-22 RX ORDER — LOSARTAN POTASSIUM 100 MG/1
100 TABLET ORAL DAILY
COMMUNITY

## 2023-08-22 NOTE — TELEPHONE ENCOUNTER
Patient called with BP readings    145/75  158/86  181/84  156/75  156/74  145/82  148/80 most recent, today    Isordil increased to 10 mg TID on 8/15/23.

## 2023-08-22 NOTE — TELEPHONE ENCOUNTER
Increase losartan to 100 mg p.o. daily monitor blood pressure daily and call us in 1 week. Check BMP in 1 week.

## 2023-08-25 DIAGNOSIS — I10 ESSENTIAL HYPERTENSION: ICD-10-CM

## 2023-08-25 LAB
ANION GAP SERPL CALCULATED.3IONS-SCNC: 8 MMOL/L (ref 7–16)
BUN BLDV-MCNC: 29 MG/DL (ref 6–23)
CALCIUM SERPL-MCNC: 9.5 MG/DL (ref 8.6–10.2)
CHLORIDE BLD-SCNC: 104 MMOL/L (ref 98–107)
CO2: 29 MMOL/L (ref 22–29)
CREAT SERPL-MCNC: 1.4 MG/DL (ref 0.7–1.2)
GFR SERPL CREATININE-BSD FRML MDRD: 55 ML/MIN/1.73M2
GLUCOSE BLD-MCNC: 79 MG/DL (ref 74–99)
POTASSIUM SERPL-SCNC: 4.1 MMOL/L (ref 3.5–5)
SODIUM BLD-SCNC: 141 MMOL/L (ref 132–146)

## 2023-08-29 ENCOUNTER — TELEPHONE (OUTPATIENT)
Dept: CARDIOLOGY CLINIC | Age: 68
End: 2023-08-29

## 2023-08-29 NOTE — TELEPHONE ENCOUNTER
----- Message from Hilary Martinez MD sent at 8/28/2023  4:32 PM EDT -----  Renal functions are slightly elevated, please ask him to stay well-hydrated.

## 2023-08-29 NOTE — TELEPHONE ENCOUNTER
Add HCTZ 25 mg po daily, BP are high especially after eating out suggests high salt intake is playing a role. Ask him to try his best to cut down daily salt intake to less than 2 g. Monitor blood pressure daily and call us in 1 week.

## 2023-08-30 RX ORDER — HYDROCHLOROTHIAZIDE 25 MG/1
25 TABLET ORAL DAILY
COMMUNITY
End: 2023-08-30 | Stop reason: SDUPTHER

## 2023-08-30 RX ORDER — HYDROCHLOROTHIAZIDE 25 MG/1
25 TABLET ORAL DAILY
Qty: 30 TABLET | Refills: 11 | Status: SHIPPED | OUTPATIENT
Start: 2023-08-30

## 2023-09-05 ENCOUNTER — TELEPHONE (OUTPATIENT)
Dept: CARDIOLOGY CLINIC | Age: 68
End: 2023-09-05

## 2023-09-05 RX ORDER — ISOSORBIDE DINITRATE 10 MG/1
10 TABLET ORAL 3 TIMES DAILY
Qty: 270 TABLET | Refills: 3 | Status: SHIPPED | OUTPATIENT
Start: 2023-09-05

## 2023-09-05 RX ORDER — LOSARTAN POTASSIUM 100 MG/1
100 TABLET ORAL DAILY
Qty: 90 TABLET | Refills: 3 | Status: SHIPPED | OUTPATIENT
Start: 2023-09-05

## 2023-09-05 NOTE — TELEPHONE ENCOUNTER
Patient called with BP readings for Dr. Real December. 131/77  100/74  132/84  114/69  110/66  132/73 most recent yesterday, after exertion    Patient started HCTZ on 8/30/23. He states he feels much better. Please advise if okay to wait for Dr. Avery's return.

## 2023-09-11 NOTE — TELEPHONE ENCOUNTER
Blood pressures are well controlled, stay on the current medications and follow-up with me as scheduled.

## 2024-01-25 NOTE — PROGRESS NOTES
Physical Therapy    Physical Therapy Daily Treatment Note      Name: Andrei Yousif  : 1955  MRN: [id number]    Referring Provider:  Nila Almodovar MD    Date of Service: 3/9/2021    Evaluating PT:  Alen Jeans, PT, DPT [id number]     Room #:  7808/8413-Y -- [facility]  Diagnosis:  Upper GIB  PMHx/PSHx:  JASS, aortic dissection, Afib, CAD, CHF, HTN, ischemic cardiomyopathy, obesity, pericardial effusion, pleural effusion, sarcoidosis, valvular heart disease   Procedure/Surgery:  EGD with hemorrhage control 3/5  Precautions:  Falls  Equipment Needs:  NA    SUBJECTIVE:    Pt lives alone in a 1 story home with 1+1 stairs to enter and 0 rail. Bedroom and bathroom are on the 1st level. Pt ambulated with no AD and independent PTA. Full flight with B rails to basement laundry. OBJECTIVE:   Initial Evaluation  Date: 3/8/21 Treatment  3/9/21 Short Term/ Long Term   Goals   AM-PAC 6 Clicks 07/14 15/33     Was pt agreeable to Eval/treatment? Yes Yes    Does pt have pain? No c/o pain  No c/o pain     Bed Mobility  NT, pt in chair on arrival  Rolling: Independent   Supine to sit: Independent   Sit to supine: Independent   Scooting: Independent  Rolling: Independent   Supine to sit: Independent   Sit to supine: Independent   Scooting: Independent    Transfers Sit to stand: SBA  Stand to sit: SBA  Stand pivot: SBA Sit to stand: Independent   Stand to sit: Independent   Stand pivot: Independent  Sit to stand: Independent   Stand to sit:  Independent   Stand pivot: Independent    Ambulation    200 feet with no AD CGA> feet no AD Independent  >300 feet with no AD Independent    Stair negotiation: ascended and descended  NT 12 steps with 1 rail Modified Independent   >4 steps with 1 rail Modified Independent     ROM BUE:  Per OT eval  BLE:  WFL     Strength BUE:  Per OT eval   BLE:  5/5     Balance Sitting EOB:  NT  Dynamic Standing:  SBA<>CGA  Independent  Sitting EOB: Universal Instructions Sheet      Your procedure/Surgery is scheduled at [facility]:  [address] [address]  on 2/2/2024.   Your procedure time is at 9 AM.      Your scheduled arrival time is 7 AM, however if there are any changes to your procedure time,  you will be called with your new time the day before your procedure.       Please do NOT follow the arrival time on your live well rhonda, it is incorrect and doing so may cause your procedure to be cancelled (we are working on the issue).    Free  service is available Monday through Friday starting at 8am until 3:30pm     Please bring your insurance card, 's license and a list of your medications, vitamins and supplements to the hospital, including the last dosage and time taken.    ON DAY OF PROCEDURE, CHECK IN AT:  Day Surgery - located on the 2nd floor. ([phone])    Diet:    A full meal is allowed up to 8 hours prior to ARRIVAL time if allowed by your surgeon. Nothing to eat after this. This includes hard candy, gum and mints. Not following these instructions could cause a delay or cancellation of your procedure/surgery.      You may have clear liquids up until 2 hours before YOUR ARRIVAL TIME.     Approved Clear Liquids   Non-Diabetic Diabetic    Apple Juice  Ginger Ale  Water  7-up  Coffee (Black-Nothing added to it) Cranberry Juice  Grape Juice  Clear chicken or beef broth  Bouillon   Tea (Nothing added to it) Water  Sugar free clear drinks       Medication Instructions:      Stop herbal supplements and Vitamin E (and products containing Vitamin E), multivitamins, prenatal vitamins, and fish oil 1 week prior to procedure.    Stop Chondroiton/glucosamine 2 days prior to procedure.    Do not use marijuana day of procedure.     STOP taking non-steroidal, anti-inflammatory drugs, otherwise known as NSAIDS per your surgeon's instructions.  Examples of NSAIDS are Aleve, ibuprofen, Motrin, Advil and Naproxen.          Take  Independent   Dynamic Standing:  Independent      Pt is A & O x 4  Sensation:  Pt denies numbness and tingling to extremities  Edema:  Unremarkable     Vitals:  , SpO2 96% on RA after ambulation and stairs       Therapeutic Exercises:     NT    Patient education  Pt educated on safety during functional mobility, fall prevention    Patient response to education:   Pt verbalized understanding Pt demonstrated skill Pt requires further education in this area   Yes  Yes  Reinforce      ASSESSMENT:    Comments:  Pt received sitting in chair and agreeable to PT treatment. Vitals monitored during session. Pt has good LE strength. Pt demonstrates improved balance today. Appears to be at baseline level of function. Ambulated multiple laps in hallway with no assistance and completed stair ascension/descension. Returned to room at end of session. No further PT needs identified. Treatment:  Patient practiced and was instructed in the following treatment:     Therapeutic activities including bed mobility (rolling and supine>sit), STS from various surfaces, ambulation in hallway and around hospital room, and stair ascension/descension       PLAN:  Pt has completed PT goals. Pt will be removed from PT caseload. Please re-consult if there are any changes. Thank you.        Time in  0800  Time out  0810    Total Treatment Time  10 minutes     CPT codes:  [] Low Complexity PT evaluation 77929  [] Moderate Complexity PT evaluation 98894  [] High Complexity PT evaluation 38077  [] PT Re-evaluation 88975  [] Gait training 56514 -- minutes  [] Manual therapy 47597 -- minutes  [x] Therapeutic activities 02042 10 minutes  [] Therapeutic exercises 93763 -- minutes  [] Neuromuscular reeducation 39992 -- minutes     Berhane Kay PT, DPT  [id number] only the following medications before coming to the hospital. If they are pills, you may take them with a small sip of water: Atorvastatin__        If there are any changes in your physical condition, such as cold, fever or infection, or you have specific questions regarding your procedure/surgery, please contact your surgeon directly.    For questions regarding these instructions, contact Pre-Admission Testing at [phone], Monday through Friday, 8 am - 4 pm.    On the day of your procedure, please bring in a copy of your complete up-to-date medication list, I.D., and insurance card.      Important Information:    Hibiclens Highly Recommended    Please bathe or shower the evening before and morning of your procedure/surgery.  Avoid using lotions, creams, powders, make-up and deodorant on your clean skin.    Remove all body piercings, jewelry, earrings and contact lenses before coming to the hospital.    You may wear you glasses, hearing aids and/or dentures to the hospital. Please bring a case as they will need to be removed prior to the procedure/surgery.    If you are a smoker, please DO NOT SMOKE FOR 12 HOURS PRIOR to your procedure/surgery (this includes cigarettes and vaping).   DO NOT SMOKE MARIJUANA day of your procedure/surgery.       If your doctor mentioned that you may might spend the night, please bring a small overnight bag with toiletries and any personal items you may need.  If you use a CPAP machine and will be spending the night, bring your machine, tubing and mask.    Due to anesthesia, you will not be able to operate power tools, drink alcohol, or drive a vehicle for 24 hours after surgery.  You will not be able to walk home, use public transportation or take a cab, Uber or Lyft home.        *If you are not being admitted after your procedure, you must have a responsible adult (18 or older) to drive you home and it is HIGHLY recommended that you have a responsible adult with you overnight at  home following the procedure/surgery.*. IF YOU ARE UNABLE TO HAVE SOMEONE WITH YOU OVERNIGHT AFTER SURGERY, PLEASE NOTIFY YOUR SURGEON.           REQUIRED INSTRUCTIONS PRESURGERY SHOWER/BATH   DO NOT: shave any part of the surgical area for at least 2 days prior to surgery.   Tiny skin cuts and abrasions from shaving in the surgical area can increase risk of infection.   DO NOT use lotions on skin before surgery.   DO use a fresh clean towel after each shower.   DO dress in clean clothes after each of your showers.   DO shower or bathe your whole body, including hair, on the night before surgery.   DO shower again on the morning of surgery if time permits (do not wash hair during this shower)     GENERAL REQUIRED SHOWER/BATH INSTRUCTIONS (SOAP AND WATER)   1. Use warm, but not hot, water for shower/bath       NOTE: Showers are recommended. Showers are more effective than a bath for pre-operative skin cleaning   2. Use regular soap to remove dirt and germs from your skin   3. Do not scrub so vigorously that skin is abraded or scratched   4. Use clean towels to gently dry your skin after each shower/bath          Instructions for Preoperative Skin Cleansing Before Planned Surgical Procedure at [facility]   For your safety and to help prevent infection, please follow this:    HIGHLY RECOMMENDED additional antiseptic shower/bath.   ANTISEPTIC SHOWER/BATH PROCESS     e.g. HIBICLENS* or alternative chlorhexidine (CHG) containing skin antisepsis solutions   Chlorhexidine gluconate (2% - 4%) skin cleansing solutions are wash solutions used to kill germs on the skin. They are available over the counter (no prescription needed) at minimal cost at most pharmacies and drug stores.   *WHEN USING HIBICLENS* MAY CAUSE SHOWER AND BATHTUB SURFACES TO BECOME SLIPPERY*   Instructions for \"whole body\" shower or bath with chlorhexidine gluconate (CHG) antiseptic solution   1. After your required shower or bath, rinse thoroughly.    2. Step away from the stream of water, and thoroughly apply enough solution to cover skin below the neck (not head or face), within skin folds, and in hard to reach areas (e.g. the back of the knees, inside elbows, etc). Use your hands to apply and rub onto your skin without using a washcloth. Note: Solution will NOT suds up when applying   3. Rinse thoroughly.   4. Dry off with a clean towel.   5. Dress in clean clothes.   ? DO NOT use on the head or face DO NOT use in the genital area   ? DO NOT use on non-healing wounds DO NOT use in eyes, ears or mouth.   Making sure that your skin is clean and ready for surgery at home is very important.   You can reduce the risk of infection by following the required instructions above.     email

## 2024-02-06 ENCOUNTER — OFFICE VISIT (OUTPATIENT)
Dept: CARDIOLOGY CLINIC | Age: 69
End: 2024-02-06
Payer: MEDICARE

## 2024-02-06 VITALS
BODY MASS INDEX: 33.65 KG/M2 | HEART RATE: 64 BPM | RESPIRATION RATE: 16 BRPM | WEIGHT: 209.4 LBS | HEIGHT: 66 IN | SYSTOLIC BLOOD PRESSURE: 120 MMHG | DIASTOLIC BLOOD PRESSURE: 66 MMHG

## 2024-02-06 DIAGNOSIS — I10 ESSENTIAL HYPERTENSION: Primary | ICD-10-CM

## 2024-02-06 PROCEDURE — 1123F ACP DISCUSS/DSCN MKR DOCD: CPT | Performed by: INTERNAL MEDICINE

## 2024-02-06 PROCEDURE — 99214 OFFICE O/P EST MOD 30 MIN: CPT | Performed by: INTERNAL MEDICINE

## 2024-02-06 PROCEDURE — 3078F DIAST BP <80 MM HG: CPT | Performed by: INTERNAL MEDICINE

## 2024-02-06 PROCEDURE — 3074F SYST BP LT 130 MM HG: CPT | Performed by: INTERNAL MEDICINE

## 2024-02-06 PROCEDURE — 93000 ELECTROCARDIOGRAM COMPLETE: CPT | Performed by: INTERNAL MEDICINE

## 2024-02-06 NOTE — PATIENT INSTRUCTIONS
Continue Eliquis 5 mg p.o. twice daily for anticoagulation    Will defer cardioversion due to underlying history of aortic dissection s/p repair.  He is on guideline directed medical therapy for heart failure with reduced ejection fraction.  We will continue Coreg 25 mg daily along with losartan 50 mg p.o. daily and hydralazine 50 mg p.o. 3 times a day and isosorbide 5 mg p.o. 3 times a day.  He is not on Aldactone due to CKD with a creatinine of 1.7>>1.6  Continue digoxin 62.5 micrograms along with Coreg 25 mg po twice a day for rate control.  He will continue rest of his current medications.  All his medications were refilled.   Continue cardiac diet with salt restriction to 2 g and fluid restriction to 64 ounces a day.  Advised to take Bumex as needed for leg edema.   He was also advised to check weight on a daily basis and advised to call me if he gains more than 3 pounds in 1 day and 5 pounds in 1 week.    Echo results reviewed, as above and results discussed with the patient, EF improved to 50-55%.   Advised to get another Lipid panel in couple of months and if LDL remains high then consider low dose statin therapy.   Follow-up with me in 6 months.

## 2024-02-21 ENCOUNTER — TELEPHONE (OUTPATIENT)
Dept: CARDIOLOGY CLINIC | Age: 69
End: 2024-02-21

## 2024-02-21 RX ORDER — AMLODIPINE BESYLATE 5 MG/1
5 TABLET ORAL DAILY
Qty: 30 TABLET | Refills: 5 | Status: SHIPPED | OUTPATIENT
Start: 2024-02-21

## 2024-02-21 NOTE — TELEPHONE ENCOUNTER
Patient called stating he tested positive for Covid a couple of week ago.  He states his BP has been going up.  The last couple of days his BP has been 178/102, 166/99 and  154/104.  He also has complaints of SOB. He states he is taking all of his medications as prescribed.

## 2024-02-21 NOTE — TELEPHONE ENCOUNTER
Add Norvasc 5 mg po daily ( hope no issues with norvasc), monitor BP daily and call us  on Monday, if breathing gets any worse then he needs to head to the ER.

## 2024-02-26 ENCOUNTER — TELEPHONE (OUTPATIENT)
Dept: CARDIOLOGY CLINIC | Age: 69
End: 2024-02-26

## 2024-02-26 RX ORDER — AMLODIPINE BESYLATE 10 MG/1
10 TABLET ORAL DAILY
COMMUNITY

## 2024-02-26 NOTE — TELEPHONE ENCOUNTER
Patient called and left BP readings on [employer].    142/81  149/86  146/81  131/87  149/86  147/84    Norvasc 5 mg daily added on 2/21/24.

## 2024-02-26 NOTE — TELEPHONE ENCOUNTER
Ask him to increase Norvasc to 10 mg p.o. daily.  Please ask him to monitor both blood pressure and heart rate daily and call us in 1 week

## 2024-03-04 ENCOUNTER — TELEPHONE (OUTPATIENT)
Dept: CARDIOLOGY CLINIC | Age: 69
End: 2024-03-04

## 2024-03-04 RX ORDER — AMLODIPINE BESYLATE 5 MG/1
5 TABLET ORAL DAILY
COMMUNITY

## 2024-03-04 NOTE — TELEPHONE ENCOUNTER
BP is dropping and it is too low, please ask him to take only 5 mg daily, stay hydrated. Monitor BP daily and call us in one week.

## 2024-03-04 NOTE — TELEPHONE ENCOUNTER
Patient called with BP/P readings since increasing   Norvasc to 10 mg daily on 2/26/24.    148/69 (57)  135/82 (67)  120/66 (45)  119/65 (48)  113/63 (48)  101/62 (52)  most recent, 4/3/24

## 2024-03-11 ENCOUNTER — TELEPHONE (OUTPATIENT)
Dept: CARDIOLOGY CLINIC | Age: 69
End: 2024-03-11

## 2024-03-11 NOTE — TELEPHONE ENCOUNTER
Patient called with BP/P readings since decreasing Amlodipine to 5 mg daily 3/4/24.    125/62 (56)  131/82 (67)  142/73 (62)  128/69 (57)  142/81 (67)  134/76 (54) most recent, yesterday

## 2024-04-18 DIAGNOSIS — I50.22 CHRONIC SYSTOLIC HEART FAILURE (HCC): ICD-10-CM

## 2024-04-18 DIAGNOSIS — I10 ESSENTIAL HYPERTENSION: ICD-10-CM

## 2024-04-18 RX ORDER — CARVEDILOL 25 MG/1
25 TABLET ORAL 2 TIMES DAILY WITH MEALS
Qty: 180 TABLET | Refills: 3 | Status: SHIPPED | OUTPATIENT
Start: 2024-04-18

## 2024-07-17 ENCOUNTER — OFFICE VISIT (OUTPATIENT)
Dept: PODIATRY | Age: 69
End: 2024-07-17
Payer: MEDICARE

## 2024-07-17 VITALS — HEIGHT: 67 IN | WEIGHT: 205 LBS | BODY MASS INDEX: 32.18 KG/M2

## 2024-07-17 DIAGNOSIS — G60.9 IDIOPATHIC PERIPHERAL NEUROPATHY: ICD-10-CM

## 2024-07-17 DIAGNOSIS — I73.9 PVD (PERIPHERAL VASCULAR DISEASE) (HCC): ICD-10-CM

## 2024-07-17 DIAGNOSIS — M79.675 PAIN OF TOE OF LEFT FOOT: ICD-10-CM

## 2024-07-17 DIAGNOSIS — M79.674 PAIN OF TOE OF RIGHT FOOT: ICD-10-CM

## 2024-07-17 DIAGNOSIS — R26.2 DIFFICULTY WALKING: ICD-10-CM

## 2024-07-17 DIAGNOSIS — B35.1 ONYCHOMYCOSIS: Primary | ICD-10-CM

## 2024-07-17 DIAGNOSIS — I87.2 VENOUS INSUFFICIENCY (CHRONIC) (PERIPHERAL): ICD-10-CM

## 2024-07-17 PROCEDURE — 11721 DEBRIDE NAIL 6 OR MORE: CPT | Performed by: PODIATRIST

## 2024-07-17 PROCEDURE — 1123F ACP DISCUSS/DSCN MKR DOCD: CPT | Performed by: PODIATRIST

## 2024-07-17 PROCEDURE — 99203 OFFICE O/P NEW LOW 30 MIN: CPT | Performed by: PODIATRIST

## 2024-07-17 RX ORDER — LEUCOVORIN/PYRIDOX/MECOBALAMIN 4-50-2 MG
4-50 TABLET ORAL 2 TIMES DAILY
Qty: 90 TABLET | Refills: 2 | Status: SHIPPED | OUTPATIENT
Start: 2024-07-17

## 2024-07-17 NOTE — PROGRESS NOTES
New patient here for discolored toenails. Patient also complains of numbness and tingling in bottom of feet. Kamron Chase,  last visit 6/17/2024   Electronically signed by Karuna Darnell LPN on 7/17/2024 at 10:19 AM

## 2024-07-18 NOTE — PROGRESS NOTES
24     Hong Salvatore    : 1955 Sex: male   Age: 69 y.o.    Patient was referred by: Kamron Chase DO  Patient's PCP/Provider is:  Kamron Chase DO    Subjective:    Patient seen today for evaluation regarding chronic nail dystrophy issues    Chief Complaint   Patient presents with    Nail Problem     Nail fungus, discoloration    Numbness     Foot numbness and tingling       HPI: Patient stated he has tried topical medication which she applies periodically.  Patient has a hard time debriding his nails due to dystrophy present.  Patient also wanted to discuss potential treatment options regarding mild neuropathy issues to both lower extremities.  Patient does try to wear appropriate shoe gear on a daily basis.  No other additional abnormalities noted.    ROS:  Const: Positives and pertinent negatives as per HPI.     Musculo: Denies symptoms other than stated above.  Neuro: Denies symptoms other than stated above.  Skin: Denies symptoms other than stated above.    Current Medications:    Current Outpatient Medications:     Folinic Acid-Vit B6-Vit B12 (FOLINIC-PLUS) 4-50-2 MG TABS, Take 4-50 mg by mouth 2 times daily, Disp: 90 tablet, Rfl: 2    ciclopirox (PENLAC) 8 % solution, Apply topically nightly., Disp: 1 mL, Rfl: 1    carvedilol (COREG) 25 MG tablet, Take 1 tablet by mouth 2 times daily (with meals), Disp: 180 tablet, Rfl: 3    amLODIPine (NORVASC) 5 MG tablet, Take 1 tablet by mouth daily, Disp: , Rfl:     senna (SENOKOT) 8.6 MG TABS tablet, TAKE 2 TABLETS BY MOUTH 2 TIMES DAILY, Disp: 120 tablet, Rfl: 5    isosorbide dinitrate (ISORDIL) 10 MG tablet, Take 1 tablet by mouth 3 times daily, Disp: 270 tablet, Rfl: 3    losartan (COZAAR) 100 MG tablet, Take 1 tablet by mouth daily, Disp: 90 tablet, Rfl: 3    hydroCHLOROthiazide (HYDRODIURIL) 25 MG tablet, Take 1 tablet by mouth daily, Disp: 30 tablet, Rfl: 11    apixaban (ELIQUIS) 5 MG TABS tablet, Take 1 tablet by mouth 2 times daily, Disp: 180

## 2024-07-23 ENCOUNTER — OFFICE VISIT (OUTPATIENT)
Dept: CARDIOLOGY CLINIC | Age: 69
End: 2024-07-23
Payer: MEDICARE

## 2024-07-23 VITALS
RESPIRATION RATE: 16 BRPM | DIASTOLIC BLOOD PRESSURE: 68 MMHG | BODY MASS INDEX: 32.02 KG/M2 | SYSTOLIC BLOOD PRESSURE: 122 MMHG | WEIGHT: 204 LBS | HEIGHT: 67 IN | HEART RATE: 64 BPM

## 2024-07-23 DIAGNOSIS — I10 ESSENTIAL HYPERTENSION: Primary | ICD-10-CM

## 2024-07-23 PROCEDURE — 3074F SYST BP LT 130 MM HG: CPT | Performed by: INTERNAL MEDICINE

## 2024-07-23 PROCEDURE — 3078F DIAST BP <80 MM HG: CPT | Performed by: INTERNAL MEDICINE

## 2024-07-23 PROCEDURE — 93000 ELECTROCARDIOGRAM COMPLETE: CPT | Performed by: INTERNAL MEDICINE

## 2024-07-23 PROCEDURE — 1123F ACP DISCUSS/DSCN MKR DOCD: CPT | Performed by: INTERNAL MEDICINE

## 2024-07-23 PROCEDURE — 99214 OFFICE O/P EST MOD 30 MIN: CPT | Performed by: INTERNAL MEDICINE

## 2024-07-23 RX ORDER — ACETAMINOPHEN 325 MG/1
500 TABLET ORAL 4 TIMES DAILY
COMMUNITY

## 2024-07-23 NOTE — PATIENT INSTRUCTIONS
Continue Eliquis 5 mg p.o. twice daily for anticoagulation    Will defer cardioversion due to underlying history of aortic dissection s/p repair.  He is on guideline directed medical therapy for heart failure with reduced ejection fraction.  We will continue losartan 50 mg p.o. daily and hydralazine 50 mg p.o. 3 times a day and isosorbide 5 mg p.o. 3 times a day.  He is not on Aldactone due to CKD with a creatinine of 1.7>>1.6  Continue digoxin 62.5 micrograms and decrease Coreg 12.5 mg po twice a day for rate control.  He will continue rest of his current medications.  All his medications were refilled.   Continue cardiac diet with salt restriction to 2 g and fluid restriction to 64 ounces a day.  Advised to take Bumex as needed for leg edema.   He was also advised to check weight on a daily basis and advised to call me if he gains more than 3 pounds in 1 day and 5 pounds in 1 week.    Echo results reviewed, as above and results discussed with the patient, EF improved to 50-55%.   Advised to get another Lipid panel in couple of months and if LDL remains high then consider low dose statin therapy.   Advised to monitor blood pressure heart rate daily and call me in 1 week.  Lives  Follow-up with me in 6 months.

## 2024-07-23 NOTE — PROGRESS NOTES
OUTPATIENT CARDIOLOGY FOLLOW-UP    Name: Hong Chase    Age: 69 y.o.    Primary Care Physician: Kamron Chase DO    Date of Service: 7/23/2024    Chief Complaint:   Chief Complaint   Patient presents with    Follow-up     6 month follow up.       Interim History:   Mr. Chase is a 69-year-old gentleman with history of permanent atrial fibrillation on warfarin, with mild MR and TR, dilated cardiomyopathy with an EF of 40 to 45% based on echo done in May 2017, valvular heart disease team, hypertension, sarcoidosis, GERD who was recently admitted with chest pain on 11/14/2019 with tearing sensation in his chest.  Initially, he had a chest pain underwent Lexiscan Cardiolite stress test that came back negative.  He had an echocardiogram due to persistent chest pain and was diagnosed with ascending aortic aneurysm with a dissection.  He was also noted to have an EF of 30 to 35% on that echocardiogram.  He was life flighted to [facility] on 11/15/2019 and underwent emergency surgery for type I dissection.  Postoperatively, he developed atrial fibrillation and acute kidney injury.  He also had a left pleural effusion for which he had a pigtail catheter placed.  He was discharged home in early December 2019 on Lasix 20 mg p.o. daily.  He was evaluated at [facility] outpatient on 12/20/2019.  He had an echocardiogram done which showed small pericardial effusion measuring about 1 cm, LVEF was 42± percent, moderate mitral regurgitation.  He was told to increase Lasix to 40 mg for a week.  He ran out of Lasix and when he called [facility], he was directed to stop Lasix which he stopped 12/2019.  He was admitted to the hospital on 1/6/2020 with acute decompensated heart failure.  He was seen in the hospital as a new consult on 1/7/2020 after he was admitted with a decompensated heart failure.    He was discharged from the hospital on on 1/15/2020 after diuresing.      He was last seen in office on

## 2024-08-01 ENCOUNTER — TELEPHONE (OUTPATIENT)
Dept: CARDIOLOGY CLINIC | Age: 69
End: 2024-08-01

## 2024-08-01 NOTE — TELEPHONE ENCOUNTER
Patient called with BP/P readings since decreasing Coreg to 12.5 mg BID on 7/24/24:    Before working in garden:    122/83  129/77  115/70  130/80  123/75    After working in the garden:    106/64  100/62  111/68  101/65  105/58    HR 60-62 bpm

## 2024-08-01 NOTE — TELEPHONE ENCOUNTER
Is blood pressure numbers are good, continue current dose of carvedilol and stay well-hydrated.  Follow-up with me as scheduled

## 2024-08-01 NOTE — TELEPHONE ENCOUNTER
Patient notified of Dr. Avery's assessment/recommendations.Patient put on list to call when January 2025 schedule opens.

## 2024-08-14 DIAGNOSIS — I48.91 RATE CONTROLLED ATRIAL FIBRILLATION (HCC): ICD-10-CM

## 2024-08-14 DIAGNOSIS — I10 ESSENTIAL HYPERTENSION: ICD-10-CM

## 2024-08-14 DIAGNOSIS — I50.22 CHRONIC SYSTOLIC HEART FAILURE (HCC): ICD-10-CM

## 2024-08-14 RX ORDER — APIXABAN 5 MG/1
5 TABLET, FILM COATED ORAL 2 TIMES DAILY
Qty: 60 TABLET | Refills: 11 | Status: SHIPPED | OUTPATIENT
Start: 2024-08-14

## 2024-08-15 ENCOUNTER — TELEPHONE (OUTPATIENT)
Dept: CARDIOLOGY CLINIC | Age: 69
End: 2024-08-15

## 2024-08-15 RX ORDER — ISOSORBIDE DINITRATE 10 MG/1
10 TABLET ORAL 3 TIMES DAILY
Qty: 270 TABLET | Refills: 3 | Status: SHIPPED | OUTPATIENT
Start: 2024-08-15

## 2024-08-15 RX ORDER — HYDROCHLOROTHIAZIDE 25 MG/1
25 TABLET ORAL DAILY
Qty: 30 TABLET | Refills: 11 | OUTPATIENT
Start: 2024-08-15

## 2024-08-15 RX ORDER — LOSARTAN POTASSIUM 100 MG/1
100 TABLET ORAL DAILY
Qty: 90 TABLET | Refills: 3 | Status: SHIPPED | OUTPATIENT
Start: 2024-08-15

## 2024-08-15 RX ORDER — HYDRALAZINE HYDROCHLORIDE 50 MG/1
50 TABLET, FILM COATED ORAL 3 TIMES DAILY
Qty: 270 TABLET | Refills: 3 | Status: SHIPPED | OUTPATIENT
Start: 2024-08-15 | End: 2025-08-10

## 2024-08-15 RX ORDER — HYDROCHLOROTHIAZIDE 25 MG/1
25 TABLET ORAL DAILY
Qty: 30 TABLET | Refills: 11 | Status: SHIPPED | OUTPATIENT
Start: 2024-08-15

## 2024-08-15 RX ORDER — DIGOXIN 125 MCG
TABLET ORAL
Qty: 45 TABLET | Refills: 3 | Status: SHIPPED | OUTPATIENT
Start: 2024-08-15

## 2024-08-15 RX ORDER — LOSARTAN POTASSIUM 100 MG/1
100 TABLET ORAL DAILY
Qty: 90 TABLET | Refills: 3 | OUTPATIENT
Start: 2024-08-15

## 2024-08-15 RX ORDER — ISOSORBIDE DINITRATE 10 MG/1
10 TABLET ORAL 3 TIMES DAILY
Qty: 270 TABLET | Refills: 3 | OUTPATIENT
Start: 2024-08-15

## 2024-08-15 NOTE — TELEPHONE ENCOUNTER
[facility] requesting refills of HCTZ 25 mg daily, Isordil 10 mg TID and Losartan 100 mg daily.     Most recent office note plan from 7/23/24 states to continue continue Losartan 50 mg p.o. daily  and Isosorbide 5 mg p.o. 3 times a day.         Meds updated and refilled

## 2024-08-29 DIAGNOSIS — I48.91 RATE CONTROLLED ATRIAL FIBRILLATION (HCC): ICD-10-CM

## 2024-08-29 DIAGNOSIS — I50.22 CHRONIC SYSTOLIC HEART FAILURE (HCC): ICD-10-CM

## 2024-08-29 RX ORDER — BUMETANIDE 1 MG/1
1 TABLET ORAL DAILY PRN
Qty: 90 TABLET | Refills: 3 | Status: SHIPPED | OUTPATIENT
Start: 2024-08-29 | End: 2025-08-24

## 2024-09-11 ENCOUNTER — HOSPITAL ENCOUNTER (INPATIENT)
Age: 69
LOS: 9 days | Discharge: HOME | DRG: 197 | End: 2024-09-20
Attending: EMERGENCY MEDICINE | Admitting: TRANSPLANT SURGERY
Payer: MEDICARE

## 2024-09-11 ENCOUNTER — APPOINTMENT (OUTPATIENT)
Dept: ULTRASOUND IMAGING | Age: 69
DRG: 197 | End: 2024-09-11
Payer: MEDICARE

## 2024-09-11 DIAGNOSIS — K86.89 PANCREATIC MASS: ICD-10-CM

## 2024-09-11 DIAGNOSIS — K80.81 BILIARY CALCULUS OF OTHER SITE WITH OBSTRUCTION: ICD-10-CM

## 2024-09-11 DIAGNOSIS — I50.84 END STAGE CONGESTIVE HEART FAILURE (HCC): ICD-10-CM

## 2024-09-11 DIAGNOSIS — R17 JAUNDICE, NON-NEONATAL: Primary | ICD-10-CM

## 2024-09-11 LAB
ALBUMIN SERPL-MCNC: 4.2 G/DL (ref 3.5–5.2)
ALP SERPL-CCNC: 189 U/L (ref 40–129)
ALT SERPL-CCNC: 229 U/L (ref 0–40)
ANION GAP SERPL CALCULATED.3IONS-SCNC: 15 MMOL/L (ref 7–16)
AST SERPL-CCNC: 89 U/L (ref 0–39)
BASOPHILS # BLD: 0.01 K/UL (ref 0–0.2)
BASOPHILS NFR BLD: 0 % (ref 0–2)
BILIRUB DIRECT SERPL-MCNC: 3.3 MG/DL (ref 0–0.3)
BILIRUB INDIRECT SERPL-MCNC: 0.7 MG/DL (ref 0–1)
BILIRUB SERPL-MCNC: 4 MG/DL (ref 0–1.2)
BILIRUB UR QL STRIP: ABNORMAL
BUN SERPL-MCNC: 45 MG/DL (ref 6–23)
CALCIUM SERPL-MCNC: 9.7 MG/DL (ref 8.6–10.2)
CEA SERPL-MCNC: 6 NG/ML (ref 0–5.2)
CHLORIDE SERPL-SCNC: 97 MMOL/L (ref 98–107)
CLARITY UR: CLEAR
CO2 SERPL-SCNC: 26 MMOL/L (ref 22–29)
COLOR UR: YELLOW
COMMENT: ABNORMAL
CREAT SERPL-MCNC: 2.4 MG/DL (ref 0.7–1.2)
EOSINOPHIL # BLD: 0.26 K/UL (ref 0.05–0.5)
EOSINOPHILS RELATIVE PERCENT: 6 % (ref 0–6)
ERYTHROCYTE [DISTWIDTH] IN BLOOD BY AUTOMATED COUNT: 15.5 % (ref 11.5–15)
GFR, ESTIMATED: 28 ML/MIN/1.73M2
GLUCOSE SERPL-MCNC: 143 MG/DL (ref 74–99)
GLUCOSE UR STRIP-MCNC: NEGATIVE MG/DL
HCT VFR BLD AUTO: 35.8 % (ref 37–54)
HGB BLD-MCNC: 12 G/DL (ref 12.5–16.5)
HGB UR QL STRIP.AUTO: NEGATIVE
IMM GRANULOCYTES # BLD AUTO: <0.03 K/UL (ref 0–0.58)
IMM GRANULOCYTES NFR BLD: 0 % (ref 0–5)
INR PPP: 1.3
KETONES UR STRIP-MCNC: NEGATIVE MG/DL
LACTATE BLDV-SCNC: 1.2 MMOL/L (ref 0.5–2.2)
LEUKOCYTE ESTERASE UR QL STRIP: NEGATIVE
LYMPHOCYTES NFR BLD: 0.45 K/UL (ref 1.5–4)
LYMPHOCYTES RELATIVE PERCENT: 10 % (ref 20–42)
MCH RBC QN AUTO: 32.9 PG (ref 26–35)
MCHC RBC AUTO-ENTMCNC: 33.5 G/DL (ref 32–34.5)
MCV RBC AUTO: 98.1 FL (ref 80–99.9)
MONOCYTES NFR BLD: 0.55 K/UL (ref 0.1–0.95)
MONOCYTES NFR BLD: 12 % (ref 2–12)
NEUTROPHILS NFR BLD: 72 % (ref 43–80)
NEUTS SEG NFR BLD: 3.35 K/UL (ref 1.8–7.3)
NITRITE UR QL STRIP: NEGATIVE
PH UR STRIP: 6 [PH] (ref 5–9)
PLATELET # BLD AUTO: 155 K/UL (ref 130–450)
PMV BLD AUTO: 10.3 FL (ref 7–12)
POTASSIUM SERPL-SCNC: 3.5 MMOL/L (ref 3.5–5)
PROT SERPL-MCNC: 7.6 G/DL (ref 6.4–8.3)
PROT UR STRIP-MCNC: NEGATIVE MG/DL
PROTHROMBIN TIME: 14.6 SEC (ref 9.3–12.4)
RBC # BLD AUTO: 3.65 M/UL (ref 3.8–5.8)
RBC # BLD: ABNORMAL 10*6/UL
SODIUM SERPL-SCNC: 138 MMOL/L (ref 132–146)
SP GR UR STRIP: 1.01 (ref 1–1.03)
UROBILINOGEN UR STRIP-ACNC: 0.2 EU/DL (ref 0–1)
WBC OTHER # BLD: 4.6 K/UL (ref 4.5–11.5)

## 2024-09-11 PROCEDURE — 83605 ASSAY OF LACTIC ACID: CPT

## 2024-09-11 PROCEDURE — 80053 COMPREHEN METABOLIC PANEL: CPT

## 2024-09-11 PROCEDURE — 99222 1ST HOSP IP/OBS MODERATE 55: CPT | Performed by: TRANSPLANT SURGERY

## 2024-09-11 PROCEDURE — 76705 ECHO EXAM OF ABDOMEN: CPT

## 2024-09-11 PROCEDURE — 85610 PROTHROMBIN TIME: CPT

## 2024-09-11 PROCEDURE — 2140000000 HC CCU INTERMEDIATE R&B

## 2024-09-11 PROCEDURE — 82248 BILIRUBIN DIRECT: CPT

## 2024-09-11 PROCEDURE — 81003 URINALYSIS AUTO W/O SCOPE: CPT

## 2024-09-11 PROCEDURE — 82378 CARCINOEMBRYONIC ANTIGEN: CPT

## 2024-09-11 PROCEDURE — 99285 EMERGENCY DEPT VISIT HI MDM: CPT

## 2024-09-11 PROCEDURE — 96375 TX/PRO/DX INJ NEW DRUG ADDON: CPT

## 2024-09-11 PROCEDURE — 36415 COLL VENOUS BLD VENIPUNCTURE: CPT

## 2024-09-11 PROCEDURE — 2580000003 HC RX 258: Performed by: EMERGENCY MEDICINE

## 2024-09-11 PROCEDURE — 86301 IMMUNOASSAY TUMOR CA 19-9: CPT

## 2024-09-11 PROCEDURE — 2580000003 HC RX 258

## 2024-09-11 PROCEDURE — 85025 COMPLETE CBC W/AUTO DIFF WBC: CPT

## 2024-09-11 PROCEDURE — 96365 THER/PROPH/DIAG IV INF INIT: CPT

## 2024-09-11 PROCEDURE — 6360000002 HC RX W HCPCS: Performed by: EMERGENCY MEDICINE

## 2024-09-11 RX ORDER — SODIUM CHLORIDE 0.9 % (FLUSH) 0.9 %
10 SYRINGE (ML) INJECTION PRN
Status: DISCONTINUED | OUTPATIENT
Start: 2024-09-11 | End: 2024-09-20 | Stop reason: HOSPADM

## 2024-09-11 RX ORDER — SODIUM CHLORIDE 9 MG/ML
INJECTION, SOLUTION INTRAVENOUS CONTINUOUS
Status: DISCONTINUED | OUTPATIENT
Start: 2024-09-11 | End: 2024-09-13

## 2024-09-11 RX ORDER — ONDANSETRON 2 MG/ML
4 INJECTION INTRAMUSCULAR; INTRAVENOUS EVERY 6 HOURS PRN
Status: DISCONTINUED | OUTPATIENT
Start: 2024-09-11 | End: 2024-09-20 | Stop reason: HOSPADM

## 2024-09-11 RX ORDER — 0.9 % SODIUM CHLORIDE 0.9 %
1000 INTRAVENOUS SOLUTION INTRAVENOUS ONCE
Status: COMPLETED | OUTPATIENT
Start: 2024-09-11 | End: 2024-09-11

## 2024-09-11 RX ORDER — SODIUM CHLORIDE 9 MG/ML
INJECTION, SOLUTION INTRAVENOUS PRN
Status: DISCONTINUED | OUTPATIENT
Start: 2024-09-11 | End: 2024-09-20 | Stop reason: HOSPADM

## 2024-09-11 RX ORDER — SODIUM CHLORIDE 9 MG/ML
INJECTION, SOLUTION INTRAVENOUS CONTINUOUS
Status: DISCONTINUED | OUTPATIENT
Start: 2024-09-11 | End: 2024-09-11

## 2024-09-11 RX ORDER — ONDANSETRON 4 MG/1
4 TABLET, ORALLY DISINTEGRATING ORAL EVERY 8 HOURS PRN
Status: DISCONTINUED | OUTPATIENT
Start: 2024-09-11 | End: 2024-09-20 | Stop reason: HOSPADM

## 2024-09-11 RX ORDER — POLYETHYLENE GLYCOL 3350 17 G/17G
17 POWDER, FOR SOLUTION ORAL DAILY
Status: DISCONTINUED | OUTPATIENT
Start: 2024-09-11 | End: 2024-09-20 | Stop reason: HOSPADM

## 2024-09-11 RX ORDER — SODIUM CHLORIDE 0.9 % (FLUSH) 0.9 %
10 SYRINGE (ML) INJECTION EVERY 12 HOURS SCHEDULED
Status: DISCONTINUED | OUTPATIENT
Start: 2024-09-11 | End: 2024-09-20 | Stop reason: HOSPADM

## 2024-09-11 RX ORDER — METRONIDAZOLE 500 MG/100ML
500 INJECTION, SOLUTION INTRAVENOUS ONCE
Status: COMPLETED | OUTPATIENT
Start: 2024-09-11 | End: 2024-09-11

## 2024-09-11 RX ADMIN — WATER 2000 MG: 1 INJECTION INTRAMUSCULAR; INTRAVENOUS; SUBCUTANEOUS at 17:03

## 2024-09-11 RX ADMIN — METRONIDAZOLE 500 MG: 500 INJECTION, SOLUTION INTRAVENOUS at 17:15

## 2024-09-11 RX ADMIN — SODIUM CHLORIDE: 9 INJECTION, SOLUTION INTRAVENOUS at 21:54

## 2024-09-11 RX ADMIN — SODIUM CHLORIDE 1000 ML: 9 INJECTION, SOLUTION INTRAVENOUS at 14:31

## 2024-09-11 RX ADMIN — SODIUM CHLORIDE: 9 INJECTION, SOLUTION INTRAVENOUS at 17:02

## 2024-09-11 ASSESSMENT — LIFESTYLE VARIABLES
HOW OFTEN DO YOU HAVE A DRINK CONTAINING ALCOHOL: NEVER
HOW MANY STANDARD DRINKS CONTAINING ALCOHOL DO YOU HAVE ON A TYPICAL DAY: PATIENT DOES NOT DRINK

## 2024-09-11 NOTE — CONSULTS
[facility]  CONSULT NOTE    Patient's Name/Date of Birth: Hong Chase / 1955    Date: September 11, 2024     PCP: Kamron Chase DO     Chief Complaint:   Chief Complaint   Patient presents with    Pruritis     Pt states full body itching began 2 days ago    pt states his urine is darker than normal. Pt denies buring     Fatigue     Began 2 days ago       Physician Consulted: Dr. Escamilla  Reason for Consult: New onset painless jaundice.   Referring Physician: Dr. Glen MONZON  Hong Chase is a 69 y.o. male who presents for evaluation of painless jaundice for the past 2 days.  Over the past 2 days the patient has experienced itching, decreased appetite, acholic stools, dark urine.  Patient has been feeling lethargic and weak for the past  1 week as well as feeling generalized abdominal pain and epigastric discomfort over the past year.  Patient denies any weight loss, increased blood sugars or signs of hyperglycemia.  Denies any family history of cancer.  Patient smoked for 25 years but quit 26 years ago.  Current A1c is 5.2.  Patient has past medical history of A-fib (on Eliquis, Plavix), CKD, sarcoidosis, aortic dissection repair (2019), AAA repair (2021), coronary artery bypass graft.    Physical exam is notable for scleral icterus, mild jaundice of abdomen, sublingual, and all 4 extremities as well as recent excoriation marks upon the lower abdomen from patient scratching.  Imaging, gallbladder ultrasound shows cholelithiasis without cholecystitis.  Labs are notable for creatinine of 2.4, alk phos of 189, , AST 89, T. bili 4.0 (direct 3.  3), WBC 4.6, hemoglobin 12.  Bilirubin and urine.      Past Medical History:   Diagnosis Date    Acute kidney injury (HCC)     Aortic dissection (HCC)     Atrial fibrillation (HCC)     CAD (coronary artery disease)     CHF (congestive heart failure) (HCC) 5/03    severely impaired LV systolic function and CHF, EF 25-30%    CKD (chronic kidney disease)     H/O  Types: Marijuana (Weed)     Comment: since 1960s - few times a week         Review of Systems   Review of Systems pertinent positives in HPI      PHYSICAL EXAM:    Vitals:    09/11/24 1325   BP: (!) 92/57   Pulse: 60   Resp: 12   Temp:    SpO2: 96%       General appearance:  lying in bed, NAD, A&Ox4  Neurologic: GC15, PERRL  Head: NCAT, EOMI, red conjunctiva  Neck: supple, no masses, trachea midline  Lungs: symmetric chest rise, no respiratory distress  Heart: normal rate per peripheral pulse  Abdomen: soft, nontender, moderately distended  Skin: warm and dry, no cyanosis      LABS:  CBC  Recent Labs     09/11/24  1423   WBC 4.6   HGB 12.0*   HCT 35.8*        BMP  Recent Labs     09/11/24  1423      K 3.5   CL 97*   CO2 26   BUN 45*   CREATININE 2.4*   CALCIUM 9.7     Liver Function  Recent Labs     09/11/24  1423   BILITOT 4.0*   BILIDIR 3.3*   AST 89*   *   ALKPHOS 189*     No results for input(s): \"LACTATE\" in the last 72 hours.  Recent Labs     09/11/24  1423   INR 1.3       RADIOLOGY  I have personally reviewed all relevant imaging:      US GALLBLADDER RUQ   Final Result   1. Cholelithiasis without sonographic evidence of acute cholecystitis.   2. Ring down artifact in the gallbladder wall, which can be seen in the   setting of adenomyomatosis.                ASSESSMENT:      Patient Active Problem List   Diagnosis    H/O sarcoidosis    Essential hypertension    Moderate obesity    Stage 3a chronic kidney disease (HCC)    MR (mitral regurgitation)    Anticoagulated on Coumadin    Cardiomyopathy (HCC)    Cognitive dysfunction    Mixed hyperlipidemia    Hyperuricemia    Dissection of thoracic aorta (HCC)    Transition of care performed with sharing of clinical summary    Chronic combined systolic and diastolic CHF (congestive heart failure) (HCC)    Atrial fibrillation with RVR (HCC)    Fatigue    KYARA on CPAP    Restrictive lung disease    Gastrointestinal hemorrhage    Acute blood loss anemia

## 2024-09-11 NOTE — ED PROVIDER NOTES
HPI:  9/11/24, Time: 3:49 PM EDT         Hong Chase is a 69 y.o. male presenting to the ED for itching.  Patient said these been itching for the past week.  Came on gradually, nothing makes it better or worse, does have associated dark urine with it.  No abdominal.  No fevers or chills.  No cough or sputum no back pain.  No paresthesias.  Is not had this in the past.  He is not an alcoholic.  Does not take Tylenol consistently.  No other symptoms or complaints.    Review of Systems:   A complete review of systems was performed and pertinent positives and negatives are stated within HPI, all other systems reviewed and are negative.          --------------------------------------------- PAST HISTORY ---------------------------------------------  Past Medical History:  has a past medical history of Acute kidney injury (HCC), Aortic dissection (HCC), Atrial fibrillation (HCC), CAD (coronary artery disease), CHF (congestive heart failure) (HCC), CKD (chronic kidney disease), H/O cardiovascular stress test, H/O Doppler echocardiogram, Hypertension, Ischemic cardiomyopathy, Obesity, Pericardial effusion (noninflammatory), Pleural effusion, Sarcoidosis, and Valvular heart disease.    Past Surgical History:  has a past surgical history that includes Tonsillectomy; other surgical history (11/15/2019); Upper gastrointestinal endoscopy (N/A, 3/5/2021); Upper gastrointestinal endoscopy (N/A, 4/19/2021); Colonoscopy (N/A, 11/23/2021); and Upper gastrointestinal endoscopy (N/A, 11/23/2021).    Social History:  reports that he quit smoking about 30 years ago. His smoking use included cigarettes, pipe, and cigars. He started smoking about 36 years ago. He has a 2.9 pack-year smoking history. He has been exposed to tobacco smoke. He has never used smokeless tobacco. He reports current alcohol use. He reports current drug use. Frequency: 3.00 times per week. Drug: Marijuana (Weed).    Family History: family history includes  clear, handling secretions, no trismus  Neck: Supple, full ROM,   Pulmonary: Lungs clear to auscultation bilaterally, no wheezes, rales, or rhonchi. Not in respiratory distress  Cardiovascular:  Regular rate and rhythm, no murmurs, gallops, or rubs. 2+ distal pulses  Abdomen: Soft, non tender, non distended, no guarding or rebound, Rojas sign negative  Extremities: Moves all extremities x 4. Warm and well perfused  Skin: warm and dry without rash  Neurologic: GCS 15,  Psych: Normal Affect      ------------------------------ ED COURSE/MEDICAL DECISION MAKING----------------------  Medications   sodium chloride 0.9 % bolus 1,000 mL (1,000 mLs IntraVENous New Bag 24 5106)   0.9 % sodium chloride infusion (has no administration in time range)         ED COURSE:       Medical Decision Making:      Patient presents with jaundice.  Concern for malignancy, cholelithiasis, cholecystitis, or other pathologies.  No pain on arrival.  Did receive IV fluids.    Labs are by me shows normal CBC, BMP reassuring other than a creatinine of 2.4, urinalysis not consistent with a UTI, liver enzymes elevated, total bili is 4 with elevated LFTs    Right upper quadrant ultrasound obtained and reviewed, concern for choledocholithiasis    Chart reviewed, patient was seen for essential hypertension on 2024 by cardiology    Patient received Rocephin and Flagyl    Discussed with general surgery, they will evaluate the patient      Counseling:   The emergency provider has spoken with the patient and discussed today’s results, in addition to providing specific details for the plan of care and counseling regarding the diagnosis and prognosis.  Questions are answered at this time and they are agreeable with the plan.      --------------------------------- IMPRESSION AND DISPOSITION ---------------------------------    IMPRESSION  1. Jaundice, non-    2. Biliary calculus of other site with obstruction         DISPOSITION  Disposition: Admit to med/surg floor  Patient condition is stable      NOTE: This report was transcribed using voice recognition software. Every effort was made to ensure accuracy; however, inadvertent computerized transcription errors may be present        Ophelia Carroll MD  09/11/24 0426

## 2024-09-12 ENCOUNTER — APPOINTMENT (OUTPATIENT)
Dept: MRI IMAGING | Age: 69
DRG: 197 | End: 2024-09-12
Payer: MEDICARE

## 2024-09-12 LAB
ALBUMIN SERPL-MCNC: 3.8 G/DL (ref 3.5–5.2)
ALP SERPL-CCNC: 171 U/L (ref 40–129)
ALT SERPL-CCNC: 186 U/L (ref 0–40)
ANION GAP SERPL CALCULATED.3IONS-SCNC: 17 MMOL/L (ref 7–16)
AST SERPL-CCNC: 77 U/L (ref 0–39)
BASOPHILS # BLD: 0.03 K/UL (ref 0–0.2)
BASOPHILS NFR BLD: 1 % (ref 0–2)
BILIRUB SERPL-MCNC: 4.3 MG/DL (ref 0–1.2)
BUN SERPL-MCNC: 39 MG/DL (ref 6–23)
CALCIUM SERPL-MCNC: 9.1 MG/DL (ref 8.6–10.2)
CHLORIDE SERPL-SCNC: 98 MMOL/L (ref 98–107)
CO2 SERPL-SCNC: 21 MMOL/L (ref 22–29)
CREAT SERPL-MCNC: 2.2 MG/DL (ref 0.7–1.2)
EOSINOPHIL # BLD: 0.28 K/UL (ref 0.05–0.5)
EOSINOPHILS RELATIVE PERCENT: 6 % (ref 0–6)
ERYTHROCYTE [DISTWIDTH] IN BLOOD BY AUTOMATED COUNT: 15.2 % (ref 11.5–15)
GFR, ESTIMATED: 32 ML/MIN/1.73M2
GLUCOSE SERPL-MCNC: 92 MG/DL (ref 74–99)
HCT VFR BLD AUTO: 34 % (ref 37–54)
HGB BLD-MCNC: 10.8 G/DL (ref 12.5–16.5)
IMM GRANULOCYTES # BLD AUTO: <0.03 K/UL (ref 0–0.58)
IMM GRANULOCYTES NFR BLD: 0 % (ref 0–5)
LYMPHOCYTES NFR BLD: 0.49 K/UL (ref 1.5–4)
LYMPHOCYTES RELATIVE PERCENT: 11 % (ref 20–42)
MAGNESIUM SERPL-MCNC: 1.9 MG/DL (ref 1.6–2.6)
MCH RBC QN AUTO: 31.3 PG (ref 26–35)
MCHC RBC AUTO-ENTMCNC: 31.8 G/DL (ref 32–34.5)
MCV RBC AUTO: 98.6 FL (ref 80–99.9)
MONOCYTES NFR BLD: 0.54 K/UL (ref 0.1–0.95)
MONOCYTES NFR BLD: 12 % (ref 2–12)
NEUTROPHILS NFR BLD: 70 % (ref 43–80)
NEUTS SEG NFR BLD: 3.17 K/UL (ref 1.8–7.3)
PHOSPHATE SERPL-MCNC: 3.7 MG/DL (ref 2.5–4.5)
PLATELET, FLUORESCENCE: 140 K/UL (ref 130–450)
PMV BLD AUTO: 10.4 FL (ref 7–12)
POTASSIUM SERPL-SCNC: 3.2 MMOL/L (ref 3.5–5)
PROT SERPL-MCNC: 6.8 G/DL (ref 6.4–8.3)
RBC # BLD AUTO: 3.45 M/UL (ref 3.8–5.8)
RBC # BLD: ABNORMAL 10*6/UL
SODIUM SERPL-SCNC: 136 MMOL/L (ref 132–146)
WBC OTHER # BLD: 4.5 K/UL (ref 4.5–11.5)

## 2024-09-12 PROCEDURE — 84156 ASSAY OF PROTEIN URINE: CPT

## 2024-09-12 PROCEDURE — 6360000002 HC RX W HCPCS: Performed by: TRANSPLANT SURGERY

## 2024-09-12 PROCEDURE — 83935 ASSAY OF URINE OSMOLALITY: CPT

## 2024-09-12 PROCEDURE — 93005 ELECTROCARDIOGRAM TRACING: CPT | Performed by: INTERNAL MEDICINE

## 2024-09-12 PROCEDURE — 2580000003 HC RX 258: Performed by: TRANSPLANT SURGERY

## 2024-09-12 PROCEDURE — 97530 THERAPEUTIC ACTIVITIES: CPT

## 2024-09-12 PROCEDURE — 74183 MRI ABD W/O CNTR FLWD CNTR: CPT

## 2024-09-12 PROCEDURE — 2140000000 HC CCU INTERMEDIATE R&B

## 2024-09-12 PROCEDURE — 6370000000 HC RX 637 (ALT 250 FOR IP): Performed by: INTERNAL MEDICINE

## 2024-09-12 PROCEDURE — 97165 OT EVAL LOW COMPLEX 30 MIN: CPT

## 2024-09-12 PROCEDURE — 83735 ASSAY OF MAGNESIUM: CPT

## 2024-09-12 PROCEDURE — 36415 COLL VENOUS BLD VENIPUNCTURE: CPT

## 2024-09-12 PROCEDURE — A9579 GAD-BASE MR CONTRAST NOS,1ML: HCPCS | Performed by: RADIOLOGY

## 2024-09-12 PROCEDURE — 94660 CPAP INITIATION&MGMT: CPT

## 2024-09-12 PROCEDURE — 6360000004 HC RX CONTRAST MEDICATION: Performed by: RADIOLOGY

## 2024-09-12 PROCEDURE — 82570 ASSAY OF URINE CREATININE: CPT

## 2024-09-12 PROCEDURE — 99222 1ST HOSP IP/OBS MODERATE 55: CPT | Performed by: INTERNAL MEDICINE

## 2024-09-12 PROCEDURE — 84300 ASSAY OF URINE SODIUM: CPT

## 2024-09-12 PROCEDURE — 82436 ASSAY OF URINE CHLORIDE: CPT

## 2024-09-12 PROCEDURE — 80053 COMPREHEN METABOLIC PANEL: CPT

## 2024-09-12 PROCEDURE — 81001 URINALYSIS AUTO W/SCOPE: CPT

## 2024-09-12 PROCEDURE — 97161 PT EVAL LOW COMPLEX 20 MIN: CPT

## 2024-09-12 PROCEDURE — 84100 ASSAY OF PHOSPHORUS: CPT

## 2024-09-12 PROCEDURE — 6370000000 HC RX 637 (ALT 250 FOR IP)

## 2024-09-12 PROCEDURE — 6360000002 HC RX W HCPCS

## 2024-09-12 PROCEDURE — 85025 COMPLETE CBC W/AUTO DIFF WBC: CPT

## 2024-09-12 RX ORDER — AMLODIPINE BESYLATE 5 MG/1
5 TABLET ORAL DAILY
Status: DISCONTINUED | OUTPATIENT
Start: 2024-09-12 | End: 2024-09-20 | Stop reason: HOSPADM

## 2024-09-12 RX ORDER — CARVEDILOL 6.25 MG/1
12.5 TABLET ORAL 2 TIMES DAILY WITH MEALS
Status: DISCONTINUED | OUTPATIENT
Start: 2024-09-12 | End: 2024-09-20 | Stop reason: HOSPADM

## 2024-09-12 RX ORDER — ENOXAPARIN SODIUM 100 MG/ML
1 INJECTION SUBCUTANEOUS 2 TIMES DAILY
Status: DISCONTINUED | OUTPATIENT
Start: 2024-09-12 | End: 2024-09-20

## 2024-09-12 RX ORDER — DIGOXIN 125 MCG
62.5 TABLET ORAL DAILY
Status: DISCONTINUED | OUTPATIENT
Start: 2024-09-12 | End: 2024-09-20 | Stop reason: HOSPADM

## 2024-09-12 RX ORDER — SENNOSIDES A AND B 8.6 MG/1
1 TABLET, FILM COATED ORAL NIGHTLY
Status: DISCONTINUED | OUTPATIENT
Start: 2024-09-12 | End: 2024-09-20 | Stop reason: HOSPADM

## 2024-09-12 RX ORDER — POTASSIUM CHLORIDE 1500 MG/1
40 TABLET, EXTENDED RELEASE ORAL ONCE
Status: COMPLETED | OUTPATIENT
Start: 2024-09-12 | End: 2024-09-12

## 2024-09-12 RX ADMIN — ONDANSETRON 4 MG: 2 INJECTION INTRAMUSCULAR; INTRAVENOUS at 07:22

## 2024-09-12 RX ADMIN — SENNOSIDES 8.6 MG: 8.6 TABLET, FILM COATED ORAL at 03:22

## 2024-09-12 RX ADMIN — POTASSIUM CHLORIDE 40 MEQ: 1500 TABLET, EXTENDED RELEASE ORAL at 19:25

## 2024-09-12 RX ADMIN — SENNOSIDES 8.6 MG: 8.6 TABLET, FILM COATED ORAL at 20:10

## 2024-09-12 RX ADMIN — ENOXAPARIN SODIUM 90 MG: 100 INJECTION SUBCUTANEOUS at 15:27

## 2024-09-12 RX ADMIN — AMLODIPINE BESYLATE 5 MG: 5 TABLET ORAL at 10:11

## 2024-09-12 RX ADMIN — SODIUM CHLORIDE: 9 INJECTION, SOLUTION INTRAVENOUS at 12:31

## 2024-09-12 RX ADMIN — DIGOXIN 62.5 MCG: 125 TABLET ORAL at 10:11

## 2024-09-12 RX ADMIN — ENOXAPARIN SODIUM 90 MG: 100 INJECTION SUBCUTANEOUS at 20:10

## 2024-09-12 RX ADMIN — CARVEDILOL 12.5 MG: 6.25 TABLET, FILM COATED ORAL at 10:10

## 2024-09-12 RX ADMIN — GADOTERIDOL 20 ML: 279.3 INJECTION, SOLUTION INTRAVENOUS at 17:47

## 2024-09-12 RX ADMIN — CARVEDILOL 12.5 MG: 6.25 TABLET, FILM COATED ORAL at 19:23

## 2024-09-12 ASSESSMENT — PAIN SCALES - GENERAL
PAINLEVEL_OUTOF10: 0

## 2024-09-12 NOTE — H&P
Dissection of thoracic aorta (HCC)    Transition of care performed with sharing of clinical summary    Chronic combined systolic and diastolic CHF (congestive heart failure) (HCC)    Atrial fibrillation with RVR (HCC)    Fatigue    KYARA on CPAP    Restrictive lung disease    Gastrointestinal hemorrhage    Acute blood loss anemia    Hypotension due to hypovolemia    CAD (coronary artery disease)    Hx of CABG    S/P AAA repair    Lactic acidosis    Elevated LDL cholesterol level    Right carpal tunnel syndrome            69 y.o. male with painless jaundice possibly due to an obstructive process     PLAN:  -Will monitor pt daily labs  -MRCP ordered to identify biliary anatomy   -GI consult  -Tumor markers ordered (Ca-19, CEA)     Nicola Nye DO  General Surgery Resident, PGY-1     Electronically signed by Nicola Nye DO on 9/11/24 at 7:28 PM EDT     Attending Physician Statement:    Chief Complaint:   Chief Complaint   Patient presents with    Pruritis     Pt states full body itching began 2 days ago    pt states his urine is darker than normal. Pt denies buring     Fatigue     Began 2 days ago       I have examined the patient and performed the key aspects of physical exam, reviewed the record (including all pertinent and new radiology images and laboratory findings), and discussed the case with the surgical team.  I agree with the assessment and plan with the following additions, corrections, and changes. 14pt review of symptoms completed and negative except as mentioned.    Patient's GFR is above 30 therefore it is safe for the patient to have a MRI.  We need to determine if this is a stone versus a malignant neoplasm.  If is not a malignant neoplasm this will change the course of treatment.  Okay for a diet after MRI  Will consult nephrology for assistance with acute on chronic kidney disease  Will also consult the hospitalist service for medical management  Hold eliquis, will bridge with lovenox due to chronic

## 2024-09-12 NOTE — CONSULTS
Inpatient Consult      PCP:  Kamron Chase DO  Admitting Physician:  Deni Escamilla III, MD  Consultants:  internal medicine, [facility] [place]  Chief Complaint:    Chief Complaint   Patient presents with    Pruritis     Pt states full body itching began 2 days ago    pt states his urine is darker than normal. Pt denies buring     Fatigue     Began 2 days ago       History of Present Illness  Hnog Chase is a 69 y.o. male who presents to [facility] ER complaining of pruritus, dark urine, fatigue.    Hong Chase has a past medical history that includes hypertension, CAD S/p CABG, CKD, CHF, atrial fibrillation, KYARA, hx GIB, sarcoidosis    Hong presented to the ER yesterday for evaluation of pruritus, dark urine, acholic stools, decreased appetite.  He had been feeling weak over the past week as well as generalized abdominal pain and epigastric discomfort over the past year.  Denies weight loss.  In the ER he was found to have scleral icterus and mild jaundice.  Imaging showed cholelithiasis without cholecystitis.  He was admitted to hepatobiliary service and plan is for MRCP.  Internal medicine consulted today for medication management.  During my examination, patient and family member are frustrated with plan of care.  They are unsure why MRCP keeps getting pushed back and why he did not receive any of his home medications yesterday.  Through chart review, I answered patient's questions as thoroughly as I could.  MRCP was held due to issues with renal function.  He currently denies any pain.  Plan is to have MRCP now at 5:00.    ER Course  Upon presentation to the ER, routine labwork was performed which revealed potassium BUN 45, creatinine 2.4, alk phos 189, , AST 89, total bilirubin 4.0, hemoglobin 12.  Imaging results are as outlined below in the Imaging section of this note.    Upon arrival to the ER, patient was 92/57.  The patient received rocephin, flagyl, 1L NS in the emergency room and was admitted  jaundice  HEENT:  NCAT; PERRL; conjunctiva pink, scleral icterus  Neck:  no adenopathy, bruit, JVD, tenderness, masses, or nodules; supple, symmetrical, trachea midline, thyroid not enlarged  Lung:  clear to auscultation bilaterally; no use of accessory muscles; no rhonchi, rales, or crackles  Heart:  regular rate and regular rhythm without murmur, rub, or gallop  Abdomen:  soft, nontender, nondistended; normoactive bowel sounds; no organomegaly  Extremities:  extremities normal, atraumatic, no cyanosis or edema  Musculokeletal:  no joint swelling, no muscle tenderness. ROM normal in all joints of extremities.   Neurologic:  mental status A&Ox3, thought content appropriate; CN II-XII grossly intact; sensation intact, motor strength 5/5 globally; no slurred speech    Laboratory Data  Recent Results (from the past 24 hour(s))   CEA    Collection Time: 09/11/24  9:10 PM   Result Value Ref Range    CEA 6.0 (H) 0.0 - 5.2 ng/mL   CBC auto differential    Collection Time: 09/12/24  5:59 AM   Result Value Ref Range    WBC 4.5 4.5 - 11.5 k/uL    RBC 3.45 (L) 3.80 - 5.80 m/uL    Hemoglobin 10.8 (L) 12.5 - 16.5 g/dL    Hematocrit 34.0 (L) 37.0 - 54.0 %    MCV 98.6 80.0 - 99.9 fL    MCH 31.3 26.0 - 35.0 pg    MCHC 31.8 (L) 32.0 - 34.5 g/dL    RDW 15.2 (H) 11.5 - 15.0 %    MPV 10.4 7.0 - 12.0 fL    Platelet, Fluorescence 140 130 - 450 k/uL    Neutrophils % 70 43.0 - 80.0 %    Lymphocytes % 11 (L) 20.0 - 42.0 %    Monocytes % 12 2.0 - 12.0 %    Eosinophils % 6 0 - 6 %    Basophils % 1 0.0 - 2.0 %    Immature Granulocytes % 0 0.0 - 5.0 %    Neutrophils Absolute 3.17 1.80 - 7.30 k/uL    Lymphocytes Absolute 0.49 (L) 1.50 - 4.00 k/uL    Monocytes Absolute 0.54 0.10 - 0.95 k/uL    Eosinophils Absolute 0.28 0.05 - 0.50 k/uL    Basophils Absolute 0.03 0.00 - 0.20 k/uL    Immature Granulocytes Absolute <0.03 0.00 - 0.58 k/uL    RBC Morphology 1+ ANISOCYTOSIS     RBC Morphology 1+ ZAID CELLS     RBC Morphology 1+ OVALOCYTES     RBC

## 2024-09-12 NOTE — PROGRESS NOTES
Radiology Procedure Waiver   Name: Hong Chase  : 1955  MRN: [id number]    Date:  24    Time: 10:09 PM EDT    Benefits of immediately proceeding with Radiology exam(s) without pre-testing outweigh the risks or are not indicated as specified below and therefore the following is/are being waived:    [] Pregnancy test   [] Patients LMP on-time and regular.   [] Patient had Tubal Ligation or has other Contraception Device.   [] Patient  is Menopausal or Premenarcheal.    [] Patient had Full or Partial Hysterectomy.    [] Protocol for Iodine allergy    [] MRI Questionnaire     [x] BUN/Creatinine   [] Patient age w/no hx of renal dysfunction.   [] Patient on Dialysis.   [] Recent Normal Labs.  Electronically signed by Luciana Nguyễn DO on 24 at 10:09 PM EDT             Electronically signed by Luciana Nguyễn DO on 2024 at 10:10 PM

## 2024-09-12 NOTE — PROGRESS NOTES
[facility] SURGERY  DAILY PROGRESS NOTE  9/12/2024    Subjective:  No new complaints or overnight events. Pt is doing okay. No pain or nausea. Plan for MRCP today.    Objective:  BP (!) 113/56   Pulse 71   Temp 97.2 °F (36.2 °C) (Temporal)   Resp 17   Ht 1.702 m (5' 7\")   Wt 91.8 kg (202 lb 6.4 oz)   SpO2 94%   BMI 31.70 kg/m²     General appearance:  lying in bed, NAD, A&Ox4  Neurologic: GC15, PERRL  Head: NCAT, EOMI, red conjunctiva  Neck: supple, no masses, trachea midline  Lungs: symmetric chest rise, no respiratory distress  Heart: normal rate per peripheral pulse  Abdomen: soft, nontender, moderately distended  Skin: warm and dry, no cyanosis      I have personally reviewed all relevant labs and imaging.    Assessment/Plan:  69 y.o. male with painless jaundice possibly due to an obstructive process   -Will monitor pt daily labs  -MRCP ordered to identify biliary anatomy, should be performed today, Waiver in chart   -GI consult-tentative plans for EUS/ERCP on 9/16 pending MR results and endo availability  -Tumor markers ordered Ca-19 pending, CEA elevated 6.0       Electronically signed by Nicola Nye DO on 9/12/2024 at 1:24 PM     Attending Physician Statement:    Chief Complaint:   Chief Complaint   Patient presents with    Pruritis     Pt states full body itching began 2 days ago    pt states his urine is darker than normal. Pt denies buring     Fatigue     Began 2 days ago       I have examined the patient and performed the key aspects of physical exam, reviewed the record (including all pertinent and new radiology images and laboratory findings), and discussed the case with the surgical team.  I agree with the assessment and plan with the following additions, corrections, and changes. 14pt review of symptoms completed and negative except as mentioned.    Patient's GFR is above 30 therefore it is safe for the patient to have a MRI.  We need to determine if this is a stone versus a malignant neoplasm.  If  is not a malignant neoplasm this will change the course of treatment.  Okay for a diet after MRI  Will consult nephrology for assistance with acute on chronic kidney disease  Will also consult the hospitalist service for medical management  Hold eliquis, will bridge with lovenox due to chronic atrial fibrillation    Deni Escamilla MD  09/12/24  2:00 PM

## 2024-09-12 NOTE — PROGRESS NOTES
RN messaged Surgical resident Nicola Nye via perfect serve as MR called stating they need a waiver for MRCP due to pt kidney fx. I also made resident aware they MRI said whoever puts in the waiver, they will need to be the one whose name displays on the MRCP order.     Resident said waiver should be in pt chart from yesterday but RN made him aware that pt arrived to unit with no chart

## 2024-09-12 NOTE — THERAPY EVALUATION
Physical Therapy Initial Assessment     Name: Hong Chase  : 1955  MRN: [id number]      Date of Service: 2024    Evaluating PT:  Latasha Lundberg, PT, DPT [id number]    Room #:  6513/6513-A  Diagnosis:  Jaundice [R17]  Jaundice, non- [R17]  Biliary calculus of other site with obstruction [K80.81]  PMHx/PSHx:    Past Medical History:   Diagnosis Date    Acute kidney injury (HCC)     Aortic dissection (HCC)     Atrial fibrillation (HCC)     CAD (coronary artery disease)     CHF (congestive heart failure) (HCC)     severely impaired LV systolic function and CHF, EF 25-30%    CKD (chronic kidney disease)     H/O cardiovascular stress test     No evidence of stress-induced ischemia    H/O Doppler echocardiogram 5/04/10    [facility], mildly dilated LV, mod concentric LVH, normal LV systolic function, mod dilated left atrium, trace MR    Hypertension     Ischemic cardiomyopathy     Obesity     Pericardial effusion (noninflammatory) 11/15/2019    History: Post-op problem  Assessment: S/P pericardial drain placement in CVICU  Plan:  Drain dc'ed, no need for post-procedure echo unless pt become symptomatic.    Pleural effusion 2019    History: Post op problem Assessment: On r/a, left pigtail dc'ed Plan: PO lasix.  Denies sob.  Desat study done, no home O2 needed    Sarcoidosis     Valvular heart disease      Procedure/Surgery: None this admission  Reason for admission: Jaundice [R17]  Jaundice, non- [R17]  Biliary calculus of other site with obstruction [K80.81]  Precautions:  NPO, Falls  Equipment Needs:  TBD    SUBJECTIVE:  Pt lives alone in ranch house. Pt has 1 stair to enter the home and 1 stair to enter the kitchen with no handrails. Pt ambulated with no AD PTA.    OBJECTIVE:   Initial Evaluation  Date: 24 Treatment Short Term/ Long Term   Goals   AM-PAC 6 Clicks      Was pt agreeable to Eval/treatment? Yes     Does pt have pain? None stated     Bed Mobility  Rolling:  discharge home and/or into the community   [] Positioning to prevent skin breakdown and contractures  [] Safety and Education Training   [x] Patient/Caregiver Education   [] HEP  [x] Other     PT long term treatment goals are located in above grid    Frequency of treatments: 2-5x/week x 1-2 weeks.    Time in  1122  Time out  1145    Total Treatment Time  13 minutes     Evaluation Time includes thorough review of current medical information, gathering information on past medical history/social history and prior level of function, completion of standardized testing/informal observation of tasks, assessment of data and education on plan of care and goals.    CPT codes:  [x] Low Complexity PT evaluation 79592  [] Moderate Complexity PT evaluation 23920  [] High Complexity PT evaluation 98160  [] PT Re-evaluation 25716  [] Gait training 45267 - minutes  [] Manual therapy 29283 - minutes  [x] Therapeutic activities 76136 13 minutes  [] Therapeutic exercises 09004 - minutes  [] Neuromuscular reeducation 54126 - minutes     Ubaldo Lima, SPT    Latasha Lundberg, PT, DPT  [id number]

## 2024-09-12 NOTE — PLAN OF CARE
Problem: ABCDS Injury Assessment  Goal: Absence of physical injury  Outcome: Progressing     Problem: Chronic Conditions and Co-morbidities  Goal: Patient's chronic conditions and co-morbidity symptoms are monitored and maintained or improved  Outcome: Progressing     Problem: Discharge Planning  Goal: Discharge to home or other facility with appropriate resources  Outcome: Progressing

## 2024-09-12 NOTE — PLAN OF CARE
Problem: ABCDS Injury Assessment  Goal: Absence of physical injury  9/12/2024 1945 by Iban Bennett, RN  Outcome: Progressing     Problem: Chronic Conditions and Co-morbidities  Goal: Patient's chronic conditions and co-morbidity symptoms are monitored and maintained or improved  9/12/2024 1945 by Iban Bennett, RN  Outcome: Progressing     Problem: Discharge Planning  Goal: Discharge to home or other facility with appropriate resources  9/12/2024 1945 by Iban Bennett, RN  Outcome: Progressing     Problem: Pain  Goal: Verbalizes/displays adequate comfort level or baseline comfort level  Outcome: Progressing

## 2024-09-12 NOTE — PROGRESS NOTES
Notified Attending Resident via Perfect[employer] regarding \"patient's chart did not come to unit with him and ED does not have it. MRI is requesting a waiver for the MRCP w/ and w/o contrast. Can another one be signed electronically?\"

## 2024-09-12 NOTE — PROGRESS NOTES
Notified Attending Resident via [employer] of patient's expiring telemetry order.    Continuous telemetry monitoring ordered per Dr. Nye.

## 2024-09-12 NOTE — CARE COORDINATION
Transition of care: Admitted with jaundice. MRCP ordered. GI consulted. IVFs at 75ml/hr. Total bili 4.3 and other labs noted. Met with pt in room. Pt said he lives alone in a ranch style home with 1 step to enter home. Independent with ADLs and drives. DME- CPAP from [employer]. Pt does not use assistive devices at home.  Plan is to return home when medically ready. No needs voiced at present. Pt said he will have support/assistance from his girlfriend, Felicitas, if needed. PCP is Dr STACEY Chase and local pharmacy is [facility] in [place]. His girlfriend will provide transportation home. Cm/sw will follow.     Case Management Assessment  Initial Evaluation    Date/Time of Evaluation: 2024 10:58 AM  Assessment Completed by: Mily Loja RN    If patient is discharged prior to next notation, then this note serves as note for discharge by case management.    Patient Name: Hong Chase                   YOB: 1955  Diagnosis: Jaundice [R17]  Jaundice, non- [R17]  Biliary calculus of other site with obstruction [K80.81]                   Date / Time: 2024  4:18 PM    Patient Admission Status: Inpatient   Readmission Risk (Low < 19, Mod (19-27), High > 27): Readmission Risk Score: 14.1    Current PCP: Kamron Chase, DO  PCP verified by CM? Yes    Chart Reviewed: Yes      History Provided by: Patient  Patient Orientation: Alert and Oriented    Patient Cognition: Alert    Hospitalization in the last 30 days (Readmission):  No    If yes, Readmission Assessment in  Navigator will be completed.    Advance Directives:      Code Status: Full Code     Discharge Planning:    Patient lives with: Alone Type of Home: House  Primary Care Giver: Self  Patient Support Systems include: Family Members, Spouse/Significant Other     ADLS  Prior functional level: Independent in ADLs/IADLs      Family can provide assistance at DC: Other (comment) (pt's girlfriend, Felicitas)  Would you like Case Management to

## 2024-09-12 NOTE — PROGRESS NOTES
MRI screening will need to completed, also spoke to ordering doctor and they are going to put a waiver in for patients GFR being only a 28. Said patient will be getting fluids and that ok to do first thing in the morning.

## 2024-09-12 NOTE — PROGRESS NOTES
4 Eyes Skin Assessment     NAME:  Hong Chase  YOB: 1955  MEDICAL RECORD NUMBER:  [id number]    The patient is being assessed for  Admission    I agree that at least one RN has performed a thorough Head to Toe Skin Assessment on the patient. ALL assessment sites listed below have been assessed.      Areas assessed by both nurses:    Head, Face, Ears, Shoulders, Back, Chest, Arms, Elbows, Hands, Sacrum. Buttock, Coccyx, Ischium, Legs. Feet and Heels, and Under Medical Devices         Does the Patient have a Wound? No noted wound(s)       Sea Prevention initiated by RN: No  Wound Care Orders initiated by RN: No    Pressure Injury (Stage 3,4, Unstageable, DTI, NWPT, and Complex wounds) if present, place Wound referral order by RN under : No    New Ostomies, if present place, Ostomy referral order under : No     Nurse 1 eSignature: Electronically signed by Iban Bennett RN on 9/12/24 at 3:00 AM EDT    **SHARE this note so that the co-signing nurse can place an eSignature**    Nurse 2 eSignature: Electronically signed by Kelsey Foreman RN on 9/12/24 at 3:14 AM EDT

## 2024-09-12 NOTE — PLAN OF CARE
Problem: Chronic Conditions and Co-morbidities  Goal: Patient's chronic conditions and co-morbidity symptoms are monitored and maintained or improved  9/12/2024 0848 by Khushi Corbin RN  Outcome: Progressing     Problem: ABCDS Injury Assessment  Goal: Absence of physical injury  9/12/2024 0848 by Khushi Corbin, RN  Outcome: Progressing     Problem: Discharge Planning  Goal: Discharge to home or other facility with appropriate resources  9/12/2024 0848 by Khushi Corbin, RN  Outcome: Progressing

## 2024-09-12 NOTE — PROGRESS NOTES
[facility] Quality Flow/Interdisciplinary Rounds Progress Note        Quality Flow Rounds held on September 12, 2024    Disciplines Attending:  Bedside Nurse, , , and Nursing Unit Leadership    Hong Chase was admitted on 9/11/2024  4:18 PM    Anticipated Discharge Date:       Disposition:    Sea Score:  Sea Scale Score: 21    Readmission Risk              Risk of Unplanned Readmission:  13           Discussed patient goal for the day, patient clinical progression, and barriers to discharge.  The following Goal(s) of the Day/Commitment(s) have been identified:  Diagnostics - Report Results and Labs - Report Results      Luana Hsieh RN  September 12, 2024

## 2024-09-12 NOTE — CONSULTS
Advanced Endoscopy/Gastroenterology Consult Note  Deni Taylor D.O.    Requesting physician:Dr. Escamilla  Reason for Consult: Obstructive Jaundice  Date:8:29 AM 9/12/2024    Hong Chase  69 y.o.  male    HPI:  Hong is a pleasant 70yo male who presents to the ER with complaints of a couple of days worth of dark urine, light colored stools, itching, and now jaundice. He also has complaints of occasional bloating/early satiety along with feeling weaker and more fatigued over the past couple of months. He does not feel that he has lost any significant weight. He has never had an episode similar to this in the past nor has he had any prior bouts of pancreatitis. He is in no pain. He denies any family members with and GI associated malignancy. He does have a hx of tobacco use quit >20 years prior. He has a hx of Sarcoid which he states is in remission and does not seek     Work-up thus far demonstrated RUQ US with cholelithiasis without cholecystitis. ALP of 189, , AST 89, T. bili 4.0 (direct was 3. 3), WBC 4.6, hemoglobin 12.     Prior endoscopic hx   Colonoscopy (Dr. Padilla) 11/2021: Multiple colon polyps, largest 1cm (Tubulovillous pathology)- repeat recommended 11/2024  EGD (Dr. Padilla), 4/2021: Normal findings  EGD (Dr. Padilla), 3/2021: Lesser curvature actively oozing gastric ulcer- therapy employed with Epinephrine, diluted    PAST MEDICAL Hx:  Past Medical History:   Diagnosis Date    Acute kidney injury (HCC)     Aortic dissection (HCC)     Atrial fibrillation (HCC)     CAD (coronary artery disease)     CHF (congestive heart failure) (HCC) 5/03    severely impaired LV systolic function and CHF, EF 25-30%    CKD (chronic kidney disease)     H/O cardiovascular stress test 5/03    No evidence of stress-induced ischemia    H/O Doppler echocardiogram 5/04/10    [facility], mildly dilated LV, mod concentric LVH, normal LV systolic function, mod dilated left atrium, trace MR    Hypertension     Ischemic

## 2024-09-12 NOTE — PROGRESS NOTES
Occupational Therapy  OCCUPATIONAL THERAPY INITIAL EVALUATION    [facility]  [address], [place]      Date:2024                                                Patient Name: Hong Chase  MRN: [id number]  : 1955  Room: [address] [place] [address]    Evaluating OT: Bhupendra Rodriguez OTR/L #[id number]     Referring Provider: Gayla Wang MD   Specific Provider Orders/Date: OT eval and treat 24    Diagnosis: Jaundice [R17]  Jaundice, non- [R17]  Biliary calculus of other site with obstruction [K80.81]   Pt admitted to hospital with painless jaundice.     Pertinent Medical History:  has a past medical history of Acute kidney injury (HCC), Aortic dissection (HCC), Atrial fibrillation (HCC), CAD (coronary artery disease), CHF (congestive heart failure) (HCC), CKD (chronic kidney disease), H/O cardiovascular stress test, H/O Doppler echocardiogram, Hypertension, Ischemic cardiomyopathy, Obesity, Pericardial effusion (noninflammatory), Pleural effusion, Sarcoidosis, and Valvular heart disease.       Precautions:  Fall Risk    Assessment of current deficits    [x] Functional mobility  [x]ADLs  [x] Strength               []Cognition    [x] Functional transfers   [x] IADLs         [x] Safety Awareness   [x]Endurance    [] Fine Coordination              [x] Balance      [] Vision/perception   []Sensation     []Gross Motor Coordination  [] ROM  [] Delirium                   [] Motor Control     OT PLAN OF CARE   OT POC based on physician orders, patient diagnosis and results of clinical assessment    Frequency/Duration 1-3 days/wk for 2 weeks PRN   Specific OT Treatment Interventions to include:   * Instruction/training on adapted ADL techniques and AE recommendations to increase functional independence within precautions       * Training on energy conservation strategies, correct breathing pattern and techniques to improve independence/tolerance for  Complexity: Low    Time In: 1130  Time Out: 1155  Total Treatment Time: 10 minutes    Min Units   OT Eval Low 97165  x  1   OT Eval Medium 36443      OT Eval High 12537      OT Re-Eval 41318       Therapeutic Ex 78722       Therapeutic Activities 67939  8  1   ADL/Self Care 00554  2     Orthotic Management 26043       Manual 68064     Neuro Re-Ed 09331       Non-Billable Time          Evaluation Time additionally includes thorough review of current medical information, gathering information on past medical history/social history and prior level of function, interpretation of standardized testing/informal observation of tasks, assessment of data and development of plan of care and goals.          Bhupendra Rodriguez OTR/L #[id number]

## 2024-09-12 NOTE — PROGRESS NOTES
Spoke to MRI regarding the radiology procedure waiver signed by Dr. Nguyễn located in the notes of patient's EMR.    Plan for MRI ABD/MRCP W WO CONTRAST is today at 1700.

## 2024-09-12 NOTE — PROGRESS NOTES
9/12- No waiver has been put in yet for contrast- spoke to RN and he will message doctor. Once waiver is in we can schedule patient a time.

## 2024-09-12 NOTE — PROGRESS NOTES
MRI department called twice regarding time for MRI no answer at this time, will try again soon. Screening completed and MRI department notified earlier.

## 2024-09-12 NOTE — FLOWSHEET NOTE
Pt. Arrived to the unit with the following belongings:     09/11/24 2056   Belongings   Dental Appliances None   Vision - Corrective Lenses Eyeglasses;At bedside   Hearing Aid None   Clothing Belt;Footwear;Pants;Shirt;Socks;Undergarments;At bedside;Hat   Jewelry None   Electronic Devices Cell Phone;At bedside   Weapons (Notify Protective Services/Security) None   Home Medications None   Valuables Given To Other (Comment)  (n/a)   Provide Name(s) of Who Valuable(s) Were Given To n/a

## 2024-09-13 ENCOUNTER — APPOINTMENT (OUTPATIENT)
Age: 69
DRG: 197 | End: 2024-09-13
Attending: INTERNAL MEDICINE
Payer: MEDICARE

## 2024-09-13 ENCOUNTER — APPOINTMENT (OUTPATIENT)
Dept: INTERVENTIONAL RADIOLOGY/VASCULAR | Age: 69
DRG: 197 | End: 2024-09-13
Payer: MEDICARE

## 2024-09-13 PROBLEM — K83.1 OBSTRUCTIVE JAUNDICE: Status: ACTIVE | Noted: 2024-09-13

## 2024-09-13 LAB
ALBUMIN SERPL-MCNC: 3.7 G/DL (ref 3.5–5.2)
ALP SERPL-CCNC: 172 U/L (ref 40–129)
ALT SERPL-CCNC: 168 U/L (ref 0–40)
ANION GAP SERPL CALCULATED.3IONS-SCNC: 13 MMOL/L (ref 7–16)
AST SERPL-CCNC: 73 U/L (ref 0–39)
BACTERIA URNS QL MICRO: ABNORMAL
BASOPHILS # BLD: 0.02 K/UL (ref 0–0.2)
BASOPHILS NFR BLD: 1 % (ref 0–2)
BILIRUB SERPL-MCNC: 4.7 MG/DL (ref 0–1.2)
BILIRUB UR QL STRIP: ABNORMAL
BNP SERPL-MCNC: 5189 PG/ML (ref 0–125)
BUN SERPL-MCNC: 40 MG/DL (ref 6–23)
CALCIUM SERPL-MCNC: 9 MG/DL (ref 8.6–10.2)
CANCER AG19-9 SERPL IA-ACNC: 236 U/ML (ref 0–35)
CHLORIDE SERPL-SCNC: 102 MMOL/L (ref 98–107)
CHLORIDE UR-SCNC: 64 MMOL/L
CLARITY UR: CLEAR
CO2 SERPL-SCNC: 23 MMOL/L (ref 22–29)
COLOR UR: YELLOW
CREAT SERPL-MCNC: 2.3 MG/DL (ref 0.7–1.2)
CREAT UR-MCNC: 87.2 MG/DL (ref 40–278)
ECHO AO ASC DIAM: 4.2 CM
ECHO AO ASCENDING AORTA INDEX: 2.08 CM/M2
ECHO AO SINUS VALSALVA DIAM: 4.8 CM
ECHO AO SINUS VALSALVA INDEX: 2.38 CM/M2
ECHO AV AREA PEAK VELOCITY: 5.2 CM2
ECHO AV AREA VTI: 4.9 CM2
ECHO AV AREA/BSA PEAK VELOCITY: 2.6 CM2/M2
ECHO AV AREA/BSA VTI: 2.4 CM2/M2
ECHO AV CUSP MM: 2 CM
ECHO AV MEAN GRADIENT: 3 MMHG
ECHO AV MEAN VELOCITY: 0.8 M/S
ECHO AV PEAK GRADIENT: 4 MMHG
ECHO AV PEAK VELOCITY: 1.1 M/S
ECHO AV VELOCITY RATIO: 0.82
ECHO AV VTI: 17.7 CM
ECHO BSA: 2.06 M2
ECHO EST RA PRESSURE: 8 MMHG
ECHO LA DIAMETER INDEX: 3.12 CM/M2
ECHO LA DIAMETER: 6.3 CM
ECHO LA VOL A-L A2C: 158 ML (ref 18–58)
ECHO LA VOL A-L A4C: 165 ML (ref 18–58)
ECHO LA VOL MOD A2C: 144 ML (ref 18–58)
ECHO LA VOL MOD A4C: 159 ML (ref 18–58)
ECHO LA VOLUME AREA LENGTH: 175 ML
ECHO LA VOLUME INDEX A-L A2C: 78 ML/M2 (ref 16–34)
ECHO LA VOLUME INDEX A-L A4C: 82 ML/M2 (ref 16–34)
ECHO LA VOLUME INDEX AREA LENGTH: 87 ML/M2 (ref 16–34)
ECHO LA VOLUME INDEX MOD A2C: 71 ML/M2 (ref 16–34)
ECHO LA VOLUME INDEX MOD A4C: 79 ML/M2 (ref 16–34)
ECHO LV EF PHYSICIAN: 62 %
ECHO LV FRACTIONAL SHORTENING: 13 % (ref 28–44)
ECHO LV INTERNAL DIMENSION DIASTOLE INDEX: 2.38 CM/M2
ECHO LV INTERNAL DIMENSION DIASTOLIC: 4.8 CM (ref 4.2–5.9)
ECHO LV INTERNAL DIMENSION SYSTOLIC INDEX: 2.08 CM/M2
ECHO LV INTERNAL DIMENSION SYSTOLIC: 4.2 CM
ECHO LV IVSD: 1.5 CM (ref 0.6–1)
ECHO LV IVSS: 1.8 CM
ECHO LV MASS 2D: 232.2 G (ref 88–224)
ECHO LV MASS INDEX 2D: 115 G/M2 (ref 49–115)
ECHO LV POSTERIOR WALL DIASTOLIC: 1 CM (ref 0.6–1)
ECHO LV POSTERIOR WALL SYSTOLIC: 2 CM
ECHO LV RELATIVE WALL THICKNESS RATIO: 0.42
ECHO LVOT AREA: 6.6 CM2
ECHO LVOT AV VTI INDEX: 0.76
ECHO LVOT DIAM: 2.9 CM
ECHO LVOT MEAN GRADIENT: 2 MMHG
ECHO LVOT PEAK GRADIENT: 3 MMHG
ECHO LVOT PEAK VELOCITY: 0.9 M/S
ECHO LVOT STROKE VOLUME INDEX: 43.8 ML/M2
ECHO LVOT SV: 88.5 ML
ECHO LVOT VTI: 13.4 CM
ECHO MV A VELOCITY: 0.87 M/S
ECHO MV AREA VTI: 3.2 CM2
ECHO MV E DECELERATION TIME (DT): 181.6 MS
ECHO MV E VELOCITY: 0.85 M/S
ECHO MV E/A RATIO: 0.98
ECHO MV LVOT VTI INDEX: 2.07
ECHO MV MAX VELOCITY: 1.1 M/S
ECHO MV MEAN GRADIENT: 1 MMHG
ECHO MV MEAN VELOCITY: 0.5 M/S
ECHO MV PEAK GRADIENT: 5 MMHG
ECHO MV VTI: 27.7 CM
ECHO PULMONARY ARTERY END DIASTOLIC PRESSURE: 4 MMHG
ECHO PV REGURGITANT MAX VELOCITY: 1 M/S
ECHO PVEIN PEAK S VELOCITY: 0.4 M/S
ECHO RIGHT VENTRICULAR SYSTOLIC PRESSURE (RVSP): 48 MMHG
ECHO RV INTERNAL DIMENSION: 3.6 CM
ECHO RV TAPSE: 1.8 CM (ref 1.7–?)
ECHO TV REGURGITANT MAX VELOCITY: 3.16 M/S
ECHO TV REGURGITANT PEAK GRADIENT: 40 MMHG
EKG ATRIAL RATE: 288 BPM
EKG Q-T INTERVAL: 388 MS
EKG QRS DURATION: 116 MS
EKG QTC CALCULATION (BAZETT): 403 MS
EKG R AXIS: 23 DEGREES
EKG T AXIS: 9 DEGREES
EKG VENTRICULAR RATE: 65 BPM
EOSINOPHIL # BLD: 0.17 K/UL (ref 0.05–0.5)
EOSINOPHILS RELATIVE PERCENT: 4 % (ref 0–6)
ERYTHROCYTE [DISTWIDTH] IN BLOOD BY AUTOMATED COUNT: 15.3 % (ref 11.5–15)
FERRITIN SERPL-MCNC: 159 NG/ML
GFR, ESTIMATED: 30 ML/MIN/1.73M2
GLUCOSE SERPL-MCNC: 138 MG/DL (ref 74–99)
GLUCOSE UR STRIP-MCNC: NEGATIVE MG/DL
HCT VFR BLD AUTO: 32.8 % (ref 37–54)
HGB BLD-MCNC: 10.6 G/DL (ref 12.5–16.5)
HGB UR QL STRIP.AUTO: NEGATIVE
IMM GRANULOCYTES # BLD AUTO: <0.03 K/UL (ref 0–0.58)
IMM GRANULOCYTES NFR BLD: 0 % (ref 0–5)
INR PPP: 1.1
IRON SATN MFR SERPL: 24 % (ref 20–55)
IRON SERPL-MCNC: 56 UG/DL (ref 59–158)
KETONES UR STRIP-MCNC: NEGATIVE MG/DL
LEUKOCYTE ESTERASE UR QL STRIP: NEGATIVE
LYMPHOCYTES NFR BLD: 0.37 K/UL (ref 1.5–4)
LYMPHOCYTES RELATIVE PERCENT: 10 % (ref 20–42)
MAGNESIUM SERPL-MCNC: 2.1 MG/DL (ref 1.6–2.6)
MCH RBC QN AUTO: 31.8 PG (ref 26–35)
MCHC RBC AUTO-ENTMCNC: 32.3 G/DL (ref 32–34.5)
MCV RBC AUTO: 98.5 FL (ref 80–99.9)
MONOCYTES NFR BLD: 0.51 K/UL (ref 0.1–0.95)
MONOCYTES NFR BLD: 13 % (ref 2–12)
NEUTROPHILS NFR BLD: 72 % (ref 43–80)
NEUTS SEG NFR BLD: 2.76 K/UL (ref 1.8–7.3)
NITRITE UR QL STRIP: NEGATIVE
OSMOLALITY UR: 438 MOSM/KG (ref 300–900)
PH UR STRIP: 5.5 [PH] (ref 5–9)
PHOSPHATE SERPL-MCNC: 3.8 MG/DL (ref 2.5–4.5)
PLATELET, FLUORESCENCE: 126 K/UL (ref 130–450)
PMV BLD AUTO: 10.7 FL (ref 7–12)
POTASSIUM SERPL-SCNC: 3.8 MMOL/L (ref 3.5–5)
PROT SERPL-MCNC: 6.5 G/DL (ref 6.4–8.3)
PROT UR STRIP-MCNC: ABNORMAL MG/DL
PROTHROMBIN TIME: 12 SEC (ref 9.3–12.4)
RBC # BLD AUTO: 3.33 M/UL (ref 3.8–5.8)
RBC # BLD: ABNORMAL 10*6/UL
RBC #/AREA URNS HPF: ABNORMAL /HPF
SODIUM SERPL-SCNC: 138 MMOL/L (ref 132–146)
SODIUM UR-SCNC: 77 MMOL/L
SP GR UR STRIP: 1.02 (ref 1–1.03)
TIBC SERPL-MCNC: 237 UG/DL (ref 250–450)
TOTAL PROTEIN, URINE: 28 MG/DL (ref 0–12)
URATE SERPL-MCNC: 9.6 MG/DL (ref 3.4–7)
URINE TOTAL PROTEIN CREATININE RATIO: 0.32 (ref 0–0.2)
UROBILINOGEN UR STRIP-ACNC: 0.2 EU/DL (ref 0–1)
WBC #/AREA URNS HPF: ABNORMAL /HPF
WBC OTHER # BLD: 3.8 K/UL (ref 4.5–11.5)

## 2024-09-13 PROCEDURE — 93010 ELECTROCARDIOGRAM REPORT: CPT | Performed by: INTERNAL MEDICINE

## 2024-09-13 PROCEDURE — 2709999900 IR TRANSCATHETER BIOPSY

## 2024-09-13 PROCEDURE — 99153 MOD SED SAME PHYS/QHP EA: CPT

## 2024-09-13 PROCEDURE — 84550 ASSAY OF BLOOD/URIC ACID: CPT

## 2024-09-13 PROCEDURE — 99223 1ST HOSP IP/OBS HIGH 75: CPT | Performed by: INTERNAL MEDICINE

## 2024-09-13 PROCEDURE — 2580000003 HC RX 258

## 2024-09-13 PROCEDURE — 85610 PROTHROMBIN TIME: CPT

## 2024-09-13 PROCEDURE — 93306 TTE W/DOPPLER COMPLETE: CPT

## 2024-09-13 PROCEDURE — 36415 COLL VENOUS BLD VENIPUNCTURE: CPT

## 2024-09-13 PROCEDURE — 2500000003 HC RX 250 WO HCPCS: Performed by: RADIOLOGY

## 2024-09-13 PROCEDURE — 0FB03ZX EXCISION OF LIVER, PERCUTANEOUS APPROACH, DIAGNOSTIC: ICD-10-PCS | Performed by: TRANSPLANT SURGERY

## 2024-09-13 PROCEDURE — 93306 TTE W/DOPPLER COMPLETE: CPT | Performed by: INTERNAL MEDICINE

## 2024-09-13 PROCEDURE — 6370000000 HC RX 637 (ALT 250 FOR IP)

## 2024-09-13 PROCEDURE — 94660 CPAP INITIATION&MGMT: CPT

## 2024-09-13 PROCEDURE — 83880 ASSAY OF NATRIURETIC PEPTIDE: CPT

## 2024-09-13 PROCEDURE — 82728 ASSAY OF FERRITIN: CPT

## 2024-09-13 PROCEDURE — 88313 SPECIAL STAINS GROUP 2: CPT

## 2024-09-13 PROCEDURE — 84100 ASSAY OF PHOSPHORUS: CPT

## 2024-09-13 PROCEDURE — 6370000000 HC RX 637 (ALT 250 FOR IP): Performed by: TRANSPLANT SURGERY

## 2024-09-13 PROCEDURE — 99232 SBSQ HOSP IP/OBS MODERATE 35: CPT | Performed by: TRANSPLANT SURGERY

## 2024-09-13 PROCEDURE — 36011 PLACE CATHETER IN VEIN: CPT

## 2024-09-13 PROCEDURE — 6360000002 HC RX W HCPCS: Performed by: RADIOLOGY

## 2024-09-13 PROCEDURE — 37200 TRANSCATHETER BIOPSY: CPT

## 2024-09-13 PROCEDURE — 88307 TISSUE EXAM BY PATHOLOGIST: CPT

## 2024-09-13 PROCEDURE — 2140000000 HC CCU INTERMEDIATE R&B

## 2024-09-13 PROCEDURE — 85025 COMPLETE CBC W/AUTO DIFF WBC: CPT

## 2024-09-13 PROCEDURE — 99232 SBSQ HOSP IP/OBS MODERATE 35: CPT | Performed by: INTERNAL MEDICINE

## 2024-09-13 PROCEDURE — 80053 COMPREHEN METABOLIC PANEL: CPT

## 2024-09-13 PROCEDURE — 83550 IRON BINDING TEST: CPT

## 2024-09-13 PROCEDURE — 6360000002 HC RX W HCPCS

## 2024-09-13 PROCEDURE — 83540 ASSAY OF IRON: CPT

## 2024-09-13 PROCEDURE — 75970 VASCULAR BIOPSY: CPT

## 2024-09-13 PROCEDURE — 6360000004 HC RX CONTRAST MEDICATION: Performed by: RADIOLOGY

## 2024-09-13 PROCEDURE — 88312 SPECIAL STAINS GROUP 1: CPT

## 2024-09-13 PROCEDURE — 83735 ASSAY OF MAGNESIUM: CPT

## 2024-09-13 RX ORDER — HEPARIN SODIUM 10000 [USP'U]/ML
INJECTION, SOLUTION INTRAVENOUS; SUBCUTANEOUS PRN
Status: COMPLETED | OUTPATIENT
Start: 2024-09-13 | End: 2024-09-13

## 2024-09-13 RX ORDER — DIPHENHYDRAMINE HYDROCHLORIDE 50 MG/ML
INJECTION INTRAMUSCULAR; INTRAVENOUS PRN
Status: COMPLETED | OUTPATIENT
Start: 2024-09-13 | End: 2024-09-13

## 2024-09-13 RX ORDER — KETOROLAC TROMETHAMINE 30 MG/ML
INJECTION, SOLUTION INTRAMUSCULAR; INTRAVENOUS PRN
Status: COMPLETED | OUTPATIENT
Start: 2024-09-13 | End: 2024-09-13

## 2024-09-13 RX ORDER — MIDAZOLAM HYDROCHLORIDE 2 MG/2ML
INJECTION, SOLUTION INTRAMUSCULAR; INTRAVENOUS PRN
Status: COMPLETED | OUTPATIENT
Start: 2024-09-13 | End: 2024-09-13

## 2024-09-13 RX ORDER — FENTANYL CITRATE 50 UG/ML
INJECTION, SOLUTION INTRAMUSCULAR; INTRAVENOUS PRN
Status: COMPLETED | OUTPATIENT
Start: 2024-09-13 | End: 2024-09-13

## 2024-09-13 RX ORDER — LOSARTAN POTASSIUM 50 MG/1
100 TABLET ORAL DAILY
Status: DISCONTINUED | OUTPATIENT
Start: 2024-09-13 | End: 2024-09-20 | Stop reason: HOSPADM

## 2024-09-13 RX ORDER — LIDOCAINE HYDROCHLORIDE 20 MG/ML
INJECTION, SOLUTION INFILTRATION; PERINEURAL PRN
Status: COMPLETED | OUTPATIENT
Start: 2024-09-13 | End: 2024-09-13

## 2024-09-13 RX ORDER — IOPAMIDOL 755 MG/ML
INJECTION, SOLUTION INTRAVASCULAR PRN
Status: COMPLETED | OUTPATIENT
Start: 2024-09-13 | End: 2024-09-13

## 2024-09-13 RX ORDER — HYDROCHLOROTHIAZIDE 25 MG/1
25 TABLET ORAL DAILY
Status: DISCONTINUED | OUTPATIENT
Start: 2024-09-13 | End: 2024-09-20 | Stop reason: HOSPADM

## 2024-09-13 RX ORDER — LIDOCAINE HYDROCHLORIDE AND EPINEPHRINE BITARTRATE 20; .01 MG/ML; MG/ML
INJECTION, SOLUTION SUBCUTANEOUS PRN
Status: COMPLETED | OUTPATIENT
Start: 2024-09-13 | End: 2024-09-13

## 2024-09-13 RX ORDER — POTASSIUM CHLORIDE 7.45 MG/ML
10 INJECTION INTRAVENOUS
Status: COMPLETED | OUTPATIENT
Start: 2024-09-13 | End: 2024-09-13

## 2024-09-13 RX ORDER — POTASSIUM CHLORIDE 7.45 MG/ML
10 INJECTION INTRAVENOUS
Status: DISCONTINUED | OUTPATIENT
Start: 2024-09-13 | End: 2024-09-13

## 2024-09-13 RX ORDER — ISOSORBIDE DINITRATE 10 MG/1
10 TABLET ORAL 3 TIMES DAILY
Status: DISCONTINUED | OUTPATIENT
Start: 2024-09-13 | End: 2024-09-20 | Stop reason: HOSPADM

## 2024-09-13 RX ADMIN — LIDOCAINE HYDROCHLORIDE 10 ML: 20 INJECTION, SOLUTION INFILTRATION; PERINEURAL at 12:07

## 2024-09-13 RX ADMIN — ONDANSETRON 4 MG: 2 INJECTION INTRAMUSCULAR; INTRAVENOUS at 04:02

## 2024-09-13 RX ADMIN — CARVEDILOL 12.5 MG: 6.25 TABLET, FILM COATED ORAL at 09:01

## 2024-09-13 RX ADMIN — POTASSIUM CHLORIDE 10 MEQ: 7.46 INJECTION, SOLUTION INTRAVENOUS at 14:14

## 2024-09-13 RX ADMIN — MIDAZOLAM HYDROCHLORIDE 0.5 MG: 1 INJECTION, SOLUTION INTRAMUSCULAR; INTRAVENOUS at 12:41

## 2024-09-13 RX ADMIN — SENNOSIDES 8.6 MG: 8.6 TABLET, FILM COATED ORAL at 21:35

## 2024-09-13 RX ADMIN — POTASSIUM CHLORIDE 10 MEQ: 7.46 INJECTION, SOLUTION INTRAVENOUS at 15:17

## 2024-09-13 RX ADMIN — IOPAMIDOL 5 ML: 755 INJECTION, SOLUTION INTRAVENOUS at 12:42

## 2024-09-13 RX ADMIN — FENTANYL CITRATE 25 MCG: 50 INJECTION, SOLUTION INTRAMUSCULAR; INTRAVENOUS at 12:41

## 2024-09-13 RX ADMIN — FENTANYL CITRATE 25 MCG: 50 INJECTION, SOLUTION INTRAMUSCULAR; INTRAVENOUS at 12:06

## 2024-09-13 RX ADMIN — POLYETHYLENE GLYCOL 3350 17 G: 17 POWDER, FOR SOLUTION ORAL at 14:13

## 2024-09-13 RX ADMIN — DIPHENHYDRAMINE HYDROCHLORIDE 25 MG: 50 INJECTION, SOLUTION INTRAMUSCULAR; INTRAVENOUS at 12:07

## 2024-09-13 RX ADMIN — CARVEDILOL 12.5 MG: 6.25 TABLET, FILM COATED ORAL at 17:48

## 2024-09-13 RX ADMIN — ISOSORBIDE DINITRATE 10 MG: 10 TABLET ORAL at 09:01

## 2024-09-13 RX ADMIN — KETOROLAC TROMETHAMINE 15 MG: 30 INJECTION, SOLUTION INTRAMUSCULAR at 12:37

## 2024-09-13 RX ADMIN — ISOSORBIDE DINITRATE 10 MG: 10 TABLET ORAL at 14:12

## 2024-09-13 RX ADMIN — LIDOCAINE HYDROCHLORIDE,EPINEPHRINE BITARTRATE 10 ML: 20; .01 INJECTION, SOLUTION INFILTRATION; PERINEURAL at 12:07

## 2024-09-13 RX ADMIN — MIDAZOLAM HYDROCHLORIDE 0.5 MG: 1 INJECTION, SOLUTION INTRAMUSCULAR; INTRAVENOUS at 12:07

## 2024-09-13 RX ADMIN — Medication 5000 UNITS: at 12:10

## 2024-09-13 RX ADMIN — ISOSORBIDE DINITRATE 10 MG: 10 TABLET ORAL at 17:48

## 2024-09-13 RX ADMIN — SODIUM CHLORIDE, PRESERVATIVE FREE 10 ML: 5 INJECTION INTRAVENOUS at 21:35

## 2024-09-13 RX ADMIN — DIGOXIN 62.5 MCG: 125 TABLET ORAL at 09:01

## 2024-09-13 RX ADMIN — AMLODIPINE BESYLATE 5 MG: 5 TABLET ORAL at 09:01

## 2024-09-13 ASSESSMENT — PAIN - FUNCTIONAL ASSESSMENT: PAIN_FUNCTIONAL_ASSESSMENT: NONE - DENIES PAIN

## 2024-09-13 NOTE — PROGRESS NOTES
Post-Procedure SBAR Report    Patient Name: Hong Chase  MRN: [id number]  : 1955  Date of Procedure: 24  Completed Procedure: Transjugular Liver Biopsy    Telephone SBAR report provided to GLENDA Puri.     Information from the following report(s) Nurse Handoff Report, MAR, and Procedure Verification was reviewed.    Opportunity for questions and clarification was provided.

## 2024-09-13 NOTE — BRIEF OP NOTE
Brief Postoperative Note      Patient: Hong Chase  YOB: 1955  MRN: [id number]    Date of Procedure: 9/13/2024  End stage liver disease    Post-Op Diagnosis: Same  Portal pressures and biopsy    PHYLLIS Silveira    Assistant:  * No surgical staff found *    Anesthesia: * moderate sedation    Estimated Blood Loss (mL): Minimal    Complications: None    Specimens:   Liver right lobe    Implants:  * No implants in log *      Drains: * No LDAs found *    Findings:  Infection Present At Time Of Surgery (PATOS) (choose all levels that have infection present):  No infection present  Other Findings: portal pressures and Liver biopsy    Electronically signed by Sung Silveira MD on 9/13/2024 at 1:00 PM

## 2024-09-13 NOTE — CARE COORDINATION
Transition of care update: Pt for IR transjugular liver biopsy with protal pressures today. Cardiology, nephrology and hospitalist consulted. Cr 2.3 and other labs noted. IVFs at 50ml/hr. Met with pt in room before going to IR dept. Plan remains to home when medically ready. No needs voiced at present. His girlfriend will provide transportation.  Cm/sw will follow.

## 2024-09-13 NOTE — PROGRESS NOTES
P Quality Flow/Interdisciplinary Rounds Progress Note        Quality Flow Rounds held on September 13, 2024    Disciplines Attending:  Bedside Nurse, , , and Nursing Unit Leadership    Hong Chase was admitted on 9/11/2024  4:18 PM    Anticipated Discharge Date:       Disposition:    Sea Score:  Sea Scale Score: 20    Readmission Risk              Risk of Unplanned Readmission:  16           Discussed patient goal for the day, patient clinical progression, and barriers to discharge.  The following Goal(s) of the Day/Commitment(s) have been identified:   liver biopsy WDL      Carmina Mcpherson RN  September 13, 2024

## 2024-09-13 NOTE — CONSULTS
[facility]  MD Bhupendra Avery MD Ali Hassan, MD Lisa Kniska, LUKE Younger CNP  Consultation  Patient's Name: Hong Chase  11:48 AM  9/13/2024    Nephrologist: Bhupendra Newton MD    Reason for Consult:  JASS on CKD   Requesting Physician:  Kamron Chase DO    Chief Complaint:  Jaundice, weakness     History Obtained From: Patient, chart    History of Present Ilness:         Mr. Chase is a pleasant 69-year-old gentleman who presented to the hospital with new onset jaundice.  He also reports that he has noticed itching, poor appetite, fatigue, and weakness over the past 2 days as well.  He reports that his urine has been darker in color and he has noticed a decrease in urinary output.  Workup in the hospital thus far has included an ultrasound of the gallbladder that showed cholelithiasis without evidence of cholecystitis and an MRCP that showed no choledocholithiasis or pancreatic mass.  Hepatobiliary surgery has been consulted and has planned for an interventional radiology transjugular liver biopsy with portal pressures as cardiomegaly and significant intrahepatic venous dilation were noted on the MRI.  His past medical history is significant for atrial fibrillation, aortic dissection, coronary artery disease, congestive heart failure, hypertension, ischemic cardiomyopathy, pericardial effusion, sarcoidosis, and valvular heart disease.         We were consulted for acute on chronic kidney injury.  He denies a previous history of chronic kidney disease and does not follow longitudinally with a nephrologist.  He tells me that his PCP, Dr. Chase, whom is his nephew follows his laboratory studies.  His kidney function has been mildly elevated dating all the way back to 2013.  His creatinine level in December 2023 was 1.9 mg/dL.  His creatinine level on arrival to the hospital was 2.4 mg/dL.  He was started on isotonic intravenous  LEUKOCYTESUR NEGATIVE 09/12/2024 11:15 PM    UROBILINOGEN 0.2 09/12/2024 11:15 PM    BILIRUBINUR SMALL 09/12/2024 11:15 PM    GLUCOSEU NEGATIVE 09/12/2024 11:15 PM     Microalbumen/Creatinine ratio:  No components found for: \"RUCREAT\"  Iron Saturation:  No components found for: \"PERCENTFE\"  TIBC:    Lab Results   Component Value Date/Time    TIBC 237 09/13/2024 06:00 AM     FERRITIN:    Lab Results   Component Value Date/Time    FERRITIN 159 09/13/2024 06:00 AM        Imaging:  MRI ABDOMEN W WO CONTRAST MRCP   Final Result   1. No choledocholithiasis or pancreatic mass.   2. Cholelithiasis.         US GALLBLADDER RUQ   Final Result   1. Cholelithiasis without sonographic evidence of acute cholecystitis.   2. Ring down artifact in the gallbladder wall, which can be seen in the   setting of adenomyomatosis.         IR TRANSCATHETER BIOPSY    (Results Pending)       Assessment  Acute kidney injury in the setting of volume contraction due to poor oral intake. Urine indices were collected after fluid resuscitation making them difficult to interpret. HRS is likely contributing.   Chronic kidney disease stage IIIb secondary to renal microvascular atherosclerotic disease. Previous baseline creatinine level was 1.4-1.9 mg/dL. His baseline creatinine level may have gone up somewhat over the past year.   Hypertension  Jaundice     Plan  Continue normal saline at a decreased rate - 50 cc/hr as he is NPO  Stop fluids when PO intake improves   Check urine protein and creatinine   Renal US   Avoid nephrotoxins as able   Would hold Bumex, HCTZ, and Losartan for now  Follow labs  Monitor I&O  Continue supportive renal care       Thank you for the opportunity to participate in the care of your pleasant patient.  We look forward to following along with you.        Electronically signed by CRISTINA Amado CNP on 9/13/2024 at 11:48 AM

## 2024-09-13 NOTE — PRE SEDATION
Sedation Pre-Procedure Note    Patient Name: oHng Chase   YOB: 1955  Room/Bed: 6513/6513-A  Medical Record Number: [id number]  Date: 9/13/2024   Time: 12:59 PM       Indication:  end stage liver disease    Consent: I have discussed with the patient and/or the patient representative the indication, alternatives, and the possible risks and/or complications of the planned procedure and the anesthesia methods. The patient and/or patient representative appear to understand and agree to proceed.    Vital Signs:   Vitals:    09/13/24 1249   BP:    Pulse: 67   Resp: 13   Temp:    SpO2: 97%       Past Medical History:   has a past medical history of Acute kidney injury (HCC), Aortic dissection (HCC), Atrial fibrillation (HCC), CAD (coronary artery disease), CHF (congestive heart failure) (HCC), CKD (chronic kidney disease), H/O cardiovascular stress test, H/O Doppler echocardiogram, Hypertension, Ischemic cardiomyopathy, Obesity, Pericardial effusion (noninflammatory), Pleural effusion, Sarcoidosis, and Valvular heart disease.    Past Surgical History:   has a past surgical history that includes Tonsillectomy; other surgical history (11/15/2019); Upper gastrointestinal endoscopy (N/A, 3/5/2021); Upper gastrointestinal endoscopy (N/A, 4/19/2021); Colonoscopy (N/A, 11/23/2021); and Upper gastrointestinal endoscopy (N/A, 11/23/2021).    Medications:   Scheduled Meds:    [Held by provider] losartan  100 mg Oral Daily    isosorbide dinitrate  10 mg Oral TID    [Held by provider] hydroCHLOROthiazide  25 mg Oral Daily    amLODIPine  5 mg Oral Daily    carvedilol  12.5 mg Oral BID WC    digoxin  62.5 mcg Oral Daily    senna  1 tablet Oral Nightly    [Held by provider] enoxaparin  1 mg/kg SubCUTAneous BID    sodium chloride flush  10 mL IntraVENous 2 times per day    polyethylene glycol  17 g Oral Daily     Continuous Infusions:    sodium chloride 75 mL/hr at 09/12/24 1231    sodium chloride       PRN Meds: sodium  therapy use last 7 days: no  Other anticoagulant use last 7 days: no  Additional Medication Information:  n/a      Pre-Sedation Documentation and Exam:   I have reviewed the patient's history and review of systems.    Mallampati Airway Assessment:  Mallampati Class II - (soft palate, fauces & uvula are visible)    Prior History of Anesthesia Complications:   none    ASA Classification:  Class 3 - A patient with severe systemic disease that limits activity but is not incapacitating    Sedation/ Anesthesia Plan:   intravenous sedation    Medications Planned:   fentanyl intravenously    Patient is an appropriate candidate for plan of sedation: no    Electronically signed by Sung Silveira MD on 9/13/2024 at 12:59 PM

## 2024-09-13 NOTE — PROGRESS NOTES
Notified Attending via [employer] regarding discontinuation of discharge order as patient received ECHO at 1513 today and is awaiting renal ultrasound.      Discharge order discontinued per Dr. Leon.

## 2024-09-13 NOTE — PROGRESS NOTES
[facility] Hospitalist Progress Note      Synopsis: Patient with history of CHF and sarcoidosis admitted on 9/11/2024 for painless jaundice.  MRCP showed cholelithiasis but no mass or choledocholithiasis.  Patient had significant intrahepatic venous dilation as well as cardiomegaly concerning for possible CHF.  Cardiology consulted, await recommendations. Pt also underwent transjugular liver biopsy with portal pressures on 9/13/24    Subjective  Denies abdominal pain    Exam:  /80   Pulse 77   Temp 97.8 °F (36.6 °C) (Temporal)   Resp 16   Ht 1.702 m (5' 7\")   Wt 90.7 kg (200 lb)   SpO2 92%   BMI 31.32 kg/m²   General appearance: No apparent distress, appears stated age and cooperative.  HEENT: Pupils equal, round, and reactive to light. Conjunctivae/corneas clear.  Neck: Supple. No jugular venous distention. Trachea midline.  Respiratory:  Normal respiratory effort. Clear to auscultation, bilaterally without Rales/Wheezes/Rhonchi.  Cardiovascular: Regular rate and rhythm with normal S1/S2 without murmurs, rubs or gallops.  Bilateral lower extremity edema  Abdomen: Soft, non-tender, moderately distended  Musculoskeletal: No clubbing or cyanosis. Brisk capillary refill. 2+ lower extremity pulses (dorsalis pedis).   Skin:  No rashes    Neurologic: awake, alert and following commands     Medications:  Reviewed    Infusion Medications    sodium chloride 75 mL/hr at 09/12/24 1231    sodium chloride       Scheduled Medications    [Held by provider] losartan  100 mg Oral Daily    isosorbide dinitrate  10 mg Oral TID    [Held by provider] hydroCHLOROthiazide  25 mg Oral Daily    amLODIPine  5 mg Oral Daily    carvedilol  12.5 mg Oral BID WC    digoxin  62.5 mcg Oral Daily    senna  1 tablet Oral Nightly    [Held by provider] enoxaparin  1 mg/kg SubCUTAneous BID    sodium chloride flush  10 mL IntraVENous 2 times per day    polyethylene glycol  17 g Oral Daily     PRN Meds: sodium chloride flush, sodium chloride,

## 2024-09-13 NOTE — PROGRESS NOTES
1055- Patient arrived via cart with transport to Radiology department for transjugular liver bx. Allergies, home medications, H&P and fasting instructions reviewed with patient. Vital signs taken.     IV in place, IV flushed and prn adapter attached. Procedural instructions given, questions answered, understanding expressed and consent signed. Patient given fluoroscopy education, no questions at this time.

## 2024-09-13 NOTE — OR NURSING
Portal Pressures:     Portal: 15/13 (13)  Hepatic: 13/2 (7)  IVC: 13/0 (6)  Right Atrium: 14/0 (7)

## 2024-09-13 NOTE — PROGRESS NOTES
[facility] SURGERY  DAILY PROGRESS NOTE  9/13/2024    Subjective:  No new complaints or overnight events. Pt is doing okay. No pain or nausea. Having bowel movements. Tolerating diet. Mentions some continued abdominal discomfort.  Objective:  /82   Pulse 68   Temp 98.5 °F (36.9 °C) (Temporal)   Resp 20   Ht 1.702 m (5' 7\")   Wt 90.7 kg (200 lb)   SpO2 92%   BMI 31.32 kg/m²     General appearance:  lying in bed, NAD, A&Ox4  Neurologic: GC15, PERRL  Head: NCAT, EOMI, red conjunctiva  Neck: supple, no masses, trachea midline  Lungs: symmetric chest rise, no respiratory distress  Heart: normal rate per peripheral pulse  Abdomen: soft, nontender, moderately distended  Skin: warm and dry, no cyanosis      I have personally reviewed all relevant labs and imaging.    Assessment/Plan:  69 y.o. male with jaundice, history of heart failure     -MRCP showed no choledoco or mass just cholelithiasis  -Tumor markers elevated CEA 6.0, Ca 19-9 236    -Will monitor pt daily labs  -NPO for IR procedure  -Hold eliquis, will bridge with lovenox due to chronic atrial fibrillation  -Cardiology consult for cardiomegaly with HF  -IR consulted for transjugular liver biopsy with portal pressure  -GI consult-tentative plans for EUS/ERCP on 9/16 pending MR results and endo availability  -Nepro consulted for acute on chronic kidney disease, hospitalist consulted for medical management    Electronically signed by Nicola Nye DO on 9/13/2024 at 7:38 AM     Attending Physician Statement:    Chief Complaint:   Chief Complaint   Patient presents with    Pruritis     Pt states full body itching began 2 days ago    pt states his urine is darker than normal. Pt denies buring     Fatigue     Began 2 days ago       I have examined the patient and performed the key aspects of physical exam, reviewed the record (including all pertinent and new radiology images and laboratory findings), and discussed the case with the surgical team.  I agree with the

## 2024-09-13 NOTE — CONSULTS
Inpatient Cardiology Consultation      Reason for Consult:  Cardiomegaly with heart failure (enlarged cava and dilated intrahepatic veins)    Inpatient consult to Cardiology  Consult performed by: Desiree Garcia MD  Consult ordered by: Deni Escamilla III, MD      Date of Consultation: 9/13/2024    HISTORY OF PRESENT ILLNESS:     This is 2nd DOA of 69 Y/M,  with medical history significant for chronic HFmrEF 50-55%, permanent Afib on Eliquis, type I aortic dissection s/p surgery at [facility] for on 11/15/2019, hypertension, hyperlipidemia, CKD stage III, history of sarcoidosis and history of GI bleed secondary to gastric ulcer came to the ED with complaint of discoloration of the skin darkening of the urine associated with generalized itching.  Patient was evaluated by general surgery team for further jaundice, MRI was done which showed no choledocholithiasis or; just cholelithiasis. Tumor markers CEA and CA 19-9 were elevated, patient underwent transjugular hepatic biopsy today.    Upon presentation to the ER, routine labwork was performed which revealed potassium BUN 45, creatinine 2.4, alk phos 189, , AST 89, total bilirubin 4.0, hemoglobin 12.  Imaging results are as outlined below in the Imaging section of this note.    Upon arrival to the ER, patient was 92/57.  The patient received rocephin, flagyl, 1L NS in the emergency room and was admitted to [facility].     Cardiology was consulted for \"left heart failure (enlarged cava and dilated enlarged cava and dilated intrahepatic veins) \". Patient is known to [facility] cardiology.  Upon my evaluation at bedside patient was awake, alert, oriented and following commands.  Patient was complaining of generalized itchiness and darkening of the urine.  Patient was also complaining of epigastric and right hypochondriac discomfort/mild pain and abdominal bloating.     Patient did not give any history suggesting presyncope or syncope,  muscle strength and tone. No atrophy or abnormal movements.   : Deferred  SKIN: Warm and dry no statis dermatitis or ulcers   NEURO / PSYCH: Oriented to person, place and time. Speech clear and appropriate. Follows all commands. Pleasant affect     DATA:    ECG / Tele strips: please see HPI   Diagnostic:      Intake/Output Summary (Last 24 hours) at 9/13/2024 1506  Last data filed at 9/13/2024 0424  Gross per 24 hour   Intake 480 ml   Output 450 ml   Net 30 ml       Labs:   CBC:   Recent Labs     09/11/24  1423 09/12/24  0559 09/13/24  0600   WBC 4.6 4.5 3.8*   HGB 12.0* 10.8* 10.6*   HCT 35.8* 34.0* 32.8*     --   --      BMP:   Recent Labs     09/12/24  0559 09/13/24  0600    138   K 3.2* 3.8   CO2 21* 23   BUN 39* 40*   CREATININE 2.2* 2.3*   LABGLOM 32* 30*   CALCIUM 9.1 9.0     Mag:   Recent Labs     09/12/24  0559 09/13/24  0600   MG 1.9 2.1     Phos:   Recent Labs     09/12/24  0559 09/13/24  0600   PHOS 3.7 3.8     TSH: No results for input(s): \"TSH\" in the last 72 hours.  HgA1c:   Lab Results   Component Value Date    LABA1C 5.2 12/12/2023     Lab Results   Component Value Date     12/05/2022     proBNP:   Recent Labs     09/13/24  0600   PROBNP 5,189*     PT/INR:   Recent Labs     09/11/24  1423 09/13/24  0748   PROTIME 14.6* 12.0   INR 1.3 1.1     APTT:No results for input(s): \"APTT\" in the last 72 hours.  CARDIAC ENZYMES:No results for input(s): \"CKTOTAL\", \"CKMB\", \"CKMBINDEX\", \"TROPONINI\" in the last 72 hours.  FASTING LIPID PANEL:  Lab Results   Component Value Date/Time    CHOL 202 12/12/2023 10:25 AM    CHOL 169 07/15/2021 10:54 AM    HDL 35 12/12/2023 10:25 AM    TRIG 254 12/12/2023 10:25 AM     LIVER PROFILE:  Recent Labs     09/12/24  0559 09/13/24  0600   AST 77* 73*   * 168*       Assessment and plan:    -Chronic heart failure with reduced ejection fraction>>HFiEF, improved and still euvolemic.    -Permanent Afib, rate controlled.     -H/o Ischemic cardiomyopathy, EF

## 2024-09-14 LAB
ALBUMIN SERPL-MCNC: 3.9 G/DL (ref 3.5–5.2)
ALP SERPL-CCNC: 163 U/L (ref 40–129)
ALT SERPL-CCNC: 156 U/L (ref 0–40)
ANION GAP SERPL CALCULATED.3IONS-SCNC: 12 MMOL/L (ref 7–16)
AST SERPL-CCNC: 74 U/L (ref 0–39)
BASOPHILS # BLD: 0 K/UL (ref 0–0.2)
BASOPHILS NFR BLD: 0 % (ref 0–2)
BILIRUB SERPL-MCNC: 5.6 MG/DL (ref 0–1.2)
BUN SERPL-MCNC: 38 MG/DL (ref 6–23)
CALCIUM SERPL-MCNC: 9 MG/DL (ref 8.6–10.2)
CHLORIDE SERPL-SCNC: 103 MMOL/L (ref 98–107)
CO2 SERPL-SCNC: 23 MMOL/L (ref 22–29)
CREAT SERPL-MCNC: 2.2 MG/DL (ref 0.7–1.2)
DATE LAST DOSE: ABNORMAL
DIGOXIN DOSE TIME: ABNORMAL
DIGOXIN DOSE: ABNORMAL MG
DIGOXIN SERPL-MCNC: 0.6 NG/ML (ref 0.8–2)
EOSINOPHIL # BLD: 0.21 K/UL (ref 0.05–0.5)
EOSINOPHILS RELATIVE PERCENT: 5 % (ref 0–6)
ERYTHROCYTE [DISTWIDTH] IN BLOOD BY AUTOMATED COUNT: 15.5 % (ref 11.5–15)
GFR, ESTIMATED: 31 ML/MIN/1.73M2
GLUCOSE SERPL-MCNC: 117 MG/DL (ref 74–99)
HCT VFR BLD AUTO: 33.1 % (ref 37–54)
HGB BLD-MCNC: 10.7 G/DL (ref 12.5–16.5)
LYMPHOCYTES NFR BLD: 0.21 K/UL (ref 1.5–4)
LYMPHOCYTES RELATIVE PERCENT: 5 % (ref 20–42)
MCH RBC QN AUTO: 31.6 PG (ref 26–35)
MCHC RBC AUTO-ENTMCNC: 32.3 G/DL (ref 32–34.5)
MCV RBC AUTO: 97.6 FL (ref 80–99.9)
METAMYELOCYTES ABSOLUTE COUNT: 0.04 K/UL (ref 0–0.12)
METAMYELOCYTES: 1 % (ref 0–1)
MONOCYTES NFR BLD: 0.57 K/UL (ref 0.1–0.95)
MONOCYTES NFR BLD: 14 % (ref 2–12)
NEUTROPHILS NFR BLD: 75 % (ref 43–80)
NEUTS SEG NFR BLD: 3.07 K/UL (ref 1.8–7.3)
NUCLEATED RED BLOOD CELLS: 2 PER 100 WBC
PHOSPHATE SERPL-MCNC: 3.2 MG/DL (ref 2.5–4.5)
PLATELET # BLD AUTO: 129 K/UL (ref 130–450)
PMV BLD AUTO: 10.1 FL (ref 7–12)
POTASSIUM SERPL-SCNC: 4.1 MMOL/L (ref 3.5–5)
PROT SERPL-MCNC: 6.8 G/DL (ref 6.4–8.3)
RBC # BLD AUTO: 3.39 M/UL (ref 3.8–5.8)
RBC # BLD: ABNORMAL 10*6/UL
SODIUM SERPL-SCNC: 138 MMOL/L (ref 132–146)
WBC OTHER # BLD: 4.1 K/UL (ref 4.5–11.5)

## 2024-09-14 PROCEDURE — 94660 CPAP INITIATION&MGMT: CPT

## 2024-09-14 PROCEDURE — 2140000000 HC CCU INTERMEDIATE R&B

## 2024-09-14 PROCEDURE — 82103 ALPHA-1-ANTITRYPSIN TOTAL: CPT

## 2024-09-14 PROCEDURE — 99232 SBSQ HOSP IP/OBS MODERATE 35: CPT | Performed by: TRANSPLANT SURGERY

## 2024-09-14 PROCEDURE — 80162 ASSAY OF DIGOXIN TOTAL: CPT

## 2024-09-14 PROCEDURE — 85025 COMPLETE CBC W/AUTO DIFF WBC: CPT

## 2024-09-14 PROCEDURE — 86255 FLUORESCENT ANTIBODY SCREEN: CPT

## 2024-09-14 PROCEDURE — 99231 SBSQ HOSP IP/OBS SF/LOW 25: CPT | Performed by: INTERNAL MEDICINE

## 2024-09-14 PROCEDURE — 6370000000 HC RX 637 (ALT 250 FOR IP): Performed by: TRANSPLANT SURGERY

## 2024-09-14 PROCEDURE — 2580000003 HC RX 258

## 2024-09-14 PROCEDURE — 6370000000 HC RX 637 (ALT 250 FOR IP)

## 2024-09-14 PROCEDURE — 80053 COMPREHEN METABOLIC PANEL: CPT

## 2024-09-14 PROCEDURE — 86038 ANTINUCLEAR ANTIBODIES: CPT

## 2024-09-14 PROCEDURE — 99232 SBSQ HOSP IP/OBS MODERATE 35: CPT | Performed by: STUDENT IN AN ORGANIZED HEALTH CARE EDUCATION/TRAINING PROGRAM

## 2024-09-14 PROCEDURE — 82390 ASSAY OF CERULOPLASMIN: CPT

## 2024-09-14 PROCEDURE — 6360000002 HC RX W HCPCS

## 2024-09-14 PROCEDURE — 86039 ANTINUCLEAR ANTIBODIES (ANA): CPT

## 2024-09-14 PROCEDURE — 84100 ASSAY OF PHOSPHORUS: CPT

## 2024-09-14 PROCEDURE — 80074 ACUTE HEPATITIS PANEL: CPT

## 2024-09-14 PROCEDURE — 36415 COLL VENOUS BLD VENIPUNCTURE: CPT

## 2024-09-14 RX ADMIN — ISOSORBIDE DINITRATE 10 MG: 10 TABLET ORAL at 17:42

## 2024-09-14 RX ADMIN — POLYETHYLENE GLYCOL 3350 17 G: 17 POWDER, FOR SOLUTION ORAL at 08:23

## 2024-09-14 RX ADMIN — ISOSORBIDE DINITRATE 10 MG: 10 TABLET ORAL at 08:20

## 2024-09-14 RX ADMIN — SODIUM CHLORIDE, PRESERVATIVE FREE 10 ML: 5 INJECTION INTRAVENOUS at 20:23

## 2024-09-14 RX ADMIN — SODIUM CHLORIDE, PRESERVATIVE FREE 10 ML: 5 INJECTION INTRAVENOUS at 08:24

## 2024-09-14 RX ADMIN — DIGOXIN 62.5 MCG: 125 TABLET ORAL at 08:20

## 2024-09-14 RX ADMIN — AMLODIPINE BESYLATE 5 MG: 5 TABLET ORAL at 08:20

## 2024-09-14 RX ADMIN — ISOSORBIDE DINITRATE 10 MG: 10 TABLET ORAL at 14:20

## 2024-09-14 RX ADMIN — SENNOSIDES 8.6 MG: 8.6 TABLET, FILM COATED ORAL at 20:23

## 2024-09-14 RX ADMIN — CARVEDILOL 12.5 MG: 6.25 TABLET, FILM COATED ORAL at 17:43

## 2024-09-14 RX ADMIN — ENOXAPARIN SODIUM 90 MG: 100 INJECTION SUBCUTANEOUS at 20:22

## 2024-09-14 RX ADMIN — ENOXAPARIN SODIUM 90 MG: 100 INJECTION SUBCUTANEOUS at 08:29

## 2024-09-14 RX ADMIN — CARVEDILOL 12.5 MG: 6.25 TABLET, FILM COATED ORAL at 08:20

## 2024-09-14 RX ADMIN — ONDANSETRON 4 MG: 2 INJECTION INTRAMUSCULAR; INTRAVENOUS at 17:43

## 2024-09-14 ASSESSMENT — PAIN SCALES - GENERAL
PAINLEVEL_OUTOF10: 0
PAINLEVEL_OUTOF10: 0

## 2024-09-14 NOTE — PROGRESS NOTES
Hospitalist Progress Note      SYNOPSIS: Patient admitted on 2024 for Jaundice, non-   Patient with history of CHF and sarcoidosis admitted on 2024 for painless jaundice.  MRCP showed cholelithiasis but no mass or choledocholithiasis.  Patient had significant intrahepatic venous dilation as well as cardiomegaly concerning for possible CHF.  Cardiology consulted, await recommendations. Pt also underwent transjugular liver biopsy with portal pressures on 24      SUBJECTIVE:  Stable overnight. No other overnight issues reported.   Patient seen and examined at bedside today a.m. does complain of nausea, mild abdominal pain, decrease in appetite  Records reviewed.         Temp (24hrs), Av.4 °F (36.3 °C), Min:97 °F (36.1 °C), Max:98.3 °F (36.8 °C)    DIET: ADULT DIET; Regular  CODE: Full Code    Intake/Output Summary (Last 24 hours) at 2024 1105  Last data filed at 2024 0655  Gross per 24 hour   Intake 660 ml   Output --   Net 660 ml       Review of Systems  All bolded are positive; please see HPI  General:  Fever, chills, diaphoresis, fatigue, malaise, night sweats, weight loss  Psychological:  Anxiety, disorientation, hallucinations.  ENT:  Epistaxis, headaches, vertigo, visual changes.  Cardiovascular:  Chest pain, irregular heartbeats, palpitations, paroxysmal nocturnal dyspnea.  Respiratory:  Shortness of breath, coughing, sputum production, hemoptysis, wheezing, orthopnea.  Gastrointestinal:  Nausea, vomiting, diarrhea, heartburn, constipation, abdominal pain, hematemesis, hematochezia, melena, acholic stools  Genito-Urinary:  Dysuria, urgency, frequency, hematuria  Musculoskeletal:  Joint pain, joint stiffness, joint swelling, muscle pain  Neurology:  Headache, focal neurological deficits, weakness, numbness, paresthesia  Derm:  Rashes, ulcers, excoriations, bruising  Extremities:  Decreased ROM, peripheral edema, mottling      OBJECTIVE:    /83   Pulse 75   Temp  Oral Daily    carvedilol  12.5 mg Oral BID WC    digoxin  62.5 mcg Oral Daily    senna  1 tablet Oral Nightly    enoxaparin  1 mg/kg SubCUTAneous BID    sodium chloride flush  10 mL IntraVENous 2 times per day    polyethylene glycol  17 g Oral Daily     PRN Meds: perflutren lipid microspheres, sodium chloride flush, sodium chloride, ondansetron **OR** ondansetron, HYDROmorphone **OR** HYDROmorphone    Labs:     Recent Labs     09/11/24 1423 09/12/24  0559 09/13/24  0600 09/14/24  0707   WBC 4.6 4.5 3.8* 4.1*   HGB 12.0* 10.8* 10.6* 10.7*   HCT 35.8* 34.0* 32.8* 33.1*     --   --  129*       Recent Labs     09/12/24  0559 09/13/24  0600 09/14/24  0707    138 138   K 3.2* 3.8 4.1   CL 98 102 103   CO2 21* 23 23   BUN 39* 40* 38*   CREATININE 2.2* 2.3* 2.2*   CALCIUM 9.1 9.0 9.0   PHOS 3.7 3.8 3.2       Recent Labs     09/12/24 0559 09/13/24  0600 09/14/24  0707   ALKPHOS 171* 172* 163*   * 168* 156*   AST 77* 73* 74*   BILITOT 4.3* 4.7* 5.6*       Recent Labs     09/11/24 1423 09/13/24  0748   INR 1.3 1.1       No results for input(s): \"CKTOTAL\", \"TROPONINI\" in the last 72 hours.    Chronic labs:    Lab Results   Component Value Date    CHOL 202 (H) 12/12/2023    TRIG 254 (H) 12/12/2023    HDL 35 (L) 12/12/2023    TSH 3.64 12/12/2023    PSA 1.17 12/12/2023    INR 1.1 09/13/2024    LABA1C 5.2 12/12/2023       Radiology: REVIEWED DAILY    +++++++++++++++++++++++++++++++++++++++++++++++++  Ton Catherine MD   Hospitalist  [place], [place]  +++++++++++++++++++++++++++++++++++++++++++++++++  NOTE: This report was transcribed using voice recognition software. Every effort was made to ensure accuracy; however, inadvertent computerized transcription errors may be present.

## 2024-09-14 NOTE — PROGRESS NOTES
[facility]astroenterology Progress Note    By SANG Armenta  69 y.o.  male    SUBJECTIVE:  Felicitas his long time girlfriend present, case discussed in detail.  Bili mildly more elevated today  Post Right hepatic vein catheter placement with venogram, pressures, and liver biopsy. No apparent post procdure complicaitons.     OBJECTIVE:  /72   Pulse 70   Temp 98.3 °F (36.8 °C) (Temporal)   Resp 16   Ht 1.702 m (5' 7\")   Wt 90.7 kg (200 lb)   SpO2 90%   BMI 31.32 kg/m²   GEN: A&Ox3, friendly, NAD  HEENT:PEERL, + icterus  Heart: RRR  Lungs: CTAB  Abd.: soft, NT, ND, BS +, no G/R, no HSM  Extr.: + trace edema, no C/C, no brusing         Lab Results   Component Value Date/Time    WBC 3.8 09/13/2024 06:00 AM    WBC 4.5 09/12/2024 05:59 AM    WBC 4.6 09/11/2024 02:23 PM    HGB 10.6 09/13/2024 06:00 AM    HGB 10.8 09/12/2024 05:59 AM    HGB 12.0 09/11/2024 02:23 PM    HCT 32.8 09/13/2024 06:00 AM    MCV 98.5 09/13/2024 06:00 AM    RDW 15.3 09/13/2024 06:00 AM     09/11/2024 02:23 PM     12/12/2023 10:25 AM     12/05/2022 09:26 AM     03/17/2022 10:44 AM     Lab Results   Component Value Date/Time     09/13/2024 06:00 AM    K 3.8 09/13/2024 06:00 AM    K 4.3 03/05/2021 06:22 AM     09/13/2024 06:00 AM    CO2 23 09/13/2024 06:00 AM    BUN 40 09/13/2024 06:00 AM    CREATININE 2.3 09/13/2024 06:00 AM    CALCIUM 9.0 09/13/2024 06:00 AM    BILITOT 4.7 09/13/2024 06:00 AM    BILITOT 4.3 09/12/2024 05:59 AM    BILITOT 4.0 09/11/2024 02:23 PM    ALKPHOS 172 09/13/2024 06:00 AM    ALKPHOS 171 09/12/2024 05:59 AM    ALKPHOS 189 09/11/2024 02:23 PM    AST 73 09/13/2024 06:00 AM    AST 77 09/12/2024 05:59 AM    AST 89 09/11/2024 02:23 PM     09/13/2024 06:00 AM     09/12/2024 05:59 AM     09/11/2024 02:23 PM     Lab Results   Component Value Date/Time    LIPASE 34 03/04/2021 09:41 PM     No results found for:

## 2024-09-14 NOTE — PROGRESS NOTES
[facility]  MD Bhupendra Avery MD Ali Hassan, MD Lisa Kniska, MARTIN Cardona, LUKE Baeza, CNP  Progress note  Patient's Name: Hong Chase  11:11 AM  9/14/2024    Cr relatively stable  Remain with yellowish discoloration   NAD     History of Present Ilness:         Mr. Chase is a pleasant 69-year-old gentleman who presented to the hospital with new onset jaundice.  He also reports that he has noticed itching, poor appetite, fatigue, and weakness over the past 2 days as well.  He reports that his urine has been darker in color and he has noticed a decrease in urinary output.  Workup in the hospital thus far has included an ultrasound of the gallbladder that showed cholelithiasis without evidence of cholecystitis and an MRCP that showed no choledocholithiasis or pancreatic mass.  Hepatobiliary surgery has been consulted and has planned for an interventional radiology transjugular liver biopsy with portal pressures as cardiomegaly and significant intrahepatic venous dilation were noted on the MRI.  His past medical history is significant for atrial fibrillation, aortic dissection, coronary artery disease, congestive heart failure, hypertension, ischemic cardiomyopathy, pericardial effusion, sarcoidosis, and valvular heart disease.         We were consulted for acute on chronic kidney injury.  He denies a previous history of chronic kidney disease and does not follow longitudinally with a nephrologist.  He tells me that his PCP, Dr. Chase, whom is his nephew follows his laboratory studies.  His kidney function has been mildly elevated dating all the way back to 2013.  His creatinine level in December 2023 was 1.9 mg/dL.  His creatinine level on arrival to the hospital was 2.4 mg/dL.  He was started on isotonic intravenous fluids and his creatinine level is 2.3 mg/dL as of today.  He is currently n.p.o. for the transjugular liver biopsy today with    Labs:  CBC with Differential:    Lab Results   Component Value Date/Time    WBC 4.1 09/14/2024 07:07 AM    RBC 3.39 09/14/2024 07:07 AM    RBC 4.27 12/05/2022 09:26 AM    HGB 10.7 09/14/2024 07:07 AM    HCT 33.1 09/14/2024 07:07 AM     09/14/2024 07:07 AM    MCV 97.6 09/14/2024 07:07 AM    MCH 31.6 09/14/2024 07:07 AM    MCHC 32.3 09/14/2024 07:07 AM    RDW 15.5 09/14/2024 07:07 AM    NRBC 2 09/14/2024 07:07 AM    METASPCT 1 09/14/2024 07:07 AM    METASPCT 0.9 11/14/2019 09:35 AM    LYMPHOPCT 5 09/14/2024 07:07 AM    LYMPHOPCT 11.2 12/05/2022 09:26 AM    MONOPCT 14 09/14/2024 07:07 AM    MYELOPCT 1.7 03/06/2021 01:50 PM    EOSPCT 5 09/14/2024 07:07 AM    BASOPCT 0 09/14/2024 07:07 AM    MONOSABS 0.57 09/14/2024 07:07 AM    LYMPHSABS 0.21 09/14/2024 07:07 AM    EOSABS 0.21 09/14/2024 07:07 AM    BASOSABS 0.00 09/14/2024 07:07 AM     CMP:    Lab Results   Component Value Date/Time     09/14/2024 07:07 AM    K 4.1 09/14/2024 07:07 AM    K 4.3 03/05/2021 06:22 AM     09/14/2024 07:07 AM    CO2 23 09/14/2024 07:07 AM    BUN 38 09/14/2024 07:07 AM    CREATININE 2.2 09/14/2024 07:07 AM    GFRAA >60 03/17/2022 10:44 AM    LABGLOM 31 09/14/2024 07:07 AM    LABGLOM 38 12/12/2023 10:25 AM    GLUCOSE 117 09/14/2024 07:07 AM    GLUCOSE 110 12/05/2022 09:26 AM    CALCIUM 9.0 09/14/2024 07:07 AM    BILITOT 5.6 09/14/2024 07:07 AM    ALKPHOS 163 09/14/2024 07:07 AM    AST 74 09/14/2024 07:07 AM     09/14/2024 07:07 AM     Ionized Calcium:  No components found for: \"IONCA\"  Magnesium:    Lab Results   Component Value Date/Time    MG 2.1 09/13/2024 06:00 AM     Phosphorus:    Lab Results   Component Value Date/Time    PHOS 3.2 09/14/2024 07:07 AM     U/A:    Lab Results   Component Value Date/Time    COLORU Yellow 09/12/2024 11:15 PM    PHUR 5.5 09/12/2024 11:15 PM    WBCUA 0 TO 5 09/12/2024 11:15 PM    RBCUA 0 TO 2 09/12/2024 11:15 PM    BACTERIA TRACE 09/12/2024 11:15 PM    LEUKOCYTESUR NEGATIVE

## 2024-09-14 NOTE — PLAN OF CARE
Problem: ABCDS Injury Assessment  Goal: Absence of physical injury  9/14/2024 1944 by Luana Hsieh RN  Outcome: Progressing  9/14/2024 1939 by Nai Small RN  Outcome: Progressing  9/14/2024 1145 by Lucille Patricia RN  Outcome: Progressing     Problem: Chronic Conditions and Co-morbidities  Goal: Patient's chronic conditions and co-morbidity symptoms are monitored and maintained or improved  9/14/2024 1944 by Luana Hsieh RN  Outcome: Progressing  9/14/2024 1939 by Nai Small RN  Outcome: Progressing  9/14/2024 1145 by Lucille Patricia RN  Outcome: Progressing     Problem: Discharge Planning  Goal: Discharge to home or other facility with appropriate resources  9/14/2024 1944 by Luana Hsieh RN  Outcome: Progressing  9/14/2024 1939 by Nai Small RN  Outcome: Progressing  9/14/2024 1145 by Lucille Patricia RN  Outcome: Progressing     Problem: Pain  Goal: Verbalizes/displays adequate comfort level or baseline comfort level  9/14/2024 1944 by Luana Hsieh RN  Outcome: Progressing  Flowsheets (Taken 9/14/2024 1940 by Nai Small, RN)  Verbalizes/displays adequate comfort level or baseline comfort level: Encourage patient to monitor pain and request assistance  9/14/2024 1939 by Nai Small RN  Outcome: Progressing     Problem: Safety - Adult  Goal: Free from fall injury  9/14/2024 1944 by Luana Hsieh RN  Outcome: Progressing  9/14/2024 1939 by Nai Small RN  Outcome: Progressing

## 2024-09-14 NOTE — PROGRESS NOTES
[facility]  DAILY PROGRESS NOTE  9/14/2024  Subjective:  Patient complains of nausea.  He says that he has regularly had nausea in the morning that improves throughout the day.  Complains of some mild abdominal pain.  He has been tolerating his diet.    No intake/output data recorded.  I/O last 3 completed shifts:  In: 1140 [P.O.:1140]  Out: 450 [Urine:450]    Objective:  /83   Pulse 75   Temp 97.7 °F (36.5 °C) (Temporal)   Resp 16   Ht 1.702 m (5' 7\")   Wt 92 kg (202 lb 12.8 oz)   SpO2 91%   BMI 31.76 kg/m²     - General: No acute distress. Awake and conversant.  - CV: Regular rate.  - Respiratory: Respirations are non-labored   - Abdomen: Soft, minimally tender, mildly distended  - Skin: Warm. No rashes or ulcers.  - Extremities: No cyanosis or edema.    Lab Results   Component Value Date    WBC 4.1 (L) 09/14/2024    HGB 10.7 (L) 09/14/2024    HCT 33.1 (L) 09/14/2024    MCV 97.6 09/14/2024     (L) 09/14/2024     Lab Results   Component Value Date     09/14/2024    K 4.1 09/14/2024     09/14/2024    CO2 23 09/14/2024    BUN 38 (H) 09/14/2024    CREATININE 2.2 (H) 09/14/2024    GLUCOSE 117 (H) 09/14/2024    CALCIUM 9.0 09/14/2024    BILITOT 5.6 (H) 09/14/2024    ALKPHOS 163 (H) 09/14/2024    AST 74 (H) 09/14/2024     (H) 09/14/2024    LABGLOM 31 (L) 09/14/2024    GFRAA >60 03/17/2022       ASSESSMENT/PLAN:    69 y.o. male with painless jaundice and abdominal hypertension status liver biopsy 9/13    Okay to restart Lovenox  Continue regular diet  Appreciate cardiology nephrology recs  Follow-up liver biopsy pathology  MRI showed no apparent choledocholithiasis or mass  Tentative plans for EUS 9/17 per Dr. Taylor  -Discussed with Dr. Francine Little DO  General Surgery Resident, PGY-1    Electronically signed by Barrett Little DO on 9/14/24 at 8:30 AM EDT    Attending Physician Statement:    Chief Complaint:   Chief Complaint   Patient presents with     Pruritis     Pt states full body itching began 2 days ago    pt states his urine is darker than normal. Pt denies buring     Fatigue     Began 2 days ago       I have examined the patient and performed the key aspects of physical exam, reviewed the record (including all pertinent and new radiology images and laboratory findings), and discussed the case with the surgical team.  I agree with the assessment and plan with the following additions, corrections, and changes. 14pt review of symptoms completed and negative except as mentioned.    Patient's portosystemic gradient is less than 10 not consistent with portal hypertension.  Liver biopsy performed yesterday, awaiting results  Appreciate all consultants input  The biopsy will be telling and hopefully will explain why his bilirubin is elevated    Deni Escamilla MD  09/14/24  8:40 AM

## 2024-09-14 NOTE — PLAN OF CARE
Problem: ABCDS Injury Assessment  Goal: Absence of physical injury  9/14/2024 1939 by Nai Small RN  Outcome: Progressing  9/14/2024 1145 by Lucille Patricia RN  Outcome: Progressing     Problem: Chronic Conditions and Co-morbidities  Goal: Patient's chronic conditions and co-morbidity symptoms are monitored and maintained or improved  9/14/2024 1939 by Nai Small, RN  Outcome: Progressing  9/14/2024 1145 by Lucille Patricia RN  Outcome: Progressing     Problem: Discharge Planning  Goal: Discharge to home or other facility with appropriate resources  9/14/2024 1939 by Nai Small, RN  Outcome: Progressing  9/14/2024 1145 by Lucille Patricia RN  Outcome: Progressing     Problem: Pain  Goal: Verbalizes/displays adequate comfort level or baseline comfort level  Outcome: Progressing     Problem: Safety - Adult  Goal: Free from fall injury  Outcome: Progressing

## 2024-09-14 NOTE — PROGRESS NOTES
Reason for follow up: Chronic heart failure with improved ejection fraction and permanent atrial fibrillation.    Subjective: Patient felt bloated and had abdominal discomfort this morning.   Objective:    No distress. No events overnight.                                        Scheduled Meds:   [Held by provider] losartan  100 mg Oral Daily    isosorbide dinitrate  10 mg Oral TID    [Held by provider] hydroCHLOROthiazide  25 mg Oral Daily    amLODIPine  5 mg Oral Daily    carvedilol  12.5 mg Oral BID WC    digoxin  62.5 mcg Oral Daily    senna  1 tablet Oral Nightly    enoxaparin  1 mg/kg SubCUTAneous BID    sodium chloride flush  10 mL IntraVENous 2 times per day    polyethylene glycol  17 g Oral Daily       Continuous Infusions:   sodium chloride             Intake/Output Summary (Last 24 hours) at 9/14/2024 1115  Last data filed at 9/14/2024 0655  Gross per 24 hour   Intake 660 ml   Output --   Net 660 ml       Patient Vitals for the past 96 hrs (Last 3 readings):   Weight   09/14/24 0655 92 kg (202 lb 12.8 oz)   09/13/24 0424 90.7 kg (200 lb)   09/12/24 0448 91.8 kg (202 lb 6.4 oz)          PE:   /83   Pulse 75   Temp 97.7 °F (36.5 °C) (Temporal)   Resp 16   Ht 1.702 m (5' 7\")   Wt 92 kg (202 lb 12.8 oz)   SpO2 91%   BMI 31.76 kg/m²   CONST: Middle-age obese male sitting in bed no acute distress.  He has jaundice.  HEENT: Head- normocephalic, atraumatic.   Neck:  no jugular venous distention. No carotid bruit noted.   LUNGS: Clear.  CARDIOVASCULAR: Irregular but not tachycardic, no murmur, s3, s4 or rub noted.   PV: No lower extremity edema.  Pedal pulses palpable, no clubbing or cyanosis   ABDOMEN: Obese, soft, vague tenderness to light palpation. Bowel sounds present. No palpable masses no hepatosplenomegaly or splenomegaly; no abdominal bruit / pulsation  SKIN: Warm and dry.   NEURO / PSYCH: Oriented to person, place and time. Speech clear and appropriate. Follows all commands. Pleasant affect.

## 2024-09-14 NOTE — PLAN OF CARE
Problem: ABCDS Injury Assessment  Goal: Absence of physical injury  9/14/2024 1145 by Lucille Patricia RN  Outcome: Progressing  9/13/2024 2304 by Nai Small RN  Outcome: Progressing     Problem: Chronic Conditions and Co-morbidities  Goal: Patient's chronic conditions and co-morbidity symptoms are monitored and maintained or improved  9/14/2024 1145 by Lucille Patrciia RN  Outcome: Progressing  9/13/2024 2304 by Nai Small RN  Outcome: Progressing  Flowsheets (Taken 9/13/2024 2000)  Care Plan - Patient's Chronic Conditions and Co-Morbidity Symptoms are Monitored and Maintained or Improved: Monitor and assess patient's chronic conditions and comorbid symptoms for stability, deterioration, or improvement     Problem: Discharge Planning  Goal: Discharge to home or other facility with appropriate resources  9/14/2024 1145 by Lucille Patricia RN  Outcome: Progressing  9/13/2024 2304 by Nai Small RN  Outcome: Progressing  Flowsheets (Taken 9/13/2024 2000)  Discharge to home or other facility with appropriate resources: Identify barriers to discharge with patient and caregiver     Problem: Pain  Goal: Verbalizes/displays adequate comfort level or baseline comfort level  9/13/2024 2304 by Nai Small RN  Outcome: Progressing  Flowsheets (Taken 9/13/2024 2000)  Verbalizes/displays adequate comfort level or baseline comfort level: Encourage patient to monitor pain and request assistance     Problem: Safety - Adult  Goal: Free from fall injury  9/13/2024 2304 by Nai Small, RN  Outcome: Progressing

## 2024-09-14 NOTE — PLAN OF CARE
Problem: ABCDS Injury Assessment  Goal: Absence of physical injury  9/13/2024 2304 by Nai Small RN  Outcome: Progressing  9/13/2024 1509 by Khushi Corbin RN  Outcome: Progressing     Problem: Chronic Conditions and Co-morbidities  Goal: Patient's chronic conditions and co-morbidity symptoms are monitored and maintained or improved  9/13/2024 2304 by Nai Small RN  Outcome: Progressing  9/13/2024 1509 by Khushi Corbin RN  Outcome: Progressing     Problem: Discharge Planning  Goal: Discharge to home or other facility with appropriate resources  9/13/2024 2304 by Nai Small RN  Outcome: Progressing  9/13/2024 1509 by Khushi Corbin RN  Outcome: Progressing     Problem: Pain  Goal: Verbalizes/displays adequate comfort level or baseline comfort level  9/13/2024 2304 by Nai Small RN  Outcome: Progressing  Flowsheets (Taken 9/13/2024 2000)  Verbalizes/displays adequate comfort level or baseline comfort level: Encourage patient to monitor pain and request assistance  9/13/2024 1509 by Khushi Corbin RN  Outcome: Progressing     Problem: Safety - Adult  Goal: Free from fall injury  9/13/2024 2304 by Nai Small RN  Outcome: Progressing  9/13/2024 1509 by Khushi Corbin RN  Outcome: Progressing

## 2024-09-15 ENCOUNTER — APPOINTMENT (OUTPATIENT)
Dept: ULTRASOUND IMAGING | Age: 69
DRG: 197 | End: 2024-09-15
Payer: MEDICARE

## 2024-09-15 LAB
ALBUMIN SERPL-MCNC: 3.8 G/DL (ref 3.5–5.2)
ALP SERPL-CCNC: 176 U/L (ref 40–129)
ALT SERPL-CCNC: 140 U/L (ref 0–40)
ANION GAP SERPL CALCULATED.3IONS-SCNC: 14 MMOL/L (ref 7–16)
AST SERPL-CCNC: 73 U/L (ref 0–39)
BASOPHILS # BLD: 0 K/UL (ref 0–0.2)
BASOPHILS NFR BLD: 0 % (ref 0–2)
BILIRUB DIRECT SERPL-MCNC: 6 MG/DL (ref 0–0.3)
BILIRUB SERPL-MCNC: 7.2 MG/DL (ref 0–1.2)
BUN SERPL-MCNC: 32 MG/DL (ref 6–23)
CALCIUM SERPL-MCNC: 9.1 MG/DL (ref 8.6–10.2)
CHLORIDE SERPL-SCNC: 103 MMOL/L (ref 98–107)
CO2 SERPL-SCNC: 20 MMOL/L (ref 22–29)
CREAT SERPL-MCNC: 1.9 MG/DL (ref 0.7–1.2)
EOSINOPHIL # BLD: 0.27 K/UL (ref 0.05–0.5)
EOSINOPHILS RELATIVE PERCENT: 6 % (ref 0–6)
ERYTHROCYTE [DISTWIDTH] IN BLOOD BY AUTOMATED COUNT: 15.9 % (ref 11.5–15)
GFR, ESTIMATED: 38 ML/MIN/1.73M2
GLUCOSE SERPL-MCNC: 97 MG/DL (ref 74–99)
HCT VFR BLD AUTO: 34.8 % (ref 37–54)
HGB BLD-MCNC: 11.1 G/DL (ref 12.5–16.5)
LYMPHOCYTES NFR BLD: 0.23 K/UL (ref 1.5–4)
LYMPHOCYTES RELATIVE PERCENT: 5 % (ref 20–42)
MCH RBC QN AUTO: 31.6 PG (ref 26–35)
MCHC RBC AUTO-ENTMCNC: 31.9 G/DL (ref 32–34.5)
MCV RBC AUTO: 99.1 FL (ref 80–99.9)
MONOCYTES NFR BLD: 0.27 K/UL (ref 0.1–0.95)
MONOCYTES NFR BLD: 6 % (ref 2–12)
MYELOCYTES ABSOLUTE COUNT: 0.04 K/UL
MYELOCYTES: 1 %
NEUTROPHILS NFR BLD: 82 % (ref 43–80)
NEUTS SEG NFR BLD: 3.68 K/UL (ref 1.8–7.3)
PHOSPHATE SERPL-MCNC: 3.1 MG/DL (ref 2.5–4.5)
PLATELET # BLD AUTO: 147 K/UL (ref 130–450)
PMV BLD AUTO: 10.3 FL (ref 7–12)
POTASSIUM SERPL-SCNC: 4.1 MMOL/L (ref 3.5–5)
PROT SERPL-MCNC: 6.9 G/DL (ref 6.4–8.3)
RBC # BLD AUTO: 3.51 M/UL (ref 3.8–5.8)
RBC # BLD: ABNORMAL 10*6/UL
SODIUM SERPL-SCNC: 137 MMOL/L (ref 132–146)
WBC OTHER # BLD: 4.5 K/UL (ref 4.5–11.5)

## 2024-09-15 PROCEDURE — 6360000002 HC RX W HCPCS

## 2024-09-15 PROCEDURE — 94660 CPAP INITIATION&MGMT: CPT

## 2024-09-15 PROCEDURE — 99231 SBSQ HOSP IP/OBS SF/LOW 25: CPT | Performed by: INTERNAL MEDICINE

## 2024-09-15 PROCEDURE — 2500000003 HC RX 250 WO HCPCS

## 2024-09-15 PROCEDURE — 6370000000 HC RX 637 (ALT 250 FOR IP)

## 2024-09-15 PROCEDURE — 80053 COMPREHEN METABOLIC PANEL: CPT

## 2024-09-15 PROCEDURE — 76770 US EXAM ABDO BACK WALL COMP: CPT

## 2024-09-15 PROCEDURE — 2580000003 HC RX 258

## 2024-09-15 PROCEDURE — 2140000000 HC CCU INTERMEDIATE R&B

## 2024-09-15 PROCEDURE — 6370000000 HC RX 637 (ALT 250 FOR IP): Performed by: TRANSPLANT SURGERY

## 2024-09-15 PROCEDURE — 36415 COLL VENOUS BLD VENIPUNCTURE: CPT

## 2024-09-15 PROCEDURE — 85025 COMPLETE CBC W/AUTO DIFF WBC: CPT

## 2024-09-15 PROCEDURE — 82248 BILIRUBIN DIRECT: CPT

## 2024-09-15 PROCEDURE — 84100 ASSAY OF PHOSPHORUS: CPT

## 2024-09-15 RX ORDER — SIMETHICONE 80 MG
40 TABLET,CHEWABLE ORAL EVERY 6 HOURS PRN
Status: DISCONTINUED | OUTPATIENT
Start: 2024-09-15 | End: 2024-09-20 | Stop reason: HOSPADM

## 2024-09-15 RX ADMIN — SODIUM CHLORIDE, PRESERVATIVE FREE 10 ML: 5 INJECTION INTRAVENOUS at 08:45

## 2024-09-15 RX ADMIN — ISOSORBIDE DINITRATE 10 MG: 10 TABLET ORAL at 13:27

## 2024-09-15 RX ADMIN — ISOSORBIDE DINITRATE 10 MG: 10 TABLET ORAL at 17:43

## 2024-09-15 RX ADMIN — CARVEDILOL 12.5 MG: 6.25 TABLET, FILM COATED ORAL at 08:43

## 2024-09-15 RX ADMIN — ONDANSETRON 4 MG: 2 INJECTION INTRAMUSCULAR; INTRAVENOUS at 21:02

## 2024-09-15 RX ADMIN — SODIUM PHOSPHATE, MONOBASIC, MONOHYDRATE AND SODIUM PHOSPHATE, DIBASIC, ANHYDROUS 20 MMOL: 142; 276 INJECTION, SOLUTION INTRAVENOUS at 12:08

## 2024-09-15 RX ADMIN — SENNOSIDES 8.6 MG: 8.6 TABLET, FILM COATED ORAL at 21:02

## 2024-09-15 RX ADMIN — ENOXAPARIN SODIUM 90 MG: 100 INJECTION SUBCUTANEOUS at 21:02

## 2024-09-15 RX ADMIN — AMLODIPINE BESYLATE 5 MG: 5 TABLET ORAL at 08:44

## 2024-09-15 RX ADMIN — SIMETHICONE 40 MG: 80 TABLET, CHEWABLE ORAL at 21:02

## 2024-09-15 RX ADMIN — ISOSORBIDE DINITRATE 10 MG: 10 TABLET ORAL at 08:44

## 2024-09-15 RX ADMIN — SODIUM CHLORIDE, PRESERVATIVE FREE 10 ML: 5 INJECTION INTRAVENOUS at 21:01

## 2024-09-15 RX ADMIN — CARVEDILOL 12.5 MG: 6.25 TABLET, FILM COATED ORAL at 17:43

## 2024-09-15 RX ADMIN — ENOXAPARIN SODIUM 90 MG: 100 INJECTION SUBCUTANEOUS at 08:55

## 2024-09-15 RX ADMIN — SIMETHICONE 40 MG: 80 TABLET, CHEWABLE ORAL at 12:03

## 2024-09-15 RX ADMIN — ONDANSETRON 4 MG: 2 INJECTION INTRAMUSCULAR; INTRAVENOUS at 15:16

## 2024-09-15 RX ADMIN — DIGOXIN 62.5 MCG: 125 TABLET ORAL at 08:44

## 2024-09-15 ASSESSMENT — PAIN DESCRIPTION - ORIENTATION: ORIENTATION: UPPER;RIGHT

## 2024-09-15 ASSESSMENT — PAIN DESCRIPTION - ONSET: ONSET: ON-GOING

## 2024-09-15 ASSESSMENT — PAIN SCALES - GENERAL
PAINLEVEL_OUTOF10: 0
PAINLEVEL_OUTOF10: 4

## 2024-09-15 ASSESSMENT — PAIN DESCRIPTION - DESCRIPTORS: DESCRIPTORS: CRAMPING;PRESSURE;TENDER

## 2024-09-15 ASSESSMENT — PAIN - FUNCTIONAL ASSESSMENT: PAIN_FUNCTIONAL_ASSESSMENT: ACTIVITIES ARE NOT PREVENTED

## 2024-09-15 ASSESSMENT — PAIN DESCRIPTION - LOCATION: LOCATION: ABDOMEN

## 2024-09-15 NOTE — PROGRESS NOTES
Notified resident via [employer] regarding patient c/o perssure and bloating.      Simethicone ordered per Dr. Nye

## 2024-09-15 NOTE — PROGRESS NOTES
[facility]  MD Bhupendra Avery MD Ali Hassan, MD Lisa Kniska, MARTIN Cardona, LUKE Baeza, CNP  Progress note  Patient's Name: Hong Chase  5:38 PM  9/15/2024    Cr relatively stable  Remain with yellowish discoloration   NAD   Verbalize no issues    History of Present Ilness:         Mr. Chase is a pleasant 69-year-old gentleman who presented to the hospital with new onset jaundice.  He also reports that he has noticed itching, poor appetite, fatigue, and weakness over the past 2 days as well.  He reports that his urine has been darker in color and he has noticed a decrease in urinary output.  Workup in the hospital thus far has included an ultrasound of the gallbladder that showed cholelithiasis without evidence of cholecystitis and an MRCP that showed no choledocholithiasis or pancreatic mass.  Hepatobiliary surgery has been consulted and has planned for an interventional radiology transjugular liver biopsy with portal pressures as cardiomegaly and significant intrahepatic venous dilation were noted on the MRI.  His past medical history is significant for atrial fibrillation, aortic dissection, coronary artery disease, congestive heart failure, hypertension, ischemic cardiomyopathy, pericardial effusion, sarcoidosis, and valvular heart disease.         We were consulted for acute on chronic kidney injury.  He denies a previous history of chronic kidney disease and does not follow longitudinally with a nephrologist.  He tells me that his PCP, Dr. Chase, whom is his nephew follows his laboratory studies.  His kidney function has been mildly elevated dating all the way back to 2013.  His creatinine level in December 2023 was 1.9 mg/dL.  His creatinine level on arrival to the hospital was 2.4 mg/dL.  He was started on isotonic intravenous fluids and his creatinine level is 2.3 mg/dL as of today.  He is currently n.p.o. for the transjugular liver biopsy  symptomatic.    Pleural effusion 11/26/2019    History: Post op problem Assessment: On r/a, left pigtail dc'ed Plan: PO lasix.  Denies sob.  Desat study done, no home O2 needed    Sarcoidosis     Valvular heart disease        Past Surgical History:   Procedure Laterality Date    COLONOSCOPY N/A 11/23/2021    COLONOSCOPY POLYPECTOMY SNARE/COLD BIOPSY performed by Rubens Padilla MD at [facility]    IR TRANSCATHETER BIOPSY  9/13/2024    IR TRANSCATHETER BIOPSY 9/13/2024 RaoulSung steward MD [facility] PROCEDURES    OTHER SURGICAL HISTORY  11/15/2019    aortic dissection repair @ [facility]    TONSILLECTOMY      UPPER GASTROINTESTINAL ENDOSCOPY N/A 3/5/2021    EGD CONTROL HEMORRHAGE performed by Rubens Padilla MD at [facility]    UPPER GASTROINTESTINAL ENDOSCOPY N/A 4/19/2021    EGD ESOPHAGOGASTRODUODENOSCOPY performed by Rubens Padilla MD at [facility]    UPPER GASTROINTESTINAL ENDOSCOPY N/A 11/23/2021    EGD DIAGNOSTIC ONLY performed by Rubens Padilla MD at [facility]       Allergies:  Patient has no known allergies.    Current Medications:    simethicone (MYLICON) chewable tablet 40 mg, Q6H PRN  [Held by provider] losartan (COZAAR) tablet 100 mg, Daily  isosorbide dinitrate (ISORDIL) tablet 10 mg, TID  [Held by provider] hydroCHLOROthiazide (HYDRODIURIL) tablet 25 mg, Daily  perflutren lipid microspheres (DEFINITY) injection 1.5 mL, ONCE PRN  amLODIPine (NORVASC) tablet 5 mg, Daily  carvedilol (COREG) tablet 12.5 mg, BID WC  digoxin (LANOXIN) tablet 62.5 mcg, Daily  senna (SENOKOT) tablet 8.6 mg, Nightly  enoxaparin (LOVENOX) injection 90 mg, BID  sodium chloride flush 0.9 % injection 10 mL, 2 times per day  sodium chloride flush 0.9 % injection 10 mL, PRN  0.9 % sodium chloride infusion, PRN  ondansetron (ZOFRAN-ODT) disintegrating tablet 4 mg, Q8H PRN   Or  ondansetron (ZOFRAN) injection 4 mg, Q6H PRN  polyethylene glycol (GLYCOLAX) packet 17 g, Daily  HYDROmorphone (DILAUDID) injection 0.25 mg, Q3H  09/12/2024 11:15 PM    BACTERIA TRACE 09/12/2024 11:15 PM    LEUKOCYTESUR NEGATIVE 09/12/2024 11:15 PM    UROBILINOGEN 0.2 09/12/2024 11:15 PM    BILIRUBINUR SMALL 09/12/2024 11:15 PM    GLUCOSEU NEGATIVE 09/12/2024 11:15 PM     Microalbumen/Creatinine ratio:  No components found for: \"RUCREAT\"  Iron Saturation:  No components found for: \"PERCENTFE\"  TIBC:    Lab Results   Component Value Date/Time    TIBC 237 09/13/2024 06:00 AM     FERRITIN:    Lab Results   Component Value Date/Time    FERRITIN 159 09/13/2024 06:00 AM        Imaging:  US RETROPERITONEAL COMPLETE   Final Result   No obstruction or acute findings in the kidneys.  Borderline increased renal   echogenicity consistent with medical renal disease.  Small benign renal cysts.         IR TRANSCATHETER BIOPSY   Final Result   1. Successful hepatic vein pressures as above with a portal/systemic gradient   of 7 mm Hg.   2. Successful uncomplicated transjugular liver core biopsy.         IR TRANSCATHETER BIOPSY   Final Result   1. Successful hepatic vein pressures as above with a portal/systemic gradient   of 7 mm Hg.   2. Successful uncomplicated transjugular liver core biopsy.         IR ALON CATH PLACE VENOUS 1ST ORDER   Final Result   1. Successful hepatic vein pressures as above with a portal/systemic gradient   of 7 mm Hg.   2. Successful uncomplicated transjugular liver core biopsy.         IR ALON CATH PLACE VENOUS 1ST ORDER   Final Result   1. Successful hepatic vein pressures as above with a portal/systemic gradient   of 7 mm Hg.   2. Successful uncomplicated transjugular liver core biopsy.         MRI ABDOMEN W WO CONTRAST MRCP   Final Result   1. No choledocholithiasis or pancreatic mass.   2. Cholelithiasis.         US GALLBLADDER RUQ   Final Result   1. Cholelithiasis without sonographic evidence of acute cholecystitis.   2. Ring down artifact in the gallbladder wall, which can be seen in the   setting of adenomyomatosis.

## 2024-09-15 NOTE — PLAN OF CARE
Problem: Chronic Conditions and Co-morbidities  Goal: Patient's chronic conditions and co-morbidity symptoms are monitored and maintained or improved  9/15/2024 1013 by Khushi Corbin RN  Outcome: Progressing     Problem: Pain  Goal: Verbalizes/displays adequate comfort level or baseline comfort level  9/15/2024 1013 by Khushi Corbin RN  Outcome: Progressing    Problem: Safety - Adult  Goal: Free from fall injury  9/15/2024 1013 by Khushi Corbin RN  Outcome: Progressing     Problem: Discharge Planning  Goal: Discharge to home or other facility with appropriate resources  9/15/2024 1013 by Khushi Corbin RN  Outcome: Progressing

## 2024-09-15 NOTE — PROGRESS NOTES
HOSPITALIST PROGRESS NOTES     ADMITTING DATE AND TIME: 2024  4:18 PM  had concerns including Pruritis (Pt states full body itching began 2 days ago), pt states his urine is darker than normal. Pt denies buring , and Fatigue (Began 2 days ago).    ADMIT DX: Jaundice [R17]  Jaundice, non- [R17]  Biliary calculus of other site with obstruction [K80.81]      SUBJECTIVE:  Patient is being followed for Jaundice [R17]  Jaundice, non- [R17]  Biliary calculus of other site with obstruction [K80.81]     Patient was seen examined and evaluated  Recent lab results, charts and pertinent diagnostic imaging reviewed   Ancillary service notes reviewed   Up in chair   C/o RUQ pain     Care reviewed with nursing staff, medical and surgical specialty care, primary care and the patient's family as available.   ROS: denies fever, chills, cp, sob, n/v, HA unless stated above     sodium phosphate IVPB (PERIPHERAL line)  20 mmol IntraVENous Once    [Held by provider] losartan  100 mg Oral Daily    isosorbide dinitrate  10 mg Oral TID    [Held by provider] hydroCHLOROthiazide  25 mg Oral Daily    amLODIPine  5 mg Oral Daily    carvedilol  12.5 mg Oral BID WC    digoxin  62.5 mcg Oral Daily    senna  1 tablet Oral Nightly    enoxaparin  1 mg/kg SubCUTAneous BID    sodium chloride flush  10 mL IntraVENous 2 times per day    polyethylene glycol  17 g Oral Daily     simethicone, 40 mg, Q6H PRN  perflutren lipid microspheres, 1.5 mL, ONCE PRN  sodium chloride flush, 10 mL, PRN  sodium chloride, , PRN  ondansetron, 4 mg, Q8H PRN   Or  ondansetron, 4 mg, Q6H PRN  HYDROmorphone, 0.25 mg, Q3H PRN   Or  HYDROmorphone, 0.5 mg, Q3H PRN         OBJECTIVE:    BP (!) 160/83   Pulse 76   Temp 98 °F (36.7 °C) (Oral)   Resp 22   Ht 1.702 m (5' 7\")   Wt 92 kg (202 lb 12.8 oz)   SpO2 93%   BMI 31.76 kg/m²      General Appearance: alert and oriented to person, place and time and in no acute distress  Skin: jaundice   Head: normocephalic and atraumatic  Eyes: pupils equal, round, and reactive to light, extraocular eye movements intact, conjunctivae normal  Neck: neck supple and non tender without mass   Pulmonary/Chest: clear to auscultation bilaterally- no wheezes, rales or rhonchi, normal air movement, no respiratory distress  Cardiovascular: normal rate, normal S1 and S2 and no carotid bruits  Abdomen: soft, non-tender, non-distended, normal bowel sounds, no masses or organomegaly  Extremities: no cyanosis, no clubbing and no edema  Neurologic: no cranial nerve deficit and speech normal    Recent Labs     09/13/24  0600 09/14/24  0707 09/15/24  0723    138 137   K 3.8 4.1 4.1    103 103   CO2 23 23 20*   BUN 40* 38* 32*   CREATININE 2.3* 2.2* 1.9*   GLUCOSE 138* 117* 97   CALCIUM 9.0 9.0 9.1       Recent Labs     09/13/24  0600 09/14/24  0707 09/15/24  0723   WBC 3.8* 4.1* 4.5   RBC 3.33* 3.39* 3.51*   HGB 10.6* 10.7* 11.1*   HCT 32.8* 33.1* 34.8*   MCV 98.5 97.6 99.1   MCH 31.8 31.6 31.6   MCHC 32.3 32.3 31.9*   RDW 15.3* 15.5* 15.9*   PLT  --  129* 147   MPV 10.7 10.1 10.3       I/O last 3 completed shifts:  In: 1320 [P.O.:1320]  Out: -   No intake/output data recorded.    09/11/24    ECHO (TTE) COMPLETE (PRN CONTRAST/BUBBLE/STRAIN/3D) 09/13/2024  7:35 PM (Final)    Interpretation Summary    Left Ventricle: Normal left ventricular systolic function with a visually estimated EF of 60 - 65%. Left ventricle size is normal. Mildly increased ventricular mass. Findings consistent with mild concentric hypertrophy. Normal wall motion. Indeterminate diastolic function.    Right Ventricle: Right ventricle size is normal. Normal systolic function. TAPSE is 1.8 cm.    Aortic Valve: No stenosis.    Tricuspid Valve: Mild regurgitation. Mildly elevated RVSP, consistent with mild pulmonary hypertension. The estimated  to software limitations. Not all errors are caught and corrected. If there are questions or concerns about the content of this note or information contained within the body of this dictation they should be addressed directly with the author for clarification.    Electronically signed by Wang Silva MD on 9/15/2024 at 12:13 PM

## 2024-09-15 NOTE — PROGRESS NOTES
[facility]  DAILY PROGRESS NOTE  9/15/2024  Subjective:  Complains of some mild abdominal pain and right upper quadrant as well as what he describes as a stomachache.  Patient is having bowel movements. He has been tolerating his diet but does not have much of an appetite.  Liver biopsy pending    No intake/output data recorded.  I/O last 3 completed shifts:  In: 1320 [P.O.:1320]  Out: -     Objective:  BP (!) 163/93   Pulse 78   Temp 98 °F (36.7 °C) (Temporal)   Resp 22   Ht 1.702 m (5' 7\")   Wt 92 kg (202 lb 12.8 oz)   SpO2 92%   BMI 31.76 kg/m²     - General: No acute distress. Awake and conversant.  - CV: Regular rate.  - Respiratory: Respirations are non-labored   - Abdomen: Soft, minimally tender, mildly distended  - Skin: Warm. No rashes or ulcers.  - Extremities: No cyanosis or edema.    Lab Results   Component Value Date    WBC 4.5 09/15/2024    HGB 11.1 (L) 09/15/2024    HCT 34.8 (L) 09/15/2024    MCV 99.1 09/15/2024     09/15/2024     Lab Results   Component Value Date     09/15/2024    K 4.1 09/15/2024     09/15/2024    CO2 20 (L) 09/15/2024    BUN 32 (H) 09/15/2024    CREATININE 1.9 (H) 09/15/2024    GLUCOSE 97 09/15/2024    CALCIUM 9.1 09/15/2024    BILITOT 7.2 (H) 09/15/2024    ALKPHOS 176 (H) 09/15/2024    AST 73 (H) 09/15/2024     (H) 09/15/2024    LABGLOM 38 (L) 09/15/2024    GFRAA >60 03/17/2022       ASSESSMENT/PLAN:    69 y.o. male with painless jaundice and abdominal hypertension status liver biopsy 9/13    -Okay to restart Lovenox  Continue regular diet  =Appreciate cardiology nephrology recs  Follow-up liver biopsy pathology, still pending  MRI showed no apparent choledocholithiasis or mass  Tentative plans for EUS 9/17 per Dr. Taylor  -Discussed with Dr. Francine Nye DO  General Surgery Resident, PGY-1  Electronically signed by Nicola Nye DO on 9/15/2024 at 9:37 AM     Agree with above  Bilirubin still increase  Biopsy  pending    Electronically signed by Deni Escamilla MD on 9/15/2024 at 11:59 AM

## 2024-09-16 LAB
ALBUMIN SERPL-MCNC: 3.6 G/DL (ref 3.5–5.2)
ALP SERPL-CCNC: 169 U/L (ref 40–129)
ALT SERPL-CCNC: 113 U/L (ref 0–40)
ANA SER QL IA: NEGATIVE
ANION GAP SERPL CALCULATED.3IONS-SCNC: 15 MMOL/L (ref 7–16)
APAP SERPL-MCNC: <5 UG/ML (ref 10–30)
AST SERPL-CCNC: 58 U/L (ref 0–39)
BASOPHILS # BLD: 0.02 K/UL (ref 0–0.2)
BASOPHILS NFR BLD: 0 % (ref 0–2)
BILIRUB SERPL-MCNC: 7.7 MG/DL (ref 0–1.2)
BUN SERPL-MCNC: 31 MG/DL (ref 6–23)
CALCIUM SERPL-MCNC: 8.8 MG/DL (ref 8.6–10.2)
CHLORIDE SERPL-SCNC: 106 MMOL/L (ref 98–107)
CO2 SERPL-SCNC: 20 MMOL/L (ref 22–29)
CREAT SERPL-MCNC: 1.7 MG/DL (ref 0.7–1.2)
EOSINOPHIL # BLD: 0.29 K/UL (ref 0.05–0.5)
EOSINOPHILS RELATIVE PERCENT: 7 % (ref 0–6)
ERYTHROCYTE [DISTWIDTH] IN BLOOD BY AUTOMATED COUNT: 15.9 % (ref 11.5–15)
GFR, ESTIMATED: 42 ML/MIN/1.73M2
GLUCOSE SERPL-MCNC: 100 MG/DL (ref 74–99)
HAV IGM SERPL QL IA: NONREACTIVE
HBV CORE IGM SERPL QL IA: NONREACTIVE
HBV SURFACE AG SERPL QL IA: NONREACTIVE
HCT VFR BLD AUTO: 31.8 % (ref 37–54)
HCV AB SERPL QL IA: NONREACTIVE
HGB BLD-MCNC: 10.3 G/DL (ref 12.5–16.5)
IMM GRANULOCYTES # BLD AUTO: 0.04 K/UL (ref 0–0.58)
IMM GRANULOCYTES NFR BLD: 1 % (ref 0–5)
LYMPHOCYTES NFR BLD: 0.33 K/UL (ref 1.5–4)
LYMPHOCYTES RELATIVE PERCENT: 7 % (ref 20–42)
MCH RBC QN AUTO: 31.6 PG (ref 26–35)
MCHC RBC AUTO-ENTMCNC: 32.4 G/DL (ref 32–34.5)
MCV RBC AUTO: 97.5 FL (ref 80–99.9)
MITOCHONDRIA AB SER QL: NEGATIVE
MONOCYTES NFR BLD: 0.58 K/UL (ref 0.1–0.95)
MONOCYTES NFR BLD: 13 % (ref 2–12)
NEUTROPHILS NFR BLD: 72 % (ref 43–80)
NEUTS SEG NFR BLD: 3.22 K/UL (ref 1.8–7.3)
PHOSPHATE SERPL-MCNC: 3 MG/DL (ref 2.5–4.5)
PLATELET # BLD AUTO: 144 K/UL (ref 130–450)
PMV BLD AUTO: 10 FL (ref 7–12)
POTASSIUM SERPL-SCNC: 3.9 MMOL/L (ref 3.5–5)
PROT SERPL-MCNC: 6.5 G/DL (ref 6.4–8.3)
RBC # BLD AUTO: 3.26 M/UL (ref 3.8–5.8)
RBC # BLD: ABNORMAL 10*6/UL
SODIUM SERPL-SCNC: 141 MMOL/L (ref 132–146)
WBC OTHER # BLD: 4.5 K/UL (ref 4.5–11.5)

## 2024-09-16 PROCEDURE — 80053 COMPREHEN METABOLIC PANEL: CPT

## 2024-09-16 PROCEDURE — 84100 ASSAY OF PHOSPHORUS: CPT

## 2024-09-16 PROCEDURE — 6370000000 HC RX 637 (ALT 250 FOR IP)

## 2024-09-16 PROCEDURE — 6360000002 HC RX W HCPCS: Performed by: INTERNAL MEDICINE

## 2024-09-16 PROCEDURE — 6370000000 HC RX 637 (ALT 250 FOR IP): Performed by: TRANSPLANT SURGERY

## 2024-09-16 PROCEDURE — 2580000003 HC RX 258

## 2024-09-16 PROCEDURE — 85025 COMPLETE CBC W/AUTO DIFF WBC: CPT

## 2024-09-16 PROCEDURE — 2140000000 HC CCU INTERMEDIATE R&B

## 2024-09-16 PROCEDURE — 99232 SBSQ HOSP IP/OBS MODERATE 35: CPT | Performed by: INTERNAL MEDICINE

## 2024-09-16 PROCEDURE — 80143 DRUG ASSAY ACETAMINOPHEN: CPT

## 2024-09-16 PROCEDURE — 2700000000 HC OXYGEN THERAPY PER DAY

## 2024-09-16 PROCEDURE — 94660 CPAP INITIATION&MGMT: CPT

## 2024-09-16 PROCEDURE — 6360000002 HC RX W HCPCS

## 2024-09-16 PROCEDURE — 36415 COLL VENOUS BLD VENIPUNCTURE: CPT

## 2024-09-16 PROCEDURE — 2500000003 HC RX 250 WO HCPCS

## 2024-09-16 RX ORDER — FUROSEMIDE 10 MG/ML
40 INJECTION INTRAMUSCULAR; INTRAVENOUS ONCE
Status: COMPLETED | OUTPATIENT
Start: 2024-09-16 | End: 2024-09-16

## 2024-09-16 RX ADMIN — SENNOSIDES 8.6 MG: 8.6 TABLET, FILM COATED ORAL at 23:19

## 2024-09-16 RX ADMIN — SODIUM CHLORIDE, PRESERVATIVE FREE 10 ML: 5 INJECTION INTRAVENOUS at 23:19

## 2024-09-16 RX ADMIN — PIPERACILLIN AND TAZOBACTAM 3375 MG: 3; .375 INJECTION, POWDER, LYOPHILIZED, FOR SOLUTION INTRAVENOUS at 06:39

## 2024-09-16 RX ADMIN — SODIUM CHLORIDE, PRESERVATIVE FREE 10 ML: 5 INJECTION INTRAVENOUS at 06:38

## 2024-09-16 RX ADMIN — ISOSORBIDE DINITRATE 10 MG: 10 TABLET ORAL at 13:12

## 2024-09-16 RX ADMIN — POLYETHYLENE GLYCOL 3350 17 G: 17 POWDER, FOR SOLUTION ORAL at 08:56

## 2024-09-16 RX ADMIN — PIPERACILLIN AND TAZOBACTAM 3375 MG: 3; .375 INJECTION, POWDER, LYOPHILIZED, FOR SOLUTION INTRAVENOUS at 23:19

## 2024-09-16 RX ADMIN — ISOSORBIDE DINITRATE 10 MG: 10 TABLET ORAL at 17:36

## 2024-09-16 RX ADMIN — ONDANSETRON 4 MG: 2 INJECTION INTRAMUSCULAR; INTRAVENOUS at 06:42

## 2024-09-16 RX ADMIN — SODIUM PHOSPHATE, MONOBASIC, MONOHYDRATE AND SODIUM PHOSPHATE, DIBASIC, ANHYDROUS 20 MMOL: 142; 276 INJECTION, SOLUTION INTRAVENOUS at 10:16

## 2024-09-16 RX ADMIN — DIGOXIN 62.5 MCG: 125 TABLET ORAL at 08:56

## 2024-09-16 RX ADMIN — PIPERACILLIN AND TAZOBACTAM 3375 MG: 3; .375 INJECTION, POWDER, LYOPHILIZED, FOR SOLUTION INTRAVENOUS at 14:14

## 2024-09-16 RX ADMIN — ISOSORBIDE DINITRATE 10 MG: 10 TABLET ORAL at 08:56

## 2024-09-16 RX ADMIN — AMLODIPINE BESYLATE 5 MG: 5 TABLET ORAL at 08:56

## 2024-09-16 RX ADMIN — CARVEDILOL 12.5 MG: 6.25 TABLET, FILM COATED ORAL at 08:56

## 2024-09-16 RX ADMIN — SIMETHICONE 40 MG: 80 TABLET, CHEWABLE ORAL at 06:41

## 2024-09-16 RX ADMIN — CARVEDILOL 12.5 MG: 6.25 TABLET, FILM COATED ORAL at 17:36

## 2024-09-16 RX ADMIN — FUROSEMIDE 40 MG: 10 INJECTION, SOLUTION INTRAMUSCULAR; INTRAVENOUS at 14:11

## 2024-09-16 NOTE — PROGRESS NOTES
HOSPITALIST PROGRESS NOTES     ADMITTING DATE AND TIME: 2024  4:18 PM  had concerns including Pruritis (Pt states full body itching began 2 days ago), pt states his urine is darker than normal. Pt denies buring , and Fatigue (Began 2 days ago).    ADMIT DX: Jaundice [R17]  Jaundice, non- [R17]  Biliary calculus of other site with obstruction [K80.81]      SUBJECTIVE:  Patient is being followed for Jaundice [R17]  Jaundice, non- [R17]  Biliary calculus of other site with obstruction [K80.81]     Patient was seen examined and evaluated  Recent lab results, charts and pertinent diagnostic imaging reviewed   Ancillary service notes reviewed   Up in chair   C/o RUQ pain     Care reviewed with nursing staff, medical and surgical specialty care, primary care and the patient's family as available.   ROS: denies fever, chills, cp, sob, n/v, HA unless stated above     piperacillin-tazobactam  3,375 mg IntraVENous Q8H    sodium phosphate IVPB (PERIPHERAL line)  20 mmol IntraVENous Once    [Held by provider] losartan  100 mg Oral Daily    isosorbide dinitrate  10 mg Oral TID    [Held by provider] hydroCHLOROthiazide  25 mg Oral Daily    amLODIPine  5 mg Oral Daily    carvedilol  12.5 mg Oral BID WC    digoxin  62.5 mcg Oral Daily    senna  1 tablet Oral Nightly    [Held by provider] enoxaparin  1 mg/kg SubCUTAneous BID    sodium chloride flush  10 mL IntraVENous 2 times per day    polyethylene glycol  17 g Oral Daily     simethicone, 40 mg, Q6H PRN  perflutren lipid microspheres, 1.5 mL, ONCE PRN  sodium chloride flush, 10 mL, PRN  sodium chloride, , PRN  ondansetron, 4 mg, Q8H PRN   Or  ondansetron, 4 mg, Q6H PRN  HYDROmorphone, 0.25 mg, Q3H PRN   Or  HYDROmorphone, 0.5 mg, Q3H PRN         OBJECTIVE:    BP (!) 149/89   Pulse 63   Temp 98 °F (36.7 °C) (Temporal)   Resp 18   Ht  regurgitation. Mildly elevated RVSP, consistent with mild pulmonary hypertension. The estimated RVSP is 48 mmHg.    Left Atrium: Left atrium is severely dilated.    Aorta: Normal sized aortic root. Mildly dilated ascending aorta. Ao ascending diameter is 4.2 cm. 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    Pericardium: No pericardial effusion.    Image quality is adequate.    Signed by: Safia Avery MD on 2024  7:35 PM      SYNOPSIS:   Patient admitted on 2024 for Jaundice, non-  Patient with history of CHF and sarcoidosis admitted on 2024 for painless jaundice.  MRCP showed cholelithiasis but no mass or choledocholithiasis.  Patient had significant intrahepatic venous dilation as well as cardiomegaly concerning for possible CHF.  Cardiology consulted, await recommendations. Pt also underwent transjugular liver biopsy with portal pressures on 24.  Internal medicine consulted for medical management                                                ASSESSMENT AND PLAN:    Principal Problem:    Jaundice, non-  Active Problems:    Obstructive jaundice  Resolved Problems:    * No resolved hospital problems. *    Painless jaundice  Transaminitis  Hyperbilirubinemia, worsening   Care per hepatobiliary service  Workup thus far is negative   Chronic tylenol use   Liver biopsy pending  Plan for EUS per GI    Acute kidney injury on CKD  High anion gap metabolic acidosis  Nephrology on board  Creatinine improving 1.7 today  Repeat BMP    Essential hypertension  Diuretics and ACE inhibitor on hold  Continue metoprolol amlodipine  Continue to monitor    Morbid obesity  Obstructive sleep apnea  CPAP nightly    A-fib, CHF, aortic dissection status post surgery at [facility]  Cardiology following     DISPOSITION: Per primary                                                            This note was generated by the epic EMR system/Dragon speech recognition and may contain inherent errors or omissions not

## 2024-09-16 NOTE — CARE COORDINATION
Transition of care update: Liver biopsy 09/13. Tentative plans for EUS 09/17. IV zosyn 3,375mg q 8 hrs. Total bili 7.7 and other labs noted. Met with pt in room. Plan is to home when medically ready. No needs identified at present. Cm/sw will follow.

## 2024-09-16 NOTE — PROGRESS NOTES
[facility]  DAILY PROGRESS NOTE  9/16/2024  Subjective:  Complains of some mild abdominal pain and right upper quadrant same as prior exams improved with simethicone. Afebrile, vitally stable. Having bowel movements. Bilirubin has increased from 5.6 to 7.7 today.    I/O this shift:  In: -   Out: 100 [Urine:100]  I/O last 3 completed shifts:  In: 1080 [P.O.:1080]  Out: -     Objective:  BP (!) 149/89   Pulse 63   Temp 98 °F (36.7 °C) (Temporal)   Resp 18   Ht 1.702 m (5' 7\")   Wt 91.5 kg (201 lb 12.8 oz)   SpO2 95%   BMI 31.61 kg/m²     - General: No acute distress. Awake and conversant.  - CV: Regular rate.  - Respiratory: Respirations are non-labored   - Abdomen: Soft, minimally tender, mildly distended  - Skin: Warm. No rashes or ulcers.  - Extremities: No cyanosis or edema.    Lab Results   Component Value Date    WBC 4.5 09/16/2024    HGB 10.3 (L) 09/16/2024    HCT 31.8 (L) 09/16/2024    MCV 97.5 09/16/2024     09/16/2024     Lab Results   Component Value Date     09/16/2024    K 3.9 09/16/2024     09/16/2024    CO2 20 (L) 09/16/2024    BUN 31 (H) 09/16/2024    CREATININE 1.7 (H) 09/16/2024    GLUCOSE 100 (H) 09/16/2024    CALCIUM 8.8 09/16/2024    BILITOT 7.7 (H) 09/16/2024    ALKPHOS 169 (H) 09/16/2024    AST 58 (H) 09/16/2024     (H) 09/16/2024    LABGLOM 42 (L) 09/16/2024    GFRAA >60 03/17/2022       ASSESSMENT/PLAN:    69 y.o. male with painless jaundice and abdominal hypertension status liver biopsy 9/13    -Okay to restart Lovenox  Continue regular diet  -Appreciate cardiology nephrology recs  -Follow-up liver biopsy pathology, still pending  -MRI showed no apparent choledocholithiasis or mass  Tentative plans for EUS 9/17 per Dr. Taylor  -Discussed with Dr. Francine Nye DO  General Surgery Resident, PGY-1  Electronically signed by Nicola Nye DO on 9/16/2024 at 8:58 AM

## 2024-09-16 NOTE — PLAN OF CARE
Problem: ABCDS Injury Assessment  Goal: Absence of physical injury  9/16/2024 0732 by Nai Small RN  Outcome: Progressing  9/15/2024 2007 by Nai Small RN  Outcome: Progressing     Problem: Chronic Conditions and Co-morbidities  Goal: Patient's chronic conditions and co-morbidity symptoms are monitored and maintained or improved  9/16/2024 0732 by Nai Small RN  Outcome: Progressing  9/15/2024 2007 by Nai Small RN  Outcome: Progressing  Flowsheets (Taken 9/15/2024 1945)  Care Plan - Patient's Chronic Conditions and Co-Morbidity Symptoms are Monitored and Maintained or Improved: Monitor and assess patient's chronic conditions and comorbid symptoms for stability, deterioration, or improvement     Problem: Discharge Planning  Goal: Discharge to home or other facility with appropriate resources  9/16/2024 0732 by Nai Small RN  Outcome: Progressing  9/15/2024 2007 by Nai Small RN  Outcome: Progressing  Flowsheets (Taken 9/15/2024 1945)  Discharge to home or other facility with appropriate resources: Identify barriers to discharge with patient and caregiver     Problem: Pain  Goal: Verbalizes/displays adequate comfort level or baseline comfort level  9/16/2024 0732 by Nai Small RN  Outcome: Progressing  Flowsheets  Taken 9/16/2024 0305  Verbalizes/displays adequate comfort level or baseline comfort level: Encourage patient to monitor pain and request assistance  Taken 9/15/2024 2315  Verbalizes/displays adequate comfort level or baseline comfort level: Encourage patient to monitor pain and request assistance  9/15/2024 2007 by Nai Small RN  Outcome: Progressing  Flowsheets (Taken 9/15/2024 1945)  Verbalizes/displays adequate comfort level or baseline comfort level: Encourage patient to monitor pain and request assistance     Problem: Safety - Adult  Goal: Free from fall injury  9/16/2024 0732 by Nai Small, RN  Outcome: Progressing  9/15/2024 2007 by

## 2024-09-16 NOTE — PROGRESS NOTES
[facility]enterology Progress Note    By SANG Armenta  69 y.o.  male    SUBJECTIVE:  Right upper quadrant/right flank abdominal pain today.  Tolerating diet  Having bowel movements with some occasional gaseous like pain  Questions r/b of procedures were discussed with the pt.     OBJECTIVE:  BP (!) 149/79   Pulse 69   Temp 98.4 °F (36.9 °C) (Temporal)   Resp 18   Ht 1.702 m (5' 7\")   Wt 91.5 kg (201 lb 12.8 oz)   SpO2 95%   BMI 31.61 kg/m²   GEN: A&Ox3, friendly, NAD  HEENT:PEERL, + icterus  Heart: RRR  Lungs: CTAB  Abd.: soft, NT, ND, BS +, no G/R, no HSM  Extr.: + trace edema, no C/C, no brusing         Lab Results   Component Value Date/Time    WBC 4.5 09/16/2024 05:55 AM    WBC 4.5 09/15/2024 07:23 AM    WBC 4.1 09/14/2024 07:07 AM    HGB 10.3 09/16/2024 05:55 AM    HGB 11.1 09/15/2024 07:23 AM    HGB 10.7 09/14/2024 07:07 AM    HCT 31.8 09/16/2024 05:55 AM    MCV 97.5 09/16/2024 05:55 AM    RDW 15.9 09/16/2024 05:55 AM     09/16/2024 05:55 AM     09/15/2024 07:23 AM     09/14/2024 07:07 AM     Lab Results   Component Value Date/Time     09/16/2024 05:55 AM    K 3.9 09/16/2024 05:55 AM    K 4.3 03/05/2021 06:22 AM     09/16/2024 05:55 AM    CO2 20 09/16/2024 05:55 AM    BUN 31 09/16/2024 05:55 AM    CREATININE 1.7 09/16/2024 05:55 AM    CALCIUM 8.8 09/16/2024 05:55 AM    BILITOT 7.7 09/16/2024 05:55 AM    BILITOT 7.2 09/15/2024 07:23 AM    BILITOT 5.6 09/14/2024 07:07 AM    ALKPHOS 169 09/16/2024 05:55 AM    ALKPHOS 176 09/15/2024 07:23 AM    ALKPHOS 163 09/14/2024 07:07 AM    AST 58 09/16/2024 05:55 AM    AST 73 09/15/2024 07:23 AM    AST 74 09/14/2024 07:07 AM     09/16/2024 05:55 AM     09/15/2024 07:23 AM     09/14/2024 07:07 AM     Lab Results   Component Value Date/Time    LIPASE 34 03/04/2021 09:41 PM     No results found for: \"AMYLASE\"      ASSESSMENT/PLAN:   1. Painless jaundice, Bili ~7.0, mainly  direct, non-dilated CBD  2. Elevated liver chemistries   3. Mild Anemia (hgb 10.8), normocytic, elevated RDW - baseline ~13  4. Elevated CA 19-9, 236     Continue to trend labs  Plans for EUS/ERCP 9/17, currently scheduled for 1400  MRI results/images reviewed - no apparent choledocholithiasis/mass  HVPG results reviewed - consistent with sub clinical PHTN, not >10 or 12.    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

## 2024-09-16 NOTE — PROGRESS NOTES
[facility]  MD Bhupendra Avery MD Ali Hassan, MD Lisa Kniska, CNP   Zaira Cardona, LUKE Baeza, CNP  Progress note  Patient's Name: Hong Chase  3:23 PM  9/16/2024    Cr relatively stable  Remain with yellowish discoloration   NAD     9/16: Sitting up in bed. Does have some dyspnea. He is on oxygen via nasal canula. His girlfriend is bringing in his cpap. Appetite is fair. Vital signs are stable. For EUS tomorrow.     History of Present Ilness:         Mr. Chase is a pleasant 69-year-old gentleman who presented to the hospital with new onset jaundice.  He also reports that he has noticed itching, poor appetite, fatigue, and weakness over the past 2 days as well.  He reports that his urine has been darker in color and he has noticed a decrease in urinary output.  Workup in the hospital thus far has included an ultrasound of the gallbladder that showed cholelithiasis without evidence of cholecystitis and an MRCP that showed no choledocholithiasis or pancreatic mass.  Hepatobiliary surgery has been consulted and has planned for an interventional radiology transjugular liver biopsy with portal pressures as cardiomegaly and significant intrahepatic venous dilation were noted on the MRI.  His past medical history is significant for atrial fibrillation, aortic dissection, coronary artery disease, congestive heart failure, hypertension, ischemic cardiomyopathy, pericardial effusion, sarcoidosis, and valvular heart disease.         We were consulted for acute on chronic kidney injury.  He denies a previous history of chronic kidney disease and does not follow longitudinally with a nephrologist.  He tells me that his PCP, Dr. Chase, whom is his nephew follows his laboratory studies.  His kidney function has been mildly elevated dating all the way back to 2013.  His creatinine level in December 2023 was 1.9 mg/dL.  His creatinine level on arrival to the hospital was  No choledocholithiasis or pancreatic mass.   2. Cholelithiasis.         US GALLBLADDER RUQ   Final Result   1. Cholelithiasis without sonographic evidence of acute cholecystitis.   2. Ring down artifact in the gallbladder wall, which can be seen in the   setting of adenomyomatosis.             Assessment  Acute kidney injury in the setting of volume contraction due to poor oral intake. Urine indices were collected after fluid resuscitation making them difficult to interpret. HRS is likely contributing.   Chronic kidney disease stage IIIb secondary to renal microvascular atherosclerotic disease. Previous baseline creatinine level was 1.4-1.9 mg/dL. His baseline creatinine level may have gone up somewhat over the past year.   Hypertension  Jaundice     Cr at his baseline, 1.7 mg/dL   Seems a bit hypervolemic today, having some dyspnea  For EUS tomorrow     Plan  Lasix 40 mg IV once , will evaluate response   Avoid nephrotoxins as able   Would hold HCTZ, and Losartan for now -- consider resuming in the next 1-2 days if creatinine remains at baseline  Follow liver biopsy results  Monitor I&O  Continue supportive renal care         Electronically signed by CRISTINA Amado CNP on 9/16/2024 at 3:23 PM

## 2024-09-16 NOTE — PLAN OF CARE
Problem: ABCDS Injury Assessment  Goal: Absence of physical injury  9/15/2024 2007 by Nai Small RN  Outcome: Progressing  9/15/2024 1013 by Khushi Corbin RN  Outcome: Progressing     Problem: Chronic Conditions and Co-morbidities  Goal: Patient's chronic conditions and co-morbidity symptoms are monitored and maintained or improved  9/15/2024 2007 by Nai Small RN  Outcome: Progressing  9/15/2024 1013 by Khushi Corbin RN  Outcome: Progressing     Problem: Discharge Planning  Goal: Discharge to home or other facility with appropriate resources  9/15/2024 2007 by Nai Small RN  Outcome: Progressing  9/15/2024 1013 by Khushi Corbin RN  Outcome: Progressing     Problem: Pain  Goal: Verbalizes/displays adequate comfort level or baseline comfort level  9/15/2024 2007 by Nai Small RN  Outcome: Progressing  Flowsheets (Taken 9/15/2024 1945)  Verbalizes/displays adequate comfort level or baseline comfort level: Encourage patient to monitor pain and request assistance  9/15/2024 1013 by Khushi Corbin RN  Outcome: Progressing  Flowsheets  Taken 9/15/2024 0305 by Nai Small RN  Verbalizes/displays adequate comfort level or baseline comfort level: Encourage patient to monitor pain and request assistance  Taken 9/14/2024 2300 by Nai Small RN  Verbalizes/displays adequate comfort level or baseline comfort level: Encourage patient to monitor pain and request assistance     Problem: Safety - Adult  Goal: Free from fall injury  9/15/2024 2007 by Nai Small RN  Outcome: Progressing  9/15/2024 1013 by Khushi Corbin RN  Outcome: Progressing

## 2024-09-16 NOTE — PROGRESS NOTES
[facility] Quality Flow/Interdisciplinary Rounds Progress Note        Quality Flow Rounds held on September 16, 2024    Disciplines Attending:  Bedside Nurse, , , and Nursing Unit Leadership    Hong Chase was admitted on 9/11/2024  4:18 PM    Anticipated Discharge Date:       Disposition:    Sea Score:  Sea Scale Score: 20    Readmission Risk              Risk of Unplanned Readmission:  17           Discussed patient goal for the day, patient clinical progression, and barriers to discharge.  The following Goal(s) of the Day/Commitment(s) have been identified:  Diagnostics - Report Results and Labs - Report Results      Luana Hsieh RN  September 16, 2024

## 2024-09-17 ENCOUNTER — ANESTHESIA EVENT (OUTPATIENT)
Dept: ENDOSCOPY | Age: 69
End: 2024-09-17
Payer: MEDICARE

## 2024-09-17 LAB
A1AT SERPL-MCNC: 173 MG/DL (ref 90–200)
ALBUMIN SERPL-MCNC: 3.8 G/DL (ref 3.5–5.2)
ALP SERPL-CCNC: 173 U/L (ref 40–129)
ALT SERPL-CCNC: 94 U/L (ref 0–40)
ANION GAP SERPL CALCULATED.3IONS-SCNC: 14 MMOL/L (ref 7–16)
AST SERPL-CCNC: 43 U/L (ref 0–39)
BASOPHILS # BLD: 0 K/UL (ref 0–0.2)
BASOPHILS NFR BLD: 0 % (ref 0–2)
BILIRUB SERPL-MCNC: 9.7 MG/DL (ref 0–1.2)
BUN SERPL-MCNC: 34 MG/DL (ref 6–23)
CALCIUM SERPL-MCNC: 8.8 MG/DL (ref 8.6–10.2)
CERULOPLASMIN SERPL-MCNC: 27 MG/DL (ref 15–30)
CHLORIDE SERPL-SCNC: 103 MMOL/L (ref 98–107)
CO2 SERPL-SCNC: 23 MMOL/L (ref 22–29)
CREAT SERPL-MCNC: 2 MG/DL (ref 0.7–1.2)
EOSINOPHIL # BLD: 0.29 K/UL (ref 0.05–0.5)
EOSINOPHILS RELATIVE PERCENT: 5 % (ref 0–6)
ERYTHROCYTE [DISTWIDTH] IN BLOOD BY AUTOMATED COUNT: 15.9 % (ref 11.5–15)
GFR, ESTIMATED: 35 ML/MIN/1.73M2
GLUCOSE SERPL-MCNC: 98 MG/DL (ref 74–99)
HCT VFR BLD AUTO: 32.8 % (ref 37–54)
HGB BLD-MCNC: 10.6 G/DL (ref 12.5–16.5)
LYMPHOCYTES NFR BLD: 0.44 K/UL (ref 1.5–4)
LYMPHOCYTES RELATIVE PERCENT: 8 % (ref 20–42)
MCH RBC QN AUTO: 31.2 PG (ref 26–35)
MCHC RBC AUTO-ENTMCNC: 32.3 G/DL (ref 32–34.5)
MCV RBC AUTO: 96.5 FL (ref 80–99.9)
MONOCYTES NFR BLD: 0.44 K/UL (ref 0.1–0.95)
MONOCYTES NFR BLD: 8 % (ref 2–12)
NEUTROPHILS NFR BLD: 79 % (ref 43–80)
NEUTS SEG NFR BLD: 4.42 K/UL (ref 1.8–7.3)
PHOSPHATE SERPL-MCNC: 3.2 MG/DL (ref 2.5–4.5)
PLATELET # BLD AUTO: 180 K/UL (ref 130–450)
PMV BLD AUTO: 10.4 FL (ref 7–12)
POTASSIUM SERPL-SCNC: 4 MMOL/L (ref 3.5–5)
PROT SERPL-MCNC: 6.8 G/DL (ref 6.4–8.3)
RBC # BLD AUTO: 3.4 M/UL (ref 3.8–5.8)
RBC # BLD: ABNORMAL 10*6/UL
SODIUM SERPL-SCNC: 140 MMOL/L (ref 132–146)
WBC OTHER # BLD: 5.6 K/UL (ref 4.5–11.5)

## 2024-09-17 PROCEDURE — 2700000000 HC OXYGEN THERAPY PER DAY

## 2024-09-17 PROCEDURE — 99232 SBSQ HOSP IP/OBS MODERATE 35: CPT | Performed by: INTERNAL MEDICINE

## 2024-09-17 PROCEDURE — 84100 ASSAY OF PHOSPHORUS: CPT

## 2024-09-17 PROCEDURE — 94660 CPAP INITIATION&MGMT: CPT

## 2024-09-17 PROCEDURE — 85025 COMPLETE CBC W/AUTO DIFF WBC: CPT

## 2024-09-17 PROCEDURE — 80053 COMPREHEN METABOLIC PANEL: CPT

## 2024-09-17 PROCEDURE — 2580000003 HC RX 258: Performed by: NURSE PRACTITIONER

## 2024-09-17 PROCEDURE — 6370000000 HC RX 637 (ALT 250 FOR IP)

## 2024-09-17 PROCEDURE — 2580000003 HC RX 258: Performed by: INTERNAL MEDICINE

## 2024-09-17 PROCEDURE — 2140000000 HC CCU INTERMEDIATE R&B

## 2024-09-17 PROCEDURE — 6360000002 HC RX W HCPCS

## 2024-09-17 PROCEDURE — 6370000000 HC RX 637 (ALT 250 FOR IP): Performed by: INTERNAL MEDICINE

## 2024-09-17 PROCEDURE — 2580000003 HC RX 258

## 2024-09-17 PROCEDURE — 36415 COLL VENOUS BLD VENIPUNCTURE: CPT

## 2024-09-17 PROCEDURE — 2500000003 HC RX 250 WO HCPCS

## 2024-09-17 PROCEDURE — 6370000000 HC RX 637 (ALT 250 FOR IP): Performed by: TRANSPLANT SURGERY

## 2024-09-17 PROCEDURE — 99232 SBSQ HOSP IP/OBS MODERATE 35: CPT | Performed by: TRANSPLANT SURGERY

## 2024-09-17 RX ORDER — SODIUM CHLORIDE 0.9 % (FLUSH) 0.9 %
5-40 SYRINGE (ML) INJECTION EVERY 12 HOURS SCHEDULED
Status: DISCONTINUED | OUTPATIENT
Start: 2024-09-17 | End: 2024-09-18 | Stop reason: HOSPADM

## 2024-09-17 RX ORDER — INDOMETHACIN 100 MG
100 SUPPOSITORY, RECTAL RECTAL ONCE
Status: COMPLETED | OUTPATIENT
Start: 2024-09-18 | End: 2024-09-18

## 2024-09-17 RX ORDER — SODIUM CHLORIDE 0.9 % (FLUSH) 0.9 %
5-40 SYRINGE (ML) INJECTION PRN
Status: DISCONTINUED | OUTPATIENT
Start: 2024-09-17 | End: 2024-09-18 | Stop reason: HOSPADM

## 2024-09-17 RX ORDER — SODIUM CHLORIDE 9 MG/ML
25 INJECTION, SOLUTION INTRAVENOUS PRN
Status: DISCONTINUED | OUTPATIENT
Start: 2024-09-17 | End: 2024-09-18 | Stop reason: HOSPADM

## 2024-09-17 RX ORDER — DEXTROSE MONOHYDRATE, SODIUM CHLORIDE, AND POTASSIUM CHLORIDE 50; 1.49; 4.5 G/1000ML; G/1000ML; G/1000ML
INJECTION, SOLUTION INTRAVENOUS CONTINUOUS
Status: DISCONTINUED | OUTPATIENT
Start: 2024-09-17 | End: 2024-09-17

## 2024-09-17 RX ORDER — SODIUM CHLORIDE, SODIUM LACTATE, POTASSIUM CHLORIDE, AND CALCIUM CHLORIDE .6; .31; .03; .02 G/100ML; G/100ML; G/100ML; G/100ML
800 INJECTION, SOLUTION INTRAVENOUS ONCE
Status: COMPLETED | OUTPATIENT
Start: 2024-09-18 | End: 2024-09-18

## 2024-09-17 RX ORDER — INDOMETHACIN 100 MG
100 SUPPOSITORY, RECTAL RECTAL ONCE
Status: DISCONTINUED | OUTPATIENT
Start: 2024-09-17 | End: 2024-09-17

## 2024-09-17 RX ORDER — SODIUM CHLORIDE, SODIUM LACTATE, POTASSIUM CHLORIDE, AND CALCIUM CHLORIDE .6; .31; .03; .02 G/100ML; G/100ML; G/100ML; G/100ML
800 INJECTION, SOLUTION INTRAVENOUS ONCE
Status: DISCONTINUED | OUTPATIENT
Start: 2024-09-17 | End: 2024-09-17

## 2024-09-17 RX ORDER — DEXTROSE MONOHYDRATE, SODIUM CHLORIDE, AND POTASSIUM CHLORIDE 50; 1.49; 4.5 G/1000ML; G/1000ML; G/1000ML
INJECTION, SOLUTION INTRAVENOUS CONTINUOUS
Status: DISCONTINUED | OUTPATIENT
Start: 2024-09-18 | End: 2024-09-19

## 2024-09-17 RX ADMIN — ONDANSETRON 4 MG: 2 INJECTION INTRAMUSCULAR; INTRAVENOUS at 03:08

## 2024-09-17 RX ADMIN — ISOSORBIDE DINITRATE 10 MG: 10 TABLET ORAL at 12:27

## 2024-09-17 RX ADMIN — SODIUM CHLORIDE, PRESERVATIVE FREE 10 ML: 5 INJECTION INTRAVENOUS at 23:48

## 2024-09-17 RX ADMIN — SODIUM CHLORIDE, POTASSIUM CHLORIDE, SODIUM LACTATE AND CALCIUM CHLORIDE 800 ML: 600; 310; 30; 20 INJECTION, SOLUTION INTRAVENOUS at 12:05

## 2024-09-17 RX ADMIN — DIGOXIN 62.5 MCG: 125 TABLET ORAL at 08:58

## 2024-09-17 RX ADMIN — DICLOFENAC SODIUM 4 G: 10 GEL TOPICAL at 12:15

## 2024-09-17 RX ADMIN — PIPERACILLIN AND TAZOBACTAM 3375 MG: 3; .375 INJECTION, POWDER, LYOPHILIZED, FOR SOLUTION INTRAVENOUS at 14:42

## 2024-09-17 RX ADMIN — ONDANSETRON 4 MG: 2 INJECTION INTRAMUSCULAR; INTRAVENOUS at 23:20

## 2024-09-17 RX ADMIN — ISOSORBIDE DINITRATE 10 MG: 10 TABLET ORAL at 08:59

## 2024-09-17 RX ADMIN — CARVEDILOL 12.5 MG: 6.25 TABLET, FILM COATED ORAL at 18:03

## 2024-09-17 RX ADMIN — ISOSORBIDE DINITRATE 10 MG: 10 TABLET ORAL at 18:03

## 2024-09-17 RX ADMIN — POTASSIUM CHLORIDE, DEXTROSE MONOHYDRATE AND SODIUM CHLORIDE: 150; 5; 450 INJECTION, SOLUTION INTRAVENOUS at 23:27

## 2024-09-17 RX ADMIN — PIPERACILLIN AND TAZOBACTAM 3375 MG: 3; .375 INJECTION, POWDER, LYOPHILIZED, FOR SOLUTION INTRAVENOUS at 06:41

## 2024-09-17 RX ADMIN — AMLODIPINE BESYLATE 5 MG: 5 TABLET ORAL at 08:59

## 2024-09-17 RX ADMIN — POLYETHYLENE GLYCOL 3350 17 G: 17 POWDER, FOR SOLUTION ORAL at 09:00

## 2024-09-17 RX ADMIN — POTASSIUM CHLORIDE, DEXTROSE MONOHYDRATE AND SODIUM CHLORIDE: 150; 5; 450 INJECTION, SOLUTION INTRAVENOUS at 06:41

## 2024-09-17 RX ADMIN — PIPERACILLIN AND TAZOBACTAM 3375 MG: 3; .375 INJECTION, POWDER, LYOPHILIZED, FOR SOLUTION INTRAVENOUS at 23:28

## 2024-09-17 RX ADMIN — SODIUM PHOSPHATE, MONOBASIC, MONOHYDRATE AND SODIUM PHOSPHATE, DIBASIC, ANHYDROUS 20 MMOL: 142; 276 INJECTION, SOLUTION INTRAVENOUS at 12:19

## 2024-09-17 NOTE — PROGRESS NOTES
[facility] Surgery  DAILY PROGRESS NOTE  9/17/2024  Subjective:  EUS/ERCP today with Dr. Tompkins. NPO for procedure. No acute events overnight. Therapeutic lovenox held for procedure yesterday evening.     No intake/output data recorded.  I/O last 3 completed shifts:  In: 1680 [P.O.:1680]  Out: 200 [Urine:200]    Objective:  /72   Pulse 82   Temp 98.1 °F (36.7 °C) (Temporal)   Resp 16   Ht 1.702 m (5' 7\")   Wt 90.4 kg (199 lb 3.2 oz)   SpO2 94%   BMI 31.20 kg/m²     - General: No acute distress. Awake and conversant.  - CV: Regular rate.  - Respiratory: Respirations are non-labored   - Abdomen: Soft, minimally tender, mildly distended  - Skin: Warm. No rashes or ulcers.  - Extremities: No cyanosis or edema.    Lab Results   Component Value Date    WBC 5.6 09/17/2024    HGB 10.6 (L) 09/17/2024    HCT 32.8 (L) 09/17/2024    MCV 96.5 09/17/2024     09/17/2024     Lab Results   Component Value Date     09/17/2024    K 4.0 09/17/2024     09/17/2024    CO2 23 09/17/2024    BUN 34 (H) 09/17/2024    CREATININE 2.0 (H) 09/17/2024    GLUCOSE 98 09/17/2024    CALCIUM 8.8 09/17/2024    BILITOT 9.7 (H) 09/17/2024    ALKPHOS 173 (H) 09/17/2024    AST 43 (H) 09/17/2024    ALT 94 (H) 09/17/2024    LABGLOM 35 (L) 09/17/2024    GFRAA >60 03/17/2022       ASSESSMENT/PLAN:    69 y.o. male with painless jaundice status post liver biopsy 9/13    -Lovenox held for procedure  - NPO for procedure, okay for Low fat diet after procedure completed if okay with GI  -Appreciate cardiology nephrology recs  -Follow-up liver biopsy pathology, still pending  -MRI showed no apparent choledocholithiasis or mass  - EUS/ERCP 9/17 with Dr. Taylor  -Discussed with Dr. Francine Velasquez DO  General Surgery Resident, PGY-2  Electronically signed by Veronica Velasquez DO on 9/17/2024 at 9:12 AM       Attending Physician Statement:    Chief Complaint:   Chief Complaint   Patient presents with    Pruritis     Pt states full

## 2024-09-17 NOTE — PLAN OF CARE
Problem: ABCDS Injury Assessment  Goal: Absence of physical injury  9/17/2024 1520 by Luana Hsieh RN  Outcome: Progressing  9/17/2024 0135 by Lili Wright, RN  Outcome: Progressing     Problem: Chronic Conditions and Co-morbidities  Goal: Patient's chronic conditions and co-morbidity symptoms are monitored and maintained or improved  9/17/2024 1520 by Luana Hsieh RN  Outcome: Progressing  9/17/2024 0135 by Lili Wright, RN  Outcome: Progressing     Problem: Discharge Planning  Goal: Discharge to home or other facility with appropriate resources  Outcome: Progressing     Problem: Pain  Goal: Verbalizes/displays adequate comfort level or baseline comfort level  9/17/2024 1520 by Luana Hsieh RN  Outcome: Progressing  9/17/2024 0135 by Lili Wright, RN  Outcome: Progressing     Problem: Safety - Adult  Goal: Free from fall injury  9/17/2024 1520 by Luana Hsieh RN  Outcome: Progressing  9/17/2024 0135 by Lili Wright, RN  Outcome: Progressing

## 2024-09-17 NOTE — CARE COORDINATION
Transition of care update: EUS/ERCP cancelled today and rescheduled for tomorrow at 0950. Labs and orders noted. IVFs at 75ml/hr. IV zosyn 3,375mg q 8 hrs. Plan is to return home when medically ready. Cm/sw will follow for discharge needs.

## 2024-09-17 NOTE — PROGRESS NOTES
[facility] Quality Flow/Interdisciplinary Rounds Progress Note        Quality Flow Rounds held on September 17, 2024    Disciplines Attending:  Bedside Nurse, , , and Nursing Unit Leadership    Hong Chase was admitted on 9/11/2024  4:18 PM    Anticipated Discharge Date:       Disposition:    Sea Score:  Sea Scale Score: 20    Readmission Risk              Risk of Unplanned Readmission:  17           Discussed patient goal for the day, patient clinical progression, and barriers to discharge.  The following Goal(s) of the Day/Commitment(s) have been identified:  Diagnostics - Report Results and Labs - Report Results      Luana Hsieh RN  September 17, 2024

## 2024-09-17 NOTE — PROGRESS NOTES
[facility]  MD Bhupendra Avery MD Ali Hassan, MD Lisa Kniska, CNP   Zaira Cardona, LUKE Baeza, CNP  Progress note  Patient's Name: Hong Chase  2:46 PM  9/17/2024    Cr relatively stable  Remain with yellowish discoloration   NAD     9/16: Sitting up in bed. Does have some dyspnea. He is on oxygen via nasal canula. His girlfriend is bringing in his cpap. Appetite is fair. Vital signs are stable. For EUS tomorrow.     9/17: Laying in bed. Denies any acute complaints. Shortness of breath has improved. Concerned that his bilirubin keeps going up. NPO for EUS today.     History of Present Ilness:         Mr. Chase is a pleasant 69-year-old gentleman who presented to the hospital with new onset jaundice.  He also reports that he has noticed itching, poor appetite, fatigue, and weakness over the past 2 days as well.  He reports that his urine has been darker in color and he has noticed a decrease in urinary output.  Workup in the hospital thus far has included an ultrasound of the gallbladder that showed cholelithiasis without evidence of cholecystitis and an MRCP that showed no choledocholithiasis or pancreatic mass.  Hepatobiliary surgery has been consulted and has planned for an interventional radiology transjugular liver biopsy with portal pressures as cardiomegaly and significant intrahepatic venous dilation were noted on the MRI.  His past medical history is significant for atrial fibrillation, aortic dissection, coronary artery disease, congestive heart failure, hypertension, ischemic cardiomyopathy, pericardial effusion, sarcoidosis, and valvular heart disease.         We were consulted for acute on chronic kidney injury.  He denies a previous history of chronic kidney disease and does not follow longitudinally with a nephrologist.  He tells me that his PCP, Dr. Chase, whom is his nephew follows his laboratory studies.  His kidney function has been    Component Value Date/Time     09/17/2024 06:02 AM    K 4.0 09/17/2024 06:02 AM    K 4.3 03/05/2021 06:22 AM     09/17/2024 06:02 AM    CO2 23 09/17/2024 06:02 AM    BUN 34 09/17/2024 06:02 AM    CREATININE 2.0 09/17/2024 06:02 AM    GFRAA >60 03/17/2022 10:44 AM    LABGLOM 35 09/17/2024 06:02 AM    LABGLOM 38 12/12/2023 10:25 AM    GLUCOSE 98 09/17/2024 06:02 AM    GLUCOSE 110 12/05/2022 09:26 AM    CALCIUM 8.8 09/17/2024 06:02 AM    BILITOT 9.7 09/17/2024 06:02 AM    ALKPHOS 173 09/17/2024 06:02 AM    AST 43 09/17/2024 06:02 AM    ALT 94 09/17/2024 06:02 AM     Ionized Calcium:  No components found for: \"IONCA\"  Magnesium:    Lab Results   Component Value Date/Time    MG 2.1 09/13/2024 06:00 AM     Phosphorus:    Lab Results   Component Value Date/Time    PHOS 3.2 09/17/2024 06:02 AM     U/A:    Lab Results   Component Value Date/Time    COLORU Yellow 09/12/2024 11:15 PM    PHUR 5.5 09/12/2024 11:15 PM    WBCUA 0 TO 5 09/12/2024 11:15 PM    RBCUA 0 TO 2 09/12/2024 11:15 PM    BACTERIA TRACE 09/12/2024 11:15 PM    LEUKOCYTESUR NEGATIVE 09/12/2024 11:15 PM    UROBILINOGEN 0.2 09/12/2024 11:15 PM    BILIRUBINUR SMALL 09/12/2024 11:15 PM    GLUCOSEU NEGATIVE 09/12/2024 11:15 PM     Microalbumen/Creatinine ratio:  No components found for: \"RUCREAT\"  Iron Saturation:  No components found for: \"PERCENTFE\"  TIBC:    Lab Results   Component Value Date/Time    TIBC 237 09/13/2024 06:00 AM     FERRITIN:    Lab Results   Component Value Date/Time    FERRITIN 159 09/13/2024 06:00 AM        Imaging:  US RETROPERITONEAL COMPLETE   Final Result   No obstruction or acute findings in the kidneys.  Borderline increased renal   echogenicity consistent with medical renal disease.  Small benign renal cysts.         IR TRANSCATHETER BIOPSY   Final Result   1. Successful hepatic vein pressures as above with a portal/systemic gradient   of 7 mm Hg.   2. Successful uncomplicated transjugular liver core biopsy.         IR

## 2024-09-17 NOTE — PROGRESS NOTES
HOSPITALIST PROGRESS NOTES     ADMITTING DATE AND TIME: 2024  4:18 PM  had concerns including Pruritis (Pt states full body itching began 2 days ago), pt states his urine is darker than normal. Pt denies buring , and Fatigue (Began 2 days ago).    ADMIT DX: Jaundice [R17]  Jaundice, non- [R17]  Biliary calculus of other site with obstruction [K80.81]      SUBJECTIVE:  Patient is being followed for Jaundice [R17]  Jaundice, non- [R17]  Biliary calculus of other site with obstruction [K80.81]     Patient was seen examined and evaluated  Recent lab results, charts and pertinent diagnostic imaging reviewed   Ancillary service notes reviewed   C/o RUQ   Discussed with his brother      Care reviewed with nursing staff, medical and surgical specialty care, primary care and the patient's family as available.   ROS: denies fever, chills, cp, sob, n/v, HA unless stated above     sodium chloride flush  5-40 mL IntraVENous 2 times per day    lactated ringers  800 mL IntraVENous Once    indomethacin  100 mg Rectal Once    sodium phosphate IVPB (PERIPHERAL line)  20 mmol IntraVENous Once    piperacillin-tazobactam  3,375 mg IntraVENous Q8H    [Held by provider] losartan  100 mg Oral Daily    isosorbide dinitrate  10 mg Oral TID    [Held by provider] hydroCHLOROthiazide  25 mg Oral Daily    amLODIPine  5 mg Oral Daily    carvedilol  12.5 mg Oral BID WC    digoxin  62.5 mcg Oral Daily    senna  1 tablet Oral Nightly    [Held by provider] enoxaparin  1 mg/kg SubCUTAneous BID    sodium chloride flush  10 mL IntraVENous 2 times per day    polyethylene glycol  17 g Oral Daily     sodium chloride flush, 5-40 mL, PRN  sodium chloride, 25 mL, PRN  diclofenac sodium, 4 g, 4x Daily PRN  simethicone, 40 mg, Q6H PRN  perflutren lipid microspheres, 1.5 mL, ONCE PRN  sodium chloride flush, 10 mL,

## 2024-09-17 NOTE — PLAN OF CARE
Problem: ABCDS Injury Assessment  Goal: Absence of physical injury  Outcome: Progressing     Problem: Chronic Conditions and Co-morbidities  Goal: Patient's chronic conditions and co-morbidity symptoms are monitored and maintained or improved  Outcome: Progressing     Problem: Pain  Goal: Verbalizes/displays adequate comfort level or baseline comfort level  Outcome: Progressing     Problem: Safety - Adult  Goal: Free from fall injury  Outcome: Progressing

## 2024-09-18 ENCOUNTER — ANESTHESIA (OUTPATIENT)
Dept: ENDOSCOPY | Age: 69
End: 2024-09-18
Payer: MEDICARE

## 2024-09-18 ENCOUNTER — APPOINTMENT (OUTPATIENT)
Dept: GENERAL RADIOLOGY | Age: 69
DRG: 197 | End: 2024-09-18
Payer: MEDICARE

## 2024-09-18 LAB
ALBUMIN SERPL-MCNC: 3.8 G/DL (ref 3.5–5.2)
ALP SERPL-CCNC: 151 U/L (ref 40–129)
ALT SERPL-CCNC: 67 U/L (ref 0–40)
ANION GAP SERPL CALCULATED.3IONS-SCNC: 13 MMOL/L (ref 7–16)
AST SERPL-CCNC: 29 U/L (ref 0–39)
BASOPHILS # BLD: 0 K/UL (ref 0–0.2)
BASOPHILS NFR BLD: 0 % (ref 0–2)
BILIRUB SERPL-MCNC: 11.5 MG/DL (ref 0–1.2)
BUN SERPL-MCNC: 31 MG/DL (ref 6–23)
CALCIUM SERPL-MCNC: 8.7 MG/DL (ref 8.6–10.2)
CHLORIDE SERPL-SCNC: 100 MMOL/L (ref 98–107)
CO2 SERPL-SCNC: 23 MMOL/L (ref 22–29)
CREAT SERPL-MCNC: 1.9 MG/DL (ref 0.7–1.2)
EOSINOPHIL # BLD: 0.15 K/UL (ref 0.05–0.5)
EOSINOPHILS RELATIVE PERCENT: 3 % (ref 0–6)
ERYTHROCYTE [DISTWIDTH] IN BLOOD BY AUTOMATED COUNT: 16.2 % (ref 11.5–15)
GFR, ESTIMATED: 37 ML/MIN/1.73M2
GLUCOSE SERPL-MCNC: 124 MG/DL (ref 74–99)
HCT VFR BLD AUTO: 31.2 % (ref 37–54)
HGB BLD-MCNC: 10.2 G/DL (ref 12.5–16.5)
LIPASE SERPL-CCNC: 30 U/L (ref 13–60)
LYMPHOCYTES NFR BLD: 0.1 K/UL (ref 1.5–4)
LYMPHOCYTES RELATIVE PERCENT: 2 % (ref 20–42)
MCH RBC QN AUTO: 31.5 PG (ref 26–35)
MCHC RBC AUTO-ENTMCNC: 32.7 G/DL (ref 32–34.5)
MCV RBC AUTO: 96.3 FL (ref 80–99.9)
METAMYELOCYTES ABSOLUTE COUNT: 0.05 K/UL (ref 0–0.12)
METAMYELOCYTES: 1 % (ref 0–1)
MONOCYTES NFR BLD: 0.59 K/UL (ref 0.1–0.95)
MONOCYTES NFR BLD: 10 % (ref 2–12)
NEUTROPHILS NFR BLD: 84 % (ref 43–80)
NEUTS SEG NFR BLD: 4.81 K/UL (ref 1.8–7.3)
PHOSPHATE SERPL-MCNC: 3.2 MG/DL (ref 2.5–4.5)
PLATELET, FLUORESCENCE: 185 K/UL (ref 130–450)
PMV BLD AUTO: 10.3 FL (ref 7–12)
POTASSIUM SERPL-SCNC: 3.9 MMOL/L (ref 3.5–5)
PROT SERPL-MCNC: 6.5 G/DL (ref 6.4–8.3)
RBC # BLD AUTO: 3.24 M/UL (ref 3.8–5.8)
RBC # BLD: ABNORMAL 10*6/UL
SODIUM SERPL-SCNC: 136 MMOL/L (ref 132–146)
WBC OTHER # BLD: 5.7 K/UL (ref 4.5–11.5)

## 2024-09-18 PROCEDURE — 94660 CPAP INITIATION&MGMT: CPT

## 2024-09-18 PROCEDURE — 6360000002 HC RX W HCPCS

## 2024-09-18 PROCEDURE — 82785 ASSAY OF IGE: CPT

## 2024-09-18 PROCEDURE — 2709999900 HC NON-CHARGEABLE SUPPLY: Performed by: INTERNAL MEDICINE

## 2024-09-18 PROCEDURE — 0DB98ZX EXCISION OF DUODENUM, VIA NATURAL OR ARTIFICIAL OPENING ENDOSCOPIC, DIAGNOSTIC: ICD-10-PCS | Performed by: INTERNAL MEDICINE

## 2024-09-18 PROCEDURE — 3609015000 HC ERCP REMOVE FOREIGN BODY/STENT BILIARY/PANC DUCT: Performed by: INTERNAL MEDICINE

## 2024-09-18 PROCEDURE — 2580000003 HC RX 258: Performed by: NURSE PRACTITIONER

## 2024-09-18 PROCEDURE — 7100000000 HC PACU RECOVERY - FIRST 15 MIN: Performed by: INTERNAL MEDICINE

## 2024-09-18 PROCEDURE — 3609014900 HC ERCP W/SPHINCTEROTOMY &/OR PAPILLOTOMY: Performed by: INTERNAL MEDICINE

## 2024-09-18 PROCEDURE — 3609012400 HC EGD TRANSORAL BIOPSY SINGLE/MULTIPLE: Performed by: INTERNAL MEDICINE

## 2024-09-18 PROCEDURE — 6360000002 HC RX W HCPCS: Performed by: INTERNAL MEDICINE

## 2024-09-18 PROCEDURE — C1713 ANCHOR/SCREW BN/BN,TIS/BN: HCPCS | Performed by: INTERNAL MEDICINE

## 2024-09-18 PROCEDURE — 0F798ZZ DILATION OF COMMON BILE DUCT, VIA NATURAL OR ARTIFICIAL OPENING ENDOSCOPIC: ICD-10-PCS | Performed by: INTERNAL MEDICINE

## 2024-09-18 PROCEDURE — 88305 TISSUE EXAM BY PATHOLOGIST: CPT

## 2024-09-18 PROCEDURE — 80053 COMPREHEN METABOLIC PANEL: CPT

## 2024-09-18 PROCEDURE — 2700000000 HC OXYGEN THERAPY PER DAY

## 2024-09-18 PROCEDURE — 88342 IMHCHEM/IMCYTCHM 1ST ANTB: CPT

## 2024-09-18 PROCEDURE — 87799 DETECT AGENT NOS DNA QUANT: CPT

## 2024-09-18 PROCEDURE — 2140000000 HC CCU INTERMEDIATE R&B

## 2024-09-18 PROCEDURE — 3700000001 HC ADD 15 MINUTES (ANESTHESIA): Performed by: INTERNAL MEDICINE

## 2024-09-18 PROCEDURE — C1769 GUIDE WIRE: HCPCS | Performed by: INTERNAL MEDICINE

## 2024-09-18 PROCEDURE — 6370000000 HC RX 637 (ALT 250 FOR IP)

## 2024-09-18 PROCEDURE — 99231 SBSQ HOSP IP/OBS SF/LOW 25: CPT | Performed by: INTERNAL MEDICINE

## 2024-09-18 PROCEDURE — 2580000003 HC RX 258: Performed by: INTERNAL MEDICINE

## 2024-09-18 PROCEDURE — 83690 ASSAY OF LIPASE: CPT

## 2024-09-18 PROCEDURE — 86790 VIRUS ANTIBODY NOS: CPT

## 2024-09-18 PROCEDURE — 36415 COLL VENOUS BLD VENIPUNCTURE: CPT

## 2024-09-18 PROCEDURE — 85025 COMPLETE CBC W/AUTO DIFF WBC: CPT

## 2024-09-18 PROCEDURE — 3609020800 HC EGD W/EUS FNA: Performed by: INTERNAL MEDICINE

## 2024-09-18 PROCEDURE — 88112 CYTOPATH CELL ENHANCE TECH: CPT

## 2024-09-18 PROCEDURE — 2580000003 HC RX 258: Performed by: NURSE ANESTHETIST, CERTIFIED REGISTERED

## 2024-09-18 PROCEDURE — 3700000000 HC ANESTHESIA ATTENDED CARE: Performed by: INTERNAL MEDICINE

## 2024-09-18 PROCEDURE — 74330 X-RAY BILE/PANC ENDOSCOPY: CPT

## 2024-09-18 PROCEDURE — 2580000003 HC RX 258

## 2024-09-18 PROCEDURE — 6370000000 HC RX 637 (ALT 250 FOR IP): Performed by: TRANSPLANT SURGERY

## 2024-09-18 PROCEDURE — 0DB68ZX EXCISION OF STOMACH, VIA NATURAL OR ARTIFICIAL OPENING ENDOSCOPIC, DIAGNOSTIC: ICD-10-PCS | Performed by: INTERNAL MEDICINE

## 2024-09-18 PROCEDURE — 2720000010 HC SURG SUPPLY STERILE: Performed by: INTERNAL MEDICINE

## 2024-09-18 PROCEDURE — 84100 ASSAY OF PHOSPHORUS: CPT

## 2024-09-18 PROCEDURE — 6370000000 HC RX 637 (ALT 250 FOR IP): Performed by: NURSE PRACTITIONER

## 2024-09-18 PROCEDURE — 7100000001 HC PACU RECOVERY - ADDTL 15 MIN: Performed by: INTERNAL MEDICINE

## 2024-09-18 PROCEDURE — C2617 STENT, NON-COR, TEM W/O DEL: HCPCS | Performed by: INTERNAL MEDICINE

## 2024-09-18 PROCEDURE — 6360000004 HC RX CONTRAST MEDICATION: Performed by: INTERNAL MEDICINE

## 2024-09-18 PROCEDURE — 3609018800 HC ERCP DX COLLECTION SPECIMEN BRUSHING/WASHING: Performed by: INTERNAL MEDICINE

## 2024-09-18 PROCEDURE — 2500000003 HC RX 250 WO HCPCS

## 2024-09-18 PROCEDURE — C2625 STENT, NON-COR, TEM W/DEL SY: HCPCS | Performed by: INTERNAL MEDICINE

## 2024-09-18 PROCEDURE — 82784 ASSAY IGA/IGD/IGG/IGM EACH: CPT

## 2024-09-18 PROCEDURE — 6360000002 HC RX W HCPCS: Performed by: NURSE ANESTHETIST, CERTIFIED REGISTERED

## 2024-09-18 PROCEDURE — C1753 CATH, INTRAVAS ULTRASOUND: HCPCS | Performed by: INTERNAL MEDICINE

## 2024-09-18 PROCEDURE — 3609015100 HC ERCP STENT PLACEMENT BILIARY/PANCREATIC DUCT: Performed by: INTERNAL MEDICINE

## 2024-09-18 DEVICE — PANCREATIC STENT
Type: IMPLANTABLE DEVICE | Site: PANCREAS | Status: FUNCTIONAL
Brand: ADVANIX™ PANCREATIC STENT

## 2024-09-18 DEVICE — CLIP HEMOSTATIC REPOSITIONABLE 235 CMX11 MM DURACLIP: Type: IMPLANTABLE DEVICE | Site: DUODENUM | Status: FUNCTIONAL

## 2024-09-18 RX ORDER — IOPAMIDOL 612 MG/ML
INJECTION, SOLUTION INTRAVASCULAR PRN
Status: DISCONTINUED | OUTPATIENT
Start: 2024-09-18 | End: 2024-09-18 | Stop reason: ALTCHOICE

## 2024-09-18 RX ORDER — GLYCOPYRROLATE 0.2 MG/ML
INJECTION INTRAMUSCULAR; INTRAVENOUS
Status: DISCONTINUED | OUTPATIENT
Start: 2024-09-18 | End: 2024-09-18 | Stop reason: SDUPTHER

## 2024-09-18 RX ORDER — DEXAMETHASONE SODIUM PHOSPHATE 10 MG/ML
INJECTION INTRAMUSCULAR; INTRAVENOUS
Status: DISCONTINUED | OUTPATIENT
Start: 2024-09-18 | End: 2024-09-18

## 2024-09-18 RX ORDER — DEXAMETHASONE SODIUM PHOSPHATE 10 MG/ML
INJECTION INTRAMUSCULAR; INTRAVENOUS
Status: DISCONTINUED | OUTPATIENT
Start: 2024-09-18 | End: 2024-09-18 | Stop reason: SDUPTHER

## 2024-09-18 RX ORDER — ONDANSETRON 2 MG/ML
INJECTION INTRAMUSCULAR; INTRAVENOUS
Status: DISCONTINUED | OUTPATIENT
Start: 2024-09-18 | End: 2024-09-18 | Stop reason: SDUPTHER

## 2024-09-18 RX ORDER — SUCCINYLCHOLINE CHLORIDE 20 MG/ML
INJECTION INTRAMUSCULAR; INTRAVENOUS
Status: DISCONTINUED | OUTPATIENT
Start: 2024-09-18 | End: 2024-09-18 | Stop reason: SDUPTHER

## 2024-09-18 RX ORDER — PROPOFOL 10 MG/ML
INJECTION, EMULSION INTRAVENOUS
Status: DISCONTINUED | OUTPATIENT
Start: 2024-09-18 | End: 2024-09-18 | Stop reason: SDUPTHER

## 2024-09-18 RX ORDER — LIDOCAINE HYDROCHLORIDE 20 MG/ML
INJECTION, SOLUTION INTRAVENOUS
Status: DISCONTINUED | OUTPATIENT
Start: 2024-09-18 | End: 2024-09-18 | Stop reason: SDUPTHER

## 2024-09-18 RX ORDER — SODIUM CHLORIDE 9 MG/ML
INJECTION, SOLUTION INTRAVENOUS
Status: DISCONTINUED | OUTPATIENT
Start: 2024-09-18 | End: 2024-09-18 | Stop reason: SDUPTHER

## 2024-09-18 RX ORDER — PHENYLEPHRINE HCL IN 0.9% NACL 1 MG/10 ML
SYRINGE (ML) INTRAVENOUS
Status: DISCONTINUED | OUTPATIENT
Start: 2024-09-18 | End: 2024-09-18 | Stop reason: SDUPTHER

## 2024-09-18 RX ADMIN — Medication 100 MCG: at 11:53

## 2024-09-18 RX ADMIN — PROPOFOL 100 MG: 10 INJECTION, EMULSION INTRAVENOUS at 10:38

## 2024-09-18 RX ADMIN — Medication 100 MCG: at 11:12

## 2024-09-18 RX ADMIN — SUCCINYLCHOLINE CHLORIDE 120 MG: 20 INJECTION, SOLUTION INTRAMUSCULAR; INTRAVENOUS at 10:38

## 2024-09-18 RX ADMIN — DEXAMETHASONE SODIUM PHOSPHATE 10 MG: 10 INJECTION INTRAMUSCULAR; INTRAVENOUS at 10:50

## 2024-09-18 RX ADMIN — PROPOFOL 50 MG: 10 INJECTION, EMULSION INTRAVENOUS at 10:30

## 2024-09-18 RX ADMIN — PROPOFOL 20 MG: 10 INJECTION, EMULSION INTRAVENOUS at 10:27

## 2024-09-18 RX ADMIN — SODIUM CHLORIDE: 9 INJECTION, SOLUTION INTRAVENOUS at 10:25

## 2024-09-18 RX ADMIN — POTASSIUM CHLORIDE, DEXTROSE MONOHYDRATE AND SODIUM CHLORIDE: 150; 5; 450 INJECTION, SOLUTION INTRAVENOUS at 17:29

## 2024-09-18 RX ADMIN — Medication 0.5 MG: at 12:15

## 2024-09-18 RX ADMIN — SODIUM CHLORIDE, POTASSIUM CHLORIDE, SODIUM LACTATE AND CALCIUM CHLORIDE 800 ML: 600; 310; 30; 20 INJECTION, SOLUTION INTRAVENOUS at 08:24

## 2024-09-18 RX ADMIN — PROPOFOL 50 MG: 10 INJECTION, EMULSION INTRAVENOUS at 10:52

## 2024-09-18 RX ADMIN — ISOSORBIDE DINITRATE 10 MG: 10 TABLET ORAL at 17:25

## 2024-09-18 RX ADMIN — ISOSORBIDE DINITRATE 10 MG: 10 TABLET ORAL at 08:10

## 2024-09-18 RX ADMIN — Medication 100 MCG: at 11:21

## 2024-09-18 RX ADMIN — PIPERACILLIN AND TAZOBACTAM 3375 MG: 3; .375 INJECTION, POWDER, LYOPHILIZED, FOR SOLUTION INTRAVENOUS at 19:00

## 2024-09-18 RX ADMIN — Medication 200 MCG: at 12:05

## 2024-09-18 RX ADMIN — SODIUM CHLORIDE, PRESERVATIVE FREE 10 ML: 5 INJECTION INTRAVENOUS at 08:20

## 2024-09-18 RX ADMIN — AMLODIPINE BESYLATE 5 MG: 5 TABLET ORAL at 08:10

## 2024-09-18 RX ADMIN — PROPOFOL 80 MG: 10 INJECTION, EMULSION INTRAVENOUS at 10:25

## 2024-09-18 RX ADMIN — GLYCOPYRROLATE 0.2 MG: 0.2 INJECTION INTRAMUSCULAR; INTRAVENOUS at 12:32

## 2024-09-18 RX ADMIN — PIPERACILLIN AND TAZOBACTAM 3375 MG: 3; .375 INJECTION, POWDER, LYOPHILIZED, FOR SOLUTION INTRAVENOUS at 06:12

## 2024-09-18 RX ADMIN — ENOXAPARIN SODIUM 90 MG: 100 INJECTION SUBCUTANEOUS at 20:55

## 2024-09-18 RX ADMIN — Medication 100 MG: at 08:14

## 2024-09-18 RX ADMIN — ONDANSETRON 4 MG: 2 INJECTION INTRAMUSCULAR; INTRAVENOUS at 12:34

## 2024-09-18 RX ADMIN — SENNOSIDES 8.6 MG: 8.6 TABLET, FILM COATED ORAL at 20:55

## 2024-09-18 RX ADMIN — PROPOFOL 50 MG: 10 INJECTION, EMULSION INTRAVENOUS at 10:33

## 2024-09-18 RX ADMIN — CARVEDILOL 12.5 MG: 6.25 TABLET, FILM COATED ORAL at 08:11

## 2024-09-18 RX ADMIN — SODIUM CHLORIDE, PRESERVATIVE FREE 10 ML: 5 INJECTION INTRAVENOUS at 20:55

## 2024-09-18 RX ADMIN — Medication 0.5 MG: at 11:44

## 2024-09-18 RX ADMIN — LIDOCAINE HYDROCHLORIDE 100 MG: 20 INJECTION, SOLUTION INTRAVENOUS at 10:25

## 2024-09-18 RX ADMIN — WATER 40 MG: 1 INJECTION INTRAMUSCULAR; INTRAVENOUS; SUBCUTANEOUS at 17:16

## 2024-09-18 RX ADMIN — CARVEDILOL 12.5 MG: 6.25 TABLET, FILM COATED ORAL at 17:25

## 2024-09-18 RX ADMIN — DIGOXIN 62.5 MCG: 125 TABLET ORAL at 08:12

## 2024-09-18 ASSESSMENT — PAIN SCALES - GENERAL: PAINLEVEL_OUTOF10: 0

## 2024-09-18 ASSESSMENT — PAIN - FUNCTIONAL ASSESSMENT: PAIN_FUNCTIONAL_ASSESSMENT: NONE - DENIES PAIN

## 2024-09-18 NOTE — PROGRESS NOTES
Comprehensive Nutrition Assessment    Type and Reason for Visit:  Initial (LOS)    Nutrition Recommendations/Plan:   Advance diet when medically appropriate.    When diet advances, will recommend and start appropriate nutritional supplementation to help meet nutritional needs and d/t decreased po intake of meals.             Malnutrition Assessment:  Malnutrition Status:  At risk for malnutrition (Comment) (09/18/24 1028)    Context:  Acute Illness     Findings of the 6 clinical characteristics of malnutrition:  Energy Intake:  50% or less of estimated energy requirements for 5 or more days  Weight Loss:  Unable to assess (d/t limited actual weight history)     Body Fat Loss:  Unable to assess (pt off floor at this time)     Muscle Mass Loss:  Unable to assess (pt off floor at this time)    Fluid Accumulation:  No significant fluid accumulation     Strength:  Not Performed    Nutrition Assessment:    Patient is currently NPO for planned EUS/ERCP ; adm w/ itching and jaundice ; noted biliary calculus ; s/p liver biopsy on 9/13 ; MRI showed no choledocholithiasis or mass ; JASS on CKD ; hx of CAD/CHF/CKD/cardiomyopathy/sarcoidosis ; hx of aortic dissection - s/p repair 2019 at [facility] ; noted decreased po intake/appetite PTA 2/2 abd pain ;  will provide recommendations    Nutrition Related Findings:    +I&Os (+3.3 L), no edema, jaundice, nausea, active BS, cramping, bloating, tenderness to abd, hyperbilirubinemia, elevated LFT's ; Wound Type: None       Current Nutrition Intake & Therapies:    Average Meal Intake: NPO, 26-50%     Diet NPO Exceptions are: Sips of Water with Meds    Anthropometric Measures:  Height: 170.2 cm (5' 7\")  Ideal Body Weight (IBW): 148 lbs (67 kg)    Admission Body Weight: 91.6 kg (202 lb) (9/12, standing scale)  Current Body Weight: 92.1 kg (203 lb) (9/18, standing scale), 137.2 % IBW. Weight Source: Standing Scale  Current BMI (kg/m2): 31.8  Usual Body Weight:  (UTO ; EMR shows past weight of  196# actual on 8/8/24)                       BMI Categories: Obese Class 1 (BMI 30.0-34.9)    Estimated Daily Nutrient Needs:  Energy Requirements Based On: Formula  Weight Used for Energy Requirements: Current  Energy (kcal/day): 9682-3978 (REE 1636 x 1.1 SF)  Weight Used for Protein Requirements: Ideal  Protein (g/day): 80-95 (1.2-1.4g/kg IBW ; noted elevated LFT's)  Method Used for Fluid Requirements: 1 ml/kcal  Fluid (ml/day): 2198-0239    Nutrition Diagnosis:   Inadequate oral intake related to inadequate protein-energy intake (2/2 abd pain and nausea) as evidenced by NPO or clear liquid status due to medical condition, poor intake prior to admission, GI abnormality, nausea    Nutrition Interventions:   Food and/or Nutrient Delivery: Continue NPO  Nutrition Education/Counseling: Education not indicated  Coordination of Nutrition Care: Continue to monitor while inpatient       Goals:  Previous Goal Met: Progressing toward Goal(s)  Goals: Initiate PO diet, by next RD assessment       Nutrition Monitoring and Evaluation:   Behavioral-Environmental Outcomes: None Identified  Food/Nutrient Intake Outcomes: Diet Advancement/Tolerance  Physical Signs/Symptoms Outcomes: Biochemical Data, Chewing or Swallowing, GI Status, Nausea or Vomiting, Fluid Status or Edema, Hemodynamic Status, Meal Time Behavior, Weight, Skin, Nutrition Focused Physical Findings    Discharge Planning:    Too soon to determine     Jorge Means RD, LD  Contact: [phone]

## 2024-09-18 NOTE — PROGRESS NOTES
Floor Called, nurse to nurse given. Spoke with recieving RN . Patients test results review, VS reported to receiving nurse. Any and all important information regarding patient disclosed.

## 2024-09-18 NOTE — PROGRESS NOTES
[facility]  MD Bhupendra Avery MD Ali Hassan, MD Lisa Kniska, CNP   Zaira Cardona, LUKE Baeza, CNP  Progress note  Patient's Name: Hong Chase  3:12 PM  9/18/2024    Cr relatively stable  Remain with yellowish discoloration   NAD     9/16: Sitting up in bed. Does have some dyspnea. He is on oxygen via nasal canula. His girlfriend is bringing in his cpap. Appetite is fair. Vital signs are stable. For EUS tomorrow.     9/17: Laying in bed. Denies any acute complaints. Shortness of breath has improved. Concerned that his bilirubin keeps going up. NPO for EUS today.     9/18:  Not in room as he is having a colonoscopy.    History of Present Ilness:         Mr. Chase is a pleasant 69-year-old gentleman who presented to the hospital with new onset jaundice.  He also reports that he has noticed itching, poor appetite, fatigue, and weakness over the past 2 days as well.  He reports that his urine has been darker in color and he has noticed a decrease in urinary output.  Workup in the hospital thus far has included an ultrasound of the gallbladder that showed cholelithiasis without evidence of cholecystitis and an MRCP that showed no choledocholithiasis or pancreatic mass.  Hepatobiliary surgery has been consulted and has planned for an interventional radiology transjugular liver biopsy with portal pressures as cardiomegaly and significant intrahepatic venous dilation were noted on the MRI.  His past medical history is significant for atrial fibrillation, aortic dissection, coronary artery disease, congestive heart failure, hypertension, ischemic cardiomyopathy, pericardial effusion, sarcoidosis, and valvular heart disease.         We were consulted for acute on chronic kidney injury.  He denies a previous history of chronic kidney disease and does not follow longitudinally with a nephrologist.  He tells me that his PCP, Dr. Chase, whom is his nephew follows his  [facility], mildly dilated LV, mod concentric LVH, normal LV systolic function, mod dilated left atrium, trace MR    Hypertension     Ischemic cardiomyopathy     Obesity     Pericardial effusion (noninflammatory) 11/15/2019    History: Post-op problem  Assessment: S/P pericardial drain placement in CVICU 12/2 Plan:  Drain dc'ed, no need for post-procedure echo unless pt become symptomatic.    Pleural effusion 11/26/2019    History: Post op problem Assessment: On r/a, left pigtail dc'ed Plan: PO lasix.  Denies sob.  Desat study done, no home O2 needed    Sarcoidosis     Valvular heart disease        Past Surgical History:   Procedure Laterality Date    COLONOSCOPY N/A 11/23/2021    COLONOSCOPY POLYPECTOMY SNARE/COLD BIOPSY performed by Rubens Padilla MD at [facility] ENDOSCOPY    IR TRANSCATHETER BIOPSY  9/13/2024    IR TRANSCATHETER BIOPSY 9/13/2024 Sung Silveira MD [facility] SPECIAL PROCEDURES    OTHER SURGICAL HISTORY  11/15/2019    aortic dissection repair @ [facility]    TONSILLECTOMY      UPPER GASTROINTESTINAL ENDOSCOPY N/A 3/5/2021    EGD CONTROL HEMORRHAGE performed by Rubens Padilla MD at [facility] ENDOSCOPY    UPPER GASTROINTESTINAL ENDOSCOPY N/A 4/19/2021    EGD ESOPHAGOGASTRODUODENOSCOPY performed by Rubens Padilla MD at [facility] ENDOSCOPY    UPPER GASTROINTESTINAL ENDOSCOPY N/A 11/23/2021    EGD DIAGNOSTIC ONLY performed by Rubens Padilla MD at [facility] ENDOSCOPY       Allergies:  Patient has no known allergies.    Current Medications:    methylPREDNISolone sodium succ (SOLU-MEDROL) 40 mg in sterile water 1 mL injection, Daily  diclofenac sodium (VOLTAREN) 1 % gel 4 g, 4x Daily PRN  dextrose 5 % and 0.45 % NaCl with KCl 20 mEq infusion, Continuous  piperacillin-tazobactam (ZOSYN) 3,375 mg in sodium chloride 0.9 % 50 mL IVPB ([id number]), Q8H  simethicone (MYLICON) chewable tablet 40 mg, Q6H PRN  [Held by provider] losartan (COZAAR) tablet 100 mg, Daily  isosorbide dinitrate (ISORDIL) tablet 10 mg, TID  [Held by provider]

## 2024-09-18 NOTE — PROCEDURES
Operative Note        Procedure(s):  EGD ESOPHAGOGASTRODUODENOSCOPY ULTRASOUND  EGD BIOPSY       Detailed Description of Procedure:   Pre-Anesthesia Assessment:  Prior to the procedure, a history and physical was performed and patient medications and allergies were reviewed.  The risks, benefits, complications, treatment options and expected outcomes were discussed with the patient.  The possibilities of reaction to medication, pulmonary aspiration, perforation of the gastrointestinal tract, bleeding requiring transfusion or operation, respiratory failure requiring placement on a ventilator, failure to diagnose a condition or identify polyps/lesions, and acute pancreatitis with potential need for prolonged hospitalization were discussed with the patient. All questions were answered and informed consent was obtained.      Patient identification and proposed procedure were verified by the physician, the nurse, the anesthesiologist, the anesthetist, and the technician in the preprocedure area. Please see accompanying documentation.  After informed consent was obtained, the scope was passed under direct vision. The gastroscope/echoendoscope was introduced through the mouth and advanced to the duodenum performing a visual inspection of mucosa, then the ultrasound examination was performed. Once finished CO2 was suctioned as best as possible and the the scope was withdrawn from the patient.  The procedure was performed without difficulty. The patient tolerated the procedure well.      EGD:  Normal esophagus  Stomach with multiple small erosions in antrum and body, bx taken  Duodenum with multiple ~2mm erosion, small duodenal diverticulum, normal appearing papilla        EUS:     The region of the celiac plexus and celiac ganglia was visualized and showed no sign of significant endosonographic abnormality.  The vascular anatomy of the region was normal.     The pancreas was somewhat difficult to visualize, echogencity was

## 2024-09-18 NOTE — PROGRESS NOTES
Patient transferred to floor on bed, 4lnc and telemetry monitor in stable condition with ancillary staff.

## 2024-09-18 NOTE — PROCEDURES
PROCEDURE PERFORMED:  ERCP with any interventions as indicated     PREOPERATIVE DIAGNOSES:  Elevated liver chemistries      ANESTHESIA:  general      DESCRIPTION OF PROCEDURE:  With the patient in supine position, the Olympus side-viewing video duodenoscope was introduced into the esophagus, advanced through the GE junction into the gastric body, advanced through the pylorus into duodenal bulb, second portion of duodenum, the ampulla was visualized with a small duodenal diverticulum. Procedure required long position secondary to significant looping. To expose papilla, conmed clip was utilized. The conmed sphinctertome over the 035 guide wire was then utilized in attempt to gain biliary access. The MPD was cannulated and subsequently injected confirming EUS findings of non-dilated duct with no appreciable pathology. A MPD stent was then placed 5F x 7cm, plastic leading rosa. The sphinctertome was then reloaded and the common bile duct was cannulated with the 035 guide wire. A cholangiogram was performed and appeared normal, ducts appeared thin, with no obvious stricture or dilation. The cystic duct did fill, albeit sluggish. No appreciable biliary stones stones were observed. A sphincterotomy was then performed with a 9-12mm extraction balloon to remove sludge. Biliary brushings were also then performed.  An occlusion cholangiogram was then performed after removal of stones and there did not appear to be any residual stones. To facilitate flow of bile a 7F x 7cm plastic leading rosa single pigtail stent was placed. Flow of bile and contrast was observed from the stent. Pictures were taken.  The scope was retrieved, area decompressed, and the scope was withdrawn  The patient tolerated the procedure well.    Blood loss was minimal and self limited    Assessment:  1) Biliary sludge - s/p sphincterotomy with balloon sweeps, leading rosa plastic 7Fx7cm single pigtail stent,  2) Non dilated MPD and CBD  3) Patent cystic  duct demonstrated by GB filling which appeared, mildly contracted.   4) Biliary brushings performed of CBD, although no obvious stone  5) Incidental cannulation of MPD, leading rosa PD stent 5F x 7cm   6) Small duodenal diverticulum    Plan:  1) NPO for 2 hours if no signs of PEP ok to advance diet to CL and then as tolerated  2) OK to resume medications, including anticoagulation from a GI POV  3) Continue to trend labs, await brushings and liver bx.   4) He will require repeat ERCP with stent removal (~4-8wks), I would consider repeat EUS at that time as well.    5) GI will continue to follow whilst in the hospital, Please call the service if any questions or concerns.     Assessment and plan was discussed with his girlfriend, Felicitas.

## 2024-09-18 NOTE — PROGRESS NOTES
Notified attending via [place] regarding patient c/o symptoms of gout in his R ankle/toe and L toe.

## 2024-09-18 NOTE — PROGRESS NOTES
[facility]  DAILY PROGRESS NOTE  9/18/2024  Subjective:  EUS/ERCP today with Dr. Tompkins. NPO for procedure. No acute events overnight. Therapeutic lovenox held. Patient with complaints of gout flare up this morning.     No intake/output data recorded.  I/O last 3 completed shifts:  In: 1423.9 [P.O.:360; I.V.:1063.9]  Out: 300 [Urine:300]    Objective:  /76   Pulse 78   Temp 98 °F (36.7 °C) (Oral)   Resp 18   Ht 1.702 m (5' 7\")   Wt 92.1 kg (203 lb)   SpO2 95%   BMI 31.79 kg/m²     - General: No acute distress. Awake and conversant.  - CV: Regular rate.  - Respiratory: Respirations are non-labored   - Abdomen: Soft, minimally tender, mildly distended  - Skin: Warm. No rashes or ulcers.  - Extremities: No cyanosis or edema.    Lab Results   Component Value Date    WBC 5.7 09/18/2024    HGB 10.2 (L) 09/18/2024    HCT 31.2 (L) 09/18/2024    MCV 96.3 09/18/2024     09/17/2024     Lab Results   Component Value Date     09/18/2024    K 3.9 09/18/2024     09/18/2024    CO2 23 09/18/2024    BUN 31 (H) 09/18/2024    CREATININE 1.9 (H) 09/18/2024    GLUCOSE 124 (H) 09/18/2024    CALCIUM 8.7 09/18/2024    BILITOT 11.5 (H) 09/18/2024    ALKPHOS 151 (H) 09/18/2024    AST 29 09/18/2024    ALT 67 (H) 09/18/2024    LABGLOM 37 (L) 09/18/2024    GFRAA >60 03/17/2022       ASSESSMENT/PLAN:    69 y.o. male with painless jaundice status post liver biopsy 9/13    -Lovenox held for procedure  - Tbili 11.5 this AM from 9.7  - continue Zosyn for cholangitis prophylaxis  - NPO for procedure, okay for Low fat diet after procedure completed if okay with GI  -Appreciate consultant recommendations  -Follow-up liver biopsy pathology, still pending  -MRI showed no choledocholithiasis or mass  - EUS/ERCP 9/18 with Dr. Taylor; was moved today secondary to scheduling issues  -Discussed with Dr. Francine Velasquez DO  General Surgery Resident, PGY-2  Electronically signed by Veronica Velasquez DO on 9/18/2024  at 8:49 AM     Patient is scheduled for an EUS with an ERCP today with Dr. Taylor  Liver biopsy is also still pending  This is likely an intrinsic process    Electronically signed by Deni Escamilla MD on 9/18/2024 at 3:01 PM

## 2024-09-18 NOTE — PROGRESS NOTES
HOSPITALIST PROGRESS NOTES     ADMITTING DATE AND TIME: 2024  4:18 PM  had concerns including Pruritis (Pt states full body itching began 2 days ago), pt states his urine is darker than normal. Pt denies buring , and Fatigue (Began 2 days ago).    ADMIT DX: Jaundice [R17]  Jaundice, non- [R17]  Biliary calculus of other site with obstruction [K80.81]      SUBJECTIVE:  Patient is being followed for Jaundice [R17]  Jaundice, non- [R17]  Biliary calculus of other site with obstruction [K80.81]     Patient was seen examined and evaluated  Recent lab results, charts and pertinent diagnostic imaging reviewed   Ancillary service notes reviewed   Off the floor for surgery      Care reviewed with nursing staff, medical and surgical specialty care, primary care and the patient's family as available.   ROS: denies fever, chills, cp, sob, n/v, HA unless stated above     methylPREDNISolone  40 mg IntraVENous Daily    sodium chloride flush  5-40 mL IntraVENous 2 times per day    piperacillin-tazobactam  3,375 mg IntraVENous Q8H    [Held by provider] losartan  100 mg Oral Daily    isosorbide dinitrate  10 mg Oral TID    [Held by provider] hydroCHLOROthiazide  25 mg Oral Daily    amLODIPine  5 mg Oral Daily    carvedilol  12.5 mg Oral BID WC    digoxin  62.5 mcg Oral Daily    senna  1 tablet Oral Nightly    [Held by provider] enoxaparin  1 mg/kg SubCUTAneous BID    sodium chloride flush  10 mL IntraVENous 2 times per day    polyethylene glycol  17 g Oral Daily     iopamidol, , PRN  sodium chloride flush, 5-40 mL, PRN  sodium chloride, 25 mL, PRN  diclofenac sodium, 4 g, 4x Daily PRN  simethicone, 40 mg, Q6H PRN  perflutren lipid microspheres, 1.5 mL, ONCE PRN  sodium chloride flush, 10 mL, PRN  sodium chloride, , PRN  ondansetron, 4 mg, Q8H PRN   Or  ondansetron, 4 mg, Q6H  clarification.    Electronically signed by Wang Silva MD on 9/18/2024 at 12:43 PM

## 2024-09-18 NOTE — PLAN OF CARE
Problem: ABCDS Injury Assessment  Goal: Absence of physical injury  9/17/2024 2156 by Lili Wright, RN  Outcome: Progressing     Problem: Chronic Conditions and Co-morbidities  Goal: Patient's chronic conditions and co-morbidity symptoms are monitored and maintained or improved  9/17/2024 2156 by Lili Wright, RN  Outcome: Progressing     Problem: Discharge Planning  Goal: Discharge to home or other facility with appropriate resources  9/17/2024 2156 by Lili Wright, RN  Outcome: Progressing     Problem: Pain  Goal: Verbalizes/displays adequate comfort level or baseline comfort level  9/17/2024 2156 by Lili Wright RN  Outcome: Progressing     Problem: Safety - Adult  Goal: Free from fall injury  9/17/2024 2156 by Lili Wright, RN  Outcome: Progressing

## 2024-09-18 NOTE — PATIENT CARE CONFERENCE
P Quality Flow/Interdisciplinary Rounds Progress Note        Quality Flow Rounds held on September 18, 2024    Disciplines Attending:  Bedside Nurse, , , and Nursing Unit Leadership    Hong Chase was admitted on 9/11/2024  4:18 PM    Anticipated Discharge Date:       Disposition:    Sea Score:  Sea Scale Score: 20    Readmission Risk              Risk of Unplanned Readmission:  14           Discussed patient goal for the day, patient clinical progression, and barriers to discharge.  The following Goal(s) of the Day/Commitment(s) have been identified:  downgrade/discharge planning       Jackelyn Burks RN  September 18, 2024

## 2024-09-18 NOTE — PLAN OF CARE
Problem: ABCDS Injury Assessment  Goal: Absence of physical injury  9/18/2024 1940 by Lili Wright, RN  Outcome: Progressing     Problem: Chronic Conditions and Co-morbidities  Goal: Patient's chronic conditions and co-morbidity symptoms are monitored and maintained or improved  9/18/2024 1940 by Lili Wright, RN  Outcome: Progressing     Problem: Discharge Planning  Goal: Discharge to home or other facility with appropriate resources  9/18/2024 0749 by Khushi Corbin RN  Outcome: Progressing     Problem: Pain  Goal: Verbalizes/displays adequate comfort level or baseline comfort level  9/18/2024 1940 by Lili Wright, RN  Outcome: Progressing     Problem: Safety - Adult  Goal: Free from fall injury  9/18/2024 1940 by Lili Wright, RN  Outcome: Progressing     Problem: Nutrition Deficit:  Goal: Optimize nutritional status  Outcome: Progressing

## 2024-09-18 NOTE — PLAN OF CARE
Problem: Chronic Conditions and Co-morbidities  Goal: Patient's chronic conditions and co-morbidity symptoms are monitored and maintained or improved  9/18/2024 0749 by Khushi Corbin RN  Outcome: Progressing     Problem: Pain  Goal: Verbalizes/displays adequate comfort level or baseline comfort level  9/18/2024 0749 by Khushi Corbin RN  Outcome: Progressing     Problem: Safety - Adult  Goal: Free from fall injury  9/18/2024 0749 by Khushi Corbin RN  Outcome: Progressing     Problem: ABCDS Injury Assessment  Goal: Absence of physical injury  9/18/2024 0749 by Khushi Corbin RN  Outcome: Progressing     Problem: Discharge Planning  Goal: Discharge to home or other facility with appropriate resources  9/18/2024 0749 by Khushi Corbin RN  Outcome: Progressing

## 2024-09-19 LAB
ALBUMIN SERPL-MCNC: 3.5 G/DL (ref 3.5–5.2)
ALP SERPL-CCNC: 147 U/L (ref 40–129)
ALT SERPL-CCNC: 56 U/L (ref 0–40)
ANION GAP SERPL CALCULATED.3IONS-SCNC: 14 MMOL/L (ref 7–16)
AST SERPL-CCNC: 27 U/L (ref 0–39)
BASOPHILS # BLD: 0 K/UL (ref 0–0.2)
BASOPHILS NFR BLD: 0 % (ref 0–2)
BILIRUB SERPL-MCNC: 13.5 MG/DL (ref 0–1.2)
BUN SERPL-MCNC: 27 MG/DL (ref 6–23)
CALCIUM SERPL-MCNC: 9 MG/DL (ref 8.6–10.2)
CHLORIDE SERPL-SCNC: 98 MMOL/L (ref 98–107)
CO2 SERPL-SCNC: 21 MMOL/L (ref 22–29)
CREAT SERPL-MCNC: 1.5 MG/DL (ref 0.7–1.2)
EOSINOPHIL # BLD: 0 K/UL (ref 0.05–0.5)
EOSINOPHILS RELATIVE PERCENT: 0 % (ref 0–6)
ERYTHROCYTE [DISTWIDTH] IN BLOOD BY AUTOMATED COUNT: 16.1 % (ref 11.5–15)
GFR, ESTIMATED: 52 ML/MIN/1.73M2
GLUCOSE SERPL-MCNC: 208 MG/DL (ref 74–99)
HCT VFR BLD AUTO: 31.4 % (ref 37–54)
HGB BLD-MCNC: 10.1 G/DL (ref 12.5–16.5)
IGG SERPL-MCNC: 1072 MG/DL (ref 700–1600)
IGM SERPL-MCNC: 58 MG/DL (ref 40–230)
IMM GRANULOCYTES # BLD AUTO: 0.03 K/UL (ref 0–0.58)
IMM GRANULOCYTES NFR BLD: 1 % (ref 0–5)
LIPASE SERPL-CCNC: 54 U/L (ref 13–60)
LYMPHOCYTES NFR BLD: 0.12 K/UL (ref 1.5–4)
LYMPHOCYTES RELATIVE PERCENT: 3 % (ref 20–42)
MCH RBC QN AUTO: 31.7 PG (ref 26–35)
MCHC RBC AUTO-ENTMCNC: 32.2 G/DL (ref 32–34.5)
MCV RBC AUTO: 98.4 FL (ref 80–99.9)
MONOCYTES NFR BLD: 0.09 K/UL (ref 0.1–0.95)
MONOCYTES NFR BLD: 2 % (ref 2–12)
NEUTROPHILS NFR BLD: 94 % (ref 43–80)
NEUTS SEG NFR BLD: 3.47 K/UL (ref 1.8–7.3)
NON-GYN CYTOLOGY REPORT: NORMAL
PHOSPHATE SERPL-MCNC: 2.9 MG/DL (ref 2.5–4.5)
PLATELET # BLD AUTO: 185 K/UL (ref 130–450)
PMV BLD AUTO: 10.3 FL (ref 7–12)
POTASSIUM SERPL-SCNC: 4.1 MMOL/L (ref 3.5–5)
PROT SERPL-MCNC: 6.5 G/DL (ref 6.4–8.3)
RBC # BLD AUTO: 3.19 M/UL (ref 3.8–5.8)
RBC # BLD: ABNORMAL 10*6/UL
SODIUM SERPL-SCNC: 133 MMOL/L (ref 132–146)
WBC OTHER # BLD: 3.7 K/UL (ref 4.5–11.5)

## 2024-09-19 PROCEDURE — 2700000000 HC OXYGEN THERAPY PER DAY

## 2024-09-19 PROCEDURE — 99232 SBSQ HOSP IP/OBS MODERATE 35: CPT | Performed by: TRANSPLANT SURGERY

## 2024-09-19 PROCEDURE — 6370000000 HC RX 637 (ALT 250 FOR IP)

## 2024-09-19 PROCEDURE — 2580000003 HC RX 258: Performed by: INTERNAL MEDICINE

## 2024-09-19 PROCEDURE — 94660 CPAP INITIATION&MGMT: CPT

## 2024-09-19 PROCEDURE — 85025 COMPLETE CBC W/AUTO DIFF WBC: CPT

## 2024-09-19 PROCEDURE — 36415 COLL VENOUS BLD VENIPUNCTURE: CPT

## 2024-09-19 PROCEDURE — 2140000000 HC CCU INTERMEDIATE R&B

## 2024-09-19 PROCEDURE — 6360000002 HC RX W HCPCS: Performed by: INTERNAL MEDICINE

## 2024-09-19 PROCEDURE — 99232 SBSQ HOSP IP/OBS MODERATE 35: CPT | Performed by: INTERNAL MEDICINE

## 2024-09-19 PROCEDURE — 2500000003 HC RX 250 WO HCPCS

## 2024-09-19 PROCEDURE — 83690 ASSAY OF LIPASE: CPT

## 2024-09-19 PROCEDURE — 80053 COMPREHEN METABOLIC PANEL: CPT

## 2024-09-19 PROCEDURE — 6360000002 HC RX W HCPCS

## 2024-09-19 PROCEDURE — 84100 ASSAY OF PHOSPHORUS: CPT

## 2024-09-19 PROCEDURE — 6370000000 HC RX 637 (ALT 250 FOR IP): Performed by: TRANSPLANT SURGERY

## 2024-09-19 PROCEDURE — 2580000003 HC RX 258

## 2024-09-19 RX ORDER — PANTOPRAZOLE SODIUM 40 MG/1
40 TABLET, DELAYED RELEASE ORAL
Status: DISCONTINUED | OUTPATIENT
Start: 2024-09-20 | End: 2024-09-20 | Stop reason: HOSPADM

## 2024-09-19 RX ORDER — OXYCODONE HYDROCHLORIDE 10 MG/1
10 TABLET ORAL EVERY 4 HOURS PRN
Status: DISCONTINUED | OUTPATIENT
Start: 2024-09-19 | End: 2024-09-20 | Stop reason: HOSPADM

## 2024-09-19 RX ORDER — OXYCODONE HYDROCHLORIDE 5 MG/1
5 TABLET ORAL EVERY 4 HOURS PRN
Status: DISCONTINUED | OUTPATIENT
Start: 2024-09-19 | End: 2024-09-20 | Stop reason: HOSPADM

## 2024-09-19 RX ADMIN — SODIUM PHOSPHATE, MONOBASIC, MONOHYDRATE AND SODIUM PHOSPHATE, DIBASIC, ANHYDROUS 20 MMOL: 142; 276 INJECTION, SOLUTION INTRAVENOUS at 14:02

## 2024-09-19 RX ADMIN — AMLODIPINE BESYLATE 5 MG: 5 TABLET ORAL at 09:58

## 2024-09-19 RX ADMIN — ISOSORBIDE DINITRATE 10 MG: 10 TABLET ORAL at 09:58

## 2024-09-19 RX ADMIN — SODIUM CHLORIDE, PRESERVATIVE FREE 10 ML: 5 INJECTION INTRAVENOUS at 10:06

## 2024-09-19 RX ADMIN — CARVEDILOL 12.5 MG: 6.25 TABLET, FILM COATED ORAL at 17:04

## 2024-09-19 RX ADMIN — ISOSORBIDE DINITRATE 10 MG: 10 TABLET ORAL at 12:25

## 2024-09-19 RX ADMIN — PIPERACILLIN AND TAZOBACTAM 3375 MG: 3; .375 INJECTION, POWDER, LYOPHILIZED, FOR SOLUTION INTRAVENOUS at 11:00

## 2024-09-19 RX ADMIN — PIPERACILLIN AND TAZOBACTAM 3375 MG: 3; .375 INJECTION, POWDER, LYOPHILIZED, FOR SOLUTION INTRAVENOUS at 18:40

## 2024-09-19 RX ADMIN — PIPERACILLIN AND TAZOBACTAM 3375 MG: 3; .375 INJECTION, POWDER, LYOPHILIZED, FOR SOLUTION INTRAVENOUS at 03:38

## 2024-09-19 RX ADMIN — CARVEDILOL 12.5 MG: 6.25 TABLET, FILM COATED ORAL at 09:58

## 2024-09-19 RX ADMIN — ONDANSETRON 4 MG: 4 TABLET, ORALLY DISINTEGRATING ORAL at 10:06

## 2024-09-19 RX ADMIN — ENOXAPARIN SODIUM 90 MG: 100 INJECTION SUBCUTANEOUS at 09:58

## 2024-09-19 RX ADMIN — SODIUM CHLORIDE, PRESERVATIVE FREE 10 ML: 5 INJECTION INTRAVENOUS at 23:53

## 2024-09-19 RX ADMIN — ISOSORBIDE DINITRATE 10 MG: 10 TABLET ORAL at 17:04

## 2024-09-19 RX ADMIN — DIGOXIN 62.5 MCG: 125 TABLET ORAL at 09:58

## 2024-09-19 RX ADMIN — WATER 40 MG: 1 INJECTION INTRAMUSCULAR; INTRAVENOUS; SUBCUTANEOUS at 09:57

## 2024-09-19 RX ADMIN — ENOXAPARIN SODIUM 90 MG: 100 INJECTION SUBCUTANEOUS at 23:52

## 2024-09-19 NOTE — CARE COORDINATION
Transition of care update: IV zosyn 3,375mg q 8 hrs, iv solu-medrol 40mg daily. Total bili 13.5 and other labs noted. CT chest ordered. O2 at 3l/nc. Regular, low fat diet. EUS on 09/18 -sphincterotomy and stent placement. Met with pt in room earlier today. Plan remains to home when medically ready. Pt said his girlfriend will be available to provide support/assistance if needed at home. Denies any other needs at present. Cm/sw will follow.

## 2024-09-19 NOTE — PROGRESS NOTES
[facility]  DAILY PROGRESS NOTE  9/19/2024  Subjective:  Feeling better today. Pain improved. No nausea or emesis, tolerating clears. Still having Bms. Bili 13.5 today from 11.5. Pathology results not returned yet.     I/O this shift:  In: 1448.1 [P.O.:180; I.V.:1268.1]  Out: -   I/O last 3 completed shifts:  In: 2423.9 [P.O.:360; I.V.:2063.9]  Out: 200 [Urine:200]    Objective:  BP (!) 154/74   Pulse 78   Temp 98.5 °F (36.9 °C) (Temporal)   Resp 16   Ht 1.702 m (5' 7\")   Wt 93.1 kg (205 lb 3.2 oz)   SpO2 94%   BMI 32.14 kg/m²     - General: No acute distress. Awake and conversant.  - CV: Regular rate.  - Respiratory: Respirations are non-labored   - Abdomen: Soft, minimally tender, mildly distended  - Skin: Warm. No rashes or ulcers.  - Extremities: No cyanosis or edema.    Lab Results   Component Value Date    WBC 3.7 (L) 09/19/2024    HGB 10.1 (L) 09/19/2024    HCT 31.4 (L) 09/19/2024    MCV 98.4 09/19/2024     09/19/2024     Lab Results   Component Value Date     09/19/2024    K 4.1 09/19/2024    CL 98 09/19/2024    CO2 21 (L) 09/19/2024    BUN 27 (H) 09/19/2024    CREATININE 1.5 (H) 09/19/2024    GLUCOSE 208 (H) 09/19/2024    CALCIUM 9.0 09/19/2024    BILITOT 13.5 (H) 09/19/2024    ALKPHOS 147 (H) 09/19/2024    AST 27 09/19/2024    ALT 56 (H) 09/19/2024    LABGLOM 52 (L) 09/19/2024    GFRAA >60 03/17/2022       ASSESSMENT/PLAN:    69 y.o. male with painless jaundice status post liver biopsy 9/13, EUS/ERCP with plastic stent insertion 9/18    - ERCP 9/18/24- 1) Biliary sludge - s/p sphincterotomy with balloon sweeps, leading rosa plastic 7Fx7cm single pigtail stent. 2) Non dilated MPD and CBD. 3) Patent cystic duct demonstrated by GB filling which appeared, mildly contracted. 4) Biliary brushings performed of CBD, although no obvious stone. 5) Incidental cannulation of MPD, leading rosa PD stent 5F x 7cm. 6) Small duodenal diverticulum     - ok to continue lovenox, will discuss transition

## 2024-09-19 NOTE — PATIENT CARE CONFERENCE
P Quality Flow/Interdisciplinary Rounds Progress Note        Quality Flow Rounds held on September 19, 2024    Disciplines Attending:  Bedside Nurse, , , and Nursing Unit Leadership    Hong Chase was admitted on 9/11/2024  4:18 PM    Anticipated Discharge Date:       Disposition:    Sea Score:  Sea Scale Score: 20    Readmission Risk              Risk of Unplanned Readmission:  19           Discussed patient goal for the day, patient clinical progression, and barriers to discharge.  The following Goal(s) of the Day/Commitment(s) have been identified:  Report labs/diagnostics      Jackelyn Burks RN  September 19, 2024

## 2024-09-19 NOTE — PLAN OF CARE
Problem: ABCDS Injury Assessment  Goal: Absence of physical injury  Outcome: Progressing     Problem: Chronic Conditions and Co-morbidities  Goal: Patient's chronic conditions and co-morbidity symptoms are monitored and maintained or improved  Outcome: Progressing     Problem: Discharge Planning  Goal: Discharge to home or other facility with appropriate resources  Outcome: Progressing     Problem: Pain  Goal: Verbalizes/displays adequate comfort level or baseline comfort level  Outcome: Progressing     Problem: Safety - Adult  Goal: Free from fall injury  Outcome: Progressing     Problem: Nutrition Deficit:  Goal: Optimize nutritional status  Outcome: Progressing

## 2024-09-19 NOTE — PROGRESS NOTES
Daily  senna (SENOKOT) tablet 8.6 mg, Nightly  enoxaparin (LOVENOX) injection 90 mg, BID  sodium chloride flush 0.9 % injection 10 mL, 2 times per day  sodium chloride flush 0.9 % injection 10 mL, PRN  0.9 % sodium chloride infusion, PRN  ondansetron (ZOFRAN-ODT) disintegrating tablet 4 mg, Q8H PRN   Or  ondansetron (ZOFRAN) injection 4 mg, Q6H PRN  polyethylene glycol (GLYCOLAX) packet 17 g, Daily  HYDROmorphone (DILAUDID) injection 0.25 mg, Q3H PRN   Or  HYDROmorphone (DILAUDID) injection 0.5 mg, Q3H PRN         Data Review  CBC:   Lab Results   Component Value Date/Time    WBC 3.7 09/19/2024 04:47 AM    RBC 3.19 09/19/2024 04:47 AM    RBC 4.27 12/05/2022 09:26 AM    HGB 10.1 09/19/2024 04:47 AM    HCT 31.4 09/19/2024 04:47 AM    MCV 98.4 09/19/2024 04:47 AM    MCH 31.7 09/19/2024 04:47 AM    MCHC 32.2 09/19/2024 04:47 AM    RDW 16.1 09/19/2024 04:47 AM     09/19/2024 04:47 AM    MPV 10.3 09/19/2024 04:47 AM     CMP:    Lab Results   Component Value Date/Time     09/19/2024 04:47 AM    K 4.1 09/19/2024 04:47 AM    K 4.3 03/05/2021 06:22 AM    CL 98 09/19/2024 04:47 AM    CO2 21 09/19/2024 04:47 AM    BUN 27 09/19/2024 04:47 AM    CREATININE 1.5 09/19/2024 04:47 AM    GFRAA >60 03/17/2022 10:44 AM    LABGLOM 52 09/19/2024 04:47 AM    LABGLOM 38 12/12/2023 10:25 AM    GLUCOSE 208 09/19/2024 04:47 AM    GLUCOSE 110 12/05/2022 09:26 AM    CALCIUM 9.0 09/19/2024 04:47 AM    BILITOT 13.5 09/19/2024 04:47 AM    ALKPHOS 147 09/19/2024 04:47 AM    AST 27 09/19/2024 04:47 AM    ALT 56 09/19/2024 04:47 AM     Hepatic Function Panel:    Lab Results   Component Value Date/Time    ALKPHOS 147 09/19/2024 04:47 AM    ALT 56 09/19/2024 04:47 AM    AST 27 09/19/2024 04:47 AM    BILITOT 13.5 09/19/2024 04:47 AM    BILIDIR 6.0 09/15/2024 07:23 AM    IBILI 0.7 09/11/2024 02:23 PM     No components found for: \"CHLPL\"    Lab Results   Component Value Date    TRIG 254 (H) 12/12/2023    TRIG 98 12/05/2022    TRIG 76  07/15/2021       Lab Results   Component Value Date    HDL 35 (L) 12/12/2023    HDL 41 12/05/2022    HDL 37 07/15/2021     No components found for: \"LDLCALC\"  No components found for: \"LABVLDL\"   PT/INR:    Lab Results   Component Value Date/Time    PROTIME 12.0 09/13/2024 07:48 AM    PROTIME 25.2 03/14/2022 12:08 PM    INR 1.1 09/13/2024 07:48 AM     IRON:    Lab Results   Component Value Date/Time    IRON 56 09/13/2024 06:00 AM     Iron Saturation:  No components found for: \"PERCENTFE\"  FERRITIN:    Lab Results   Component Value Date/Time    FERRITIN 159 09/13/2024 06:00 AM         Assessment:     1. Painless jaundice, Bili 4.0, direct 3.3, non-dilated CBD  2. Elevated liver chemistries   3. Mild Anemia (hgb 10.8), normocytic, elevated RDW - baseline ~13  4. EGD/EUS 9/18/24- EGD: Normal Esophagus. Stomach with multiple small erosions in antrum and body, bx taken. Duodenum with multiple ~2mm erosion, bx taken. Small duodenal diverticulum, normal appearing papilla. EUS: Normal appearing pancreatic panenchyma, No cysts or masses appreciated. Normal MPD size in HOP, body, tail. Normal bile duct, questionable sludge/debris vs. Wall artifact. Normal Ampulla. Mildly contracted GB with scant sludge. Normal findings of the liver  5. ERCP 9/18/24- 1) Biliary sludge - s/p sphincterotomy with balloon sweeps, leading rosa plastic 7Fx7cm single pigtail stent. 2) Non dilated MPD and CBD. 3) Patent cystic duct demonstrated by GB filling which appeared, mildly contracted. 4) Biliary brushings performed of CBD, although no obvious stone. 5) Incidental cannulation of MPD, leading rosa PD stent 5F x 7cm. 6) Small duodenal diverticulum    Plan:   Increase diet to regular  Pantoprazole 40 mg daily  Viral studies pending   Ok to resume anticoagulants from GI POV  ERCP with stent removal and possible repeat EUS to be arranged as OP in 4-8 wks. Office to arrange  Medical management per primary  HBS following  Supportive care  Will follow

## 2024-09-19 NOTE — PROGRESS NOTES
[facility]  MD Bhupendra Avery, MD Narda Silva, CNP   Zaira Cardona, LUKE Baeza, CNP  Progress note  Patient's Name: Hong Chase  3:48 PM  9/19/2024    Cr relatively stable  Remain with yellowish discoloration   NAD     9/16: Sitting up in bed. Does have some dyspnea. He is on oxygen via nasal canula. His girlfriend is bringing in his cpap. Appetite is fair. Vital signs are stable. For EUS tomorrow.     9/17: Laying in bed. Denies any acute complaints. Shortness of breath has improved. Concerned that his bilirubin keeps going up. NPO for EUS today.     9/18:  Not in room as he is having a colonoscopy.    9/19:  Sitting up in bed.  Feels much better.  Tolerated lunch tray.  Denies chest pain, palpitations or SOB.  Oxygen 2L/NC. Jaundiced.  Abdominal pain much improved.     History of Present Ilness:         Mr. Chase is a pleasant 69-year-old gentleman who presented to the hospital with new onset jaundice.  He also reports that he has noticed itching, poor appetite, fatigue, and weakness over the past 2 days as well.  He reports that his urine has been darker in color and he has noticed a decrease in urinary output.  Workup in the hospital thus far has included an ultrasound of the gallbladder that showed cholelithiasis without evidence of cholecystitis and an MRCP that showed no choledocholithiasis or pancreatic mass.  Hepatobiliary surgery has been consulted and has planned for an interventional radiology transjugular liver biopsy with portal pressures as cardiomegaly and significant intrahepatic venous dilation were noted on the MRI.  His past medical history is significant for atrial fibrillation, aortic dissection, coronary artery disease, congestive heart failure, hypertension, ischemic cardiomyopathy, pericardial effusion, sarcoidosis, and valvular heart disease.         We were consulted for acute on chronic kidney injury.  He denies a  [facility] [place] [facility]    UPPER GASTROINTESTINAL ENDOSCOPY N/A 4/19/2021    EGD ESOPHAGOGASTRODUODENOSCOPY performed by Rubens Padilla MD at [facility]    UPPER GASTROINTESTINAL ENDOSCOPY N/A 11/23/2021    EGD DIAGNOSTIC ONLY performed by Rubens Padilla MD at [facility]    UPPER GASTROINTESTINAL ENDOSCOPY N/A 9/18/2024    ESOPHAGOGASTRODUODENOSCOPY ENDOSCOPIC ULTRASOUND FINE NEEDLE ASPIRATION performed by Deni Taylor DO at [facility]    UPPER GASTROINTESTINAL ENDOSCOPY N/A 9/18/2024    ESOPHAGOGASTRODUODENOSCOPY BIOPSY performed by Deni Taylor DO at [facility]       Allergies:  Patient has no known allergies.    Current Medications:    [START ON 9/20/2024] pantoprazole (PROTONIX) tablet 40 mg, QAM AC  oxyCODONE (ROXICODONE) immediate release tablet 5 mg, Q4H PRN   Or  oxyCODONE HCl (OXY-IR) immediate release tablet 10 mg, Q4H PRN  sodium phosphate 20 mmol in sodium chloride 0.9 % 500 mL IVPB, Once  methylPREDNISolone sodium succ (SOLU-MEDROL) 40 mg in sterile water 1 mL injection, Daily  diclofenac sodium (VOLTAREN) 1 % gel 4 g, 4x Daily PRN  piperacillin-tazobactam (ZOSYN) 3,375 mg in sodium chloride 0.9 % 50 mL IVPB ([id number]), Q8H  simethicone (MYLICON) chewable tablet 40 mg, Q6H PRN  [Held by provider] losartan (COZAAR) tablet 100 mg, Daily  isosorbide dinitrate (ISORDIL) tablet 10 mg, TID  [Held by provider] hydroCHLOROthiazide (HYDRODIURIL) tablet 25 mg, Daily  perflutren lipid microspheres (DEFINITY) injection 1.5 mL, ONCE PRN  amLODIPine (NORVASC) tablet 5 mg, Daily  carvedilol (COREG) tablet 12.5 mg, BID WC  digoxin (LANOXIN) tablet 62.5 mcg, Daily  senna (SENOKOT) tablet 8.6 mg, Nightly  enoxaparin (LOVENOX) injection 90 mg, BID  sodium chloride flush 0.9 % injection 10 mL, 2 times per day  sodium chloride flush 0.9 % injection 10 mL, PRN  0.9 % sodium chloride infusion, PRN  ondansetron (ZOFRAN-ODT) disintegrating tablet 4 mg, Q8H PRN   Or  ondansetron (ZOFRAN)  No choledocholithiasis or pancreatic mass.   2. Cholelithiasis.         US GALLBLADDER RUQ   Final Result   1. Cholelithiasis without sonographic evidence of acute cholecystitis.   2. Ring down artifact in the gallbladder wall, which can be seen in the   setting of adenomyomatosis.         CT CHEST WO CONTRAST    (Results Pending)       Assessment  Acute kidney injury in the setting of volume contraction due to poor oral intake. Urine indices were collected after fluid resuscitation making them difficult to interpret. HRS is likely contributing.   Chronic kidney disease stage IIIb secondary to renal microvascular atherosclerotic disease. Previous baseline creatinine level was 1.4-1.9 mg/dL. His baseline creatinine level may have gone up somewhat over the past year.   Hypertension  Jaundice     Creatinine 2.0-->1.9 mg/dl --> 1.5 (baseline)   BP stable    Plan  Will hold diuresis today as creatinine is at baseline  Avoid nephrotoxins as able   Would hold HCTZ, and Losartan for now  Follow liver biopsy results  Monitor I&O  Continue supportive renal care         Electronically signed by CRISTINA MORA CNP on 9/19/2024 at 3:48 PM

## 2024-09-19 NOTE — PROGRESS NOTES
HOSPITALIST PROGRESS NOTES     ADMITTING DATE AND TIME: 2024  4:18 PM  had concerns including Pruritis (Pt states full body itching began 2 days ago), pt states his urine is darker than normal. Pt denies buring , and Fatigue (Began 2 days ago).    ADMIT DX: Jaundice [R17]  Jaundice, non- [R17]  Biliary calculus of other site with obstruction [K80.81]      SUBJECTIVE:  Patient is being followed for Jaundice [R17]  Jaundice, non- [R17]  Biliary calculus of other site with obstruction [K80.81]     Patient was seen examined and evaluated  Recent lab results, charts and pertinent diagnostic imaging reviewed   Ancillary service notes reviewed   Much improved today     Care reviewed with nursing staff, medical and surgical specialty care, primary care and the patient's family as available.   ROS: denies fever, chills, cp, sob, n/v, HA unless stated above     methylPREDNISolone  40 mg IntraVENous Daily    piperacillin-tazobactam  3,375 mg IntraVENous Q8H    [Held by provider] losartan  100 mg Oral Daily    isosorbide dinitrate  10 mg Oral TID    [Held by provider] hydroCHLOROthiazide  25 mg Oral Daily    amLODIPine  5 mg Oral Daily    carvedilol  12.5 mg Oral BID WC    digoxin  62.5 mcg Oral Daily    senna  1 tablet Oral Nightly    enoxaparin  1 mg/kg SubCUTAneous BID    sodium chloride flush  10 mL IntraVENous 2 times per day    polyethylene glycol  17 g Oral Daily     diclofenac sodium, 4 g, 4x Daily PRN  simethicone, 40 mg, Q6H PRN  perflutren lipid microspheres, 1.5 mL, ONCE PRN  sodium chloride flush, 10 mL, PRN  sodium chloride, , PRN  ondansetron, 4 mg, Q8H PRN   Or  ondansetron, 4 mg, Q6H PRN  HYDROmorphone, 0.25 mg, Q3H PRN   Or  HYDROmorphone, 0.5 mg, Q3H PRN         OBJECTIVE:    BP (!) 154/74   Pulse 78   Temp 98.5 °F (36.9 °C) (Temporal)   Resp 16   Ht 1.702 m  No

## 2024-09-19 NOTE — PLAN OF CARE
Problem: Chronic Conditions and Co-morbidities  Goal: Patient's chronic conditions and co-morbidity symptoms are monitored and maintained or improved  9/19/2024 1944 by Lili Wright, RN  Outcome: Progressing     Problem: Discharge Planning  Goal: Discharge to home or other facility with appropriate resources  9/19/2024 1944 by Lili Wright, RN  Outcome: Progressing     Problem: Pain  Goal: Verbalizes/displays adequate comfort level or baseline comfort level  9/19/2024 1944 by Lili Wright, RN  Outcome: Progressing     Problem: Safety - Adult  Goal: Free from fall injury  9/19/2024 1944 by Lili Wright, RN  Outcome: Progressing     Problem: Nutrition Deficit:  Goal: Optimize nutritional status  9/19/2024 1944 by Lili Wright, RN  Outcome: Progressing

## 2024-09-20 ENCOUNTER — APPOINTMENT (OUTPATIENT)
Dept: CT IMAGING | Age: 69
DRG: 197 | End: 2024-09-20
Attending: INTERNAL MEDICINE
Payer: MEDICARE

## 2024-09-20 VITALS
BODY MASS INDEX: 32.07 KG/M2 | DIASTOLIC BLOOD PRESSURE: 91 MMHG | HEART RATE: 86 BPM | OXYGEN SATURATION: 99 % | HEIGHT: 67 IN | RESPIRATION RATE: 20 BRPM | WEIGHT: 204.3 LBS | TEMPERATURE: 97 F | SYSTOLIC BLOOD PRESSURE: 151 MMHG

## 2024-09-20 PROBLEM — D86.89 SARCOIDOSIS OF OTHER SITES: Status: ACTIVE | Noted: 2024-09-20

## 2024-09-20 PROBLEM — D86.89 HEPATIC GRANULOMA ASSOCIATED WITH SARCOIDOSIS: Status: ACTIVE | Noted: 2024-09-20

## 2024-09-20 LAB
ALBUMIN SERPL-MCNC: 3.5 G/DL (ref 3.5–5.2)
ALP SERPL-CCNC: 130 U/L (ref 40–129)
ALT SERPL-CCNC: 49 U/L (ref 0–40)
ANION GAP SERPL CALCULATED.3IONS-SCNC: 11 MMOL/L (ref 7–16)
AST SERPL-CCNC: 28 U/L (ref 0–39)
BASOPHILS # BLD: 0 K/UL (ref 0–0.2)
BASOPHILS NFR BLD: 0 % (ref 0–2)
BILIRUB SERPL-MCNC: 11.5 MG/DL (ref 0–1.2)
BUN SERPL-MCNC: 45 MG/DL (ref 6–23)
CALCIUM SERPL-MCNC: 8.9 MG/DL (ref 8.6–10.2)
CHLORIDE SERPL-SCNC: 100 MMOL/L (ref 98–107)
CO2 SERPL-SCNC: 26 MMOL/L (ref 22–29)
CREAT SERPL-MCNC: 1.5 MG/DL (ref 0.7–1.2)
EOSINOPHIL # BLD: 0 K/UL (ref 0.05–0.5)
EOSINOPHILS RELATIVE PERCENT: 0 % (ref 0–6)
ERYTHROCYTE [DISTWIDTH] IN BLOOD BY AUTOMATED COUNT: 16.6 % (ref 11.5–15)
GFR, ESTIMATED: 49 ML/MIN/1.73M2
GLUCOSE SERPL-MCNC: 169 MG/DL (ref 74–99)
HCT VFR BLD AUTO: 27.2 % (ref 37–54)
HGB BLD-MCNC: 8.8 G/DL (ref 12.5–16.5)
IGE SERPL-ACNC: 60 IU/ML (ref 0–100)
IMM GRANULOCYTES # BLD AUTO: 0.03 K/UL (ref 0–0.58)
IMM GRANULOCYTES NFR BLD: 1 % (ref 0–5)
LIPASE SERPL-CCNC: 51 U/L (ref 13–60)
LYMPHOCYTES NFR BLD: 0.16 K/UL (ref 1.5–4)
LYMPHOCYTES RELATIVE PERCENT: 3 % (ref 20–42)
MCH RBC QN AUTO: 31.1 PG (ref 26–35)
MCHC RBC AUTO-ENTMCNC: 32.4 G/DL (ref 32–34.5)
MCV RBC AUTO: 96.1 FL (ref 80–99.9)
MONOCYTES NFR BLD: 0.4 K/UL (ref 0.1–0.95)
MONOCYTES NFR BLD: 8 % (ref 2–12)
NEUTROPHILS NFR BLD: 88 % (ref 43–80)
NEUTS SEG NFR BLD: 4.44 K/UL (ref 1.8–7.3)
PHOSPHATE SERPL-MCNC: 2.2 MG/DL (ref 2.5–4.5)
PLATELET # BLD AUTO: 234 K/UL (ref 130–450)
PMV BLD AUTO: 10.4 FL (ref 7–12)
POTASSIUM SERPL-SCNC: 4.2 MMOL/L (ref 3.5–5)
PROT SERPL-MCNC: 6 G/DL (ref 6.4–8.3)
RBC # BLD AUTO: 2.83 M/UL (ref 3.8–5.8)
RBC # BLD: ABNORMAL 10*6/UL
SODIUM SERPL-SCNC: 137 MMOL/L (ref 132–146)
SURGICAL PATHOLOGY REPORT: NORMAL
WBC OTHER # BLD: 5 K/UL (ref 4.5–11.5)

## 2024-09-20 PROCEDURE — 6370000000 HC RX 637 (ALT 250 FOR IP): Performed by: INTERNAL MEDICINE

## 2024-09-20 PROCEDURE — 94660 CPAP INITIATION&MGMT: CPT

## 2024-09-20 PROCEDURE — 82164 ANGIOTENSIN I ENZYME TEST: CPT

## 2024-09-20 PROCEDURE — 80053 COMPREHEN METABOLIC PANEL: CPT

## 2024-09-20 PROCEDURE — 2580000003 HC RX 258: Performed by: INTERNAL MEDICINE

## 2024-09-20 PROCEDURE — 84100 ASSAY OF PHOSPHORUS: CPT

## 2024-09-20 PROCEDURE — 6360000002 HC RX W HCPCS: Performed by: INTERNAL MEDICINE

## 2024-09-20 PROCEDURE — 6370000000 HC RX 637 (ALT 250 FOR IP)

## 2024-09-20 PROCEDURE — 99232 SBSQ HOSP IP/OBS MODERATE 35: CPT | Performed by: INTERNAL MEDICINE

## 2024-09-20 PROCEDURE — 2580000003 HC RX 258

## 2024-09-20 PROCEDURE — 71250 CT THORAX DX C-: CPT

## 2024-09-20 PROCEDURE — 2700000000 HC OXYGEN THERAPY PER DAY

## 2024-09-20 PROCEDURE — 6370000000 HC RX 637 (ALT 250 FOR IP): Performed by: TRANSPLANT SURGERY

## 2024-09-20 PROCEDURE — 99232 SBSQ HOSP IP/OBS MODERATE 35: CPT | Performed by: TRANSPLANT SURGERY

## 2024-09-20 PROCEDURE — 85025 COMPLETE CBC W/AUTO DIFF WBC: CPT

## 2024-09-20 PROCEDURE — 6370000000 HC RX 637 (ALT 250 FOR IP): Performed by: NURSE PRACTITIONER

## 2024-09-20 PROCEDURE — 36415 COLL VENOUS BLD VENIPUNCTURE: CPT

## 2024-09-20 PROCEDURE — 6360000002 HC RX W HCPCS

## 2024-09-20 PROCEDURE — 83690 ASSAY OF LIPASE: CPT

## 2024-09-20 RX ORDER — URSODIOL 300 MG/1
300 CAPSULE ORAL 2 TIMES DAILY
Status: DISCONTINUED | OUTPATIENT
Start: 2024-09-20 | End: 2024-09-20 | Stop reason: HOSPADM

## 2024-09-20 RX ORDER — PREDNISONE 10 MG/1
TABLET ORAL
Qty: 50 TABLET | Refills: 0 | Status: SHIPPED | OUTPATIENT
Start: 2024-09-20 | End: 2024-09-30

## 2024-09-20 RX ORDER — POLYETHYLENE GLYCOL 3350 17 G/17G
17 POWDER, FOR SOLUTION ORAL DAILY
Qty: 30 PACKET | Refills: 0 | Status: SHIPPED | OUTPATIENT
Start: 2024-09-21 | End: 2024-10-21

## 2024-09-20 RX ORDER — CARVEDILOL 12.5 MG/1
12.5 TABLET ORAL 2 TIMES DAILY WITH MEALS
Qty: 60 TABLET | Refills: 3 | Status: SHIPPED | OUTPATIENT
Start: 2024-09-20

## 2024-09-20 RX ORDER — PANTOPRAZOLE SODIUM 40 MG/1
40 TABLET, DELAYED RELEASE ORAL
Qty: 30 TABLET | Refills: 3 | Status: ON HOLD | OUTPATIENT
Start: 2024-09-21 | End: 2024-09-27

## 2024-09-20 RX ORDER — URSODIOL 300 MG/1
300 CAPSULE ORAL 2 TIMES DAILY
Qty: 60 CAPSULE | Refills: 3 | Status: SHIPPED | OUTPATIENT
Start: 2024-09-20

## 2024-09-20 RX ADMIN — CARVEDILOL 12.5 MG: 6.25 TABLET, FILM COATED ORAL at 08:32

## 2024-09-20 RX ADMIN — Medication 250 MG: at 08:32

## 2024-09-20 RX ADMIN — POLYETHYLENE GLYCOL 3350 17 G: 17 POWDER, FOR SOLUTION ORAL at 08:32

## 2024-09-20 RX ADMIN — URSODIOL 300 MG: 300 CAPSULE ORAL at 08:33

## 2024-09-20 RX ADMIN — APIXABAN 5 MG: 5 TABLET, FILM COATED ORAL at 08:32

## 2024-09-20 RX ADMIN — AMLODIPINE BESYLATE 5 MG: 5 TABLET ORAL at 08:32

## 2024-09-20 RX ADMIN — DIGOXIN 62.5 MCG: 125 TABLET ORAL at 08:33

## 2024-09-20 RX ADMIN — Medication 250 MG: at 12:57

## 2024-09-20 RX ADMIN — PIPERACILLIN AND TAZOBACTAM 3375 MG: 3; .375 INJECTION, POWDER, LYOPHILIZED, FOR SOLUTION INTRAVENOUS at 07:10

## 2024-09-20 RX ADMIN — WATER 40 MG: 1 INJECTION INTRAMUSCULAR; INTRAVENOUS; SUBCUTANEOUS at 08:34

## 2024-09-20 RX ADMIN — SODIUM CHLORIDE, PRESERVATIVE FREE 10 ML: 5 INJECTION INTRAVENOUS at 08:33

## 2024-09-20 RX ADMIN — ISOSORBIDE DINITRATE 10 MG: 10 TABLET ORAL at 08:33

## 2024-09-20 RX ADMIN — PANTOPRAZOLE SODIUM 40 MG: 40 TABLET, DELAYED RELEASE ORAL at 07:10

## 2024-09-20 RX ADMIN — ISOSORBIDE DINITRATE 10 MG: 10 TABLET ORAL at 12:57

## 2024-09-20 NOTE — CARE COORDINATION
Transition of care update: CT Chest ordered. Total bili 11.5 and other labs noted. IV solumedrol 40mg daily. Attempted to meet with pt and he was out of room. Plan is to return home when medically ready with no needs identified. Pt will have transportation home. Cm/sw will follow.

## 2024-09-20 NOTE — PROGRESS NOTES
[facility]  DAILY PROGRESS NOTE  9/20/2024  Subjective:  Doing well this morning. Reports tolerating his diet and passing gas/having bowel function. No complaints this AM.     No intake/output data recorded.  I/O last 3 completed shifts:  In: 1868.1 [P.O.:600; I.V.:1268.1]  Out: 300 [Urine:300]    Objective:  BP (!) 146/90   Pulse 75   Temp 98.2 °F (36.8 °C) (Oral)   Resp 18   Ht 1.702 m (5' 7\")   Wt 92.7 kg (204 lb 4.8 oz)   SpO2 99%   BMI 32.00 kg/m²     - General: No acute distress. Awake and conversant.  - CV: Regular rate.  - Respiratory: Respirations are non-labored   - Abdomen: Soft, minimally tender, minimally distended  - Skin: Warm. No rashes or ulcers.  - Extremities: No cyanosis or edema.    Lab Results   Component Value Date    WBC 3.7 (L) 09/19/2024    HGB 10.1 (L) 09/19/2024    HCT 31.4 (L) 09/19/2024    MCV 98.4 09/19/2024     09/19/2024     Lab Results   Component Value Date     09/19/2024    K 4.1 09/19/2024    CL 98 09/19/2024    CO2 21 (L) 09/19/2024    BUN 27 (H) 09/19/2024    CREATININE 1.5 (H) 09/19/2024    GLUCOSE 208 (H) 09/19/2024    CALCIUM 9.0 09/19/2024    BILITOT 13.5 (H) 09/19/2024    ALKPHOS 147 (H) 09/19/2024    AST 27 09/19/2024    ALT 56 (H) 09/19/2024    LABGLOM 52 (L) 09/19/2024    GFRAA >60 03/17/2022       ASSESSMENT/PLAN:    69 y.o. male with painless jaundice status post liver biopsy 9/13, EUS/ERCP with plastic stent insertion 9/18    - ERCP 9/18/24- 1) Biliary sludge - s/p sphincterotomy with balloon sweeps, leading rosa plastic 7Fx7cm single pigtail stent. 2) Non dilated MPD and CBD. 3) Patent cystic duct demonstrated by GB filling which appeared, mildly contracted. 4) Biliary brushings performed of CBD, although no obvious stone. 5) Incidental cannulation of MPD, leading rosa PD stent 5F x 7cm. 6) Small duodenal diverticulum     - ok to continue lovenox, will discuss transition back to eliquis today vs tomorrow  - will plan to discontinue Zosyn once  bili improves  - continue Low fat diet   -Appreciate consultant recommendations; will defer to medicine for sarcoidosis treatment  -Follow-up  biopsy pathology, still pending  - DC planning    Veronica Velasquez DO  General Surgery Resident, PGY-2  Electronically signed by Veronica Velasquez DO on 9/20/2024 at 7:18 AM     Attending Physician Statement:    Chief Complaint:   Chief Complaint   Patient presents with    Pruritis     Pt states full body itching began 2 days ago    pt states his urine is darker than normal. Pt denies buring     Fatigue     Began 2 days ago       I have examined the patient and performed the key aspects of physical exam, reviewed the record (including all pertinent and new radiology images and laboratory findings), and discussed the case with the surgical team.  I agree with the assessment and plan with the following additions, corrections, and changes. 14pt review of symptoms completed and negative except as mentioned.    Bilirubin trending down on high-dose steroids  Okay for patient to be discharged today from my standpoint  Will ask internal medicine to write for the steroid taper  I will have him follow-up with Dr. Taylor  No need for follow-up with me as this is not a surgical issue.    Deni Escamilla MD  09/20/24  9:40 AM

## 2024-09-20 NOTE — DISCHARGE INSTRUCTIONS
CALL DR. NOEMI CAZARES OFFICE FOR FOLLOW UP APPOINTMENT.  [phone]  [address] [place] [address], [address]  [place], [place] [address]

## 2024-09-20 NOTE — PROGRESS NOTES
CLINICAL PHARMACY NOTE: MEDS TO BEDS    Total # of Prescriptions Filled: 5   The following medications were delivered to the patient:  Pantoprazole sodium 40 mg  Carvedilol 12.5  mg  Polyethylene glycol  Ursodiol 300 mg  Prednisone 10 mg    Additional Documentation:   Pt picked up in the pharmacy

## 2024-09-20 NOTE — PROGRESS NOTES
HOSPITALIST PROGRESS NOTES     ADMITTING DATE AND TIME: 2024  4:18 PM  had concerns including Pruritis (Pt states full body itching began 2 days ago), pt states his urine is darker than normal. Pt denies buring , and Fatigue (Began 2 days ago).    ADMIT DX: Jaundice [R17]  Jaundice, non- [R17]  Biliary calculus of other site with obstruction [K80.81]      SUBJECTIVE:  Patient is being followed for Jaundice [R17]  Jaundice, non- [R17]  Biliary calculus of other site with obstruction [K80.81]     Patient was seen examined and evaluated  Recent lab results, charts and pertinent diagnostic imaging reviewed   Ancillary service notes reviewed   Much improved today     Care reviewed with nursing staff, medical and surgical specialty care, primary care and the patient's family as available.   ROS: denies fever, chills, cp, sob, n/v, HA unless stated above     ursodiol  300 mg Oral BID    potassium & sodium phosphates  1 packet Oral 4x Daily    apixaban  5 mg Oral BID    pantoprazole  40 mg Oral QAM AC    methylPREDNISolone  40 mg IntraVENous Daily    [Held by provider] losartan  100 mg Oral Daily    isosorbide dinitrate  10 mg Oral TID    [Held by provider] hydroCHLOROthiazide  25 mg Oral Daily    amLODIPine  5 mg Oral Daily    carvedilol  12.5 mg Oral BID WC    digoxin  62.5 mcg Oral Daily    senna  1 tablet Oral Nightly    sodium chloride flush  10 mL IntraVENous 2 times per day    polyethylene glycol  17 g Oral Daily     oxyCODONE, 5 mg, Q4H PRN   Or  oxyCODONE, 10 mg, Q4H PRN  diclofenac sodium, 4 g, 4x Daily PRN  simethicone, 40 mg, Q6H PRN  perflutren lipid microspheres, 1.5 mL, ONCE PRN  sodium chloride flush, 10 mL, PRN  sodium chloride, , PRN  ondansetron, 4 mg, Q8H PRN   Or  ondansetron, 4 mg, Q6H PRN  HYDROmorphone, 0.25 mg, Q3H PRN   Or  HYDROmorphone, 0.5 mg, Q3H  PRN         OBJECTIVE:    BP (!) 151/91   Pulse 86   Temp 97 °F (36.1 °C) (Temporal)   Resp 20   Ht 1.702 m (5' 7\")   Wt 92.7 kg (204 lb 4.8 oz)   SpO2 99%   BMI 32.00 kg/m²     General Appearance: alert and oriented to person, place and time and in no acute distress  Skin: jaundiced skin   Head: normocephalic and atraumatic  Eyes: pupils equal, round, and reactive to light, extraocular eye movements intact, conjunctivae normal  Neck: neck supple and non tender without mass   Pulmonary/Chest: clear to auscultation bilaterally- no wheezes, rales or rhonchi, normal air movement, no respiratory distress  Cardiovascular: normal rate, normal S1 and S2 and no carotid bruits  Abdomen: soft, non-tender, non-distended, normal bowel sounds, no masses or organomegaly  Extremities: no cyanosis, no clubbing and no edema  Neurologic: no cranial nerve deficit and speech normal  Recent Labs     09/18/24  0538 09/19/24  0447 09/20/24  0647    133 137   K 3.9 4.1 4.2    98 100   CO2 23 21* 26   BUN 31* 27* 45*   CREATININE 1.9* 1.5* 1.5*   GLUCOSE 124* 208* 169*   CALCIUM 8.7 9.0 8.9       Recent Labs     09/18/24  0538 09/19/24  0447 09/20/24  0647   WBC 5.7 3.7* 5.0   RBC 3.24* 3.19* 2.83*   HGB 10.2* 10.1* 8.8*   HCT 31.2* 31.4* 27.2*   MCV 96.3 98.4 96.1   MCH 31.5 31.7 31.1   MCHC 32.7 32.2 32.4   RDW 16.2* 16.1* 16.6*   PLT  --  185 234   MPV 10.3 10.3 10.4       I/O last 3 completed shifts:  In: 1868.1 [P.O.:600; I.V.:1268.1]  Out: 300 [Urine:300]  I/O this shift:  In: 180 [P.O.:180]  Out: -     09/11/24    ECHO (TTE) COMPLETE (PRN CONTRAST/BUBBLE/STRAIN/3D) 09/13/2024  7:35 PM (Final)    Interpretation Summary    Left Ventricle: Normal left ventricular systolic function with a visually estimated EF of 60 - 65%. Left ventricle size is normal. Mildly increased ventricular mass. Findings consistent with mild concentric hypertrophy. Normal wall motion. Indeterminate diastolic function.    Right Ventricle: Right  ventricle size is normal. Normal systolic function. TAPSE is 1.8 cm.    Aortic Valve: No stenosis.    Tricuspid Valve: Mild regurgitation. Mildly elevated RVSP, consistent with mild pulmonary hypertension. The estimated RVSP is 48 mmHg.    Left Atrium: Left atrium is severely dilated.    Aorta: Normal sized aortic root. Mildly dilated ascending aorta. Ao ascending diameter is 4.2 cm. 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    Pericardium: No pericardial effusion.    Image quality is adequate.    Signed by: Safia Avery MD on 2024  7:35 PM      SYNOPSIS:   Patient admitted on 2024 for Jaundice, non-  Patient with history of CHF and sarcoidosis admitted on 2024 for painless jaundice.  MRCP showed cholelithiasis but no mass or choledocholithiasis.  Patient had significant intrahepatic venous dilation as well as cardiomegaly concerning for possible CHF.  Cardiology consulted, await recommendations. Pt also underwent transjugular liver biopsy with portal pressures on 24.  Internal medicine consulted for medical management                                                ASSESSMENT AND PLAN:    Principal Problem:    Jaundice, non-  Active Problems:    Obstructive jaundice    Hepatic granuloma associated with sarcoidosis    Sarcoidosis of other sites  Resolved Problems:    * No resolved hospital problems. *    Painless jaundice  Transaminitis  Hyperbilirubinemia, improving after stent placement   Care per HBS and GI  S/p EUS sphincterotomy and stent placement   Workup thus far is negative   He has history of pulmonary sarcoidosis  Discussed with Dr. Escamilla  Likely affecting hepatobiliary system  Liver biopsy pending  Prolong steroid taper on discharge   Known to Dr. Cooper, follow up on DC       Acute kidney injury on CKD IIIb  High anion gap metabolic acidosis  Nephrology on board  Creatinine 1.5 today   Repeat BMP    Right ankle and toe pain  likely gout flare  uric acid pending

## 2024-09-20 NOTE — PROGRESS NOTES
[facility]  MD Bhupendra Avery MD Ali Hassan, MD Lisa Kniska, CNP   Zaira Cardona, LUKE Baeza, CNP  Progress note  Patient's Name: Hong Chase  2:26 PM  9/20/2024    Cr relatively stable  Remain with yellowish discoloration   NAD     9/16: Sitting up in bed. Does have some dyspnea. He is on oxygen via nasal canula. His girlfriend is bringing in his cpap. Appetite is fair. Vital signs are stable. For EUS tomorrow.     9/17: Laying in bed. Denies any acute complaints. Shortness of breath has improved. Concerned that his bilirubin keeps going up. NPO for EUS today.     9/18:  Not in room as he is having a colonoscopy.    9/19:  Sitting up in bed.  Feels much better.  Tolerated lunch tray.  Denies chest pain, palpitations or SOB.  Oxygen 2L/NC. Jaundiced.  Abdominal pain much improved.     9/20: Sitting up in bed, no acute distress. Vital signs are stable. He is being discharged this afternoon. Denies chest pain, dyspnea, or palpitations.     History of Present Ilness:         Mr. Chase is a pleasant 69-year-old gentleman who presented to the hospital with new onset jaundice.  He also reports that he has noticed itching, poor appetite, fatigue, and weakness over the past 2 days as well.  He reports that his urine has been darker in color and he has noticed a decrease in urinary output.  Workup in the hospital thus far has included an ultrasound of the gallbladder that showed cholelithiasis without evidence of cholecystitis and an MRCP that showed no choledocholithiasis or pancreatic mass.  Hepatobiliary surgery has been consulted and has planned for an interventional radiology transjugular liver biopsy with portal pressures as cardiomegaly and significant intrahepatic venous dilation were noted on the MRI.  His past medical history is significant for atrial fibrillation, aortic dissection, coronary artery disease, congestive heart failure, hypertension,  biopsy.         IR ALON CATH PLACE VENOUS 1ST ORDER   Final Result   1. Successful hepatic vein pressures as above with a portal/systemic gradient   of 7 mm Hg.   2. Successful uncomplicated transjugular liver core biopsy.         IR ALON CATH PLACE VENOUS 1ST ORDER   Final Result   1. Successful hepatic vein pressures as above with a portal/systemic gradient   of 7 mm Hg.   2. Successful uncomplicated transjugular liver core biopsy.         MRI ABDOMEN W WO CONTRAST MRCP   Final Result   1. No choledocholithiasis or pancreatic mass.   2. Cholelithiasis.         US GALLBLADDER RUQ   Final Result   1. Cholelithiasis without sonographic evidence of acute cholecystitis.   2. Ring down artifact in the gallbladder wall, which can be seen in the   setting of adenomyomatosis.             Assessment  Acute kidney injury in the setting of volume contraction due to poor oral intake. Urine indices were collected after fluid resuscitation making them difficult to interpret. HRS is likely contributing.   Chronic kidney disease stage IIIb secondary to renal microvascular atherosclerotic disease. Previous baseline creatinine level was 1.4-1.9 mg/dL. His baseline creatinine level may have gone up somewhat over the past year.   Hypertension  Jaundice     Creatinine 2.0-->1.9 mg/dl --> 1.5 (baseline)   BP stable    Plan  Losartan can be resumed at discharge   Replace phosphorus   Ok to discharge from renal standpoint   Follow up with Dr. Waggoner in the office in 3-4 weeks         Electronically signed by CRISTINA Amado CNP on 9/20/2024 at 2:26 PM

## 2024-09-20 NOTE — PLAN OF CARE
Problem: ABCDS Injury Assessment  Goal: Absence of physical injury  Outcome: Progressing     Problem: Chronic Conditions and Co-morbidities  Goal: Patient's chronic conditions and co-morbidity symptoms are monitored and maintained or improved  9/20/2024 0651 by Lizbet Hough RN  Outcome: Progressing     Problem: Discharge Planning  Goal: Discharge to home or other facility with appropriate resources  9/20/2024 0651 by Lizbet Hough RN  Outcome: Progressing     Problem: Pain  Goal: Verbalizes/displays adequate comfort level or baseline comfort level  9/20/2024 0651 by Lizbet Hough RN  Outcome: Progressing     Problem: Safety - Adult  Goal: Free from fall injury  9/20/2024 0651 by Lizbet Hough RN  Outcome: Progressing     Problem: Nutrition Deficit:  Goal: Optimize nutritional status  9/20/2024 0651 by Lizbet Hough RN  Outcome: Progressing

## 2024-09-20 NOTE — PROGRESS NOTES
Reached out to IM per [facility] request regarding writing steroid taper for discharge    Lizbet Hough RN

## 2024-09-20 NOTE — PROGRESS NOTES
Went over discharge papers with pt. All questions answered at this time. Ivs and heart monitor removed    Lizbet Hough RN

## 2024-09-21 LAB
CMV QNT BY NAAT, PLASMA INTERP: NOT DETECTED
CMV QNT BY NAAT, PLASMA IU/ML: NOT DETECTED IU/ML
CMV QNT BY NAAT, PLASMA LOG IU/ML: NOT DETECTED LOG IU/ML

## 2024-09-22 LAB
ACE SERPL-CCNC: <10 U/L (ref 16–85)
EBV QNT BY NAAT, PLASMA INTERP: NOT DETECTED
EBV QNT BY NAAT, PLASMA IU/ML: NOT DETECTED IU/ML
EBV QNT BY NAAT, PLASMA LOG: NOT DETECTED LOG IU/ML
HEPATITIS E AB, IGG: NEGATIVE
HEPATITIS E IGM AB: NEGATIVE

## 2024-09-23 NOTE — CARE COORDINATION
8/23 @ 9:03AM     reached out to patients PCP office Dr Kamron Chase at [phone] to schedule follow up D/C appointment.  spoke to Max and scheduled appointment on 9/25 1:30PM.      called patient at [phone] and provided an update on scheduled appointment day/time and patient was agreeable to going.

## 2024-09-24 ENCOUNTER — HOSPITAL ENCOUNTER (INPATIENT)
Age: 69
LOS: 3 days | Discharge: HOME | DRG: 920 | End: 2024-09-27
Attending: STUDENT IN AN ORGANIZED HEALTH CARE EDUCATION/TRAINING PROGRAM | Admitting: INTERNAL MEDICINE
Payer: MEDICARE

## 2024-09-24 DIAGNOSIS — D62 ACUTE BLOOD LOSS ANEMIA: ICD-10-CM

## 2024-09-24 DIAGNOSIS — I48.91 RATE CONTROLLED ATRIAL FIBRILLATION (HCC): ICD-10-CM

## 2024-09-24 DIAGNOSIS — K92.2 GASTROINTESTINAL HEMORRHAGE, UNSPECIFIED GASTROINTESTINAL HEMORRHAGE TYPE: ICD-10-CM

## 2024-09-24 DIAGNOSIS — R17 JAUNDICE, NON-NEONATAL: Primary | ICD-10-CM

## 2024-09-24 DIAGNOSIS — K62.5 RECTAL BLEEDING: ICD-10-CM

## 2024-09-24 PROBLEM — E87.5 HYPERKALEMIA: Status: ACTIVE | Noted: 2024-09-24

## 2024-09-24 PROBLEM — M79.89 SWELLING OF BOTH LOWER EXTREMITIES: Status: ACTIVE | Noted: 2024-09-24

## 2024-09-24 LAB
ALBUMIN SERPL-MCNC: 3.7 G/DL (ref 3.5–5.2)
ALP SERPL-CCNC: 110 U/L (ref 40–129)
ALT SERPL-CCNC: 40 U/L (ref 0–40)
ANION GAP SERPL CALCULATED.3IONS-SCNC: 11 MMOL/L (ref 7–16)
AST SERPL-CCNC: 25 U/L (ref 0–39)
BASOPHILS # BLD: 0 K/UL (ref 0–0.2)
BASOPHILS NFR BLD: 0 % (ref 0–2)
BILIRUB SERPL-MCNC: 11.9 MG/DL (ref 0–1.2)
BUN SERPL-MCNC: 64 MG/DL (ref 6–23)
CALCIUM SERPL-MCNC: 9.1 MG/DL (ref 8.6–10.2)
CHLORIDE SERPL-SCNC: 102 MMOL/L (ref 98–107)
CO2 SERPL-SCNC: 22 MMOL/L (ref 22–29)
CREAT SERPL-MCNC: 1.5 MG/DL (ref 0.7–1.2)
EOSINOPHIL # BLD: 0 K/UL (ref 0.05–0.5)
EOSINOPHILS RELATIVE PERCENT: 0 % (ref 0–6)
ERYTHROCYTE [DISTWIDTH] IN BLOOD BY AUTOMATED COUNT: 20.4 % (ref 11.5–15)
GFR, ESTIMATED: 49 ML/MIN/1.73M2
GLUCOSE SERPL-MCNC: 152 MG/DL (ref 74–99)
HCT VFR BLD AUTO: 20.6 % (ref 37–54)
HGB BLD-MCNC: 6.8 G/DL (ref 12.5–16.5)
INR PPP: 1.1
LACTATE BLDV-SCNC: 1 MMOL/L (ref 0.5–2.2)
LIPASE SERPL-CCNC: 62 U/L (ref 13–60)
LYMPHOCYTES NFR BLD: 0.27 K/UL (ref 1.5–4)
LYMPHOCYTES RELATIVE PERCENT: 3 % (ref 20–42)
MCH RBC QN AUTO: 32.1 PG (ref 26–35)
MCHC RBC AUTO-ENTMCNC: 33 G/DL (ref 32–34.5)
MCV RBC AUTO: 97.2 FL (ref 80–99.9)
METAMYELOCYTES ABSOLUTE COUNT: 0.09 K/UL (ref 0–0.12)
METAMYELOCYTES: 1 % (ref 0–1)
MONOCYTES NFR BLD: 0.82 K/UL (ref 0.1–0.95)
MONOCYTES NFR BLD: 9 % (ref 2–12)
MYELOCYTES ABSOLUTE COUNT: 0.18 K/UL
MYELOCYTES: 2 %
NEUTROPHILS NFR BLD: 86 % (ref 43–80)
NEUTS SEG NFR BLD: 8.24 K/UL (ref 1.8–7.3)
NUCLEATED RED BLOOD CELLS: 3 PER 100 WBC
PARTIAL THROMBOPLASTIN TIME: 26.1 SEC (ref 24.5–35.1)
PLATELET # BLD AUTO: 333 K/UL (ref 130–450)
PMV BLD AUTO: 9.9 FL (ref 7–12)
POTASSIUM SERPL-SCNC: 5.2 MMOL/L (ref 3.5–5)
PROT SERPL-MCNC: 6 G/DL (ref 6.4–8.3)
PROTHROMBIN TIME: 12.4 SEC (ref 9.3–12.4)
RBC # BLD AUTO: 2.12 M/UL (ref 3.8–5.8)
RBC # BLD: ABNORMAL 10*6/UL
SODIUM SERPL-SCNC: 135 MMOL/L (ref 132–146)
TROPONIN I SERPL HS-MCNC: 27 NG/L (ref 0–11)
WBC OTHER # BLD: 9.6 K/UL (ref 4.5–11.5)

## 2024-09-24 PROCEDURE — 6370000000 HC RX 637 (ALT 250 FOR IP): Performed by: INTERNAL MEDICINE

## 2024-09-24 PROCEDURE — 36430 TRANSFUSION BLD/BLD COMPNT: CPT

## 2024-09-24 PROCEDURE — 85025 COMPLETE CBC W/AUTO DIFF WBC: CPT

## 2024-09-24 PROCEDURE — 2580000003 HC RX 258: Performed by: STUDENT IN AN ORGANIZED HEALTH CARE EDUCATION/TRAINING PROGRAM

## 2024-09-24 PROCEDURE — 99285 EMERGENCY DEPT VISIT HI MDM: CPT

## 2024-09-24 PROCEDURE — 93005 ELECTROCARDIOGRAM TRACING: CPT | Performed by: STUDENT IN AN ORGANIZED HEALTH CARE EDUCATION/TRAINING PROGRAM

## 2024-09-24 PROCEDURE — 86901 BLOOD TYPING SEROLOGIC RH(D): CPT

## 2024-09-24 PROCEDURE — P9016 RBC LEUKOCYTES REDUCED: HCPCS

## 2024-09-24 PROCEDURE — 86923 COMPATIBILITY TEST ELECTRIC: CPT

## 2024-09-24 PROCEDURE — 84484 ASSAY OF TROPONIN QUANT: CPT

## 2024-09-24 PROCEDURE — 99223 1ST HOSP IP/OBS HIGH 75: CPT | Performed by: INTERNAL MEDICINE

## 2024-09-24 PROCEDURE — 6360000002 HC RX W HCPCS: Performed by: INTERNAL MEDICINE

## 2024-09-24 PROCEDURE — 2060000000 HC ICU INTERMEDIATE R&B

## 2024-09-24 PROCEDURE — 80053 COMPREHEN METABOLIC PANEL: CPT

## 2024-09-24 PROCEDURE — 30233N1 TRANSFUSION OF NONAUTOLOGOUS RED BLOOD CELLS INTO PERIPHERAL VEIN, PERCUTANEOUS APPROACH: ICD-10-PCS | Performed by: STUDENT IN AN ORGANIZED HEALTH CARE EDUCATION/TRAINING PROGRAM

## 2024-09-24 PROCEDURE — 86850 RBC ANTIBODY SCREEN: CPT

## 2024-09-24 PROCEDURE — 85610 PROTHROMBIN TIME: CPT

## 2024-09-24 PROCEDURE — 83690 ASSAY OF LIPASE: CPT

## 2024-09-24 PROCEDURE — 85730 THROMBOPLASTIN TIME PARTIAL: CPT

## 2024-09-24 PROCEDURE — 83605 ASSAY OF LACTIC ACID: CPT

## 2024-09-24 PROCEDURE — 86900 BLOOD TYPING SEROLOGIC ABO: CPT

## 2024-09-24 PROCEDURE — 6360000002 HC RX W HCPCS: Performed by: STUDENT IN AN ORGANIZED HEALTH CARE EDUCATION/TRAINING PROGRAM

## 2024-09-24 RX ORDER — AMLODIPINE BESYLATE 5 MG/1
5 TABLET ORAL DAILY
Status: DISCONTINUED | OUTPATIENT
Start: 2024-09-25 | End: 2024-09-27 | Stop reason: HOSPADM

## 2024-09-24 RX ORDER — SODIUM CHLORIDE 0.9 % (FLUSH) 0.9 %
5-40 SYRINGE (ML) INJECTION EVERY 12 HOURS SCHEDULED
Status: DISCONTINUED | OUTPATIENT
Start: 2024-09-24 | End: 2024-09-27 | Stop reason: HOSPADM

## 2024-09-24 RX ORDER — ACETAMINOPHEN 650 MG/1
650 SUPPOSITORY RECTAL EVERY 6 HOURS PRN
Status: DISCONTINUED | OUTPATIENT
Start: 2024-09-24 | End: 2024-09-27 | Stop reason: HOSPADM

## 2024-09-24 RX ORDER — SENNOSIDES A AND B 8.6 MG/1
2 TABLET, FILM COATED ORAL 2 TIMES DAILY
Status: DISCONTINUED | OUTPATIENT
Start: 2024-09-24 | End: 2024-09-27 | Stop reason: HOSPADM

## 2024-09-24 RX ORDER — ISOSORBIDE DINITRATE 10 MG/1
10 TABLET ORAL 3 TIMES DAILY
Status: DISCONTINUED | OUTPATIENT
Start: 2024-09-25 | End: 2024-09-27 | Stop reason: HOSPADM

## 2024-09-24 RX ORDER — ONDANSETRON 4 MG/1
4 TABLET, ORALLY DISINTEGRATING ORAL EVERY 8 HOURS PRN
Status: DISCONTINUED | OUTPATIENT
Start: 2024-09-24 | End: 2024-09-27 | Stop reason: HOSPADM

## 2024-09-24 RX ORDER — SODIUM CHLORIDE 9 MG/ML
INJECTION, SOLUTION INTRAVENOUS PRN
Status: DISCONTINUED | OUTPATIENT
Start: 2024-09-24 | End: 2024-09-27 | Stop reason: HOSPADM

## 2024-09-24 RX ORDER — ONDANSETRON 2 MG/ML
4 INJECTION INTRAMUSCULAR; INTRAVENOUS EVERY 6 HOURS PRN
Status: DISCONTINUED | OUTPATIENT
Start: 2024-09-24 | End: 2024-09-27 | Stop reason: HOSPADM

## 2024-09-24 RX ORDER — ACETAMINOPHEN 325 MG/1
650 TABLET ORAL EVERY 6 HOURS PRN
Status: DISCONTINUED | OUTPATIENT
Start: 2024-09-24 | End: 2024-09-27 | Stop reason: HOSPADM

## 2024-09-24 RX ORDER — URSODIOL 300 MG/1
300 CAPSULE ORAL 2 TIMES DAILY
Status: DISCONTINUED | OUTPATIENT
Start: 2024-09-24 | End: 2024-09-27 | Stop reason: HOSPADM

## 2024-09-24 RX ORDER — SODIUM CHLORIDE 0.9 % (FLUSH) 0.9 %
5-40 SYRINGE (ML) INJECTION PRN
Status: DISCONTINUED | OUTPATIENT
Start: 2024-09-24 | End: 2024-09-27 | Stop reason: HOSPADM

## 2024-09-24 RX ORDER — DIGOXIN 125 MCG
62.5 TABLET ORAL DAILY
Status: DISCONTINUED | OUTPATIENT
Start: 2024-09-25 | End: 2024-09-27 | Stop reason: HOSPADM

## 2024-09-24 RX ORDER — CARVEDILOL 6.25 MG/1
12.5 TABLET ORAL 2 TIMES DAILY WITH MEALS
Status: DISCONTINUED | OUTPATIENT
Start: 2024-09-25 | End: 2024-09-27 | Stop reason: HOSPADM

## 2024-09-24 RX ORDER — HYDRALAZINE HYDROCHLORIDE 50 MG/1
50 TABLET, FILM COATED ORAL 3 TIMES DAILY
Status: DISCONTINUED | OUTPATIENT
Start: 2024-09-24 | End: 2024-09-27 | Stop reason: HOSPADM

## 2024-09-24 RX ORDER — POLYETHYLENE GLYCOL 3350 17 G/17G
17 POWDER, FOR SOLUTION ORAL DAILY
Status: DISCONTINUED | OUTPATIENT
Start: 2024-09-25 | End: 2024-09-27 | Stop reason: HOSPADM

## 2024-09-24 RX ORDER — M-VIT,TX,IRON,MINS/CALC/FOLIC 27MG-0.4MG
1 TABLET ORAL DAILY
Status: DISCONTINUED | OUTPATIENT
Start: 2024-09-25 | End: 2024-09-27 | Stop reason: HOSPADM

## 2024-09-24 RX ORDER — DIPHENHYDRAMINE HYDROCHLORIDE 50 MG/ML
25 INJECTION INTRAMUSCULAR; INTRAVENOUS EVERY 6 HOURS PRN
Status: DISCONTINUED | OUTPATIENT
Start: 2024-09-24 | End: 2024-09-27 | Stop reason: HOSPADM

## 2024-09-24 RX ADMIN — PANTOPRAZOLE SODIUM 80 MG: 40 INJECTION, POWDER, FOR SOLUTION INTRAVENOUS at 21:19

## 2024-09-24 RX ADMIN — SENNOSIDES 17.2 MG: 8.6 TABLET, FILM COATED ORAL at 22:13

## 2024-09-24 RX ADMIN — DIPHENHYDRAMINE HYDROCHLORIDE 25 MG: 50 INJECTION INTRAMUSCULAR; INTRAVENOUS at 22:30

## 2024-09-24 RX ADMIN — HYDRALAZINE HYDROCHLORIDE 50 MG: 50 TABLET ORAL at 22:13

## 2024-09-24 ASSESSMENT — PAIN - FUNCTIONAL ASSESSMENT: PAIN_FUNCTIONAL_ASSESSMENT: NONE - DENIES PAIN

## 2024-09-25 ENCOUNTER — ANESTHESIA (OUTPATIENT)
Dept: ENDOSCOPY | Age: 69
End: 2024-09-25
Payer: MEDICARE

## 2024-09-25 ENCOUNTER — ANESTHESIA EVENT (OUTPATIENT)
Dept: ENDOSCOPY | Age: 69
End: 2024-09-25
Payer: MEDICARE

## 2024-09-25 LAB
ALBUMIN SERPL-MCNC: 3.3 G/DL (ref 3.5–5.2)
ALP SERPL-CCNC: 98 U/L (ref 40–129)
ALT SERPL-CCNC: 35 U/L (ref 0–40)
ANION GAP SERPL CALCULATED.3IONS-SCNC: 12 MMOL/L (ref 7–16)
AST SERPL-CCNC: 20 U/L (ref 0–39)
BILIRUB SERPL-MCNC: 10.6 MG/DL (ref 0–1.2)
BUN SERPL-MCNC: 62 MG/DL (ref 6–23)
CALCIUM SERPL-MCNC: 8.4 MG/DL (ref 8.6–10.2)
CHLORIDE SERPL-SCNC: 105 MMOL/L (ref 98–107)
CO2 SERPL-SCNC: 21 MMOL/L (ref 22–29)
CREAT SERPL-MCNC: 1.4 MG/DL (ref 0.7–1.2)
EKG ATRIAL RATE: 83 BPM
EKG Q-T INTERVAL: 376 MS
EKG QRS DURATION: 112 MS
EKG QTC CALCULATION (BAZETT): 417 MS
EKG R AXIS: 68 DEGREES
EKG T AXIS: 76 DEGREES
EKG VENTRICULAR RATE: 74 BPM
ERYTHROCYTE [DISTWIDTH] IN BLOOD BY AUTOMATED COUNT: 19.5 % (ref 11.5–15)
FERRITIN SERPL-MCNC: 118 NG/ML
GFR, ESTIMATED: 53 ML/MIN/1.73M2
GLUCOSE SERPL-MCNC: 134 MG/DL (ref 74–99)
HCT VFR BLD AUTO: 21.4 % (ref 37–54)
HCT VFR BLD AUTO: 24.5 % (ref 37–54)
HCT VFR BLD AUTO: 25.2 % (ref 37–54)
HGB BLD-MCNC: 7.1 G/DL (ref 12.5–16.5)
HGB BLD-MCNC: 8.2 G/DL (ref 12.5–16.5)
HGB BLD-MCNC: 8.4 G/DL (ref 12.5–16.5)
INR PPP: 1.2
IRON SATN MFR SERPL: 33 % (ref 20–55)
IRON SERPL-MCNC: 70 UG/DL (ref 59–158)
MAGNESIUM SERPL-MCNC: 2.5 MG/DL (ref 1.6–2.6)
MCH RBC QN AUTO: 31.4 PG (ref 26–35)
MCHC RBC AUTO-ENTMCNC: 33.2 G/DL (ref 32–34.5)
MCV RBC AUTO: 94.7 FL (ref 80–99.9)
PARTIAL THROMBOPLASTIN TIME: 27.7 SEC (ref 24.5–35.1)
PHOSPHATE SERPL-MCNC: 3.7 MG/DL (ref 2.5–4.5)
PLATELET # BLD AUTO: 300 K/UL (ref 130–450)
PMV BLD AUTO: 10 FL (ref 7–12)
POTASSIUM SERPL-SCNC: 4.6 MMOL/L (ref 3.5–5)
PROT SERPL-MCNC: 5.3 G/DL (ref 6.4–8.3)
PROTHROMBIN TIME: 13 SEC (ref 9.3–12.4)
RBC # BLD AUTO: 2.26 M/UL (ref 3.8–5.8)
SODIUM SERPL-SCNC: 138 MMOL/L (ref 132–146)
SURGICAL PATHOLOGY REPORT: NORMAL
TIBC SERPL-MCNC: 213 UG/DL (ref 250–450)
WBC OTHER # BLD: 7.4 K/UL (ref 4.5–11.5)

## 2024-09-25 PROCEDURE — 0W3P8ZZ CONTROL BLEEDING IN GASTROINTESTINAL TRACT, VIA NATURAL OR ARTIFICIAL OPENING ENDOSCOPIC: ICD-10-PCS | Performed by: INTERNAL MEDICINE

## 2024-09-25 PROCEDURE — 3609013000 HC EGD TRANSORAL CONTROL BLEEDING ANY METHOD: Performed by: INTERNAL MEDICINE

## 2024-09-25 PROCEDURE — 6360000002 HC RX W HCPCS

## 2024-09-25 PROCEDURE — 85027 COMPLETE CBC AUTOMATED: CPT

## 2024-09-25 PROCEDURE — 6360000002 HC RX W HCPCS: Performed by: INTERNAL MEDICINE

## 2024-09-25 PROCEDURE — 2060000000 HC ICU INTERMEDIATE R&B

## 2024-09-25 PROCEDURE — 99233 SBSQ HOSP IP/OBS HIGH 50: CPT | Performed by: STUDENT IN AN ORGANIZED HEALTH CARE EDUCATION/TRAINING PROGRAM

## 2024-09-25 PROCEDURE — 6360000002 HC RX W HCPCS: Performed by: ANESTHESIOLOGY

## 2024-09-25 PROCEDURE — 3700000001 HC ADD 15 MINUTES (ANESTHESIA): Performed by: INTERNAL MEDICINE

## 2024-09-25 PROCEDURE — 6370000000 HC RX 637 (ALT 250 FOR IP): Performed by: STUDENT IN AN ORGANIZED HEALTH CARE EDUCATION/TRAINING PROGRAM

## 2024-09-25 PROCEDURE — 6370000000 HC RX 637 (ALT 250 FOR IP): Performed by: INTERNAL MEDICINE

## 2024-09-25 PROCEDURE — 2720000010 HC SURG SUPPLY STERILE: Performed by: INTERNAL MEDICINE

## 2024-09-25 PROCEDURE — 84100 ASSAY OF PHOSPHORUS: CPT

## 2024-09-25 PROCEDURE — 83540 ASSAY OF IRON: CPT

## 2024-09-25 PROCEDURE — 85730 THROMBOPLASTIN TIME PARTIAL: CPT

## 2024-09-25 PROCEDURE — 83550 IRON BINDING TEST: CPT

## 2024-09-25 PROCEDURE — 80053 COMPREHEN METABOLIC PANEL: CPT

## 2024-09-25 PROCEDURE — 2580000003 HC RX 258

## 2024-09-25 PROCEDURE — 85610 PROTHROMBIN TIME: CPT

## 2024-09-25 PROCEDURE — 82728 ASSAY OF FERRITIN: CPT

## 2024-09-25 PROCEDURE — 7100000000 HC PACU RECOVERY - FIRST 15 MIN: Performed by: INTERNAL MEDICINE

## 2024-09-25 PROCEDURE — 3E0G8GC INTRODUCTION OF OTHER THERAPEUTIC SUBSTANCE INTO UPPER GI, VIA NATURAL OR ARTIFICIAL OPENING ENDOSCOPIC: ICD-10-PCS | Performed by: INTERNAL MEDICINE

## 2024-09-25 PROCEDURE — 2580000003 HC RX 258: Performed by: INTERNAL MEDICINE

## 2024-09-25 PROCEDURE — 3700000000 HC ANESTHESIA ATTENDED CARE: Performed by: INTERNAL MEDICINE

## 2024-09-25 PROCEDURE — 85018 HEMOGLOBIN: CPT

## 2024-09-25 PROCEDURE — 83735 ASSAY OF MAGNESIUM: CPT

## 2024-09-25 PROCEDURE — XW0G886 INTRODUCTION OF MINERAL-BASED TOPICAL HEMOSTATIC AGENT INTO UPPER GI, VIA NATURAL OR ARTIFICIAL OPENING ENDOSCOPIC, NEW TECHNOLOGY GROUP 6: ICD-10-PCS | Performed by: INTERNAL MEDICINE

## 2024-09-25 PROCEDURE — C1713 ANCHOR/SCREW BN/BN,TIS/BN: HCPCS | Performed by: INTERNAL MEDICINE

## 2024-09-25 PROCEDURE — P9016 RBC LEUKOCYTES REDUCED: HCPCS

## 2024-09-25 PROCEDURE — 36430 TRANSFUSION BLD/BLD COMPNT: CPT

## 2024-09-25 PROCEDURE — 36415 COLL VENOUS BLD VENIPUNCTURE: CPT

## 2024-09-25 PROCEDURE — 7100000001 HC PACU RECOVERY - ADDTL 15 MIN: Performed by: INTERNAL MEDICINE

## 2024-09-25 PROCEDURE — 2709999900 HC NON-CHARGEABLE SUPPLY: Performed by: INTERNAL MEDICINE

## 2024-09-25 PROCEDURE — C1052 HEMOSTATIC AGENT, GI, TOPIC: HCPCS | Performed by: INTERNAL MEDICINE

## 2024-09-25 PROCEDURE — 93010 ELECTROCARDIOGRAM REPORT: CPT | Performed by: INTERNAL MEDICINE

## 2024-09-25 PROCEDURE — 85014 HEMATOCRIT: CPT

## 2024-09-25 DEVICE — OPEN WIDTH11±1MM THE MAX OD OF INSERTION PART2.6MM WORKING LENGTH2350MM COATED
Type: IMPLANTABLE DEVICE | Status: FUNCTIONAL
Brand: DURACLIP(TM) REPOSITIONABLE HEMOSTASIS CLIP

## 2024-09-25 RX ORDER — PROPOFOL 10 MG/ML
INJECTION, EMULSION INTRAVENOUS
Status: DISCONTINUED | OUTPATIENT
Start: 2024-09-25 | End: 2024-09-25 | Stop reason: SDUPTHER

## 2024-09-25 RX ORDER — PREDNISONE 20 MG/1
40 TABLET ORAL ONCE
Status: COMPLETED | OUTPATIENT
Start: 2024-09-25 | End: 2024-09-25

## 2024-09-25 RX ORDER — SODIUM CHLORIDE 9 MG/ML
INJECTION, SOLUTION INTRAVENOUS PRN
Status: DISCONTINUED | OUTPATIENT
Start: 2024-09-25 | End: 2024-09-25 | Stop reason: HOSPADM

## 2024-09-25 RX ORDER — SODIUM CHLORIDE 9 MG/ML
INJECTION, SOLUTION INTRAVENOUS PRN
Status: DISCONTINUED | OUTPATIENT
Start: 2024-09-25 | End: 2024-09-27 | Stop reason: HOSPADM

## 2024-09-25 RX ORDER — EPINEPHRINE 1 MG/ML(1)
AMPUL (ML) INJECTION PRN
Status: DISCONTINUED | OUTPATIENT
Start: 2024-09-25 | End: 2024-09-25 | Stop reason: ALTCHOICE

## 2024-09-25 RX ORDER — PROCHLORPERAZINE EDISYLATE 5 MG/ML
5 INJECTION INTRAMUSCULAR; INTRAVENOUS
Status: DISCONTINUED | OUTPATIENT
Start: 2024-09-25 | End: 2024-09-25 | Stop reason: HOSPADM

## 2024-09-25 RX ORDER — IPRATROPIUM BROMIDE AND ALBUTEROL SULFATE 2.5; .5 MG/3ML; MG/3ML
1 SOLUTION RESPIRATORY (INHALATION)
Status: DISCONTINUED | OUTPATIENT
Start: 2024-09-25 | End: 2024-09-25 | Stop reason: HOSPADM

## 2024-09-25 RX ORDER — SODIUM CHLORIDE 9 MG/ML
25 INJECTION, SOLUTION INTRAVENOUS PRN
Status: CANCELLED | OUTPATIENT
Start: 2024-09-25

## 2024-09-25 RX ORDER — ONDANSETRON 2 MG/ML
4 INJECTION INTRAMUSCULAR; INTRAVENOUS
Status: COMPLETED | OUTPATIENT
Start: 2024-09-25 | End: 2024-09-25

## 2024-09-25 RX ORDER — DIPHENHYDRAMINE HYDROCHLORIDE 50 MG/ML
12.5 INJECTION INTRAMUSCULAR; INTRAVENOUS
Status: DISCONTINUED | OUTPATIENT
Start: 2024-09-25 | End: 2024-09-25 | Stop reason: HOSPADM

## 2024-09-25 RX ORDER — SODIUM CHLORIDE 0.9 % (FLUSH) 0.9 %
5-40 SYRINGE (ML) INJECTION EVERY 12 HOURS SCHEDULED
Status: CANCELLED | OUTPATIENT
Start: 2024-09-25

## 2024-09-25 RX ORDER — PREDNISONE 20 MG/1
20 TABLET ORAL DAILY
Status: DISCONTINUED | OUTPATIENT
Start: 2024-09-25 | End: 2024-09-25

## 2024-09-25 RX ORDER — SODIUM CHLORIDE 0.9 % (FLUSH) 0.9 %
5-40 SYRINGE (ML) INJECTION PRN
Status: CANCELLED | OUTPATIENT
Start: 2024-09-25

## 2024-09-25 RX ORDER — SODIUM CHLORIDE 0.9 % (FLUSH) 0.9 %
5-40 SYRINGE (ML) INJECTION EVERY 12 HOURS SCHEDULED
Status: DISCONTINUED | OUTPATIENT
Start: 2024-09-25 | End: 2024-09-25 | Stop reason: HOSPADM

## 2024-09-25 RX ORDER — NALOXONE HYDROCHLORIDE 0.4 MG/ML
INJECTION, SOLUTION INTRAMUSCULAR; INTRAVENOUS; SUBCUTANEOUS PRN
Status: DISCONTINUED | OUTPATIENT
Start: 2024-09-25 | End: 2024-09-25 | Stop reason: HOSPADM

## 2024-09-25 RX ORDER — SODIUM CHLORIDE 9 MG/ML
INJECTION, SOLUTION INTRAVENOUS
Status: DISCONTINUED | OUTPATIENT
Start: 2024-09-25 | End: 2024-09-25 | Stop reason: SDUPTHER

## 2024-09-25 RX ORDER — SODIUM CHLORIDE 0.9 % (FLUSH) 0.9 %
5-40 SYRINGE (ML) INJECTION PRN
Status: DISCONTINUED | OUTPATIENT
Start: 2024-09-25 | End: 2024-09-25 | Stop reason: HOSPADM

## 2024-09-25 RX ORDER — SIMETHICONE 40MG/0.6ML
SUSPENSION, DROPS(FINAL DOSAGE FORM)(ML) ORAL PRN
Status: DISCONTINUED | OUTPATIENT
Start: 2024-09-25 | End: 2024-09-25 | Stop reason: ALTCHOICE

## 2024-09-25 RX ORDER — LIDOCAINE HYDROCHLORIDE 20 MG/ML
INJECTION, SOLUTION INTRAVENOUS
Status: DISCONTINUED | OUTPATIENT
Start: 2024-09-25 | End: 2024-09-25 | Stop reason: SDUPTHER

## 2024-09-25 RX ORDER — DEXTROSE MONOHYDRATE 100 MG/ML
INJECTION, SOLUTION INTRAVENOUS CONTINUOUS PRN
Status: DISCONTINUED | OUTPATIENT
Start: 2024-09-25 | End: 2024-09-25 | Stop reason: HOSPADM

## 2024-09-25 RX ADMIN — ISOSORBIDE DINITRATE 10 MG: 10 TABLET ORAL at 08:52

## 2024-09-25 RX ADMIN — SODIUM CHLORIDE: 9 INJECTION, SOLUTION INTRAVENOUS at 15:40

## 2024-09-25 RX ADMIN — LIDOCAINE HYDROCHLORIDE 100 MG: 20 INJECTION, SOLUTION INTRAVENOUS at 14:29

## 2024-09-25 RX ADMIN — PANTOPRAZOLE SODIUM 40 MG: 40 INJECTION, POWDER, FOR SOLUTION INTRAVENOUS at 08:18

## 2024-09-25 RX ADMIN — AMLODIPINE BESYLATE 5 MG: 5 TABLET ORAL at 08:17

## 2024-09-25 RX ADMIN — SODIUM CHLORIDE, PRESERVATIVE FREE 10 ML: 5 INJECTION INTRAVENOUS at 20:46

## 2024-09-25 RX ADMIN — HYDRALAZINE HYDROCHLORIDE 50 MG: 50 TABLET ORAL at 20:45

## 2024-09-25 RX ADMIN — DIGOXIN 62.5 MCG: 125 TABLET ORAL at 08:15

## 2024-09-25 RX ADMIN — SENNOSIDES 17.2 MG: 8.6 TABLET, FILM COATED ORAL at 20:45

## 2024-09-25 RX ADMIN — DIPHENHYDRAMINE HYDROCHLORIDE 25 MG: 50 INJECTION INTRAMUSCULAR; INTRAVENOUS at 12:34

## 2024-09-25 RX ADMIN — PROPOFOL 130 MG: 10 INJECTION, EMULSION INTRAVENOUS at 15:39

## 2024-09-25 RX ADMIN — URSODIOL 300 MG: 300 CAPSULE ORAL at 08:59

## 2024-09-25 RX ADMIN — Medication 1 TABLET: at 08:52

## 2024-09-25 RX ADMIN — DIPHENHYDRAMINE HYDROCHLORIDE 25 MG: 50 INJECTION INTRAMUSCULAR; INTRAVENOUS at 20:53

## 2024-09-25 RX ADMIN — CARVEDILOL 12.5 MG: 6.25 TABLET, FILM COATED ORAL at 08:15

## 2024-09-25 RX ADMIN — POLYETHYLENE GLYCOL 3350 17 G: 17 POWDER, FOR SOLUTION ORAL at 09:03

## 2024-09-25 RX ADMIN — SODIUM CHLORIDE: 9 INJECTION, SOLUTION INTRAVENOUS at 14:24

## 2024-09-25 RX ADMIN — ISOSORBIDE DINITRATE 10 MG: 10 TABLET ORAL at 17:55

## 2024-09-25 RX ADMIN — PANTOPRAZOLE SODIUM 40 MG: 40 INJECTION, POWDER, FOR SOLUTION INTRAVENOUS at 20:45

## 2024-09-25 RX ADMIN — PREDNISONE 40 MG: 20 TABLET ORAL at 12:34

## 2024-09-25 RX ADMIN — PROPOFOL 800 MG: 10 INJECTION, EMULSION INTRAVENOUS at 14:28

## 2024-09-25 RX ADMIN — ONDANSETRON 4 MG: 2 INJECTION INTRAMUSCULAR; INTRAVENOUS at 16:29

## 2024-09-25 RX ADMIN — URSODIOL 300 MG: 300 CAPSULE ORAL at 20:53

## 2024-09-25 RX ADMIN — CARVEDILOL 12.5 MG: 6.25 TABLET, FILM COATED ORAL at 17:55

## 2024-09-25 RX ADMIN — HYDRALAZINE HYDROCHLORIDE 50 MG: 50 TABLET ORAL at 08:16

## 2024-09-25 ASSESSMENT — PAIN - FUNCTIONAL ASSESSMENT: PAIN_FUNCTIONAL_ASSESSMENT: ADULT NONVERBAL PAIN SCALE (NPVS)

## 2024-09-25 NOTE — CONSULTS
[facility]  MD Bhupendra Avery MD Ali Hassan, MD Lisa Kniska, MARTIN Cardona, LUKE Baeza, CNP  Consultation    Patient's Name: Hong Chase  3:56 PM  9/25/2024    Nephrologist: Bhupendra Newton MD    Reason for Consult: JASS, CKD  Requesting Physician:  Kamron Chase DO    Chief Complaint: Melena    History Obtained From: Patient, chart    History of Present Ilness:    Mr. Chase is a pleasant 69-year-old gentleman with a complicated past medical history as noted below.  He has had a recent episode of acute kidney injury, from which she has been recovering slowly over the past several weeks.  He has chronic kidney disease the baseline serum creatinine around 1.3 to 1.5 mg/dL.  He has a history of sarcoidosis, no renal involvement,pulmonary involvement has been absent for some time, though tells me he is having an exacerbation of sarcoid in his liver and is currently being treated with an oral steroid pulse.  He presented to the emergency department last evening with melena without hematochezia of at least several days duration accompanied by lightheadedness, generalized weakness and fatigue, poor exercise tolerance denies chest pain or palpitations.  He has been lightheaded.  No fever or chill.  No chest pain or palpitations.  Denies dyspnea.  No peripheral swelling.    Hemoglobin on presentation was 6.7 g/dL, compares with a hemoglobin of 10.1 g/dL on 9/19, and 11.1 on 9/15 serum creatinine presentation was 1.5 mg/dL, improving to 1.4 mg/dL as of this morning.  Creatinine on 9/19 was also 1.5 mg/dL, on 9/13 was 2.3, having improved steadily since that time.    He has received 2 units PRBCs.  Received IV fluid.  Tells me is feeling much better now than he did at the time of his presentation last evening.  He is awaiting endoscopy.    Past Medical History:   Diagnosis Date    Acute kidney injury (HCC)     Aortic dissection (HCC)     Atrial  FERRITIN 118 09/25/2024 07:13 AM        Imaging:  No orders to display       Assessment  Chronic kidney disease stage IIIa due to renal microvascular atherosclerotic disease in the setting of known coronary and peripheral arterial disease, as well as history of JASS with incomplete recovery.  Recent history of JASS, recovering, also hemodynamically mediated due to anemia  Anemia, severe, due to acute blood loss, probably superimposed on mild anemia due to CKD.  Iron stores are within normal limits, there were drawn today after receiving PRBCs, though also normal on 9/13/2024    Recommendations  Continue conservative-rate IV fluid while n.p.o.  Transfuse transfuse PRBCs to keep hemoglobin > 8 in the setting of the bleed  Avoid nephrotoxic exposure as able  Continue supportive care  Follow labs, UO      Thank you for the opportunity to participate in the care of your pleasant patient.  We look forward to following along with you.        Electronically signed by Bhupendra Newton MD on 9/25/2024 at 3:56 PM    NOTE: This report was transcribed using voice recognition software. Every effort was made to ensure accuracy; however, inadvertent transcription errors may be presen

## 2024-09-25 NOTE — DISCHARGE SUMMARY
Physician Discharge Summary     Patient ID:  Hong Chase  [id number]  69 y.o.  1955    Admit date: 2024    Discharge date and time: 2024  7:01 PM     Admitting Physician: Deni Escamilla III, MD     Admission Diagnoses: Jaundice [R17]  Jaundice, non- [R17]  Biliary calculus of other site with obstruction [K80.81]    Discharge Diagnoses: Principal Problem:    Jaundice, non-  Active Problems:    Obstructive jaundice    Hepatic granuloma associated with sarcoidosis    Sarcoidosis of other sites  Resolved Problems:    * No resolved hospital problems. *      Admission Condition: fair    Discharged Condition: stable    Indication for Admission: New onset painless jaundice    Hospital Course/Procedures/Operation/treatments:   Presented with jaundice and concern for biliary obstruction. CTA unrevealing. Underwent EUS/ERCP with stent insertion  as well as liver biopsy prior to this. Patients symptoms improved over his stay, tolerating diet. Ok for discharge with followup to discuss path results as an outpatient             Consults:   IP CONSULT TO GENERAL SURGERY  IP CONSULT TO GI  IP CONSULT TO NEPHROLOGY  IP CONSULT TO HOSPITALIST  IP CONSULT TO CARDIOLOGY    Significant Diagnostic Studies:   No results found.    Discharge Exam:  - General: No acute distress. Awake and conversant.  - CV: Regular rate.  - Respiratory: Respirations are non-labored   - Abdomen: Soft, minimally tender, minimally distended  - Skin: Warm. No rashes or ulcers.  - Extremities: No cyanosis or edema.       Disposition: home    In process/preliminary results:  Outstanding Order Results       No orders found from 2024 to 2024.            Patient Instructions:   Discharge Medication List as of 2024  1:18 PM             Details   polyethylene glycol (GLYCOLAX) 17 g packet Take 1 packet by mouth daily, Disp-30 packet, R-0Normal      ursodiol (ACTIGALL) 300 MG capsule Take 1 capsule by mouth 2

## 2024-09-25 NOTE — CONSENT
Informed Consent for Blood Component Transfusion Note    I have discussed with the patient the rationale for blood component transfusion; its benefits in treating or preventing fatigue, organ damage, or death; and its risk which includes mild transfusion reactions, rare risk of blood borne infection, or more serious but rare reactions. I have discussed the alternatives to transfusion, including the risk and consequences of not receiving transfusion. The patient had an opportunity to ask questions and had agreed to proceed with transfusion of blood components.    Electronically signed by Camron Whitten DO on 9/24/24 at 8:01 PM EDT

## 2024-09-25 NOTE — PROGRESS NOTES
4 Eyes Skin Assessment     NAME:  Hong Chase  YOB: 1955  MEDICAL RECORD NUMBER:  [id number]    The patient is being assessed for  Admission    I agree that at least one RN has performed a thorough Head to Toe Skin Assessment on the patient. ALL assessment sites listed below have been assessed.      Areas assessed by both nurses:    Head, Face, Ears, Shoulders, Back, Chest, Arms, Elbows, Hands, Sacrum. Buttock, Coccyx, Ischium, Legs. Feet and Heels, and Under Medical Devices         Does the Patient have a Wound? No noted wound(s)       Sea Prevention initiated by RN: Yes  Wound Care Orders initiated by RN: No    Pressure Injury (Stage 3,4, Unstageable, DTI, NWPT, and Complex wounds) if present, place Wound referral order by RN under : No    New Ostomies, if present place, Ostomy referral order under : No     Nurse 1 eSignature: Electronically signed by Hong Murphy RN on 9/25/24 at 6:17 PM EDT    **SHARE this note so that the co-signing nurse can place an eSignature**    Nurse 2 eSignature: Electronically signed by Batool Peralta RN on 9/25/24 at 6:18 PM EDT

## 2024-09-25 NOTE — PROGRESS NOTES
[facility] Hospitalist Progress Note    SYNOPSIS: Patient admitted on 2024 for Acute blood loss anemia    Patient is a 69-year-old male with past medical history of CKD, sarcoidosis, KYARA, CHF who presented to the hospital with fatigue, found to have acute on chronic anemia with hemoglobin 6.7 initially when he was at his pulmonology office. Patient complained of melena, getting transfused 1 unit in the ED. patient follows with gastroenterology outpatient.  Patient states he had weight gain with increased lower extremity swelling since his diuretics were stopped due to elevated creatinine.      complaining of vague abdominal pain, which he attributes to him not getting steroid for his sarcoidosis. Getting 1 Unit PRBC, GI consulted, he is kept NPO for possible GI scope. Patient has been taking steroid for his sarcoidosis, however he denies using NSAIDs        SUBJECTIVE:  Stable overnight. No other overnight issues reported.   Patient seen and examined  Records reviewed.         Temp (24hrs), Av °F (36.7 °C), Min:97.7 °F (36.5 °C), Max:98.4 °F (36.9 °C)    DIET: Diet NPO Exceptions are: Ice Chips  CODE: Full Code    Intake/Output Summary (Last 24 hours) at 2024 1137  Last data filed at 2024 0102  Gross per 24 hour   Intake 700 ml   Output --   Net 700 ml       Review of Systems  All bolded are positive; please see HPI  General:  Fever, chills, diaphoresis, fatigue, malaise, night sweats, weight loss  Psychological:  Anxiety, disorientation, hallucinations.  ENT:  Epistaxis, headaches, vertigo, visual changes.  Cardiovascular:  Chest pain, irregular heartbeats, palpitations, paroxysmal nocturnal dyspnea.  Respiratory:  Shortness of breath, coughing, sputum production, hemoptysis, wheezing, orthopnea.  Gastrointestinal:  Nausea, vomiting, diarrhea, heartburn, constipation, abdominal pain, hematemesis, hematochezia, melena, acholic stools  Genito-Urinary:  Dysuria, urgency, frequency,    GI Prophylaxis [x] PPI,  [] H2 Blocker,  [] Carafate,  [] Diet/Tube Feeds   Disposition Patient requires continued admission due to pending GI, pulm eval    MDM [] Low, [] Moderate,[x]  High       Medications:  REVIEWED DAILY    Infusion Medications    sodium chloride      sodium chloride      sodium chloride       Scheduled Medications    predniSONE  20 mg Oral Daily    sodium chloride flush  5-40 mL IntraVENous 2 times per day    pantoprazole (PROTONIX) 40 mg in sodium chloride (PF) 0.9 % 10 mL injection  40 mg IntraVENous Q12H    amLODIPine  5 mg Oral Daily    carvedilol  12.5 mg Oral BID WC    digoxin  62.5 mcg Oral Daily    [Held by provider] apixaban  5 mg Oral BID    hydrALAZINE  50 mg Oral TID    isosorbide dinitrate  10 mg Oral TID    therapeutic multivitamin-minerals  1 tablet Oral Daily    polyethylene glycol  17 g Oral Daily    senna  2 tablet Oral BID    ursodiol  300 mg Oral BID     PRN Meds: sodium chloride, sodium chloride, sodium chloride flush, sodium chloride, ondansetron **OR** ondansetron, acetaminophen **OR** acetaminophen, diphenhydrAMINE    Labs:     Recent Labs     09/24/24  1011 09/24/24 1852 09/25/24  0713   WBC 8.5 9.6 7.4   HGB 6.7* 6.8* 7.1*   HCT 20.8* 20.6* 21.4*    333 300       Recent Labs     09/24/24  1011 09/24/24  1852 09/25/24  0713    135 138   K 5.2* 5.2* 4.6    102 105   CO2 21* 22 21*   BUN 62* 64* 62*   CREATININE 1.5* 1.5* 1.4*   CALCIUM 9.0 9.1 8.4*   PHOS  --   --  3.7       Recent Labs     09/24/24  1011 09/24/24  1852 09/25/24  0713   ALKPHOS 106 110 98   ALT 35 40 35   AST 22 25 20   BILITOT 11.1* 11.9* 10.6*   LIPASE  --  62*  --        Recent Labs     09/24/24 1852 09/25/24  0713   INR 1.1 1.2       No results for input(s): \"CKTOTAL\", \"TROPONINI\" in the last 72 hours.    Chronic labs:    Lab Results   Component Value Date    CHOL 202 (H) 12/12/2023    TRIG 254 (H) 12/12/2023    HDL 35 (L) 12/12/2023    TSH 3.64 12/12/2023    PSA 1.17

## 2024-09-25 NOTE — CONSULTS
[facility] Consult Note   SENG Montes De Oca with Deni Taylor D.O. Consult Note        Date of Service: 9/25/2024  Reason for Consult: Acute anemia  Requesting Physician: Dr. Moralez    CHIEF COMPLAINT: Fatigue and anemia    History Obtained From:  patient, electronic medical record    HISTORY OF PRESENT ILLNESS:       Hong Chase is a 69 y.o. male with significant past medical history of recently diagnosed sarcoidosis.  Anemia, CKD, A-fib on Eliquis and CHF presenting to ED for fatigue and anemia and admitted with GI bleed.  Pt reports upon discharge from prior hospital stay he started to have black tarry stools. Pt with c/o fatigue and weakness. Has mild RUQ abdominal tenderness. No nausea or vomiting. Was tolerating diet. No c/o chest pain or dyspnea. Denies lightheadedness or dizziness. Pt on Eliquis for A Fib, last dose yesterday 9/24/24. Of note patient was started on oral steroids last admission.  Admission labs potassium 3.2, BUN 62, creatinine 1.5, total bilirubin 11.1, hemoglobin 6.7, INR 1.1. Consultation for acute anemia.  Pt is well known to our service just recently seen with last admission. EGD/EUS 9/18/24- EGD: Normal Esophagus. Stomach with multiple small erosions in antrum and body, bx taken. Duodenum with multiple ~2mm erosion. Small duodenal diverticulum, normal appearing papilla. EUS: Normal appearing pancreatic panenchyma, No cysts or masses appreciated. Normal MPD size in HOP, body, tail. Normal bile duct, questionable sludge/debris vs. Wall artifact. Normal Ampulla. Mildly contracted GB with scant sludge. Normal findings of the liver. ERCP 9/18/24- 1) Biliary sludge - s/p sphincterotomy with balloon sweeps, leading rosa plastic 7Fx7cm single pigtail stent. 2) Non dilated MPD and CBD. 3) Patent cystic duct demonstrated by GB filling which appeared, mildly contracted. 4) Biliary brushings performed of CBD, although no obvious stone. 5) Incidental

## 2024-09-25 NOTE — PROGRESS NOTES
Pt stated he wears a home cpap but does not wear one when he's in the hospital. I told the pt that we could get him one of ours to wear, and he declined.

## 2024-09-25 NOTE — H&P
mod concentric LVH, normal LV systolic function, mod dilated left atrium, trace MR    Hypertension     Ischemic cardiomyopathy     Obesity     Pericardial effusion (noninflammatory) 11/15/2019    History: Post-op problem  Assessment: S/P pericardial drain placement in CVICU 12/2 Plan:  Drain dc'ed, no need for post-procedure echo unless pt become symptomatic.    Pleural effusion 11/26/2019    History: Post op problem Assessment: On r/a, left pigtail dc'ed Plan: PO lasix.  Denies sob.  Desat study done, no home O2 needed    Sarcoidosis     Valvular heart disease        Surgical History:  Past Surgical History:   Procedure Laterality Date    COLONOSCOPY N/A 11/23/2021    COLONOSCOPY POLYPECTOMY SNARE/COLD BIOPSY performed by Rubens Padilla MD at [facility] ENDOSCOPY    ERCP N/A 9/18/2024    ENDOSCOPIC RETROGRADE CHOLANGIOPANCREATOGRAPHY performed by Deni Taylor DO at [facility] ENDOSCOPY    ERCP N/A 9/18/2024    ENDOSCOPIC RETROGRADE CHOLANGIOPANCREATOGRAPHY STENT INSERTION performed by Deni Taylor DO at [facility] ENDOSCOPY    ERCP N/A 9/18/2024    ENDOSCOPIC RETROGRADE CHOLANGIOPANCREATOGRAPHY SPHINCTER/PAPILLOTOMY performed by Deni Taylor DO at [facility] ENDOSCOPY    ERCP  9/18/2024    ENDOSCOPIC RETROGRADE CHOLANGIOPANCREATOGRAPHY BILIARY BRUSHING performed by Deni Taylor DO at [facility] ENDOSCOPY    ERCP N/A 9/18/2024    ENDOSCOPIC RETROGRADE CHOLANGIOPANCREATOGRAPHY FOREIGN BODY REMOVAL performed by Deni Taylor DO at [facility] ENDOSCOPY    IR TRANSCATHETER BIOPSY  9/13/2024    IR TRANSCATHETER BIOPSY 9/13/2024 RaoulSung MD [facility] SPECIAL PROCEDURES    OTHER SURGICAL HISTORY  11/15/2019    aortic dissection repair @ [facility]    TONSILLECTOMY      UPPER GASTROINTESTINAL ENDOSCOPY N/A 3/5/2021    EGD CONTROL HEMORRHAGE performed by Rubens Padilla MD at [facility] ENDOSCOPY    UPPER GASTROINTESTINAL ENDOSCOPY N/A 4/19/2021    EGD ESOPHAGOGASTRODUODENOSCOPY performed by Rubens Padilla MD at [facility] ENDOSCOPY    UPPER  64*   CREATININE 1.5* 1.5*   GLUCOSE 144* 152*   CALCIUM 9.0 9.1       Recent Labs     09/24/24  1011 09/24/24  1852   WBC 8.5 9.6   RBC 2.14* 2.12*   HGB 6.7* 6.8*   HCT 20.8* 20.6*   MCV 97.2 97.2   MCH 31.3 32.1   MCHC 32.2 33.0   RDW 19.5* 20.4*    333   MPV 10.2 9.9       No results for input(s): \"POCGLU\" in the last 72 hours.      Radiology:   No orders to display       NOTE: This report was transcribed using voice recognition software. Every effort was made to ensure accuracy; however, inadvertent computerized transcription errors may be present.  Electronically signed by Natalya Moralez MD on 9/24/2024 at 8:19 PM

## 2024-09-25 NOTE — PROCEDURES
Procedure:    Esophagogastroduodenoscopy    Indication:    Acute blood loss anemia, melena    Consent:   Informed consent was obtained from the patient including and not limited to risk of perforation, aspiration of gastric contents or teeth, bleeding, infection, dental breakage, ileus, need for surgery, or worst case death. Procedure performed with both side viewing duodenoscope and adult gastroscope    Sedation  Endoscope was advanced easily through mouth to second portion of duodenum    Oropharynx:    views are limited but grossly normal.    Esophagus:   Esophagitis mild, with proximal white and yellow plaques, appeared candida     Stomach:   Antrum is normal    Gastric body is normal.    Retroflexed views showed normal fundus and cardia.    Duodenum: Bulb is normal.     Second portion of duodenum is normal.  Fresh and old blood, Biliary and PD stent noted, bx forcep and copious flushing of water used to remove blood clot in the region. Oozing noted at the prior sphincterotomy site. 2 clips were placed along injection of ~4cc of dilute epinephrine. Lastly Hemospray was utilized to ensure hemostasis. Good hemostasis was noted.     EBL - 3cc    IMPRESSION AND PLAN:   1) Post sphincterotomy bleeding - hemostasis with 2 clips, dilute epinephrine, and hemospray was utilized  2) Proximal esophageal yellow and white plaques consistent with candida    Continue to hold anticoagulation if medially possible. BID PPI. NPO for additional 2 hours then ok for CLD. Continue Hgb monitoring. Gi will continue to follow, if bleeding persists may consider repeat endoscopy potential ERCP with [facility].  Would also entertain additional imaging pending pace of bleeding, CTA vs NM bleeding scan.     Deni Taylor,   9/25/2024  3:58 PM

## 2024-09-26 LAB
ABO/RH: NORMAL
ANION GAP SERPL CALCULATED.3IONS-SCNC: 12 MMOL/L (ref 7–16)
ANION GAP SERPL CALCULATED.3IONS-SCNC: 13 MMOL/L (ref 7–16)
ANTIBODY SCREEN: NEGATIVE
ARM BAND NUMBER: NORMAL
BLOOD BANK BLOOD PRODUCT EXPIRATION DATE: NORMAL
BLOOD BANK BLOOD PRODUCT EXPIRATION DATE: NORMAL
BLOOD BANK DISPENSE STATUS: NORMAL
BLOOD BANK DISPENSE STATUS: NORMAL
BLOOD BANK ISBT PRODUCT BLOOD TYPE: 2800
BLOOD BANK ISBT PRODUCT BLOOD TYPE: 600
BLOOD BANK PRODUCT CODE: NORMAL
BLOOD BANK PRODUCT CODE: NORMAL
BLOOD BANK SAMPLE EXPIRATION: NORMAL
BLOOD BANK UNIT TYPE AND RH: NORMAL
BLOOD BANK UNIT TYPE AND RH: NORMAL
BPU ID: NORMAL
BPU ID: NORMAL
BUN SERPL-MCNC: 43 MG/DL (ref 6–23)
BUN SERPL-MCNC: 44 MG/DL (ref 6–23)
CALCIUM SERPL-MCNC: 8.1 MG/DL (ref 8.6–10.2)
CALCIUM SERPL-MCNC: 8.2 MG/DL (ref 8.6–10.2)
CHLORIDE SERPL-SCNC: 100 MMOL/L (ref 98–107)
CHLORIDE SERPL-SCNC: 100 MMOL/L (ref 98–107)
CO2 SERPL-SCNC: 20 MMOL/L (ref 22–29)
CO2 SERPL-SCNC: 20 MMOL/L (ref 22–29)
COMPONENT: NORMAL
COMPONENT: NORMAL
CREAT SERPL-MCNC: 1.4 MG/DL (ref 0.7–1.2)
CREAT SERPL-MCNC: 1.4 MG/DL (ref 0.7–1.2)
CROSSMATCH RESULT: NORMAL
CROSSMATCH RESULT: NORMAL
GFR, ESTIMATED: 52 ML/MIN/1.73M2
GFR, ESTIMATED: 52 ML/MIN/1.73M2
GLUCOSE SERPL-MCNC: 218 MG/DL (ref 74–99)
GLUCOSE SERPL-MCNC: 258 MG/DL (ref 74–99)
HCT VFR BLD AUTO: 23.1 % (ref 37–54)
HCT VFR BLD AUTO: 23.6 % (ref 37–54)
HCT VFR BLD AUTO: 23.7 % (ref 37–54)
HCT VFR BLD AUTO: 25.1 % (ref 37–54)
HGB BLD-MCNC: 7.8 G/DL (ref 12.5–16.5)
HGB BLD-MCNC: 7.8 G/DL (ref 12.5–16.5)
HGB BLD-MCNC: 8 G/DL (ref 12.5–16.5)
HGB BLD-MCNC: 8.3 G/DL (ref 12.5–16.5)
MAGNESIUM SERPL-MCNC: 2.4 MG/DL (ref 1.6–2.6)
PHOSPHATE SERPL-MCNC: 3.3 MG/DL (ref 2.5–4.5)
POTASSIUM SERPL-SCNC: 5 MMOL/L (ref 3.5–5)
POTASSIUM SERPL-SCNC: 5.3 MMOL/L (ref 3.5–5)
SODIUM SERPL-SCNC: 132 MMOL/L (ref 132–146)
SODIUM SERPL-SCNC: 133 MMOL/L (ref 132–146)
TRANSFUSION STATUS: NORMAL
TRANSFUSION STATUS: NORMAL
UNIT DIVISION: 0
UNIT DIVISION: 0
UNIT ISSUE DATE/TIME: NORMAL
UNIT ISSUE DATE/TIME: NORMAL

## 2024-09-26 PROCEDURE — 6370000000 HC RX 637 (ALT 250 FOR IP): Performed by: INTERNAL MEDICINE

## 2024-09-26 PROCEDURE — 85018 HEMOGLOBIN: CPT

## 2024-09-26 PROCEDURE — 85014 HEMATOCRIT: CPT

## 2024-09-26 PROCEDURE — 2580000003 HC RX 258: Performed by: INTERNAL MEDICINE

## 2024-09-26 PROCEDURE — 6360000002 HC RX W HCPCS: Performed by: INTERNAL MEDICINE

## 2024-09-26 PROCEDURE — 84100 ASSAY OF PHOSPHORUS: CPT

## 2024-09-26 PROCEDURE — 2060000000 HC ICU INTERMEDIATE R&B

## 2024-09-26 PROCEDURE — 80048 BASIC METABOLIC PNL TOTAL CA: CPT

## 2024-09-26 PROCEDURE — 83735 ASSAY OF MAGNESIUM: CPT

## 2024-09-26 PROCEDURE — 99233 SBSQ HOSP IP/OBS HIGH 50: CPT | Performed by: STUDENT IN AN ORGANIZED HEALTH CARE EDUCATION/TRAINING PROGRAM

## 2024-09-26 PROCEDURE — 36415 COLL VENOUS BLD VENIPUNCTURE: CPT

## 2024-09-26 PROCEDURE — 6370000000 HC RX 637 (ALT 250 FOR IP): Performed by: STUDENT IN AN ORGANIZED HEALTH CARE EDUCATION/TRAINING PROGRAM

## 2024-09-26 RX ORDER — FLUCONAZOLE 100 MG/1
200 TABLET ORAL DAILY
Status: DISCONTINUED | OUTPATIENT
Start: 2024-09-26 | End: 2024-09-26

## 2024-09-26 RX ORDER — DIPHENHYDRAMINE HCL 25 MG
25 TABLET ORAL EVERY 8 HOURS PRN
Status: DISCONTINUED | OUTPATIENT
Start: 2024-09-26 | End: 2024-09-27 | Stop reason: HOSPADM

## 2024-09-26 RX ORDER — PREDNISONE 20 MG/1
40 TABLET ORAL ONCE
Status: COMPLETED | OUTPATIENT
Start: 2024-09-26 | End: 2024-09-26

## 2024-09-26 RX ORDER — FLUCONAZOLE 100 MG/1
200 TABLET ORAL DAILY
Status: DISCONTINUED | OUTPATIENT
Start: 2024-09-27 | End: 2024-09-27

## 2024-09-26 RX ORDER — FLUCONAZOLE 2 MG/ML
400 INJECTION, SOLUTION INTRAVENOUS EVERY 24 HOURS
Status: DISCONTINUED | OUTPATIENT
Start: 2024-09-27 | End: 2024-09-26

## 2024-09-26 RX ADMIN — URSODIOL 300 MG: 300 CAPSULE ORAL at 19:46

## 2024-09-26 RX ADMIN — CARVEDILOL 12.5 MG: 6.25 TABLET, FILM COATED ORAL at 17:05

## 2024-09-26 RX ADMIN — SENNOSIDES 17.2 MG: 8.6 TABLET, FILM COATED ORAL at 19:39

## 2024-09-26 RX ADMIN — PREDNISONE 40 MG: 20 TABLET ORAL at 08:11

## 2024-09-26 RX ADMIN — SODIUM CHLORIDE, PRESERVATIVE FREE 10 ML: 5 INJECTION INTRAVENOUS at 08:27

## 2024-09-26 RX ADMIN — DIPHENHYDRAMINE HYDROCHLORIDE 25 MG: 25 TABLET ORAL at 09:58

## 2024-09-26 RX ADMIN — URSODIOL 300 MG: 300 CAPSULE ORAL at 08:17

## 2024-09-26 RX ADMIN — HYDRALAZINE HYDROCHLORIDE 50 MG: 50 TABLET ORAL at 09:57

## 2024-09-26 RX ADMIN — POLYETHYLENE GLYCOL 3350 17 G: 17 POWDER, FOR SOLUTION ORAL at 08:19

## 2024-09-26 RX ADMIN — HYDRALAZINE HYDROCHLORIDE 50 MG: 50 TABLET ORAL at 13:10

## 2024-09-26 RX ADMIN — DIGOXIN 62.5 MCG: 125 TABLET ORAL at 08:18

## 2024-09-26 RX ADMIN — HYDRALAZINE HYDROCHLORIDE 50 MG: 50 TABLET ORAL at 19:39

## 2024-09-26 RX ADMIN — FLUCONAZOLE 200 MG: 100 TABLET ORAL at 09:58

## 2024-09-26 RX ADMIN — SENNOSIDES 17.2 MG: 8.6 TABLET, FILM COATED ORAL at 08:14

## 2024-09-26 RX ADMIN — ISOSORBIDE DINITRATE 10 MG: 10 TABLET ORAL at 08:15

## 2024-09-26 RX ADMIN — DIPHENHYDRAMINE HYDROCHLORIDE 25 MG: 25 TABLET ORAL at 17:58

## 2024-09-26 RX ADMIN — AMLODIPINE BESYLATE 5 MG: 5 TABLET ORAL at 08:16

## 2024-09-26 RX ADMIN — PANTOPRAZOLE SODIUM 40 MG: 40 INJECTION, POWDER, FOR SOLUTION INTRAVENOUS at 08:21

## 2024-09-26 RX ADMIN — SODIUM CHLORIDE, PRESERVATIVE FREE 10 ML: 5 INJECTION INTRAVENOUS at 19:41

## 2024-09-26 RX ADMIN — Medication 1 TABLET: at 08:15

## 2024-09-26 RX ADMIN — ISOSORBIDE DINITRATE 10 MG: 10 TABLET ORAL at 13:10

## 2024-09-26 RX ADMIN — PANTOPRAZOLE SODIUM 40 MG: 40 INJECTION, POWDER, FOR SOLUTION INTRAVENOUS at 19:39

## 2024-09-26 RX ADMIN — ISOSORBIDE DINITRATE 10 MG: 10 TABLET ORAL at 17:05

## 2024-09-26 RX ADMIN — CARVEDILOL 12.5 MG: 6.25 TABLET, FILM COATED ORAL at 08:17

## 2024-09-26 NOTE — PROGRESS NOTES
[facility] Hospitalist Progress Note    SYNOPSIS: Patient admitted on 2024 for Acute blood loss anemia    Patient is a 69-year-old male with past medical history of CKD, sarcoidosis, KYARA, CHF who presented to the hospital with fatigue, found to have acute on chronic anemia with hemoglobin 6.7 initially when he was at his pulmonology office. Patient complained of melena, getting transfused 1 unit in the ED. patient follows with gastroenterology outpatient.  Patient states he had weight gain with increased lower extremity swelling since his diuretics were stopped due to elevated creatinine.      complaining of vague abdominal pain, which he attributes to him not getting steroid for his sarcoidosis. Getting 1 Unit PRBC, GI consulted, he is kept NPO for possible GI scope. Patient has been taking steroid for his sarcoidosis, however he denies using NSAIDs     s/p EGD with findings suggestive of post- sphincterotomy bleeding- hemostasis with 2 clips, dilute epinephrine, and hemospray   Proximal esophageal yellow and white plaques consistent with candida   Patient currently on steroid tapering dose. ID consulted for concerns of Esophageal Candidiasis     SUBJECTIVE:  Stable overnight. No other overnight issues reported.   Patient seen and examined  Records reviewed.         Temp (24hrs), Av.7 °F (36.5 °C), Min:97 °F (36.1 °C), Max:98.9 °F (37.2 °C)    DIET: ADULT DIET; Full Liquid  CODE: Full Code    Intake/Output Summary (Last 24 hours) at 2024 1227  Last data filed at 2024 1813  Gross per 24 hour   Intake 1220 ml   Output 600 ml   Net 620 ml       Review of Systems  All bolded are positive; please see HPI  General:  Fever, chills, diaphoresis, fatigue, malaise, night sweats, weight loss  Psychological:  Anxiety, disorientation, hallucinations.  ENT:  Epistaxis, headaches, vertigo, visual changes.  Cardiovascular:  Chest pain, irregular heartbeats, palpitations, paroxysmal nocturnal  Problem: Patient Care Overview  Goal: Plan of Care/Patient Progress Review  Outcome: Improving  A&O x4. Neuros intact except L sided hemiperesis. Tingling to R index and thumb finger and L index finger VSS on RA Regular diet, +flatus and BS but no BM this shift. Up SBA/walker and GB. Neck brace at all times. Incision LATASHA. C/o pain to neck and left shoulder decreased with oxycodone and scheduled flexeril. Declined tylenol. AVS reviewed with patient along with follow up with neurosurg followup highlighted. Patient discharged to ARU via  at 1145.         displayed.       Recent Labs     09/24/24  1011 09/24/24  1852 09/25/24  0713    135 138   K 5.2* 5.2* 4.6    102 105   CO2 21* 22 21*   BUN 62* 64* 62*   CREATININE 1.5* 1.5* 1.4*   CALCIUM 9.0 9.1 8.4*   PHOS  --   --  3.7       Recent Labs     09/24/24  1011 09/24/24  1852 09/25/24  0713   ALKPHOS 106 110 98   ALT 35 40 35   AST 22 25 20   BILITOT 11.1* 11.9* 10.6*   LIPASE  --  62*  --        Recent Labs     09/24/24  1852 09/25/24  0713   INR 1.1 1.2       No results for input(s): \"CKTOTAL\", \"TROPONINI\" in the last 72 hours.    Chronic labs:    Lab Results   Component Value Date    CHOL 202 (H) 12/12/2023    TRIG 254 (H) 12/12/2023    HDL 35 (L) 12/12/2023    TSH 3.64 12/12/2023    PSA 1.17 12/12/2023    INR 1.2 09/25/2024    LABA1C 5.2 12/12/2023       Radiology: REVIEWED DAILY    +++++++++++++++++++++++++++++++++++++++++++++++++  Reggie Mayfield MD  [facility]- [place], [place]  +++++++++++++++++++++++++++++++++++++++++++++++++  NOTE: This report was transcribed using voice recognition software. Every effort was made to ensure accuracy; however, inadvertent computerized transcription errors may be present.        No

## 2024-09-26 NOTE — CARE COORDINATION
Pt lives at home alone. He has not children but has a large extended family. His pcp is his nephew, Dr. Kamron Chase in [place]. He saw him in the office in July. Pt was sent in by his pulmonary doctor yesterday. Pt said he has spent most of sept in the hospital. He has a ranch home with 1 garage step to enter. He is not a  and no [facility] pta. He has 3 siblings in the area. His parents are . His wife  about 10 years ago from cancer and he has a girlfriend, lashell, she or his family will  pt on discharge. Pt has a cpap at home and was independent and drove. S/p EGD with GI. Received 3 units of prbc's. On clears presently. Pulmonary to see, and renal is following. Pcp consulted ID due to candida found during the EGD. REID Pizarro  2024    Case Management Assessment  Initial Evaluation    Date/Time of Evaluation: 2024 9:34 AM  Assessment Completed by: REID Pizarro    If patient is discharged prior to next notation, then this note serves as note for discharge by case management.    Patient Name: Hong Chase                   YOB: 1955  Diagnosis: Acute blood loss anemia [D62]                   Date / Time: 2024  5:05 PM    Patient Admission Status: Inpatient   Readmission Risk (Low < 19, Mod (19-27), High > 27): Readmission Risk Score: 22    Current PCP: Kamron Chase, DO  PCP verified by ? Yes    Chart Reviewed: Yes      History Provided by: Patient  Patient Orientation: Alert and Oriented    Patient Cognition: Alert    Hospitalization in the last 30 days (Readmission):  Yes    If yes, Readmission Assessment in CM Navigator will be completed.    Advance Directives:      Code Status: Full Code   Patient's Primary Decision Maker is: Legal Next of Kin (provided pt with documents.)    Primary Decision Maker: SalvatoreDanie - Brother/Sister - [phone]    Secondary Decision Maker: Tutu Chase - Niece/Nephew - [phone]    Discharge Planning:    Patient  associated with the providers was provided to:     Patient Representative Name:       The Patient and/or Patient Representative Agree with the Discharge Plan?      REID Pizarro  Case Management Department  Ph: [phone] Fax: [phone]

## 2024-09-26 NOTE — PROGRESS NOTES
[facility]  MD Bhupendra Avery, MD Narda Silva, MARTIN Cardona, LUKE  Progress Note    9/26/2024    SUBJECTIVE:   9/26: Sitting up in bed.  Feels better.  Tolerating full liquids.  Oxygen 2L/NC. Denies chest pain, palpitations or SOB.  Denies abdominal pain, nausea or vomiting.     PROBLEM LIST:    Principal Problem:    Acute blood loss anemia  Active Problems:    Hyperkalemia    Swelling of both lower extremities  Resolved Problems:    * No resolved hospital problems. *         DIET:    ADULT DIET; Full Liquid     MEDS (scheduled):    [START ON 9/27/2024] predniSONE  30 mg Oral Daily    [START ON 9/27/2024] fluconazole  400 mg IntraVENous Q24H    sodium chloride flush  5-40 mL IntraVENous 2 times per day    pantoprazole (PROTONIX) 40 mg in sodium chloride (PF) 0.9 % 10 mL injection  40 mg IntraVENous Q12H    amLODIPine  5 mg Oral Daily    carvedilol  12.5 mg Oral BID WC    digoxin  62.5 mcg Oral Daily    [Held by provider] apixaban  5 mg Oral BID    hydrALAZINE  50 mg Oral TID    isosorbide dinitrate  10 mg Oral TID    therapeutic multivitamin-minerals  1 tablet Oral Daily    polyethylene glycol  17 g Oral Daily    senna  2 tablet Oral BID    ursodiol  300 mg Oral BID       MEDS (infusions):   sodium chloride      sodium chloride      sodium chloride         MEDS (prn):  diphenhydrAMINE, sodium chloride, sodium chloride, sodium chloride flush, sodium chloride, ondansetron **OR** ondansetron, acetaminophen **OR** acetaminophen, diphenhydrAMINE    PHYSICAL EXAM:     Patient Vitals for the past 24 hrs:   BP Temp Temp src Pulse Resp SpO2 Height Weight   09/26/24 1300 (!) 113/59 -- -- 72 -- -- -- --   09/26/24 1106 120/71 97.4 °F (36.3 °C) Oral 75 13 98 % -- 93 kg (205 lb 0.4 oz)   09/26/24 0957 116/72 -- -- -- -- -- -- --   09/26/24 0748 127/80 98.2 °F (36.8 °C) Oral 69 21 100 % -- --   09/26/24 0545 -- -- -- -- -- 97 % -- --   09/26/24 0200 -- -- --  -- -- 99 % -- --   09/25/24 2256 109/60 98.9 °F (37.2 °C) -- 78 20 94 % -- --   09/25/24 1939 101/64 97.4 °F (36.3 °C) Temporal 81 18 100 % -- --   09/25/24 1817 -- -- -- -- -- -- 1.702 m (5' 7\") 96.2 kg (212 lb)   09/25/24 1645 133/82 97 °F (36.1 °C) Temporal 68 15 98 % -- --   09/25/24 1630 (!) 142/72 -- -- 72 16 98 % -- --   09/25/24 1615 118/72 -- -- 70 21 96 % -- --   09/25/24 1609 (!) 133/2 97.5 °F (36.4 °C) -- 72 16 100 % -- --   09/25/24 1608 133/72 97.5 °F (36.4 °C) Temporal 68 22 96 % -- --   @      Intake/Output Summary (Last 24 hours) at 9/26/2024 1354  Last data filed at 9/25/2024 1813  Gross per 24 hour   Intake 1220 ml   Output 600 ml   Net 620 ml         Wt Readings from Last 3 Encounters:   09/26/24 93 kg (205 lb 0.4 oz)   09/24/24 96.3 kg (212 lb 6.4 oz)   09/20/24 92.7 kg (204 lb 4.8 oz)       Constitutional:  in no acute distress  HEENT: NC/AT, EOMI, sclera and conjunctiva are clear and anicteric, mucus membranes moist  Neck: Trachea midline, no JVD  Cardiovascular: S1, S2 regular rhythm, no murmur,or rub  Respiratory:  No crackles, no wheeze  Gastrointestinal:  Soft, nontender, nondistended, NABS  Ext: no edema, feet warm  Skin: dry, no rash  Neuro: awake, alert, interactive      DATA:    Recent Labs     09/24/24  1011 09/24/24  1852 09/25/24  0713 09/25/24  1327 09/25/24  2046 09/26/24  0203 09/26/24  0751   WBC 8.5 9.6 7.4  --   --   --   --    HGB 6.7* 6.8* 7.1*   < > 8.2* 7.8* 7.8*   HCT 20.8* 20.6* 21.4*   < > 24.5* 23.1* 23.7*   MCV 97.2 97.2 94.7  --   --   --   --     333 300  --   --   --   --     < > = values in this interval not displayed.     Recent Labs     09/24/24  1011 09/24/24  1852 09/25/24  0713    135 138   K 5.2* 5.2* 4.6    102 105   CO2 21* 22 21*   MG  --   --  2.5   PHOS  --   --  3.7   BUN 62* 64* 62*   CREATININE 1.5* 1.5* 1.4*   ALT 35 40 35   AST 22 25 20   BILITOT 11.1* 11.9* 10.6*   ALKPHOS 106 110 98       No results found for:

## 2024-09-26 NOTE — ED PROVIDER NOTES
[facility]  EMERGENCY DEPARTMENT ENCOUNTER      Pt Name: Hong Chase  MRN: [id number]  Birthdate 1955  Date of evaluation: 9/24/2024  Provider: Camron Whitten DO  PCP: Kamron Chase DO    CHIEF COMPLAINT       Chief Complaint   Patient presents with    Abnormal Lab     Dr Cooper, called pt and told him his blood count was low and to come back to hospital   Hgb dropped 2 grams in 4 days, taking eliquis, having tarry stools        HISTORY OF PRESENT ILLNESS: 1 or more Elements   History From: Patient  Limitations to history : None    Hong Chase is a 69 y.o. male Past medical history of hypertension, CKD, hyperlipidemia as well as A-fib anticoagulated on Eliquis.  Patient presents with chief complaint of anemia with rectal bleeding.  Patient states that he followed up with his doctor as an outpatient today and was found to be anemic with low hemoglobin.  Patient was sent to the emergency department for further evaluation.  Patient does endorse increased dark and tarry stools.  He states that symptoms have been moderate in severity, since onset there are no exacerbating relieving factors noted patient has had multiple some episodes in past.  Hemoglobin was reported to be 6.7 as an outpatient.  Patient denies any fevers, chills, chest pain, cough, constipation or diarrhea    Nursing Notes were all reviewed and agreed with or any disagreements were addressed in the HPI.    REVIEW OF SYSTEMS :    Positives and Pertinent negatives as per HPI.     PAST MEDICAL HISTORY/Chronic Conditions Affecting Care    has a past medical history of Acute kidney injury (HCC), Aortic dissection (HCC), Atrial fibrillation (HCC), CAD (coronary artery disease), CHF (congestive heart failure) (HCC) (5/03), CKD (chronic kidney disease), H/O cardiovascular stress test (5/03), H/O Doppler echocardiogram (5/04/10), Hypertension, Ischemic cardiomyopathy, Obesity, Pericardial effusion (noninflammatory) (11/15/2019), Pleural

## 2024-09-26 NOTE — CONSULTS
masses.  HEMATOLOGIC/LYMPHATIC:  No lymphadenopathy or blood dyscrasics.   ALLERGIC/IMMUNOLOGIC:  No anaphylaxis.   ENDOCRINE:  No polyuria or polydipsia or temperature intolerance.    MUSCULOSKELETAL:  No myalgia or arthralgia.  Full ROM.  NEUROLOGICAL:  No focal motor sensory deficit.  BEHAVIOR/PSYCH:  No psychosis.     PHYSICAL EXAM:    Vitals:    /71   Pulse 75   Temp 97.4 °F (36.3 °C) (Oral)   Resp 13   Ht 1.702 m (5' 7\")   Wt 93 kg (205 lb 0.4 oz)   SpO2 98%   BMI 32.11 kg/m²   Constitutional: The patient is awake, alert, and oriented.   Skin: Warm and dry. No rashes were noted. No jaundice.  HEENT: Eyes show round, and reactive pupils. Moist mucous membranes, no ulcerations, no thrush.   Neck: Supple to movements. No lymphadenopathy.   Chest: No use of accessory muscles to breathe. Symmetrical expansion. Auscultation reveals no wheezing, crackles, or rhonchi.   Cardiovascular: S1 and S2 are rhythmic and regular. No murmurs appreciated.   Abdomen: Positive bowel sounds to auscultation. Benign to palpation. No masses felt. No hepatosplenomegaly.  Genitourinary: No pain in lower abdomen   Extremities: No clubbing, no cyanosis, no edema.  Musculoskeletal: No pain in range of motion of any joints  Neurological: Following commands, no focal neurodeficit  Lines: peripheral      CBC+dif:  Recent Labs     09/24/24  1011 09/24/24  1852 09/25/24  0713 09/25/24  1327 09/26/24  0751   WBC 8.5 9.6 7.4  --   --    HGB 6.7* 6.8* 7.1*   < > 7.8*   HCT 20.8* 20.6* 21.4*   < > 23.7*   MCV 97.2 97.2 94.7  --   --     333 300  --   --    NEUTROABS 7.55* 8.24*  --   --   --     < > = values in this interval not displayed.     Lab Results   Component Value Date    CRP 4.0 09/24/2024    CRP 2.3 (H) 10/04/2019     No results found for: \"CRPHS\"  Lab Results   Component Value Date    SEDRATE 24 (H) 09/24/2024    SEDRATE 16 (H) 10/04/2019     Lab Results   Component Value Date    ALT 35 09/25/2024    AST 20  post epineurectomy bleeding, 2 clips applied   Candidal Esophagitis     Plan:    GI Allowed solid food, Patient is comfortable with solid food  Diflucan 200mg PO daily   Plan to continue 2 weeks of Diflucan therapy   ID will continue to follow     Thank you for having us see this patient in consultation. I will be discussing this case with the treating physicians.      Electronically signed by Kentrell Mills MD on 9/26/2024 at 12:45 PM

## 2024-09-26 NOTE — PROGRESS NOTES
PROGRESS NOTE        Patient Presents with/Seen in Consultation For      Reason for Consult: Acute anemia   CHIEF COMPLAINT: Fatigue and anemia   Subjective:     Patient seen sitting in bed in no apparent distress. Eating clear diet, states his wife brought him food last evening, tolerated. States he had 2 BM's \"dark\", denies hematochezia. No c/o abdominal pain. POC d/w pt.     Review of Systems  Aside from what was mentioned in the PMH and HPI, essentially unremarkable, all others negative.    Objective:     /72   Pulse 69   Temp 98.2 °F (36.8 °C) (Oral)   Resp 21   Ht 1.702 m (5' 7\")   Wt 96.2 kg (212 lb)   SpO2 100%   BMI 33.20 kg/m²     General appearance: alert, awake, laying in bed, and cooperative  Eyes: conjunctiva pale, sclera icteric. PERRL.  Lungs: clear to auscultation bilaterally  Heart: regular rate and rhythm, no murmur, 2+ pulses; no edema  Abdomen: soft, non-tender; bowel sounds normal; no masses,  no organomegaly  Extremities: extremities without edema  Pulses: 2+ and symmetric  Skin: Skin color jaundiced, texture, turgor normal.   Neurologic: Grossly normal    [START ON 9/27/2024] predniSONE (DELTASONE) tablet 30 mg, Daily  fluconazole (DIFLUCAN) tablet 200 mg, Daily  diphenhydrAMINE (BENADRYL) tablet 25 mg, Q8H PRN  0.9 % sodium chloride infusion, PRN  0.9 % sodium chloride infusion, PRN  sodium chloride flush 0.9 % injection 5-40 mL, 2 times per day  sodium chloride flush 0.9 % injection 5-40 mL, PRN  0.9 % sodium chloride infusion, PRN  ondansetron (ZOFRAN-ODT) disintegrating tablet 4 mg, Q8H PRN   Or  ondansetron (ZOFRAN) injection 4 mg, Q6H PRN  acetaminophen (TYLENOL) tablet 650 mg, Q6H PRN   Or  acetaminophen (TYLENOL) suppository 650 mg, Q6H PRN  pantoprazole (PROTONIX) 40 mg in sodium chloride (PF) 0.9 % 10 mL injection, Q12H  amLODIPine (NORVASC) tablet 5 mg, Daily  carvedilol (COREG) tablet 12.5 mg, BID WC  digoxin (LANOXIN) tablet 62.5 mcg, Daily  [Held by provider]  apixaban (ELIQUIS) tablet 5 mg, BID  hydrALAZINE (APRESOLINE) tablet 50 mg, TID  isosorbide dinitrate (ISORDIL) tablet 10 mg, TID  therapeutic multivitamin-minerals 1 tablet, Daily  polyethylene glycol (GLYCOLAX) packet 17 g, Daily  senna (SENOKOT) tablet 17.2 mg, BID  ursodiol (ACTIGALL) capsule 300 mg, BID  diphenhydrAMINE (BENADRYL) injection 25 mg, Q6H PRN         Data Review  CBC:   Lab Results   Component Value Date/Time    WBC 7.4 09/25/2024 07:13 AM    RBC 2.26 09/25/2024 07:13 AM    RBC 4.27 12/05/2022 09:26 AM    HGB 7.8 09/26/2024 07:51 AM    HCT 23.7 09/26/2024 07:51 AM    MCV 94.7 09/25/2024 07:13 AM    MCH 31.4 09/25/2024 07:13 AM    MCHC 33.2 09/25/2024 07:13 AM    RDW 19.5 09/25/2024 07:13 AM     09/25/2024 07:13 AM    MPV 10.0 09/25/2024 07:13 AM     CMP:    Lab Results   Component Value Date/Time     09/25/2024 07:13 AM    K 4.6 09/25/2024 07:13 AM    K 4.3 03/05/2021 06:22 AM     09/25/2024 07:13 AM    CO2 21 09/25/2024 07:13 AM    BUN 62 09/25/2024 07:13 AM    CREATININE 1.4 09/25/2024 07:13 AM    GFRAA >60 03/17/2022 10:44 AM    LABGLOM 53 09/25/2024 07:13 AM    LABGLOM 38 12/12/2023 10:25 AM    GLUCOSE 134 09/25/2024 07:13 AM    GLUCOSE 110 12/05/2022 09:26 AM    CALCIUM 8.4 09/25/2024 07:13 AM    BILITOT 10.6 09/25/2024 07:13 AM    ALKPHOS 98 09/25/2024 07:13 AM    AST 20 09/25/2024 07:13 AM    ALT 35 09/25/2024 07:13 AM     Hepatic Function Panel:    Lab Results   Component Value Date/Time    ALKPHOS 98 09/25/2024 07:13 AM    ALT 35 09/25/2024 07:13 AM    AST 20 09/25/2024 07:13 AM    BILITOT 10.6 09/25/2024 07:13 AM    BILIDIR 6.0 09/15/2024 07:23 AM    IBILI 0.7 09/11/2024 02:23 PM     No components found for: \"CHLPL\"    Lab Results   Component Value Date    TRIG 254 (H) 12/12/2023    TRIG 98 12/05/2022    TRIG 76 07/15/2021       Lab Results   Component Value Date    HDL 35 (L) 12/12/2023    HDL 41 12/05/2022    HDL 37 07/15/2021     No components found for:

## 2024-09-26 NOTE — ACP (ADVANCE CARE PLANNING)
Advance Care Planning   Healthcare Decision Maker:    Primary Decision Maker: Danie Chase - Brother/Sister - [phone]    Secondary Decision Maker: SalvatoreTutu - Niece/Nephew - [phone]    Click here to complete Healthcare Decision Makers including selection of the Healthcare Decision Maker Relationship (ie \"Primary\").          provided documents and told pt if completes them to ask for some one to witness them while here. REID Pizarro  9/26/2024

## 2024-09-27 VITALS
SYSTOLIC BLOOD PRESSURE: 119 MMHG | WEIGHT: 205.03 LBS | RESPIRATION RATE: 16 BRPM | DIASTOLIC BLOOD PRESSURE: 80 MMHG | HEART RATE: 56 BPM | BODY MASS INDEX: 32.18 KG/M2 | OXYGEN SATURATION: 98 % | HEIGHT: 67 IN | TEMPERATURE: 98.1 F

## 2024-09-27 LAB
ALBUMIN SERPL-MCNC: 3.4 G/DL (ref 3.5–5.2)
ALP SERPL-CCNC: 116 U/L (ref 40–129)
ALT SERPL-CCNC: 38 U/L (ref 0–40)
ANION GAP SERPL CALCULATED.3IONS-SCNC: 13 MMOL/L (ref 7–16)
AST SERPL-CCNC: 24 U/L (ref 0–39)
BILIRUB SERPL-MCNC: 12.1 MG/DL (ref 0–1.2)
BUN SERPL-MCNC: 38 MG/DL (ref 6–23)
CALCIUM SERPL-MCNC: 8.4 MG/DL (ref 8.6–10.2)
CHLORIDE SERPL-SCNC: 102 MMOL/L (ref 98–107)
CO2 SERPL-SCNC: 22 MMOL/L (ref 22–29)
CREAT SERPL-MCNC: 1.7 MG/DL (ref 0.7–1.2)
GFR, ESTIMATED: 44 ML/MIN/1.73M2
GLUCOSE BLD-MCNC: 203 MG/DL (ref 74–99)
GLUCOSE SERPL-MCNC: 140 MG/DL (ref 74–99)
HCT VFR BLD AUTO: 23.3 % (ref 37–54)
HCT VFR BLD AUTO: 25.7 % (ref 37–54)
HGB BLD-MCNC: 7.8 G/DL (ref 12.5–16.5)
HGB BLD-MCNC: 8.6 G/DL (ref 12.5–16.5)
POTASSIUM SERPL-SCNC: 4.7 MMOL/L (ref 3.5–5)
PROT SERPL-MCNC: 5.5 G/DL (ref 6.4–8.3)
SODIUM SERPL-SCNC: 137 MMOL/L (ref 132–146)

## 2024-09-27 PROCEDURE — 99239 HOSP IP/OBS DSCHRG MGMT >30: CPT | Performed by: STUDENT IN AN ORGANIZED HEALTH CARE EDUCATION/TRAINING PROGRAM

## 2024-09-27 PROCEDURE — 6360000002 HC RX W HCPCS: Performed by: INTERNAL MEDICINE

## 2024-09-27 PROCEDURE — 36415 COLL VENOUS BLD VENIPUNCTURE: CPT

## 2024-09-27 PROCEDURE — 82962 GLUCOSE BLOOD TEST: CPT

## 2024-09-27 PROCEDURE — 80053 COMPREHEN METABOLIC PANEL: CPT

## 2024-09-27 PROCEDURE — 85014 HEMATOCRIT: CPT

## 2024-09-27 PROCEDURE — 2580000003 HC RX 258: Performed by: INTERNAL MEDICINE

## 2024-09-27 PROCEDURE — 6370000000 HC RX 637 (ALT 250 FOR IP): Performed by: STUDENT IN AN ORGANIZED HEALTH CARE EDUCATION/TRAINING PROGRAM

## 2024-09-27 PROCEDURE — 6370000000 HC RX 637 (ALT 250 FOR IP): Performed by: INTERNAL MEDICINE

## 2024-09-27 PROCEDURE — 2700000000 HC OXYGEN THERAPY PER DAY

## 2024-09-27 PROCEDURE — 85018 HEMOGLOBIN: CPT

## 2024-09-27 RX ORDER — FLUCONAZOLE 100 MG/1
100 TABLET ORAL DAILY
Status: DISCONTINUED | OUTPATIENT
Start: 2024-09-28 | End: 2024-09-27 | Stop reason: HOSPADM

## 2024-09-27 RX ORDER — FLUCONAZOLE 200 MG/1
200 TABLET ORAL DAILY
Qty: 13 TABLET | Refills: 0 | Status: SHIPPED | OUTPATIENT
Start: 2024-09-28 | End: 2024-09-27

## 2024-09-27 RX ORDER — DEXTROSE MONOHYDRATE 100 MG/ML
INJECTION, SOLUTION INTRAVENOUS CONTINUOUS PRN
Status: DISCONTINUED | OUTPATIENT
Start: 2024-09-27 | End: 2024-09-27 | Stop reason: HOSPADM

## 2024-09-27 RX ORDER — GLUCAGON 1 MG/ML
1 KIT INJECTION PRN
Status: DISCONTINUED | OUTPATIENT
Start: 2024-09-27 | End: 2024-09-27 | Stop reason: HOSPADM

## 2024-09-27 RX ORDER — INSULIN LISPRO 100 [IU]/ML
0-4 INJECTION, SOLUTION INTRAVENOUS; SUBCUTANEOUS
Status: DISCONTINUED | OUTPATIENT
Start: 2024-09-27 | End: 2024-09-27 | Stop reason: HOSPADM

## 2024-09-27 RX ORDER — FLUCONAZOLE 200 MG/1
100 TABLET ORAL DAILY
Qty: 7 TABLET | Refills: 0 | Status: SHIPPED | OUTPATIENT
Start: 2024-09-28 | End: 2024-10-11

## 2024-09-27 RX ORDER — PANTOPRAZOLE SODIUM 40 MG/1
40 TABLET, DELAYED RELEASE ORAL
Qty: 60 TABLET | Refills: 1 | Status: SHIPPED | OUTPATIENT
Start: 2024-09-27 | End: 2024-11-26

## 2024-09-27 RX ORDER — INSULIN LISPRO 100 [IU]/ML
0-4 INJECTION, SOLUTION INTRAVENOUS; SUBCUTANEOUS NIGHTLY
Status: DISCONTINUED | OUTPATIENT
Start: 2024-09-27 | End: 2024-09-27 | Stop reason: HOSPADM

## 2024-09-27 RX ADMIN — DIPHENHYDRAMINE HYDROCHLORIDE 25 MG: 50 INJECTION INTRAMUSCULAR; INTRAVENOUS at 08:35

## 2024-09-27 RX ADMIN — PREDNISONE 30 MG: 10 TABLET ORAL at 08:21

## 2024-09-27 RX ADMIN — HYDRALAZINE HYDROCHLORIDE 50 MG: 50 TABLET ORAL at 08:21

## 2024-09-27 RX ADMIN — CARVEDILOL 12.5 MG: 6.25 TABLET, FILM COATED ORAL at 08:21

## 2024-09-27 RX ADMIN — URSODIOL 300 MG: 300 CAPSULE ORAL at 08:20

## 2024-09-27 RX ADMIN — HYDRALAZINE HYDROCHLORIDE 50 MG: 50 TABLET ORAL at 13:48

## 2024-09-27 RX ADMIN — INSULIN LISPRO 1 UNITS: 100 INJECTION, SOLUTION INTRAVENOUS; SUBCUTANEOUS at 12:04

## 2024-09-27 RX ADMIN — DIGOXIN 62.5 MCG: 125 TABLET ORAL at 08:21

## 2024-09-27 RX ADMIN — AMLODIPINE BESYLATE 5 MG: 5 TABLET ORAL at 08:21

## 2024-09-27 RX ADMIN — FLUCONAZOLE 200 MG: 100 TABLET ORAL at 08:22

## 2024-09-27 RX ADMIN — DIPHENHYDRAMINE HYDROCHLORIDE 25 MG: 25 TABLET ORAL at 01:11

## 2024-09-27 RX ADMIN — ISOSORBIDE DINITRATE 10 MG: 10 TABLET ORAL at 08:21

## 2024-09-27 RX ADMIN — PANTOPRAZOLE SODIUM 40 MG: 40 INJECTION, POWDER, FOR SOLUTION INTRAVENOUS at 08:20

## 2024-09-27 RX ADMIN — SENNOSIDES 17.2 MG: 8.6 TABLET, FILM COATED ORAL at 08:21

## 2024-09-27 RX ADMIN — Medication 1 TABLET: at 08:21

## 2024-09-27 RX ADMIN — ISOSORBIDE DINITRATE 10 MG: 10 TABLET ORAL at 12:04

## 2024-09-27 RX ADMIN — SODIUM CHLORIDE, PRESERVATIVE FREE 10 ML: 5 INJECTION INTRAVENOUS at 08:23

## 2024-09-27 NOTE — CARE COORDINATION
SOCIAL WORK/CASEMANAGEMENT TRANSITION OF CARE PLANNING( NARDA VINNIE, -[phone]): plan is home with no needs anticipated. Pt is on room air. S/p egd with clips and ID has pt on p.o. diflucan for 2 weeks. Anticipate home possibly today. .Narda Barcenas, TYLORW  9/27/2024  Met with pt this a.m. and he said he has home o2 and portables at home but can't remember name of the dme co it came from but he never needs o2 in the day, only uses it hs and the cpap. Narda Barcenas, REID  9/27/2024

## 2024-09-27 NOTE — DISCHARGE SUMMARY
biopsy.     IR ALON CATH PLACE VENOUS 1ST ORDER    Result Date: 9/13/2024  PROCEDURE: IR TRANSCATHETER BIOPSY; IR SELEC CATH VENOUS 1ST ORD9/13/2024 2:46 pm 1. Right hepatic vein catheter placement and diagnostic venogram 2. Venous pressure measurements 3. Ultrasound guidance for access 4. Transjugular liver core biopsy under fluoroscopy : Dr. Silveira ASSISTANT: None The procedure, risks, limitations, and alternatives were discussed. All questions answered. Written informed consent obtained. Maximal sterile barrier technique, hand hygiene, skin prep, and sterile ultrasound techniques (if used) utilized. Percutaneous site was sterilely prepped and draped. Time out performed. Ultrasound demonstrates vessel patency and appropriate forward flow. An image was obtained. After administration of local anesthesia, a tiny skin incision was made and the vein was cannulated with a micropuncture set. Sheath was placed over the wire.  Catheter placed in the right atrium. Pressure measurement obtained and reported below. The right hepatic vein was selected with a catheter.  Contrast injection demonstrates brisk antegrade flow to the heart without stenosis or obstruction. Balloon catheter was placed in the right hepatic vein and confirmed not to be wedged. Pressure measurement was performed with the balloon deflated and inflated.  Contrast injection with the balloon inflated showed contrast to be static within the hepatic vein confirming adequate balloon inflation to obtain a wedged pressure. After advancing the sheath into the hepatic vein, the balloon catheter was exchanged for the biopsy cannula. The biopsy needle was advanced into the hepatic vein and Five samples were obtained.  Biopsy samples were obtained with the biopsy device directed anteriorly to avoid the capsule.  Biopsy obtained centrally to minimize risk of capsular injury. Post biopsy venogram shows no contrast extravasation. All catheters and sheaths were  removed. Hemostasis obtained with manual pressure. A sterile dressing was applied. COMPLICATIONS: None EBL: Minimal PATIENT CONDITION: Stable, unchanged PRESSURES (mm Hg): RA: 14/0 (7) IVC (inferior to hepatic vein confluence): 13/0 (6) RHV: (free/balloon deflated): 13/2 (7) RHV (wedged/balloon inflated): 15/13 (14) Portosystemic gradient (wedged-RA): 7 INSERTION SITE: Right internal jugular vein MATERIALS: Cook 19 G Transjugular liver access and biopsy needle Amplatz 1 cm tip 10 Fr 40 cm angled [place] sheath NAILA balloon catheter Pressure transducer kit [facility] ANESTHESIA: Moderate sedation was administered and monitored by dedicated nursing personnel under direct supervision by the . SEDATION TIME (min): 35 FLUORO (min): 10 minutes and 24 seconds AIR KERMA DOSE (mGy): 275.1 CONTRAST (mL): 5, Isovue 370 COMPARISON: 09/12/2024 MRI HISTORY: ORDERING SYSTEM PROVIDED HISTORY: transjugular liver biopsy with portal pressures TECHNOLOGIST PROVIDED HISTORY: Reason for exam:->transjugular liver biopsy with portal pressures What reading provider will be dictating this exam?->CRC     1. Successful hepatic vein pressures as above with a portal/systemic gradient of 7 mm Hg. 2. Successful uncomplicated transjugular liver core biopsy.     Echo (TTE) complete (PRN contrast/bubble/strain/3D)    Result Date: 9/13/2024    Left Ventricle: Normal left ventricular systolic function with a visually estimated EF of 60 - 65%. Left ventricle size is normal. Mildly increased ventricular mass. Findings consistent with mild concentric hypertrophy. Normal wall motion. Indeterminate diastolic function.   Right Ventricle: Right ventricle size is normal. Normal systolic function. TAPSE is 1.8 cm.   Aortic Valve: No stenosis.   Tricuspid Valve: Mild regurgitation. Mildly elevated RVSP, consistent with mild pulmonary hypertension. The estimated RVSP is 48 mmHg.   Left Atrium: Left atrium is severely dilated.   Aorta: Normal sized aortic root.

## 2024-09-27 NOTE — PROGRESS NOTES
[facility]  MD Bhupendra Avery, MD Narda Silva, CNP   Zaira Cardona, LUKE  Progress Note    9/27/2024    SUBJECTIVE:   9/26: Sitting up in bed.  Feels better.  Tolerating full liquids.  Oxygen 2L/NC. Denies chest pain, palpitations or SOB.  Denies abdominal pain, nausea or vomiting.     9/27: Sitting up in bed. Only complaint is that he is itching. He denies dyspnea, chest pain, or palpitations. He is on room air. Denies diarrhea or vomiting. PO intake is good.     PROBLEM LIST:    Principal Problem:    Acute blood loss anemia  Active Problems:    Hyperkalemia    Swelling of both lower extremities  Resolved Problems:    * No resolved hospital problems. *         DIET:    ADULT DIET; Regular     MEDS (scheduled):    insulin lispro  0-4 Units SubCUTAneous TID WC    insulin lispro  0-4 Units SubCUTAneous Nightly    [START ON 9/28/2024] fluconazole  100 mg Oral Daily    predniSONE  30 mg Oral Daily    sodium chloride flush  5-40 mL IntraVENous 2 times per day    pantoprazole (PROTONIX) 40 mg in sodium chloride (PF) 0.9 % 10 mL injection  40 mg IntraVENous Q12H    amLODIPine  5 mg Oral Daily    carvedilol  12.5 mg Oral BID WC    digoxin  62.5 mcg Oral Daily    [Held by provider] apixaban  5 mg Oral BID    hydrALAZINE  50 mg Oral TID    isosorbide dinitrate  10 mg Oral TID    therapeutic multivitamin-minerals  1 tablet Oral Daily    polyethylene glycol  17 g Oral Daily    senna  2 tablet Oral BID    ursodiol  300 mg Oral BID       MEDS (infusions):   dextrose      sodium chloride      sodium chloride      sodium chloride         MEDS (prn):  glucose, dextrose bolus **OR** dextrose bolus, glucagon (rDNA), dextrose, diphenhydrAMINE, sodium chloride, sodium chloride, sodium chloride flush, sodium chloride, ondansetron **OR** ondansetron, acetaminophen **OR** acetaminophen, diphenhydrAMINE    PHYSICAL EXAM:     Patient Vitals for the past 24 hrs:   BP Temp Temp  microvascular atherosclerotic disease in the setting of known coronary and peripheral arterial disease, as well as history of JASS with incomplete recovery.  Recent history of JASS, recovering, also hemodynamically mediated due to anemia  Anemia, severe, due to acute blood loss, probably superimposed on mild anemia due to CKD.  Iron stores are within normal limits, there were drawn today after receiving PRBCs, though also normal on 9/13/2024    Cr is up slightly today- 1.7 mg/dL (still within his baseline range)  PO intake is good, no diarrhea, no vomiting   EGD report noted  Hgb stable 8.6      Recommendations  Encourage oral intake  Avoid nephrotoxic exposure as able  Continue supportive care  Follow labs, UO  Ok for discharge from renal standpoint   Follow up with Dr. Waggoner in 3-4 weeks        Electronically signed by CRISTINA Amado CNP on 9/27/2024 at 3:15 PM

## 2024-09-27 NOTE — PLAN OF CARE
Problem: Chronic Conditions and Co-morbidities  Goal: Patient's chronic conditions and co-morbidity symptoms are monitored and maintained or improved  9/26/2024 2057 by Radha Jones RN  Outcome: Progressing  9/26/2024 1557 by Popeye Bartholomew RN  Outcome: Progressing     Problem: Safety - Adult  Goal: Free from fall injury  9/26/2024 2057 by Radha Jones RN  Outcome: Progressing  9/26/2024 1557 by Popeye Bartholomew RN  Outcome: Progressing

## 2024-09-27 NOTE — PROGRESS NOTES
Pulse ox was 98 % on room air at rest.  Ambulated patient on room air.  Oxygen saturation was 96% on room air while ambulating.

## 2024-09-27 NOTE — PROGRESS NOTES
PRN  pantoprazole (PROTONIX) 40 mg in sodium chloride (PF) 0.9 % 10 mL injection, Q12H  amLODIPine (NORVASC) tablet 5 mg, Daily  carvedilol (COREG) tablet 12.5 mg, BID WC  digoxin (LANOXIN) tablet 62.5 mcg, Daily  [Held by provider] apixaban (ELIQUIS) tablet 5 mg, BID  hydrALAZINE (APRESOLINE) tablet 50 mg, TID  isosorbide dinitrate (ISORDIL) tablet 10 mg, TID  therapeutic multivitamin-minerals 1 tablet, Daily  polyethylene glycol (GLYCOLAX) packet 17 g, Daily  senna (SENOKOT) tablet 17.2 mg, BID  ursodiol (ACTIGALL) capsule 300 mg, BID  diphenhydrAMINE (BENADRYL) injection 25 mg, Q6H PRN         Data Review  CBC:   Lab Results   Component Value Date/Time    WBC 7.4 09/25/2024 07:13 AM    RBC 2.26 09/25/2024 07:13 AM    RBC 4.27 12/05/2022 09:26 AM    HGB 8.6 09/27/2024 08:24 AM    HCT 25.7 09/27/2024 08:24 AM    MCV 94.7 09/25/2024 07:13 AM    MCH 31.4 09/25/2024 07:13 AM    MCHC 33.2 09/25/2024 07:13 AM    RDW 19.5 09/25/2024 07:13 AM     09/25/2024 07:13 AM    MPV 10.0 09/25/2024 07:13 AM     CMP:    Lab Results   Component Value Date/Time     09/26/2024 04:18 PM    K 5.0 09/26/2024 04:18 PM    K 4.3 03/05/2021 06:22 AM     09/26/2024 04:18 PM    CO2 20 09/26/2024 04:18 PM    BUN 43 09/26/2024 04:18 PM    CREATININE 1.4 09/26/2024 04:18 PM    GFRAA >60 03/17/2022 10:44 AM    LABGLOM 52 09/26/2024 04:18 PM    LABGLOM 38 12/12/2023 10:25 AM    GLUCOSE 218 09/26/2024 04:18 PM    GLUCOSE 110 12/05/2022 09:26 AM    CALCIUM 8.2 09/26/2024 04:18 PM    BILITOT 10.6 09/25/2024 07:13 AM    ALKPHOS 98 09/25/2024 07:13 AM    AST 20 09/25/2024 07:13 AM    ALT 35 09/25/2024 07:13 AM     Hepatic Function Panel:    Lab Results   Component Value Date/Time    ALKPHOS 98 09/25/2024 07:13 AM    ALT 35 09/25/2024 07:13 AM    AST 20 09/25/2024 07:13 AM    BILITOT 10.6 09/25/2024 07:13 AM    BILIDIR 6.0 09/15/2024 07:23 AM    IBILI 0.7 09/11/2024 02:23 PM     No components found for: \"CHLPL\"    Lab Results    Component Value Date    TRIG 254 (H) 12/12/2023    TRIG 98 12/05/2022    TRIG 76 07/15/2021       Lab Results   Component Value Date    HDL 35 (L) 12/12/2023    HDL 41 12/05/2022    HDL 37 07/15/2021     No components found for: \"LDLCALC\"  No components found for: \"LABVLDL\"   PT/INR:    Lab Results   Component Value Date/Time    PROTIME 13.0 09/25/2024 07:13 AM    PROTIME 25.2 03/14/2022 12:08 PM    INR 1.2 09/25/2024 07:13 AM     IRON:    Lab Results   Component Value Date/Time    IRON 70 09/25/2024 07:13 AM     Iron Saturation:  No components found for: \"PERCENTFE\"  FERRITIN:    Lab Results   Component Value Date/Time    FERRITIN 118 09/25/2024 07:13 AM         Assessment:     Acute on chronic anemia; suspected GIB  Hyperbilirubinemia  A-fib on Eliquis  Sarcoidosis- outpatient referral to [facility] with last admission.  CKD  EGD 9/25/24- 1) Post sphincterotomy bleeding - hemostasis with 2 clips, dilute epinephrine, and hemospray was utilized. 2) Proximal esophageal yellow and white plaques consistent with candida    Plan:   Hgb stable  Diet as tolerated  Hold anticoagulants for another 24 hours, d/w pt   Pantoprazole 40 mg q12, PO BID as OP  Continue ursodiol as ordered  Continue diflucan   Supportive care  Op labs to be ordered for patient   Discharge per primary, ok from GI POV with OP FU visit       Note: This report was completed utilizing computer voice recognition software. Every effort has been made to ensure accuracy, however; inadvertent computerized transcription errors may be present.     Discussed with Dr. Taylor  Plan per Dr. Claudia Wallace APRPANCHO-NP-C 9/27/2024  9:53 AM For Dr. Taylor    GI attending addendum:    The patient case was reviewed and discussed with the GI midlevel team.       Deni Taylor DO

## 2024-09-27 NOTE — PROGRESS NOTES
[facility]  [facility]  Progress Note      Chief Complaint   Patient presents with    Abnormal Lab     Dr Cooper, called pt and told him his blood count was low and to come back to hospital   Hgb dropped 2 grams in 4 days, taking eliquis, having tarry stools        SUBJECTIVE:  Patient is tolerating medications. No reported adverse drug reactions.  No nausea, vomiting, diarrhea.  No problems eating. Appetite good. No history of thrush but has been on steroids  Review of systems:  As stated above in the chief complaint, otherwise negative.    Medications:  Scheduled Meds:   insulin lispro  0-4 Units SubCUTAneous TID WC    insulin lispro  0-4 Units SubCUTAneous Nightly    predniSONE  30 mg Oral Daily    fluconazole  200 mg Oral Daily    sodium chloride flush  5-40 mL IntraVENous 2 times per day    pantoprazole (PROTONIX) 40 mg in sodium chloride (PF) 0.9 % 10 mL injection  40 mg IntraVENous Q12H    amLODIPine  5 mg Oral Daily    carvedilol  12.5 mg Oral BID WC    digoxin  62.5 mcg Oral Daily    [Held by provider] apixaban  5 mg Oral BID    hydrALAZINE  50 mg Oral TID    isosorbide dinitrate  10 mg Oral TID    therapeutic multivitamin-minerals  1 tablet Oral Daily    polyethylene glycol  17 g Oral Daily    senna  2 tablet Oral BID    ursodiol  300 mg Oral BID     Continuous Infusions:   dextrose      sodium chloride      sodium chloride      sodium chloride       PRN Meds:glucose, dextrose bolus **OR** dextrose bolus, glucagon (rDNA), dextrose, diphenhydrAMINE, sodium chloride, sodium chloride, sodium chloride flush, sodium chloride, ondansetron **OR** ondansetron, acetaminophen **OR** acetaminophen, diphenhydrAMINE    OBJECTIVE:  /73   Pulse 82   Temp 98.2 °F (36.8 °C) (Oral)   Resp 16   Ht 1.702 m (5' 7\")   Wt 93 kg (205 lb 0.4 oz)   SpO2 99%   BMI 32.11 kg/m²   Temp  Av.8 °F (36.6 °C)  Min: 97.4 °F (36.3 °C)  Max: 98.2 °F (36.8 °C)  Constitutional: The patient is awake,

## 2024-09-30 NOTE — PROGRESS NOTES
CLINICAL PHARMACY NOTE: MEDS TO BEDS    Total # of Prescriptions Filled: 2   The following medications were delivered to the patient:  Eliquis 5  Fluconazole 100    Additional Documentation:   To PT in room

## 2024-09-30 NOTE — CARE COORDINATION
9/30 @ 10:19AM      reached out to patients PCP office Dr Kamron Chase at [phone] to schedule follow up D/C appointment.  spoke to [place] and scheduled appointment on 10/1 at 10:30AM in office.      was informed they already called him and made patient aware of scheduled appointment.

## 2024-10-10 ENCOUNTER — OFFICE VISIT (OUTPATIENT)
Dept: PODIATRY | Age: 69
End: 2024-10-10
Payer: MEDICARE

## 2024-10-10 VITALS — BODY MASS INDEX: 33.2 KG/M2 | WEIGHT: 212 LBS

## 2024-10-10 DIAGNOSIS — I73.9 PVD (PERIPHERAL VASCULAR DISEASE) (HCC): ICD-10-CM

## 2024-10-10 DIAGNOSIS — I87.2 VENOUS INSUFFICIENCY (CHRONIC) (PERIPHERAL): ICD-10-CM

## 2024-10-10 DIAGNOSIS — R26.2 DIFFICULTY WALKING: ICD-10-CM

## 2024-10-10 DIAGNOSIS — G60.9 IDIOPATHIC PERIPHERAL NEUROPATHY: ICD-10-CM

## 2024-10-10 DIAGNOSIS — B35.1 ONYCHOMYCOSIS: Primary | ICD-10-CM

## 2024-10-10 PROCEDURE — 11721 DEBRIDE NAIL 6 OR MORE: CPT | Performed by: PODIATRIST

## 2024-10-10 PROCEDURE — 99999 PR OFFICE/OUTPT VISIT,PROCEDURE ONLY: CPT | Performed by: PODIATRIST

## 2024-10-10 NOTE — PROGRESS NOTES
10/10/24     Hong Chase    : 1955  Sex: male  Age: 69 y.o.    Subjective:  The patient is seen today for evaluation regarding foot evaluation and mycotic nail care.  No other complaints noted.    Chief Complaint   Patient presents with    Nail Problem     onychomycosis       Current Medications:    Current Outpatient Medications:     pantoprazole (PROTONIX) 40 MG tablet, Take 1 tablet by mouth 2 times daily (before meals), Disp: 60 tablet, Rfl: 1    bumetanide (BUMEX) 1 MG tablet, Take 1 tablet by mouth daily, Disp: 90 tablet, Rfl: 1    hydrOXYzine HCl (ATARAX) 50 MG tablet, Take 1 tablet by mouth every 8 hours as needed for Itching, Disp: 60 tablet, Rfl: 1    predniSONE (DELTASONE) 20 MG tablet, Take 1 tablet by mouth daily, Disp: 30 tablet, Rfl: 1    apixaban (ELIQUIS) 5 MG TABS tablet, Take 1 tablet by mouth 2 times daily Ok to resume from , Disp: 60 tablet, Rfl: 11    fluconazole (DIFLUCAN) 200 MG tablet, Take 0.5 tablets by mouth daily for 13 days, Disp: 7 tablet, Rfl: 0    carvedilol (COREG) 12.5 MG tablet, Take 1 tablet by mouth 2 times daily (with meals), Disp: 60 tablet, Rfl: 3    polyethylene glycol (GLYCOLAX) 17 g packet, Take 1 packet by mouth daily, Disp: 30 packet, Rfl: 0    ursodiol (ACTIGALL) 300 MG capsule, Take 1 capsule by mouth 2 times daily, Disp: 60 capsule, Rfl: 3    digoxin (LANOXIN) 125 MCG tablet, TAKE 1/2 TABLET (62.5MG) DAILY, Disp: 45 tablet, Rfl: 3    hydrALAZINE (APRESOLINE) 50 MG tablet, TAKE 1 TABLET BY MOUTH 3 TIMES DAILY, Disp: 270 tablet, Rfl: 3    isosorbide dinitrate (ISORDIL) 10 MG tablet, Take 1 tablet by mouth 3 times daily, Disp: 270 tablet, Rfl: 3    Folinic Acid-Vit B6-Vit B12 (FOLINIC-PLUS) 4-50-2 MG TABS, Take 4-50 mg by mouth 2 times daily, Disp: 90 tablet, Rfl: 2    amLODIPine (NORVASC) 5 MG tablet, Take 1 tablet by mouth daily, Disp: , Rfl:     senna (SENOKOT) 8.6 MG TABS tablet, TAKE 2 TABLETS BY MOUTH 2 TIMES DAILY, Disp: 120 tablet, Rfl: 5

## 2024-10-10 NOTE — PROGRESS NOTES
Pt presents to office today for follow up on fungus  Pt states that it is better at this time.  Pt was just in the hospital 09/2024  Dr Chase 10/01/2024

## 2024-10-15 RX ORDER — AMLODIPINE BESYLATE 5 MG/1
5 TABLET ORAL DAILY
Qty: 30 TABLET | Refills: 11 | Status: SHIPPED | OUTPATIENT
Start: 2024-10-15

## 2024-11-07 ENCOUNTER — TELEPHONE (OUTPATIENT)
Dept: CARDIOLOGY CLINIC | Age: 69
End: 2024-11-07

## 2024-12-07 ENCOUNTER — HOSPITAL ENCOUNTER (INPATIENT)
Age: 69
LOS: 3 days | Discharge: HOME | DRG: 189 | End: 2024-12-10
Attending: EMERGENCY MEDICINE | Admitting: INTERNAL MEDICINE
Payer: MEDICARE

## 2024-12-07 ENCOUNTER — APPOINTMENT (OUTPATIENT)
Dept: GENERAL RADIOLOGY | Age: 69
DRG: 189 | End: 2024-12-07
Payer: MEDICARE

## 2024-12-07 DIAGNOSIS — J18.9 PNEUMONIA DUE TO INFECTIOUS ORGANISM, UNSPECIFIED LATERALITY, UNSPECIFIED PART OF LUNG: ICD-10-CM

## 2024-12-07 DIAGNOSIS — I50.33 ACUTE ON CHRONIC HEART FAILURE WITH PRESERVED EJECTION FRACTION (HCC): ICD-10-CM

## 2024-12-07 DIAGNOSIS — I50.9 ACUTE ON CHRONIC CONGESTIVE HEART FAILURE, UNSPECIFIED HEART FAILURE TYPE (HCC): ICD-10-CM

## 2024-12-07 DIAGNOSIS — B34.8 PARAINFLUENZA INFECTION: Primary | ICD-10-CM

## 2024-12-07 DIAGNOSIS — R09.02 HYPOXIA: ICD-10-CM

## 2024-12-07 PROBLEM — J96.01 ACUTE HYPOXIC RESPIRATORY FAILURE: Status: ACTIVE | Noted: 2024-12-07

## 2024-12-07 LAB
ALBUMIN SERPL-MCNC: 4.1 G/DL (ref 3.5–5.2)
ALP SERPL-CCNC: 93 U/L (ref 40–129)
ALT SERPL-CCNC: 23 U/L (ref 0–40)
ANION GAP SERPL CALCULATED.3IONS-SCNC: 12 MMOL/L (ref 7–16)
AST SERPL-CCNC: 22 U/L (ref 0–39)
B PARAP IS1001 DNA NPH QL NAA+NON-PROBE: NOT DETECTED
B PERT DNA SPEC QL NAA+PROBE: NOT DETECTED
BASOPHILS # BLD: 0.02 K/UL (ref 0–0.2)
BASOPHILS NFR BLD: 0 % (ref 0–2)
BILIRUB SERPL-MCNC: 1 MG/DL (ref 0–1.2)
BNP SERPL-MCNC: 4483 PG/ML (ref 0–125)
BUN SERPL-MCNC: 20 MG/DL (ref 6–23)
C PNEUM DNA NPH QL NAA+NON-PROBE: NOT DETECTED
CALCIUM SERPL-MCNC: 9.4 MG/DL (ref 8.6–10.2)
CHLORIDE SERPL-SCNC: 97 MMOL/L (ref 98–107)
CO2 SERPL-SCNC: 29 MMOL/L (ref 22–29)
CREAT SERPL-MCNC: 1.6 MG/DL (ref 0.7–1.2)
EOSINOPHIL # BLD: 0.08 K/UL (ref 0.05–0.5)
EOSINOPHILS RELATIVE PERCENT: 1 % (ref 0–6)
ERYTHROCYTE [DISTWIDTH] IN BLOOD BY AUTOMATED COUNT: 17.6 % (ref 11.5–15)
FLUAV RNA NPH QL NAA+NON-PROBE: NOT DETECTED
FLUBV RNA NPH QL NAA+NON-PROBE: NOT DETECTED
GFR, ESTIMATED: 46 ML/MIN/1.73M2
GLUCOSE SERPL-MCNC: 120 MG/DL (ref 74–99)
HADV DNA NPH QL NAA+NON-PROBE: NOT DETECTED
HCOV 229E RNA NPH QL NAA+NON-PROBE: NOT DETECTED
HCOV HKU1 RNA NPH QL NAA+NON-PROBE: NOT DETECTED
HCOV NL63 RNA NPH QL NAA+NON-PROBE: NOT DETECTED
HCOV OC43 RNA NPH QL NAA+NON-PROBE: NOT DETECTED
HCT VFR BLD AUTO: 32.7 % (ref 37–54)
HGB BLD-MCNC: 9.8 G/DL (ref 12.5–16.5)
HMPV RNA NPH QL NAA+NON-PROBE: NOT DETECTED
HPIV1 RNA NPH QL NAA+NON-PROBE: NOT DETECTED
HPIV2 RNA NPH QL NAA+NON-PROBE: NOT DETECTED
HPIV3 RNA NPH QL NAA+NON-PROBE: DETECTED
HPIV4 RNA NPH QL NAA+NON-PROBE: NOT DETECTED
IMM GRANULOCYTES # BLD AUTO: 0.05 K/UL (ref 0–0.58)
IMM GRANULOCYTES NFR BLD: 1 % (ref 0–5)
LYMPHOCYTES NFR BLD: 0.26 K/UL (ref 1.5–4)
LYMPHOCYTES RELATIVE PERCENT: 4 % (ref 20–42)
M PNEUMO DNA NPH QL NAA+NON-PROBE: NOT DETECTED
MCH RBC QN AUTO: 26.6 PG (ref 26–35)
MCHC RBC AUTO-ENTMCNC: 30 G/DL (ref 32–34.5)
MCV RBC AUTO: 88.6 FL (ref 80–99.9)
MONOCYTES NFR BLD: 0.63 K/UL (ref 0.1–0.95)
MONOCYTES NFR BLD: 10 % (ref 2–12)
NEUTROPHILS NFR BLD: 83 % (ref 43–80)
NEUTS SEG NFR BLD: 5.18 K/UL (ref 1.8–7.3)
PLATELET # BLD AUTO: 193 K/UL (ref 130–450)
PMV BLD AUTO: 8.8 FL (ref 7–12)
POTASSIUM SERPL-SCNC: 4 MMOL/L (ref 3.5–5)
PROCALCITONIN SERPL-MCNC: 0.07 NG/ML (ref 0–0.08)
PROT SERPL-MCNC: 6.9 G/DL (ref 6.4–8.3)
RBC # BLD AUTO: 3.69 M/UL (ref 3.8–5.8)
RBC # BLD: ABNORMAL 10*6/UL
RSV RNA NPH QL NAA+NON-PROBE: NOT DETECTED
RV+EV RNA NPH QL NAA+NON-PROBE: NOT DETECTED
SARS-COV-2 RNA NPH QL NAA+NON-PROBE: NOT DETECTED
SODIUM SERPL-SCNC: 138 MMOL/L (ref 132–146)
SPECIMEN DESCRIPTION: ABNORMAL
TROPONIN I SERPL HS-MCNC: 47 NG/L (ref 0–11)
TROPONIN I SERPL HS-MCNC: 49 NG/L (ref 0–11)
WBC OTHER # BLD: 6.2 K/UL (ref 4.5–11.5)

## 2024-12-07 PROCEDURE — 83880 ASSAY OF NATRIURETIC PEPTIDE: CPT

## 2024-12-07 PROCEDURE — 94640 AIRWAY INHALATION TREATMENT: CPT

## 2024-12-07 PROCEDURE — 99222 1ST HOSP IP/OBS MODERATE 55: CPT | Performed by: INTERNAL MEDICINE

## 2024-12-07 PROCEDURE — 2580000003 HC RX 258: Performed by: INTERNAL MEDICINE

## 2024-12-07 PROCEDURE — 6370000000 HC RX 637 (ALT 250 FOR IP): Performed by: INTERNAL MEDICINE

## 2024-12-07 PROCEDURE — 6360000002 HC RX W HCPCS: Performed by: INTERNAL MEDICINE

## 2024-12-07 PROCEDURE — 99285 EMERGENCY DEPT VISIT HI MDM: CPT

## 2024-12-07 PROCEDURE — 2140000000 HC CCU INTERMEDIATE R&B

## 2024-12-07 PROCEDURE — 84484 ASSAY OF TROPONIN QUANT: CPT

## 2024-12-07 PROCEDURE — 0202U NFCT DS 22 TRGT SARS-COV-2: CPT

## 2024-12-07 PROCEDURE — 85025 COMPLETE CBC W/AUTO DIFF WBC: CPT

## 2024-12-07 PROCEDURE — 80053 COMPREHEN METABOLIC PANEL: CPT

## 2024-12-07 PROCEDURE — 6370000000 HC RX 637 (ALT 250 FOR IP)

## 2024-12-07 PROCEDURE — 84145 PROCALCITONIN (PCT): CPT

## 2024-12-07 PROCEDURE — 93005 ELECTROCARDIOGRAM TRACING: CPT

## 2024-12-07 PROCEDURE — 71045 X-RAY EXAM CHEST 1 VIEW: CPT

## 2024-12-07 RX ORDER — IPRATROPIUM BROMIDE AND ALBUTEROL SULFATE 2.5; .5 MG/3ML; MG/3ML
1 SOLUTION RESPIRATORY (INHALATION) ONCE
Status: COMPLETED | OUTPATIENT
Start: 2024-12-07 | End: 2024-12-07

## 2024-12-07 RX ORDER — AMLODIPINE BESYLATE 5 MG/1
5 TABLET ORAL DAILY
Status: DISCONTINUED | OUTPATIENT
Start: 2024-12-08 | End: 2024-12-10 | Stop reason: HOSPADM

## 2024-12-07 RX ORDER — ENOXAPARIN SODIUM 100 MG/ML
40 INJECTION SUBCUTANEOUS DAILY
Status: DISCONTINUED | OUTPATIENT
Start: 2024-12-08 | End: 2024-12-07

## 2024-12-07 RX ORDER — SENNOSIDES A AND B 8.6 MG/1
2 TABLET, FILM COATED ORAL 2 TIMES DAILY
Status: DISCONTINUED | OUTPATIENT
Start: 2024-12-07 | End: 2024-12-10 | Stop reason: HOSPADM

## 2024-12-07 RX ORDER — URSODIOL 300 MG/1
300 CAPSULE ORAL 2 TIMES DAILY
Status: DISCONTINUED | OUTPATIENT
Start: 2024-12-07 | End: 2024-12-10 | Stop reason: HOSPADM

## 2024-12-07 RX ORDER — HYDRALAZINE HYDROCHLORIDE 20 MG/ML
10 INJECTION INTRAMUSCULAR; INTRAVENOUS EVERY 6 HOURS PRN
Status: DISCONTINUED | OUTPATIENT
Start: 2024-12-07 | End: 2024-12-10 | Stop reason: HOSPADM

## 2024-12-07 RX ORDER — ACETAMINOPHEN 325 MG/1
650 TABLET ORAL EVERY 6 HOURS PRN
Status: DISCONTINUED | OUTPATIENT
Start: 2024-12-07 | End: 2024-12-10 | Stop reason: HOSPADM

## 2024-12-07 RX ORDER — IPRATROPIUM BROMIDE AND ALBUTEROL SULFATE 2.5; .5 MG/3ML; MG/3ML
1 SOLUTION RESPIRATORY (INHALATION)
Status: DISCONTINUED | OUTPATIENT
Start: 2024-12-07 | End: 2024-12-10 | Stop reason: HOSPADM

## 2024-12-07 RX ORDER — ACETAMINOPHEN 500 MG
1000 TABLET ORAL ONCE
Status: COMPLETED | OUTPATIENT
Start: 2024-12-07 | End: 2024-12-07

## 2024-12-07 RX ORDER — BENZONATATE 100 MG/1
100 CAPSULE ORAL 3 TIMES DAILY PRN
Status: DISCONTINUED | OUTPATIENT
Start: 2024-12-07 | End: 2024-12-10 | Stop reason: HOSPADM

## 2024-12-07 RX ORDER — POTASSIUM CHLORIDE 7.45 MG/ML
10 INJECTION INTRAVENOUS PRN
Status: DISCONTINUED | OUTPATIENT
Start: 2024-12-07 | End: 2024-12-10 | Stop reason: HOSPADM

## 2024-12-07 RX ORDER — M-VIT,TX,IRON,MINS/CALC/FOLIC 27MG-0.4MG
1 TABLET ORAL DAILY
Status: DISCONTINUED | OUTPATIENT
Start: 2024-12-08 | End: 2024-12-10 | Stop reason: HOSPADM

## 2024-12-07 RX ORDER — ONDANSETRON 4 MG/1
4 TABLET, ORALLY DISINTEGRATING ORAL EVERY 8 HOURS PRN
Status: DISCONTINUED | OUTPATIENT
Start: 2024-12-07 | End: 2024-12-10 | Stop reason: HOSPADM

## 2024-12-07 RX ORDER — FUROSEMIDE 10 MG/ML
20 INJECTION INTRAMUSCULAR; INTRAVENOUS ONCE
Status: DISCONTINUED | OUTPATIENT
Start: 2024-12-07 | End: 2024-12-07

## 2024-12-07 RX ORDER — HYDRALAZINE HYDROCHLORIDE 50 MG/1
50 TABLET, FILM COATED ORAL 3 TIMES DAILY
Status: DISCONTINUED | OUTPATIENT
Start: 2024-12-07 | End: 2024-12-10 | Stop reason: HOSPADM

## 2024-12-07 RX ORDER — MAGNESIUM SULFATE IN WATER 40 MG/ML
2000 INJECTION, SOLUTION INTRAVENOUS PRN
Status: DISCONTINUED | OUTPATIENT
Start: 2024-12-07 | End: 2024-12-10 | Stop reason: HOSPADM

## 2024-12-07 RX ORDER — BUMETANIDE 1 MG/1
1 TABLET ORAL DAILY
Status: DISCONTINUED | OUTPATIENT
Start: 2024-12-08 | End: 2024-12-10 | Stop reason: HOSPADM

## 2024-12-07 RX ORDER — CARVEDILOL 6.25 MG/1
12.5 TABLET ORAL 2 TIMES DAILY WITH MEALS
Status: DISCONTINUED | OUTPATIENT
Start: 2024-12-07 | End: 2024-12-10 | Stop reason: HOSPADM

## 2024-12-07 RX ORDER — SODIUM CHLORIDE 9 MG/ML
INJECTION, SOLUTION INTRAVENOUS PRN
Status: DISCONTINUED | OUTPATIENT
Start: 2024-12-07 | End: 2024-12-10 | Stop reason: HOSPADM

## 2024-12-07 RX ORDER — ONDANSETRON 2 MG/ML
4 INJECTION INTRAMUSCULAR; INTRAVENOUS EVERY 6 HOURS PRN
Status: DISCONTINUED | OUTPATIENT
Start: 2024-12-07 | End: 2024-12-10 | Stop reason: HOSPADM

## 2024-12-07 RX ORDER — ACETAMINOPHEN 650 MG/1
650 SUPPOSITORY RECTAL EVERY 6 HOURS PRN
Status: DISCONTINUED | OUTPATIENT
Start: 2024-12-07 | End: 2024-12-10 | Stop reason: HOSPADM

## 2024-12-07 RX ORDER — POLYETHYLENE GLYCOL 3350 17 G/17G
17 POWDER, FOR SOLUTION ORAL DAILY PRN
Status: DISCONTINUED | OUTPATIENT
Start: 2024-12-07 | End: 2024-12-10 | Stop reason: HOSPADM

## 2024-12-07 RX ORDER — SODIUM CHLORIDE 0.9 % (FLUSH) 0.9 %
5-40 SYRINGE (ML) INJECTION EVERY 12 HOURS SCHEDULED
Status: DISCONTINUED | OUTPATIENT
Start: 2024-12-07 | End: 2024-12-10 | Stop reason: HOSPADM

## 2024-12-07 RX ORDER — ISOSORBIDE DINITRATE 10 MG/1
10 TABLET ORAL 3 TIMES DAILY
Status: DISCONTINUED | OUTPATIENT
Start: 2024-12-07 | End: 2024-12-10 | Stop reason: HOSPADM

## 2024-12-07 RX ORDER — DIGOXIN 125 MCG
62.5 TABLET ORAL DAILY
Status: DISCONTINUED | OUTPATIENT
Start: 2024-12-08 | End: 2024-12-10 | Stop reason: HOSPADM

## 2024-12-07 RX ORDER — BUMETANIDE 0.25 MG/ML
1 INJECTION, SOLUTION INTRAMUSCULAR; INTRAVENOUS ONCE
Status: DISCONTINUED | OUTPATIENT
Start: 2024-12-07 | End: 2024-12-10 | Stop reason: HOSPADM

## 2024-12-07 RX ORDER — CALCIUM CARBONATE 500 MG/1
500 TABLET, CHEWABLE ORAL 3 TIMES DAILY PRN
Status: DISCONTINUED | OUTPATIENT
Start: 2024-12-07 | End: 2024-12-10 | Stop reason: HOSPADM

## 2024-12-07 RX ORDER — POTASSIUM CHLORIDE 1500 MG/1
40 TABLET, EXTENDED RELEASE ORAL PRN
Status: DISCONTINUED | OUTPATIENT
Start: 2024-12-07 | End: 2024-12-10 | Stop reason: HOSPADM

## 2024-12-07 RX ORDER — SODIUM CHLORIDE 0.9 % (FLUSH) 0.9 %
5-40 SYRINGE (ML) INJECTION PRN
Status: DISCONTINUED | OUTPATIENT
Start: 2024-12-07 | End: 2024-12-10 | Stop reason: HOSPADM

## 2024-12-07 RX ADMIN — IPRATROPIUM BROMIDE AND ALBUTEROL SULFATE 1 DOSE: .5; 3 SOLUTION RESPIRATORY (INHALATION) at 11:57

## 2024-12-07 RX ADMIN — BENZONATATE 100 MG: 100 CAPSULE ORAL at 17:39

## 2024-12-07 RX ADMIN — APIXABAN 5 MG: 5 TABLET, FILM COATED ORAL at 23:30

## 2024-12-07 RX ADMIN — SENNOSIDES 17.2 MG: 8.6 TABLET, FILM COATED ORAL at 23:00

## 2024-12-07 RX ADMIN — HYDRALAZINE HYDROCHLORIDE 50 MG: 25 TABLET ORAL at 23:00

## 2024-12-07 RX ADMIN — URSODIOL 300 MG: 300 CAPSULE ORAL at 23:00

## 2024-12-07 RX ADMIN — SODIUM CHLORIDE, PRESERVATIVE FREE 5 ML: 5 INJECTION INTRAVENOUS at 22:57

## 2024-12-07 RX ADMIN — IPRATROPIUM BROMIDE AND ALBUTEROL SULFATE 1 DOSE: 2.5; .5 SOLUTION RESPIRATORY (INHALATION) at 19:22

## 2024-12-07 RX ADMIN — CARVEDILOL 12.5 MG: 6.25 TABLET, FILM COATED ORAL at 17:34

## 2024-12-07 RX ADMIN — WATER 40 MG: 1 INJECTION INTRAMUSCULAR; INTRAVENOUS; SUBCUTANEOUS at 17:32

## 2024-12-07 RX ADMIN — IPRATROPIUM BROMIDE AND ALBUTEROL SULFATE 1 DOSE: 2.5; .5 SOLUTION RESPIRATORY (INHALATION) at 16:02

## 2024-12-07 RX ADMIN — ACETAMINOPHEN 1000 MG: 500 TABLET ORAL at 12:09

## 2024-12-07 RX ADMIN — ISOSORBIDE DINITRATE 10 MG: 10 TABLET ORAL at 23:30

## 2024-12-07 NOTE — H&P
Inpatient H&P      PCP:  Kamron Chase DO  Admitting Physician:  Katie Rader DO  Consultants:  None at this time   Chief Complaint:    Chief Complaint   Patient presents with    Shortness of Breath     Off prednisone for 4 days. Now SOB, pt checks pulse ox at home which was been as low as 84%. Hx CHF       History of Present Illness  Hong Chase is a 69 y.o. male who presents to [facility] ER complaining of shortness of breath.    Hong Chase has a past medical history that includes CHF, ischemic cardiomyopathy, atrial fibrillation, type I aortic dissection, hypertension, sarcoidosis, CKD, KYARA, hyperlipidemia    Hong presents with shortness of breath.  Shortness of breath has been progressively worsening over the past day.  Last night he admitted to a productive cough with sputum and increased shortness of breath.  He checked his pulse ox at home which was in the upper 80s.  He does have a history of sarcoidosis and CHF.  He was febrile in the ER at 101.3.  Chest x-ray showed cardiomegaly with mild interstitial edema. He tested positive for Parainfluenza   Discussed patient's case with ED physician.    ER Course  Upon presentation to the ER, routine labwork was performed which revealed creatinine 1.6, proBNP 4483, troponin 49, 47, hemoglobin 9.8.  Imaging results are as outlined below in the Imaging section of this note.     Upon arrival to the ER, patient was 149/89.  The patient received Tylenol, DuoNebs in the emergency room and was admitted to [facility].    Last Hospital Admission - I personally reviewed previous admission from 9/24/24  Acute on chronic anemia-with melena  GI bleed  Post sphincterotomy bleeding   Physical deconditioning   CKD stage IIIb  Hyperkalemia  Sarcoidosis  KYARA on CPAP  Chronic CHF  A-fib   Esophageal Candidiasis    Last Echocardiogram - I personally reviewed echocardiogram from 9/13/24    Left Ventricle: Normal left ventricular systolic function with a visually  estimated EF of 60 - 65%. Left ventricle size is normal. Mildly increased ventricular mass. Findings consistent with mild concentric hypertrophy. Normal wall motion. Indeterminate diastolic function.    Right Ventricle: Right ventricle size is normal. Normal systolic function. TAPSE is 1.8 cm.    Aortic Valve: No stenosis.    Tricuspid Valve: Mild regurgitation. Mildly elevated RVSP, consistent with mild pulmonary hypertension. The estimated RVSP is 48 mmHg.    Left Atrium: Left atrium is severely dilated.    Aorta: Normal sized aortic root. Mildly dilated ascending aorta. Ao ascending diameter is 4.2 cm. 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    Pericardium: No pericardial effusion.    Image quality is adequate.     ED TRIAGE VITALS  BP: 122/73, Temp: (!) 101.3 °F (38.5 °C), Pulse: 84, Respirations: 14, SpO2: 96 %    Vitals:    12/07/24 1053 12/07/24 1058 12/07/24 1200 12/07/24 1300   BP:  (!) 149/89 (!) 144/77 122/73   Pulse: 80  77 84   Resp:  22 (!) 34 14   Temp: (!) 101.3 °F (38.5 °C)      SpO2: (!) 89% 92% 99% 96%   Weight:  93 kg (205 lb)           Histories  Past Medical History:   Diagnosis Date    Acute kidney injury (HCC)     Aortic dissection (HCC)     Atrial fibrillation (HCC)     CAD (coronary artery disease)     CHF (congestive heart failure) ([facility]) 5/03    severely impaired LV systolic function and CHF, EF 25-30%    CKD (chronic kidney disease)     H/O cardiovascular stress test 5/03    No evidence of stress-induced ischemia    H/O Doppler echocardiogram 5/04/10    [facility], mildly dilated LV, mod concentric LVH, normal LV systolic function, mod dilated left atrium, trace MR    Hypertension     Ischemic cardiomyopathy     Obesity     Pericardial effusion (noninflammatory) 11/15/2019    History: Post-op problem  Assessment: S/P pericardial drain placement in CVICU 12/2 Plan:  Drain dc'ed, no need for post-procedure echo unless pt become symptomatic.    Pleural effusion 11/26/2019    History: Post op problem

## 2024-12-07 NOTE — ED PROVIDER NOTES
[facility] EMERGENCY DEPARTMENT  EMERGENCY DEPARTMENT ENCOUNTER        Pt Name: Hong Chase  MRN: [id number]  Birthdate 1955  Date of evaluation: 12/7/2024  Provider: John Paul Chang DO  PCP: Kamron Chase DO  Note Started: 11:27 AM EST 12/7/24    CHIEF COMPLAINT       Chief Complaint   Patient presents with    Shortness of Breath     Off prednisone for 4 days. Now SOB, pt checks pulse ox at home which was been as low as 84%. Hx CHF       HISTORY OF PRESENT ILLNESS: 1 or more Elements   History received from: Patient    Hong Chase is a 69 y.o. male who presents to the emergency department with chief complaint of shortness of breath.  Patient shortness of breath been gradually worsening over the past 24 hours.  States last night he started having a productive cough with yellow sputum as well as more shortness of breath.  States that he checks his pulse ox at home and it has been in the mid to upper 80s today.  States he does have a history of for CHF as well as sarcoidosis.  Nothing makes symptoms better or worse.  Patient states that he had just been on prednisone and completed a course 4 days ago.  States he has had fever at home.  Denies any chills, nausea vomiting, chest pain, abdominal pain, hematuria dysuria, constipation or diarrhea.    Nursing Notes were all reviewed and agreed with or any disagreements were addressed in the HPI.    REVIEW OF SYSTEMS :    Positives and Pertinent negatives as per HPI.    PAST MEDICAL HISTORY/Chronic Conditions Affecting Care    has a past medical history of Acute kidney injury (HCC), Aortic dissection (HCC), Atrial fibrillation (HCC), CAD (coronary artery disease), CHF (congestive heart failure) (HCC) (5/03), CKD (chronic kidney disease), H/O cardiovascular stress test (5/03), H/O Doppler echocardiogram (5/04/10), Hypertension, Ischemic cardiomyopathy, Obesity, Pericardial effusion (noninflammatory) (11/15/2019), Pleural effusion  includes but is not limited to: ACS, pneumonia, pneumothorax, costochondritis, CHF exacerbation, COPD exacerbation, viral URI    69 y.o. male who presents to the emergency department with chief complaint of shortness of breath.  On arrival to emergency department patient placed on 2 L nasal cannula for hypoxia.  He is given 1 DuoNeb treatment for mild expiratory wheezing and a gram of Tylenol for fever.  Metabolic panel negative for acute findings and no acute kidney injury and CBC is normal.  BNP elevated at 4483 and chest x-ray with some opacities that could be pulmonary edema in the setting-patient given 20 mg of IV Lasix.  Troponin stable x 2 with reassuring EKG and molecular panel positive for parainfluenza virus.  Due to patient's hypoxia and concern for CHF, spoke with hospitalist for admission.    Please refer to the ED Course as available for additional MDM.  ED Course as of 12/07/24 1435   Sat Dec 07, 2024   1130 EKG: Atrial fibrillation at a rate of 87 bpm with right axis and QTc of 385.  No significant ST changes or elevation with no prior to compare to at this time.  EKG interpreted by myself [RW]      ED Course User Index  [RW] John Paul Chang,         Social Determinants affecting Dx or Tx: Stress    Records Reviewed: EKG from 9/24/2024 reviewed in comparison to today's.    I am the Primary Clinician of Record.    CONSULTS: (Who and What was discussed)  None    FINAL IMPRESSION      1. Parainfluenza infection    2. Hypoxia    3. Pneumonia due to infectious organism, unspecified laterality, unspecified part of lung    4. Acute on chronic congestive heart failure, unspecified heart failure type (HCC)          DISPOSITION/PLAN   DISPOSITION Admitted 12/07/2024 02:09:27 PM    PATIENT REFERRED TO:  No follow-up provider specified.    DISCHARGE MEDICATIONS:  New Prescriptions    No medications on file            (Please note that portions of this note were completed with a voice recognition program.

## 2024-12-08 LAB
ALBUMIN SERPL-MCNC: 4 G/DL (ref 3.5–5.2)
ALP SERPL-CCNC: 86 U/L (ref 40–129)
ALT SERPL-CCNC: 23 U/L (ref 0–40)
AMPHET UR QL SCN: NEGATIVE
ANION GAP SERPL CALCULATED.3IONS-SCNC: 12 MMOL/L (ref 7–16)
ANION GAP SERPL CALCULATED.3IONS-SCNC: 8 MMOL/L (ref 7–16)
AST SERPL-CCNC: 23 U/L (ref 0–39)
BARBITURATES UR QL SCN: NEGATIVE
BENZODIAZ UR QL: NEGATIVE
BILIRUB SERPL-MCNC: 0.9 MG/DL (ref 0–1.2)
BUN SERPL-MCNC: 20 MG/DL (ref 6–23)
BUN SERPL-MCNC: 26 MG/DL (ref 6–23)
BUPRENORPHINE UR QL: NEGATIVE
CALCIUM SERPL-MCNC: 9 MG/DL (ref 8.6–10.2)
CALCIUM SERPL-MCNC: 9.2 MG/DL (ref 8.6–10.2)
CANNABINOIDS UR QL SCN: POSITIVE
CHLORIDE SERPL-SCNC: 98 MMOL/L (ref 98–107)
CHLORIDE SERPL-SCNC: 99 MMOL/L (ref 98–107)
CHOLEST SERPL-MCNC: 183 MG/DL
CO2 SERPL-SCNC: 27 MMOL/L (ref 22–29)
CO2 SERPL-SCNC: 29 MMOL/L (ref 22–29)
COCAINE UR QL SCN: NEGATIVE
CREAT SERPL-MCNC: 1.4 MG/DL (ref 0.7–1.2)
CREAT SERPL-MCNC: 1.6 MG/DL (ref 0.7–1.2)
ERYTHROCYTE [DISTWIDTH] IN BLOOD BY AUTOMATED COUNT: 17.2 % (ref 11.5–15)
FENTANYL UR QL: NEGATIVE
GFR, ESTIMATED: 44 ML/MIN/1.73M2
GFR, ESTIMATED: 55 ML/MIN/1.73M2
GLUCOSE BLD-MCNC: 226 MG/DL (ref 74–99)
GLUCOSE BLD-MCNC: 234 MG/DL (ref 74–99)
GLUCOSE SERPL-MCNC: 162 MG/DL (ref 74–99)
GLUCOSE SERPL-MCNC: 200 MG/DL (ref 74–99)
HBA1C MFR BLD: 5.5 % (ref 4–5.6)
HCT VFR BLD AUTO: 31.9 % (ref 37–54)
HDLC SERPL-MCNC: 46 MG/DL
HGB BLD-MCNC: 9.7 G/DL (ref 12.5–16.5)
LDLC SERPL CALC-MCNC: 118 MG/DL
MAGNESIUM SERPL-MCNC: 2.1 MG/DL (ref 1.6–2.6)
MCH RBC QN AUTO: 26.6 PG (ref 26–35)
MCHC RBC AUTO-ENTMCNC: 30.4 G/DL (ref 32–34.5)
MCV RBC AUTO: 87.6 FL (ref 80–99.9)
METHADONE UR QL: NEGATIVE
OPIATES UR QL SCN: NEGATIVE
OXYCODONE UR QL SCN: NEGATIVE
PCP UR QL SCN: NEGATIVE
PHOSPHATE SERPL-MCNC: 3.5 MG/DL (ref 2.5–4.5)
PLATELET # BLD AUTO: 180 K/UL (ref 130–450)
PMV BLD AUTO: 8.9 FL (ref 7–12)
POTASSIUM SERPL-SCNC: 3.8 MMOL/L (ref 3.5–5)
POTASSIUM SERPL-SCNC: 4.1 MMOL/L (ref 3.5–5)
PROCALCITONIN SERPL-MCNC: 0.08 NG/ML (ref 0–0.08)
PROT SERPL-MCNC: 6.8 G/DL (ref 6.4–8.3)
RBC # BLD AUTO: 3.64 M/UL (ref 3.8–5.8)
SODIUM SERPL-SCNC: 136 MMOL/L (ref 132–146)
SODIUM SERPL-SCNC: 137 MMOL/L (ref 132–146)
TEST INFORMATION: ABNORMAL
TRIGL SERPL-MCNC: 97 MG/DL
TSH SERPL DL<=0.05 MIU/L-ACNC: 2.31 UIU/ML (ref 0.27–4.2)
VLDLC SERPL CALC-MCNC: 19 MG/DL
WBC OTHER # BLD: 4.8 K/UL (ref 4.5–11.5)

## 2024-12-08 PROCEDURE — 99233 SBSQ HOSP IP/OBS HIGH 50: CPT | Performed by: INTERNAL MEDICINE

## 2024-12-08 PROCEDURE — 36415 COLL VENOUS BLD VENIPUNCTURE: CPT

## 2024-12-08 PROCEDURE — 6360000002 HC RX W HCPCS: Performed by: INTERNAL MEDICINE

## 2024-12-08 PROCEDURE — 84443 ASSAY THYROID STIM HORMONE: CPT

## 2024-12-08 PROCEDURE — 2580000003 HC RX 258: Performed by: INTERNAL MEDICINE

## 2024-12-08 PROCEDURE — 83735 ASSAY OF MAGNESIUM: CPT

## 2024-12-08 PROCEDURE — 6370000000 HC RX 637 (ALT 250 FOR IP): Performed by: INTERNAL MEDICINE

## 2024-12-08 PROCEDURE — 80048 BASIC METABOLIC PNL TOTAL CA: CPT

## 2024-12-08 PROCEDURE — 84100 ASSAY OF PHOSPHORUS: CPT

## 2024-12-08 PROCEDURE — 82947 ASSAY GLUCOSE BLOOD QUANT: CPT

## 2024-12-08 PROCEDURE — 84145 PROCALCITONIN (PCT): CPT

## 2024-12-08 PROCEDURE — 85027 COMPLETE CBC AUTOMATED: CPT

## 2024-12-08 PROCEDURE — 80307 DRUG TEST PRSMV CHEM ANLYZR: CPT

## 2024-12-08 PROCEDURE — 83036 HEMOGLOBIN GLYCOSYLATED A1C: CPT

## 2024-12-08 PROCEDURE — 2140000000 HC CCU INTERMEDIATE R&B

## 2024-12-08 PROCEDURE — 80053 COMPREHEN METABOLIC PANEL: CPT

## 2024-12-08 PROCEDURE — 80061 LIPID PANEL: CPT

## 2024-12-08 PROCEDURE — 94640 AIRWAY INHALATION TREATMENT: CPT

## 2024-12-08 RX ADMIN — IPRATROPIUM BROMIDE AND ALBUTEROL SULFATE 1 DOSE: 2.5; .5 SOLUTION RESPIRATORY (INHALATION) at 12:25

## 2024-12-08 RX ADMIN — URSODIOL 300 MG: 300 CAPSULE ORAL at 21:44

## 2024-12-08 RX ADMIN — HYDRALAZINE HYDROCHLORIDE 50 MG: 25 TABLET ORAL at 07:54

## 2024-12-08 RX ADMIN — SENNOSIDES 17.2 MG: 8.6 TABLET, FILM COATED ORAL at 07:54

## 2024-12-08 RX ADMIN — CARVEDILOL 12.5 MG: 6.25 TABLET, FILM COATED ORAL at 18:02

## 2024-12-08 RX ADMIN — ISOSORBIDE DINITRATE 10 MG: 10 TABLET ORAL at 18:02

## 2024-12-08 RX ADMIN — AMLODIPINE BESYLATE 5 MG: 5 TABLET ORAL at 07:54

## 2024-12-08 RX ADMIN — Medication 1 TABLET: at 07:54

## 2024-12-08 RX ADMIN — SENNOSIDES 17.2 MG: 8.6 TABLET, FILM COATED ORAL at 20:20

## 2024-12-08 RX ADMIN — SODIUM CHLORIDE, PRESERVATIVE FREE 10 ML: 5 INJECTION INTRAVENOUS at 20:20

## 2024-12-08 RX ADMIN — HYDRALAZINE HYDROCHLORIDE 50 MG: 25 TABLET ORAL at 14:35

## 2024-12-08 RX ADMIN — HYDRALAZINE HYDROCHLORIDE 50 MG: 25 TABLET ORAL at 20:20

## 2024-12-08 RX ADMIN — APIXABAN 5 MG: 5 TABLET, FILM COATED ORAL at 20:20

## 2024-12-08 RX ADMIN — BENZONATATE 100 MG: 100 CAPSULE ORAL at 21:44

## 2024-12-08 RX ADMIN — CARVEDILOL 12.5 MG: 6.25 TABLET, FILM COATED ORAL at 07:54

## 2024-12-08 RX ADMIN — ISOSORBIDE DINITRATE 10 MG: 10 TABLET ORAL at 14:39

## 2024-12-08 RX ADMIN — IPRATROPIUM BROMIDE AND ALBUTEROL SULFATE 1 DOSE: 2.5; .5 SOLUTION RESPIRATORY (INHALATION) at 08:10

## 2024-12-08 RX ADMIN — URSODIOL 300 MG: 300 CAPSULE ORAL at 12:09

## 2024-12-08 RX ADMIN — WATER 40 MG: 1 INJECTION INTRAMUSCULAR; INTRAVENOUS; SUBCUTANEOUS at 04:20

## 2024-12-08 RX ADMIN — ISOSORBIDE DINITRATE 10 MG: 10 TABLET ORAL at 07:54

## 2024-12-08 RX ADMIN — DIGOXIN 62.5 MCG: 125 TABLET ORAL at 07:52

## 2024-12-08 RX ADMIN — IPRATROPIUM BROMIDE AND ALBUTEROL SULFATE 1 DOSE: 2.5; .5 SOLUTION RESPIRATORY (INHALATION) at 16:48

## 2024-12-08 RX ADMIN — BUMETANIDE 1 MG: 1 TABLET ORAL at 07:54

## 2024-12-08 RX ADMIN — WATER 40 MG: 1 INJECTION INTRAMUSCULAR; INTRAVENOUS; SUBCUTANEOUS at 18:02

## 2024-12-08 RX ADMIN — IPRATROPIUM BROMIDE AND ALBUTEROL SULFATE 1 DOSE: 2.5; .5 SOLUTION RESPIRATORY (INHALATION) at 19:16

## 2024-12-08 ASSESSMENT — LIFESTYLE VARIABLES
HOW MANY STANDARD DRINKS CONTAINING ALCOHOL DO YOU HAVE ON A TYPICAL DAY: PATIENT DOES NOT DRINK
HOW OFTEN DO YOU HAVE A DRINK CONTAINING ALCOHOL: NEVER

## 2024-12-08 ASSESSMENT — PAIN SCALES - GENERAL
PAINLEVEL_OUTOF10: 0
PAINLEVEL_OUTOF10: 0

## 2024-12-08 ASSESSMENT — PAIN - FUNCTIONAL ASSESSMENT: PAIN_FUNCTIONAL_ASSESSMENT: NONE - DENIES PAIN

## 2024-12-08 NOTE — PROGRESS NOTES
Patient refusing hospital issued CPAP at this time.  Patient states that he does not wish for staff to even bring CPAP machine into him.  Patient states that he is okay just on a couple liters nasal cannula.

## 2024-12-08 NOTE — PROGRESS NOTES
Internal Medicine Progress Note      Synopsis: Patient admitted on 12/7/2024 with shortness of breath. Patient was found to be hypoxic and placed on 3L NC. Patient with initial temp of 101.3. Tested positive for parainfluenza. Chest x-ray showed cardiomegaly with mild interstitial edema. Started on IV Solumedrol. Pulmonology consulted.       Assessment and Plan         Acute hypoxic respiratory failure- wean O2 as tolerated. Not normally on any baseline oxygen. Pulmonology consultation.   Parainfluenza- symptomatic treatment. Nebs.   Acute on chronic CHF- Continue bumex. As per patient request-Cardiology consultation   Sarcoidosis- newly diagnosed liver sarcoid. Following up with sarcoid clinic at [place].  [place] thinks that is not sarcoid of the liver he is getting multiple blood work and testing done and  follow-up is January 25   Permanent atrial fibrillation on Eliquis  Hypertension- reorder home medications  Hyperlipidemia- check lipid panel. statin  CKD3 at baseline  Chronic anemia  KYARA on CPAP- auto 8 to 13 cm   Hyperglycemia-monitor blood sugars     DVT Prophylaxis: Eliquis  Disposition: Remains inpatient requiring 02     Subjective:     Feeling beter  But still feels short of breath  Non productive cough today  No nausea or vomiting.          Exam:  /83   Pulse 89   Temp 97.8 °F (36.6 °C) (Oral)   Resp 18   Ht 1.676 m (5' 6\")   Wt 93 kg (205 lb)   SpO2 95%   BMI 33.09 kg/m²   General appearance: No apparent distress, appears stated age and cooperative.  Respiratory: Diffuse expiratory wheezes   Cardiovascular: Regular rate and rhythm with normal S1/S2 without murmurs, rubs or gallops.  Abdomen: Soft, non-tender, non-distended with normal bowel sounds.  Musculoskeletal: No clubbing, cyanosis or edema bilaterally. Brisk capillary refill. 2+ lower extremity pulses (dorsalis pedis).   Skin:  No rashes    Neurologic: awake, alert and following commands     Medications:

## 2024-12-08 NOTE — CONSULTS
(ACTIGALL) 300 MG capsule Take 1 capsule by mouth in the morning and at bedtime 10/14/24  Yes Kamron Chase DO   bumetanide (BUMEX) 1 MG tablet Take 1 tablet by mouth daily 10/1/24  Yes Kamron Chase DO   hydrOXYzine HCl (ATARAX) 50 MG tablet Take 1 tablet by mouth every 8 hours as needed for Itching 10/1/24  Yes Kamron Chase DO   apixaban (ELIQUIS) 5 MG TABS tablet Take 1 tablet by mouth 2 times daily Ok to resume from 9/28 9/27/24  Yes Reggie Mayfield MD   carvedilol (COREG) 12.5 MG tablet Take 1 tablet by mouth 2 times daily (with meals) 9/20/24  Yes Veronica Velasquez DO   digoxin (LANOXIN) 125 MCG tablet TAKE 1/2 TABLET (62.5MG) DAILY 8/15/24  Yes Safia Avery MD   hydrALAZINE (APRESOLINE) 50 MG tablet TAKE 1 TABLET BY MOUTH 3 TIMES DAILY 8/15/24 8/10/25 Yes Safia Avery MD   isosorbide dinitrate (ISORDIL) 10 MG tablet Take 1 tablet by mouth 3 times daily 8/15/24  Yes Safia Avery MD   Folinic Acid-Vit B6-Vit B12 (FOLINIC-PLUS) 4-50-2 MG TABS Take 4-50 mg by mouth 2 times daily 7/17/24  Yes Hong Rogel Jr., DPM   senna (SENOKOT) 8.6 MG TABS tablet TAKE 2 TABLETS BY MOUTH 2 TIMES DAILY 12/12/23  Yes Kamron Chase DO   Multiple Vitamins-Minerals (THERAPEUTIC MULTIVITAMIN-MINERALS) tablet Take 1 tablet by mouth daily   Yes Suzanna Gutiérrez MD   pantoprazole (PROTONIX) 40 MG tablet Take 1 tablet by mouth 2 times daily (before meals) 10/7/24 12/6/24  Kamron Chase DO       Current Medications:    Current Facility-Administered Medications: predniSONE (DELTASONE) tablet 40 mg, 40 mg, Oral, Daily  benzocaine-menthol (CEPACOL SORE THROAT) lozenge 1 lozenge, 1 lozenge, Oral, Q2H PRN  benzonatate (TESSALON) capsule 100 mg, 100 mg, Oral, TID PRN  calcium carbonate (TUMS) chewable tablet 500 mg, 500 mg, Oral, TID PRN  hydrALAZINE (APRESOLINE) injection 10 mg, 10 mg, IntraVENous, Q6H PRN  melatonin tablet 3 mg, 3 mg, Oral, Nightly PRN  Polyvinyl Alcohol-Povidone PF (REFRESH) 1.4-0.6  190     BMP:   Recent Labs     12/09/24  0641 12/09/24  1339    139   K 3.6 3.8   CO2 27 25   BUN 27* 28*   CREATININE 1.4* 1.6*   LABGLOM 52* 47*   CALCIUM 9.1 9.6     Mag:   Recent Labs     12/08/24  0858   MG 2.1     Phos:   Recent Labs     12/08/24  0858   PHOS 3.5     TFT:   Lab Results   Component Value Date    TSH 2.31 12/08/2024    T4FREE 1.3 12/12/2023      HgA1c:   Lab Results   Component Value Date/Time    LABA1C 5.5 12/08/2024 08:58 AM    LABA1C 5.2 12/12/2023 10:25 AM    LABA1C 5.4 12/05/2022 09:26 AM     Lab Results   Component Value Date     12/05/2022     proBNP:   Lab Results   Component Value Date/Time    PROBNP 4,483 12/07/2024 11:39 AM    PROBNP 1,820 10/07/2024 11:19 AM    PROBNP 2,020 10/01/2024 11:23 AM    PROBNP 5,189 09/13/2024 06:00 AM    PROBNP 2,261 05/14/2020 10:00 AM    PROBNP 4,038 02/18/2020 09:20 AM    PROBNP 4,214 01/06/2020 02:25 PM    PROBNP 2,950 11/14/2019 09:35 AM     TROPONIN:  Lab Results   Component Value Date/Time    TROPHS 47 12/07/2024 01:28 PM    TROPHS 49 12/07/2024 11:39 AM    TROPHS 27 09/24/2024 06:52 PM     CK:  Lab Results   Component Value Date/Time    CKTOTAL 65 03/06/2021 10:15 PM    CKTOTAL 141 11/14/2019 09:35 AM     FASTING LIPID PANEL:  Lab Results   Component Value Date/Time    CHOL 183 12/08/2024 08:58 AM    HDL 46 12/08/2024 08:58 AM    TRIG 97 12/08/2024 08:58 AM     LIVER PROFILE:  Recent Labs     12/07/24  1139 12/08/24  0858   AST 22 23   ALT 23 23     COVID19:   Recent Labs     12/07/24  1139   COVID19 Not Detected     ASSESSMENT  SOB, cough + Parainfluenza  Acute hypoxic respiratory failure requiring NC O2 upon admission  Elevated troponin in setting of CKD, acute hypoxic respiratory failure. Patient denies CP.   Chronic HFrecEF  Severe KYARA compliant with C-pap  Lung sarcoidosis(diagnosed via biopsy) around age 36 has been in remission   Acute hepatitis d/t sarcoidosis s/p steroids weaned off 12/5/2024  Aortic dissection s/p repair in

## 2024-12-08 NOTE — CONSULTS
PULMONARY CONSULTATION    Reason for Consultation: acute hypoxic respiratory failure   Referring Physician: Renee Gallego DO    Communication with the referring physician will be sent via the electronic medical record.    HPI:    The patient is a 69 year old male known to me with a complex past medical history of sarcoidosis with recent liver biopsy demonstrating well formed non caseating granulomas for which he was treated with corticosteroids (since weaned off as of 5 days ago).   He also has a history of HFpEF with EF 60%, pulmonary hypertension, pleural effusions, severe KYARA, aortic dissection s/p repair [facility] 2019.  He had a recent opinion from pulmonary at [facility] regarding his sarcoidosis and recommendation was to wean off of the steroids.  He states that over the last several days he has noticed increased SOB, hypoxia, and cough.  He has not had any significant sputum of hemoptysis.  He has not had wheezing.  Upon arrival to the ED he has needed 3L supplemental oxygen and has tested + for parainfluenza.  He did have a sick contact last week.    Past Medical History:   Diagnosis Date    Acute kidney injury (HCC)     Aortic dissection (HCC)     Atrial fibrillation (HCC)     CAD (coronary artery disease)     CHF (congestive heart failure) ([facility]) 5/03    severely impaired LV systolic function and CHF, EF 25-30%    CKD (chronic kidney disease)     H/O cardiovascular stress test 5/03    No evidence of stress-induced ischemia    H/O Doppler echocardiogram 5/04/10    [facility], mildly dilated LV, mod concentric LVH, normal LV systolic function, mod dilated left atrium, trace MR    Hypertension     Ischemic cardiomyopathy     Obesity     Pericardial effusion (noninflammatory) 11/15/2019    History: Post-op problem  Assessment: S/P pericardial drain placement in CVICU 12/2 Plan:  Drain dc'ed, no need for post-procedure echo unless pt become symptomatic.    Pleural effusion 11/26/2019    History: Post op problem  ascending aorta. Ao ascending diameter is 4.2 cm. 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    Pericardium: No pericardial effusion.    Image quality is adequate.    IL2R level elevated 1089  Autoimmune panel neg  Myositis neg      Labs:  Lab Results   Component Value Date/Time    WBC 4.8 12/08/2024 08:58 AM    RBC 3.64 12/08/2024 08:58 AM    RBC 4.27 12/05/2022 09:26 AM    HGB 9.7 12/08/2024 08:58 AM    HCT 31.9 12/08/2024 08:58 AM    MCV 87.6 12/08/2024 08:58 AM    MCH 26.6 12/08/2024 08:58 AM    MCHC 30.4 12/08/2024 08:58 AM    RDW 17.2 12/08/2024 08:58 AM     12/08/2024 08:58 AM    MPV 8.9 12/08/2024 08:58 AM     Lab Results   Component Value Date/Time     12/08/2024 08:58 AM    K 3.8 12/08/2024 08:58 AM    K 4.3 03/05/2021 06:22 AM    CL 99 12/08/2024 08:58 AM    CO2 29 12/08/2024 08:58 AM    BUN 20 12/08/2024 08:58 AM    CREATININE 1.4 12/08/2024 08:58 AM    CALCIUM 9.0 12/08/2024 08:58 AM    GFRAA >60 03/17/2022 10:44 AM    LABGLOM 55 12/08/2024 08:58 AM    LABGLOM 38 12/12/2023 10:25 AM     Lab Results   Component Value Date/Time    PROTIME 13.0 09/25/2024 07:13 AM    PROTIME 25.2 03/14/2022 12:08 PM    INR 1.2 09/25/2024 07:13 AM           Assessment:  Acute hypoxic respiratory failure  Parainfluenza bronchitis   Severe KYARA on CPAP  Acute hepatitis d/t sarcoidosis  - improved after steroids  Pleural effusions - improved  HFpEF  Anemia - stable  aortic dissection s/p repair [facility] 2019  Pulmonary hypertension  CKD    Plan:  Wean oxygen as able for saturation 90% and above  Scheduled bronchodilators  EZPAP/incentive  Continue solumedrol with quick taper to off  Nocturnal CPAP  Close outpatient follow up      Thank you for allowing me to participate in the care of Hong Chase.       Kendra Cooper MD  12/8/2024 2:20 PM

## 2024-12-09 LAB
ANION GAP SERPL CALCULATED.3IONS-SCNC: 11 MMOL/L (ref 7–16)
ANION GAP SERPL CALCULATED.3IONS-SCNC: 12 MMOL/L (ref 7–16)
ANION GAP SERPL CALCULATED.3IONS-SCNC: 16 MMOL/L (ref 7–16)
ANION GAP SERPL CALCULATED.3IONS-SCNC: 16 MMOL/L (ref 7–16)
BASOPHILS # BLD: 0.01 K/UL (ref 0–0.2)
BASOPHILS NFR BLD: 0 % (ref 0–2)
BUN SERPL-MCNC: 27 MG/DL (ref 6–23)
BUN SERPL-MCNC: 28 MG/DL (ref 6–23)
BUN SERPL-MCNC: 29 MG/DL (ref 6–23)
BUN SERPL-MCNC: 32 MG/DL (ref 6–23)
CALCIUM SERPL-MCNC: 9 MG/DL (ref 8.6–10.2)
CALCIUM SERPL-MCNC: 9.1 MG/DL (ref 8.6–10.2)
CALCIUM SERPL-MCNC: 9.3 MG/DL (ref 8.6–10.2)
CALCIUM SERPL-MCNC: 9.6 MG/DL (ref 8.6–10.2)
CHLORIDE SERPL-SCNC: 97 MMOL/L (ref 98–107)
CHLORIDE SERPL-SCNC: 98 MMOL/L (ref 98–107)
CHLORIDE SERPL-SCNC: 99 MMOL/L (ref 98–107)
CHLORIDE SERPL-SCNC: 99 MMOL/L (ref 98–107)
CO2 SERPL-SCNC: 25 MMOL/L (ref 22–29)
CO2 SERPL-SCNC: 26 MMOL/L (ref 22–29)
CO2 SERPL-SCNC: 26 MMOL/L (ref 22–29)
CO2 SERPL-SCNC: 27 MMOL/L (ref 22–29)
CREAT SERPL-MCNC: 1.4 MG/DL (ref 0.7–1.2)
CREAT SERPL-MCNC: 1.6 MG/DL (ref 0.7–1.2)
CREAT SERPL-MCNC: 1.7 MG/DL (ref 0.7–1.2)
CREAT SERPL-MCNC: 1.7 MG/DL (ref 0.7–1.2)
DATE LAST DOSE: NORMAL
DIGOXIN DOSE TIME: NORMAL
DIGOXIN DOSE: NORMAL MG
DIGOXIN SERPL-MCNC: 0.8 NG/ML (ref 0.8–2)
EKG ATRIAL RATE: 78 BPM
EKG Q-T INTERVAL: 320 MS
EKG QRS DURATION: 106 MS
EKG QTC CALCULATION (BAZETT): 385 MS
EKG R AXIS: 108 DEGREES
EKG T AXIS: -6 DEGREES
EKG VENTRICULAR RATE: 87 BPM
EOSINOPHIL # BLD: 0 K/UL (ref 0.05–0.5)
EOSINOPHILS RELATIVE PERCENT: 0 % (ref 0–6)
ERYTHROCYTE [DISTWIDTH] IN BLOOD BY AUTOMATED COUNT: 17.3 % (ref 11.5–15)
GFR, ESTIMATED: 42 ML/MIN/1.73M2
GFR, ESTIMATED: 43 ML/MIN/1.73M2
GFR, ESTIMATED: 47 ML/MIN/1.73M2
GFR, ESTIMATED: 52 ML/MIN/1.73M2
GLUCOSE BLD-MCNC: 169 MG/DL (ref 74–99)
GLUCOSE BLD-MCNC: 235 MG/DL (ref 74–99)
GLUCOSE SERPL-MCNC: 157 MG/DL (ref 74–99)
GLUCOSE SERPL-MCNC: 162 MG/DL (ref 74–99)
GLUCOSE SERPL-MCNC: 180 MG/DL (ref 74–99)
GLUCOSE SERPL-MCNC: 183 MG/DL (ref 74–99)
HCT VFR BLD AUTO: 30.6 % (ref 37–54)
HGB BLD-MCNC: 9.3 G/DL (ref 12.5–16.5)
IMM GRANULOCYTES # BLD AUTO: 0.05 K/UL (ref 0–0.58)
IMM GRANULOCYTES NFR BLD: 1 % (ref 0–5)
LYMPHOCYTES NFR BLD: 0.16 K/UL (ref 1.5–4)
LYMPHOCYTES RELATIVE PERCENT: 3 % (ref 20–42)
MCH RBC QN AUTO: 26.6 PG (ref 26–35)
MCHC RBC AUTO-ENTMCNC: 30.4 G/DL (ref 32–34.5)
MCV RBC AUTO: 87.4 FL (ref 80–99.9)
MONOCYTES NFR BLD: 0.23 K/UL (ref 0.1–0.95)
MONOCYTES NFR BLD: 4 % (ref 2–12)
NEUTROPHILS NFR BLD: 91 % (ref 43–80)
NEUTS SEG NFR BLD: 4.81 K/UL (ref 1.8–7.3)
PLATELET # BLD AUTO: 190 K/UL (ref 130–450)
PMV BLD AUTO: 9.4 FL (ref 7–12)
POTASSIUM SERPL-SCNC: 3.6 MMOL/L (ref 3.5–5)
POTASSIUM SERPL-SCNC: 3.7 MMOL/L (ref 3.5–5)
POTASSIUM SERPL-SCNC: 3.7 MMOL/L (ref 3.5–5)
POTASSIUM SERPL-SCNC: 3.8 MMOL/L (ref 3.5–5)
RBC # BLD AUTO: 3.5 M/UL (ref 3.8–5.8)
RBC # BLD: ABNORMAL 10*6/UL
SODIUM SERPL-SCNC: 136 MMOL/L (ref 132–146)
SODIUM SERPL-SCNC: 138 MMOL/L (ref 132–146)
SODIUM SERPL-SCNC: 139 MMOL/L (ref 132–146)
SODIUM SERPL-SCNC: 139 MMOL/L (ref 132–146)
WBC OTHER # BLD: 5.3 K/UL (ref 4.5–11.5)

## 2024-12-09 PROCEDURE — 6360000002 HC RX W HCPCS: Performed by: INTERNAL MEDICINE

## 2024-12-09 PROCEDURE — 94640 AIRWAY INHALATION TREATMENT: CPT

## 2024-12-09 PROCEDURE — 93010 ELECTROCARDIOGRAM REPORT: CPT | Performed by: INTERNAL MEDICINE

## 2024-12-09 PROCEDURE — 6370000000 HC RX 637 (ALT 250 FOR IP): Performed by: INTERNAL MEDICINE

## 2024-12-09 PROCEDURE — 82947 ASSAY GLUCOSE BLOOD QUANT: CPT

## 2024-12-09 PROCEDURE — 1200000000 HC SEMI PRIVATE

## 2024-12-09 PROCEDURE — 80162 ASSAY OF DIGOXIN TOTAL: CPT

## 2024-12-09 PROCEDURE — 6370000000 HC RX 637 (ALT 250 FOR IP)

## 2024-12-09 PROCEDURE — 85025 COMPLETE CBC W/AUTO DIFF WBC: CPT

## 2024-12-09 PROCEDURE — 36415 COLL VENOUS BLD VENIPUNCTURE: CPT

## 2024-12-09 PROCEDURE — APPSS180 APP SPLIT SHARED TIME > 60 MINUTES: Performed by: NURSE PRACTITIONER

## 2024-12-09 PROCEDURE — 99232 SBSQ HOSP IP/OBS MODERATE 35: CPT | Performed by: INTERNAL MEDICINE

## 2024-12-09 PROCEDURE — 2580000003 HC RX 258: Performed by: INTERNAL MEDICINE

## 2024-12-09 PROCEDURE — 2700000000 HC OXYGEN THERAPY PER DAY

## 2024-12-09 PROCEDURE — 80048 BASIC METABOLIC PNL TOTAL CA: CPT

## 2024-12-09 PROCEDURE — 99222 1ST HOSP IP/OBS MODERATE 55: CPT | Performed by: INTERNAL MEDICINE

## 2024-12-09 RX ORDER — PREDNISONE 20 MG/1
40 TABLET ORAL DAILY
Status: DISCONTINUED | OUTPATIENT
Start: 2024-12-09 | End: 2024-12-10 | Stop reason: HOSPADM

## 2024-12-09 RX ADMIN — PREDNISONE 40 MG: 20 TABLET ORAL at 12:46

## 2024-12-09 RX ADMIN — SENNOSIDES 17.2 MG: 8.6 TABLET, FILM COATED ORAL at 09:17

## 2024-12-09 RX ADMIN — URSODIOL 300 MG: 300 CAPSULE ORAL at 09:18

## 2024-12-09 RX ADMIN — HYDRALAZINE HYDROCHLORIDE 50 MG: 25 TABLET ORAL at 20:13

## 2024-12-09 RX ADMIN — AMLODIPINE BESYLATE 5 MG: 5 TABLET ORAL at 09:18

## 2024-12-09 RX ADMIN — SODIUM CHLORIDE, PRESERVATIVE FREE 10 ML: 5 INJECTION INTRAVENOUS at 09:19

## 2024-12-09 RX ADMIN — IPRATROPIUM BROMIDE AND ALBUTEROL SULFATE 1 DOSE: 2.5; .5 SOLUTION RESPIRATORY (INHALATION) at 07:37

## 2024-12-09 RX ADMIN — HYDRALAZINE HYDROCHLORIDE 50 MG: 25 TABLET ORAL at 12:46

## 2024-12-09 RX ADMIN — CARVEDILOL 12.5 MG: 6.25 TABLET, FILM COATED ORAL at 17:35

## 2024-12-09 RX ADMIN — URSODIOL 300 MG: 300 CAPSULE ORAL at 20:13

## 2024-12-09 RX ADMIN — APIXABAN 5 MG: 5 TABLET, FILM COATED ORAL at 20:13

## 2024-12-09 RX ADMIN — BENZONATATE 100 MG: 100 CAPSULE ORAL at 04:59

## 2024-12-09 RX ADMIN — IPRATROPIUM BROMIDE AND ALBUTEROL SULFATE 1 DOSE: 2.5; .5 SOLUTION RESPIRATORY (INHALATION) at 12:23

## 2024-12-09 RX ADMIN — SENNOSIDES 17.2 MG: 8.6 TABLET, FILM COATED ORAL at 20:12

## 2024-12-09 RX ADMIN — SODIUM CHLORIDE, PRESERVATIVE FREE 10 ML: 5 INJECTION INTRAVENOUS at 20:16

## 2024-12-09 RX ADMIN — BUMETANIDE 1 MG: 1 TABLET ORAL at 09:18

## 2024-12-09 RX ADMIN — DIGOXIN 62.5 MCG: 125 TABLET ORAL at 09:18

## 2024-12-09 RX ADMIN — ISOSORBIDE DINITRATE 10 MG: 10 TABLET ORAL at 09:17

## 2024-12-09 RX ADMIN — WATER 40 MG: 1 INJECTION INTRAMUSCULAR; INTRAVENOUS; SUBCUTANEOUS at 04:43

## 2024-12-09 RX ADMIN — CARVEDILOL 12.5 MG: 6.25 TABLET, FILM COATED ORAL at 09:17

## 2024-12-09 RX ADMIN — APIXABAN 5 MG: 5 TABLET, FILM COATED ORAL at 09:17

## 2024-12-09 RX ADMIN — Medication 1 TABLET: at 09:19

## 2024-12-09 RX ADMIN — ISOSORBIDE DINITRATE 10 MG: 10 TABLET ORAL at 12:46

## 2024-12-09 RX ADMIN — ISOSORBIDE DINITRATE 10 MG: 10 TABLET ORAL at 17:35

## 2024-12-09 RX ADMIN — HYDRALAZINE HYDROCHLORIDE 50 MG: 25 TABLET ORAL at 09:18

## 2024-12-09 RX ADMIN — BENZOCAINE AND MENTHOL 1 LOZENGE: 15; 3.6 LOZENGE ORAL at 04:59

## 2024-12-09 NOTE — PROGRESS NOTES
Internal Medicine Progress Note      Synopsis: Patient admitted on 12/7/2024 with shortness of breath. Patient was found to be hypoxic and placed on 3L NC. Patient with initial temp of 101.3. Tested positive for parainfluenza. Chest x-ray showed cardiomegaly with mild interstitial edema. Started on IV Solumedrol. Pulmonology consulted.       Assessment and Plan         Acute hypoxic respiratory failure- wean O2 as tolerated. Not normally on any baseline oxygen. Pulmonology consultation. Appreciate recommendations. Continue steroids and breathing treatments.   Parainfluenza- symptomatic treatment. Nebs.   Acute on chronic CHF- Continue bumex. As per patient request-Cardiology consultation.   Sarcoidosis- newly diagnosed liver sarcoid. Following up with sarcoid clinic at [place].  [place] thinks that is not sarcoid of the liver he is getting multiple blood work and testing done and  follow-up is January 25   Permanent atrial fibrillation on Eliquis  Hypertension- reorder home medications  Hyperlipidemia- check lipid panel. statin  CKD3 at baseline  Chronic anemia  KYARA on CPAP- auto 8 to 13 cm   Hyperglycemia-monitor blood sugars. Hba1c not consistent with diabetes     DVT Prophylaxis: Eliquis  Disposition: Remains inpatient requiring 02     Subjective:       Feels better  Still short of breath with ambulation   No chest pain  No nausea or vomiting  Feels fatigued.     Exam:  /71   Pulse 94   Temp 97.9 °F (36.6 °C) (Axillary)   Resp 20   Ht 1.676 m (5' 6\")   Wt 89.8 kg (197 lb 14.4 oz)   SpO2 94%   BMI 31.94 kg/m²   General appearance: No apparent distress, appears stated age and cooperative.  Respiratory: Diffuse expiratory wheezes   Cardiovascular: Regular rate and rhythm with normal S1/S2 without murmurs, rubs or gallops.  Abdomen: Soft, non-tender, non-distended with normal bowel sounds.  Musculoskeletal: No clubbing, cyanosis or edema bilaterally. Brisk capillary refill. 2+ lower extremity  pulses (dorsalis pedis).   Skin:  No rashes    Neurologic: awake, alert and following commands     Medications:  Reviewed    Infusion Medications    sodium chloride       Scheduled Medications    sodium chloride flush  5-40 mL IntraVENous 2 times per day    amLODIPine  5 mg Oral Daily    apixaban  5 mg Oral BID    carvedilol  12.5 mg Oral BID WC    digoxin  62.5 mcg Oral Daily    hydrALAZINE  50 mg Oral TID    isosorbide dinitrate  10 mg Oral TID    therapeutic multivitamin-minerals  1 tablet Oral Daily    senna  2 tablet Oral BID    ursodiol  300 mg Oral BID    bumetanide  1 mg Oral Daily    bumetanide  1 mg IntraVENous Once    ipratropium 0.5 mg-albuterol 2.5 mg  1 Dose Inhalation Q4H While awake    methylPREDNISolone  40 mg IntraVENous Q12H     PRN Meds: benzocaine-menthol, benzonatate, calcium carbonate, hydrALAZINE, melatonin, Polyvinyl Alcohol-Povidone PF, sodium chloride, sodium chloride flush, sodium chloride, potassium chloride **OR** potassium alternative oral replacement **OR** potassium chloride, magnesium sulfate, ondansetron **OR** ondansetron, polyethylene glycol, acetaminophen **OR** acetaminophen    I/O    Intake/Output Summary (Last 24 hours) at 12/9/2024 1103  Last data filed at 12/8/2024 2020  Gross per 24 hour   Intake 10 ml   Output --   Net 10 ml       Labs:   Recent Labs     12/07/24  1139 12/08/24  0858 12/09/24  0641   WBC 6.2 4.8 5.3   HGB 9.8* 9.7* 9.3*   HCT 32.7* 31.9* 30.6*    180 190       Recent Labs     12/08/24  0858 12/08/24  1849 12/09/24  0016 12/09/24  0641    137 136 138   K 3.8 4.1 3.7 3.6   CL 99 98 99 99   CO2 29 27 26 27   BUN 20 26* 29* 27*   CREATININE 1.4* 1.6* 1.7* 1.4*   CALCIUM 9.0 9.2 9.0 9.1   PHOS 3.5  --   --   --        Recent Labs     12/07/24  1139 12/08/24  0858   ALKPHOS 93 86   ALT 23 23   AST 22 23   BILITOT 1.0 0.9     Chronic labs:  Lab Results   Component Value Date    CHOL 183 12/08/2024    TRIG 97 12/08/2024    HDL 46 12/08/2024    TSH

## 2024-12-09 NOTE — CARE COORDINATION
Patient presented to the ED from home due to shortness of breath; admitted for parainfluenza, hypoxia, pneumonia and acute on chronic congestive heart failure. Spoke with patient for transition of care planning. Patient reports he lives alone in a ranch style home, 1 steps to enter, he is currently independent, ambulates without a device and drives; has a cpap with oxygen through [employer]. Uses [facility] ([facility]) and PCP is Dr. Chase. Patient currently on 2L NC, no oxygen at baseline, states the tanks he has at home are 20 years old; has no preference on DME company if oxygen is needed at discharge. Patient plans to return home, reports no needs and his significant other, Felicitas will transport home. Call made to Isela at [facility] to confirm what patient has at home; patient has a cpap, orders for 3L NC continuous, portable tank and concentrator.    Case Management Assessment  Initial Evaluation    Date/Time of Evaluation: 12/9/2024 3:44 PM  Assessment Completed by: SHAQ Longoria    If patient is discharged prior to next notation, then this note serves as note for discharge by case management.    Patient Name: Hong Chase                   YOB: 1955  Diagnosis: Parainfluenza infection [B34.8]  Hypoxia [R09.02]  Pneumonia due to infectious organism, unspecified laterality, unspecified part of lung [J18.9]  Acute on chronic congestive heart failure, unspecified heart failure type (HCC) [I50.9]  Acute hypoxic respiratory failure [J96.01]                   Date / Time: 12/7/2024 11:03 AM    Patient Admission Status: Inpatient   Readmission Risk (Low < 19, Mod (19-27), High > 27): Readmission Risk Score: 22.2    Current PCP: Kamron Chase, DO  PCP verified by CM? Yes    Chart Reviewed: Yes      History Provided by: Patient  Patient Orientation: Alert and Oriented    Patient Cognition: Alert    Hospitalization in the last 30 days (Readmission):  No    If yes, Readmission Assessment in CM

## 2024-12-09 NOTE — PROGRESS NOTES
The  visited the patient. The patient was not available at the time of visit. The  will follow up at a later time.    If additional support is needed please reach out to Spiritual Health (ext [phone]).    Rev ELSA Chandra MDIV,

## 2024-12-09 NOTE — PROGRESS NOTES
Tolu Park M.D.,[facility]  Octavio Almaguer D.O., F.AKIN.MEMEOVALORIE., [facility]  Fidencio Fontana M.D.  Kendra Cooper M.D.   John Paul Irizarry D.O.  Sohail Vazquez M.D.         Daily Pulmonary Progress Note    Patient:  Hong Chase 69 y.o. male MRN: [id number]            Synopsis     We are following patient for parainfluenza    \"CC\" shortness of breath    Code status: FULL CODE      Subjective      Patient was seen and examined sitting up in bed on 1 L via nasal cannula, SpO2 90%.  Bedside nurse present stating that she did try to wean him off completely however he dropped into the mid 80s.  He is doing fairly well.    Review of Systems:  Constitutional: Denies fever, weight loss, night sweats, and fatigue  Skin: Denies pigmentation, dark lesions, and rashes   HEENT: Denies hearing loss, tinnitus, ear drainage, epistaxis, sore throat, and hoarseness.  Cardiovascular: Denies palpitations, chest pain, and chest pressure.  Respiratory: Denies cough, dyspnea at rest, hemoptysis, apnea, and choking.  Gastrointestinal: Denies nausea, vomiting, poor appetite, diarrhea, heartburn or reflux  Genitourinary: Denies dysuria, frequency, urgency or hematuria  Musculoskeletal: Denies myalgias, muscle weakness, and bone pain  Neurological: Denies dizziness, vertigo, headache, and focal weakness  Psychological: Denies anxiety and depression  Endocrine: Denies heat intolerance and cold intolerance  Hematopoietic/Lymphatic: Denies bleeding problems and blood transfusions    24-hour events:  No new events    Objective   OBJECTIVE:   BP (!) 136/94   Pulse 80   Temp 98 °F (36.7 °C) (Oral)   Resp 22   Ht 1.676 m (5' 6\")   Wt 89.8 kg (197 lb 14.4 oz)   SpO2 93%   BMI 31.94 kg/m²   SpO2 Readings from Last 1 Encounters:   12/09/24 93%        I/O:    Intake/Output Summary (Last 24 hours) at 12/9/2024 1432  Last data filed at 12/8/2024 2020  Gross per 24 hour   Intake 10 ml   Output --   Net 10 ml                      CURRENT MEDS  greater than 21 mm and decreases greater than 50% during inspiration; therefore the estimated right atrial pressure is intermediate (~8 mmHg). IVC size is normal.   Pericardium No pericardial effusion.       Labs:  Lab Results   Component Value Date/Time    WBC 5.3 12/09/2024 06:41 AM    RBC 3.50 12/09/2024 06:41 AM    RBC 4.27 12/05/2022 09:26 AM    HGB 9.3 12/09/2024 06:41 AM    HCT 30.6 12/09/2024 06:41 AM    MCV 87.4 12/09/2024 06:41 AM    MCH 26.6 12/09/2024 06:41 AM    MCHC 30.4 12/09/2024 06:41 AM    RDW 17.3 12/09/2024 06:41 AM     12/09/2024 06:41 AM    MPV 9.4 12/09/2024 06:41 AM     Lab Results   Component Value Date/Time     12/09/2024 01:39 PM    K 3.8 12/09/2024 01:39 PM    K 4.3 03/05/2021 06:22 AM    CL 98 12/09/2024 01:39 PM    CO2 25 12/09/2024 01:39 PM    BUN 28 12/09/2024 01:39 PM    CREATININE 1.6 12/09/2024 01:39 PM    CALCIUM 9.6 12/09/2024 01:39 PM    GFRAA >60 03/17/2022 10:44 AM    LABGLOM 47 12/09/2024 01:39 PM    LABGLOM 38 12/12/2023 10:25 AM     Lab Results   Component Value Date/Time    PROTIME 13.0 09/25/2024 07:13 AM    PROTIME 25.2 03/14/2022 12:08 PM    INR 1.2 09/25/2024 07:13 AM     Recent Labs     12/07/24  1139   PROBNP 4,483*       Recent Labs     12/08/24  0858   PROCAL 0.08       Micro:  Component  Ref Range & Units    Specimen Description .NASOPHARYNGEAL SWAB   Adenovirus PCR  Not Detected Not Detected   Coronavirus 229E PCR  Not Detected Not Detected   Coronavirus HKU1 PCR  Not Detected Not Detected   Coronavirus NL63 PCR  Not Detected Not Detected   Coronavirus OC43 PCR  Not Detected Not Detected   SARS-CoV-2, PCR  Not Detected Not Detected   Human Metapneumovirus PCR  Not Detected Not Detected   Rhino/Enterovirus PCR  Not Detected Not Detected   Influenza A by PCR  Not Detected Not Detected   Influenza B by PCR  Not Detected Not Detected   Parainfluenza 1 PCR  Not Detected Not Detected   Parainfluenza 2 PCR  Not Detected Not Detected   Parainfluenza 3

## 2024-12-09 NOTE — PLAN OF CARE
Problem: ABCDS Injury Assessment  Goal: Absence of physical injury  Outcome: Progressing  Flowsheets (Taken 12/8/2024 2300)  Absence of Physical Injury: Implement safety measures based on patient assessment     Problem: Safety - Adult  Goal: Free from fall injury  Outcome: Progressing  Flowsheets (Taken 12/8/2024 2300)  Free From Fall Injury:   Instruct family/caregiver on patient safety   Based on caregiver fall risk screen, instruct family/caregiver to ask for assistance with transferring infant if caregiver noted to have fall risk factors     Problem: Chronic Conditions and Co-morbidities  Goal: Patient's chronic conditions and co-morbidity symptoms are monitored and maintained or improved  Outcome: Progressing     Problem: Discharge Planning  Goal: Discharge to home or other facility with appropriate resources  Outcome: Progressing

## 2024-12-09 NOTE — PROGRESS NOTES
Spoke with Dr. Gallego while on unit who states that the pt may downgrade to a general floor. Order placed at this time.

## 2024-12-10 VITALS
HEART RATE: 81 BPM | WEIGHT: 207.67 LBS | RESPIRATION RATE: 18 BRPM | TEMPERATURE: 97.4 F | OXYGEN SATURATION: 93 % | HEIGHT: 66 IN | BODY MASS INDEX: 33.38 KG/M2 | SYSTOLIC BLOOD PRESSURE: 141 MMHG | DIASTOLIC BLOOD PRESSURE: 77 MMHG

## 2024-12-10 LAB
ANION GAP SERPL CALCULATED.3IONS-SCNC: 9 MMOL/L (ref 7–16)
ANION GAP SERPL CALCULATED.3IONS-SCNC: NORMAL MMOL/L (ref 9–17)
BASOPHILS # BLD: 0 K/UL (ref 0–0.2)
BASOPHILS NFR BLD: 0 % (ref 0–2)
BNP SERPL-MCNC: 4909 PG/ML (ref 0–125)
BUN SERPL-MCNC: 36 MG/DL (ref 6–23)
BUN SERPL-MCNC: NORMAL MG/DL (ref 8–23)
BUN/CREAT SERPL: NORMAL (ref 9–20)
CALCIUM SERPL-MCNC: 9.5 MG/DL (ref 8.6–10.2)
CALCIUM SERPL-MCNC: NORMAL MG/DL (ref 8.6–10.4)
CHLORIDE SERPL-SCNC: 102 MMOL/L (ref 98–107)
CHLORIDE SERPL-SCNC: NORMAL MMOL/L (ref 98–107)
CO2 SERPL-SCNC: 30 MMOL/L (ref 22–29)
CO2 SERPL-SCNC: NORMAL MMOL/L (ref 20–31)
CREAT SERPL-MCNC: 1.6 MG/DL (ref 0.7–1.2)
CREAT SERPL-MCNC: NORMAL MG/DL (ref 0.7–1.2)
EOSINOPHIL # BLD: 0 K/UL (ref 0.05–0.5)
EOSINOPHILS RELATIVE PERCENT: 0 % (ref 0–6)
ERYTHROCYTE [DISTWIDTH] IN BLOOD BY AUTOMATED COUNT: 17.1 % (ref 11.5–15)
GFR, ESTIMATED: 47 ML/MIN/1.73M2
GFR, ESTIMATED: NORMAL ML/MIN/1.73M2
GLUCOSE BLD-MCNC: 145 MG/DL (ref 74–99)
GLUCOSE BLD-MCNC: 153 MG/DL (ref 74–99)
GLUCOSE SERPL-MCNC: 147 MG/DL (ref 74–99)
GLUCOSE SERPL-MCNC: NORMAL MG/DL (ref 70–99)
HCT VFR BLD AUTO: 30.5 % (ref 37–54)
HGB BLD-MCNC: 9.3 G/DL (ref 12.5–16.5)
LYMPHOCYTES NFR BLD: 0.12 K/UL (ref 1.5–4)
LYMPHOCYTES RELATIVE PERCENT: 2 % (ref 20–42)
MCH RBC QN AUTO: 26.6 PG (ref 26–35)
MCHC RBC AUTO-ENTMCNC: 30.5 G/DL (ref 32–34.5)
MCV RBC AUTO: 87.1 FL (ref 80–99.9)
MONOCYTES NFR BLD: 0.59 K/UL (ref 0.1–0.95)
MONOCYTES NFR BLD: 9 % (ref 2–12)
NEUTROPHILS NFR BLD: 89 % (ref 43–80)
NEUTS SEG NFR BLD: 5.99 K/UL (ref 1.8–7.3)
NUCLEATED RED BLOOD CELLS: 1 PER 100 WBC
PLATELET # BLD AUTO: 187 K/UL (ref 130–450)
PMV BLD AUTO: 8.5 FL (ref 7–12)
POTASSIUM SERPL-SCNC: 3.7 MMOL/L (ref 3.5–5)
POTASSIUM SERPL-SCNC: NORMAL MMOL/L (ref 3.7–5.3)
RBC # BLD AUTO: 3.5 M/UL (ref 3.8–5.8)
RBC # BLD: ABNORMAL 10*6/UL
SODIUM SERPL-SCNC: 141 MMOL/L (ref 132–146)
SODIUM SERPL-SCNC: NORMAL MMOL/L (ref 135–144)
WBC OTHER # BLD: 6.7 K/UL (ref 4.5–11.5)

## 2024-12-10 PROCEDURE — 85025 COMPLETE CBC W/AUTO DIFF WBC: CPT

## 2024-12-10 PROCEDURE — 82947 ASSAY GLUCOSE BLOOD QUANT: CPT

## 2024-12-10 PROCEDURE — 99232 SBSQ HOSP IP/OBS MODERATE 35: CPT | Performed by: INTERNAL MEDICINE

## 2024-12-10 PROCEDURE — 80048 BASIC METABOLIC PNL TOTAL CA: CPT

## 2024-12-10 PROCEDURE — 36415 COLL VENOUS BLD VENIPUNCTURE: CPT

## 2024-12-10 PROCEDURE — 6370000000 HC RX 637 (ALT 250 FOR IP): Performed by: INTERNAL MEDICINE

## 2024-12-10 PROCEDURE — 94640 AIRWAY INHALATION TREATMENT: CPT

## 2024-12-10 PROCEDURE — 99232 SBSQ HOSP IP/OBS MODERATE 35: CPT | Performed by: STUDENT IN AN ORGANIZED HEALTH CARE EDUCATION/TRAINING PROGRAM

## 2024-12-10 PROCEDURE — 83880 ASSAY OF NATRIURETIC PEPTIDE: CPT

## 2024-12-10 PROCEDURE — 2580000003 HC RX 258: Performed by: INTERNAL MEDICINE

## 2024-12-10 PROCEDURE — 2700000000 HC OXYGEN THERAPY PER DAY

## 2024-12-10 PROCEDURE — 6370000000 HC RX 637 (ALT 250 FOR IP)

## 2024-12-10 RX ORDER — BENZONATATE 100 MG/1
100 CAPSULE ORAL 3 TIMES DAILY PRN
Qty: 21 CAPSULE | Refills: 0 | Status: SHIPPED | OUTPATIENT
Start: 2024-12-10 | End: 2024-12-17

## 2024-12-10 RX ORDER — PREDNISONE 20 MG/1
40 TABLET ORAL DAILY
Qty: 6 TABLET | Refills: 0 | Status: SHIPPED | OUTPATIENT
Start: 2024-12-11 | End: 2024-12-14

## 2024-12-10 RX ADMIN — DIGOXIN 62.5 MCG: 125 TABLET ORAL at 08:33

## 2024-12-10 RX ADMIN — APIXABAN 5 MG: 5 TABLET, FILM COATED ORAL at 08:33

## 2024-12-10 RX ADMIN — CARVEDILOL 12.5 MG: 6.25 TABLET, FILM COATED ORAL at 08:33

## 2024-12-10 RX ADMIN — Medication 1 TABLET: at 08:33

## 2024-12-10 RX ADMIN — AMLODIPINE BESYLATE 5 MG: 5 TABLET ORAL at 08:34

## 2024-12-10 RX ADMIN — PREDNISONE 40 MG: 20 TABLET ORAL at 08:34

## 2024-12-10 RX ADMIN — URSODIOL 300 MG: 300 CAPSULE ORAL at 08:34

## 2024-12-10 RX ADMIN — BUMETANIDE 1 MG: 1 TABLET ORAL at 08:33

## 2024-12-10 RX ADMIN — HYDRALAZINE HYDROCHLORIDE 50 MG: 25 TABLET ORAL at 08:34

## 2024-12-10 RX ADMIN — SODIUM CHLORIDE, PRESERVATIVE FREE 10 ML: 5 INJECTION INTRAVENOUS at 08:33

## 2024-12-10 RX ADMIN — IPRATROPIUM BROMIDE AND ALBUTEROL SULFATE 1 DOSE: 2.5; .5 SOLUTION RESPIRATORY (INHALATION) at 13:21

## 2024-12-10 RX ADMIN — HYDRALAZINE HYDROCHLORIDE 50 MG: 25 TABLET ORAL at 14:11

## 2024-12-10 RX ADMIN — SENNOSIDES 17.2 MG: 8.6 TABLET, FILM COATED ORAL at 08:33

## 2024-12-10 RX ADMIN — ISOSORBIDE DINITRATE 10 MG: 10 TABLET ORAL at 14:11

## 2024-12-10 RX ADMIN — ISOSORBIDE DINITRATE 10 MG: 10 TABLET ORAL at 08:34

## 2024-12-10 NOTE — PROGRESS NOTES
Detected Not Detected   Parainfluenza 2 PCR  Not Detected Not Detected   Parainfluenza 3 PCR  Not Detected DETECTED Abnormal    Parainfluenza 4 PCR  Not Detected Not Detected   Resp Syncytial Virus PCR  Not Detected Not Detected   Bordetella parapertussis by PCR  Not Detected Not Detected   B Pertussis by PCR  Not Detected Not Detected   Chlamydia pneumoniae By PCR  Not Detected Not Detected   Mycoplasma pneumo by PCR  Not Detected Not Detected   Comment: Performed by multiplexed nucleic acid assay.   Resulting Agency [facility]              Specimen Collected: 12/07/24 11:39 EST Last Resulted: 12/07/24 14:02 EST           Assessment:    Acute hypoxic respiratory failure  Parainfluenza bronchitis   Severe KYARA on CPAP  Acute hepatitis d/t sarcoidosis  - improved after steroids  Pleural effusions - improved  HFpEF  Anemia - stable  aortic dissection s/p repair [facility] 2019  Pulmonary hypertension  CK    Plan:   Oxygen weaned off  Check ambulatory oxygen needs send nebulized bronchodilators-DuoNebs every 4 hours while awake  Transition steroids to prednisone x 5 days for short taper  Supportive care for viral illness  Incentive spirometry, EZ Pap  DVT/PE prophylaxis-chronic Eliquis  Close outpatient follow-up with Dr. Kenneth Gary to discharge from a pulmonary standpoint after home oxygen needs have been determined  Message routed to office for follow-up    This plan of care was reviewed in collaboration with Dr. Vazquez    Electronically signed by CRISTINA Ospina CNP on 12/10/2024 at 1:24 PM        This is confirmation that I have personally performed a substantial portion of medical decision making (>50%) related to this patient encounter.  The medications & laboratory data and imagery were discussed and adjusted where necessary. Key issues of the case were discussed among consultants.  Review of CNP documentation was conducted and revisions were made as appropriate. I agree with the above  documented exam, problem list and plan of care with the following additions:    Ambulatory O2 testing prior to d/c, otherwise okay from pulmonary to d/c    Sohail Vazquez MD

## 2024-12-10 NOTE — PROGRESS NOTES
Nurse to Nurse report given to RN [id number] and patient notified of downgrade to Med Surgical Floor and notified family member also.

## 2024-12-10 NOTE — PLAN OF CARE
I was notified about 2.1 second sinus pause when the patient was in telemetry floor 12/09 around 6 am.    Patient ddidnot have any symptom including dizziness, syncope, presyncope.     He was transferred to a non telemetry floor and is still asymptomatic  Vitals stable, oxygen 91% at RA    This pause which was asymptomatic does not necessarily  need any further investigation    Patient discharge instruction includes to follow up to the hospital ER stat if he develops  dizziness, near syncope, syncope to be evaluated further with EP consultation.

## 2024-12-10 NOTE — PROGRESS NOTES
Patient received the Sacrament of the Anointing of the Sick by Father Bernabe Madsen on Monday, December 9, 2024.    If additional support is requested or needed please reach out to Spiritual Health ([phone]).    Chap. Otto Kelley MDIV, BCC

## 2024-12-10 NOTE — PROGRESS NOTES
CLINICAL PHARMACY NOTE: MEDS TO BEDS    Total # of Prescriptions Filled: 2   The following medications were delivered to the patient:  Benzonatate 100 mg  Prednisone 20 mg    Additional Documentation:   Patient picked up I the pharmacy

## 2024-12-10 NOTE — PROGRESS NOTES
Dr Catherine notified this nurse that the pt is okay to discharge and will allow the discharge to continue

## 2024-12-10 NOTE — CARE COORDINATION
12/10/2024Social work transition of care planning  Sw followed up with pt,plan remains for home. Pt [place] at [place].  Electronically signed by REID Trevizo on 12/10/2024 at 11:17 AM

## 2024-12-10 NOTE — PROGRESS NOTES
2 telemetry strips located on 6400 unit noting that on 12/09 at 0604 and 0611 pt had pauses on telemetry > 2.1 seconds. Call was placed to 5400 and spoke with Marion DOSHI. This RN requested Marion DOSHI to have Dr. Catherine call 6400 to discuss telemetry findings. This RN received call from Dr. Catherine and discussed findings. Per Dr. Catherine, hold discharge and he will go to 5400 to look at telemetry stips personally. Tele strips tubed to 5400 and call was placed to Marion DOSHI notifying to hold discharge until Dr. Catherine comes to unit to evaluate strips and discuss with pt bedside RN.

## 2024-12-10 NOTE — CARE COORDINATION
reached out to patients PCP office Dr Kamron Chase at [phone] to schedule follow up D/C appointment.  spoke to office staff and scheduled an appointment on 12/12 at 1:15 PM in office.     Please bring your discharge paperwork, new medications, ID, insurance card and co-pay if you have one.    Information added to D/C summary.

## 2024-12-10 NOTE — PROGRESS NOTES
INPATIENT CARDIOLOGY FOLLOW-UP    Name: Hong Chase    Age: 69 y.o.    Date of Admission: 2024 11:03 AM    Date of Service: 12/10/2024    Primary Cardiologist: Dr Avery    Chief Complaint: Follow-up for AF    Interim History:  Feels better.  Now on nonmonitored floor.  Denies chest pain breathing improved.    Review of Systems:   Negative except as described above    Problem List:  Patient Active Problem List   Diagnosis    H/O sarcoidosis    Essential hypertension    Moderate obesity    Stage 3a chronic kidney disease (HCC)    MR (mitral regurgitation)    Anticoagulated on Coumadin    Cardiomyopathy (HCC)    Cognitive dysfunction    Mixed hyperlipidemia    Hyperuricemia    Dissection of thoracic aorta (HCC)    Transition of care performed with sharing of clinical summary    Chronic combined systolic and diastolic congestive heart failure (HCC)    Atrial fibrillation (HCC)    Fatigue    KYARA on CPAP    Restrictive lung disease    Gastrointestinal hemorrhage    Acute blood loss anemia    Hypotension due to hypovolemia    CAD (coronary artery disease)    Hx of CABG    S/P aortic dissection repair    Lactic acidosis    Elevated LDL cholesterol level    Right carpal tunnel syndrome    Jaundice, non-    Obstructive jaundice    Hepatic granuloma associated with sarcoidosis    Sarcoidosis of other sites    Hyperkalemia    Swelling of both lower extremities    Acute hypoxic respiratory failure    Parainfluenza infection       Current Medications:    Current Facility-Administered Medications:     predniSONE (DELTASONE) tablet 40 mg, 40 mg, Oral, Daily, Mily Vaughn APRN - CNP, 40 mg at 12/10/24 0834    benzocaine-menthol (CEPACOL SORE THROAT) lozenge 1 lozenge, 1 lozenge, Oral, Q2H PRN, Katie Rader M, DO, 1 lozenge at 24 0459    benzonatate (TESSALON) capsule 100 mg, 100 mg, Oral, TID PRN, Katie Rader, DO, 100 mg at 24 0459    calcium carbonate (TUMS) chewable tablet 500 mg, 500

## 2024-12-10 NOTE — PLAN OF CARE
Problem: ABCDS Injury Assessment  Goal: Absence of physical injury  12/9/2024 2154 by Stacy Méndez RN  Outcome: Progressing  12/9/2024 1855 by Stacy éMndez RN  Outcome: Progressing     Problem: Safety - Adult  Goal: Free from fall injury  12/9/2024 2154 by Stacy Méndez RN  Outcome: Progressing  12/9/2024 1855 by Stacy Méndez RN  Outcome: Progressing     Problem: Chronic Conditions and Co-morbidities  Goal: Patient's chronic conditions and co-morbidity symptoms are monitored and maintained or improved  12/9/2024 2154 by Stacy Méndez RN  Outcome: Progressing  12/9/2024 1855 by Stacy Méndez RN  Outcome: Progressing     Problem: Discharge Planning  Goal: Discharge to home or other facility with appropriate resources  12/9/2024 2154 by Stacy Méndez RN  Outcome: Progressing  12/9/2024 1855 by Stacy Méndez RN  Outcome: Progressing

## 2024-12-10 NOTE — PROGRESS NOTES
4 Eyes Skin Assessment     NAME:  Hong Chase  YOB: 1955  MEDICAL RECORD NUMBER:  [id number]    The patient is being assessed for  Admission    I agree that at least one RN has performed a thorough Head to Toe Skin Assessment on the patient. ALL assessment sites listed below have been assessed.      Areas assessed by both nurses:    Head, Face, Ears, Shoulders, Back, Chest, Arms, Elbows, Hands, Sacrum. Buttock, Coccyx, Ischium, and Legs. Feet and Heels        Does the Patient have a Wound? No noted wound(s)       Sea Prevention initiated by RN: No  Wound Care Orders initiated by RN: No    Pressure Injury (Stage 3,4, Unstageable, DTI, NWPT, and Complex wounds) if present, place Wound referral order by RN under : No    New Ostomies, if present place, Ostomy referral order under : No     Nurse 1 eSignature: Electronically signed by Josselin De Los Santos RN on 12/9/24 at 11:07 PM EST    **SHARE this note so that the co-signing nurse can place an eSignature**    Nurse 2 eSignature: {Esignature:[id number]}

## 2024-12-10 NOTE — DISCHARGE SUMMARY
Hospital Medicine Discharge Summary    Patient ID: Hong Chase      Patient's PCP: Kamron Chase DO    Admit Date: 12/7/2024     Discharge Date:   12/10/2024    Admitting Physician: Katie Rader DO     Discharge Physician: Ton Catherine MD     Discharge Diagnoses:  Acute hypoxic respiratory failure 2/2 Pulmonary sarcoidosis with Parainfluenza positive     Active Hospital Problems    Diagnosis Date Noted    Acute hypoxic respiratory failure [J96.01] 12/07/2024    Parainfluenza infection [B34.8] 12/07/2024    Sarcoidosis of other sites [D86.89] 09/20/2024    CAD (coronary artery disease) [I25.10] 03/05/2021    Hx of CABG [Z95.1] 03/05/2021    S/P aortic dissection repair [Z98.890] 03/05/2021    KYARA on CPAP [G47.33] 02/14/2020    Atrial fibrillation (HCC) [I48.91] 11/16/2019    Chronic combined systolic and diastolic congestive heart failure (HCC) [I50.42] 11/15/2019    Mixed hyperlipidemia [E78.2] 05/08/2019    Stage 3a chronic kidney disease (HCC) [N18.31] 02/04/2014    Essential hypertension [I10] 10/25/2012       The patient was seen and examined on day of discharge and this discharge summary is in conjunction with any daily progress note from day of discharge.    Hospital Course:   Patient admitted on 12/7/2024 with shortness of breath. Patient was found to be hypoxic and placed on 3L NC. Patient with initial temp of 101.3. Tested positive for parainfluenza.     Chest x-ray showed cardiomegaly with mild interstitial edema. Started on IV Solumedrol.     Pulmonology consulted.   complex past medical history of sarcoidosis with recent liver biopsy demonstrating well formed non caseating granulomas for which he was treated with corticosteroids (since weaned off as of 5 days ago). He also has a history of HFpEF with EF 60%, pulmonary hypertension, pleural effusions, severe KYARA, aortic dissection s/p repair [facility] 2019.    He was weaned off oxygen and steroid. Nocturnal CPAP ordered. Scheduled  provided:      CBC:    Lab Results   Component Value Date/Time    WBC 6.7 12/10/2024 07:11 AM    HGB 9.3 12/10/2024 07:11 AM    HCT 30.5 12/10/2024 07:11 AM     12/10/2024 07:11 AM       Renal:    Lab Results   Component Value Date/Time    NA DUPLICATE ORDER 12/10/2024 07:13 AM    K DUPLICATE ORDER 12/10/2024 07:13 AM    K 4.3 03/05/2021 06:22 AM    CL DUPLICATE ORDER 12/10/2024 07:13 AM    CO2 DUPLICATE ORDER 12/10/2024 07:13 AM    BUN DUPLICATE ORDER 12/10/2024 07:13 AM    CREATININE DUPLICATE ORDER 12/10/2024 07:13 AM    CALCIUM DUPLICATE ORDER 12/10/2024 07:13 AM    PHOS 3.5 12/08/2024 08:58 AM       Discharge Medications:     Current Discharge Medication List             Details   benzonatate (TESSALON) 100 MG capsule Take 1 capsule by mouth 3 times daily as needed for Cough  Qty: 21 capsule, Refills: 0                Details   predniSONE (DELTASONE) 20 MG tablet Take 2 tablets by mouth daily for 3 doses  Qty: 6 tablet, Refills: 0                Details   amLODIPine (NORVASC) 5 MG tablet TAKE 1 TABLET BY MOUTH DAILY  Qty: 30 tablet, Refills: 11      ursodiol (ACTIGALL) 300 MG capsule Take 1 capsule by mouth in the morning and at bedtime  Qty: 60 capsule, Refills: 3      bumetanide (BUMEX) 1 MG tablet Take 1 tablet by mouth daily  Qty: 90 tablet, Refills: 1    Associated Diagnoses: Chronic systolic heart failure (HCC)      hydrOXYzine HCl (ATARAX) 50 MG tablet Take 1 tablet by mouth every 8 hours as needed for Itching  Qty: 60 tablet, Refills: 1    Associated Diagnoses: Painless jaundice      apixaban (ELIQUIS) 5 MG TABS tablet Take 1 tablet by mouth 2 times daily Ok to resume from 9/28  Qty: 60 tablet, Refills: 11    Associated Diagnoses: Rate controlled atrial fibrillation (HCC)      carvedilol (COREG) 12.5 MG tablet Take 1 tablet by mouth 2 times daily (with meals)  Qty: 60 tablet, Refills: 3      digoxin (LANOXIN) 125 MCG tablet TAKE 1/2 TABLET (62.5MG) DAILY  Qty: 45 tablet, Refills: 3

## 2024-12-10 NOTE — PROGRESS NOTES
Physician Progress Note      PATIENT:               DARIAN GUERRERO  [facility] #:                  [id number]  :                       1955  ADMIT DATE:       2024 11:03 AM  DISCH DATE:  RESPONDING  PROVIDER #:        Ton Catherine MD          QUERY TEXT:    Patient admitted with Flu and resp. Failure.  Pt noted to have AFIB and   maintained on Eliquis.  If possible, please document in progress notes and   discharge summary if you are evaluating and/or treating any of the following:?  ?  The medical record reflects the following:  Risk Factors: CKD, HTN, CHF  Clinical Indicators: Male patient, AGE: 69, AFIB  Treatment: Eliquis.  Options provided:  -- Secondary hypercoagulable state in a patient with atrial fibrillation  -- Other - I will add my own diagnosis  -- Disagree - Not applicable / Not valid  -- Disagree - Clinically unable to determine / Unknown  -- Refer to Clinical Documentation Reviewer    PROVIDER RESPONSE TEXT:    This patient has secondary hypercoagulable state in a patient with atrial   fibrillation.    Query created by: Kalina Mesa on 12/10/2024 2:40 PM      Electronically signed by:  Ton Catherine MD 12/10/2024 2:43 PM

## 2024-12-11 ENCOUNTER — TELEPHONE (OUTPATIENT)
Dept: OTHER | Age: 69
End: 2024-12-11

## 2024-12-11 NOTE — TELEPHONE ENCOUNTER
2:09 PM 12/11/2024 Called patient re: new referral from Saadia PADGETT to establish with the CHF clinic in [place]. Pt states he was feeling well and seeing his PCP tomorrow. Pt states he needs new oxygen tanks ( does not have portable tanks) d/t being very old and reports when he takes off the oxygen saturation will drop. Pt feels that he needs a new oxygen tanks delivered at the house. Instructed patient to call the PCP about update walk test.  Reports to wear oxygen as prescribed. Pt called the oxygen company and awaiting a callback.     Pt agreeable to establish CHF clinic.  Denies any s/s symptomatic CHF. Scheduled for 1/7/2025    Future Appointments   Date Time Provider Department Center   12/12/2024 10:15 AM Hong Rogel Jr., [facility]   12/12/2024  1:15 PM Kamron Chase DO [facility]   12/18/2024 10:00 AM Kamron Chase DO [facility]   2/4/2025  2:40 PM Safia Avrey MD [place] [facility]   5/20/2025  9:20 AM Carlin Vazquez APRN - CNS [facility]

## 2024-12-12 ENCOUNTER — OFFICE VISIT (OUTPATIENT)
Dept: PODIATRY | Age: 69
End: 2024-12-12
Payer: MEDICARE

## 2024-12-12 VITALS — BODY MASS INDEX: 33.27 KG/M2 | HEIGHT: 66 IN | WEIGHT: 207 LBS | HEART RATE: 90 BPM | OXYGEN SATURATION: 91 %

## 2024-12-12 DIAGNOSIS — B35.1 ONYCHOMYCOSIS: Primary | ICD-10-CM

## 2024-12-12 DIAGNOSIS — G60.9 IDIOPATHIC PERIPHERAL NEUROPATHY: ICD-10-CM

## 2024-12-12 DIAGNOSIS — R26.2 DIFFICULTY WALKING: ICD-10-CM

## 2024-12-12 DIAGNOSIS — I87.2 VENOUS INSUFFICIENCY (CHRONIC) (PERIPHERAL): ICD-10-CM

## 2024-12-12 DIAGNOSIS — I73.9 PVD (PERIPHERAL VASCULAR DISEASE) (HCC): ICD-10-CM

## 2024-12-12 PROCEDURE — 99999 PR OFFICE/OUTPT VISIT,PROCEDURE ONLY: CPT | Performed by: PODIATRIST

## 2024-12-12 PROCEDURE — 11721 DEBRIDE NAIL 6 OR MORE: CPT | Performed by: PODIATRIST

## 2024-12-12 NOTE — PROGRESS NOTES
24     Hong Chase    : 1955  Sex: male  Age: 69 y.o.    Subjective:  The patient is seen today for evaluation regarding foot evaluation and mycotic nail care.  No other complaints noted.    Chief Complaint   Patient presents with    Nail Problem     Nail fungus        Current Medications:    Current Outpatient Medications:     benzonatate (TESSALON) 100 MG capsule, Take 1 capsule by mouth 3 times daily as needed for Cough, Disp: 21 capsule, Rfl: 0    predniSONE (DELTASONE) 20 MG tablet, Take 2 tablets by mouth daily for 3 doses, Disp: 6 tablet, Rfl: 0    amLODIPine (NORVASC) 5 MG tablet, TAKE 1 TABLET BY MOUTH DAILY, Disp: 30 tablet, Rfl: 11    ursodiol (ACTIGALL) 300 MG capsule, Take 1 capsule by mouth in the morning and at bedtime, Disp: 60 capsule, Rfl: 3    bumetanide (BUMEX) 1 MG tablet, Take 1 tablet by mouth daily, Disp: 90 tablet, Rfl: 1    hydrOXYzine HCl (ATARAX) 50 MG tablet, Take 1 tablet by mouth every 8 hours as needed for Itching, Disp: 60 tablet, Rfl: 1    apixaban (ELIQUIS) 5 MG TABS tablet, Take 1 tablet by mouth 2 times daily Ok to resume from , Disp: 60 tablet, Rfl: 11    carvedilol (COREG) 12.5 MG tablet, Take 1 tablet by mouth 2 times daily (with meals), Disp: 60 tablet, Rfl: 3    digoxin (LANOXIN) 125 MCG tablet, TAKE 1/2 TABLET (62.5MG) DAILY, Disp: 45 tablet, Rfl: 3    hydrALAZINE (APRESOLINE) 50 MG tablet, TAKE 1 TABLET BY MOUTH 3 TIMES DAILY, Disp: 270 tablet, Rfl: 3    isosorbide dinitrate (ISORDIL) 10 MG tablet, Take 1 tablet by mouth 3 times daily, Disp: 270 tablet, Rfl: 3    Folinic Acid-Vit B6-Vit B12 (FOLINIC-PLUS) 4-50-2 MG TABS, Take 4-50 mg by mouth 2 times daily, Disp: 90 tablet, Rfl: 2    senna (SENOKOT) 8.6 MG TABS tablet, TAKE 2 TABLETS BY MOUTH 2 TIMES DAILY, Disp: 120 tablet, Rfl: 5    Multiple Vitamins-Minerals (THERAPEUTIC MULTIVITAMIN-MINERALS) tablet, Take 1 tablet by mouth daily, Disp: , Rfl:     Allergies:  No Known Allergies    Past Surgical

## 2024-12-16 NOTE — PROGRESS NOTES
Physician Progress Note      PATIENT:               DARIAN GUERRERO  CSN #:                  [id number]  :                       1955  ADMIT DATE:       2024 11:03 AM  DISCH DATE:        12/10/2024 4:29 PM  RESPONDING  PROVIDER #:        Renee Gallego DO          QUERY TEXT:    Pt admitted with shortness of breath, positive for flu and dx. with resp.   failure.  Pt noted to have elevated temp: 101.3 on arrival with elevated resp.   rate: 22.   If possible, please document in the progress notes and discharge   summary if you are evaluating and /or treating any of the following:  The medical record reflects the following:  Risk Factors: + Flu, Acute on Chronic CHF  Clinical Indicators: Presents with shortness of breath, decreased SPo2: 87% on   arrival and dx. Acute Resp. Failure with Hypoxia. Noted to have + flu test,   and elevated temp and elevated resp. rate on arrival.  Treatment: Steroids, O2 at 3L N/C, CXR  Options provided:  -- Sepsis, present on admission  -- Flu without Sepsis  -- SIRS only without Sepsis  -- Other - I will add my own diagnosis  -- Disagree - Not applicable / Not valid  -- Disagree - Clinically unable to determine / Unknown  -- Refer to Clinical Documentation Reviewer    PROVIDER RESPONSE TEXT:    This patient has sepsis which was present on admission.    Query created by: Kalina Mesa on 12/10/2024 2:38 PM      Electronically signed by:  Renee Gallego DO 2024 10:42 AM

## 2025-01-01 ENCOUNTER — HOSPITAL ENCOUNTER (EMERGENCY)
Age: 70
End: 2025-02-01
Attending: STUDENT IN AN ORGANIZED HEALTH CARE EDUCATION/TRAINING PROGRAM
Payer: MEDICARE

## 2025-01-01 ENCOUNTER — APPOINTMENT (OUTPATIENT)
Dept: CT IMAGING | Age: 70
End: 2025-01-01
Payer: MEDICARE

## 2025-01-01 ENCOUNTER — APPOINTMENT (OUTPATIENT)
Dept: GENERAL RADIOLOGY | Age: 70
End: 2025-01-01
Payer: MEDICARE

## 2025-01-01 VITALS
HEIGHT: 67 IN | SYSTOLIC BLOOD PRESSURE: 67 MMHG | OXYGEN SATURATION: 99 % | RESPIRATION RATE: 16 BRPM | DIASTOLIC BLOOD PRESSURE: 44 MMHG | WEIGHT: 210 LBS | BODY MASS INDEX: 32.96 KG/M2 | TEMPERATURE: 97.9 F

## 2025-01-01 DIAGNOSIS — I46.9 CARDIAC ARREST: Primary | ICD-10-CM

## 2025-01-01 DIAGNOSIS — D64.9 ANEMIA, UNSPECIFIED TYPE: ICD-10-CM

## 2025-01-01 LAB
ALBUMIN SERPL-MCNC: 2.4 G/DL (ref 3.5–5.2)
ALP SERPL-CCNC: 191 U/L (ref 40–129)
ALT SERPL-CCNC: 44 U/L (ref 0–40)
ANION GAP SERPL CALCULATED.3IONS-SCNC: 24 MMOL/L (ref 7–16)
AST SERPL-CCNC: 61 U/L (ref 0–39)
B.E.: -12.9 MMOL/L (ref -3–3)
BASOPHILS # BLD: 0.04 K/UL (ref 0–0.2)
BASOPHILS NFR BLD: 1 % (ref 0–2)
BILIRUB SERPL-MCNC: 0.4 MG/DL (ref 0–1.2)
BUN SERPL-MCNC: 55 MG/DL (ref 6–23)
CALCIUM SERPL-MCNC: 8.8 MG/DL (ref 8.6–10.2)
CHLORIDE SERPL-SCNC: 98 MMOL/L (ref 98–107)
CO2 SERPL-SCNC: 16 MMOL/L (ref 22–29)
COHB: 0.4 % (ref 0–1.5)
CREAT SERPL-MCNC: 2.2 MG/DL (ref 0.7–1.2)
CRITICAL: ABNORMAL
DATE ANALYZED: ABNORMAL
DATE OF COLLECTION: ABNORMAL
EKG ATRIAL RATE: 117 BPM
EKG Q-T INTERVAL: 362 MS
EKG QRS DURATION: 108 MS
EKG QTC CALCULATION (BAZETT): 509 MS
EKG R AXIS: 56 DEGREES
EKG T AXIS: 42 DEGREES
EKG VENTRICULAR RATE: 119 BPM
EOSINOPHIL # BLD: 0.07 K/UL (ref 0.05–0.5)
EOSINOPHILS RELATIVE PERCENT: 2 % (ref 0–6)
ERYTHROCYTE [DISTWIDTH] IN BLOOD BY AUTOMATED COUNT: 20.1 % (ref 11.5–15)
FIO2: 100 %
GFR, ESTIMATED: 31 ML/MIN/1.73M2
GLUCOSE SERPL-MCNC: 165 MG/DL (ref 74–99)
HCO3: 18.7 MMOL/L (ref 22–26)
HCT VFR BLD AUTO: 25 % (ref 37–54)
HGB BLD-MCNC: 7.2 G/DL (ref 12.5–16.5)
HHB: 3.2 % (ref 0–5)
LAB: ABNORMAL
LACTATE BLDV-SCNC: 13.2 MMOL/L (ref 0.5–1.9)
LYMPHOCYTES NFR BLD: 0.7 K/UL (ref 1.5–4)
LYMPHOCYTES RELATIVE PERCENT: 19 % (ref 20–42)
Lab: 1215
MCH RBC QN AUTO: 25.9 PG (ref 26–35)
MCHC RBC AUTO-ENTMCNC: 28.8 G/DL (ref 32–34.5)
MCV RBC AUTO: 89.9 FL (ref 80–99.9)
METAMYELOCYTES ABSOLUTE COUNT: 0.52 K/UL (ref 0–0.12)
METAMYELOCYTES: 14 % (ref 0–1)
METHB: 0.3 % (ref 0–1.5)
MODE: AC
MONOCYTES NFR BLD: 0.22 K/UL (ref 0.1–0.95)
MONOCYTES NFR BLD: 6 % (ref 2–12)
MYELOCYTES ABSOLUTE COUNT: 0.07 K/UL
MYELOCYTES: 2 %
NEUTROPHILS NFR BLD: 55 % (ref 43–80)
NEUTS SEG NFR BLD: 2.04 K/UL (ref 1.8–7.3)
NUCLEATED RED BLOOD CELLS: 3 PER 100 WBC
O2 CONTENT: 11.3 ML/DL
O2 SATURATION: 96.8 % (ref 92–98.5)
O2HB: 96.1 % (ref 94–97)
OPERATOR ID: 420
PATIENT TEMP: 37 C
PCO2: 84.8 MMHG (ref 35–45)
PEEP/CPAP: 8 CMH2O
PFO2: 1.45 MMHG/%
PH BLOOD GAS: 6.96 (ref 7.35–7.45)
PLATELET # BLD AUTO: 166 K/UL (ref 130–450)
PMV BLD AUTO: 9.7 FL (ref 7–12)
PO2: 145.1 MMHG (ref 75–100)
POTASSIUM SERPL-SCNC: 4.6 MMOL/L (ref 3.5–5)
PROMYELOCYTES ABSOLUTE COUNT: 0.04 K/UL
PROMYELOCYTES: 1 %
PROT SERPL-MCNC: 5.1 G/DL (ref 6.4–8.3)
RBC # BLD AUTO: 2.78 M/UL (ref 3.8–5.8)
RBC # BLD: ABNORMAL 10*6/UL
RI(T): 3.33
RR MECHANICAL: 16 B/MIN
SODIUM SERPL-SCNC: 138 MMOL/L (ref 132–146)
SOURCE, BLOOD GAS: ABNORMAL
THB: 8.1 G/DL (ref 11.5–16.5)
TIME ANALYZED: 1237
TROPONIN I SERPL HS-MCNC: 47 NG/L (ref 0–11)
TSH SERPL DL<=0.05 MIU/L-ACNC: 8.81 UIU/ML (ref 0.27–4.2)
VT MECHANICAL: 450 ML
WBC # BLD: ABNORMAL 10*3/UL
WBC OTHER # BLD: 3.7 K/UL (ref 4.5–11.5)

## 2025-01-01 PROCEDURE — 2580000003 HC RX 258: Performed by: STUDENT IN AN ORGANIZED HEALTH CARE EDUCATION/TRAINING PROGRAM

## 2025-01-01 PROCEDURE — 87077 CULTURE AEROBIC IDENTIFY: CPT

## 2025-01-01 PROCEDURE — 93005 ELECTROCARDIOGRAM TRACING: CPT | Performed by: STUDENT IN AN ORGANIZED HEALTH CARE EDUCATION/TRAINING PROGRAM

## 2025-01-01 PROCEDURE — 2500000003 HC RX 250 WO HCPCS: Performed by: STUDENT IN AN ORGANIZED HEALTH CARE EDUCATION/TRAINING PROGRAM

## 2025-01-01 PROCEDURE — 86900 BLOOD TYPING SEROLOGIC ABO: CPT

## 2025-01-01 PROCEDURE — 87150 DNA/RNA AMPLIFIED PROBE: CPT

## 2025-01-01 PROCEDURE — 86920 COMPATIBILITY TEST SPIN: CPT

## 2025-01-01 PROCEDURE — 36415 COLL VENOUS BLD VENIPUNCTURE: CPT

## 2025-01-01 PROCEDURE — 80053 COMPREHEN METABOLIC PANEL: CPT

## 2025-01-01 PROCEDURE — 71045 X-RAY EXAM CHEST 1 VIEW: CPT

## 2025-01-01 PROCEDURE — 93010 ELECTROCARDIOGRAM REPORT: CPT | Performed by: INTERNAL MEDICINE

## 2025-01-01 PROCEDURE — 85025 COMPLETE CBC W/AUTO DIFF WBC: CPT

## 2025-01-01 PROCEDURE — 36556 INSERT NON-TUNNEL CV CATH: CPT

## 2025-01-01 PROCEDURE — 87040 BLOOD CULTURE FOR BACTERIA: CPT

## 2025-01-01 PROCEDURE — 86850 RBC ANTIBODY SCREEN: CPT

## 2025-01-01 PROCEDURE — 83605 ASSAY OF LACTIC ACID: CPT

## 2025-01-01 PROCEDURE — 92950 HEART/LUNG RESUSCITATION CPR: CPT

## 2025-01-01 PROCEDURE — 99285 EMERGENCY DEPT VISIT HI MDM: CPT

## 2025-01-01 PROCEDURE — 31500 INSERT EMERGENCY AIRWAY: CPT

## 2025-01-01 PROCEDURE — 82805 BLOOD GASES W/O2 SATURATION: CPT

## 2025-01-01 PROCEDURE — P9016 RBC LEUKOCYTES REDUCED: HCPCS

## 2025-01-01 PROCEDURE — 84484 ASSAY OF TROPONIN QUANT: CPT

## 2025-01-01 PROCEDURE — 6360000002 HC RX W HCPCS: Performed by: STUDENT IN AN ORGANIZED HEALTH CARE EDUCATION/TRAINING PROGRAM

## 2025-01-01 PROCEDURE — 86901 BLOOD TYPING SEROLOGIC RH(D): CPT

## 2025-01-01 PROCEDURE — 84443 ASSAY THYROID STIM HORMONE: CPT

## 2025-01-01 RX ORDER — SODIUM CHLORIDE 9 MG/ML
INJECTION, SOLUTION INTRAVENOUS PRN
Status: DISCONTINUED | OUTPATIENT
Start: 2025-01-01 | End: 2025-02-01 | Stop reason: HOSPADM

## 2025-01-01 RX ORDER — METHYLENE BLUE 10 MG/ML
10 INJECTION INTRAVENOUS ONCE
Status: DISCONTINUED | OUTPATIENT
Start: 2025-01-01 | End: 2025-02-01 | Stop reason: HOSPADM

## 2025-01-01 RX ORDER — EPINEPHRINE 0.1 MG/ML
INJECTION INTRAVENOUS
Status: DISPENSED
Start: 2025-01-01 | End: 2025-02-01

## 2025-01-01 RX ORDER — IOPAMIDOL 755 MG/ML
75 INJECTION, SOLUTION INTRAVASCULAR
Status: DISCONTINUED | OUTPATIENT
Start: 2025-01-01 | End: 2025-02-01 | Stop reason: HOSPADM

## 2025-01-01 RX ORDER — HYDROCORTISONE SODIUM SUCCINATE 100 MG/2ML
100 INJECTION INTRAMUSCULAR; INTRAVENOUS ONCE
Status: DISCONTINUED | OUTPATIENT
Start: 2025-01-01 | End: 2025-02-01 | Stop reason: HOSPADM

## 2025-01-01 RX ORDER — EPINEPHRINE IN SOD CHLOR,ISO 1 MG/10 ML
SYRINGE (ML) INTRAVENOUS DAILY PRN
Status: COMPLETED | OUTPATIENT
Start: 2025-01-01 | End: 2025-01-01

## 2025-01-01 RX ADMIN — Medication 1 MG: at 13:43

## 2025-01-01 RX ADMIN — Medication 50 MEQ: at 13:43

## 2025-01-01 RX ADMIN — Medication 20 MCG/MIN: at 12:47

## 2025-01-01 RX ADMIN — Medication 50 MEQ: at 13:37

## 2025-01-01 RX ADMIN — NOREPINEPHRINE BITARTRATE 100 MCG/MIN: 1 INJECTION, SOLUTION, CONCENTRATE INTRAVENOUS at 12:46

## 2025-01-01 RX ADMIN — VASOPRESSIN 0.03 UNITS/MIN: 20 INJECTION INTRAVENOUS at 12:44

## 2025-01-01 RX ADMIN — Medication 1 MG: at 13:35

## 2025-01-01 ASSESSMENT — PAIN SCALES - GENERAL: PAINLEVEL_OUTOF10: 0

## 2025-01-01 ASSESSMENT — PAIN - FUNCTIONAL ASSESSMENT: PAIN_FUNCTIONAL_ASSESSMENT: 0-10

## 2025-01-07 ENCOUNTER — HOSPITAL ENCOUNTER (OUTPATIENT)
Dept: OTHER | Age: 70
Setting detail: THERAPIES SERIES
Discharge: HOME | End: 2025-01-07
Payer: MEDICARE

## 2025-01-07 VITALS
WEIGHT: 214 LBS | HEART RATE: 79 BPM | DIASTOLIC BLOOD PRESSURE: 76 MMHG | BODY MASS INDEX: 33.52 KG/M2 | SYSTOLIC BLOOD PRESSURE: 116 MMHG | OXYGEN SATURATION: 93 % | RESPIRATION RATE: 17 BRPM

## 2025-01-07 DIAGNOSIS — I50.22 CHRONIC SYSTOLIC HEART FAILURE (HCC): ICD-10-CM

## 2025-01-07 DIAGNOSIS — I50.42 CHRONIC COMBINED SYSTOLIC AND DIASTOLIC CONGESTIVE HEART FAILURE (HCC): Primary | ICD-10-CM

## 2025-01-07 LAB
ANION GAP SERPL CALCULATED.3IONS-SCNC: 12 MMOL/L (ref 7–16)
BNP SERPL-MCNC: 6183 PG/ML (ref 0–450)
BUN SERPL-MCNC: 42 MG/DL (ref 6–23)
CALCIUM SERPL-MCNC: 9.6 MG/DL (ref 8.6–10.2)
CHLORIDE SERPL-SCNC: 101 MMOL/L (ref 98–107)
CO2 SERPL-SCNC: 25 MMOL/L (ref 22–29)
CREAT SERPL-MCNC: 1.5 MG/DL (ref 0.7–1.2)
GFR, ESTIMATED: 50 ML/MIN/1.73M2
GLUCOSE SERPL-MCNC: 157 MG/DL (ref 74–99)
POTASSIUM SERPL-SCNC: 5.1 MMOL/L (ref 3.5–5)
SODIUM SERPL-SCNC: 138 MMOL/L (ref 132–146)

## 2025-01-07 PROCEDURE — 83880 ASSAY OF NATRIURETIC PEPTIDE: CPT

## 2025-01-07 PROCEDURE — 96374 THER/PROPH/DIAG INJ IV PUSH: CPT

## 2025-01-07 PROCEDURE — 36415 COLL VENOUS BLD VENIPUNCTURE: CPT

## 2025-01-07 PROCEDURE — 6360000002 HC RX W HCPCS: Performed by: NURSE PRACTITIONER

## 2025-01-07 PROCEDURE — 80048 BASIC METABOLIC PNL TOTAL CA: CPT

## 2025-01-07 PROCEDURE — 2500000003 HC RX 250 WO HCPCS: Performed by: NURSE PRACTITIONER

## 2025-01-07 PROCEDURE — 99205 OFFICE O/P NEW HI 60 MIN: CPT

## 2025-01-07 RX ORDER — BUMETANIDE 1 MG/1
2 TABLET ORAL DAILY
Qty: 60 TABLET | Refills: 3 | Status: SHIPPED | OUTPATIENT
Start: 2025-01-07

## 2025-01-07 RX ORDER — BUMETANIDE 0.25 MG/ML
2 INJECTION, SOLUTION INTRAMUSCULAR; INTRAVENOUS ONCE
Status: COMPLETED | OUTPATIENT
Start: 2025-01-07 | End: 2025-01-07

## 2025-01-07 RX ORDER — SODIUM CHLORIDE 0.9 % (FLUSH) 0.9 %
10 SYRINGE (ML) INJECTION PRN
Status: DISCONTINUED | OUTPATIENT
Start: 2025-01-07 | End: 2025-01-08 | Stop reason: HOSPADM

## 2025-01-07 RX ADMIN — BUMETANIDE 2 MG: 0.25 INJECTION INTRAMUSCULAR; INTRAVENOUS at 10:09

## 2025-01-07 RX ADMIN — SODIUM CHLORIDE, PRESERVATIVE FREE 10 ML: 5 INJECTION INTRAVENOUS at 10:09

## 2025-01-07 ASSESSMENT — PATIENT HEALTH QUESTIONNAIRE - PHQ9
SUM OF ALL RESPONSES TO PHQ QUESTIONS 1-9: 0
SUM OF ALL RESPONSES TO PHQ QUESTIONS 1-9: 0
1. LITTLE INTEREST OR PLEASURE IN DOING THINGS: NOT AT ALL
SUM OF ALL RESPONSES TO PHQ9 QUESTIONS 1 & 2: 0
SUM OF ALL RESPONSES TO PHQ QUESTIONS 1-9: 0
2. FEELING DOWN, DEPRESSED OR HOPELESS: NOT AT ALL
SUM OF ALL RESPONSES TO PHQ QUESTIONS 1-9: 0

## 2025-01-07 NOTE — PROGRESS NOTES
Congestive Heart Failure Clinic   [facility]      Referring Provider: Ginder   Primary Care Physician: Kamron Chase DO   Cardiologist: Bennie   Nephrologist: Aman       HISTORY OF PRESENT ILLNESS:     Hong Chase is a 69 y.o. (1955) male with a history of HFimpEF  Pre Cupid:     Lab Results   Component Value Date    LVEF 53 01/23/2023     Post Cupid:  No results found for: \"EFBP\"      He presents to the CHF clinic for ongoing evaluation and monitoring of heart failure.    In the CHF clinic today he denies any adverse symptoms except:  [] Dizziness or lightheadedness   [] Syncope or near syncope  [] Recent Fall  [] Shortness of breath at rest   [x] Dyspnea with exertion  [x] Decline in functional capacity (unable to perform activities they had previously been able to do)  [x] Fatigue   [] Orthopnea  [] PND  [] Nocturnal cough  [] Hemoptysis  [] Chest pain, pressure, or discomfort  [x] Palpitations (occasionally on exertion)   [] Abdominal distention  [] Abdominal bloating  [] Early satiety  [] Blood in stool   [] Diarrhea  [] Constipation  [] Nausea/Vomiting  [] Decreased urinary response to oral diuretic   [] Scrotal swelling   [x] Lower extremity edema  [] Used PRN doses of oral diuretic   [x] Weight gain (5 pounds in 1 week on home scale)     Wt Readings from Last 3 Encounters:   01/07/25 97.1 kg (214 lb)   12/18/24 94.8 kg (209 lb)   12/12/24 92.1 kg (203 lb)           SOCIAL HISTORY:  [x] Denies tobacco, alcohol or illicit drug abuse  [] Tobacco use:  [] ETOH use:  [] Illicit drug use:        MEDICATIONS:    No Known Allergies  Prior to Visit Medications    Medication Sig Taking? Authorizing Provider   methylPREDNISolone (MEDROL DOSEPACK) 4 MG tablet Take by mouth.  Patient taking differently: 3 today, weaning gradually Yes Kamron Chase DO   traMADol (ULTRAM) 50 MG tablet Take 1 tablet by mouth every 4 hours as needed for Pain for up to 5

## 2025-01-07 NOTE — RESULT ENCOUNTER NOTE
Labs and CHF clinic note reviewed  Spoke with Gloria DOSHI  Increase Bumex 2 mg daily   Follow up BMP in 1 week

## 2025-01-09 ENCOUNTER — TELEPHONE (OUTPATIENT)
Dept: OTHER | Age: 70
End: 2025-01-09

## 2025-01-09 NOTE — TELEPHONE ENCOUNTER
Patient called into the CHF clinic to notify us he received his updated orders of his increased bumex dose as well as his follow up lab order for next week.

## 2025-01-13 ENCOUNTER — TELEPHONE (OUTPATIENT)
Dept: OTHER | Age: 70
End: 2025-01-13

## 2025-01-13 DIAGNOSIS — I50.42 CHRONIC COMBINED SYSTOLIC AND DIASTOLIC CONGESTIVE HEART FAILURE (HCC): ICD-10-CM

## 2025-01-13 LAB
ANION GAP SERPL CALCULATED.3IONS-SCNC: 11 MMOL/L (ref 7–16)
BUN BLDV-MCNC: 28 MG/DL (ref 6–23)
CALCIUM SERPL-MCNC: 9.3 MG/DL (ref 8.6–10.2)
CHLORIDE BLD-SCNC: 100 MMOL/L (ref 98–107)
CO2: 30 MMOL/L (ref 22–29)
CREAT SERPL-MCNC: 1.6 MG/DL (ref 0.7–1.2)
GFR, ESTIMATED: 46 ML/MIN/1.73M2
GLUCOSE BLD-MCNC: 112 MG/DL (ref 74–99)
POTASSIUM SERPL-SCNC: 4.5 MMOL/L (ref 3.5–5)
SODIUM BLD-SCNC: 141 MMOL/L (ref 132–146)

## 2025-01-13 NOTE — RESULT ENCOUNTER NOTE
Labs reviewed and stable - continue same medications if feeling ok (increase bumex last visit)  Follow up as scheduled - sooner if needed    Thank you

## 2025-01-13 NOTE — TELEPHONE ENCOUNTER
5:01 PM 1/13/2025 Called patient re: new medication changes. Pt is feeling much better. Noticed ankles edema has improved and decreased 10 lbs. I have reviewed the provider's instructions with the patient, answering all questions to his satisfaction.        Future Appointments   Date Time Provider Department Center   1/22/2025  9:45 AM [facility] CHF ROOM 2 [facility]   2/4/2025  2:40 PM Safia Avery MD [facility]   2/13/2025 10:30 AM Hong Rogel Jr., LORRAINE CASTAÑEDA POD [facility]   3/19/2025 10:15 AM Kamron Chase DO [facility]   5/20/2025  9:20 AM Carlin Vazquez APRN - CNS [facility]   12/23/2025 10:00 AM Kamron Chase DO [facility]   12/23/2025 10:30 AM Kamron Chase DO [facility]

## 2025-01-13 NOTE — TELEPHONE ENCOUNTER
----- Message from CRISTINA Trent - CNP sent at 1/13/2025  4:15 PM EST -----  Labs reviewed and stable - continue same medications if feeling ok (increase bumex last visit)  Follow up as scheduled - sooner if needed    Thank you

## 2025-01-20 ENCOUNTER — APPOINTMENT (OUTPATIENT)
Dept: GENERAL RADIOLOGY | Age: 70
End: 2025-01-20
Payer: MEDICARE

## 2025-01-20 ENCOUNTER — HOSPITAL ENCOUNTER (EMERGENCY)
Age: 70
Discharge: HOME | End: 2025-01-20
Attending: EMERGENCY MEDICINE
Payer: MEDICARE

## 2025-01-20 ENCOUNTER — APPOINTMENT (OUTPATIENT)
Dept: CT IMAGING | Age: 70
End: 2025-01-20
Payer: MEDICARE

## 2025-01-20 VITALS
TEMPERATURE: 98.6 F | DIASTOLIC BLOOD PRESSURE: 66 MMHG | SYSTOLIC BLOOD PRESSURE: 103 MMHG | RESPIRATION RATE: 18 BRPM | OXYGEN SATURATION: 94 % | HEART RATE: 100 BPM
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
## 2025-01-20 DIAGNOSIS — M54.32 LEFT SIDED SCIATICA: ICD-10-CM

## 2025-01-20 DIAGNOSIS — M25.462 KNEE EFFUSION, LEFT: ICD-10-CM

## 2025-01-20 LAB
ANION GAP SERPL CALCULATED.3IONS-SCNC: 13 MMOL/L (ref 7–16)
BASOPHILS # BLD: 0 K/UL (ref 0–0.2)
BASOPHILS NFR BLD: 0 % (ref 0–2)
BUN SERPL-MCNC: 34 MG/DL (ref 6–23)
CALCIUM SERPL-MCNC: 9.1 MG/DL (ref 8.6–10.2)
CHLORIDE SERPL-SCNC: 93 MMOL/L (ref 98–107)
CK SERPL-CCNC: 23 U/L (ref 20–200)
CO2 SERPL-SCNC: 28 MMOL/L (ref 22–29)
CREAT SERPL-MCNC: 1.8 MG/DL (ref 0.7–1.2)
EOSINOPHIL # BLD: 0 K/UL (ref 0.05–0.5)
EOSINOPHILS RELATIVE PERCENT: 0 % (ref 0–6)
ERYTHROCYTE [DISTWIDTH] IN BLOOD BY AUTOMATED COUNT: 19.3 % (ref 11.5–15)
GFR, ESTIMATED: 40 ML/MIN/1.73M2
GLUCOSE SERPL-MCNC: 96 MG/DL (ref 74–99)
HCT VFR BLD AUTO: 28.4 % (ref 37–54)
HGB BLD-MCNC: 9.1 G/DL (ref 12.5–16.5)
INR PPP: 2.2
LYMPHOCYTES NFR BLD: 0.22 K/UL (ref 1.5–4)
LYMPHOCYTES RELATIVE PERCENT: 3 % (ref 20–42)
MCH RBC QN AUTO: 27 PG (ref 26–35)
MCHC RBC AUTO-ENTMCNC: 32 G/DL (ref 32–34.5)
MCV RBC AUTO: 84.3 FL (ref 80–99.9)
MONOCYTES NFR BLD: 0.44 K/UL (ref 0.1–0.95)
MONOCYTES NFR BLD: 5 % (ref 2–12)
NEUTROPHILS NFR BLD: 92 % (ref 43–80)
NEUTS SEG NFR BLD: 7.93 K/UL (ref 1.8–7.3)
PLATELET # BLD AUTO: 206 K/UL (ref 130–450)
PMV BLD AUTO: 8.5 FL (ref 7–12)
POTASSIUM SERPL-SCNC: 4.2 MMOL/L (ref 3.5–5)
PROTHROMBIN TIME: 23.8 SEC (ref 9.3–12.4)
RBC # BLD AUTO: 3.37 M/UL (ref 3.8–5.8)
RBC # BLD: ABNORMAL 10*6/UL
SODIUM SERPL-SCNC: 134 MMOL/L (ref 132–146)
WBC OTHER # BLD: 8.6 K/UL (ref 4.5–11.5)

## 2025-01-20 PROCEDURE — 96374 THER/PROPH/DIAG INJ IV PUSH: CPT

## 2025-01-20 PROCEDURE — 99284 EMERGENCY DEPT VISIT MOD MDM: CPT

## 2025-01-20 PROCEDURE — 70450 CT HEAD/BRAIN W/O DYE: CPT

## 2025-01-20 PROCEDURE — 73030 X-RAY EXAM OF SHOULDER: CPT

## 2025-01-20 PROCEDURE — 72125 CT NECK SPINE W/O DYE: CPT

## 2025-01-20 PROCEDURE — 96375 TX/PRO/DX INJ NEW DRUG ADDON: CPT

## 2025-01-20 PROCEDURE — 6370000000 HC RX 637 (ALT 250 FOR IP): Performed by: EMERGENCY MEDICINE

## 2025-01-20 PROCEDURE — 72131 CT LUMBAR SPINE W/O DYE: CPT

## 2025-01-20 PROCEDURE — 85610 PROTHROMBIN TIME: CPT

## 2025-01-20 PROCEDURE — 73502 X-RAY EXAM HIP UNI 2-3 VIEWS: CPT

## 2025-01-20 PROCEDURE — 73700 CT LOWER EXTREMITY W/O DYE: CPT

## 2025-01-20 PROCEDURE — 80048 BASIC METABOLIC PNL TOTAL CA: CPT

## 2025-01-20 PROCEDURE — 6360000002 HC RX W HCPCS: Performed by: EMERGENCY MEDICINE

## 2025-01-20 PROCEDURE — 85025 COMPLETE CBC W/AUTO DIFF WBC: CPT

## 2025-01-20 PROCEDURE — 73560 X-RAY EXAM OF KNEE 1 OR 2: CPT

## 2025-01-20 PROCEDURE — 82550 ASSAY OF CK (CPK): CPT

## 2025-01-20 PROCEDURE — 72128 CT CHEST SPINE W/O DYE: CPT

## 2025-01-20 RX ORDER — ORPHENADRINE CITRATE 30 MG/ML
60 INJECTION INTRAMUSCULAR; INTRAVENOUS ONCE
Status: COMPLETED | OUTPATIENT
Start: 2025-01-20 | End: 2025-01-20

## 2025-01-20 RX ORDER — MORPHINE SULFATE 4 MG/ML
4 INJECTION, SOLUTION INTRAMUSCULAR; INTRAVENOUS ONCE
Status: COMPLETED | OUTPATIENT
Start: 2025-01-20 | End: 2025-01-20

## 2025-01-20 RX ORDER — OXYCODONE AND ACETAMINOPHEN 5; 325 MG/1; MG/1
1 TABLET ORAL EVERY 6 HOURS PRN
Qty: 10 TABLET | Refills: 0 | Status: SHIPPED | OUTPATIENT
Start: 2025-01-20 | End: 2025-01-23

## 2025-01-20 RX ORDER — OXYCODONE AND ACETAMINOPHEN 5; 325 MG/1; MG/1
1 TABLET ORAL ONCE
Status: COMPLETED | OUTPATIENT
Start: 2025-01-20 | End: 2025-01-20

## 2025-01-20 RX ORDER — CYCLOBENZAPRINE HCL 5 MG
5 TABLET ORAL 2 TIMES DAILY PRN
Qty: 10 TABLET | Refills: 0 | Status: SHIPPED | OUTPATIENT
Start: 2025-01-20 | End: 2025-01-30

## 2025-01-20 RX ADMIN — ORPHENADRINE CITRATE 60 MG: 60 INJECTION INTRAMUSCULAR; INTRAVENOUS at 17:03

## 2025-01-20 RX ADMIN — MORPHINE SULFATE 4 MG: 4 INJECTION, SOLUTION INTRAMUSCULAR; INTRAVENOUS at 17:05

## 2025-01-20 RX ADMIN — OXYCODONE AND ACETAMINOPHEN 1 TABLET: 5; 325 TABLET ORAL at 20:40

## 2025-01-20 ASSESSMENT — PAIN SCALES - GENERAL
PAINLEVEL_OUTOF10: 6
PAINLEVEL_OUTOF10: 9
PAINLEVEL_OUTOF10: 9

## 2025-01-20 ASSESSMENT — PAIN - FUNCTIONAL ASSESSMENT: PAIN_FUNCTIONAL_ASSESSMENT: 0-10

## 2025-01-20 ASSESSMENT — PAIN DESCRIPTION - LOCATION
LOCATION: BACK;LEG;NECK
LOCATION: NECK;LEG;BACK
LOCATION: BACK;NECK;LEG

## 2025-01-20 ASSESSMENT — PAIN DESCRIPTION - FREQUENCY: FREQUENCY: CONTINUOUS

## 2025-01-20 ASSESSMENT — PAIN DESCRIPTION - DESCRIPTORS
DESCRIPTORS: JABBING;CRUSHING
DESCRIPTORS: SHARP;JABBING

## 2025-01-20 ASSESSMENT — ENCOUNTER SYMPTOMS
CHEST TIGHTNESS: 0
SHORTNESS OF BREATH: 0
BACK PAIN: 1

## 2025-01-20 ASSESSMENT — PAIN DESCRIPTION - PAIN TYPE: TYPE: ACUTE PAIN

## 2025-01-20 NOTE — ED PROVIDER NOTES
allergies.    -------------------------------------------------- RESULTS -------------------------------------------------  Labs:  Results for orders placed or performed during the hospital encounter of 01/20/25   CBC with Auto Differential   Result Value Ref Range    WBC 8.6 4.5 - 11.5 k/uL    RBC 3.37 (L) 3.80 - 5.80 m/uL    Hemoglobin 9.1 (L) 12.5 - 16.5 g/dL    Hematocrit 28.4 (L) 37.0 - 54.0 %    MCV 84.3 80.0 - 99.9 fL    MCH 27.0 26.0 - 35.0 pg    MCHC 32.0 32.0 - 34.5 g/dL    RDW 19.3 (H) 11.5 - 15.0 %    Platelets 206 130 - 450 k/uL    MPV 8.5 7.0 - 12.0 fL    Neutrophils % 92 (H) 43.0 - 80.0 %    Lymphocytes % 3 (L) 20.0 - 42.0 %    Monocytes % 5 2.0 - 12.0 %    Eosinophils % 0 0 - 6 %    Basophils % 0 0.0 - 2.0 %    Neutrophils Absolute 7.93 (H) 1.80 - 7.30 k/uL    Lymphocytes Absolute 0.22 (L) 1.50 - 4.00 k/uL    Monocytes Absolute 0.44 0.10 - 0.95 k/uL    Eosinophils Absolute 0.00 (L) 0.05 - 0.50 k/uL    Basophils Absolute 0.00 0.00 - 0.20 k/uL    RBC Morphology 2+ ANISOCYTOSIS     RBC Morphology 1+ HYPOCHROMIA     RBC Morphology 1+ OVALOCYTES     RBC Morphology 1+ POIKILOCYTOSIS     RBC Morphology 1+ POLYCHROMASIA    Basic Metabolic Panel   Result Value Ref Range    Sodium 134 132 - 146 mmol/L    Potassium 4.2 3.5 - 5.0 mmol/L    Chloride 93 (L) 98 - 107 mmol/L    CO2 28 22 - 29 mmol/L    Anion Gap 13 7 - 16 mmol/L    Glucose 96 74 - 99 mg/dL    BUN 34 (H) 6 - 23 mg/dL    Creatinine 1.8 (H) 0.70 - 1.20 mg/dL    Est, Glom Filt Rate 40 (L) >60 mL/min/1.73m2    Calcium 9.1 8.6 - 10.2 mg/dL   CK   Result Value Ref Range    Total CK 23 20 - 200 U/L   Protime-INR   Result Value Ref Range    Protime 23.8 (H) 9.3 - 12.4 sec    INR 2.2        Radiology:  CT KNEE LEFT WO CONTRAST   Final Result   1.  No evidence of fracture or dislocation.      2.  Small synovial effusion.         XR KNEE LEFT (1-2 VIEWS)   Final Result   1. No acute bony abnormalities.   2. Mild joint effusion.   3. Intra-articular

## 2025-01-22 ENCOUNTER — HOSPITAL ENCOUNTER (OUTPATIENT)
Dept: OTHER | Age: 70
Setting detail: THERAPIES SERIES
Discharge: HOME | End: 2025-01-22
Payer: MEDICARE

## 2025-01-22 VITALS
RESPIRATION RATE: 17 BRPM | DIASTOLIC BLOOD PRESSURE: 60 MMHG | BODY MASS INDEX: 32.89 KG/M2 | WEIGHT: 210 LBS | OXYGEN SATURATION: 95 % | SYSTOLIC BLOOD PRESSURE: 102 MMHG | HEART RATE: 84 BPM

## 2025-01-22 DIAGNOSIS — I50.20 HFREF (HEART FAILURE WITH REDUCED EJECTION FRACTION) (HCC): Primary | ICD-10-CM

## 2025-01-22 LAB
ANION GAP SERPL CALCULATED.3IONS-SCNC: 10 MMOL/L (ref 7–16)
BNP SERPL-MCNC: 4748 PG/ML (ref 0–450)
BUN SERPL-MCNC: 49 MG/DL (ref 6–23)
CALCIUM SERPL-MCNC: 9.1 MG/DL (ref 8.6–10.2)
CHLORIDE SERPL-SCNC: 98 MMOL/L (ref 98–107)
CO2 SERPL-SCNC: 28 MMOL/L (ref 22–29)
CREAT SERPL-MCNC: 2.4 MG/DL (ref 0.7–1.2)
GFR, ESTIMATED: 29 ML/MIN/1.73M2
GLUCOSE SERPL-MCNC: 131 MG/DL (ref 74–99)
POTASSIUM SERPL-SCNC: 4.1 MMOL/L (ref 3.5–5)
SODIUM SERPL-SCNC: 136 MMOL/L (ref 132–146)

## 2025-01-22 PROCEDURE — 99214 OFFICE O/P EST MOD 30 MIN: CPT

## 2025-01-22 PROCEDURE — 83880 ASSAY OF NATRIURETIC PEPTIDE: CPT

## 2025-01-22 PROCEDURE — 36415 COLL VENOUS BLD VENIPUNCTURE: CPT

## 2025-01-22 PROCEDURE — 80048 BASIC METABOLIC PNL TOTAL CA: CPT

## 2025-01-22 NOTE — RESULT ENCOUNTER NOTE
Labs and CHF clinic note reviewed  Stay hydrated   Decrease bumex to alternate Bumex 1 mg and 2mg every other day  Follow up labs in 1 week   Wear compression stockings, on in AM and off in PM  Elevate legs as much as possible

## 2025-01-22 NOTE — PROGRESS NOTES
sore and his pitting edema, which had been previously improved has now returned. Patient was advised to elevate his feet moving forward and he verbalizes understanding.     I called Hong with the following orders from Grisel EVANS:   Stay hydrated   Decrease bumex to alternate Bumex 1 mg and 2mg every other day  Follow up labs in 1 week   Wear compression stockings, on in AM and off in PM  Elevate legs as much as possible       [x]Lab work obtained    [x] Patient/Family Educated On:  [x] HF zones (Green, Yellow, Red) and aware of when to take action   [x] Daily weights  [] Scale provided   [x] Low sodium diet (2000 mg)  Barriers to compliance  [] Refuses to monitor diet  [] Socioeconomic difficulties  [] Unable to cook for self (use of frozen meals, can goods, etc)  [] CHF CHW consulted  [] Low sodium meal delivery options given to patient  [] Dietician consulted   [] Low sodium recipes provided  [] Sodium free seasoning provided   [x] Fluid intake 6-8 cups (around 64 oz)  [x] Reviewed currently prescribed medications with patient, educated on importance of compliance and answered any questions regarding their medication  [] Pill box provided to patient  [] Patient using pill packing pharmacy   [] CPAP/BiPAP use  [] Low impact exercise / cardiac rehab   [] LifeVest use  [x] Patient aware of signs and symptoms of worsening HF, CHF clinic phone number provided and made aware to call clinic for sooner if evaluation if needed     [] Difficulty affording medications  [] CHF CHW consulted  [] Prescription assistance information given   [] [facility] medication assistance program information given   [] Sample medications provided to patient to help bridge gap until affordability N/A          Scheduled to follow up in CHF clinic on:   Future Appointments   Date Time Provider Department Center   2/4/2025  2:40 PM Safia Avery MD [place] [facility]   2/12/2025  9:45 AM [facility] ROOM 1 [facility]

## 2025-01-30 PROBLEM — J96.01 ACUTE HYPOXIC RESPIRATORY FAILURE: Status: RESOLVED | Noted: 2024-01-01 | Resolved: 2025-01-01

## 2025-01-30 PROBLEM — N18.32 STAGE 3B CHRONIC KIDNEY DISEASE (HCC): Status: ACTIVE | Noted: 2025-01-01

## 2025-01-31 NOTE — ED PROVIDER NOTES
Department of Emergency Medicine   ED  Provider Note  Admit Date/RoomTime: 1/31/2025 12:16 PM  ED Room: 20/20              [facility]     Hong Chase is a 69 y.o. male with a PMHx significant for  HTN, CKD, mitral regurgitation, HLD, thoracic aortic dissection, CHF, atrial fibrillation  who presents for evaluation of cardiac arrest, beginning prior to arrival.  The complaint has been constant, severe in severity, and worsened by nothing.   Per EMS the call was for unresponsive episode.  They note upon their arrival 2 minutes prior they had called back saying there are no pulses.  They were doing CPR at home.  Patient received 5 rounds of epinephrine en route, was asystole on monitor the entire time.    Review of Systems   Unable to perform ROS: Acuity of condition        Physical Exam  Vitals and nursing note reviewed.   Constitutional:       Appearance: He is ill-appearing and toxic-appearing.      Interventions: He is intubated. Backboard in place.   HENT:      Head: Normocephalic and atraumatic.      Right Ear: External ear normal.      Left Ear: External ear normal.   Eyes:      Pupils:      Right eye: Pupil is sluggish.      Left eye: Pupil is sluggish.      Comments: Dilated, however responsive minimal   Pulmonary:      Effort: Respiratory distress present. He is intubated.      Comments: Mechanical breath sounds  Abdominal:      General: There is distension.      Tenderness: There is no abdominal tenderness.   Musculoskeletal:         General: No swelling or tenderness.   Neurological:      Mental Status: He is unresponsive.      GCS: GCS eye subscore is 1. GCS verbal subscore is 1. GCS motor subscore is 1.          Procedures    Intubation Procedure Note  Indication: s/p cardiac arrest  Consent: Unable to be obtained due to the emergent nature of this procedure.  Medications Used: None  Procedure: The patient was placed in the appropriate position.  Cricoid pressure was not required.  Intubation was performed

## 2025-01-31 NOTE — ED NOTES
Radiology Procedure Waiver   Name: Hong Chase  : 1955  MRN: [id number]    Date:  25    Time: 12:45 PM EST    Benefits of immediately proceeding with Radiology exam(s) without pre-testing outweigh the risks or are not indicated as specified below and therefore the following is/are being waived:    [] Pregnancy test   [] Patients LMP on-time and regular.   [] Patient had Tubal Ligation or has other Contraception Device.   [] Patient  is Menopausal or Premenarcheal.    [] Patient had Full or Partial Hysterectomy.    [] Protocol for Iodine allergy    [] MRI Questionnaire     [x] BUN/Creatinine   [] Patient age w/no hx of renal dysfunction.   [] Patient on Dialysis.   [x] Benefit out-weight risk  Electronically signed by Paco Jolley DO on 25 at 12:45 PM EST               Paco Jolley DO  [phone]

## 2025-01-31 NOTE — PROGRESS NOTES
Spiritual Health History and Assessment/Progress Note  [facility]     Encounter, Rituals, Rites and Sacraments,  ,  ,      Name: Hong Chase MRN: [id number]    Age: 69 y.o.     Sex: male   Language: English   Taoist: Spiritism   <principal problem not specified>     Date: 1/31/2025                           Spiritual Assessment began in [facility] EMERGENCY DEPARTMENT        Referral/Consult From: Family, Other    Encounter Overview/Reason:  Encounter, Rituals, Rites and Sacraments  Service Provided For: Patient and family together (Provided emotional support to family.)    Ilvia, Belief, Meaning:   Patient Other: Patient was unconscious and actively dying.  Family/Friends are connected with a livia tradition or spiritual practice      Importance and Influence:  Patient unable to assess at this time  Family/Friends have no beliefs influential to healthcare decision-making identified during this visit    Community:  Patient Other: Unable to assess  Family/Friends feel well-supported. Support system includes: Friends and Other: Siblings    Assessment and Plan of Care:     Patient Interventions include: Provided sacramental/Zoroastrianism ritual  Family/Friends Interventions include: Affirmed coping skills/support systems and Other: Provided emotional support.    Patient Plan of Care: Spiritual Care available upon further referral  Family/Friends Plan of Care: Spiritual Care available upon further referral    Electronically signed by Chaplain Duarte on 1/31/2025 at 2:23 PM

## 2025-01-31 NOTE — PROCEDURES
PROCEDURE NOTE  Date: 1/31/2025   Name: Hong Chase  YOB: 1955    Procedures    Arterial line placement    Procedure: Left Radial arterial line placement.     Indications: Continuous monitoring of blood pressure in a patient with hypotension +/- shock, on Levophed.     Anesthesia: Local infiltration of 1% lidocaine.     Consent:  Unable to be obtained due to the emergent nature of this procedure.    Technique: Time Out: Immediately prior to the procedure a \"timeout\" was called to verify the correct patient and procedure. Procedure was done using strict aseptic technique. Gio's test was performed and was normal. Left Radial site was cleaned with chloraprep and draped. Left Radial was identified, then Lidocaine 1% was infiltrated locally.  Arterial line was inserted, a good blood flow was obtained, after which guidewire was inserted all the way with no resistance. Then the canula was inserted and needle with guidewire was withdrawn. Pulsatile bright red blood flow was observed. The canula was connected to BP monitoring apparatus and a good quality waveform was noted. Then the canula was secured with 2 stay sutures of 2-0 silk after Lidocaine infiltration, following which dressing was applied.     Number of sticks: 1.     Number of Kits used: 1.     Complications: No immediate complication.      Estimated blood loss: About 1 ml.     Comment: Patient tolerated the procedure well.     Carol Pappas DO PGY-2  1/31/2025 1:57 PM     I was present and providing direct supervision.    Kendra Cooper MD MD  1/31/2025 1:59 PM

## 2025-01-31 NOTE — ED NOTES
1111 EMS got phone call for cardiac arrest, family initiated CPR prior to EMS arrival, total of 5 epinephrines were given by EMS, LMA airway placed in the field. IO RLE  1146 last epi was given by EMS, CPR was continued on arrival to ER  1149 pulse checked, no palpable pulse, compressions were resumed, patient being bagged by RRT  1150 1 amp of sodium bicarbonate given through established IV access in the left AC, pulse was checked at this time, no pulse, 1 mg of epinephrine given  1151 blood glucose was checked and was 219  1152 pulse check, no pulse, asystole, CPR resumed,  1154 10 mg epinpehrine, pulse checked, no palpable pulse, CPR resumed.   1156 1 amp of bicarb given   1157 pulse checked, PEA on monitor, CPR resumed.  1157 10 mg calcium gluconate given   1159 end tidal 69  1159 end tidal 69  1159 Pulse checked, no palpable pulse, PEA on monitor, resumed CPR, 1201 1 mg epinephrine given, pulse checked  1202 1 mg epinephrine given, pulse is palpable, ROSC achieved  1203 10 mg atropine given   1204 20 mcg of push dose epinephrine  given, BP 50/37  1206 20 mcg of push dose epinephrine give, BP 47/33  1207 levophed started at 20 mcg/min  1208 pulse was checked, no palpable pulse was felt, CPR RESTARTED  1209 1 mg of epinephrine given  1210 pulse checked, pulse palpated, ROSC achieved, 1 mg epinephrine given.  1211 Dr. Jolley switching LMA to endotracheal tube,  68/43  1212 intubated 8.0 tube 25 at the lip.  1213 20 mcg push dose epineprhine given, HR 59 SB, 56/38  1215 48/32 40 mcg of push dose epinephrine given, epinephrine gtt started 10 mcg/min  1218 no palpable pulse felt, CPR RESUMED, 1 mg epinephrine given   1230 amp of bicarbonate given to patient  1235 vasopressin grr started at 0.03 unit/min, levophed was increased throughout time of patient being here and having and losing pulse. At this current time levophed is running at 100 mcg/ min per verbal order of Dr. Jolley and Dr. Cooper. Epinephrine gtt

## 2025-02-01 LAB
ABO/RH: NORMAL
ANTIBODY SCREEN: NEGATIVE
ARM BAND NUMBER: NORMAL
BLOOD BANK BLOOD PRODUCT EXPIRATION DATE: NORMAL
BLOOD BANK DISPENSE STATUS: NORMAL
BLOOD BANK ISBT PRODUCT BLOOD TYPE: 9500
BLOOD BANK PRODUCT CODE: NORMAL
BLOOD BANK SAMPLE EXPIRATION: NORMAL
BLOOD BANK UNIT TYPE AND RH: NORMAL
BPU ID: NORMAL
COMPONENT: NORMAL
CROSSMATCH RESULT: NORMAL
TRANSFUSION STATUS: NORMAL
UNIT DIVISION: 0
UNIT ISSUE DATE/TIME: NORMAL
UNIT TAG COMMENT: NORMAL

## 2025-02-02 LAB
ACB COMPLEX DNA BLD POS QL NAA+NON-PROBE: NOT DETECTED
B FRAGILIS DNA BLD POS QL NAA+NON-PROBE: NOT DETECTED
BIOFIRE TEST COMMENT: ABNORMAL
BLACTX-M ISLT/SPM QL: NOT DETECTED
BLAIMP ISLT/SPM QL: NOT DETECTED
BLAKPC ISLT/SPM QL: NOT DETECTED
BLAOXA-48-LIKE ISLT/SPM QL: NOT DETECTED
BLAVIM ISLT/SPM QL: NOT DETECTED
C ALBICANS DNA BLD POS QL NAA+NON-PROBE: NOT DETECTED
C AURIS DNA BLD POS QL NAA+NON-PROBE: NOT DETECTED
C GATTII+NEOFOR DNA BLD POS QL NAA+N-PRB: NOT DETECTED
C GLABRATA DNA BLD POS QL NAA+NON-PROBE: NOT DETECTED
C KRUSEI DNA BLD POS QL NAA+NON-PROBE: NOT DETECTED
C PARAP DNA BLD POS QL NAA+NON-PROBE: NOT DETECTED
C TROPICLS DNA BLD POS QL NAA+NON-PROBE: NOT DETECTED
COLISTIN RES MCR-1 ISLT/SPM QL: NOT DETECTED
E CLOAC COMP DNA BLD POS NAA+NON-PROBE: NOT DETECTED
E COLI DNA BLD POS QL NAA+NON-PROBE: NOT DETECTED
E FAECALIS DNA BLD POS QL NAA+NON-PROBE: NOT DETECTED
E FAECIUM DNA BLD POS QL NAA+NON-PROBE: NOT DETECTED
ENTEROBACTERALES DNA BLD POS NAA+N-PRB: DETECTED
GP B STREP DNA BLD POS QL NAA+NON-PROBE: NOT DETECTED
HAEM INFLU DNA BLD POS QL NAA+NON-PROBE: NOT DETECTED
K OXYTOCA DNA BLD POS QL NAA+NON-PROBE: DETECTED
KLEBSIELLA SP DNA BLD POS QL NAA+NON-PRB: DETECTED
KLEBSIELLA SP DNA BLD POS QL NAA+NON-PRB: DETECTED
L MONOCYTOG DNA BLD POS QL NAA+NON-PROBE: NOT DETECTED
MICROORGANISM SPEC CULT: ABNORMAL
MICROORGANISM/AGENT SPEC: ABNORMAL
MICROORGANISM/AGENT SPEC: ABNORMAL
N MEN DNA BLD POS QL NAA+NON-PROBE: NOT DETECTED
P AERUGINOSA DNA BLD POS NAA+NON-PROBE: NOT DETECTED
PROTEUS SP DNA BLD POS QL NAA+NON-PROBE: NOT DETECTED
RESISTANT GENE NDM BY PCR: NOT DETECTED
S AUREUS DNA BLD POS QL NAA+NON-PROBE: NOT DETECTED
S AUREUS+CONS DNA BLD POS NAA+NON-PROBE: NOT DETECTED
S EPIDERMIDIS DNA BLD POS QL NAA+NON-PRB: NOT DETECTED
S LUGDUNENSIS DNA BLD POS QL NAA+NON-PRB: NOT DETECTED
S MALTOPHILIA DNA BLD POS QL NAA+NON-PRB: NOT DETECTED
S MARCESCENS DNA BLD POS NAA+NON-PROBE: NOT DETECTED
S PNEUM DNA BLD POS QL NAA+NON-PROBE: NOT DETECTED
S PYO DNA BLD POS QL NAA+NON-PROBE: NOT DETECTED
SALMONELLA DNA BLD POS QL NAA+NON-PROBE: NOT DETECTED
SERVICE CMNT-IMP: ABNORMAL
SERVICE CMNT-IMP: ABNORMAL
SPECIMEN DESCRIPTION: ABNORMAL
SPECIMEN DESCRIPTION: ABNORMAL
STREPTOCOCCUS DNA BLD POS NAA+NON-PROBE: DETECTED

## 2025-02-04 LAB
ACB COMPLEX DNA BLD POS QL NAA+NON-PROBE: NOT DETECTED
B FRAGILIS DNA BLD POS QL NAA+NON-PROBE: NOT DETECTED
BIOFIRE TEST COMMENT: ABNORMAL
BLACTX-M ISLT/SPM QL: NOT DETECTED
BLAIMP ISLT/SPM QL: NOT DETECTED
BLAKPC ISLT/SPM QL: NOT DETECTED
BLAOXA-48-LIKE ISLT/SPM QL: NOT DETECTED
BLAVIM ISLT/SPM QL: NOT DETECTED
C ALBICANS DNA BLD POS QL NAA+NON-PROBE: NOT DETECTED
C AURIS DNA BLD POS QL NAA+NON-PROBE: NOT DETECTED
C GATTII+NEOFOR DNA BLD POS QL NAA+N-PRB: NOT DETECTED
C GLABRATA DNA BLD POS QL NAA+NON-PROBE: NOT DETECTED
C KRUSEI DNA BLD POS QL NAA+NON-PROBE: NOT DETECTED
C PARAP DNA BLD POS QL NAA+NON-PROBE: NOT DETECTED
C TROPICLS DNA BLD POS QL NAA+NON-PROBE: NOT DETECTED
COLISTIN RES MCR-1 ISLT/SPM QL: NOT DETECTED
E CLOAC COMP DNA BLD POS NAA+NON-PROBE: NOT DETECTED
E COLI DNA BLD POS QL NAA+NON-PROBE: NOT DETECTED
E FAECALIS DNA BLD POS QL NAA+NON-PROBE: NOT DETECTED
E FAECIUM DNA BLD POS QL NAA+NON-PROBE: NOT DETECTED
ENTEROBACTERALES DNA BLD POS NAA+N-PRB: DETECTED
GP B STREP DNA BLD POS QL NAA+NON-PROBE: NOT DETECTED
HAEM INFLU DNA BLD POS QL NAA+NON-PROBE: NOT DETECTED
K OXYTOCA DNA BLD POS QL NAA+NON-PROBE: DETECTED
KLEBSIELLA SP DNA BLD POS QL NAA+NON-PRB: DETECTED
KLEBSIELLA SP DNA BLD POS QL NAA+NON-PRB: DETECTED
L MONOCYTOG DNA BLD POS QL NAA+NON-PROBE: NOT DETECTED
MICROORGANISM SPEC CULT: ABNORMAL
MICROORGANISM/AGENT SPEC: ABNORMAL
MICROORGANISM/AGENT SPEC: ABNORMAL
N MEN DNA BLD POS QL NAA+NON-PROBE: NOT DETECTED
P AERUGINOSA DNA BLD POS NAA+NON-PROBE: NOT DETECTED
PROTEUS SP DNA BLD POS QL NAA+NON-PROBE: NOT DETECTED
RESISTANT GENE NDM BY PCR: NOT DETECTED
S AUREUS DNA BLD POS QL NAA+NON-PROBE: NOT DETECTED
S AUREUS+CONS DNA BLD POS NAA+NON-PROBE: NOT DETECTED
S EPIDERMIDIS DNA BLD POS QL NAA+NON-PRB: NOT DETECTED
S LUGDUNENSIS DNA BLD POS QL NAA+NON-PRB: NOT DETECTED
S MALTOPHILIA DNA BLD POS QL NAA+NON-PRB: NOT DETECTED
S MARCESCENS DNA BLD POS NAA+NON-PROBE: NOT DETECTED
S PNEUM DNA BLD POS QL NAA+NON-PROBE: NOT DETECTED
S PYO DNA BLD POS QL NAA+NON-PROBE: NOT DETECTED
SALMONELLA DNA BLD POS QL NAA+NON-PROBE: NOT DETECTED
SERVICE CMNT-IMP: ABNORMAL
SERVICE CMNT-IMP: ABNORMAL
SPECIMEN DESCRIPTION: ABNORMAL
SPECIMEN DESCRIPTION: ABNORMAL
STREPTOCOCCUS DNA BLD POS NAA+NON-PROBE: DETECTED

## (undated) DEVICE — SNARE ENDOSCP POLYP SM 2.4 MM 195 CM 13 MM 2.8 MM CAPTIVATOR

## (undated) DEVICE — CATHETER SCLERO L240CM NDL 25GA L4MM SHTH DIA2.3MM CNTRST

## (undated) DEVICE — SPHINCTEROTOME: Brand: HYDRATOME RX 44

## (undated) DEVICE — SNARE ENDOSCP L240CM SHTH DIA2.4MM LOOP W30MM MIN WRK CHN

## (undated) DEVICE — CONNECTOR IRRIGATION AUXILIARY H2O JET W/ PRT MTL THRD HYDR

## (undated) DEVICE — SYSTEM BX CAP BILI RAP EXCHG CAP LOK DEV COMPATIBLE W/ OLY

## (undated) DEVICE — BITEBLOCK 54FR W/ DENT RIM BLOX

## (undated) DEVICE — SNARE ENDOSCP L240CM W15MM SHTH DIA2.4MM CHN 2.8MM STIFF

## (undated) DEVICE — PANCREATIC STENT DELIVERY SYSTEM: Brand: NAVIFLEX™ RX DELIVERY SYSTEM

## (undated) DEVICE — Device: Brand: BALLOON3

## (undated) DEVICE — GAUZE,SPONGE,POST-OP,4X3,STRL,LF: Brand: MEDLINE

## (undated) DEVICE — APPLICATOR ENDOSCP SPARE DEL ENDOCLOT

## (undated) DEVICE — DEFENDO AIR WATER SUCTION AND BIOPSY VALVE KIT FOR  OLYMPUS: Brand: DEFENDO AIR/WATER/SUCTION AND BIOPSY VALVE

## (undated) DEVICE — SYSTEM INJ BILI RAP REFIL CONT

## (undated) DEVICE — CONTAINER SPEC 60ML PH 7NEUTRAL BUFF FRMLN RDY TO USE

## (undated) DEVICE — SINGLE USE DISTAL COVER MAJ-2315: Brand: SINGLE USE DISTAL COVER

## (undated) DEVICE — Z DISCONTINUED NO SUB IDED TUBING ETCO2 AD L6.5FT NSL ORAL CVD PRNG NONFLARED TIP OVR

## (undated) DEVICE — RETRIEVAL BALLOON CATHETER: Brand: EXTRACTOR™ PRO RX

## (undated) DEVICE — TRAP POLYP ETRAP

## (undated) DEVICE — CANNULA NSL ORAL AD FOR CAPNOFLEX CO2 O2 AIRLFE

## (undated) DEVICE — ELECTRODE PT RET AD L9FT HI MOIST COND ADH HYDRGEL CORDED

## (undated) DEVICE — Device

## (undated) DEVICE — MEDIA CONTRAST ISOVUE 300 100ML

## (undated) DEVICE — RX PUSHER: Brand: NAVIFLEX™ RX PUSHER

## (undated) DEVICE — HEMOSPRAY ENDOSCOPIC HEMOSTAT: Brand: HEMOSPRAY

## (undated) DEVICE — WORKING LENGTH 155CM, WORKING CHANNEL 2.8MM: Brand: RESOLUTION 360 CLIP

## (undated) DEVICE — WIREGUIDED CYTOLOGY BRUSH: Brand: RX CYTOLOGY BRUSH

## (undated) DEVICE — CANNULATING SPHINCTEROTOME: Brand: JAGTOME™ REVOLUTION RX

## (undated) DEVICE — AGENT HEMOSTATIC POLYSACCHARIDE 230 CM 2.8 MM 3 GM ENDOCLOT

## (undated) DEVICE — GAUZE,SPONGE,4"X4",8PLY,STRL,LF,10/TRAY: Brand: MEDLINE

## (undated) DEVICE — FORCEP BX LG CAP 2.4 MMX120 CM W/ NDL YEL RADIAL JAW 4 DISP

## (undated) DEVICE — SYRINGE MED 50ML LUERSLIP TIP

## (undated) DEVICE — PATIENT RETURN ELECTRODE, SINGLE-USE, CONTACT QUALITY MONITORING, ADULT, WITH 9FT CORD, FOR PATIENTS WEIGING OVER 33LBS. (15KG): Brand: MEGADYNE